# Patient Record
Sex: MALE | Race: WHITE | NOT HISPANIC OR LATINO | Employment: OTHER | URBAN - METROPOLITAN AREA
[De-identification: names, ages, dates, MRNs, and addresses within clinical notes are randomized per-mention and may not be internally consistent; named-entity substitution may affect disease eponyms.]

---

## 2018-03-08 ENCOUNTER — TELEPHONE CONVERTED (OUTPATIENT)
Dept: PULMONOLOGY | Facility: CLINIC | Age: 78
End: 2018-03-08

## 2018-03-14 ENCOUNTER — OFFICE VISIT CONVERTED (OUTPATIENT)
Dept: PULMONOLOGY | Facility: CLINIC | Age: 78
End: 2018-03-14
Attending: NURSE PRACTITIONER

## 2018-03-15 ENCOUNTER — TELEPHONE CONVERTED (OUTPATIENT)
Dept: PULMONOLOGY | Facility: CLINIC | Age: 78
End: 2018-03-15

## 2018-04-11 ENCOUNTER — TELEPHONE CONVERTED (OUTPATIENT)
Dept: PULMONOLOGY | Facility: CLINIC | Age: 78
End: 2018-04-11

## 2018-04-13 ENCOUNTER — TELEPHONE CONVERTED (OUTPATIENT)
Dept: PULMONOLOGY | Facility: CLINIC | Age: 78
End: 2018-04-13

## 2018-05-08 ENCOUNTER — TELEPHONE CONVERTED (OUTPATIENT)
Dept: PULMONOLOGY | Facility: CLINIC | Age: 78
End: 2018-05-08

## 2018-06-05 ENCOUNTER — TELEPHONE CONVERTED (OUTPATIENT)
Dept: PULMONOLOGY | Facility: CLINIC | Age: 78
End: 2018-06-05

## 2018-06-08 ENCOUNTER — OFFICE VISIT CONVERTED (OUTPATIENT)
Dept: PULMONOLOGY | Facility: CLINIC | Age: 78
End: 2018-06-08
Attending: NURSE PRACTITIONER

## 2018-06-25 ENCOUNTER — OFFICE VISIT CONVERTED (OUTPATIENT)
Dept: PULMONOLOGY | Facility: CLINIC | Age: 78
End: 2018-06-25
Attending: PHYSICIAN ASSISTANT

## 2018-09-20 ENCOUNTER — OFFICE VISIT CONVERTED (OUTPATIENT)
Dept: PULMONOLOGY | Facility: CLINIC | Age: 78
End: 2018-09-20
Attending: INTERNAL MEDICINE

## 2018-12-04 ENCOUNTER — OFFICE VISIT CONVERTED (OUTPATIENT)
Dept: PULMONOLOGY | Facility: CLINIC | Age: 78
End: 2018-12-04
Attending: PHYSICIAN ASSISTANT

## 2018-12-10 ENCOUNTER — OFFICE VISIT CONVERTED (OUTPATIENT)
Dept: PULMONOLOGY | Facility: CLINIC | Age: 78
End: 2018-12-10
Attending: INTERNAL MEDICINE

## 2019-01-07 ENCOUNTER — OFFICE VISIT CONVERTED (OUTPATIENT)
Dept: PULMONOLOGY | Facility: CLINIC | Age: 79
End: 2019-01-07
Attending: INTERNAL MEDICINE

## 2019-02-12 ENCOUNTER — OFFICE VISIT CONVERTED (OUTPATIENT)
Dept: PULMONOLOGY | Facility: CLINIC | Age: 79
End: 2019-02-12
Attending: NURSE PRACTITIONER

## 2019-02-12 ENCOUNTER — HOSPITAL ENCOUNTER (OUTPATIENT)
Dept: ONCOLOGY | Facility: HOSPITAL | Age: 79
Discharge: HOME | End: 2019-02-12
Attending: NURSE PRACTITIONER

## 2019-02-12 LAB
ALBUMIN SERPL-MCNC: 4.1 G/DL (ref 3.5–5)
ALBUMIN/GLOB SERPL: 1.7 {RATIO} (ref 1.4–2.6)
ALP SERPL-CCNC: 103 U/L (ref 56–155)
ALT SERPL-CCNC: 18 U/L (ref 10–40)
ANION GAP SERPL CALC-SCNC: 15 MMOL/L (ref 8–19)
AST SERPL-CCNC: 23 U/L (ref 15–50)
BASOPHILS # BLD AUTO: 0.01 10*3/UL (ref 0–0.2)
BASOPHILS NFR BLD AUTO: 0.17 % (ref 0–3)
BILIRUB SERPL-MCNC: 0.52 MG/DL (ref 0.2–1.3)
BUN SERPL-MCNC: 13 MG/DL (ref 5–25)
BUN/CREAT SERPL: 15 {RATIO} (ref 6–20)
CALCIUM SERPL-MCNC: 9.5 MG/DL (ref 8.7–10.4)
CHLORIDE SERPL-SCNC: 104 MMOL/L (ref 99–111)
CONV CO2: 28 MMOL/L (ref 22–32)
CONV TOTAL PROTEIN: 6.5 G/DL (ref 6.3–8.2)
CREAT UR-MCNC: 0.87 MG/DL (ref 0.7–1.2)
EOSINOPHIL # BLD AUTO: 0.16 10*3/UL (ref 0–0.7)
EOSINOPHIL # BLD AUTO: 2.25 % (ref 0–7)
ERYTHROCYTE [DISTWIDTH] IN BLOOD BY AUTOMATED COUNT: 14.6 % (ref 11.5–14.5)
GFR SERPLBLD BASED ON 1.73 SQ M-ARVRAT: >60 ML/MIN/{1.73_M2}
GLOBULIN UR ELPH-MCNC: 2.4 G/DL (ref 2–3.5)
GLUCOSE SERPL-MCNC: 84 MG/DL (ref 70–99)
HBA1C MFR BLD: 13.9 G/DL (ref 14–18)
HCT VFR BLD AUTO: 41.3 % (ref 42–52)
LDH SERPL-CCNC: 214 U/L (ref 120–240)
LYMPHOCYTES # BLD AUTO: 0.95 10*3/UL (ref 1–5)
MCH RBC QN AUTO: 28.5 PG (ref 27–31)
MCHC RBC AUTO-ENTMCNC: 33.5 G/DL (ref 33–37)
MCV RBC AUTO: 85.1 FL (ref 80–96)
MONOCYTES # BLD AUTO: 0.65 10*3/UL (ref 0.2–1.2)
MONOCYTES NFR BLD AUTO: 8.99 % (ref 3–10)
NEUTROPHILS # BLD AUTO: 5.44 10*3/UL (ref 2–8)
NEUTROPHILS NFR BLD AUTO: 75.4 % (ref 30–85)
NRBC BLD AUTO-RTO: 0 % (ref 0–0.01)
OSMOLALITY SERPL CALC.SUM OF ELEC: 295 MOSM/KG (ref 273–304)
PLATELET # BLD AUTO: 149 10*3/UL (ref 130–400)
PMV BLD AUTO: 8.6 FL (ref 7.4–10.4)
POTASSIUM SERPL-SCNC: 4.2 MMOL/L (ref 3.5–5.3)
RBC # BLD AUTO: 4.86 10*6/UL (ref 4.7–6.1)
SODIUM SERPL-SCNC: 143 MMOL/L (ref 135–147)
VARIANT LYMPHS NFR BLD MANUAL: 13.2 % (ref 20–45)
WBC # BLD AUTO: 7.21 10*3/UL (ref 4.8–10.8)

## 2019-02-18 ENCOUNTER — HOSPITAL ENCOUNTER (OUTPATIENT)
Dept: GENERAL RADIOLOGY | Facility: HOSPITAL | Age: 79
Discharge: HOME | End: 2019-02-18
Attending: PHYSICIAN ASSISTANT

## 2019-02-26 ENCOUNTER — OFFICE VISIT CONVERTED (OUTPATIENT)
Dept: PULMONOLOGY | Facility: CLINIC | Age: 79
End: 2019-02-26
Attending: INTERNAL MEDICINE

## 2019-03-15 ENCOUNTER — HOSPITAL ENCOUNTER (OUTPATIENT)
Dept: OTHER | Facility: HOSPITAL | Age: 79
Discharge: HOME | End: 2019-03-15
Attending: SPECIALIST

## 2019-03-15 LAB
ALBUMIN SERPL-MCNC: 4 G/DL (ref 3.5–5)
ALBUMIN/GLOB SERPL: 1.5 {RATIO} (ref 1.4–2.6)
ALP SERPL-CCNC: 115 U/L (ref 56–155)
ALT SERPL-CCNC: 19 U/L (ref 10–40)
ANION GAP SERPL CALC-SCNC: 13 MMOL/L (ref 8–19)
AST SERPL-CCNC: 25 U/L (ref 15–50)
BILIRUB SERPL-MCNC: 0.46 MG/DL (ref 0.2–1.3)
BUN SERPL-MCNC: 10 MG/DL (ref 5–25)
BUN/CREAT SERPL: 14 {RATIO} (ref 6–20)
CALCIUM SERPL-MCNC: 9.1 MG/DL (ref 8.7–10.4)
CHLORIDE SERPL-SCNC: 105 MMOL/L (ref 99–111)
CHOLEST SERPL-MCNC: 158 MG/DL (ref 107–200)
CHOLEST/HDLC SERPL: 1.8 {RATIO} (ref 3–6)
CONV CO2: 29 MMOL/L (ref 22–32)
CONV TOTAL PROTEIN: 6.7 G/DL (ref 6.3–8.2)
CREAT UR-MCNC: 0.72 MG/DL (ref 0.7–1.2)
GFR SERPLBLD BASED ON 1.73 SQ M-ARVRAT: >60 ML/MIN/{1.73_M2}
GLOBULIN UR ELPH-MCNC: 2.7 G/DL (ref 2–3.5)
GLUCOSE SERPL-MCNC: 97 MG/DL (ref 70–99)
HDLC SERPL-MCNC: 88 MG/DL (ref 40–60)
LDLC SERPL CALC-MCNC: 61 MG/DL (ref 70–100)
OSMOLALITY SERPL CALC.SUM OF ELEC: 295 MOSM/KG (ref 273–304)
POTASSIUM SERPL-SCNC: 4.3 MMOL/L (ref 3.5–5.3)
SODIUM SERPL-SCNC: 143 MMOL/L (ref 135–147)
TRIGL SERPL-MCNC: 43 MG/DL (ref 40–150)
VLDLC SERPL-MCNC: 9 MG/DL (ref 5–37)

## 2019-04-03 ENCOUNTER — HOSPITAL ENCOUNTER (OUTPATIENT)
Dept: ONCOLOGY | Facility: HOSPITAL | Age: 79
Discharge: HOME | End: 2019-04-03
Attending: NURSE PRACTITIONER

## 2019-04-03 ENCOUNTER — OFFICE VISIT CONVERTED (OUTPATIENT)
Dept: PULMONOLOGY | Facility: CLINIC | Age: 79
End: 2019-04-03
Attending: NURSE PRACTITIONER

## 2019-04-24 ENCOUNTER — OFFICE VISIT CONVERTED (OUTPATIENT)
Dept: PULMONOLOGY | Facility: CLINIC | Age: 79
End: 2019-04-24
Attending: NURSE PRACTITIONER

## 2019-04-24 ENCOUNTER — HOSPITAL ENCOUNTER (OUTPATIENT)
Dept: OTHER | Facility: HOSPITAL | Age: 79
Discharge: HOME | End: 2019-04-24
Attending: NURSE PRACTITIONER

## 2019-05-15 ENCOUNTER — HOSPITAL ENCOUNTER (OUTPATIENT)
Dept: GENERAL RADIOLOGY | Facility: HOSPITAL | Age: 79
Discharge: HOME | End: 2019-05-15
Attending: INTERNAL MEDICINE

## 2019-05-28 ENCOUNTER — OFFICE VISIT CONVERTED (OUTPATIENT)
Dept: PULMONOLOGY | Facility: CLINIC | Age: 79
End: 2019-05-28
Attending: PHYSICIAN ASSISTANT

## 2019-08-28 ENCOUNTER — HOSPITAL ENCOUNTER (OUTPATIENT)
Dept: GENERAL RADIOLOGY | Facility: HOSPITAL | Age: 79
Discharge: HOME | End: 2019-08-28
Attending: PHYSICIAN ASSISTANT

## 2019-09-13 ENCOUNTER — OFFICE VISIT CONVERTED (OUTPATIENT)
Dept: PULMONOLOGY | Facility: CLINIC | Age: 79
End: 2019-09-13
Attending: INTERNAL MEDICINE

## 2019-10-04 ENCOUNTER — HOSPITAL ENCOUNTER (OUTPATIENT)
Dept: URGENT CARE | Facility: CLINIC | Age: 79
Discharge: HOME | End: 2019-10-04

## 2019-10-17 ENCOUNTER — CONVERSION ENCOUNTER (OUTPATIENT)
Dept: NEUROLOGY | Facility: CLINIC | Age: 79
End: 2019-10-17

## 2019-10-17 ENCOUNTER — OFFICE VISIT CONVERTED (OUTPATIENT)
Dept: NEUROSURGERY | Facility: CLINIC | Age: 79
End: 2019-10-17
Attending: PHYSICIAN ASSISTANT

## 2019-10-29 ENCOUNTER — OFFICE VISIT CONVERTED (OUTPATIENT)
Dept: PULMONOLOGY | Facility: CLINIC | Age: 79
End: 2019-10-29
Attending: PHYSICIAN ASSISTANT

## 2019-11-07 ENCOUNTER — HOSPITAL ENCOUNTER (OUTPATIENT)
Dept: ONCOLOGY | Facility: HOSPITAL | Age: 79
Discharge: HOME | End: 2019-11-07
Attending: NURSE PRACTITIONER

## 2019-11-07 ENCOUNTER — OFFICE VISIT CONVERTED (OUTPATIENT)
Dept: PULMONOLOGY | Facility: CLINIC | Age: 79
End: 2019-11-07
Attending: NURSE PRACTITIONER

## 2020-05-07 ENCOUNTER — HOSPITAL ENCOUNTER (OUTPATIENT)
Dept: ONCOLOGY | Facility: HOSPITAL | Age: 80
Discharge: HOME | End: 2020-05-07
Attending: NURSE PRACTITIONER

## 2020-05-07 ENCOUNTER — OFFICE VISIT CONVERTED (OUTPATIENT)
Dept: PULMONOLOGY | Facility: CLINIC | Age: 80
End: 2020-05-07
Attending: NURSE PRACTITIONER

## 2020-05-07 LAB
BASOPHILS # BLD AUTO: 0.04 10*3/UL (ref 0–0.2)
BASOPHILS NFR BLD AUTO: 0.5 % (ref 0–3)
CONV ABS IMM GRAN: 0.05 10*3/UL (ref 0–0.2)
CONV IMMATURE GRAN: 0.7 % (ref 0–1.8)
DEPRECATED RDW RBC AUTO: 48.2 FL (ref 35.1–43.9)
EOSINOPHIL # BLD AUTO: 0.11 10*3/UL (ref 0–0.7)
EOSINOPHIL # BLD AUTO: 1.5 % (ref 0–7)
ERYTHROCYTE [DISTWIDTH] IN BLOOD BY AUTOMATED COUNT: 14.3 % (ref 11.6–14.4)
HCT VFR BLD AUTO: 43.9 % (ref 42–52)
HGB BLD-MCNC: 13.8 G/DL (ref 14–18)
LYMPHOCYTES # BLD AUTO: 1.39 10*3/UL (ref 1–5)
LYMPHOCYTES NFR BLD AUTO: 18.7 % (ref 20–45)
MCH RBC QN AUTO: 28.9 PG (ref 27–31)
MCHC RBC AUTO-ENTMCNC: 31.4 G/DL (ref 33–37)
MCV RBC AUTO: 91.8 FL (ref 80–96)
MONOCYTES # BLD AUTO: 0.67 10*3/UL (ref 0.2–1.2)
MONOCYTES NFR BLD AUTO: 9 % (ref 3–10)
NEUTROPHILS # BLD AUTO: 5.17 10*3/UL (ref 2–8)
NEUTROPHILS NFR BLD AUTO: 69.6 % (ref 30–85)
NRBC CBCN: 0 % (ref 0–0.7)
PLATELET # BLD AUTO: 192 10*3/UL (ref 130–400)
PMV BLD AUTO: 10.9 FL (ref 9.4–12.4)
RBC # BLD AUTO: 4.78 10*6/UL (ref 4.7–6.1)
WBC # BLD AUTO: 7.43 10*3/UL (ref 4.8–10.8)

## 2020-05-20 ENCOUNTER — OFFICE VISIT CONVERTED (OUTPATIENT)
Dept: PULMONOLOGY | Facility: CLINIC | Age: 80
End: 2020-05-20
Attending: PHYSICIAN ASSISTANT

## 2020-06-16 ENCOUNTER — HOSPITAL ENCOUNTER (OUTPATIENT)
Dept: GENERAL RADIOLOGY | Facility: HOSPITAL | Age: 80
Discharge: HOME | End: 2020-06-16
Attending: PHYSICIAN ASSISTANT

## 2020-07-20 ENCOUNTER — HOSPITAL ENCOUNTER (OUTPATIENT)
Dept: GENERAL RADIOLOGY | Facility: HOSPITAL | Age: 80
Discharge: HOME | End: 2020-07-20
Attending: NURSE PRACTITIONER

## 2020-07-22 ENCOUNTER — OFFICE VISIT CONVERTED (OUTPATIENT)
Dept: PULMONOLOGY | Facility: CLINIC | Age: 80
End: 2020-07-22
Attending: PHYSICIAN ASSISTANT

## 2020-09-17 ENCOUNTER — HOSPITAL ENCOUNTER (OUTPATIENT)
Dept: GENERAL RADIOLOGY | Facility: HOSPITAL | Age: 80
Discharge: HOME | End: 2020-09-17
Attending: PHYSICIAN ASSISTANT

## 2020-10-27 ENCOUNTER — OFFICE VISIT CONVERTED (OUTPATIENT)
Dept: ONCOLOGY | Facility: HOSPITAL | Age: 80
End: 2020-10-27
Attending: NURSE PRACTITIONER

## 2020-10-27 ENCOUNTER — HOSPITAL ENCOUNTER (OUTPATIENT)
Dept: ONCOLOGY | Facility: HOSPITAL | Age: 80
Discharge: HOME | End: 2020-10-27
Attending: NURSE PRACTITIONER

## 2020-10-27 LAB
ALBUMIN SERPL-MCNC: 4.1 G/DL (ref 3.5–5)
ALBUMIN/GLOB SERPL: 1.9 {RATIO} (ref 1.4–2.6)
ALP SERPL-CCNC: 123 U/L (ref 56–155)
ALT SERPL-CCNC: 18 U/L (ref 10–40)
ANION GAP SERPL CALC-SCNC: 13 MMOL/L (ref 8–19)
AST SERPL-CCNC: 22 U/L (ref 15–50)
BASOPHILS # BLD AUTO: 0.04 10*3/UL (ref 0–0.2)
BASOPHILS NFR BLD AUTO: 0.5 % (ref 0–3)
BILIRUB SERPL-MCNC: 0.31 MG/DL (ref 0.2–1.3)
BUN SERPL-MCNC: 11 MG/DL (ref 5–25)
BUN/CREAT SERPL: 14 {RATIO} (ref 6–20)
CALCIUM SERPL-MCNC: 9.1 MG/DL (ref 8.7–10.4)
CHLORIDE SERPL-SCNC: 101 MMOL/L (ref 99–111)
CONV ABS IMM GRAN: 0.03 10*3/UL (ref 0–0.2)
CONV CO2: 29 MMOL/L (ref 22–32)
CONV IMMATURE GRAN: 0.4 % (ref 0–1.8)
CONV TOTAL PROTEIN: 6.3 G/DL (ref 6.3–8.2)
CREAT UR-MCNC: 0.8 MG/DL (ref 0.7–1.2)
DEPRECATED RDW RBC AUTO: 48.2 FL (ref 35.1–43.9)
EOSINOPHIL # BLD AUTO: 0.13 10*3/UL (ref 0–0.7)
EOSINOPHIL # BLD AUTO: 1.6 % (ref 0–7)
ERYTHROCYTE [DISTWIDTH] IN BLOOD BY AUTOMATED COUNT: 14.4 % (ref 11.6–14.4)
GFR SERPLBLD BASED ON 1.73 SQ M-ARVRAT: >60 ML/MIN/{1.73_M2}
GLOBULIN UR ELPH-MCNC: 2.2 G/DL (ref 2–3.5)
GLUCOSE SERPL-MCNC: 123 MG/DL (ref 70–99)
HCT VFR BLD AUTO: 42.8 % (ref 42–52)
HGB BLD-MCNC: 13.5 G/DL (ref 14–18)
LYMPHOCYTES # BLD AUTO: 1.36 10*3/UL (ref 1–5)
LYMPHOCYTES NFR BLD AUTO: 17.2 % (ref 20–45)
MCH RBC QN AUTO: 28.7 PG (ref 27–31)
MCHC RBC AUTO-ENTMCNC: 31.5 G/DL (ref 33–37)
MCV RBC AUTO: 90.9 FL (ref 80–96)
MONOCYTES # BLD AUTO: 0.6 10*3/UL (ref 0.2–1.2)
MONOCYTES NFR BLD AUTO: 7.6 % (ref 3–10)
NEUTROPHILS # BLD AUTO: 5.77 10*3/UL (ref 2–8)
NEUTROPHILS NFR BLD AUTO: 72.7 % (ref 30–85)
NRBC CBCN: 0 % (ref 0–0.7)
OSMOLALITY SERPL CALC.SUM OF ELEC: 289 MOSM/KG (ref 273–304)
PLATELET # BLD AUTO: 184 10*3/UL (ref 130–400)
PMV BLD AUTO: 11 FL (ref 9.4–12.4)
POTASSIUM SERPL-SCNC: 4 MMOL/L (ref 3.5–5.3)
RBC # BLD AUTO: 4.71 10*6/UL (ref 4.7–6.1)
SODIUM SERPL-SCNC: 139 MMOL/L (ref 135–147)
WBC # BLD AUTO: 7.93 10*3/UL (ref 4.8–10.8)

## 2020-11-06 ENCOUNTER — OFFICE VISIT CONVERTED (OUTPATIENT)
Dept: NEUROLOGY | Facility: CLINIC | Age: 80
End: 2020-11-06
Attending: PSYCHIATRY & NEUROLOGY

## 2020-11-13 ENCOUNTER — OFFICE VISIT CONVERTED (OUTPATIENT)
Dept: ORTHOPEDIC SURGERY | Facility: CLINIC | Age: 80
End: 2020-11-13
Attending: ORTHOPAEDIC SURGERY

## 2021-03-02 ENCOUNTER — HOSPITAL ENCOUNTER (OUTPATIENT)
Dept: GENERAL RADIOLOGY | Facility: HOSPITAL | Age: 81
Discharge: HOME | End: 2021-03-02
Attending: PHYSICIAN ASSISTANT

## 2021-04-02 ENCOUNTER — HOSPITAL ENCOUNTER (OUTPATIENT)
Dept: OTHER | Facility: HOSPITAL | Age: 81
Discharge: HOME | End: 2021-04-02
Attending: SPECIALIST

## 2021-04-02 LAB
25(OH)D3 SERPL-MCNC: 58.7 NG/ML (ref 30–100)
ALBUMIN SERPL-MCNC: 4 G/DL (ref 3.5–5)
ALBUMIN/GLOB SERPL: 1.8 {RATIO} (ref 1.4–2.6)
ALP SERPL-CCNC: 132 U/L (ref 56–155)
ALT SERPL-CCNC: 17 U/L (ref 10–40)
ANION GAP SERPL CALC-SCNC: 15 MMOL/L (ref 8–19)
AST SERPL-CCNC: 20 U/L (ref 15–50)
BILIRUB SERPL-MCNC: 0.57 MG/DL (ref 0.2–1.3)
BUN SERPL-MCNC: 11 MG/DL (ref 5–25)
BUN/CREAT SERPL: 14 {RATIO} (ref 6–20)
CALCIUM SERPL-MCNC: 9.3 MG/DL (ref 8.7–10.4)
CHLORIDE SERPL-SCNC: 105 MMOL/L (ref 99–111)
CHOLEST SERPL-MCNC: 149 MG/DL (ref 107–200)
CHOLEST/HDLC SERPL: 1.9 {RATIO} (ref 3–6)
CONV CO2: 28 MMOL/L (ref 22–32)
CONV TOTAL PROTEIN: 6.2 G/DL (ref 6.3–8.2)
CREAT UR-MCNC: 0.76 MG/DL (ref 0.7–1.2)
GFR SERPLBLD BASED ON 1.73 SQ M-ARVRAT: >60 ML/MIN/{1.73_M2}
GLOBULIN UR ELPH-MCNC: 2.2 G/DL (ref 2–3.5)
GLUCOSE SERPL-MCNC: 96 MG/DL (ref 70–99)
HDLC SERPL-MCNC: 79 MG/DL (ref 40–60)
LDLC SERPL CALC-MCNC: 59 MG/DL (ref 70–100)
OSMOLALITY SERPL CALC.SUM OF ELEC: 297 MOSM/KG (ref 273–304)
POTASSIUM SERPL-SCNC: 4.1 MMOL/L (ref 3.5–5.3)
SODIUM SERPL-SCNC: 144 MMOL/L (ref 135–147)
TRIGL SERPL-MCNC: 54 MG/DL (ref 40–150)
VLDLC SERPL-MCNC: 11 MG/DL (ref 5–37)

## 2021-04-19 ENCOUNTER — OFFICE VISIT CONVERTED (OUTPATIENT)
Dept: ONCOLOGY | Facility: HOSPITAL | Age: 81
End: 2021-04-19
Attending: INTERNAL MEDICINE

## 2021-04-27 ENCOUNTER — OFFICE VISIT CONVERTED (OUTPATIENT)
Dept: ONCOLOGY | Facility: HOSPITAL | Age: 81
End: 2021-04-27
Attending: NURSE PRACTITIONER

## 2021-04-27 ENCOUNTER — HOSPITAL ENCOUNTER (OUTPATIENT)
Dept: ONCOLOGY | Facility: HOSPITAL | Age: 81
Discharge: HOME | End: 2021-04-27
Attending: NURSE PRACTITIONER

## 2021-05-10 NOTE — H&P
History and Physical      Patient Name: Carlos Zimmerman   Patient ID: [id number]   Sex: Male   YOB: 1940    Primary Care Provider: Payam Carroll MD   Referring Provider: Claribel ULLOA    Visit Date: November 13, 2020    Provider: Jody Nieto MD   Location: [facility]   Location Address: [address] [place] [address]  [id number]   Location Phone: [phone]          Chief Complaint  · Bilateral Carpal Tunnel Syndrome      History Of Present Illness  Carlos Zimmerman is a 80 year old /White male who presents today to [facility].      Patient presents today for an evaluation of bilateral hand pain. Patient presents today with an EMG study of bilateral hands revealing carpal tunnel syndrome. Patient states he has numbness and tingling in bilateral hands after having chemo in 2017. He states both hands bother him equally. He states 24 hours a day his hands feels like when your foot falls asleep.       Past Medical History  Cancer; COPD (chronic obstructive pulmonary disease); High blood pressure; Hypertension; Hypertension, Benign Essential; Lung cancer; Pain, Lumbar         Past Surgical History  Esophagus wrap; Port Placement; shoulder repair         Medication List  amlodipine 5 mg oral tablet; aspirin 325 mg oral tablet; B Complex 1 oral tablet; cetirizine 10 mg oral tablet; cilostazol 100 mg oral tablet; cyclobenzaprine 10 mg oral tablet; gabapentin 600 mg oral tablet; metoprolol succinate 25 mg oral tablet extended release 24 hr; Multi Vitamin 9 mg iron/15 mL oral liquid; pravastatin 40 mg oral tablet; Protonix oral; Stiolto Respimat 2.5-2.5 mcg/actuation inhalation mist; Vitamin D2 50,000 unit oral capsule         Allergy List  NO KNOWN DRUG ALLERGIES       Allergies Reconciled  Family Medical History  Cerebrovascular disease; *No Known Family History         Social History  Alcohol (Current some day); Caffeine (Current - status unknown); lives  "with spouse; ; Retired; Tobacco (Former)         Review of Systems  · Constitutional  o Denies  o : fever, chills, weight loss  · Cardiovascular  o Denies  o : chest pain, shortness of breath  · Gastrointestinal  o Denies  o : liver disease, heartburn, nausea, blood in stools  · Genitourinary  o Denies  o : painful urination, blood in urine  · Integument  o Denies  o : rash, itching  · Neurologic  o Denies  o : headache, weakness, loss of consciousness  · Musculoskeletal  o Denies  o : painful, swollen joints  · Psychiatric  o Denies  o : drug/alcohol addiction, anxiety, depression      Vitals  Date Time BP Position Site L\R Cuff Size HR RR TEMP (F) WT  HT  BMI kg/m2 BSA m2 O2 Sat FR L/min FiO2        11/13/2020 08:57 AM         159lbs 0oz 5'  7\" 24.9 1.85             Physical Examination  · Constitutional  o Appearance  o : well developed, well-nourished, no obvious deformities present  · Head and Face  o Head  o :   § Inspection  § : normocephalic  o Face  o :   § Inspection  § : no facial lesions  · Eyes  o Conjunctivae  o : conjunctivae normal  o Sclerae  o : sclerae white  · Ears, Nose, Mouth and Throat  o Ears  o :   § External Ears  § : appearance within normal limits  § Hearing  § : intact  o Nose  o :   § External Nose  § : appearance normal  · Neck  o Inspection/Palpation  o : normal appearance  o Range of Motion  o : full range of motion  · Respiratory  o Respiratory Effort  o : breathing unlabored  o Inspection of Chest  o : normal appearance  o Auscultation of Lungs  o : no audible wheezing or rales  · Cardiovascular  o Heart  o : regular rate  · Gastrointestinal  o Abdominal Examination  o : soft and non-tender  · Skin and Subcutaneous Tissue  o General Inspection  o : intact, no rashes  · Psychiatric  o General  o : Alert and oriented x3  o Judgement and Insight  o : judgment and insight intact  o Mood and Affect  o : mood normal, affect appropriate  · Extremities  o Extremities  o : " BILATERAL HANDS: Sensation grossly intact. Neurovascular intact. Skin intact. Full ROM. Patient able to wiggle fingers and make a fist. Full wrist extension, full wrist flexion, full , full thumb opposition, full PIP flexors, full DIP flexors, full PIP extensors, full finger adduction, full finger abduction. Radial pulse 2+, ulnar pulse 2+. No swelling, skin discoloration or atrophy.   · Imaging  o Imaging  o : 11/6/20 EMG: Severe neuropathy. Possible left carpal tunnel syndrome.           Assessment  · Bilateral carpal tunnel syndrome     354.0/G56.03  · Bilateral hand neuropathy     355.9/G62.9      Plan  · Medications  o Medications have been Reconciled  o Transition of Care or Provider Policy  · Instructions  o Dr. Nieto saw and examined the patient and agrees with plan.   o EMG reviewed by Dr. Nieto.  o Reviewed the patient's Past Medical, Social, and Family history as well as the ROS at today's visit, no changes.  o Call or return if worsening symptoms.  o Follow Up PRN.  o This note was transcribed by Alina Cochran.   o Discussed diagnosis and treatment options with the patient. Patient opted for braces at this time. He will call back if pain worsens or doesn't improve.            Electronically Signed by: Alina Cochran-, Other -Author on November 13, 2020 01:27:00 PM  Electronically Co-signed by: Jody Nieto MD -Reviewer on November 14, 2020 09:48:38 AM

## 2021-05-10 NOTE — H&P
History and Physical      Patient Name: Carlos Zimmerman   Patient ID: [id number]   Sex: Male   YOB: 1940    Primary Care Provider: Payam Carroll MD   Referring Provider: Sharda López DO    Visit Date: November 6, 2020    Provider: Carlos Landa MD   Location: [facility]   Location Address: [address] [place] [address]  [id number]   Location Phone: [phone]          Chief Complaint     BUE numbness and tingling       History Of Present Illness  Carlos Zimmerman is a 80 year old /White male who presents today to [facility] today referred from Sharda López DO.      80-year-old man evaluated for bilateral hand numbness and tingling.  It is slightly worse in the left hand.  He states that the left hand has no .  This started in 2013 when he had chemotherapy.  His legs are also numb and tingly.  He never told his cancer doctor that he had pain.  He was given gabapentin and they want to get a second opinion whether or not he can continue taking gabapentin.  He states that his hands are always numb and tingly.  It is mainly tingly all the time.  It involves the whole hand.       Past Medical History  Cancer; COPD (chronic obstructive pulmonary disease); High blood pressure; Hypertension; Hypertension, Benign Essential; Lung cancer; Pain, Lumbar         Past Surgical History  Esophagus wrap; Port Placement; shoulder repair         Medication List  amlodipine 5 mg oral tablet; aspirin 325 mg oral tablet; B Complex 1 oral tablet; cetirizine 10 mg oral tablet; cilostazol 100 mg oral tablet; cyclobenzaprine 10 mg oral tablet; gabapentin 600 mg oral tablet; metoprolol succinate 25 mg oral tablet extended release 24 hr; Multi Vitamin 9 mg iron/15 mL oral liquid; pravastatin 40 mg oral tablet; Protonix oral; Stiolto Respimat 2.5-2.5 mcg/actuation inhalation mist; Vitamin D2 50,000 unit oral capsule         Allergy List  NO KNOWN DRUG ALLERGIES  "      Allergies Reconciled  Family Medical History  Cerebrovascular disease; *No Known Family History         Social History  Alcohol (Current some day); Caffeine (Current - status unknown); lives with spouse; ; Retired; Tobacco (Former)         Review of Systems  · Constitutional  o Denies  o : chills, excessive sweating, fatigue, fever, sycope/passing out, weight gain, weight loss  · Eyes  o Denies  o : changes in vision, blurry vision, double vision  · HENT  o Denies  o : loss of hearing, ringing in the ears, ear aches, sore throat, nasal congestion, sinus pain, nose bleeds, seasonal allergies  · Cardiovascular  o Admits  o : easy burising or bleeding  o Denies  o : blood clots, swollen legs, anemia, transfusions  · Respiratory  o Denies  o : shortness of breath, dry cough, productive cough, pneumonia, COPD  · Gastrointestinal  o Denies  o : difficulty swallowing, reflux  · Genitourinary  o Denies  o : incontinence  · Neurologic  o Admits  o : numbness/tingling/paresthesia   o Denies  o : headache, seizure, stroke, tremor, loss of balance, falls, dizziness/vertigo, difficulty with sleep, difficulty with coordination, difficulty with dexterity, weakness  · Musculoskeletal  o Denies  o : neck stiffness/pain, swollen lymph nodes, muscle aches, joint pain, weakness, spasms, sciatica, pain radiating in arm, pain radiating in leg, low back pain  · Endocrine  o Denies  o : diabetes, thyroid disorder  · Psychiatric  o Denies  o : anxiety, depression      Vitals  Date Time BP Position Site L\R Cuff Size HR RR TEMP (F) WT  HT  BMI kg/m2 BSA m2 O2 Sat FR L/min FiO2        11/06/2020 08:34 /94 Sitting    106 - R 18 97.1 159lbs 0oz 5'  7\" 24.9 1.85             Physical Examination     There is no weakness of the right upper extremity and with muscle testing.  There is no weakness of intrinsic hand muscles.  On the left hand he has proximal weakness secondary to an old injury when he was younger.  There is " significant asymmetry and the strength of the left abductor pollicis brevis compared to the ulnar innervated muscles including the first dorsal interossei.           Assessment  · Carpal tunnel syndrome     354.0/G56.00  I would like for him to be seen by orthopedic surgery to have steroid injections to the carpal tunnel bilaterally to see if it helps his symptoms. Should the steroid injections help him significantly I would recommend for him to have carpal tunnel surgery to the left hand.    10 minutes was spent for this straightforward encounter more than half the time spent face-to-face with the patient for examination, counseling, planning and recommendations.  · Numbness and tingling       Anesthesia of skin     782.0/R20.0  Paresthesia of skin     782.0/R20.2  · Peripheral neuropathy     356.9/G62.9  The study is abnormal shows electrophysiologic evidence for severe sensorimotor axonal polyneuropathy. There is significant asymmetry in the left median motor response compared to the left median ulnar motor response which the diagnosis of carpal tunnel is suspected.    I discussed with him that gabapentin is not going to help his numbness and tingling. I would recommend for him to wean himself off gabapentin. He is to take 1 tablet 3 times a day this week twice a day next week, once a day the third week and then stop taking gabapentin.      Plan  · Orders  o ORTHOPEDIC CONSULTATION (ORTHO) - 356.9/G62.9, 354.0/G56.00 - 11/06/2020   I would like the patient to have steroid injections to the carpal tunnel bilaterally and if this is helpful to him significantly he may need carpal tunnel surgery to the left hand.  o Nerve conduction studies; 7-8 studies (68774) - 356.9/G62.9, 354.0/G56.00, 782.0/R20.0, 782.0/R20.2 - 11/06/2020  · Medications  o Medications have been Reconciled  o Transition of Care or Provider Policy  · Instructions  o Encouraged to follow-up with Primary Care Provider for preventative  care.            Electronically Signed by: Carlos Landa MD -Author on November 6, 2020 10:32:40 AM

## 2021-05-14 VITALS — BODY MASS INDEX: 24.96 KG/M2 | HEIGHT: 67 IN | WEIGHT: 159 LBS

## 2021-05-14 VITALS
DIASTOLIC BLOOD PRESSURE: 94 MMHG | BODY MASS INDEX: 24.96 KG/M2 | HEART RATE: 106 BPM | WEIGHT: 159 LBS | TEMPERATURE: 97.1 F | SYSTOLIC BLOOD PRESSURE: 130 MMHG | HEIGHT: 67 IN | RESPIRATION RATE: 18 BRPM

## 2021-05-15 VITALS
HEIGHT: 67 IN | SYSTOLIC BLOOD PRESSURE: 119 MMHG | WEIGHT: 158 LBS | DIASTOLIC BLOOD PRESSURE: 98 MMHG | BODY MASS INDEX: 24.8 KG/M2

## 2021-05-28 VITALS
WEIGHT: 157.85 LBS | DIASTOLIC BLOOD PRESSURE: 70 MMHG | BODY MASS INDEX: 24.39 KG/M2 | WEIGHT: 158.29 LBS | DIASTOLIC BLOOD PRESSURE: 60 MMHG | DIASTOLIC BLOOD PRESSURE: 66 MMHG | HEART RATE: 110 BPM | OXYGEN SATURATION: 99 % | HEART RATE: 112 BPM | SYSTOLIC BLOOD PRESSURE: 119 MMHG | DIASTOLIC BLOOD PRESSURE: 69 MMHG | TEMPERATURE: 97.7 F | TEMPERATURE: 98.1 F | BODY MASS INDEX: 23.67 KG/M2 | DIASTOLIC BLOOD PRESSURE: 77 MMHG | HEART RATE: 103 BPM | WEIGHT: 157.85 LBS | RESPIRATION RATE: 16 BRPM | OXYGEN SATURATION: 96 % | BODY MASS INDEX: 24.78 KG/M2 | HEART RATE: 100 BPM | OXYGEN SATURATION: 100 % | RESPIRATION RATE: 20 BRPM | BODY MASS INDEX: 24.84 KG/M2 | OXYGEN SATURATION: 95 % | RESPIRATION RATE: 20 BRPM | OXYGEN SATURATION: 96 % | HEART RATE: 101 BPM | TEMPERATURE: 97.1 F | TEMPERATURE: 98 F | HEART RATE: 111 BPM | TEMPERATURE: 97.8 F | HEART RATE: 96 BPM | HEIGHT: 67 IN | OXYGEN SATURATION: 98 % | HEIGHT: 67 IN | SYSTOLIC BLOOD PRESSURE: 136 MMHG | WEIGHT: 155.42 LBS | TEMPERATURE: 97.7 F | HEIGHT: 67 IN | SYSTOLIC BLOOD PRESSURE: 139 MMHG | HEIGHT: 67 IN | SYSTOLIC BLOOD PRESSURE: 133 MMHG | SYSTOLIC BLOOD PRESSURE: 115 MMHG | HEIGHT: 67 IN | HEIGHT: 67 IN | WEIGHT: 158.95 LBS | WEIGHT: 150.79 LBS | OXYGEN SATURATION: 97 % | WEIGHT: 162.26 LBS | BODY MASS INDEX: 24.78 KG/M2 | DIASTOLIC BLOOD PRESSURE: 66 MMHG | TEMPERATURE: 97.7 F | BODY MASS INDEX: 25.47 KG/M2 | RESPIRATION RATE: 20 BRPM | BODY MASS INDEX: 24.95 KG/M2 | RESPIRATION RATE: 24 BRPM | SYSTOLIC BLOOD PRESSURE: 119 MMHG | SYSTOLIC BLOOD PRESSURE: 117 MMHG | HEIGHT: 67 IN | DIASTOLIC BLOOD PRESSURE: 64 MMHG

## 2021-05-28 VITALS
BODY MASS INDEX: 23.54 KG/M2 | DIASTOLIC BLOOD PRESSURE: 54 MMHG | RESPIRATION RATE: 14 BRPM | HEART RATE: 106 BPM | HEART RATE: 99 BPM | HEART RATE: 85 BPM | WEIGHT: 156 LBS | BODY MASS INDEX: 24.48 KG/M2 | OXYGEN SATURATION: 98 % | WEIGHT: 150 LBS | BODY MASS INDEX: 25.02 KG/M2 | TEMPERATURE: 98 F | HEIGHT: 67 IN | SYSTOLIC BLOOD PRESSURE: 118 MMHG | DIASTOLIC BLOOD PRESSURE: 70 MMHG | SYSTOLIC BLOOD PRESSURE: 116 MMHG | OXYGEN SATURATION: 97 % | BODY MASS INDEX: 24.67 KG/M2 | DIASTOLIC BLOOD PRESSURE: 68 MMHG | OXYGEN SATURATION: 95 % | RESPIRATION RATE: 18 BRPM | OXYGEN SATURATION: 96 % | WEIGHT: 159.44 LBS | HEIGHT: 67 IN | TEMPERATURE: 98.4 F | TEMPERATURE: 98.1 F | SYSTOLIC BLOOD PRESSURE: 114 MMHG | RESPIRATION RATE: 18 BRPM | HEART RATE: 111 BPM | RESPIRATION RATE: 18 BRPM | HEIGHT: 67 IN | WEIGHT: 157.19 LBS | DIASTOLIC BLOOD PRESSURE: 64 MMHG | TEMPERATURE: 98.4 F | HEIGHT: 67 IN | SYSTOLIC BLOOD PRESSURE: 133 MMHG

## 2021-05-28 VITALS
HEIGHT: 67 IN | RESPIRATION RATE: 14 BRPM | OXYGEN SATURATION: 93 % | WEIGHT: 315 LBS | TEMPERATURE: 97.6 F | BODY MASS INDEX: 49.44 KG/M2 | SYSTOLIC BLOOD PRESSURE: 122 MMHG | HEART RATE: 100 BPM | DIASTOLIC BLOOD PRESSURE: 64 MMHG

## 2021-05-28 VITALS
HEART RATE: 107 BPM | OXYGEN SATURATION: 98 % | HEART RATE: 100 BPM | SYSTOLIC BLOOD PRESSURE: 132 MMHG | SYSTOLIC BLOOD PRESSURE: 143 MMHG | TEMPERATURE: 97.1 F | DIASTOLIC BLOOD PRESSURE: 70 MMHG | DIASTOLIC BLOOD PRESSURE: 65 MMHG | WEIGHT: 155.65 LBS | OXYGEN SATURATION: 99 % | WEIGHT: 153 LBS | RESPIRATION RATE: 16 BRPM | BODY MASS INDEX: 24.38 KG/M2 | BODY MASS INDEX: 23.96 KG/M2 | RESPIRATION RATE: 20 BRPM | TEMPERATURE: 96.8 F

## 2021-05-28 VITALS
WEIGHT: 157.31 LBS | TEMPERATURE: 98.7 F | TEMPERATURE: 97.6 F | SYSTOLIC BLOOD PRESSURE: 131 MMHG | RESPIRATION RATE: 15 BRPM | HEIGHT: 67 IN | SYSTOLIC BLOOD PRESSURE: 105 MMHG | WEIGHT: 152.19 LBS | OXYGEN SATURATION: 95 % | BODY MASS INDEX: 23.86 KG/M2 | BODY MASS INDEX: 24.69 KG/M2 | SYSTOLIC BLOOD PRESSURE: 127 MMHG | HEART RATE: 105 BPM | HEIGHT: 67 IN | BODY MASS INDEX: 24.8 KG/M2 | DIASTOLIC BLOOD PRESSURE: 62 MMHG | HEIGHT: 67 IN | OXYGEN SATURATION: 95 % | SYSTOLIC BLOOD PRESSURE: 128 MMHG | SYSTOLIC BLOOD PRESSURE: 133 MMHG | HEIGHT: 67 IN | HEART RATE: 100 BPM | OXYGEN SATURATION: 98 % | DIASTOLIC BLOOD PRESSURE: 66 MMHG | WEIGHT: 159.37 LBS | DIASTOLIC BLOOD PRESSURE: 67 MMHG | OXYGEN SATURATION: 95 % | HEART RATE: 53 BPM | OXYGEN SATURATION: 90 % | TEMPERATURE: 98 F | HEART RATE: 110 BPM | RESPIRATION RATE: 16 BRPM | TEMPERATURE: 98.2 F | TEMPERATURE: 98.4 F | RESPIRATION RATE: 14 BRPM | HEIGHT: 67 IN | BODY MASS INDEX: 23.89 KG/M2 | WEIGHT: 152 LBS | DIASTOLIC BLOOD PRESSURE: 86 MMHG | RESPIRATION RATE: 15 BRPM | HEART RATE: 100 BPM | BODY MASS INDEX: 25.01 KG/M2 | WEIGHT: 158 LBS | RESPIRATION RATE: 16 BRPM | DIASTOLIC BLOOD PRESSURE: 68 MMHG

## 2021-05-28 VITALS
OXYGEN SATURATION: 91 % | DIASTOLIC BLOOD PRESSURE: 60 MMHG | TEMPERATURE: 97.1 F | BODY MASS INDEX: 24.5 KG/M2 | WEIGHT: 156.09 LBS | HEIGHT: 67 IN | HEART RATE: 112 BPM | SYSTOLIC BLOOD PRESSURE: 141 MMHG

## 2021-05-28 NOTE — PROGRESS NOTES
Patient: CARLOS RODRIGUEZ     Acct: [id number]     Report: #[id number]  UNIT #: [id number]     : 1940    Encounter Date:2020  PRIMARY CARE: Payam Carroll  ***Signed***  --------------------------------------------------------------------------------------------------------------------  TELEHEALTH NOTE      History of Present Illness      Chief Complaint: 6 month F/U, COPD            Carlos Rdoriguez is presenting for evaluation via Telehealth visit by phone.     Verbal consent obtained before beginning visit.            Provider spent (15) minutes with the patient during telehealth visit.            The following staff were present during the visit: Codi Snider CMA, Sammie Soler PA-C            The patient is a 80 year old male patient of Dr. Choudhary's known to me from a     previous office visit. 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 He is a     former cigarettes smoker but quit 10 years ago. I saw the patient in 2019 and he was treated for a chronic obstructive pulmonary disease exacerbation     as an outpatient prior to that. He has been hospitalized at [facility] since then, most recently by the hospitalist service on 2020. He     had a CT scan of the chest done that showed patchy ground glass opacities and de    nse consolidation throughout his right lung. He was treated with Levaquin and     says his symptoms fully resolved. He currently denies increased dyspnea,     coughing or wheezing, hemoptysis, fever or chills. The patient states that was     the 3rd or 4th time in the past 12 months he has had a chronic obstructive     pulmonary disease exacerbation or pneumonia. He reports good compliance with his     Stiolto and reports he has been getting that at [facility]. He is not sure how      much it is helping him. The patient denies any dysphagia, coughing after eating     or aspiration. The patient has not done a swallow study before.             I reviewed the Review of Systems, medical, surgical and family history and agree     with those as entered.  I personally reviewed his recent hospital records and     previous office visit notes.                      [facility]                PACS RADIOLOGY REPORT            Patient: DEEPALI RODRIGUEZ   Acct: #[id number]   Report: #[id number]            UNIT #: [id number]    DOS: 20 0949      INSURANCE:MEDICARE PART A   LOCATION:ER     : 1940            PROVIDERS      ADMITTING:     ATTENDING:       FAMILY:  YANETH,MD   ORDERING:  CEFERINO WALKER         OTHER:    DICTATING:  ROSANNE WATSON MD            REQ #:[id number]   EXAM:CHW - CT CHEST with CONTRAST      REASON FOR EXAM:  Dyspnea      REASON FOR VISIT:  POSS FLU--ANNEX            *******Signed******         PROCEDURE:   CT CHEST W/ CONTRAST             COMPARISON:   [facility], CT, CHEST W/O CONTRAST,     2019, 10:01.             INDICATIONS:   GENERALIZED CHEST PAIN X 1 DAY. COUGH. SHORTNESS OF BREATH             TECHNIQUE:   After obtaining the patient's consent, CT images were obtained with     non-ionic       intravenous contrast material.               PROTOCOL:     Pulmonary embolism imaging protocol performed                RADIATION:     DLP: 518 mGy*cm          Automated exposure control was utilized to minimize radiation dose.       CONTRAST:   100 cc Isovue 370 I.V.             FINDINGS:         No evidence for pulmonary embolus.  No evidence for acute aortic injury.  Heavy     coronary artery       calcification.  No adenopathy in the chest.  No acute findings in the included     upper abdomen.        There are patchy areas of ground-glass opacity, interstitial thickening, and     more  dense       consolidation seen in the right upper lobe, right middle lobe, and right lower     lobe.  The dense       consolidation is mostly in the right lower lobe.  This appearance is new     compared to 8/28/2019.  A       small area of similar appearing opacity in the subpleural left upper lobe is     stable compared to       8/28/2019.  No aggressive appearing bone change.             CONCLUSION:   Patchy areas of ground-glass opacity, interstitial thickening, and     more dense       consolidation throughout the right lung.  This appearance is new since     8/28/2019, and suspicious       for infectious or inflammatory change.             A similar appearing area of opacity in the left upper lobe is unchanged.             No evidence for pulmonary embolus.              ROSANNE WATSON MD             Electronically Signed and Approved By: ROSANNE WATSON MD on 4/04/2020 at 10:31                        Until signed, this is an unconfirmed preliminary report that may contain      errors and is subject to change.                                              KYLEER:      D:04/04/20 1031                         Past Med History      HX: COPD, Pulmonary Nodule, Pneumonia      Former Smoker x70 yrs, 1 PPD Quit 2010      Vaccines - Current      Overview of Symptoms      Denies: Fever, cough, SOA, chills            Most Recent Lab Findings      Laboratory Tests      5/7/20 08:28            Allergies/Medications      Allergies:        Coded Allergies:             NO KNOWN ALLERGIES (Unverified , 5/7/20)      Medications    Last Reconciled on 5/20/20 09:15 by MIRNA EDWARD      Fluticasone/Umeclidin/Vilanter (Trelegy Ellipta 100-62.5-25) 1 Each Blst.w.dev      1 PUFF INH RTQDAY, #1 INH 5 Refills         Prov: Sammie Soler PA-C         5/20/20       NEB-Albuterol Sulf (Albuterol) 2.5 Mg/3 Ml Vial.neb      2.5 MG INH Q6H PRN for SHORTNESS OF BREATH, #120 NEB 5 Refills         Prov: Sammie Soler PA-C         5/20/20        Azithromycin Z-Aaron (Zithromax Z-Aaron) 250 Mg Tablet      250 MG PO ASDIR, #1 PKT         Prov: Sammie Soler PA-C         5/20/20       predniSONE (predniSONE) 20 Mg Tablet      40 MG PO QDAY for 7 Days, #14 TAB 0 Refills         Prov: Sammie Soler PA-C         5/20/20       Alpha Lipoic Acid (Alpha Lipoic Acid) 200 Mg Tablet      200 MG PO QID, #90 TAB         Reported         4/5/20       Cilostazol (Pletal*) 100 Mg Tab      100 MG PO BID         Reported         4/4/20       Gabapentin (Neurontin) 100 Mg Capsule      300 MG PO QID, #180 CAP 0 Refills         Reported         11/7/19       Metoprolol Succinate (Metoprolol Succinate) 50 Mg Tab.er.24h      25 MG PO QDAY, #15 TAB.SR.24H 0 Refills         Reported         10/4/19       Cyclobenzaprine Hcl (Cyclobenzaprine*) 10 Mg Tablet      10 MG PO QDAY         Reported         11/20/18       Aspirin Chew (Aspirin Baby) 81 Mg Tab.chew      81 MG PO HS, #30 TAB.CHEW 0 Refills         Reported         11/20/18       Multivitamin/Iron/Folic Acid (CENTRUM COMPLETE MULTIVIT TAB) 1 Tab Tablet      1 TAB PO QDAY, #30 TAB 0 Refills         Reported         11/20/18       Vitamin B Complex (B Complex-Vitamin B-12) 1 Each Tablet      1 TAB PO QDAY, #30 TAB         Reported         6/27/18       Cholecalciferol (Vitamin D3) (Vitamin D3) 1,000 Unit Tab      1000 UNITS PO QDAY, #30 TAB 0 Refills         Reported         8/5/16       Pantoprazole (Protonix) 20 Mg Tablet      40 MG PO QAM, #60 TAB 0 Refills         Reported         5/18/16       Pravastatin Sod (Pravachol*) 40 Mg Tablet      40 MG PO HS, TAB         Reported         5/6/16            Plan/Instructions      Ambulatory Assessment/Plan:        Pneumonia - J18.9            Notes      New Medications      * predniSONE 20 MG TABLET: 40 MG PO QDAY 7 Days #14      * Azithromycin Z-Aaron (Zithromax Z-Aaron) 250 MG TABLET: 250 MG PO ASDIR #1         Instructions: Take 500 mg (two tablets) by mouth the first day,  then 250 mg        (one tablet) daily until all taken.      * Fluticasone/Umeclidin/Vilanter (Trelegy Ellipta 100-62.5-25) 1 EACH BLST.W.DE      V: 1 PUFF INH RTQDAY #1      * Tiotropium Br/Olodaterol HCl (Stiolto Respimat Inhal [place]) 4 GM MIST.INHAL: 2       PUFFS INH RTQDAY 30 Days #1      * Fluticasone (Flovent HFA) 220 MCG INHALER: 2 PUFF INH RTBID 30 Days #1      * NEB-Albuterol Sulf (Albuterol) 2.5 MG/3 ML VIAL.NEB: 2.5 MG INH Q6H PRN       SHORTNESS OF BREATH #120         Instructions: DX: J44.9      Discontinued Medications      * Tiotropium Br/Olodaterol HCl (Stiolto Respimat Inhal [place]) 4 GM MIST.INHAL: 2       PUFFS INH RTQDAY #3      * levoFLOXacin 750 MG TABLET: 750 MG PO QDAY 5 Days #5         Instructions: first dose 4/6      New Diagnostics      * Chest W/O Cont CT, Month         Dx: Pneumonia - J18.9      Plan/Instructions      * Plan Of Care: ()            * Chronic conditions reviewed and taken into consideration for today's treatment       plan.      * Patient instructed to seek medical attention urgently for new or worsening       symptoms.      * Patient was educated/instructed on their diagnosis, treatment and medications       prior to discharge from the clinic today.            ASSESSMENT:      1. Chronic obstructive pulmonary disease with acute exacerbation 6 weeks ago     clinically improving.       2. Pneumonia from an unspecified organism 6 weeks ago clinically improving.       3. Infiltrates and ground glass opacities throughout right lung with history of     left lower lobe ground glass opacities on CT scan of the chest, will need follow     up CT scan of the chest.       4. History of right upper lobe adenocarcinoma status post lobectomy and     chemotherapy followed by oncology.       5. Tobacco abuse of cigarettes in remission for 10 years.       6. Cervical spine compression fractures followed by Dr. Tom.             PLAN:      1. I have discussed with the patient regarding  his recent hospitalization and     pneumonia with abnormal CT scan of the chest showing infiltrates and ground     glass opacities throughout his right lung. I will repeat a CT scan of the chest     in 1 month to ensure resolution of pneumonia.       2. I recommend increasing his inhaler therapy to triple therapy given his 3-4     chronic obstructive pulmonary disease exacerbations and episodes of pneumonia in     the past year. If he still has recurrent exacerbations once on triple therapy,     then he will likely need daily Daliresp. He prefers to get his meds at [facility]     and thinks he has been getting Stiolto there. He prefers to take inhalers so I     will try him on trelegy ellipta inhaler if this is available at [facility]. The     patient will be called back after we speak with [facility] about what medications     are available there. I have prescribed albuterol nebulizers to use as needed.       3. I have refilled a Z-pack and prednisone for him to have on hand to use as     needed.       4. I discussed with the patient regarding possible aspiration and he denies any     aspirations, difficulty swallowing or coughing after eating. He declined to do a     swallow study at this time.       5. Continue to follow up with oncology as scheduled.       6. He is up to date on flu and pneumonia vaccines.             Addendum:      7. My medical assistant has spoken with [facility] pharmacy and they do carry     Stiolto and have Flovent as their only option for an inhaled corticosteroid. T    hey do not carry Trelegy ellipta inhaler. Thus, I will continue the patient on     Stiolto 2 puffs once daily and add flovent 220 mcg 2 puffs twice daily for     triple therapy.       8. Follow up in 2-3 months with Dr. Choudhary, sooner if needed.      Codes:  Phone Eval 11-20 mi [id number]            Electronically signed by DAPHNEY DE LA VEGA PA-C  05/21/2020 09:08       Disclaimer: Converted document may not contain table formatting or  lab diagrams. Please see [employer] for the authenticated document.

## 2021-05-28 NOTE — PROGRESS NOTES
Patient: DEEPALI RODRIGUEZ     Acct: [id number]     Report: #[id number]  UNIT #: [id number]     : 1940    Encounter Date:2018  PRIMARY CARE: Payam Carroll  ***Signed***  --------------------------------------------------------------------------------------------------------------------  Chief Complaint      Encounter Date      Sep 20, 2018            Primary Care Provider      Payam Carroll            Referring Provider      Dayne Choudhary            Patient Complaint      Patient is complaining of      follow up            VITALS      Height 5 ft 7 in / 170.18 cm      Weight 150 lbs 0 oz / 68.95929 kg      BSA 1.80 m2      BMI 23.5 kg/m2      Temperature 98.4 F / 36.89 C - Oral      Pulse 85      Respirations 18      Blood Pressure 114/54 Sitting, Right Arm      Pulse Oximetry 98%, room air            HPI      The patient is a 78 year old male with chronic obstructive pulmonary disease,     recent multifocal pneumonia, tobacco abuse in remission, 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 He is here     for follow up.             Since his last office visit he was seen by MIRNA Arteaga and had pulmonary     function tests and repeat CT scan of the chest ordered. I reviewed the CT scan     of the chest and pulmonary function tests with him today. He also had a six     minute walk test which did not show any desaturations. He has been on Spiriva     and does not notice a significant difference with it. He says he has significant    sinus drainage and no chest pain. He has minimal cough, no shortness of breath     with heavy exertion but otherwise he is doing well. He has no weight loss or     loss of appetite, no significant chest pain or chest tightness and no nausea and    vomiting. He has no cough or phlegm.            ROS      Constitutional:  Complains of: Fatigue; Denies: Fever, Weight gain, Weight loss,    Chills, Insomnia, Other       Respiratory/Breathing:  Complains of: Cough; Denies: Shortness of air, Wheezing,    Hemoptysis, Pleuritic pain, Other      Endocrine:  Denies: Polydipsia, Polyuria, Heat/cold intolerance, Diabetes, Other      Eyes:  Denies: Blurred vision, Vision Changes, Other      Ears, nose, mouth, throat:  Denies: Mouth lesions, Thrush, Throat pain,     Hoarseness, Allergies/Hay Fever, Post Nasal Drip, Headaches, Recent Head Injury,    Nose Bleeding, Neck Stiffness, Thyroid Mass, Hearing Loss, Ear Fullness, Dry     Mouth, Nasal or Sinus Pain, Dry Lips, Nasal discharge, Nasal congestion, Other      Cardiovascular:  Denies: Palpitations, Syncope, Claudication, Chest Pain, Wake     up Gasping for air, Leg Swelling, Irregular Heart Rate, Cyanosis, Dyspnea on     Exertion, Other      Gastrointestinal:  Denies: Nausea, Constipation, Diarrhea, Abdominal pain,     Vomiting, Difficulty Swallowing, Reflux/Heartburn, Dysphagia, Jaundice,     Bloating, Melena, Bloody stools, Other      Genitourinary:  Denies: Urinary frequency, Incontinence, Hematuria, Urgency,     Nocturia, Dysuria, Testicular problems, Other      Musculoskeletal:  Denies: Joint Pain, Joint Stiffness, Joint Swelling, Myalgias,    Other      Hematologic/lymphatic:  DENIES: Lymphadenopathy, Bruising, Bleeding tendencies,     Other      Neurological:  Denies: Headache, Numbness, Weakness, Seizures, Other      Psychiatric:  Denies: Anxiety, Appropriate Effect, Depression, Other      Sleep:  No: Excessive daytime sleep, Morning Headache?, Snoring, Insomnia?, Stop    breathing at sleep?, Other      Integumentary:  Denies: Rash, Dry skin, Skin Warm to Touch, Other      Immunologic/Allergic:  Denies: Latex allergy, Seasonal allergies, Asthma,     Urticaria, Eczema, Other      Immunization status:  No: Up to date            FAMILY/SOCIAL/MEDICAL HX      Surgical History:  Yes: Head Surgery (THROAT SURG), Orthopedic Surgery (LEFT     SHOULDER SURG), Other Surgeries (removed top  left of lung); No: Abdominal     Surgery, Appendectomy, Bladder Surgery, Bowel Surgery, CABG, Cholecystectomy,     Oral Surgery, Vascular Surgery      Cancer/Type - Family Hx:  Mother      Is Father Still Living?:  No      Is Mother Still Living?:  No       Family History:  Yes      Social History:  Tobacco Use      Smoking status:  Former smoker (1 ppd 55 years quit for 10 years )      Smoking packs/day:  1      Smoking history:  25-50 pack years      Anticoagulation Therapy:  No      Antibiotic Prophylaxis:  No      Medical History:  Yes: Chemotherapy/Cancer (LUNG CA, LEFT LUNG), Chronic     Bronchitis/COPD (INHALER PRN), Hemorrhoids/Rectal Prob (GERD), High Blood     Pressure (ON MEDS ), Shortness Of Breath (PNEUMONIA,  LEFT LUNG CA, REMOVAL HALF    OF UPPER PORTION LEFT LUNG); No: Alcoholism, Allergies, Anemia, Arthritis,     Asthma, Atrial Fibrillation, Blood Disease, Broken Bones, Cataracts, Chemical     Dependency, Emphysema, Chronic Liver Disease, Colon Trouble, Colitis,     Diverticulitis, Congestive Heart Failu, Deafness or Ringing Ears, Convulsions,     Depression, Anxiety, Bipolar Disorder, PTSD, Diabetes, Epilepsy, Seizures,     Forgetfullness, Glaucoma, Gall Stones, Gout, Head Injury, Heart Attack, Heart     Murmur, GERD, Hepatitis, Hiatal Hernia, High Cholesterol, HIV (Do not ask -     volu, Jaundice, Kidney or Bladder Disease, Kidney Stones, Migrane Headaches,     Mitral Valve Prolapse, Night sweats, Phlebitis, Psychiatric Care, Reflux     Disease, Rheumatic Fever, Sexually Transmitted Dis, Sinus Trouble, Skin     Disease/Psoriais/Ecz, Stroke, Thyroid Problem, Tuberculosis or Pos TB Te,     Miscellaneous Medical/oth      Psychiatric History      none            PREVENTION      Hx Influenza Vaccination:  Yes      Date Influenza Vaccine Given:  Sep 1, 2018      Influenza Vaccine Declined:  No      2 or More Falls Past Year?:  No      Fall Past Year with Injury?:  No      Hx Pneumococcal Vaccination:   Yes      Encouraged to follow-up with:  PCP regarding preventative exams.      Chart initiated by      camron delgadillo ma            ALLERGIES/MEDICATIONS      Allergies:        Coded Allergies:             NO KNOWN ALLERGIES (Unverified , 9/20/18)      Medications    Last Reconciled on 9/20/18 10:07 by KAITLIN CHOUDHARY MD      Tiotropium Br/Olodaterol HCl (Stiolto Respimat Inhal Spray) 4 Gm Mist.inhal      2 PUFFS INH RTQDAY, #1 INH 9 Refills         Prov: Kaitlin Choudhary         9/20/18       Azelastine Hcl (Azelastine Nasal*) 137 Mcg/0.137 Ml Spray.pump      2 PUFFS NARE EACH BID, #1 BOTTLE 9 Refills         Prov: Kaitlin Choudhary         9/20/18       Cefepime HCl (Maxipime Inj*) 2 Gm Vial      2000 MG IV Q8HR for 12 Days, VIAL         Prov: Millie Valera         8/9/18       (verapamil SR) 180 MG TABCR No Conflict Check      180 MG PO QDAY for 30 Days         Prov: Millie Valera         8/9/18       Tamsulosin HCL (Flomax) 0.4 Mg Cap.er.24h      0.8 MG PO QDAY for 30 Days, #60 CAP.ER         Prov: Millie Valera         8/9/18       Albuterol/Ipratropium (Duoneb) 3 Ml Ampul.neb      3 ML INH Q2H PRN for DYSPNEA/WHEEZING for 30 Days, #360 NEB         Prov: Andres Gaspar         6/29/18       Vitamin B Complex (B Complex-Vitamin B-12*) 1 Each Tablet      1 TAB PO QDAY, #30 TAB         Reported         6/27/18       Tiotropium Bromide (Spiriva Respimat 2.5 mcg/Puff) 4 Gm Mist.inhal      2 PUFFS INH RTQDAY, #1 MDI 8 Refills         Prov: Sammie Naidu PA-C         6/25/18       Cilostazol (Pletal*) 100 Mg Tab      100 MG PO BID, #60 TAB         Reported         6/25/18       Pregabalin (Lyrica *) 100 Mg Cap      100 MG PO TID, #90 CAP         Reported         6/8/18       Cholecalciferol (Vitamin D3*) 1,000 Unit Tab      1000 UNITS PO QDAY, #30 TAB 0 Refills         Reported         8/5/16       Pantoprazole (Protonix*) 20 Mg Tablet      40 MG PO HS, #60 TAB 0 Refills         Reported         5/18/16       Pravastatin Sod  (Pravachol*) 40 Mg Tablet      40 MG PO HS, TAB         Reported         5/6/16       Aspirin EC (Aspirin EC*) 81 Mg Tabec      81 MG PO QDAY         Reported         5/29/12      Current Medications      Current Medications Reviewed 9/20/18            EXAM      CONSTITUTIONAL: Pleasant female in no acute distress,  normal conversant.       EYES : Pink conjunctive, no ptosis, PERRL.       ENMT : Nose and ears appear normal, normal dentition, mild posterior pharyngeal     wall erythema. Mallampati classification       Neck: Nontender, no masses, no thyromegaly, no nodules.      Resp : Grossly clear to auscultation bilaterally, trace right lower lobe     crackles, no wheezes or rhonchi appreciated.  Normal work of breathing noted.       CVS  : No carotid bruits, s1s2 nl, RRR, no murmur, rubs or gallop, no peripheral    edema       Chest wall: Normal rise with inspiration, nontender on palpation      GI   : Abdomen soft, with no masses, no hepatosplenomegaly, no hernias, BS+      MSK  : Normal gait and station, no digital cyanosis or clubbing       Skin : No rashes, ulcerations or lesions, normal turgor and temperature      Neuro: CN II - XII intact, no sensory deficits, DTRs intact and symmetrical, no     motor weakness      Psych: Appropriate affect, A   Vtials      Vitals:             Height 5 ft 7 in / 170.18 cm           Weight 150 lbs 0 oz / 68.53592 kg           BSA 1.80 m2           BMI 23.5 kg/m2           Temperature 98.4 F / 36.89 C - Oral           Pulse 85           Respirations 18           Blood Pressure 114/54 Sitting, Right Arm           Pulse Oximetry 98%, room air            REVIEW      Results Reviewed      PCCS Results Reviewed?:  Yes Prev Lab Results, Yes Prev Radiology Results, Yes     Previous Mecial Records      Lab Results      [id number]  [id number]                                 [facility]                             [place]  [id number]               __________________________________________________________________________             Patient Name:                   Attending Physician:      Carlos Zimmerman PA-C             Patient Visit # MR #            Admit Date  Disch Date     Location      [id number]    [id number]      08/28/2018                 CT- -             Date of Birth      1940      __________________________________________________________________________      0821 - DIAGNOSTIC REPORT             SIX-MINUTE WALK TEST             TECHNIQUE:      The patient had 6-minute walk done with ATS criteria.             RESULTS:      1.  The patient walked a distance of 1200 feet with MET level of 2.8 over 6          minutes.      2.  The patient's resting O2 saturation on room air was 98%, with resting          dyspnea score of zero and a heart rate of 102.      3.  The patient walked a total of 1200 feet.  The janny oxygen saturation was          96% on room air.      4.  Heart rate increased from baseline of 102 to 113 after 5 minutes.      5.  Dyspnea score at baseline was zero and increased to 1 at 6 minutes.             IMPRESSION:      1.  The patient had no significant desaturations with exertion during          submaximal exercise.      2.  The patient's dyspnea score increased from zero to 1.      3.  The patient developed bilateral knee pain, which did limit his distance          some during the 6-minute walk test.             To be electronically signed in [place]      [id number] JULIO CESAR MARTE D.O.             WC:rt      D:  09/10/2018 10:02      T:  09/10/2018 11:58      #[id number]             Until signed, this is an unconfirmed preliminary report that may contain      errors and is subject to change.                   09/10/18 1205  <Electronically signed by Julio Cesar Marte DO>      Radiographic Results               [facility]                   [facility]                PACS RADIOLOGY REPORT            Patient: DEEPALI RODRIGUEZ   Acct: #[id number]   Report: #[id number]            UNIT #: [id number]    DOS: 18 0900      INSURANCE:MEDICARE PART A   LOCATION:CT     : 1940            PROVIDERS      ADMITTING:     ATTENDING: Sammie Naidu PA-C      FAMILY:  Payam Carroll   ORDERING:  Sammie Naidu PA-C         OTHER:    DICTATING:  ROSANNE WATSON MD            REQ #:[id number]   EXAM:CHWO - CT CHEST without CONTRAST      REASON FOR EXAM:  COPD      REASON FOR VISIT:  COPD            *******Signed******         PROCEDURE:   CT CHEST WITHOUT CONTRAST             COMPARISON:   [facility], CT, CHEST W/O CONTRAST, 2018, 9:01.             INDICATIONS:   COPD/LT. LUNG CA.             TECHNIQUE:   CT images were created without the administration of contrast     material.               PROTOCOL:     Standard imaging protocol performed                RADIATION:     DLP: 487mGy*cm          Automated exposure control was utilized to minimize radiation dose.              FINDINGS:         Patient is status post left upper lobectomy.  Scattered areas of scarring in the    left lung.        Significant emphysema in the right lung.  Previously demonstrated mucous     plugging in the right       lower lobe and right middle lobe has significantly improved.  Right middle lobe     and lower lobe per       much better aerated.  There is residual multifocal nodularity in the right     middle lobe and right       lower lobe.  Previously demonstrated areas of nodular opacity in the right lower    lobe are also       improving.  There are few scattered nodular areas remaining.  A nodular region     in the posterior       lateral right upper lobe measures 9 mm (image 31), previously 3.5 cm.  No     adenopathy in the chest.        Coronary artery calcification.  No acute findings in the included upper abdomen.      No aggressive       appearing bone lesion.             CONCLUSION:   Significantly improved aeration and opacities in the right middle     lobe and right lower       lobe.  Opacities in the right upper lobe are also improved.             Scattered areas of nodularity right lung are favored to represent residual/post     infectious or       inflammatory change.  Recommend attention on 3 month followup chest CT.              ROSANNE WATSON MD             Electronically Signed and Approved By: ROSANNE WATSON MD on 8/28/2018 at 11:59                        Until signed, this is an unconfirmed preliminary report that may contain      errors and is subject to change.                                              ZULEMA:      D:08/28/18 1159      PFT Results      [id number]  [id number]                                 [facility]                          [facility] Information Management Services                            [place], [place]  [address]               __________________________________________________________________________             Patient Name:                   Attending Physician:      Carlos Zimmerman PA-C             Patient Visit # MR #            Admit Date  Disch Date     Location      [id number]    [id number]      08/28/2018                 CT- -             Date of Birth      1940      __________________________________________________________________________      0821 - DIAGNOSTIC REPORT             SIX-MINUTE WALK TEST             TECHNIQUE:      The patient had 6-minute walk done with ATS criteria.             RESULTS:      1.  The patient walked a distance of 1200 feet with MET level of 2.8 over 6          minutes.      2.  The patient's resting O2 saturation on room air was 98%, with resting          dyspnea score of zero and a heart rate of 102.      3.  The patient walked a total of 1200 feet.  The janny oxygen saturation  was          96% on room air.      4.  Heart rate increased from baseline of 102 to 113 after 5 minutes.      5.  Dyspnea score at baseline was zero and increased to 1 at 6 minutes.             IMPRESSION:      1.  The patient had no significant desaturations with exertion during          submaximal exercise.      2.  The patient's dyspnea score increased from zero to 1.      3.  The patient developed bilateral knee pain, which did limit his distance          some during the 6-minute walk test.             To be electronically signed in [employer]      [id number] LINDSEY MARTE D.O.             WC:rt      D:  09/10/2018 10:02      T:  09/10/2018 11:58      #[id number]             Until signed, this is an unconfirmed preliminary report that may contain      errors and is subject to change.                   09/10/18 1205  <Electronically signed by Lindsey Marte DO>            Assessment      Notes      New Medications      * AZELASTINE HCL (Azelastine Nasal*) 137 MCG/0.137 ML SPRAY.PUMP: 2 PUFFS NARE       EACH BID #1      * Tiotropium Br/Olodaterol HCl (Stiolto Respimat Inhal [place]) 4 GM MIST.INHAL: 2      PUFFS INH RTQDAY #1      New Office Procedures      * Fluzone Vaccine High-Dose, As Soon As Possible         Flu Vacc [id number](65Yr Up)/Pf (Fluzone High-Dose 2018-19 Syringe) 180        MCG/0.5 ML SYRINGE:         180 MICROGRAM INTRAMUSCULARLY Qty 1 SYRINGE      PLAN:      The patient is a 78 year old male with history of chronic obstructive pulmonary     disease with low diffusion capacity, recent multifocal pneumonia, smoking in the    past, had left upper lobe adenocarcinoma status post lobectomy and chemotherapy.          1.  Chronic obstructive pulmonary disease. I will change Spiriva to Stiolto once    daily and use albuterol as needed.       2. Postnasal drip. Start azelastine nasal spray twice daily.       3. He has a CT scan of the chest scheduled for November and I will follow up     with him after that.        4. I advised him to keep himself as active as possible.       5. We gave him flu vaccine today.       6.  Follow up in November, sooner if needed.            Patient Education      Education resources provided:  Yes      Patient Education Provided:  Acute Bronchitis, COPD                 Disclaimer: Converted document may not contain table formatting or lab diagrams. Please see [email or web address] for the authenticated document.

## 2021-05-28 NOTE — PROGRESS NOTES
Patient: DEEPALI RODRIGUEZ     Acct: [id number]     Report: #[id number]  UNIT #: [id number]     : 1940    Encounter Date:2019  PRIMARY CARE: Payam Carroll  ***Signed***  --------------------------------------------------------------------------------------------------------------------  NURSE INTAKE      Visit Type      Established Patient Visit            Chief Complaint      LUNG CANCER            Referring Provider/Copies To      PCP Not Found in Lookup:  PAYAM CARROLL            History and Present Illness      Past Oncology Illness History      This is a very pleasant 76-year-old gentleman who presents with follow-up for     lobectomy with adjuvant chemotherapy.            -2016. Evaluated by PCP for coughing .  He underwent a CXR on  which     showed a suspicious appearing 5.5mm nodule in the BONNIE.  Follow-up CT chest on      showed a 1.6 x 1.9 cm mass in the apex of the BNONIE highly suspicious for     malignancy.        -May 2016.  He underwent biopsy by FNA during which he developed a pneumothorax     requiring hospital admission and chest tube placement.  He was ultimately     discharged on the following day with resolution of the pneumothorax on CXR.      Pathology results returned consistent with poorly differentiated adenocarcinoma,    consistent with lung primary.  The patient has been referred to medical oncology    for further recommendations.  PET/CT and brain MRI did not show evidence of     metastatic disease and he was referred to  for consideration of surgical     resection.        - He was seen at  and underwent left VATS with lobectomy under the    care of Dr. Saul on   Pathology revealed multifocal invasive moderately     differentiated adenocarcinoma, acinar type, measuring 2.0 cm x 1.7 cm x 1.4 cm.     Another nodule measured 0.6 cm x 0.5 cm x 0.3 cm.  All margins were free of     tumor and no lymph nodes were involved.  Visceral pleural invasion  was noted.     Pathologic stage was pT3N0. Stage IIB disease and adjuvant chemotherapy was     recommended.  His performance status is good (ECOG PS 0-1) however given age and    other comorbidities, I recommended we proceed with carboplatin based chemo as     opposed to cisplatin.      -Aug 10, 2016.  Patient was counseled on risk and benefits of systemic     chemotherapy and agreed to proceed with carboplatin AUC 6 and paclitaxel     200mg/m2 every 21 days x 4 cycles.        -February 2017.  Patient was evaluated the [facility] for follow-up.      Per patient CT chest abdomen and pelvis showed no evidence of disease.      -April 6-2017.  Patient was tapered off gabapentin and Cymbalta due increase     fatigue.       -November 2, 2017. CT chest: No evidence of recurrent or metastatic disease     done at .       -December 11, 2017. Sestamibi Stress showed low probability of coronary ischemic    disease. Follows with Cardiology.       -May 3, 2018.  CT chest:  No evidence of thoracic progression.      -June 8, 2018.  Present for f/u lung cancer.  Reports doing well except     neuropathy pain in hands.  Currently taking Lyrica 100mg BID and it seems to     wear off by late day, early evening.  Reports minimal SOA with exertion, also     has emphysema.  BFA with ecchymotic areas noted.  Will not see Dr. Saul again     until 5/2019 (stated she will be at [facility]).  Denies fever, cough and    anorexia.      -November .  WBC 14.4.  Hemoglobin 11.8.  Platelet count 125,000            HPI - Oncology Interim      Presents for follow up for Stage IIB lung adenocarcinoma: S/P BONNIE lobectomy in     July 2016, with adjuvant chemotherapy with Carboplatin and Paclitaxel x 4 cycles    completed in April of 2017.             Today, Mr. Zimmerman reports no worsening productive cough, shortness of breath,     chest pain or weight loss. Denies any HA. Reports he will visit Dr. Choudhary at the    end of this  month with CT scan of chest scheduled. He reports had pneumonia 4     times in 2018. Received pneumonia shot last year. We discussed he will speak to     Funmi's office about the second pneumonia vaccine.             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     Rates the neuropathy a 5/10. He has difficulty buttoning his buttons on his     shirt and tying his shoes. He does report his son found him a tool to assist     with the process of buttoning his shirts which is helpful.  He would like to     increase his Lyrica from 100 mg TID to 100 mg QID if possible secondary to the     neuropathy. He will increase his Lyrica to 100 mg QID x 1 month. If no     improvement in the neuropathy, then will increase to 150 mg TID dosing.             He continues to be a non-smoker at this time.             Labs today: CBC with normal WBC and platelet count. Hemoglobin showed a mild     anemia. We will  follow this. He denies any GI bleeding or worsening fatigue.     CMP pending.            Treatments      Chemotherapy      Carboplatin, Paclitaxel x 4 cycles January 2017 - April 2017            Clinical Trial Participant      No            ECOG Performance Status      0            PAST, FAMILY   Past Medical History      Past Medical History:  COPD, High Cholesterol, Hypertension, Short of Air,     Stroke (MINI)      Hematology/Oncology (M):  Anemia, Lung Cancer            Past Surgical History      Lung Biopsy            Family History      Family History:  Stomach Cancer (MOTHER)            Social History      Marital Status:        Lives independently:  Yes      Occupation:  RETIRED            Tobacco Use      Tobacco status:  Former smoker (1 ppd 55 years quit for 10 years )      Smoking packs/day:  1      Smoking history:  25-50 pack years      Quit status:  Quit date established (8 YRS AGO)            Alcohol Use      Alcohol intake:  2+ drinks per day            Substance Use       Substance use:  Denies use            REVIEW OF SYSTEMS      General:  Admits: Weight Gain;          Denies: Appetite Change, Fatigue, Fever, Night Sweats, Weight Loss      Eye:  Denies: Blurred Vision, Corrective Lenses, Diplopia, Eye Irritation, Eye     Pain, Eye Redness, Spots in Vision, Vision Loss      ENT:  Denies: Headache, Hearing Loss, Hoarseness, Seizures, Sinus Congestion,     Sore Throat      Cardiovascular:  Denies: Chest Pain, Edema Ankles, Edema Legs, Irregular     Heartbeat, Palpitations      Respiratory:  Denies: Coughing Blood, Productive Cough, Shortness of Air,     Wheezing      Gastrointestinal:  Denies: Bloody Stools, Constipation, Diarrhea, Frequent Hear    tburn, Nausea, Problem Swallowing, Tarry Stools, Unable to Control Bowels,     Vomiting      Genitourinary (male):  Denies: Blood in Urine, Decrease Urine Stream, Frequent     Urination, Incontinence, Painful Urination      Musculoskeletal:  Denies: Back Pain, Leg Cramps, Muscle Pain, Muscle Weakness,     Painful Joints, Swollen Joints      Integumentary:  Denies: Bleeds Easily, Bruises Easily, Hair Changes, Jaundice,     Lesions, Mole Changes, Nail Changes, Pigment Changes, Rash, Skin Discoloration      Neurologic:  Admits: Numbness\Tingling (to hands );          Denies: Dizziness, Fainting, Paralysis, Seizures      Psychiatric:  Denies: Anxiety, Confused, Depression, Disoriented, Memory Loss      Endocrine:  Denies: Cold Intolerance, Diabetes, Excessive Sweating, Excessive     Thirst, Excessive Urination, Heat Intolerance, Flushing, Hyperthyroidism,     Hypothyroidism      Hematologic/Lymphatic:  Denies: Bruising, Bleeding, Enlarged Lymph Nodes,     Recurrent Infections, Transfusions            VITAL SIGNS AND SCORES      Vitals      Height 5 ft 7.00 in / 170.18 cm      Weight 157 lbs 13.590 oz / 71.6 kg      BSA 1.83 m2      BMI 24.7 kg/m2      Temperature 98.1 F / 36.72 C - Temporal      Pulse 103      Respirations 20      Blood  Pressure 117/70 Sitting, Left Arm      Pulse Oximetry 100%, RM AIR            Pain Score      Experiencing any pain?:  No      Pain Scale Used:  Numerical      Pain Intensity:  0            Fatigue Score      Experiencing any fatigue?:  No      Fatigue (0-10 scale):  0 (none)            EXAM      General Appearance:  Positive for: Alert, Oriented x3, Cooperative      Eye:  Positive for: Moist Conjunctiva, PERRLA, Reactive to Light      HEENT:  Positive for: Oropharynx clear, Dentition      Neck:  Positive for: Full ROM, Supple      Respiratory:  Positive for: CTAB, Normal Respiratory Effort      Abdomen/Gastro:  Positive for: Normal Active Bowel Sounds, Soft;          Negative for: Distention, Tenderness      Cardiovascular:  Positive for: RRR;          Negative for: Murmur, Peripheral Edema      Skin:  Positive for: Normal Temperature, Normal Texture and Turgor, Normal Tone      Psychiatric:  Positive for: AAO X 3, Appropriate Affect, Intact Judgement      Neurologic:  Positive for: Cranial Ner II-XII Intact, Deep Tendon Reflexes,     Numbness;          Negative for: Headache      Genitourinary:  Negative for: Bladder Distention      Musculoskeletal:  Positive for: Full ROM Lower Extremety, Full ROM Upper     Extremety, Full Muscle Strength, Full Muscle Tone      Lower Extremities:  Positive for: Normal Gait and Station, Pedal Pulses Intact,     Pedal Pulses Symetrical      Upper Extremities:  Negative for: Edema, Weakness      Lymphatic:  Negative for: Axillary, Cervical, Supraclavicular            PREVENTION      Date Influenza Vaccine Given:  Sep 1, 2018      Influenza Vaccine Declined:  No      2 or More Falls Past Year?:  No      Fall Past Year with Injury?:  No      Encouraged to follow-up with:  PCP regarding preventative exams.      Chart initiated by      PJ ASHLEY CMA            ALLERGY/MEDS      Allergies      Coded Allergies:             NO KNOWN ALLERGIES (Unverified , 2/12/19)             Medications      Last Reconciled on 2/12/19 09:46 by FAUSTO GODWIN      Roflumilast (Daliresp) 500 Mcg Tab      500 MCG PO QDAY for 30 Days, #30 TAB 5 Refills         Prov: Sammie Soler PA-C         12/4/18       Cetirizine Hcl (ZyrTEC) 10 Mg Tablet      10 MG PO HS, #30 TAB 5 Refills         Prov: Sammie Soler PA-C         12/4/18       Tiotropium Br/Olodaterol HCl (Stiolto Respimat Inhal Spray) 4 Gm Mist.inhal      2 PUFFS INH RTQDAY, #1 INH 0 Refills         Reported         12/4/18       Cyclobenzaprine Hcl (Cyclobenzaprine*) 10 Mg Tablet      10 MG PO QDAY         Reported         11/20/18       Aspirin Chew (Aspirin Baby) 81 Mg Tab.chew      81 MG PO HS, #30 TAB.CHEW 0 Refills         Reported         11/20/18       Multivitamin/Iron/Folic Acid (CENTRUM COMPLETE MULTIVIT TAB) 1 Tab Tablet      1 TAB PO QDAY, #30 TAB 0 Refills         Reported         11/20/18       Verapamil Er (VERAPAMIL ER) 180 Mg Tablet.er      180 MG PO QDAY         Reported         11/20/18       Azelastine Hcl (Azelastine Nasal) 137 Mcg/0.137 Ml Spray.pump      2 PUFFS NARE EACH BID, #1 BOTTLE 9 Refills         Prov: Dayne Choudhary         9/20/18       Albuterol/Ipratropium (Duoneb) 3 Ml Ampul.neb      3 ML INH Q2H PRN for DYSPNEA/WHEEZING for 30 Days, #360 NEB         Prov: Andres Gaspar         6/29/18       Vitamin B Complex (B Complex-Vitamin B-12) 1 Each Tablet      1 TAB PO QDAY, #30 TAB         Reported         6/27/18       Cilostazol (Pletal*) 100 Mg Tab      100 MG PO BID, #60 TAB         Reported         6/25/18       Pregabalin (Lyrica *) 100 Mg Cap      100 MG PO TID, #90 CAP         Reported         6/8/18       Cholecalciferol (Vitamin D3*) 1,000 Unit Tab      1000 UNITS PO QDAY, #30 TAB 0 Refills         Reported         8/5/16       Pantoprazole (Protonix*) 20 Mg Tablet      40 MG PO QAM, #60 TAB 0 Refills         Reported         5/18/16       Pravastatin Sod (Pravachol*) 40 Mg Tablet      40 MG PO HS,  TAB         Reported         5/6/16      Medications Reviewed:  No Changes made to meds            IMPRESSION/PLAN      Impression      Adenocarcinoma of left lung, stage IIB (pT3N0)      -s/p VATS lobectomy on 7/12/16.  Continue adjuvant chemotherapy as recommended     by NCCN guidelines.  Pt's performance status remains very good (ECOG PS 0-1).        -Following a detailed discussion of the risks and benefits of chemotherapy,     patient decided to proceed with treatment.  Plan is to proceed with carboplatin     AUC 6 and paclitaxel 200mg/m2 every 21 days x 4 cycles.        -He initiated chemotherapy on 8/10 and tolerated cycle 1 of treatment generally     well. He received cycle 2 on 9/2.       -Patient has been asymptomatic since after surgery and chemotherapy.      -CT chest in May 2019 with Dr. Saul.  Continue close observation.      -Mild chest pain.            -Chemotherapy associated neuropathy      -Current dose of Lyrica 100 mg TID      -Will increase Lyrica 100 mg QID      -If no improvement in 1 month, will increase Lyrica to 150 mg TID      -Continue Capasician cream.            Diagnosis      Notes      Discontinued Medications      * predniSONE* (Deltasone*) 10 MG TABLET: 10 MG PO ASDIR #45         Instructions: [id number],[id number],[id number],57kof2i      * AMOXICILLIN/CLAVULANATE K (Augmentin 875/125 Mg) 1 EACH TABLET: 875 MG PO BID       #14      New Diagnostics      * CMP Comp Metabolic Panel, Routine         Dx: Non-small cell lung cancer (NSCLC) - C34.90      * LDH, Routine         Dx: Non-small cell lung cancer (NSCLC) - C34.90      * CBC, Routine         Dx: Non-small cell lung cancer (NSCLC) - C34.90            Plan      1. Has CT scan scheduled with Dr. Choudhary at the end of the month with follow up     with him.       2. Lyrica increased to 100 mg QID. If no improvement in PN, will increase to 150    mg TID instead.       3. Return in 4 months with NP follow up.       4. Labs today: CBC,  CMP.            Patient Education      Patient Education Provided:  Yes            Topics Patient Counseled on      pneumonia vaccine      peripheral neuropathy and use of Lyrica            Alcohol Counseling      Counseling given:  None                 Disclaimer: Converted document may not contain table formatting or lab diagrams. Please see [employer] [facility] for the authenticated document.

## 2021-05-28 NOTE — PROGRESS NOTES
Patient: DEEPALI RODRIGUEZ     Acct: [id number]     Report: #[id number]  UNIT #: [id number]     : 1940    Encounter Date:2021  PRIMARY CARE: Payam Carroll  ***Signed***  --------------------------------------------------------------------------------------------------------------------  NURSE INTAKE      Visit Type      Established Patient Visit            Chief Complaint      LUNG CANCER      Intent of Therapy:  Curative            Referring Provider/Copies To      Primary Care Provider:  Payam Carroll      Copies To:   Payam Carroll            History and Present Illness      Past Oncology Illness History      This is a very pleasant 76-year-old gentleman who presents with follow-up for     lobectomy with adjuvant chemotherapy.            1) Left lung:  Pathology 2016: FNA: Poorly differentiated adenocarcinoma: 5.5     mm BONNIE nodule. 2016.       Left lung lobectomy: VATS procedure: Dr. Saul in [place].Path:multifocal     invasive moderately differentiated adenocarcinoma, acinar type, 2.0 x 1.6 x 1.4     cm. Additional nodule 0.6 x 0.5 x 0.3 cm: margins free of tumor, negative LN's.     Visceral pleural invasion noted. (16). Staging: Stage IIB:  pT3N0.       Carboplatin / Paclitaxel x 4 cycles: 8/10/16 - 10/14/16.      CT CAP: No evidence of disease. 2017.       CT CAP: No evidence of disease. 17.       CT chest: Right lower base: improving consolidation. 19.       CT chest: T6 / T10 compression fractures: non-pathologic. (19)      CT chest in ER: likely pneumonia/inflammation. No new evidence of recurring     cancer: (20)      CT chest: Stable 1.5 cm subpleural ground glass opacity in the BONNIE, new patchy     areas on non-specific ground glass opacity throughout the LLL. (3/2021).            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            HPI - Oncology Interim       This is a very pleasant 76-year-old gentleman who presents with follow-up for     lobectomy with adjuvant chemotherapy.            1) Left lung:  Pathology 5/2016: FNA: Poorly differentiated adenocarcinoma: 5.5     mm BONNIE nodule. 5/2016.             Mr. Carlos Zimmerman presents for 6 month follow up. He will be 5 years out from     his lung cancer diagnosis in May of 2021.  He reports he is doing well in the     interim. He does report he went to see neurology for the chronic PN. He reports     he was discontinued off the Neurontin. He reports he has not noticed any     difference in his neuropathy since discontinuing the Neurontin.             In addition, he reports he is following with pulmonary. He reports he had a CT     of the chest in March 2021. CT scan report does show: CT chest with Stable 1.5     cm subpleural ground glass opacity in the BONNIE, new patchy areas on non-specific     ground glass opacity throughout the LLL. (3/2021).            He reports he feels well overall. He reports he will be following with pulmonary    next month to have a new CT scan to evaluate the PN. He denies any hemoptysis or    CP.             He does report he was in the hospital in March for 3 days for UTI. He does     report he has had recent lab work at his cardiology office but this is not     available for my viewing. We will try to get his most recent lab work.              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 Neurontin was     discontinued per neurology ( Dr. Gama).             3) Anemia: Mild anemia: He reports recent lab work around 3 weeks ago. He denies    GI bleeding, change in bowel habits or worsening fatigue.            Clinical Trial Participant      No            ECOG Performance Status      0            Most Recent Imaging Findings      [facility] Diagnostic Img                 PACS RADIOLOGY REPORT            Patient: DEEPALI RODRIGUEZ   Acct: #[id number]   Report: #[id number]            UNIT #: [id number]    DOS: 21 0715      INSURANCE:MEDICARE PART A   LOCATION:[facility]     : 1940            PROVIDERS      ADMITTING:     ATTENDING: DAPHNEY DE LA VEGA PA-C      FAMILY:  Payam Carroll   ORDERING:  DAPHNEY DE LA VEGA PA-C         OTHER: Reji Mcnair   DICTATING:  Vinicio Beltrán MD            REQ #:[id number]   EXAM:WO - CT CHEST without CONTRAST      REASON FOR EXAM:        REASON FOR VISIT:  ABN FINDING LUNG FIELD            *******Signed******         PROCEDURE:   CT CHEST WITHOUT CONTRAST             COMPARISON:   [facility], , CHEST AP/PA 1 VIEW, 2021,     19:33.  [facility], CT, CHEST W/O CONTRAST, 2020, 12:05.             INDICATIONS:   FOLLOW UP PREVIOUS CT CHEST. H/O LUNG CA.NO COMPLAINTS.             TECHNIQUE:   CT images were created without the administration of contrast     material.               PROTOCOL:     Standard imaging protocol performed                RADIATION:     DLP: 283.8mGy*cm          Automated exposure control was utilized to minimize radiation dose.              FINDINGS:         Lung window images reveal moderate paraseptal and centrilobular emphysema.             1.5 cm ground-glass subpleural opacity in the lateral left upper lung seen on     series 204, image 20       is stable.             New patchy areas of ground-glass opacity are seen throughout the left lower     lobe, not evident on       the prior study.  These are indeterminate for COVID-19 pneumonia.             Subsegmental atelectasis is seen in the right lower lobe.             Mediastinal windows reveal no mediastinal, hilar, or axillary adenopathy.      Extensive coronary       artery calcifications are evident.             CONCLUSION:         CT scan of the chest without IV contrast demonstrating moderate  emphysema.             Stable 1.5 cm subpleural ground-glass opacity in the left upper lung.             New patchy areas of nonspecific ground-glass opacity throughout the left lower     lobe, as above.              CHELI SEBASTIAN MD             Electronically Signed and Approved By: CHELI SEBASTIAN MD on 3/02/2021 at 10:20                                  PAST, FAMILY   Past Medical History      Past Medical History:  COPD, High Cholesterol, Hypertension, Short of Air,     Stroke      Hematology/Oncology (M):  Anemia, Lung Cancer            Past Surgical History      Lung Biopsy            Family History      Family History:  Stomach Cancer            Social History      Lives independently:  Yes      Occupation:  RETIRED            Tobacco Use      Tobacco status:  Former smoker (70 yrs x 1 PPD Quit 2010)      Smoking packs/day:  1            Substance Use      Substance use:  Denies use            REVIEW OF SYSTEMS      General:  Denies: Appetite Change, Fatigue, Fever, Night Sweats, Weight Gain,     Weight Loss      Eye:  Denies Blurred Vision, Denies Corrective Lenses, Denies Diplopia, Denies     Vision Changes      ENT:  Denies Headache, Denies Hearing Loss, Denies Hoarseness, Denies Sore     Throat      Cardiovascular:  Denies Chest Pain, Denies Palpitations      Respiratory:  Denies: Cough, Coughing Blood, Productive Cough, Shortness of Air,    Wheezing      Gastrointestinal:  Denies Bloody Stools, Denies Constipation, Denies Diarrhea,     Denies Nausea/Vomiting, Denies Problem Swallowing, Denies Unable to Control     Bowels      Genitourinary:  Denies Blood in Urine, Denies Incontinence, Denies Painful     Urination      Musculoskeletal:  Denies Back Pain, Denies Muscle Pain, Denies Painful Joints      Integumentary:  Denies Itching, Denies Lesions, Denies Rash      Neurologic:  Denies Dizziness, Denies Numbness\Tingling, Denies Seizures      Psychiatric:  Denies Anxiety, Denies Depression      Endocrine:   Denies Cold Intolerance, Denies Heat Intolerance      Hematologic/Lymphatic:  Denies Bruising, Denies Bleeding, Denies Enlarged Lymph     Nodes            VITAL SIGNS AND SCORES      Vitals      Weight 152 lbs 15.988 oz / 69.4 kg      Temperature 97.1 F / 36.17 C - Temporal      Pulse 100      Respirations 16      Blood Pressure 143/70 Sitting, Right Arm      Pulse Oximetry 99%, RM AIR            Pain Score      Experiencing any pain?:  No      Pain Scale Used:  Numerical      Pain Intensity:  0            Fatigue Score      Experiencing any fatigue?:  No      Fatigue (0-10 scale):  0 (none)            PT/OT Functional Screening      Nerve Pain            Speech Functional Screening      No needs identified            Rehab to be Ordered      Type of Referral to be Ordered:  No needs identified            EXAM      General appearance:  in no apparent distress, cooperative, appears stated age.      HEENT: No pallor, no icterus, oral mucosa moist      Neck: Supple, trachea central-not deviated      Lymph nodes: none palpable peripherally      Cardiovascular: S1-S2 heard, no murmurs, no rubs, no gallops.      Breast exam:      Respiratory: Clear to auscultation bilaterally, no adventitious sounds      Abdomen/gastro: Soft, nontender, no palpable hepatosplenomegaly, bowel sounds     heard      Skin: No lesions, no rashes, no petechiae.      Extremities: No pedal edema, peripheral pulses felt, no clubbing      Musculoskeletal: Normal tone, no rigidity of muscles; range of motion intact      Spine: No deformities      Psychiatric: Alert and oriented x3, appropriate affect, intact judgment      Neurologic: Cranial nerves II through XII grossly intact, no gross neurological     deficits noted.            PREVENTION      Hx Influenza Vaccination:  Yes      Date Influenza Vaccine Given:  Sep 1, 2020      Influenza Vaccine Declined:  No      2 or More Falls in Past Year?:  No      Fall Past Year with Injury?:  No      Hx  Pneumococcal Vaccination:  Yes      Encouraged to follow-up with:  PCP regarding preventative exams.      Chart initiated by      ABIGAIL BENSON CMA            ALLERGY/MEDS      Allergies      Coded Allergies:             NO KNOWN ALLERGIES (Unverified , 4/27/21)            Medications      Last Reconciled on 4/27/21 11:12 by FAUSTO GODWIN      levoFLOXacin (levoFLOXacin) 500 Mg Tablet      500 MG PO QDAY for 7 Days, #7 TAB 0 Refills         Prov: Andrea Schreiber         2/27/21       Metoprolol Succinate (Metoprolol Succinate) 25 Mg Tab.er.24h      25 MG PO QDAY, #30 TAB 0 Refills         Reported         2/24/21       Tamsulosin HCL (Tamsulosin HCL) 0.4 Mg Capsule      0.4 MG PO QDAY, #30 CAP 0 Refills         Reported         2/24/21       Tiotropium Br/Olodaterol HCl (Stiolto Respimat Inhal Spray) 4 Gm Mist.inhal      2 PUFFS INH RTQDAY, #1 INH 0 Refills         Reported         2/24/21       Pantoprazole (Protonix) 40 Mg Tablet.dr      40 MG PO QDAY@07, #30 TAB 0 Refills         Reported         2/24/21       Pravastatin Sodium (Pravastatin Sodium) 20 Mg Tablet      20 MG PO QDAY, #30 TAB 0 Refills         Reported         2/24/21       MDI-Albuterol (Ventolin HFA) 18 Gm Hfa.aer.ad      2 PUFFS INH QID, #1 MDI 5 Refills         Prov: [facility]         10/12/20       amLODIPine (amLODIPine) 5 Mg Tablet      5 MG PO QDAY, #30 TAB         Reported         7/2/20       Cetirizine Hcl (zyrTEC) 10 Mg Tablet      10 MG PO QDAY, #30 TAB 0 Refills         Reported         7/2/20       Aspirin Chew (Aspirin Baby) 81 Mg Tab.chew      81 MG PO QDAY, #30 TAB.CHEW 0 Refills         Reported         7/2/20       Cilostazol (Pletal*) 100 Mg Tab      100 MG PO BID         Reported         4/4/20       Cyclobenzaprine Hcl (Cyclobenzaprine*) 10 Mg Tablet      10 MG PO QDAY         Reported         11/20/18       Multivitamin/Iron/Folic Acid (CENTRUM COMPLETE MULTIVIT TAB) 1 Tab Tablet      1 TAB PO QDAY, #30 TAB  0 Refills         Reported         11/20/18       Vitamin B Complex (B Complex-Vitamin B-12) 1 Each Tablet      1 TAB PO QDAY, #30 TAB         Reported         6/27/18       Cholecalciferol (Vitamin D3) (Vitamin D3) 1,000 Unit Tab      1000 UNITS PO QDAY, #30 TAB 0 Refills         Reported         8/5/16      Medications Reviewed:  Changes made to meds            IMPRESSION/PLAN      Impression      1) Left lung:  Pathology 5/2016: FNA: Poorly differentiated adenocarcinoma: 5.5     mm BONNIE nodule. 5/2016.             Mr. Carlos Zimmerman presents for 6 month follow up. He will be 5 years out from     his lung cancer diagnosis in May of 2021.  He reports he is doing well in the     interim. He does report he went to see neurology for the chronic PN. He reports     he was discontinued off the Neurontin. He reports he has not noticed any     difference in his neuropathy since discontinuing the Neurontin.             In addition, he reports he is following with pulmonary. He reports he had a CT     of the chest in March 2021. CT scan report does show: CT chest with Stable 1.5     cm subpleural ground glass opacity in the BONNIE, new patchy areas on non-specific     ground glass opacity throughout the LLL. (3/2021).            He reports he feels well overall. He reports he will be following with pulmonary    next month to have a new CT scan to evaluate the PN. He denies any hemoptysis or    CP.             He does report he was in the hospital in March for 3 days for UTI. He does     report he has had recent lab work at his cardiology office but this is not     available for my viewing. We will try to get his most recent lab work.             We will follow along with pulmonary and his CT scans. He will follow up with us     in 1 year or sooner if needed.              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 Neurontin was discon     tinued per neurology. Appreciate Dr. Gama recommendations for the PN. He     was recommended to see orthopedic for steroid injections and if improved then     consider carpal tunnel surgery for the left hand.             3) Anemia: Mild anemia: He reports recent lab work around 3 weeks ago. He denies    GI bleeding, change in bowel habits or worsening fatigue.            Diagnosis      Anemia         Anemia, unspecified type - D64.9         Anemia type: unspecified type            Notes      New Diagnostics      * CBC With Auto Diff, Routine         Dx: Anemia - D64.9      * CMP Comp Metabolic Panel, Routine         Dx: Anemia - D64.9      * Iron Profile, Routine         Dx: Anemia - D64.9      * Ferritin Level, Routine         Dx: Anemia - D64.9      * B12      Dx: Anemia - D64.9      * SPEP with Immuno (IEPS), Routine         Dx: Anemia - D64.9      * FREE KAPPA LAMBDA LT CHAINS QU, Routine         Dx: Anemia - D64.9      * IMMUNOGLOBULIN QUANT IGAMQ, Routine         Dx: Anemia - D64.9      * Reticulocyte Count, Routine         Dx: Anemia - D64.9            Plan      1) Continue close follow up with pulmonary for CT scans for PN.             2) Stable chronic PN. He was discontinued off Neurontin for neurology. He feels     well and denies any change in his PN since discontinuing.             3) Obtain lab work from cardiology office done about 3 weeks ago. He will have     lab work in 1 year  with CBC, CMP, iron profile, ferritn, folate, b-12, SPEP,     SFLC and QUIGS.             follow up with NP in 1 year.            Pain      Pain Zero Today            Advanced Care Plan Discussion      Declines Discussion [id number]            Patient Education            Anemia      DI for Peripheral Neuropathy      DI for Pulmonary Nodule      Patient Education Provided:  Yes            Electronically signed by FAUSTO GODWIN onc  04/27/2021 11:12       Disclaimer: Converted document may not contain table formatting  or lab diagrams. Please see [facility] for the authenticated document.

## 2021-05-28 NOTE — PROGRESS NOTES
Patient: DEEPALI RODRIGUEZ     Acct: [id number]     Report: #[id number]  UNIT #: [id number]     : 1940    Encounter Date:2019  PRIMARY CARE: Payam Carroll  ***Signed***  --------------------------------------------------------------------------------------------------------------------  NURSE INTAKE      Visit Type      Established Patient Visit            Chief Complaint      LUNG CA            Referring Provider/Copies To      PCP Not Found in Lookup:  PAYAM CARROLL            History and Present Illness      Past Oncology Illness History      This is a very pleasant 76-year-old gentleman who presents with follow-up for     lobectomy with adjuvant chemotherapy.            Left lung:  Pathology 2016: FNA: Poorly differentiated adenocarcinoma: 5.5 mm     BONNIE nodule. 2016.       Left lung lobectomy: VATS procedure: Dr. Saul in [place].Path:multifocal     invasive moderately differentiated adenocarcinoma, acinar type, 2.0 x 1.6 x 1.4     cm. Additional nodule 0.6 x 0.5 x 0.3 cm: margins free of tumor, negative LN's.     Visceral pleural invasion noted. (16). Staging: Stage IIB:  pT3N0.       Carboplatin / Paclitaxel x 4 cycles: 8/10/16 - 10/14/16.      CT CAP: No evidence of disease. 2017.       CT CAP: No evidence of disease. 17.       CT chest: Right lower base: improving consolidation. 19.            HPI - Oncology Interim       Presents for lung cancer follow up and chronic issue:             1) Left lung Cancer:  Pathology 2016: FNA: Poorly differentiated adenocarcinom    a: 5.5 mm BONNIE nodule. 2016. Status post BONNIE lobectomy in .             He comes in today to discuss getting CT scan ordered.       Previously seen by Dr. Choudhary in February with scan results. Reviewed Dr. Choudhary     last note from 2019 which mentions repeat scan in 6-8 weeks from last     scan. Has follow up in May scheduled to see Dr. Choudhary. Dr. Roberts discussed     NCCN guidelines for  stage I/II lung cancer surveillance which recommends annual     low dose CT screening. He reports he is feeling well. Denies any cough, s    hortness of breath or chest pain.             2). Chronic Peripheral Neuropathy: Last month was switched from Lyrica to     Neurontin. Previously was on Neurontin 4 times a day in the past and then     switched over to Lyrica. States Lyrica is not really helping. At his last visit,    Neurontin was ordered 300 mg TID. Requests Neurontin be written for 4 times a     day instead like it was previously. At his last visit, he was started on Alpha     lipoic acid 200 mg TID. Difficulty buttoning his buttons on shirts. Has a     handicap device which assists with buttoning his shirts he uses daily. Rates his    neuropathy a 5/10 scale. Neuropathy to hands and fingertips. Feels like numbness    and tingling and weakness. Exacerbating factors: unable to button his shirts.      It is of moderate severity.  No other modifying factors or associated signs or     symptoms.  Uses a handicap device to assist with this.  Since he will not need     to follow up with us on a frequent basis other than possibly yearly. He will     discuss with his PCP to see if they will write for Neurontin. If not, will     schedule him every 6 months for the chronic peripheral neuropathy with refills     on the Neurontin with RYDER review since Neurontin can have refills. Ryder     last reviewed in March of 2019. Mr. Zimmerman will contact us regarding whether     his PCP will fill his Neurontin script.            Treatments      Chemotherapy      Carboplatin, Paclitaxel x 4 cycles January 2017 - April 2017            Clinical Trial Participant      No            ECOG Performance Status      0            PAST, FAMILY   Past Medical History      Past Medical History:  COPD, High Cholesterol, Hypertension, Short of Air,     Stroke (MINI)      Hematology/Oncology (M):  Anemia, Lung Cancer            Past Surgical  History      Lung Biopsy            Family History      Family History:  Stomach Cancer (MOTHER)            Social History      Marital Status:        Lives independently:  Yes      Occupation:  RETIRED            Tobacco Use      Tobacco status:  Former smoker (1 ppd 55 years quit for 10 years )      Smoking packs/day:  1      Smoking history:  25-50 pack years      Quit status:  Quit date established (8 YRS AGO)            Alcohol Use      Alcohol intake:  2+ drinks per day            Substance Use      Substance use:  Denies use            REVIEW OF SYSTEMS      General:  Denies: Appetite Change, Fatigue, Fever, Night Sweats, Weight Gain,     Weight Loss      Eye:  Denies: Blurred Vision, Corrective Lenses, Diplopia, Eye Irritation, Eye     Pain, Eye Redness, Spots in Vision, Vision Loss      ENT:  Denies: Headache, Hearing Loss, Hoarseness, Seizures, Sinus Congestion, S    ore Throat      Cardiovascular:  Denies: Chest Pain, Edema Ankles, Edema Legs, Irregular     Heartbeat, Palpitations      Respiratory:  Denies: Coughing Blood, Productive Cough, Shortness of Air,     Wheezing      Gastrointestinal:  Denies: Bloody Stools, Constipation, Diarrhea, Frequent     Heartburn, Nausea, Problem Swallowing, Tarry Stools, Unable to Control Bowels,     Vomiting      Genitourinary (male):  Denies: Blood in Urine, Decrease Urine Stream, Frequent     Urination, Incontinence, Painful Urination      Musculoskeletal:  Denies: Back Pain, Leg Cramps, Muscle Pain, Muscle Weakness,     Painful Joints, Swollen Joints      Integumentary:  Denies: Bleeds Easily, Bruises Easily, Hair Changes, Jaundice,     Lesions, Mole Changes, Nail Changes, Pigment Changes, Rash, Skin Discoloration      Neurologic:  Admits: Numbness\Tingling (hands and fingertips. );          Denies: Dizziness, Fainting, Paralysis, Seizures      Psychiatric:  Denies: Anxiety, Confused, Depression, Disoriented, Memory Loss      Endocrine:  Denies: Cold  Intolerance, Diabetes, Excessive Sweating, Excessive     Thirst, Excessive Urination, Heat Intolerance, Flushing, Hyperthyroidism, H    ypothyroidism      Hematologic/Lymphatic:  Denies: Bruising, Bleeding, Enlarged Lymph Nodes,     Recurrent Infections, Transfusions            VITAL SIGNS AND SCORES      Vitals      Height 5 ft 7 in / 170.18 cm      Weight 157 lbs 13.590 oz / 71.6 kg      BSA 1.83 m2      BMI 24.7 kg/m2      Temperature 98 F / 36.67 C - Temporal      Pulse 96      Respirations 16      Blood Pressure 119/66 Sitting, Left Arm      Pulse Oximetry 98%, ROOM AIR            Pain Score      Experiencing any pain?:  No      Pain Scale Used:  Numerical      Pain Intensity:  0            Fatigue Score      Experiencing any fatigue?:  No      Fatigue (0-10 scale):  0 (none)            EXAM      General Appearance:  Positive for: Alert, Oriented x3, Cooperative      Eye:  Positive for: Moist Conjunctiva, PERRLA, Reactive to Light      HEENT:  Positive for: Oropharynx clear;          Negative for: Erythema      Neck:  Positive for: Full ROM, Supple      Respiratory:  Positive for: CTAB, Normal Respiratory Effort      Abdomen/Gastro:  Positive for: Normal Active Bowel Sounds, Soft;          Negative for: Distention, Tenderness      Cardiovascular:  Positive for: RRR;          Negative for: Murmur, Peripheral Edema      Skin:  Positive for: Normal Temperature, Normal Texture and Turgor, Normal Tone;             Negative for: Rash      Psychiatric:  Positive for: AAO X 3, Appropriate Affect, Intact Judgement      Neurologic:  Positive for: Cranial Ner II-XII Intact, Deep Tendon Reflexes;          Negative for: Dizziness, Headache      Genitourinary:  Negative for: Bladder Distention      Musculoskeletal:  Positive for: Full ROM Lower Extremety, Full ROM Upper     Extremety, Full Muscle Strength, Full Muscle Tone      Lower Extremities:  Positive for: Normal Gait and Station, Pedal Pulses Intact,     Pedal Pulses  Symetrical      Upper Extremities:  Negative for: Edema, Weakness      Lymphatic:  Negative for: Axillary, Cervical, Supraclavicular            PREVENTION      Hx Influenza Vaccination:  Yes      Date Influenza Vaccine Given:  Sep 1, 2018      Influenza Vaccine Declined:  No      2 or More Falls Past Year?:  No      Fall Past Year with Injury?:  No      Hx Pneumococcal Vaccination:  Yes      Encouraged to follow-up with:  PCP regarding preventative exams.      Chart initiated by      ÁNGEL YI            ALLERGY/MEDS      Allergies      Coded Allergies:             NO KNOWN ALLERGIES (Unverified , 4/24/19)            Medications      Last Reconciled on 4/24/19 10:12 by FAUSTO GODWIN      Gabapentin (Gabapentin) 300 Mg Capsule      300 MG PO QID, #120 CAP 0 Refills         Reported         4/3/19       Cetirizine Hcl (ZyrTEC) 10 Mg Tablet      10 MG PO HS, #30 TAB 5 Refills         Prov: Sammie Soler PA-C         12/4/18       Tiotropium Br/Olodaterol HCl (Stiolto Respimat Inhal Spray) 4 Gm Mist.inhal      2 PUFFS INH RTQDAY, #1 INH 0 Refills         Reported         12/4/18       Cyclobenzaprine Hcl (Cyclobenzaprine*) 10 Mg Tablet      10 MG PO QDAY         Reported         11/20/18       Aspirin Chew (Aspirin Baby) 81 Mg Tab.chew      81 MG PO HS, #30 TAB.CHEW 0 Refills         Reported         11/20/18       Multivitamin/Iron/Folic Acid (CENTRUM COMPLETE MULTIVIT TAB) 1 Tab Tablet      1 TAB PO QDAY, #30 TAB 0 Refills         Reported         11/20/18       Verapamil Er (VERAPAMIL ER) 180 Mg Tablet.er      180 MG PO QDAY         Reported         11/20/18       Azelastine Hcl (Azelastine Nasal) 137 Mcg/0.137 Ml Spray.pump      2 PUFFS NARE EACH BID, #1 BOTTLE 9 Refills         Prov: Dayne Choudhary         9/20/18       Vitamin B Complex (B Complex-Vitamin B-12) 1 Each Tablet      1 TAB PO QDAY, #30 TAB         Reported         6/27/18       Cilostazol (Pletal*) 100 Mg Tab      100 MG PO BID, #60 TAB          Reported         6/25/18       Cholecalciferol (Vitamin D3*) 1,000 Unit Tab      1000 UNITS PO QDAY, #30 TAB 0 Refills         Reported         8/5/16       Pantoprazole (Protonix*) 20 Mg Tablet      40 MG PO QAM, #60 TAB 0 Refills         Reported         5/18/16       Pravastatin Sod (Pravachol*) 40 Mg Tablet      40 MG PO HS, TAB         Reported         5/6/16      Medications Reviewed:  No Changes made to meds            IMPRESSION/PLAN      Impression      1) Left lung Cancer:  Pathology 5/2016: FNA: Poorly differentiated     adenocarcinoma: 5.5 mm BONNIE nodule. 5/2016. Status post BONNIE lobectomy in 2016.             He comes in today to discuss getting CT scan ordered.       Previously seen by Dr. Choudhary in February with scan results. Reviewed Dr. Choudhary     last note from February 2019 which mentions repeat scan in 6-8 weeks from last     scan. Has follow up in May scheduled to see Dr. Choudhary. Dr. Roberts discussed     NCCN guidelines for stage I/II lung cancer surveillance which recommends annual     low dose CT screening. He reports he is feeling well. Denies any cough,     shortness of breath or chest pain. If CT of chest is not ordered by Dr. Choudhary's     office, then he will call us and will set up CT scan to be done.             2). Chronic Peripheral Neuropathy: Last month was switched from Lyrica to     Neurontin. Previously was on Neurontin 4 times a day in the past and then     switched over to Lyrica. States Lyrica is not really helping. At his last visit,    Neurontin was ordered 300 mg TID. Requests Neurontin be written for 4 times a     day instead like it was previously. At his last visit, he was started on Alpha     lipoic acid 200 mg TID. Difficulty buttoning his buttons on shirts. Has a     handicap device which assists with buttoning his shirts he uses daily. Rates his    neuropathy a 5/10 scale. Neuropathy to hands and fingertips. Feels like numbness    and tingling and weakness.  Exacerbating factors: unable to button his shirts.     Uses a handicap device to assist with this.  Since he will not need to follow up    with us on a frequent basis other than possibly yearly. He will discuss with his    PCP to see if they will write for Neurontin. If not, will schedule him every 6     months for the chronic peripheral neuropathy with refills on the Neurontin with     RYDER review since Neurontin can have refills. Ryder last reviewed in March of 2019. Mr. Zimmerman will contact us regarding whether his PCP will fill his     Neurontin script.            Diagnosis      Notes      Changed Medications      * Gabapentin 300 MG CAPSULE: 300 MG PO QID #120            Plan      1) no labs today. Follow up with Dr. Choudhary in May for CT to be completed and     reviewed. Discussed NCCN guidelines for surveillance. We will see him yearly.             2) Neurontin 300 mg QID, # 120. no refills. He will call his PCP to see if they     will take over Neurontin prescribing. If not, then we will see him every 6     months for the chronic peripheral neuropathy. Continue Alpha lipoic acid 200 mg     TID.             ATTENDING ADDENDUM: I personally saw and examined the patient; I performed key     or critical portions of the E and M service required for supervisory billing;     overall, the patient is doing well with no overt evidence of recurrent disease.     I think it is reasonable to transition to surveillance with pulmonary as long as    he has annual screening CT scans performed.  He indicates understanding and is     amenable to this plan.  We will see him back with any new or worsening symptoms     subsequently.            Patient Education      Patient Education Provided:  Yes            Topics Patient Counseled on      NCCN guidelines for stage I/II lung cancer.       chronic peripheral neuropathy.                 Disclaimer: Converted document may not contain table formatting or lab diagrams. Please see  [facility] for the authenticated document.

## 2021-05-28 NOTE — PROGRESS NOTES
Patient: DEEPALI RODRIGUEZ     Acct: [id number]     Report: #[id number]  UNIT #: [id number]     : 1940    Encounter Date:2018  PRIMARY CARE: Payam Carroll  ***Signed***  --------------------------------------------------------------------------------------------------------------------  ADDENDUM: [id number]          Addendum: Miguel Jackson on 3/14/18 @ 16:49             Total time spent with patient was 25 minutes of which 15 minutes was spent     discussing the patient's diagnosis. During this time we had a face-to-face     counseling and coordination of care with the patient. We also discussed the     medical diagnosis and current treatment plan.  Patient was seen in clinic,     physical exam was performed, lab and imaging tests were noted, and above     medical documentation was also done by me.              Miguel Jackson MD FACP      Board Certified Hematologist      Board Certified Medical Oncologist            Chief Complaint      Mar 14, 2018      LUNG CANCER            Current Plan      -Adenocarcinoma of left lung, stage IIB (pT3N0)      -Patient's old medical records were reviewed and summarized in chronological     order in the HPI.       -Patient's chest x-ray was reviewed independently by me by direct visualization     of the images.        -s/p VATS lobectomy on 16.  Continue adjuvant chemotherapy as recommended     by NCCN guidelines.  Pt's performance status remains very good (ECOG PS 0-1).        -Following a detailed discussion of the risks and benefits of chemotherapy,     patient decided to proceed with treatment.  Plan is to proceed with carboplatin     AUC 6 and paclitaxel 200mg/m2 every 21 days x 4 cycles.        -He initiated chemotherapy on 8/10 and tolerated cycle 1 of treatment generally     well. He received cycle 2 on .       -Counseled patient on common side effects of current treatment. All questions     were answered in the clinic room.         -Patient  completed treatment adjuvant treatment.      -Patient has been asymptomatic since after surgery and chemotherapy.      -CT chest in August 2017 at the [facility].  Continue close     observation.            -Interval Clinic Visit Changes      -Patient was given amitriptyline for neuropathy.      -Refills given for his medications.      -Return to clinic in 3 months      -Follows with Dr. Saul at  in May with repeat scans.             -Peripheral neuropathy      -Patient is currently on Gabapentin 600 mg 3 times a day.      -Added Capasician cream.       -Patient was given calcium and vitamin D.       -Patient still has neuropathy. Uses a device to button his shirts.             -Cancer related fatigue      -Fatigue and nausea has improved.  Continu zofran for nausea.       -Maintain activity as tolerated to prevent worsening fatigue.       -Encouraged oral hydration today.            Medical Evaluation of Cancer Associated Symptoms today      -Cancer associated pain-neuropathy      -Patient's pain has remained stable.      -Continue current pain regimen.      -Cancer associated muscle weakness      -Patient's weakness has remained stable.      -Continue close observation.      -Cancer associated fatigue      -Patient's fatigue has remained stable.      -Recommended exercise.      -Anxiety due to cancer      -Patient's anxiety is well controlled today.       -Continue current management.       -Today's Assessment      -Patient's imaging exams, blood tests, physicians' notes, and any new findings     since our last visit were reviewed today to reassess patient's medical     treatment plan. .       -Old medical records were reviewed and summarized in chronological order in the     HPI today to maintain an updated medical record.       -Patient's radiology imaging tests from our last visit were reviewed     independently by me by direct visualization of the images.        -Patients current lab tests and  medications were carefully reviewed to evaluate     patient's current treatment plan today.       -Patient was advised to call us right away if there are any new symptoms for an     urgent visit for further evaluation. Patient voiced understanding and agreed to     do so.      Notes      Discontinued Medications      * Capsaicin 0.075% 56.6 GM CREAM..G.: 1 APL TOPICAL Q4H PRN PAIN/CRAMPS #1      * Amitriptyline HCl 25 MG TABLET: 25 MG PO HS #30      * AQUAPHOR (Aquaphor*) 50 GM OINT...G.: 1 APL TOPICAL BID #1            Date      march 14, 2018      Presents for follow up for lung cancer.             Denies any worsening shortness of breath or productive cough. Reports     intentional 9 pound weight loss since last visit.             Has quit smoking.             Still has neuropathy to hands. Still using Neurontin for neuropathy. Uses an     assitive device to button shirts.      ECOG      ECOG Performance Status:  0            History and Present Illness      -April 2016. Evaluated by PCP for coughing .  He underwent a CXR on 4/21 which     showed a suspicious appearing 5.5mm nodule in the BONNIE.  Follow-up CT chest on 4/ 26 showed a 1.6 x 1.9 cm mass in the apex of the BONNIE highly suspicious for     malignancy.        -May 2016.  He underwent biopsy by FNA during which he developed a pneumothorax     requiring hospital admission and chest tube placement.  He was ultimately     discharged on the following day with resolution of the pneumothorax on CXR.      Pathology results returned consistent with poorly differentiated adenocarcinoma    , consistent with lung primary.  The patient has been referred to medical     oncology for further recommendations.  PET/CT and brain MRI did not show     evidence of metastatic disease and he was referred to  for consideration of     surgical resection.        -July  He was seen at  and underwent left VATS with lobectomy under     the care of Dr. Saul on   Pathology  revealed multifocal invasive moderately     differentiated adenocarcinoma, acinar type, measuring 2.0 cm x 1.7 cm x 1.4 cm.      Another nodule measured 0.6 cm x 0.5 cm x 0.3 cm.  All margins were free of     tumor and no lymph nodes were involved.  Visceral pleural invasion was noted.     Pathologic stage was pT3N0. Stage IIB disease and adjuvant chemotherapy was     recommended.  His performance status is good (ECOG PS 0-1) however given age     and other comorbidities, I recommended we proceed with carboplatin based chemo     as opposed to cisplatin.      -Aug 10, 2016.  Patient was counseled on risk and benefits of systemic     chemotherapy and agreed to proceed with carboplatin AUC 6 and paclitaxel 200mg/    m2 every 21 days x 4 cycles.        -February 2017.  Patient was evaluated the [facility] for follow-up.      Per patient CT chest abdomen and pelvis showed no evidence of disease.      -April 6-2017.  Patient was tapered off gabapentin and Cymbalta due increase     fatigue.       -November 2, 2017. CT chest: No evidence of recurrent or metastatic disease     done at .       -December 11, 2017. Sestamibi Stress showed low probability of coronary     ischemic disease. Follows with Cardiology.             This is a very pleasant 76-year-old gentleman who presents with follow-up for     lobectomy with adjuvant chemotherapy.            Vitals      Height 5 ft 7.00 in / 170.18 cm      Weight 158 lbs 4.644 oz / 71.8 kg      BSA 1.85 m2      BMI 24.8 kg/m2      Temperature 97.7 F / 36.5 C - Temporal      Pulse 112      Blood Pressure 133/77 Sitting, Left Arm      Pulse Oximetry 96%, ROOM AIR            Allergies      Coded Allergies:             NO KNOWN ALLERGIES (Unverified , 3/14/18)            Medications      Last Reconciled on 3/14/18 15:46 by FAUSTO GODWIN      Tiotropium Bromide (Spiriva Respimat 2.5 mcg/Puff) 4 Gm Mist.inhal      2 PUFFS INH RTQDAY, #1 MDI 9 Refills         Prov:  RenettaMiguel         3/8/18       Amitriptyline HCl (Amitriptyline HCl) 50 Mg Tablet      50 MG PO HS, #30 TAB         Prov: Miguel Jackson         12/12/17       Calcium Carb/Vit D3 (Eq Calcium 600 + Vit D*) 1 Each Tablet      600 MG PO BID, #60 TAB 1 Refill         Prov: RenettaMiguel         9/12/17       Gabapentin (Gabapentin) 600 Mg Tablet      600 MG PO TID, #90 TAB 0 Refills         Prov: Miguel Jackson         6/12/17       Juan-Fluticasone (Fluticasone 50 mcg) 16 Gm Spray.susp      2 PUFFS NARE EACH QDAY, #1 BOTTLE 6 Refills         Prov: Rubén Choudharyin         8/16/16       Cholecalciferol (Vitamin D3*) 1,000 Unit Tab      1000 UNITS PO QDAY, #30 TAB 0 Refills         Reported         8/5/16       Pantoprazole (Protonix*) 20 Mg Tablet      40 MG PO HS, #60 TAB 0 Refills         Reported         5/18/16       Folic Acid/Multivits-Min/Lut (Multi-Vitamin) 1 Tab Tablet      1 TAB PO QDAY, #30 TAB 0 Refills         Reported         5/6/16       Pravastatin Sod (Pravachol*) 40 Mg Tablet      40 MG PO HS, TAB         Reported         5/6/16       Aspirin EC (Aspirin EC*) 81 Mg Tabec      81 MG PO QDAY         Reported         5/29/12       verapamil HCl ER (verapamil ER*) 240 Mg Tabcr      240 MG PO QDAY, TAB         Reported         5/27/12            General Information      Primary Provider:  Payam Carroll            Pain and Fatigue Scales      Pain Assessment:           Experiencing any pain?:  Yes         Pain Scale Used:  Numerical         Pain Intensity:  4         Pain Location:  Hand         Description:  Aching, Tingling         Duration:  Continuous      Fatigue:           Experiencing any fatigue?:  No         Fatigue (0-10 scale):  0 (none)            Chemo Status      On Oral Chemotherapy?:  No            Other      Clinical Trial Participant?:  No            Past Medical History      No Diabetes Type 1, No Diabetes Type 2, No Thyroid Disease, No COPD, No     Emphysema, Yes Hypertension, No Stroke, No  High Cholesterol, No Heart Attack,     No Bleeding Condition, No Low or High RBC Count, No Low or High WBC Count, No     Low or High Platelet Coun, No Hepatitis, No Kidney Disease, No Depression, No     Alzheimer's Disease, No Mental Disease, No Seizures, No Arthritis, No     Osteoporosis, No Osteopenia, No Short of Air, No Sleep apnea, No Liver Disease,     No STD, No Enlarged Prostate, No Other      Hematology/oncology:  REPORTS HX OF: Lung cancer, DENIES HX OF: Previous     Treatment for CA, Anemia, Bladder Cancer, Blood cancer, Brain cancer, Breast     cancer, Coagulopathy, Colon Cancer, Colorectal cancer, Endocrine cancer, Eye     cancer, GI cancer,  cancer, Kidney cancer, Leukemia, Leukocytosis, Leukopenia    , Liver cancer, Lymphoma, Musculoskeletal cancer, Myeloma, Neurologic cancer,     Oral cancer, Pancreatic Cancer, Prostate cancer, Skin cancer, Stomach cancer,     Testicular cancer, Thrombocytopenia, Thyroid cancer, Other cancer history,     Other hematologic history      Genetic/metabolic:  DENIES HX OF: Cystic fibrosis, Down syndrome, Other genetic     history, Other metabolic history            Past Surgical History      REPORTS HX OF: Lung biopsy (PARTICAL REMOVAL OF LUNG), Joint replacement, Other     Past Surgical Hx (THROAT SURGERY), DENIES HX OF: Cataract extraction, Thyroid     surgery, CABG surgery, Coronary stent, Valve replacement, Appendectomy,     Cholecystectomy, Splenectomy, Bladder surgery, Nephrectomy, Frature repair,     Skin cancer removal, Melanoma excision, Spinal surgery, Breast biopsy,     Mastectomy, bilateral, Mastectomy, right, Mastectomy, left, Hysterectomy, Peg     Tube Placement, VAD Placement, Biopsy            Family History      REPORTS HX OF: Other Cancer History (MOTHER), DENIES HX OF: Anemia, Blood     disorders, Blood Cancer, Breast cancer, Cervical cancer, Coagulopathy,     Colorectal cancer, Endocrine Cancer, Eye Cancer, GI Cancer,  Cancer, Kidney     Cancer,  Leukemia, Leukocytosis, Leukopenia, Liver Cancer, Lung cancer, Lymphoma    , Melanoma, Musculoskeletal Cancer, Myeloma, Neurologic Cancer, Oral Cancer,     Ovarian cancer, Prostate cancer, Skin Cancer, Stomach Cancer, Testicular Cancer    , Thrombocytopenia, Thyroid cancer, Uterine cancer, Other Hematology History            Social History      Yes      RETIRED            Tobacco Use      Tobacco status:  Former smoker      Smoking packs/day:  1      Smoking history:  25-50 pack years      Quit status:  Quit date established (8 YRS AGO)            Alcohol Use      Alcohol intake:  2+ drinks per day      Counseling given:  Provider counseling            Substance Use      Substance use:  Denies use            ROS      General:  Complains of: Weight loss, Denies: Appetite change, Excessive sweating    , Fatigue, Fever, Night sweats, Weight gain, Other      Eyes:  Denies: Blurred vision, Corrective lenses, Diplopia, Eye irritation, Eye     pain, Eye redness, Spots in vision, Vision loss, Other      Ears, nose, mouth, throat:  Denies: Headache, Seizures, Visual Changes, Hearing     loss, Sinus Congestion, Hoarseness, Sore throat, Other      Cardiovascular:  Denies: Chest pain, Irregular heartbeat, Palpitations, Swollen     ankles/legs, Other      Respiratory:  Denies: Chest pain, Shortness of Air, Productive cough, Coughing     blood, Other      Gastrointestinal:  Denies: Nausea, Vomiting, Problem swallowing, Frequent     heartburn, Constipation, Diarrhea, Tarry stools, Bloody stools, Unable to     control bowels, Other      Kidney/Bladder:  Denies: Painful Urination, Blood in Urine, Incontinence,     Frequent Urination, Decreased Urine Stream, Other      Musculoskeletal:  Denies: New Back pain, Leg Cramps, Painful Joints, Swollen     Joints, Muscle Pain, Muscle weakness, Other      Skin:  DENIES: Bruises Easily, Hair changes, Lesions/changes in moles, Nail     changes, Pigment changes, Rash, Other      Neurological:   Complains of: Numbness\Tingling (hands ), Denies: Dizziness,     Fainting, Paralysis, Seizures, Other      Psychiatric:  Complains of: AAO X 3, Denies: Anxiety, Panic attacks, Depression    , Memory loss, Other      Endocrine:  DiabetesThyroid DisorderOsteoporosisEndocrine Other      Hematologic/lymphatic:  Complains of: Bruising, Denies: Bleeding, Enlarged     Lymph Nodes, Recurrent infections, Other            General Appearance:  Alert, Oriented X3, Cooperative, No acute distress      Eyes:  Anicteric Sclerae, Moist Conjunctiva, PERRLA      HEENT:  Orophraynx clear, No Erythema, No Exudates      Neck:  Supple, Full ROM, No Masses or JVD, No Carotid Bruits      Respiratory:  CTAB, No Diminished Breath, No Rales, No Crackels      Breast\Chest:  Symmetrical, No Nipple Discharge, No Masses, No Lumps      Abdomen\Gastro:  Soft, No No NABS, No No Masses, No No Hernias, No No     Hepatosplenomegaly      Cardio:  RRR, No Murmur, No, Peripheral Edema, Normal PMI      Skin:  Normal Temperature, Normal Tone, Normal Texture and Turgor, No Induration      Psychiatric:  Appropriate Affect, Intact Judgement, AAO x3      Neuro:  Cranial Nerve II-XII Inta, No Focal Sensory Deficit      Muscularskeletal:  Normal Gait and Station, Full ROM of extremeties, Full     muscle strength\tone      Extremities:  No Digital Cyanosis, No Digital Ischemia, Pedal Pulses Intact,     Pedal Pulses Symetrical, Normal Gait and station, No Weakness      Lymphatic:  No Cervical, No Supraclavicular, No Infraclavicular, No Axillary,     No Inguinal            Lab Results      Laboratory Tests      3/6/18 07:50            Hx Influenza Vaccination:  Yes      Date Influenza Vaccine Given:  Sep 1, 2017      Influenza Vaccine Declined:  No      2 or More Falls Past Year?:  No      Fall Past Year with Injury?:  No      Hx Pneumococcal Vaccination:  Yes      Encouraged to follow-up with:  PCP regarding preventative exams.      Chart initiated by      MARTIN  ZAKI RAMIREZ                 Disclaimer: Converted document may not contain table formatting or lab diagrams. Please see [employer] for the authenticated document.

## 2021-05-28 NOTE — PROGRESS NOTES
Patient: DEEPALI RODRIGUEZ     Acct: [id number]     Report: #[id number]  UNIT #: [id number]     : 1940    Encounter Date:2019  PRIMARY CARE: Payam Carroll  ***Signed***  --------------------------------------------------------------------------------------------------------------------  Chief Complaint      Encounter Date      2019            Primary Care Provider      Payam Carroll            Referring Provider      Dayne Choudhary            Patient Complaint      Patient is complaining of      3 month follow up/ct results            VITALS      Height 5 ft 7 in / 170.18 cm      Weight 157 lbs 3 oz / 71.446474 kg      BSA 1.83 m2      BMI 24.6 kg/m2      Temperature 98.4 F / 36.89 C - Oral      Pulse 106      Respirations 18      Blood Pressure 116/70 Sitting, Right Arm      Pulse Oximetry 97%, room air            HPI      The patient is an 79 year old male with chronic obstructive pulmonary disease     with recurrent exacerbations, history of multifocal pneumonia, pulmonary     nodules, 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 He is here for follow up.             Since his last office visit he was admitted to the hospital 3 times over the     summer and fall. The last hospital admission was right before . He     had a CT scan of the chest done at that time showing multiple lung nodules     concerning for infectious inflammatory in origin. He was seen by MIRNA Arteaga after his last hospitalization in early December and at this time he was     given Daliresp to start but the cost was too high and he did not notice any     difference. He currently uses Stiolto once daily and he rarely uses his rescue     inhaler.  He has not needed any antibiotics or steroids since his last     hospitalization. He thinks he has a CT scan ordered but does not know the exact     date. He has no nausea and vomiting, no chest pain or chest tightness,  no sign    ificant weight loss or loss of appetite.            ROS      Constitutional:  Complains of: Fatigue; Denies: Fever, Weight gain, Weight loss,    Chills, Insomnia, Other      Respiratory/Breathing:  Complains of: Shortness of air, Wheezing, Cough; Denies:    Hemoptysis, Pleuritic pain, Other      Endocrine:  Denies: Polydipsia, Polyuria, Heat/cold intolerance, Diabetes, Other      Eyes:  Denies: Blurred vision, Vision Changes, Other      Ears, nose, mouth, throat:  Denies: Mouth lesions, Thrush, Throat pain,     Hoarseness, Allergies/Hay Fever, Post Nasal Drip, Headaches, Recent Head Injury,    Nose Bleeding, Neck Stiffness, Thyroid Mass, Hearing Loss, Ear Fullness, Dry     Mouth, Nasal or Sinus Pain, Dry Lips, Nasal discharge, Nasal congestion, Other      Cardiovascular:  Denies: Palpitations, Syncope, Claudication, Chest Pain, Wake     up Gasping for air, Leg Swelling, Irregular Heart Rate, Cyanosis, Dyspnea on     Exertion, Other      Gastrointestinal:  Denies: Nausea, Constipation, Diarrhea, Abdominal pain,     Vomiting, Difficulty Swallowing, Reflux/Heartburn, Dysphagia, Jaundice,     Bloating, Melena, Bloody stools, Other      Genitourinary:  Denies: Urinary frequency, Incontinence, Hematuria, Urgency,     Nocturia, Dysuria, Testicular problems, Other      Musculoskeletal:  Denies: Joint Pain, Joint Stiffness, Joint Swelling, Myalgias,    Other      Hematologic/lymphatic:  DENIES: Lymphadenopathy, Bruising, Bleeding tendencies,     Other      Neurological:  Denies: Headache, Numbness, Weakness, Seizures, Other      Psychiatric:  Denies: Anxiety, Appropriate Effect, Depression, Other      Sleep:  No: Excessive daytime sleep, Morning Headache?, Snoring, Insomnia?, Stop    breathing at sleep?, Other      Integumentary:  Denies: Rash, Dry skin, Skin Warm to Touch, Other      Immunologic/Allergic:  Denies: Latex allergy, Seasonal allergies, Asthma,     Urticaria, Eczema, Other      Immunization status:   No: Up to date            FAMILY/SOCIAL/MEDICAL HX      Surgical History:  Yes: Head Surgery (THROAT SURG), Orthopedic Surgery (LEFT     SHOULDER SURG), Other Surgeries (removed top left of lung); No: Abdominal     Surgery, Appendectomy, Bladder Surgery, Bowel Surgery, CABG, Cholecystectomy,     Oral Surgery, Vascular Surgery      Cancer/Type - Family Hx:  Mother      Is Father Still Living?:  No      Is Mother Still Living?:  No       Family History:  Yes      Social History:  Tobacco Use      Smoking status:  Former smoker (1 ppd 55 years quit for 10 years )      Smoking packs/day:  1      Smoking history:  25-50 pack years      Anticoagulation Therapy:  No      Antibiotic Prophylaxis:  No      Medical History:  Yes: Chemotherapy/Cancer (LUNG CA, LEFT LUNG), Chronic     Bronchitis/COPD (INHALER PRN), Hemorrhoids/Rectal Prob (GERD), High Blood     Pressure (ON MEDS ), Shortness Of Breath (PNEUMONIA,  LEFT LUNG CA, REMOVAL HALF    OF UPPER PORTION LEFT LUNG); No: Alcoholism, Allergies, Anemia, Arthritis,     Asthma, Atrial Fibrillation, Blood Disease, Broken Bones, Cataracts, Chemical     Dependency, Emphysema, Chronic Liver Disease, Colon Trouble, Colitis,     Diverticulitis, Congestive Heart Failu, Deafness or Ringing Ears, Convulsions,     Depression, Anxiety, Bipolar Disorder, PTSD, Diabetes, Epilepsy, Seizures,     Forgetfullness, Glaucoma, Gall Stones, Gout, Head Injury, Heart Attack, Heart     Murmur, GERD, Hepatitis, Hiatal Hernia, High Cholesterol, HIV (Do not ask -     volu, Jaundice, Kidney or Bladder Disease, Kidney Stones, Migrane Headaches,     Mitral Valve Prolapse, Night sweats, Phlebitis, Psychiatric Care, Reflux     Disease, Rheumatic Fever, Sexually Transmitted Dis, Sinus Trouble, Skin     Disease/Psoriais/Ecz, Stroke, Thyroid Problem, Tuberculosis or Pos TB Te,     Miscellaneous Medical/oth      Psychiatric History      none            PREVENTION      Hx Influenza Vaccination:  Yes      Date  Influenza Vaccine Given:  Sep 1, 2018      Influenza Vaccine Declined:  No      2 or More Falls Past Year?:  No      Fall Past Year with Injury?:  No      Hx Pneumococcal Vaccination:  Yes      Encouraged to follow-up with:  PCP regarding preventative exams.      Chart initiated by      camron delgadillo ma            ALLERGIES/MEDICATIONS      Allergies:        Coded Allergies:             NO KNOWN ALLERGIES (Unverified , 1/7/19)      Medications    Last Reconciled on 1/7/19 08:59 by DAYNE CHOUDHARY MD      Amoxicillin/Clavulanate K (Augmentin 875/125 Mg) 1 Each Tablet      875 MG PO BID, #14 TAB 2 Refills         Prov: Dayne Choudhary         1/7/19       predniSONE* (Deltasone*) 10 Mg Tablet      10 MG PO ASDIR, #45 TAB 2 Refills         Prov: Dayne Choudhary         1/7/19       Roflumilast (Daliresp) 500 Mcg Tab      500 MCG PO QDAY for 30 Days, #30 TAB 5 Refills         Prov: Sammie Soler PA-C         12/4/18       Cetirizine Hcl (ZyrTEC) 10 Mg Tablet      10 MG PO HS, #30 TAB 5 Refills         Prov: Sammie Soler PA-C         12/4/18       Tiotropium Br/Olodaterol HCl (Stiolto Respimat Inhal Spray) 4 Gm Mist.inhal      2 PUFFS INH RTQDAY, #1 INH 0 Refills         Reported         12/4/18       Cyclobenzaprine Hcl (Cyclobenzaprine*) 10 Mg Tablet      10 MG PO QDAY         Reported         11/20/18       Aspirin Chew (Aspirin Baby) 81 Mg Tab.chew      81 MG PO HS, #30 TAB.CHEW 0 Refills         Reported         11/20/18       Multivitamin/Iron/Folic Acid (CENTRUM COMPLETE MULTIVIT TAB) 1 Tab Tablet      1 TAB PO QDAY, #30 TAB 0 Refills         Reported         11/20/18       Verapamil Er (VERAPAMIL ER) 180 Mg Tablet.er      180 MG PO QDAY         Reported         11/20/18       Azelastine Hcl (Azelastine Nasal) 137 Mcg/0.137 Ml Spray.pump      2 PUFFS NARE EACH BID, #1 BOTTLE 9 Refills         Prov: Dayne Choudhary         9/20/18       Albuterol/Ipratropium (Duoneb) 3 Ml Ampul.neb      3 ML INH Q2H PRN for  DYSPNEA/WHEEZING for 30 Days, #360 NEB         Prov: Andres Gaspar         6/29/18       Vitamin B Complex (B Complex-Vitamin B-12) 1 Each Tablet      1 TAB PO QDAY, #30 TAB         Reported         6/27/18       Cilostazol (Pletal*) 100 Mg Tab      100 MG PO BID, #60 TAB         Reported         6/25/18       Pregabalin (Lyrica *) 100 Mg Cap      100 MG PO TID, #90 CAP         Reported         6/8/18       Cholecalciferol (Vitamin D3*) 1,000 Unit Tab      1000 UNITS PO QDAY, #30 TAB 0 Refills         Reported         8/5/16       Pantoprazole (Protonix*) 20 Mg Tablet      40 MG PO QAM, #60 TAB 0 Refills         Reported         5/18/16       Pravastatin Sod (Pravachol*) 40 Mg Tablet      40 MG PO HS, TAB         Reported         5/6/16      Current Medications      Current Medications Reviewed 1/7/19            EXAM      CONSTITUTIONAL: Pleasant female in no acute distress,  normal conversant.       EYES : Pink conjunctive, no ptosis, PERRL.       ENMT : Nose and ears appear normal, normal dentition, mild posterior pharyngeal     wall erythema. Mallampati classification       Neck: Nontender, no masses, no thyromegaly, no nodules.      Resp : Bilateral diminished breath sounds, scattered rhonchi on the left base,     no wheezing or crackles, resonate to percussion bilaterally.       CVS  : No carotid bruits, s1s2 nl, RRR, no murmur, rubs or gallop, no peripheral    edema       Chest wall: Normal rise with inspiration, nontender on palpation      GI   : Abdomen soft, with no masses, no hepatosplenomegaly, no hernias, BS+      MSK  : Normal gait and station, no digital cyanosis or clubbing       Skin : No rashes, ulcerations or lesions, normal turgor and temperature      Neuro: CN II - XII intact, no sensory deficits, DTRs intact and symmetrical, no     motor weakness      Psych: Appropriate affect, A   Vtials      Vitals:             Height 5 ft 7 in / 170.18 cm           Weight 157 lbs 3 oz / 71.483371 kg            BSA 1.83 m2           BMI 24.6 kg/m2           Temperature 98.4 F / 36.89 C - Oral           Pulse 106           Respirations 18           Blood Pressure 116/70 Sitting, Right Arm           Pulse Oximetry 97%, room air            REVIEW      Results Reviewed      PCCS Results Reviewed?:  Yes Prev Lab Results, Yes Prev Radiology Results, Yes     Previous Mecial Records      Radiographic Results               [facility]                PACS RADIOLOGY REPORT            Patient: DEEPALI RODRIGUEZ   Acct: #[id number]   Report: #[id number]            UNIT #: [id number]    DOS: 18 1717      INSURANCE:MEDICARE PART A   LOCATION:[facility]  0216   : 1940            PROVIDERS      ADMITTING:  Chase Klein   ATTENDING: Chase Klein      FAMILY:  Payam Carroll   ORDERING:  Dayne Choudhary         OTHER:    DICTATING:  Hussain Martinez MD            REQ #:[id number]   EXAM:[facility] - CT CHEST without CONTRAST      REASON FOR EXAM:  Lung nodules      REASON FOR VISIT:  SEPSIS SECONDARY TO PNEUMONIA            *******Signed******         PROCEDURE:   CT CHEST WITHOUT CONTRAST             COMPARISON:   [facility], CT, CHEST W/ CONTRAST,     2016, 14:17.  [facility], CT, CHEST W/ CONTRAST, 2018, 15:16.  [facility], CT, CHEST W/       CONTRAST, 2018, 1:00.  [facility], CT, CHEST W/O CONTRAST,     2018, 9:01.        [facility], CR, CHEST AP/PA 1 VIEW, 2018, 11:37.  [facility], CT,       CHEST W/O CONTRAST, 2018, 10:39.             INDICATIONS:   LUNG NODULES, PNEUMONIA, SEPSIS, HISTORY OF LUNG CANCER             TECHNIQUE:   CT images were created without the administration of contrast m    aterial.               PROTOCOL:     Standard imaging protocol performed                RADIATION:     DLP: 534mGy*cm          Automated  exposure control was utilized to minimize radiation dose.              FINDINGS:         There are postsurgical changes from left upper lobectomy.  The residual central     bronchial tree is       clear.  There is paraseptal emphysema mostly within the right upper lobe.  There    are numerous       ill-defined nodular opacities scattered throughout the right lung that appear     overall slightly       increased from prior exam.  For example, there is a new 4 mm nodular opacity in     the right upper       lobe on image 20. There is also a new 7 mm nodular opacity within the inferior     right upper lobe on       image 40. There is a new small consolidation within the right lung base.  There     is no pleural       effusion.             The heart size appears normal, with partially calcified atherosclerotic disease     within the coronary       arteries.  The great vessels are normal in caliber.  No abnormally enlarged     lymph nodes are       identified.             Partial evaluation of the upper abdomen is unremarkable.             No aggressive osseous lesions identified.             CONCLUSION:         1. Scattered ill-defined nodular opacities appear overall slightly increased     from prior CT.        Findings may be secondary to an ongoing infectious or inflammatory process.      Given history of lung       cancer, recommend continued surveillance.      2. New small consolidation within right lung base, likely focal pneumonia.              GABRIELA DOYLE MD             Electronically Signed and Approved By: GABRIELA DOYLE MD on 11/20/2018 at     19:23                        Until signed, this is an unconfirmed preliminary report that may contain      errors and is subject to change.                                              RODD1:      D:11/20/18 1923      PFT Results      [id number]  [id number]                                 [facility]  [employer]                            [place]  [id number]               __________________________________________________________________________             Patient Name:                   Attending Physician:      Carlos Zimmerman PA-C [facility]             Patient Visit # MR #            Admit Date  Disch Date     Location      [id number]    [id number]      08/28/2018                 CT- -             Date of Birth      1940      __________________________________________________________________________      0821 - DIAGNOSTIC REPORT             PULMONARY FUNCTION TEST             SPIROMETRY:      FEV1/FVC ratio is 58%.      FEV1 is 1.53 L, 61% of predicted.      Forced vital capacity is 2.65 L, 74% of predicted.      There is no response to bronchodilator therapy seen.             LUNG VOLUMES:      Total lung capacity is 88% of predicted.      Residual volume is 120% of predicted.             DIFFUSION:      DLCO is 50% of predicted.             IMPRESSION:      1. Spirometry shows the presence of a mild obstructive defect with no          significant response to bronchodilator therapy seen.      2. Lung volumes show the presence of air trapping.      3. Diffusion capacity is moderately decreased.             To be electronically signed in [employer]      [id number] LINDSEY MARTE D.O.             WC:rt      D:  09/10/2018 10:04      T:  09/10/2018 12:02      #[id number]             Until signed, this is an unconfirmed preliminary report that may contain      errors and is subject to change.                   09/11/18 1056  <Electronically signed by Lindsey Marte DO>            Assessment      Pneumonia         Pneumonia due to infectious organism, unspecified laterality, unspecified        part of lung - J18.9         Pneumonia type: due to unspecified organism         Laterality: unspecified laterality         Lung location: unspecified part of  lung            Non-small cell lung cancer (NSCLC) - C34.90            Lung nodule - R91.1            Notes      New Medications      * predniSONE* (Deltasone*) 10 MG TABLET: 10 MG PO ASDIR #45         Instructions: 11yhx6x,13dpr5d,69euj7u,75fox7f,74svg6e      * AMOXICILLIN/CLAVULANATE K (Augmentin 875/125 Mg) 1 EACH TABLET: 875 MG PO BID       #14      New Diagnostics      * Chest W/O Cont CT, 6 WEEKS         Dx: Pneumonia - J18.9      PLAN:       The patient is a 79 year old male with chronic obstructive pulmonary disease     with recurrent exacerbation, multifocal pneumonia, pulmonary nodules, tobacco     abuse of cigarettes in remission, history of right upper lobe adenocarcinoma     status post lobectomy and chemotherapy.             1.  Chronic obstructive pulmonary disease.  Continue with Stiolto. Samples of     Stiolto were provided today. Use albuterol as needed. He has a nebulizer but he     is not using it. Chronic obstructive pulmonary disease action plan was discussed    in detail. I have given him a course of Prednisone and Augmentin to use when he     has a chronic obstructive pulmonary disease exacerbation. I have given him 3     refills.  CT scan of the chest showed multiple lung nodules and infiltrates     which were enlarging and was concerning for infectious or inflammatory etiology.    I will order CT scan of the chest in 6 weeks and follow up with him after the CT    scan.       2. I advised him to keep the head of his bed up and not eat close to bedtime.     Part of his problem is likely related to reflux and aspiration.       3. The patient is up to date on his vaccinations.       4. He says the Daliresp was very expensive and did not change any of his     symptoms so I will stop that now.       5. Continue Zyrtec and azelastine nasal spray.       6. Follow up in 6-8 weeks, earlier if needed.            Patient Education      Education resources provided:  Yes      Patient Education Provided:   Acute Bronchitis                 Disclaimer: Converted document may not contain table formatting or lab diagrams. Please see [email or web address] for the authenticated document.

## 2021-05-28 NOTE — PROGRESS NOTES
Patient: DEEPALI RODRIGUEZ     Acct: [id number]     Report: #[id number]  UNIT #: [id number]     : 1940    Encounter Date:2020  PRIMARY CARE: Payam Carroll  ***Signed***  --------------------------------------------------------------------------------------------------------------------  Chief Complaint      Encounter Date      2020            Primary Care Provider      Payam Carroll            Referring Provider      Dayne Choudhary            Patient Complaint      Patient is complaining of      Patient here today for [facility] F/U, COPD            VITALS      Height 67 in / 170.18 cm      Weight 152 lbs  / 68.364220 kg      BSA 1.80 m2      BMI 23.8 kg/m2      Temperature 98.7 F / 37.06 C - Temporal      Pulse 100      Respirations 15      Blood Pressure 127/62 Sitting, Left Arm      Pulse Oximetry 98%, room air            HPI      The patient is a 80 year old white male patient of Dr. Choudhary's last seen by me     for a Telehealth 2 months ago. He was recently hospitalized at [facility] and seen by Dr. Choudhary and Dr. Elias on 2020. He was treated for     a pseudomonas pneumonia and had extensive airway disease in the right lung on CT    scan of the chest. He has completed a 2 week course of Levaquin and states he is    feeling back to his baseline. He denies exertional dyspnea, states he     occasionally has a dry cough, denies wheezing, hemoptysis, fever or chills. He     had a follow up chest x-ray done on 2020 and it showed improving pneumonia    in right lung. The patient has been staying active and states he did 2 hours of     weed eating yesterday without any breathing difficulties. He was planning to go     [place] [employer] [place] after his visit with me today. He also has a history of lung     cancer and had right upper lobectomy and adjuvant chemotherapy. He quit smoking     10 years ago. He is asking about switching to trelegy ellipta inhaler as he has     some  friends who take it and it seems to help them a lot.             I reviewed the Review of Systems, medical, surgical and family history and agree    with those as entered.      Copies To:   Dayne Choudhary      Constitutional:  Denies: Fatigue, Fever, Weight gain, Weight loss, Chills,     Insomnia, Other      Respiratory/Breathing:  Complains of: Shortness of air, Cough; Denies: Wheezing,    Hemoptysis, Pleuritic pain, Other      Endocrine:  Denies: Polydipsia, Polyuria, Heat/cold intolerance, Diabetes, Other      Eyes:  Denies: Blurred vision, Vision Changes, Other      Ears, nose, mouth, throat:  Denies: Congestion, Dysphagia, Hearing Changes, Nose    Bleeding, Nasal Discharge, Throat pain, Tinnitus, Other      Cardiovascular:  Denies: Chest Pain, Exertional dyspnea, Peripheral Edema,     Palpitations, Syncope, Wake up Gasping for air, Orthopnea, Tachycardia, Other      Gastrointestinal:  Denies: Abdominal pain/cramping, Bloody stools, Constipation,    Diarrhea, Melena, Nausea, Vomiting, Other      Genitourinary:  Denies: Dysuria, Urinary frequency, Incontinence, Hematuria,     Urgency, Other      Musculoskeletal:  Denies: Joint Pain, Joint Stiffness, Joint Swelling, Myalgias,    Other      Hematologic/lymphatic:  DENIES: Lymphadenopathy, Bruising, Bleeding tendencies,     Other      Neurologic:  Denies: Headache, Numbness, Weakness, Seizures, Other      Psychiatric:  Denies: Anxiety, Appropriate Effect, Depression, Other      Sleep:  No: Excessive daytime sleep, Morning Headache?, Snoring, Insomnia?, Stop    breathing at sleep?, Other      Integumentary:  Denies: Rash, Dry skin, Skin Warm to Touch, Other            FAMILY/SOCIAL/MEDICAL HX      Surgical History:  Yes: Head Surgery (THROAT SURG), Orthopedic Surgery (LEFT     SHOULDER SURG), Other Surgeries (removed top left of lung); No: AAA Repair,     Abdominal Surgery, Adenoids, Angioplasty, Appendectomy, Back Surgery, Bladder     Surgery, Bowel  Surgery, Breast Surgery, CABG, Carotid Stenosis, Cholecystectomy,    Ear Surgery, Eye Surgery, Hernia Surgery, Kidney Surgery, Nose Surgery, Oral     Surgery, Prostatectomy, Rectal Surgery, Spinal Surgery, Testicular Surgery,     Throat Surgery, Tonsils, Valve Replacement, Vascular Surgery      Cancer/Type - Family Hx:  Mother      Is Father Still Living?:  No      Is Mother Still Living?:  No       Family History:  Yes      Social History:  Tobacco Use      Smoking status:  Former smoker      Smoking packs/day:  1      Smoking history:  25-50 pack years      Anticoagulation Therapy:  No      Antibiotic Prophylaxis:  No      Medical History:  Yes: Chemotherapy/Cancer (LUNG CA, LEFT LUNG), Chronic     Bronchitis/COPD (INHALER PRN), High Blood Pressure (ON MEDS ), Shortness Of     Breath (PNEUMONIA,  LEFT LUNG CA, REMOVAL HALF OF UPPER PORTION LEFT LUNG); No:     Alcoholism, Allergies, Anemia, Arthritis, Asthma, Atrial Fibrillation, Blood     Disease, Broken Bones, Cataracts, Chemical Dependency, Emphysema, Chronic Liver     Disease, Colon Trouble, Colitis, Diverticulitis, Congestive Heart Failu,     Deafness or Ringing Ears, Convulsions, Depression, Anxiety, Bipolar Disorder,     PTSD, Diabetes, Epilepsy, Seizures, Forgetfullness, Glaucoma, Gall Stones, Gout,    Head Injury, Heart Attack, Heart Murmur, GERD, Hemorrhoids/Rectal Prob,     Hepatitis, Hiatal Hernia, High Cholesterol, HIV (Do not ask - volu, Jaundice,     Kidney or Bladder Disease, Kidney Stones, Migrane Headaches, Mitral Valve     Prolapse, Night sweats, Phlebitis, Psychiatric Care, Reflux Disease, Rheumatic     Fever, Sexually Transmitted Dis, Sinus Trouble, Skin Disease/Psoriais/Ecz,     Stroke, Thyroid Problem, Tuberculosis or Pos TB Te, Miscellaneous Medical/oth      Psychiatric History      none            PREVENTION      Hx Influenza Vaccination:  Yes      Date Influenza Vaccine Given:  Sep 1, 2019      Influenza Vaccine Declined:  No      2 or  More Falls in Past Year?:  No      Fall Past Year with Injury?:  No      Hx Pneumococcal Vaccination:  Yes      Encouraged to follow-up with:  PCP regarding preventative exams.      Chart initiated by      Codi Snider CMA            ALLERGIES/MEDICATIONS      Allergies:        Coded Allergies:             NO KNOWN ALLERGIES (Unverified , 5/7/20)      Medications    Last Reconciled on 7/22/20 08:36 by MIRNA EDWARD      amLODIPine (amLODIPine) 5 Mg Tablet      5 MG PO QDAY, #30 TAB         Reported         7/2/20       Cetirizine Hcl (zyrTEC) 10 Mg Tablet      10 MG PO QDAY, #30 TAB 0 Refills         Reported         7/2/20       Aspirin Chew (Aspirin Baby) 81 Mg Tab.chew      81 MG PO QDAY, #30 TAB.CHEW 0 Refills         Reported         7/2/20       Gabapentin (Gabapentin) 300 Mg Capsule      300 MG PO QID, #60 CAP 0 Refills         Reported         7/2/20       Tiotropium Br/Olodaterol HCl (Stiolto Respimat Inhal Spray) 4 Gm Mist.inhal      2 PUFFS INH RTQDAY for 30 Days, #1 INH 5 Refills         Prov: Sammie Soler PA-C         5/20/20       Cilostazol (Pletal*) 100 Mg Tab      100 MG PO BID         Reported         4/4/20       Metoprolol Succinate (Metoprolol Succinate) 50 Mg Tab.er.24h      25 MG PO QDAY, #15 TAB.SR.24H 0 Refills         Reported         10/4/19       Cyclobenzaprine Hcl (Cyclobenzaprine*) 10 Mg Tablet      10 MG PO QDAY         Reported         11/20/18       Multivitamin/Iron/Folic Acid (CENTRUM COMPLETE MULTIVIT TAB) 1 Tab Tablet      1 TAB PO QDAY, #30 TAB 0 Refills         Reported         11/20/18       Vitamin B Complex (B Complex-Vitamin B-12) 1 Each Tablet      1 TAB PO QDAY, #30 TAB         Reported         6/27/18       Cholecalciferol (Vitamin D3) (Vitamin D3) 1,000 Unit Tab      1000 UNITS PO QDAY, #30 TAB 0 Refills         Reported         8/5/16       Pantoprazole (Protonix) 20 Mg Tablet      40 MG PO QAM, #60 TAB 0 Refills         Reported         5/18/16        Pravastatin Sod (Pravachol*) 40 Mg Tablet      20 MG PO HS, TAB         Reported         5/6/16      Current Medications      Current Medications Reviewed 7/22/20            EXAM      CONSTITUTIONAL: Pleasant male in no acute distress,  normal conversant.       EYES : Pink conjunctive, no ptosis, PERRL.       ENMT : Nose and ears appear normal, normal dentition, mild posterior pharyngeal     wall erythema, no sinus tenderness. Mallampati classification       Neck: Nontender, no masses, no thyromegaly, no nodules.      Resp : Mildly decreased breath sounds throughout, no wheezes, rhonchi or     crackles, normal work of breathing noted.        CVS  : No carotid bruits, s1s2 nl, RRR, no murmur, rubs or gallop, no peripheral    edema       Chest wall: Normal rise with inspiration, nontender on palpation.      GI   : Abdomen soft, with no masses, no hepatosplenomegaly, no hernias, BS+      MSK  : Normal gait and station, no digital cyanosis or clubbing       Skin : No rashes, ulcerations or lesions, normal turgor and temperature      Neuro: CN II - XII intact, no sensory deficits, DTRs intact and symmetrical, no     motor weakness      Psych: Appropriate affect, A   Vitals      Vitals:             Height 67 in / 170.18 cm           Weight 152 lbs  / 68.690615 kg           BSA 1.80 m2           BMI 23.8 kg/m2           Temperature 98.7 F / 37.06 C - Temporal           Pulse 100           Respirations 15           Blood Pressure 127/62 Sitting, Left Arm           Pulse Oximetry 98%, room air            REVIEW      Results Reviewed      PCCS Results Reviewed?:  Yes Prev Lab Results, Yes Prev Radiology Results, Yes     Previous [facility] Records      Lab Results      I personally reviewed the patient's previous hospital records.      Radiographic Results               [facility]                PACS RADIOLOGY REPORT            Patient: DEEPALI RODRIGUEZ   Acct:  #[id number]   Report: #[id number]            UNIT #: [id number]    DOS: 20 1045      INSURANCE:MEDICARE PART A   LOCATION:[facility]  [id number]   : 1940            PROVIDERS      ADMITTING:  Rosalio Arias   ATTENDING: Rosalio Arias      FAMILY:  Payam Carroll   ORDERING:  Rosalio Arias         OTHER:    DICTATING:  Daisy Rossi MD            REQ #:[id number]   EXAM:CHWO - CT CHEST without CONTRAST      REASON FOR EXAM:  PNA, comparision study       REASON FOR VISIT:  CAP,COPD            *******Signed******         PROCEDURE:   CT CHEST WITHOUT CONTRAST             COMPARISON:   [facility], PET, SKULL BASE TO MID THIGH INITIAL,     2016, 16:03.        [facility], CT, CHEST W/ CONTRAST, 2020, 9:59.  [facility], CR,       CHEST AP/PA 1 VIEW, 2020, 8:31.  [facility], CT,     CHEST W/O CONTRAST,       2020, 7:12.             INDICATIONS:   PNA, comparison study             TECHNIQUE:   CT images were created without the administration of contrast     material.               PROTOCOL:     Standard imaging protocol performed                RADIATION:     DLP: 322.3mGy*cm          Automated exposure control was utilized to minimize radiation dose.              FINDINGS:         There are new areas of patchy ground-glass and consolidative opacities     throughout the right lung,       most confluent in the right lower lobe.  Postoperative changes are redemonstr    ated in the left lung.        Moderate paraseptal/centrilobular emphysematous changes are again noted.  No     pleural effusion is       seen.  There is trace pericardial fluid.  Heart size is not enlarged.  Coronary     artery       calcifications are noted.  No adenopathy is seen chest on this noncontrast CT.      There are punctate       nonobstructing left renal calculi.  T8 and T12 superior endplate fractures with     anterior height       loss appears stable.              CONCLUSION:         1. New ground-glass and consolidative opacities throughout the right lung,     concerning for       multifocal pneumonia.      2. Moderate emphysematous changes.        3. Coronary artery calcifications. Please see above for additional stable     incidental findings.                BLAINE REYES MD             Electronically Signed and Approved By: BLAINE REYES MD on 7/02/2020 at 12:41                           Until signed, this is an unconfirmed preliminary report that may contain      errors and is subject to change.                                              BARLA:      D:07/02/20 1241            Assessment      Pseudomonas pneumonia - J15.1            Notes      New Medications      * Fluticasone/Umeclidin/Vilanter (Trelegy Ellipta 100-62.5-25) 1 EACH BLST.W.D      EV: 1 PUFF INH RTQDAY #1         Dx: Pseudomonas pneumonia - J15.1      * Fluticasone/Umeclidin/Vilanter (Trelegy Ellipta 100-62.5-25) 1 EACH BLST.W.DEV               Sample - Qty 2      Discontinued Medications      * levoFLOXacin 750 MG TABLET: 750 MG PO QDAY 14 Days #14      New Diagnostics      * Chest W/O Cont CT, 2 Months         Dx: Pseudomonas pneumonia - J15.1      ASSESSMENT:       1. Recent pseudomonas pneumonia clinically improving.       2. Chronic obstructive pulmonary disease with acute exacerbation clinically     resolving.       3. 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       4. Tobacco abuse of cigarettes in remission for 10 years.       5. Cervical spine compression fractures followed by Dr. Tom.            PLAN:      1. At this time I have discussed with the patient regarding his recent     hospitalization and plans going forward. He has completed a 2 week course of     Levaquin for pseudomonas pneumonia and feels fully back to his baseline. He is     back to golfing and going heavy yard work without breathing issues.       2. I will get a repeat CT scan of the  chest in 2-3 months to ensure full     resolution of right sided pneumonia. I discussed that the patient chest x-ray     done 2 days ago showed some improvement.       3. The patient would like to try trelegy ellipta inhaler. He has friends who use    this and it seems to help them. I will discontinue Stiolto and start trelegy e    llipta inhaler 1 puff once daily and use albuterol as needed. I have given hims     samples of trelegy ellipta inhaler and showed him how to use it. I advised the     patient that if he has any worsening of his breathing on trelegy ellipta inhaler    or if it does not help, he should call us back and we can switch him back to     Stiolto.         4. He is up to date on flu and pneumonia vaccines.       5. Follow up in 2-3 months with Dr. Choudhary to review CT scan of the chest, sooner    if needed.            Patient Education      Patient Education Provided:  COPD, How to use an Inhaler      Time Spent:  > 50% /Coord Care            Electronically signed by DAPHNEY DE LA VEGA PA-C  07/23/2020 09:08       Disclaimer: Converted document may not contain table formatting or lab diagrams. Please see [email or web address] for the authenticated document.

## 2021-05-28 NOTE — PROGRESS NOTES
Patient: DEEPALI RODRIGUEZ     Acct: [id number]     Report: #[id number]  UNIT #: [id number]     : 1940    Encounter Date:2019  PRIMARY CARE: Payam Carroll  ***Signed***  --------------------------------------------------------------------------------------------------------------------  Chief Complaint      Encounter Date      May 28, 2019            Primary Care Provider      Payam Carroll            Referring Provider      Dayne Choudhary            Patient Complaint      Patient is complaining of      Patient here today for 3 month follow up and CT scan            VITALS      Height 5 ft 7 in / 170.18 cm      Weight 159 lbs 6 oz / 72.838246 kg      BSA 1.84 m2      BMI 25.0 kg/m2      Temperature 98.2 F / 36.78 C - Oral      Pulse 105      Respirations 15      Blood Pressure 133/67 Sitting, Right Arm      Pulse Oximetry 95%, room air            HPI      The patient is a very pleasant 79 year old white male with a history of chronic     obstructive pulmonary disease and 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 He had had periodic CT     scans of the chest about every 3 months and most recent CT was done on 05/15/19     which I have reviewed with him today. It showed more prominent area of ground     glass opacity in the left lower lobe. The patient states he has been doing well.    He denies any increased dyspnea, coughing or wheezing. He denies hemoptysis,     fever or chills. He has not needed antibiotics or steroids in the past 3 months     or longer. He uses his Stiolto daily and denies needing his rescue inhaler at     all.             I reviewed her Review of Systems, medical, surgical and family history and agree    with those as entered.      Copies To:   Dayne Choudhary ;            KALEE      Constitutional:  Denies: Fatigue, Fever, Weight gain, Weight loss, Chills,     Insomnia, Other      Respiratory/Breathing:  Complains of: Shortness  of air; Denies: Wheezing, Cough,    Hemoptysis, Pleuritic pain, Other      Endocrine:  Denies: Polydipsia, Polyuria, Heat/cold intolerance, Diabetes, Other      Eyes:  Denies: Blurred vision, Vision Changes, Other      Ears, nose, mouth, throat:  Denies: Congestion, Dysphagia, Hearing Changes, Nose    Bleeding, Nasal Discharge, Throat pain, Tinnitus, Other      Cardiovascular:  Denies: Chest Pain, Exertional dyspnea, Peripheral Edema,     Palpitations, Syncope, Wake up Gasping for air, Orthopnea, Tachycardia, Other      Gastrointestinal:  Denies: Abdominal pain/cramping, Bloody stools, Constipation,    Diarrhea, Melena, Nausea, Vomiting, Other      Genitourinary:  Denies: Dysuria, Urinary frequency, Incontinence, Hematuria,     Urgency, Other      Musculoskeletal:  Denies: Joint Pain, Joint Stiffness, Joint Swelling, Myalgias,    Other      Hematologic/lymphatic:  DENIES: Lymphadenopathy, Bruising, Bleeding tendencies,     Other      Neurologic:  Denies: Headache, Numbness, Weakness, Seizures, Other      Psychiatric:  Denies: Anxiety, Appropriate Effect, Depression, Other      Sleep:  No: Excessive daytime sleep, Morning Headache?, Snoring, Insomnia?, Stop    breathing at sleep?, Other      Integumentary:  Denies: Rash, Dry skin, Skin Warm to Touch, Other            FAMILY/SOCIAL/MEDICAL HX      Surgical History:  Yes: Head Surgery (THROAT SURG), Orthopedic Surgery (LEFT     SHOULDER SURG), Other Surgeries (removed top left of lung); No: AAA Repair,     Abdominal Surgery, Adenoids, Angioplasty, Appendectomy, Back Surgery, Bladder     Surgery, Bowel Surgery, Breast Surgery, CABG, Carotid Stenosis, Cholecystectomy,    Ear Surgery, Eye Surgery, Hernia Surgery, Kidney Surgery, Nose Surgery, Oral     Surgery, Prostatectomy, Rectal Surgery, Spinal Surgery, Testicular Surgery,     Throat Surgery, Tonsils, Valve Replacement, Vascular Surgery      Cancer/Type - Family Hx:  Mother      Is Father Still Living?:  No      Is  Mother Still Living?:  No       Family History:  Yes      Social History:  Tobacco Use      Smoking status:  Former smoker (1 ppd 55 years quit for 10 years )      Smoking packs/day:  1      Smoking history:  25-50 pack years      Anticoagulation Therapy:  No      Antibiotic Prophylaxis:  No      Medical History:  Yes: Chemotherapy/Cancer (LUNG CA, LEFT LUNG), Chronic     Bronchitis/COPD (INHALER PRN), Hemorrhoids/Rectal Prob (GERD), High Blood     Pressure (ON MEDS ), Shortness Of Breath (PNEUMONIA,  LEFT LUNG CA, REMOVAL HALF    OF UPPER PORTION LEFT LUNG); No: Alcoholism, Allergies, Anemia, Arthritis,     Asthma, Atrial Fibrillation, Blood Disease, Broken Bones, Cataracts, Chemical     Dependency, Emphysema, Chronic Liver Disease, Colon Trouble, Colitis,     Diverticulitis, Congestive Heart Failu, Deafness or Ringing Ears, Convulsions,     Depression, Anxiety, Bipolar Disorder, PTSD, Diabetes, Epilepsy, Seizures,     Forgetfullness, Glaucoma, Gall Stones, Gout, Head Injury, Heart Attack, Heart     Murmur, GERD, Hepatitis, Hiatal Hernia, High Cholesterol, HIV (Do not ask -     volu, Jaundice, Kidney or Bladder Disease, Kidney Stones, Migrane Headaches,     Mitral Valve Prolapse, Night sweats, Phlebitis, Psychiatric Care, Reflux     Disease, Rheumatic Fever, Sexually Transmitted Dis, Sinus Trouble, Skin     Disease/Psoriais/Ecz, Stroke, Thyroid Problem, Tuberculosis or Pos TB Te,     Miscellaneous Medical/oth      Psychiatric History      none            PREVENTION      Hx Influenza Vaccination:  Yes      Date Influenza Vaccine Given:  Sep 1, 2018      Influenza Vaccine Declined:  No      2 or More Falls Past Year?:  No      Fall Past Year with Injury?:  No      Hx Pneumococcal Vaccination:  Yes      Encouraged to follow-up with:  PCP regarding preventative exams.      Chart initiated by      Codi Snider CMA            ALLERGIES/MEDICATIONS      Allergies:        Coded Allergies:             NO KNOWN ALLERGIES  (Unverified , 5/28/19)      Medications    Last Reconciled on 5/28/19 08:24 by MIRNA EDWARD      Gabapentin (Gabapentin) 300 Mg Capsule      300 MG PO QID, #120 CAP 0 Refills         Reported         4/3/19       Cetirizine Hcl (ZyrTEC) 10 Mg Tablet      10 MG PO HS, #30 TAB 5 Refills         Prov: Sammie Soler PA-C         12/4/18       Tiotropium Br/Olodaterol HCl (Stiolto Respimat Inhal Spray) 4 Gm Mist.inhal      2 PUFFS INH RTQDAY, #1 INH 0 Refills         Reported         12/4/18       Cyclobenzaprine Hcl (Cyclobenzaprine*) 10 Mg Tablet      10 MG PO QDAY         Reported         11/20/18       Aspirin Chew (Aspirin Baby) 81 Mg Tab.chew      81 MG PO HS, #30 TAB.CHEW 0 Refills         Reported         11/20/18       Multivitamin/Iron/Folic Acid (CENTRUM COMPLETE MULTIVIT TAB) 1 Tab Tablet      1 TAB PO QDAY, #30 TAB 0 Refills         Reported         11/20/18       Verapamil Er (VERAPAMIL ER) 180 Mg Tablet.er      180 MG PO QDAY         Reported         11/20/18       Azelastine Hcl (Azelastine Nasal) 137 Mcg/0.137 Ml Spray.pump      2 PUFFS NARE EACH BID, #1 BOTTLE 9 Refills         Prov: Dayne Choudhary         9/20/18       Vitamin B Complex (B Complex-Vitamin B-12) 1 Each Tablet      1 TAB PO QDAY, #30 TAB         Reported         6/27/18       Cilostazol (Pletal*) 100 Mg Tab      100 MG PO BID, #60 TAB         Reported         6/25/18       Cholecalciferol (Vitamin D3*) 1,000 Unit Tab      1000 UNITS PO QDAY, #30 TAB 0 Refills         Reported         8/5/16       Pantoprazole (Protonix*) 20 Mg Tablet      40 MG PO QAM, #60 TAB 0 Refills         Reported         5/18/16       Pravastatin Sod (Pravachol*) 40 Mg Tablet      40 MG PO HS, TAB         Reported         5/6/16      Current Medications      Current Medications Reviewed 5/28/19            EXAM      CONSTITUTIONAL: Pleasant  normal conversant.       EYES : Pink conjunctive, no ptosis, PERRL.       ENMT : Nose and ears appear normal, normal  dentition, mild posterior pharyngeal     wall erythema, no sinus tenderness. Mallampati classification       Neck: Nontender, no masses, no thyromegaly, no nodules.      Resp : Grossly clear to auscultation, mildly decreased breath sounds but no whe    ezes, rhonchi or crackles appreciated, normal work of breathing noted noted.        CVS  : No carotid bruits, s1s2 nl, RRR, no murmur, rubs or gallop, no peripheral    edema       Chest wall: Normal rise with inspiration, nontender on palpation.      GI   : Abdomen soft, with no masses, no hepatosplenomegaly, no hernias, BS+      MSK  : Normal gait and station, no digital cyanosis or clubbing       Skin : No rashes, ulcerations or lesions, normal turgor and temperature      Neuro: CN II - XII intact, no sensory deficits, DTRs intact and symmetrical, no     motor weakness      Psych: Appropriate affect, A   Vitals      Vitals:             Height 5 ft 7 in / 170.18 cm           Weight 159 lbs 6 oz / 72.859470 kg           BSA 1.84 m2           BMI 25.0 kg/m2           Temperature 98.2 F / 36.78 C - Oral           Pulse 105           Respirations 15           Blood Pressure 133/67 Sitting, Right Arm           Pulse Oximetry 95%, room air            REVIEW      Results Reviewed      PCCS Results Reviewed?:  Yes Prev Lab Results, Yes Prev Radiology Results, Yes     Previous Mecial Records      Radiographic Results               [facility]                PACS RADIOLOGY REPORT            Patient: DEEPALI RODRIGUEZ   Acct: #[id number]   Report: #[id number]            UNIT #: [id number]    DOS: 05/15/19 1015      INSURANCE:MEDICARE PART A   LOCATION:[facility]     : 1940            PROVIDERS      ADMITTING:     ATTENDING: Dayne Choudhary      FAMILY:  Payam Carroll   ORDERING:  Dayne Choudhary         OTHER:  JOAN TAYLOR   DICTATING:  Chauncey Montejo MD            REQ #:[id number]   EXAM:CHWO - CT CHEST without CONTRAST       REASON FOR EXAM:        REASON FOR VISIT:  PNEUMONIA            *******Signed******         PROCEDURE:   CT CHEST WITHOUT CONTRAST             COMPARISON:   [facility], PET, SKULL BASE TO MID THIGH INITIAL,     5/19/2016, 16:03.        [facility], CT, CHEST W/ CONTRAST, 6/20/2018, 15:16.  [facility], CT,       CHEST W/ CONTRAST, 6/27/2018, 1:00.  [facility], CT, CHEST    W/O CONTRAST,       2/18/2019, 9:17.             INDICATIONS:   HX OF LUNG CANCER, PREV ABNORMAL CT CHEST, NO COMPLAINTS, PREV     SMOKER, QUIT 10 YRS AGO             TECHNIQUE:   CT images were created without the administration of contrast     material.               PROTOCOL:     Standard imaging protocol performed                RADIATION:     DLP: 294.8mGy*cm          Automated exposure control was utilized to minimize radiation dose.              FINDINGS:         No pleural or pericardial effusion is identified.  Coronary artery     atherosclerotic disease is       present.             Sub cm mediastinal lymph nodes are nonpathologic by size criteria.  No hilar or     axillary adenopathy       is seen.             Mild to moderate emphysematous changes are noted.  A left upper lobectomy has     been performed.  The       ground-glass opacities previously noted in the right upper lobe have resolved.      In the left lower       lobe on image 21 is a 1.8 cm x 1.5 cm airspace opacity which appears mildly more    prominent in the       interval.  A small focus of scarring or atelectasis in the right middle lobe on     image 58 is stable.        A 0.3 cm nodule in the left lower lobe on image 58 is stable.             There is a 1.4 cm cystic focus in the pancreatic tail which appears stable since    6/20/2018.  No       focal osseous lesion is seen.             CONCLUSION:         1. Previous left upper lobectomy      2. Mild to moderate emphysematous changes      3. 1.8 cm x  1.5 cm airspace opacity in the left lower lobe laterally, slightly     more prominent in       the interval.      4. Chronic cystic focus in the pancreatic tail measuring 1.4 cm, unchanged in     the interval              Chauncey Montejo M.D.             Electronically Signed and Approved By: Chauncey Montejo M.D. on 5/15/2019 at 12:45                           Until signed, this is an unconfirmed preliminary report that may contain      errors and is subject to change.                                              WURRO:      D:05/15/19 1246            Assessment      Ground glass opacity present on imaging of lung - R91.8            Notes      New Medications      * Tiotropium Br/Olodaterol HCl (Stiolto Respimat Inhal Spray) 4 GM MIST.INHAL          Sample - Qty 2      New Diagnostics      * Chest W/O Cont CT, 3 Months         Dx: Ground glass opacity present on imaging of lung - R91.8      ASSESSMENT:       1.  Chronic obstructive pulmonary disease without acute exacerbation.      2. History of multifocal pneumonia.       3. History of pulmonary nodules.       4. Ground glass opacities in left lower lobe on recent CT scan of the chest.      5. History of right upper lobe adenocarcinoma status post lobectomy and chemot    herapy.       6. Tobacco abuse of cigarettes in remission for 10 years             PLAN:       1. I have discussed the patient's most recent CT scan of the chest results with     him and I will repeat a CT scan in 3 months to ensure there is no increase in     size to his left lower lobe ground glass opacity.       2. Continue Stiolto 2 puffs once daily and albuterol as needed although he has     not needed his albuterol in the past 3 months.       3. He is up to date on his pneumonia vaccines and will need flu vaccine in fall     of 2019.      4. Follow up in 3-4 months with Dr. Choudhary, sooner if needed.            Patient Education      Patient Education Provided:  COPD, How to use an Inhaler       Time Spent:  > 50% /[facility]                 Disclaimer: Converted document may not contain table formatting or lab diagrams. Please see [employer] [facility] for the authenticated document.

## 2021-05-28 NOTE — PROGRESS NOTES
Patient: DEEPALI RODRIGUEZ     Acct: [id number]     Report: #[id number]  UNIT #: [id number]     : 1940    Encounter Date:2018  PRIMARY CARE: Payam Carroll  ***Signed***  --------------------------------------------------------------------------------------------------------------------  Chief Complaint      Encounter Date      Dec 4, 2018            Primary Care Provider      Payam Carroll            Referring Provider      Dayne Choudhary            Patient Complaint      Patient is complaining of      yolande discharg            VITALS      Height 5 ft 7 in / 170.18 cm      Weight 157 lbs 5 oz / 71.152575 kg      BSA 1.83 m2      BMI 24.6 kg/m2      Temperature 97.6 F / 36.44 C - Oral      Pulse 53      Respirations 16      Blood Pressure 105/68 Sitting, Right Arm      Pulse Oximetry 90%, roomair            HPI      The patient is a very pleasant 78 year old white male patient of Dr. Choudhary's     with a history of chronic obstructive pulmonary disease with recurrent     exacerbation, history of pseudomonal pneumonia recently hospitalized at [facility] about 2 weeks ago after presenting with worsening dyspnea,     coughing and wheezing. He was treated with antibiotics, steroids and breathing     treatments and discharged with a 2 week course of oral Levaquin given his     history of pseudomonal pneumonia. He is much improved and says his breathing is     now back to his baseline and he feels well. He currently denies increased     coughing, wheezing or dyspnea. He did have evidence of pneumonia on chest CT     scan as well as some increased nodular opacities from prior chest CT scan likely    secondary to infection. He uses Stiolto two puffs once daily. He has had several    chronic obstructive pulmonary disease exacerbations in the month of .  He     denies hemoptysis, fevers or chills. He uses DuoNeb as needed.             I have reviewed his Review of Systems medical, surgical and  family history and     agree with those as entered.      Copies To:   Dayne Choudhary      Constitutional:  Denies: Fatigue, Fever, Weight gain, Weight loss, Chills,     Insomnia, Other      Respiratory/Breathing:  Denies: Shortness of air, Wheezing, Cough, Hemoptysis,     Pleuritic pain, Other      Endocrine:  Denies: Polydipsia, Polyuria, Heat/cold intolerance, Diabetes, Other      Eyes:  Denies: Blurred vision, Vision Changes, Other      Ears, nose, mouth, throat:  Denies: Congestion, Dysphagia, Hearing Changes, Nose    Bleeding, Nasal Discharge, Throat pain, Tinnitus, Other      Cardiovascular:  Denies: Chest Pain, Exertional dyspnea, Peripheral Edema,     Palpitations, Syncope, Wake up Gasping for air, Orthopnea, Tachycardia, Other      Gastrointestinal:  Denies: Abdominal pain/cramping, Bloody stools, Constipation,    Diarrhea, Melena, Nausea, Vomiting, Other      Genitourinary:  Denies: Dysuria, Urinary frequency, Incontinence, Hematuria,     Urgency, Other      Musculoskeletal:  Denies: Joint Pain, Joint Stiffness, Joint Swelling, Myalgias,    Other      Hematologic/lymphatic:  DENIES: Lymphadenopathy, Bruising, Bleeding tendencies,     Other      Neurologic:  Denies: Headache, Numbness, Weakness, Seizures, Other      Psychiatric:  Denies: Anxiety, Appropriate Effect, Depression, Other      Sleep:  No: Excessive daytime sleep, Morning Headache?, Snoring, Insomnia?, Stop    breathing at sleep?, Other      Integumentary:  Denies: Rash, Dry skin, Skin Warm to Touch, Other            FAMILY/SOCIAL/MEDICAL HX      Surgical History:  Yes: Head Surgery (THROAT SURG), Orthopedic Surgery (LEFT     SHOULDER SURG), Other Surgeries (removed top left of lung); No: Abdominal     Surgery, Appendectomy, Bladder Surgery, Bowel Surgery, CABG, Cholecystectomy,     Oral Surgery, Vascular Surgery      Cancer/Type - Family Hx:  Mother      Is Father Still Living?:  No      Is Mother Still Living?:  No       Family  History:  Yes      Social History:  Tobacco Use      Smoking status:  Former smoker (1 ppd 55 years quit for 10 years )      Smoking packs/day:  1      Smoking history:  25-50 pack years      Anticoagulation Therapy:  No      Antibiotic Prophylaxis:  No      Medical History:  Yes: Chemotherapy/Cancer (LUNG CA, LEFT LUNG), Chronic     Bronchitis/COPD (INHALER PRN), Hemorrhoids/Rectal Prob (GERD), High Blood     Pressure (ON MEDS ), Shortness Of Breath (PNEUMONIA,  LEFT LUNG CA, REMOVAL HALF    OF UPPER PORTION LEFT LUNG); No: Alcoholism, Allergies, Anemia, Arthritis,     Asthma, Atrial Fibrillation, Blood Disease, Broken Bones, Cataracts, Chemical     Dependency, Emphysema, Chronic Liver Disease, Colon Trouble, Colitis,     Diverticulitis, Congestive Heart Failu, Deafness or Ringing Ears, Convulsions,     Depression, Anxiety, Bipolar Disorder, PTSD, Diabetes, Epilepsy, Seizures,     Forgetfullness, Glaucoma, Gall Stones, Gout, Head Injury, Heart Attack, Heart     Murmur, GERD, Hepatitis, Hiatal Hernia, High Cholesterol, HIV (Do not ask -     volu, Jaundice, Kidney or Bladder Disease, Kidney Stones, Migrane Headaches,     Mitral Valve Prolapse, Night sweats, Phlebitis, Psychiatric Care, Reflux     Disease, Rheumatic Fever, Sexually Transmitted Dis, Sinus Trouble, Skin     Disease/Psoriais/Ecz, Stroke, Thyroid Problem, Tuberculosis or Pos TB Te,     Miscellaneous Medical/oth            PREVENTION      Hx Influenza Vaccination:  Yes (October 2018)      Date Influenza Vaccine Given:  Sep 1, 2018      Influenza Vaccine Declined:  No      2 or More Falls Past Year?:  No      Fall Past Year with Injury?:  No      Hx Pneumococcal Vaccination:  Yes (up to date)      Encouraged to follow-up with:  PCP regarding preventative exams.      Chart initiated by      Maribel Beltrán ma            ALLERGIES/MEDICATIONS      Allergies:        Coded Allergies:             NO KNOWN ALLERGIES (Unverified , 12/4/18)      Medications    Last  Reconciled on 12/4/18 09:11 by MIRNA EDWARD      Roflumilast (Daliresp) 500 Mcg Tab      500 MCG PO QDAY for 30 Days, #30 TAB 5 Refills         Prov: Sammie Soler PA-C         12/4/18       Cetirizine Hcl (ZyrTEC) 10 Mg Tablet      10 MG PO HS, #30 TAB 5 Refills         Prov: Sammie Soler PA-C         12/4/18       Tiotropium Br/Olodaterol HCl (Stiolto Respimat Inhal Spray) 4 Gm Mist.inhal      2 PUFFS INH RTQDAY, #1 INH 0 Refills         Reported         12/4/18       Cyclobenzaprine Hcl (Cyclobenzaprine*) 10 Mg Tablet      10 MG PO QDAY         Reported         11/20/18       Aspirin Chew (Aspirin Baby) 81 Mg Tab.chew      81 MG PO HS, #30 TAB.CHEW 0 Refills         Reported         11/20/18       Multivitamin/Iron/Folic Acid (CENTRUM COMPLETE MULTIVIT TAB) 1 Tab Tablet      1 TAB PO QDAY, #30 TAB 0 Refills         Reported         11/20/18       Verapamil Er (VERAPAMIL ER) 180 Mg Tablet.er      180 MG PO QDAY         Reported         11/20/18       Azelastine Hcl (Azelastine Nasal) 137 Mcg/0.137 Ml Spray.pump      2 PUFFS NARE EACH BID, #1 BOTTLE 9 Refills         Prov: Dayne Choudhary         9/20/18       Albuterol/Ipratropium (Duoneb) 3 Ml Ampul.neb      3 ML INH Q2H PRN for DYSPNEA/WHEEZING for 30 Days, #360 NEB         Prov: Andres Gaspar         6/29/18       Vitamin B Complex (B Complex-Vitamin B-12) 1 Each Tablet      1 TAB PO QDAY, #30 TAB         Reported         6/27/18       Cilostazol (Pletal*) 100 Mg Tab      100 MG PO BID, #60 TAB         Reported         6/25/18       Pregabalin (Lyrica *) 100 Mg Cap      100 MG PO TID, #90 CAP         Reported         6/8/18       Cholecalciferol (Vitamin D3*) 1,000 Unit Tab      1000 UNITS PO QDAY, #30 TAB 0 Refills         Reported         8/5/16       Pantoprazole (Protonix*) 20 Mg Tablet      40 MG PO QAM, #60 TAB 0 Refills         Reported         5/18/16       Pravastatin Sod (Pravachol*) 40 Mg Tablet      40 MG PO HS, TAB         Reported          5/6/16      Current Medications      Current Medications Reviewed 12/4/18            EXAM      CONSTITUTIONAL: Pleasant  normal conversant.       EYES : Pink conjunctive, no ptosis, PERRL.       ENMT : Nose and ears appear normal, normal dentition, mild posterior pharyngeal     wall erythema. Mallampati classification       Neck: Nontender, no masses, no thyromegaly, no nodules.      Resp : Grossly clear to auscultation bilaterally without wheezes, rhonchi or     crackles appreciated.  Normal work of breathing noted.       CVS  : No carotid bruits, s1s2 nl, RRR, no murmur, rubs or gallop, no peripheral    edema       Chest wall: Normal rise with inspiration, nontender on palpation      GI   : Abdomen soft, with no masses, no hepatosplenomegaly, no hernias, BS+      MSK  : Normal gait and station, no digital cyanosis or clubbing       Skin : No rashes, ulcerations or lesions, normal turgor and temperature      Neuro: CN II - XII intact, no sensory deficits, DTRs intact and symmetrical, no     motor weakness      Psych: Appropriate affect, A   Vitals      Vitals:             Height 5 ft 7 in / 170.18 cm           Weight 157 lbs 5 oz / 71.356094 kg           BSA 1.83 m2           BMI 24.6 kg/m2           Temperature 97.6 F / 36.44 C - Oral           Pulse 53           Respirations 16           Blood Pressure 105/68 Sitting, Right Arm           Pulse Oximetry 90%, roomair            REVIEW      Results Reviewed      PCCS Results Reviewed?:  Yes Prev Lab Results, Yes Prev Radiology Results, Yes     [facility]            Assessment      Pneumonia - J18.9            COPD (chronic obstructive pulmonary disease) - J44.9            Pulmonary nodule - R91.1            Notes      New Medications      * Tiotropium Br/Olodaterol HCl (Stiolto Respimat Inhal Ary) 4 GM MIST.INHAL: 2      PUFFS INH RTQDAY #1      * CETIRIZINE HCL (ZyrTEC) 10 MG TABLET: 10 MG PO HS #30      * ROFLUMILAST (Daliresp) 500 MCG TAB: 500  MCG PO QDAY 30 Days #30         Instructions: Take 250mg po qd x 1 month then increase to 500mg po qd to        continue.      Discontinued Medications      * TIOTROPIUM BROMIDE (Spiriva Respimat 2.5 mcg/Puff) 4 GM MIST.INHAL: 2 PUFFS       INH RTQDAY #1      * Levofloxacin (Levaquin*) 750 MG TABLET: 750 MG PO QDAY #14      New Diagnostics      * Chest W/O Cont CT, 3 Months         Dx: Pneumonia - J18.9      ASSESSMENT:       1.  Chronic obstructive pulmonary disease with recurrent exacerbations and     recent exacerbation resolved.       2. Recent acute on chronic hypoxic respiratory failure resolved.        3. Recent sepsis secondary to pneumonia resolved.       4. Multifocal pneumonia secondary to unidentified organism resolving.       5. Pulmonary nodules likely infectious in etiology.       6. Tobacco abuse of cigarettes in remission..       7. 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             PLAN:       1. Continue the patient on Stiolto two puffs once daily and albuterol or DuoNeb     as needed.       2. Continue azelastine nasal spray for postnasal drip. I will also start him on     Zyrtec for his uncontrolled seasonal allergies.       3. Given his history of recurrent chronic obstructive pulmonary disease     exacerbations we will start the patient on Daliresp 250 mcg once daily for 1     month and then increase to 500 mcg once daily. I have counseled him on possible     side effects such as diarrhea or gastrointestinal upset. He is to let us know if    he experiences any of those.       4. We will get a follow up chest CT scan in 3 months to follow up for resolution    of his recent pneumonia and infectious appearing nodules.       5. He is up to date on his flu and pneumonia shots.       6. Follow up with Dr. Choudhary in 3 months, sooner if needed.            Patient Education      Patient Education Provided:  COPD, How to use an Inhaler      Time Spent:  >  50% /Coord Care            Patient Education:        Chronic Obstructive Pulmonary Disease                 Disclaimer: Converted document may not contain table formatting or lab diagrams. Please see [employer] [facility] for the authenticated document.

## 2021-05-28 NOTE — PROGRESS NOTES
Patient: DEEPALI RODRIGUEZ     Acct: [id number]     Report: #[id number]  UNIT #: [id number]     : 1940    Encounter Date:10/29/2019  PRIMARY CARE: Payam Carroll  ***Signed***  --------------------------------------------------------------------------------------------------------------------  Chief Complaint      Encounter Date      Oct 29, 2019            Primary Care Provider      Payam Carroll            Referring Provider      Dayne Choudhary            Patient Complaint      Patient is complaining of      Patient here today for [facility] [place] ER follow up, COPD            VITALS      Height 67 in / 170.18 cm      Weight 158 lbs  / 71.292025 kg      BSA 1.83 m2      BMI 24.7 kg/m2      Temperature 98 F / 36.67 C - Oral      Pulse 100      Respirations 14      Blood Pressure 128/66 Sitting, Left Arm      Pulse Oximetry 95%, room air            HPI      The patient is a very pleasant 79 year white male patient of Dr. Choudhary's last     seen by him about 6 weeks ago. He has a history of chronic obstructive pulmonary    disease, ground glass opacities in left lower lobe, history of right upper lobe     adenocarcinoma status post lobectomy and was treated with chemotherapy. He is a     former smoker who quit smoking 10 years ago. He was seen in the ER 2-3 weeks ago    for a fever but did not complain of any increased dyspnea, coughing or wheezing     at that time. He had a prescription for azithromycin and prednisone on hand that    Dr. Choudhary gave him to use as needed so the ER physician instructed him to take     that. He took that, felt a bit better, fevers resolved and he now feels that his    breathing is at his baseline. He denies any increased dyspnea, coughing or     wheezing, hemoptysis, fever or chills or  purulent sputum production. He has     been seen by Dr. Rg Tom since his last office visit for compression     fractures in C6 and C10, he was not having any pain so Dr. Tom recommended     him to avoid  heavy lifting and to follow up again if he experiences pain.             I reviewed his Review of Systems, medical, surgical and family history and agree    with those as entered.      Copies To:   Dayne Choudhary      Constitutional:  Denies: Fatigue, Fever, Weight gain, Weight loss, Chills,     Insomnia, Other      Respiratory/Breathing:  Denies: Shortness of air, Wheezing, Cough, Hemoptysis,     Pleuritic pain, Other      Endocrine:  Denies: Polydipsia, Polyuria, Heat/cold intolerance, Diabetes, Other      Eyes:  Denies: Blurred vision, Vision Changes, Other      Ears, nose, mouth, throat:  Denies: Congestion, Dysphagia, Hearing Changes, Nose    Bleeding, Nasal Discharge, Throat pain, Tinnitus, Other      Cardiovascular:  Denies: Chest Pain, Exertional dyspnea, Peripheral Edema,     Palpitations, Syncope, Wake up Gasping for air, Orthopnea, Tachycardia, Other      Gastrointestinal:  Denies: Abdominal pain/cramping, Bloody stools, Constipation,    Diarrhea, Melena, Nausea, Vomiting, Other      Genitourinary:  Denies: Dysuria, Urinary frequency, Incontinence, Hematuria,     Urgency, Other      Musculoskeletal:  Denies: Joint Pain, Joint Stiffness, Joint Swelling, Myalgias,    Other      Hematologic/lymphatic:  DENIES: Lymphadenopathy, Bruising, Bleeding tendencies,     Other      Neurologic:  Denies: Headache, Numbness, Weakness, Seizures, Other      Psychiatric:  Denies: Anxiety, Appropriate Effect, Depression, Other      Sleep:  No: Excessive daytime sleep, Morning Headache?, Snoring, Insomnia?, Stop    breathing at sleep?, Other      Integumentary:  Denies: Rash, Dry skin, Skin Warm to Touch, Other            FAMILY/SOCIAL/MEDICAL HX      Surgical History:  Yes: Head Surgery (THROAT SURG), Orthopedic Surgery (LEFT     SHOULDER SURG), Other Surgeries (removed top left of lung); No: AAA Repair,     Abdominal Surgery, Adenoids, Angioplasty, Appendectomy, Back Surgery, Bladder     Surgery, Bowel  Surgery, Breast Surgery, CABG, Carotid Stenosis, Cholecystectomy,    Ear Surgery, Eye Surgery, Hernia Surgery, Kidney Surgery, Nose Surgery, Oral     Surgery, Prostatectomy, Rectal Surgery, Spinal Surgery, Testicular Surgery,     Throat Surgery, Tonsils, Valve Replacement, Vascular Surgery      Cancer/Type - Family Hx:  Mother      Is Father Still Living?:  No      Is Mother Still Living?:  No       Family History:  Yes      Social History:  Tobacco Use      Smoking status:  Former smoker      Smoking packs/day:  1      Smoking history:  25-50 pack years      Anticoagulation Therapy:  No      Antibiotic Prophylaxis:  No      Medical History:  Yes: Chemotherapy/Cancer (LUNG CA, LEFT LUNG), Chronic     Bronchitis/COPD (INHALER PRN), Hemorrhoids/Rectal Prob (GERD), High Blood     Pressure (ON MEDS ), Shortness Of Breath (PNEUMONIA,  LEFT LUNG CA, REMOVAL HALF    OF UPPER PORTION LEFT LUNG); No: Alcoholism, Allergies, Anemia, Arthritis,     Asthma, Atrial Fibrillation, Blood Disease, Broken Bones, Cataracts, Chemical     Dependency, Emphysema, Chronic Liver Disease, Colon Trouble, Colitis,     Diverticulitis, Congestive Heart Failu, Deafness or Ringing Ears, Convulsions,     Depression, Anxiety, Bipolar Disorder, PTSD, Diabetes, Epilepsy, Seizures,     Forgetfullness, Glaucoma, Gall Stones, Gout, Head Injury, Heart Attack, Heart     Murmur, GERD, Hepatitis, Hiatal Hernia, High Cholesterol, HIV (Do not ask -     volu, Jaundice, Kidney or Bladder Disease, Kidney Stones, Migrane Headaches,     Mitral Valve Prolapse, Night sweats, Phlebitis, Psychiatric Care, Reflux     Disease, Rheumatic Fever, Sexually Transmitted Dis, Sinus Trouble, Skin     Disease/Psoriais/Ecz, Stroke, Thyroid Problem, Tuberculosis or Pos TB Te,     Miscellaneous Medical/oth      Psychiatric History      none            PREVENTION      Hx Influenza Vaccination:  Yes      Date Influenza Vaccine Given:  Sep 1, 2019      Influenza Vaccine Declined:  No       2 or More Falls Past Year?:  No      Fall Past Year with Injury?:  No      Hx Pneumococcal Vaccination:  Yes      Encouraged to follow-up with:  PCP regarding preventative exams.      Chart initiated by      Codi Snider CMA            ALLERGIES/MEDICATIONS      Allergies:        Coded Allergies:             NO KNOWN ALLERGIES (Unverified , 10/29/19)      Medications    Last Reconciled on 10/29/19 08:45 by MIRNA EDWARD      Metoprolol Succinate (Metoprolol Succinate) 50 Mg Tab.er.24h      25 MG PO QDAY, #15 TAB.SR.24H 0 Refills         Reported         10/4/19       Pregabalin (Lyrica*) 50 Mg Cap      100 MG PO TID, #180 CAP         Reported         10/4/19       Tiotropium Br/Olodaterol HCl (Stiolto Respimat Inhal Spray) 4 Gm Mist.inhal               Prov: Sammie Soler PA-C         8/16/19       Cyclobenzaprine Hcl (Cyclobenzaprine*) 10 Mg Tablet      10 MG PO QDAY         Reported         11/20/18       Aspirin Chew (Aspirin Baby) 81 Mg Tab.chew      81 MG PO HS, #30 TAB.CHEW 0 Refills         Reported         11/20/18       Multivitamin/Iron/Folic Acid (CENTRUM COMPLETE MULTIVIT TAB) 1 Tab Tablet      1 TAB PO QDAY, #30 TAB 0 Refills         Reported         11/20/18       Verapamil Er (VERAPAMIL ER) 180 Mg Tablet.er      180 MG PO QDAY         Reported         11/20/18       Vitamin B Complex (B Complex-Vitamin B-12) 1 Each Tablet      1 TAB PO QDAY, #30 TAB         Reported         6/27/18       Cilostazol (Pletal*) 100 Mg Tab      100 MG PO BID, #60 TAB         Reported         6/25/18       Cholecalciferol (Vitamin D3*) 1,000 Unit Tab      1000 UNITS PO QDAY, #30 TAB 0 Refills         Reported         8/5/16       Pantoprazole (Protonix*) 20 Mg Tablet      40 MG PO QAM, #60 TAB 0 Refills         Reported         5/18/16       Pravastatin Sod (Pravachol*) 40 Mg Tablet      40 MG PO HS, TAB         Reported         5/6/16      Current Medications      Current Medications Reviewed 10/29/19             EXAM      CONSTITUTIONAL: Pleasant  normal conversant.       EYES : Pink conjunctive, no ptosis, PERRL.       ENMT : Nose and ears appear normal, normal dentition, mild posterior pharyngeal     wall erythema, no sinus tenderness. Mallampati classification       Neck: Nontender, no masses, no thyromegaly, no nodules.      Resp : Grossly clear to auscultation, no wheezes, rhonchi or crackles     appreciated, normal work of breathing noted.        CVS  : No carotid bruits, s1s2 nl, RRR, no murmur, rubs or gallop, no peripheral    edema       Chest wall: Normal rise with inspiration, nontender on palpation.      GI   : Abdomen soft, with no masses, no hepatosplenomegaly, no hernias, BS+      MSK  : Normal gait and station, no digital cyanosis or clubbing       Skin : No rashes, ulcerations or lesions, normal turgor and temperature      Neuro: CN II - XII intact, no sensory deficits, DTRs intact and symmetrical, no     motor weakness      Psych: Appropriate affect, A   Vitals      Vitals:             Height 67 in / 170.18 cm           Weight 158 lbs  / 71.615447 kg           BSA 1.83 m2           BMI 24.7 kg/m2           Temperature 98 F / 36.67 C - Oral           Pulse 100           Respirations 14           Blood Pressure 128/66 Sitting, Left Arm           Pulse Oximetry 95%, room air            REVIEW      Results Reviewed      PCCS Results Reviewed?:  Yes Prev Lab Results, Yes Prev Radiology Results, Yes     Previous [facility] Records            Assessment      ASSESSMENT:       1. Chronic obstructive pulmonary disease without acute exacerbation.       2. Ground glass opacities in left lower lobe on CT scan of the chest.       3. 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       4. Tobacco abuse of cigarettes in remission for 10 years.       5. Cervical spine compression fractures followed by Dr. Tom.            PLAN:      1. At this time the patient's breathing is now back to his baseline  and I will     not do anything further in terms of additional antibiotics or steroids. He still    has prescriptions for a Z-pack and prednisone on hand to use if needed for a     chronic obstructive pulmonary disease exacerbation.       2. Continue Stiolto 2 puffs once daily and albuterol as needed.       3. He is up to date on flu and pneumonia vaccines.       4. Continue to follow up with Dr. Tom as needed for his cervical spine     compression fractures.       5. He has an appointment in 2-3 months with Dr. Choudhary that he would like to     keep. He will call sooner if needed.            Patient Education      Patient Education Provided:  COPD, How to use an Inhaler      Time Spent:  > 50% /Coord Care            Electronically signed by DAPHNEY DE LA VEGA PA-C  11/04/2019 14:48       Disclaimer: Converted document may not contain table formatting or lab diagrams. Please see [employer] [facility] for the authenticated document.

## 2021-05-28 NOTE — PROGRESS NOTES
Patient: DEEPALI RODRIGUEZ     Acct: [id number]     Report: #[id number]  UNIT #: [id number]     : 1940    Encounter Date:2021  PRIMARY CARE: Payam Carroll  ***Signed***  --------------------------------------------------------------------------------------------------------------------  Chief Complaint      Encounter Date      2021            Primary Care Provider      Payam Carroll            Patient Complaint      Patient is complaining of      Patient is here for 6 month f/u, COPD            VITALS      Height 5 ft 7 in / 170.18 cm      Weight 337 lbs 4.861 oz / 153 kg      BSA 2.53 m2      BMI 52.8 kg/m2      Temperature 97.6 F / 36.44 C - Tympanic      Pulse 100      Respirations 14      Blood Pressure 122/64 Sitting, Left Arm      Pulse Oximetry 93%, room air            HPI      The patient is an 81 year old male with a history of pseudomonas pneumonia,     COPD, history of right upper lobe adenocarcinoma, status post lobectomy,     cervical spine compression fracture and tobacco abuse with cigarettes in     remission. He is currently on Trelegy ellipta once daily.  He uses Ventolin once    every other week or so and has not needed any antibiotics or steroids since his     last office visit note.  He was recently admitted to the hospital for a UTI one     and a half month ago and was given antibiotics for five days. He has not had any    problems since.  He has no fever, chills, nausea, vomiting, chest pain or chest     tightness.            ROS      Constitutional:  Denies: Fatigue, Fever, Weight gain, Weight loss, Chills,     Insomnia, Other      Respiratory/Breathing:  Denies: Shortness of air, Wheezing, Cough, Hemoptysis,     Pleuritic pain, Other      Endocrine:  Denies: Polydipsia, Polyuria, Heat/cold intolerance, Diabetes, Other      Eyes:  Denies: Blurred vision, Vision Changes, Other      Ears, nose, mouth, throat:  Denies: Congestion, Dysphagia, Hearing Changes, Nose     Bleeding, Nasal Discharge, Throat pain, Tinnitus, Other      Cardiovascular:  Denies: Chest Pain, Exertional dyspnea, Peripheral Edema,     Palpitations, Syncope, Wake up Gasping for air, Orthopnea, Tachycardia, Other      Gastrointestinal:  Denies: Abdominal pain/cramping, Bloody stools, Constipation,    Diarrhea, Melena, Nausea, Vomiting, Other      Genitourinary:  Denies: Dysuria, Urinary frequency, Incontinence, Hematuria,     Urgency, Other      Musculoskeletal:  Denies: Joint Pain, Joint Stiffness, Joint Swelling, Myalgias,    Other      Hematologic/lymphatic:  DENIES: Lymphadenopathy, Bruising, Bleeding tendencies,     Other      Neurologic:  Denies: Headache, Numbness, Weakness, Seizures, Other      Psychiatric:  Denies: Anxiety, Appropriate Effect, Depression, Other      Sleep:  No: Excessive daytime sleep, Morning Headache?, Snoring, Insomnia?, Stop    breathing at sleep?, Other      Integumentary:  Denies: Rash, Dry skin, Skin Warm to Touch, Other            FAMILY/SOCIAL/MEDICAL HX      Surgical History:  Yes: Head Surgery (THROAT SURG), Orthopedic Surgery (LEFT     ROTATOR CUFF ), Other Surgeries (removed top left of lung); No: AAA Repair,     Abdominal Surgery, Adenoids, Angioplasty, Appendectomy, Back Surgery, Bladder     Surgery, Bowel Surgery, Breast Surgery, CABG, Carotid Stenosis, Cholecystectomy,    Ear Surgery, Eye Surgery, Hernia Surgery, Kidney Surgery, Nose Surgery, Oral     Surgery, Prostatectomy, Rectal Surgery, Spinal Surgery, Testicular Surgery,     Throat Surgery, Tonsils, Valve Replacement, Vascular Surgery      Cancer/Type - Family Hx:  Mother      Is Father Still Living?:  No      Is Mother Still Living?:  No      Social History:  No Tobacco Use, No Alcohol Use, No Recreational Drug use      Smoking status:  Former smoker (70 yrs x 1 PPD Quit 2010)      Smoking packs/day:  1      Anticoagulation Therapy:  No      Antibiotic Prophylaxis:  No      Medical History:  Yes: Arthritis,  Chemotherapy/Cancer (HX LUNG CA, LEFT LUNG),     Chronic Bronchitis/COPD (INHALER PRN), High Blood Pressure (ON MEDS ), Shortness    Of Breath (PNEUMONIA,  LEFT LUNG CA, REMOVAL HALF OF UPPER PORTION LEFT LUNG);     No: Anemia, Asthma, Blood Disease, Broken Bones, Cataracts, Chemical Dependency,    Emphysema, Chronic Liver Disease, Colon Trouble, Colitis, Diverticulitis,     Congestive Heart Failu, Deafness or Ringing Ears, Depression, Anxiety, Bipolar     Disorder, PTSD, Diabetes, Epilepsy, Seizures, Glaucoma, Gall Stones, Gout, Head     Injury, Heart Attack, Heart Murmur, GERD, Hemorrhoids/Rectal Prob, Hepatitis,     Hiatal Hernia, HIV (Do not ask - volu, Kidney or Bladder Disease, Kidney Stones,    Migrane Headaches, Mitral Valve Prolapse, Psychiatric Care, Reflux Disease,     Rheumatic Fever, Sexually Transmitted Dis, Sinus Trouble, Skin     Disease/Psoriais/Ecz, Stroke, Thyroid Problem, Tuberculosis or Pos TB Te,     Miscellaneous Medical/oth      Psychiatric History      none            PREVENTION      Hx Influenza Vaccination:  Yes      Date Influenza Vaccine Given:  Sep 1, 2020      Influenza Vaccine Declined:  No      2 or More Falls in Past Year?:  No      Fall Past Year with Injury?:  No      Hx Pneumococcal Vaccination:  Yes      Encouraged to follow-up with:  PCP regarding preventative exams.      Chart initiated by      Codi Pierre MA            ALLERGIES/MEDICATIONS      Allergies:        Coded Allergies:             NO KNOWN ALLERGIES (Unverified , 4/19/21)      Medications    Last Reconciled on 10/12/20 08:59 by RENITA MOSER,       levoFLOXacin (levoFLOXacin) 500 Mg Tablet      500 MG PO QDAY for 7 Days, #7 TAB 0 Refills         Prov: Andrea Schreiber         2/27/21       Metoprolol Succinate (Metoprolol Succinate) 25 Mg Tab.er.24h      25 MG PO QDAY, #30 TAB 0 Refills         Reported         2/24/21       Tamsulosin HCL (Tamsulosin HCL) 0.4 Mg Capsule      0.4 MG PO QDAY, #30 CAP 0 Refills          Reported         2/24/21       Tiotropium Br/Olodaterol HCl (Stiolto Respimat Inhal Spray) 4 Gm Mist.inhal      2 PUFFS INH RTQDAY, #1 INH 0 Refills         Reported         2/24/21       Pantoprazole (Protonix) 40 Mg Tablet.dr      40 MG PO QDAY@07, #30 TAB 0 Refills         Reported         2/24/21       Pravastatin Sodium (Pravastatin Sodium) 20 Mg Tablet      20 MG PO QDAY, #30 TAB 0 Refills         Reported         2/24/21       MDI-Albuterol (Ventolin HFA) 18 Gm Hfa.aer.ad      2 PUFFS INH QID, #1 MDI 5 Refills         Prov: RENITA MOSER [facility]         10/12/20       amLODIPine (amLODIPine) 5 Mg Tablet      5 MG PO QDAY, #30 TAB         Reported         7/2/20       Cetirizine Hcl (zyrTEC) 10 Mg Tablet      10 MG PO QDAY, #30 TAB 0 Refills         Reported         7/2/20       Aspirin Chew (Aspirin Baby) 81 Mg Tab.chew      81 MG PO QDAY, #30 TAB.CHEW 0 Refills         Reported         7/2/20       Gabapentin (Gabapentin) 300 Mg Capsule      300 MG PO QID, #60 CAP 0 Refills         Reported         7/2/20       Cilostazol (Pletal*) 100 Mg Tab      100 MG PO BID         Reported         4/4/20       Cyclobenzaprine Hcl (Cyclobenzaprine*) 10 Mg Tablet      10 MG PO QDAY         Reported         11/20/18       Multivitamin/Iron/Folic Acid (CENTRUM COMPLETE MULTIVIT TAB) 1 Tab Tablet      1 TAB PO QDAY, #30 TAB 0 Refills         Reported         11/20/18       Vitamin B Complex (B Complex-Vitamin B-12) 1 Each Tablet      1 TAB PO QDAY, #30 TAB         Reported         6/27/18       Cholecalciferol (Vitamin D3) (Vitamin D3) 1,000 Unit Tab      1000 UNITS PO QDAY, #30 TAB 0 Refills         Reported         8/5/16      Current Medications      Current Medications Reviewed 4/19/21            EXAM      CONSTITUTIONAL: Pleasant male in no acute distress,  normal conversant.       EYES : Pink conjunctive, no ptosis, PERRL.       ENMT : Nose and ears appear normal, normal dentition, mild posterior pharyngeal      wall erythema. Mallampati classification       Neck: Nontender, no masses, no thyromegaly, no nodules.      Resp : Bilateral diminished breath sounds, scattered rhonchi on the left base,     no wheezing or crackles, resonate to percussion bilaterally.       CVS  : No carotid bruits, s1s2 nl, RRR, no murmur, rubs or gallop, no peripheral    edema       Chest wall: Normal rise with inspiration, nontender on palpation      GI   : Abdomen soft, with no masses, no hepatosplenomegaly, no hernias, BS+      MSK  : Normal gait and station, no digital cyanosis or clubbing       Skin : No rashes, ulcerations or lesions, normal turgor and temperature      Neuro: CN II - XII intact, no sensory deficits, DTRs intact and symmetrical, no     motor weakness      Psych: Appropriate affect, A   Vitals      Vitals:             Height 5 ft 7 in / 170.18 cm           Weight 337 lbs 4.861 oz / 153 kg           BSA 2.53 m2           BMI 52.8 kg/m2           Temperature 97.6 F / 36.44 C - Tympanic           Pulse 100           Respirations 14           Blood Pressure 122/64 Sitting, Left Arm           Pulse Oximetry 93%, room air            REVIEW      Results Reviewed      [facility] Results Reviewed?:  Yes Prev Lab Results, Yes Prev Radiology Results, Yes     [facility]      [facility]adi[facility] Results               [facility]                PACS RADIOLOGY REPORT            Patient: DEEPALI RODRIGUEZ   Acct: #[id number]   Report: #[id number]            UNIT #: [id number]    DOS: 21 0715      INSURANCE:MEDICARE PART A   LOCATION:[facility]     : 1940            PROVIDERS      ADMITTING:     ATTENDING: DAPHNEY DE LA VEGA PA-C      FAMILY:  Payam Carroll   ORDERING:  DAPHNEY DE LA VEGA PA-C         OTHER: Reji Mcnair   DICTATING:  Vinicio Beltrán MD            REQ #:[id number]   EXAM:CHWO - CT CHEST without CONTRAST      REASON FOR EXAM:        REASON FOR VISIT:  ABN  FINDING LUNG FIELD            *******Signed******         PROCEDURE:   CT CHEST WITHOUT CONTRAST             COMPARISON:   [facility], CR, CHEST AP/PA 1 VIEW, 2/24/2021,     19:33.  [facility], CT, CHEST W/O CONTRAST, 9/17/2020, 12:05.             INDICATIONS:   FOLLOW UP PREVIOUS CT CHEST. H/O LUNG CA.NO COMPLAINTS.             TECHNIQUE:   CT images were created without the administration of contrast     material.               PROTOCOL:     Standard imaging protocol performed                RADIATION:     DLP: 283.8mGy*cm          Automated exposure control was utilized to minimize radiation dose.              FINDINGS:         Lung window images reveal moderate paraseptal and centrilobular emphysema.             1.5 cm ground-glass subpleural opacity in the lateral left upper lung seen on     series 204, image 20       is stable.             New patchy areas of ground-glass opacity are seen throughout the left lower     lobe, not evident on       the prior study.  These are indeterminate for COVID-19 pneumonia.             Subsegmental atelectasis is seen in the right lower lobe.             Mediastinal windows reveal no mediastinal, hilar, or axillary adenopathy.      Extensive coronary       artery calcifications are evident.             CONCLUSION:         CT scan of the chest without IV contrast demonstrating moderate emphysema.             Stable 1.5 cm subpleural ground-glass opacity in the left upper lung.             New patchy areas of nonspecific ground-glass opacity throughout the left lower     lobe, as above.              CHELI SEBASTIAN MD             Electronically Signed and Approved By: CHELI SEBASTIAN MD on 3/02/2021 at 10:20                                  Until signed, this is an unconfirmed preliminary report that may contain      errors and is subject to change.                                              COUST:      D:03/02/21 1020      PFT Results       [id number]  [id number]                                 [facility]                          [employer]                            [place]  [id number]               __________________________________________________________________________             Patient Name:                   Attending Physician:      Carlos Zimmerman PA-C             Patient Visit # MR #            Admit Date  Disch Date     Location      [id number]    [id number]      08/28/2018                 CT- -             Date of Birth      1940      __________________________________________________________________________      0821 - DIAGNOSTIC REPORT             SIX-MINUTE WALK TEST             TECHNIQUE:      The patient had 6-minute walk done with ATS criteria.             RESULTS:      1.  The patient walked a distance of 1200 feet with MET level of 2.8 over 6          minutes.      2.  The patient's resting O2 saturation on room air was 98%, with resting          dyspnea score of zero and a heart rate of 102.      3.  The patient walked a total of 1200 feet.  The janny oxygen saturation was          96% on room air.      4.  Heart rate increased from baseline of 102 to 113 after 5 minutes.      5.  Dyspnea score at baseline was zero and increased to 1 at 6 minutes.             IMPRESSION:      1.  The patient had no significant desaturations with exertion during          submaximal exercise.      2.  The patient's dyspnea score increased from zero to 1.      3.  The patient developed bilateral knee pain, which did limit his distance          some during the 6-minute walk test.             To be electronically signed in [employer]      [id number] LINDSEY VALLES D.O.             WC:      D:  09/10/2018 10:02      T:  09/10/2018 11:58      #[id number]             Until signed, this is an unconfirmed preliminary report that may contain       errors and is subject to change.                   09/10/18 1205  <Electronically signed by Julio Cesar Marte DO>            Assessment      PLAN:  The patient is an 81 year old male with a history of COPD with a history     of pseudomonas pneumonia in the past, history of right upper lobe     adenocarcinoma, status post lobectomy, tobacco abuse with cigarettes in [facility]    ion for 10 years.      1. COPD.  Continue with Trelegy ellipta once daily, use albuterol as needed,     keep himself as active as possible.  I reviewed the CT scan of the chest with     him today. CT scan shows a stable 1.5 cm subpleural ground glass opacity in the     left upper lobe. I will order a repeat CT scan of the chest in six months.  I     will have him follow up in six months, earlier if needed.        2.  Keep himself as active as possible.        3.  He already has an appointment scheduled for 06/2021.            Electronically signed by Dayne Choudhary  05/01/2021 19:09       Disclaimer: Converted document may not contain table formatting or lab diagrams. Please see [employer] for the authenticated document.

## 2021-05-28 NOTE — PROGRESS NOTES
Patient: DEEPALI RODRIGUEZ     Acct: [id number]     Report: #[id number]  UNIT #: [id number]     : 1940    Encounter Date:2018  PRIMARY CARE: Payam Carroll  ***Signed***  --------------------------------------------------------------------------------------------------------------------  Chief Complaint      Encounter Date      2018            Primary Care Provider      Payam Carroll            Referring Provider      Dayne Choudhary            Patient Complaint      Patient is complaining of      Pt here for [facility] follow up,  ACUTE HYPOXEMIC RESP FAILURE ;NOT ON HOME OXYGEN;    IMPROVING      ACUTE METABOLIC ENCEPHALOPATHY ;RESOLVED      GENERAL WEAKNESS AND DEBILITY DUE TO PNA      B/L CAP R>L      H/O LUNG CA;S/P LOBECTOMY      SEPSIS ;LEUKOCYTOSIS/LACTIC ACIDEMIA      CHRONIC ALCOHOLISM      Lactic acidosis      Hypoxia            VITALS      Height 5 ft 7 in / 170.18 cm      Weight 152 lbs 3 oz / 69.285449 kg      BSA 1.81 m2      BMI 23.8 kg/m2      Temperature 98.4 F / 36.89 C - Oral      Pulse 110      Respirations 16      Blood Pressure 131/86 Sitting, Left Arm      Pulse Oximetry 95%, room air            HPI      The patient is a very pleasant 78 year old white male who is a patient of Dr. Choudhary's. He has a history of left upper lobe lung cancer status post left upper     lobectomy and adjuvant chemotherapy. He also has a history of chronic     obstructive pulmonary disease and was recently hospitalized at [facility] with weakness and fatigue and found to have multifocal pneumonia. He     was treated with antibiotics, steroids and breathing treatments by the     hospitalist service and was discharged home on supplemental oxygen after     failing a six minute walk test. However the patient tells me has has done quite     well since going home. He is no longer having any shortness of breath,     productive cough or wheezing. He will occasionally have a dry cough but is  not     coughing anything up. He denies fevers or chills or hemoptysis.  He is using     Spiriva Respimat 2.5 and that is helping him quite a bit. That is the only     maintenance inhaler he has been on long standing and feels he has done quite     well with it. He was discharged with 2 liters per minute nasal cannula oxygen     but tells me that after 2 days he no longer felt short of breath so he began     checking his oxygen saturation without the oxygen and noted that his saturation     were always between 94 and 98% and therefore stopped wearing it.             I have reviewed his Review of Systems medical, surgical and family history and     agree with those as entered.            ROS      Constitutional:  Complains of: Fatigue, Denies: Fever, Weight gain, Weight loss    , Chills, Insomnia, Other      Respiratory/Breathing:  Denies: Shortness of air, Wheezing, Cough, Hemoptysis,     Pleuritic pain, Other      Endocrine:  Denies: Polydipsia, Polyuria, Heat/cold intolerance, Diabetes, Other      Eyes:  Denies: Blurred vision, Vision Changes, Other      Ears, nose, mouth, throat:  Denies: Congestion, Dysphagia, Hearing Changes,     Nose Bleeding, Nasal Discharge, Throat pain, Tinnitus, Other      Cardiovascular:  Denies: Chest Pain, Exertional dyspnea, Peripheral Edema,     Palpitations, Syncope, Wake up Gasping for air, Orthopnea, Tachycardia, Other      Gastrointestinal:  Denies: Abdominal pain/cramping, Bloody stools, Constipation    , Diarrhea, Melena, Nausea, Vomiting, Other      Genitourinary:  Denies: Dysuria, Urinary frequency, Incontinence, Hematuria,     Urgency, Other      Musculoskeletal:  Denies: Joint Pain, Joint Stiffness, Joint Swelling, Myalgias    , Other      Hematologic/lymphatic:  DENIES: Lymphadenopathy, Bruising, Bleeding tendencies,     Other      Neurologic:  Denies: Headache, Numbness, Weakness, Seizures, Other      Psychiatric:  Denies: Anxiety, Appropriate Effect, Depression, Other       Sleep:  No: Excessive daytime sleep, Morning Headache?, Snoring, Insomnia?,     Stop breathing at sleep?, Other      Integumentary:  Denies: Rash, Dry skin, Skin Warm to Touch, Other            FAMILY/SOCIAL/MEDICAL HX      Surgical History:  Yes: Head Surgery (THROAT SURG), Orthopedic Surgery (LEFT     SHOULDER SURG), Other Surgeries (removed top left of lung), No: Abdominal     Surgery, Appendectomy, Bladder Surgery, Bowel Surgery, CABG, Cholecystectomy,     Oral Surgery, Vascular Surgery      Cancer/Type - Family Hx:  Mother      Is Father Still Living?:  No      Is Mother Still Living?:  No      Social History:  Tobacco Use      Smoking status:  Former smoker      Smoking packs/day:  1      Smoking history:  25-50 pack years      Anticoagulation Therapy:  No      Antibiotic Prophylaxis:  No      Medical History:  Yes: Chemotherapy/Cancer (LUNG CA, LEFT LUNG), Chronic     Bronchitis/COPD (INHALER PRN), Hemorrhoids/Rectal Prob (GERD), High Blood     Pressure (ON MEDS ), Shortness Of Breath (PNEUMONIA,  LEFT LUNG CA), No:     Arthritis, Asthma, Blood Disease, Congestive Heart Failu, Deafness or Ringing     Ears, Diabetes, Seizures, Heart Attack, Night sweats, Sinus Trouble,     Miscellaneous Medical/oth      Psychiatric History      None            PREVENTION      Hx Influenza Vaccination:  Yes      Date Influenza Vaccine Given:  Sep 1, 2017      Influenza Vaccine Declined:  No      2 or More Falls Past Year?:  No      Fall Past Year with Injury?:  No      Hx Pneumococcal Vaccination:  Yes      Encouraged to follow-up with:  PCP regarding preventative exams.      Chart initiated by      Carine Goldsmith MA            ALLERGIES/MEDICATIONS      Allergies:        Coded Allergies:             NO KNOWN ALLERGIES (Unverified , 6/25/18)      Medications    Last Reconciled on 6/25/18 14:33 by DAPHNEY MATA PA-C      Tiotropium Bromide (Spiriva Respimat 2.5 mcg/Puff) 4 Gm Mist.inhal      2 PUFFS INH RTQDAY, #1 MDI 8  Refills         Prov: Judie Naidu PA-C         6/25/18       Cyclobenzaprine Hcl (Cyclobenzaprine*) 10 Mg Tablet      10 MG PO BID Y for MUSCLE SPASMS, TAB 0 Refills         Reported         6/25/18       Cilostazol (Pletal*) 100 Mg Tab      100 MG PO BID, #60 TAB         Reported         6/25/18       Pregabalin (Lyrica *) 100 Mg Cap      100 MG PO TID, #90 CAP         Reported         6/8/18       Amitriptyline HCl (Amitriptyline HCl) 50 Mg Tablet      50 MG PO HS, #30 TAB         Prov: Miguel Jackson         12/12/17       Cholecalciferol (Vitamin D3*) 1,000 Unit Tab      1000 UNITS PO QDAY, #30 TAB 0 Refills         Reported         8/5/16       Pantoprazole (Protonix*) 20 Mg Tablet      40 MG PO HS, #60 TAB 0 Refills         Reported         5/18/16       Pravastatin Sod (Pravachol*) 40 Mg Tablet      40 MG PO HS, TAB         Reported         5/6/16       Aspirin EC (Aspirin EC*) 81 Mg Tabec      81 MG PO QDAY         Reported         5/29/12       verapamil HCl ER (verapamil ER*) 240 Mg Tabcr      240 MG PO QDAY, TAB         Reported         5/27/12      Current Medications      Current Medications Reviewed 6/25/18            EXAM      CONSTITUTIONAL: Pleasant female in no acute distress,  normal conversant.       EYES : Pink conjunctive, no ptosis, PERRL.       ENMT : Nose and ears appear normal, normal dentition, mild posterior pharyngeal     wall erythema. Mallampati classification       Neck: Nontender, no masses, no thyromegaly, no nodules.      Resp : Grossly clear to auscultation bilaterally, trace right lower lobe     crackles, no wheezes or rhonchi appreciated.  Normal work of breathing noted.       CVS  : No carotid bruits, s1s2 nl, RRR, no murmur, rubs or gallop, no     peripheral edema       Chest wall: Normal rise with inspiration, nontender on palpation      GI   : Abdomen soft, with no masses, no hepatosplenomegaly, no hernias, BS+      MSK  : Normal gait and station, no digital cyanosis or  clubbing       Skin : No rashes, ulcerations or lesions, normal turgor and temperature      Neuro: CN II - XII intact, no sensory deficits, DTRs intact and symmetrical, no     motor weakness      Psych: Appropriate affect, A   Vitals      Vitals:             Height 5 ft 7 in / 170.18 cm           Weight 152 lbs 3 oz / 69.923425 kg           BSA 1.81 m2           BMI 23.8 kg/m2           Temperature 98.4 F / 36.89 C - Oral           Pulse 110           Respirations 16           Blood Pressure 131/86 Sitting, Left Arm           Pulse Oximetry 95%, room air            REVIEW      Results Reviewed      PCCS Results Reviewed?:  Yes Prev Lab Results, Yes Prev Radiology Results, Yes     Previous [facility] Records (I personally reviewed the patient's recent history and     physical,  progress notes and discharge summary. )            Assessment      Non-small cell lung cancer (NSCLC) - C34.90            COPD (chronic obstructive pulmonary disease) - J44.9            Acute hypoxemic respiratory failure - J96.01            Multifocal pneumonia - J18.9            Notes      New Medications      * CILOSTAZOL (Pletal*) 100 MG TAB: 100 MG PO BID #60      * CYCLOBENZAPRINE HCL (Cyclobenzaprine*) 10 MG TABLET: 10 MG PO BID PRN MUSCLE     SPASMS      Renewed Medications      * TIOTROPIUM BROMIDE (Spiriva Respimat 2.5 mcg/Puff) 4 GM MIST.INHAL: 2 PUFFS     INH RTQDAY #1      Discontinued Medications      * Azithromycin 250 MG TABLET: 250 MG PO QDAY 3 Days #3      * Cefpodoxime Proxetil (Vantin) 100 MG TAB: 200 MG PO BID 7 Days #28      * predniSONE* 20 MG TABLET: 40 MG PO QDAY 3 Days #6      New Diagnostics      * 6 Min Walk With Pulse Ox, 2 Months       Dx: Non-small cell lung cancer (NSCLC) - C34.90      * PFT-Comp, PrePost,DLCO,BodyBox, 2 Months       Dx: Non-small cell lung cancer (NSCLC) - C34.90      * Chest W/O Cont CT, 2 Months       Dx: Non-small cell lung cancer (NSCLC) - C34.90      ASSESSMENT:      1. Chronic obstructive  pulmonary disease without acute exacerbation.      2.  Recent multifocal pneumonia resolving.        3. Tobacco abuse of cigarettes in remission.       4. Left upper lobe adenocarcinoma of the lung status post lobectomy and     chemotherapy.              PLAN:      1. I have instructed the patient to continue checking his oxygen saturation to     ensure that they stay greater than 90%. If they are staying greater than 90%     without supplemental oxygen then he does not have to wear it.       2. Continue Spiriva Respimat 2.5 two puffs once daily.       3. I will recheck a CT scan of the chest in 2-3 months to follow up for     resolution of his recent pneumonia.      4. I will recheck pulmonary function tests and six minute walk test in 2-3     months for reevaluation of his lung function given his recent hospitalization.     His last FEV1 was 60% in May 2016.       5. Follow up in 2-3 months to go over his results and make any adjustments to     his breathing medications, sooner if needed.            Patient Education      Patient Education Provided:  COPD, How to use an Inhaler      Time Spent:  > 50% /[facility]            Patient Education:        Chronic Obstructive Pulmonary Disease                 Disclaimer: Converted document may not contain table formatting or lab diagrams. Please see [employer] for the authenticated document.

## 2021-05-28 NOTE — PROGRESS NOTES
Patient: DEEPALI RODRIGUEZ     Acct: [id number]     Report: #[id number]  UNIT #: [id number]     : 1940    Encounter Date:2018  PRIMARY CARE: Payam Carroll  ***Signed***  --------------------------------------------------------------------------------------------------------------------  Chief Complaint      2018      LUNG CANCER      Intent of Therapy?:  Curative            Current Plan      -Adenocarcinoma of left lung, stage IIB (pT3N0)      -Patient's old medical records were reviewed and summarized in chronological     order in the HPI.       -Patient's chest x-ray was reviewed independently by me by direct visualization     of the images.        -s/p VATS lobectomy on 16.  Continue adjuvant chemotherapy as recommended     by NCCN guidelines.  Pt's performance status remains very good (ECOG PS 0-1).        -Following a detailed discussion of the risks and benefits of chemotherapy,     patient decided to proceed with treatment.  Plan is to proceed with carboplatin     AUC 6 and paclitaxel 200mg/m2 every 21 days x 4 cycles.        -He initiated chemotherapy on 8/10 and tolerated cycle 1 of treatment generally     well. He received cycle 2 on .       -Counseled patient on common side effects of current treatment. All questions     were answered in the clinic room.         -Patient completed treatment adjuvant treatment.      -Patient has been asymptomatic since after surgery and chemotherapy.      -CT chest in May 2019 with Dr. Saul.  Continue close observation.            -Interval Clinic Visit Changes      -Increase Lyrica to 100mg TID      -Alpha Lipoic Acid escribed to pharmacy      -Follow-up in clinic in 6 months or prior if needed      -Scans reviewed with patient from [facility].             -Peripheral neuropathy-ongoing      -Patient is currently on Gabapentin 600 mg 3 times a day.      -Added Capasician cream.       -Patient was given calcium and vitamin D.       -Patient still  has neuropathy. Uses a device to button his shirts.             -Cancer related fatigue      -Fatigue and nausea has improved.  Continu zofran for nausea.       -Maintain activity as tolerated to prevent worsening fatigue.       -Encouraged oral hydration today.            Medical Evaluation of Cancer Associated Symptoms today      -Cancer associated pain-neuropathy      -Patient's pain has remained stable.      -Continue current pain regimen.      -Cancer associated muscle weakness      -Patient's weakness has remained stable.      -Continue close observation.      -Cancer associated fatigue      -Patient's fatigue has remained stable.      -Recommended exercise.      -Anxiety due to cancer      -Patient's anxiety is well controlled today.       -Continue current management.       -Today's Assessment      -Patient's imaging exams, blood tests, physicians' notes, and any new findings     since our last visit were reviewed today to reassess patient's medical     treatment plan. .       -Old medical records were reviewed and summarized in chronological order in the     HPI today to maintain an updated medical record.       -Patient's radiology imaging tests from our last visit were reviewed     independently by me by direct visualization of the images.        -Patients current lab tests and medications were carefully reviewed to evaluate     patient's current treatment plan today.       -Patient was advised to call us right away if there are any new symptoms for an     urgent visit for further evaluation. Patient voiced understanding and agreed to     do so.      Non-small cell lung cancer (NSCLC)       Non-small cell cancer of left lung - C34.92       Laterality: left            Neuropathy - G62.9            Notes      New Medications      * ALPHA LIPOIC ACID (Alpha Lipoic Acid) 200 MG TABLET: 200 MG PO BID NEUROPATHY     #60      * Pregabalin (Lyrica *) 100 MG CAP: 100 MG PO TID #90      Discontinued Medications       * Gabapentin 600 MG TABLET: 600 MG PO TID #90      Intensive Monitor for Toxicity:  Labs Reviewed, Meds\Narcotics Reviewed      Labs Reviewed During Visit?:  Yes      Patient Education Provided:  Yes      ECOG Score:  0            ECOG      ECOG Performance Status:  0            History and Present Illness      This is a very pleasant 76-year-old gentleman who presents with follow-up for     lobectomy with adjuvant chemotherapy.            -April 2016. Evaluated by PCP for coughing .  He underwent a CXR on 4/21 which     showed a suspicious appearing 5.5mm nodule in the BONNIE.  Follow-up CT chest on 4/ 26 showed a 1.6 x 1.9 cm mass in the apex of the BONNIE highly suspicious for     malignancy.        -May 2016.  He underwent biopsy by FNA during which he developed a pneumothorax     requiring hospital admission and chest tube placement.  He was ultimately     discharged on the following day with resolution of the pneumothorax on CXR.      Pathology results returned consistent with poorly differentiated adenocarcinoma    , consistent with lung primary.  The patient has been referred to medical     oncology for further recommendations.  PET/CT and brain MRI did not show     evidence of metastatic disease and he was referred to  for consideration of     surgical resection.        -July  He was seen at  and underwent left VATS with lobectomy under     the care of Dr. Saul on   Pathology revealed multifocal invasive moderately     differentiated adenocarcinoma, acinar type, measuring 2.0 cm x 1.7 cm x 1.4 cm.      Another nodule measured 0.6 cm x 0.5 cm x 0.3 cm.  All margins were free of     tumor and no lymph nodes were involved.  Visceral pleural invasion was noted.     Pathologic stage was pT3N0. Stage IIB disease and adjuvant chemotherapy was     recommended.  His performance status is good (ECOG PS 0-1) however given age     and other comorbidities, I recommended we proceed with carboplatin based chemo      as opposed to cisplatin.      -Aug 10, 2016.  Patient was counseled on risk and benefits of systemic     chemotherapy and agreed to proceed with carboplatin AUC 6 and paclitaxel 200mg/    m2 every 21 days x 4 cycles.        -February 2017.  Patient was evaluated the [facility] for follow-up.      Per patient CT chest abdomen and pelvis showed no evidence of disease.      -April 6-2017.  Patient was tapered off gabapentin and Cymbalta due increase     fatigue.       -November 2, 2017. CT chest: No evidence of recurrent or metastatic disease     done at .       -December 11, 2017. Sestamibi Stress showed low probability of coronary     ischemic disease. Follows with Cardiology.       -May 3, 2018.  CT chest:  No evidence of thoracic progression.      -June 8, 2018.  Presences for f/u lung cancer.  Reports doing well except     neuropathy pain in hands.  Currently taking Lyrica 100mg BID and it seems to     wear off by late day, early evening.  Reports minimal SOA with exertion, also     has emphysema.  BFA with ecchymotic areas noted.  Will not see Dr. Saul again     until 5/2019 (stated she will be at [facility]).  Denies fever, cough     and anorexia.            Vitals      Height 5 ft 7.00 in / 170.18 cm      Weight 150 lbs 12.714 oz / 68.4 kg      BSA 1.80 m2      BMI 23.6 kg/m2      Temperature 97.8 F / 36.56 C - Temporal      Pulse 100      Blood Pressure 115/60 Sitting, Right Arm      Pulse Oximetry 96%, ROOM AIR            Allergies      Coded Allergies:             NO KNOWN ALLERGIES (Unverified , 6/8/18)            Medications      Last Reconciled on 6/8/18 09:31 by FAUSTO GODWIN      Pregabalin (Lyrica *) 100 Mg Cap      100 MG PO TID, #90 CAP         Reported         6/8/18       Alpha Lipoic Acid (Alpha Lipoic Acid) 200 Mg Tablet      200 MG PO BID for NEUROPATHY, #60 TAB 3 Refills         Prov: FAUSTO GODWIN onc         6/8/18       Tiotropium Bromide (Spiriva Respimat  2.5 mcg/Puff) 4 Gm Mist.inhal      2 PUFFS INH RTQDAY, #1 MDI 9 Refills         Prov: Miguel Jackson         3/8/18       Amitriptyline HCl (Amitriptyline HCl) 50 Mg Tablet      50 MG PO HS, #30 TAB         Prov: Miguel Jackson         12/12/17       Calcium Carb/Vit D3 (Eq Calcium 600 + Vit D*) 1 Each Tablet      600 MG PO BID, #60 TAB 1 Refill         Prov: Miguel Jackson         9/12/17       Juan-Fluticasone (Fluticasone 50 mcg) 16 Gm Spray.susp      2 PUFFS NARE EACH QDAY, #1 BOTTLE 6 Refills         Prov: Dayne Choudhary         8/16/16       Cholecalciferol (Vitamin D3*) 1,000 Unit Tab      1000 UNITS PO QDAY, #30 TAB 0 Refills         Reported         8/5/16       Pantoprazole (Protonix*) 20 Mg Tablet      40 MG PO HS, #60 TAB 0 Refills         Reported         5/18/16       Folic Acid/Multivits-Min/Lut (Multi-Vitamin) 1 Tab Tablet      1 TAB PO QDAY, #30 TAB 0 Refills         Reported         5/6/16       Pravastatin Sod (Pravachol*) 40 Mg Tablet      40 MG PO HS, TAB         Reported         5/6/16       Aspirin EC (Aspirin EC*) 81 Mg Tabec      81 MG PO QDAY         Reported         5/29/12       verapamil HCl ER (verapamil ER*) 240 Mg Tabcr      240 MG PO QDAY, TAB         Reported         5/27/12            General Information      Primary Provider:  Payam Carroll            Pain and Fatigue Scales      Pain Assessment:           Experiencing any pain?:  Yes         Pain Intensity:  2         Pain Location:  Hand, Left side, Right side         Description:  Burning, Cramping, Throbbing         Duration:  Continuous      Fatigue:           Experiencing any fatigue?:  No            Chemo Status      On Oral Chemotherapy?:  No            Other      Clinical Trial Participant?:  No            PAST, FAMILY   Past Medical History      Past Medical History:  No Diabetes Type 1, No Diabetes Type 2, No Thyroid     Disease, No COPD, No Emphysema, Yes Hypertension, No Stroke, No High Cholesterol    , No Heart Attack, No  Bleeding Condition, No Low or High RBC Count, No Low or     High WBC Count, No Low or High Platelet Coun, No Hepatitis, No Kidney Disease,     No Depression, No Alzheimer's Disease, No Mental Disease, No Seizures, No     Arthritis, No Osteoporosis, No Osteopenia, No Short of Air, No Sleep apnea, No     Liver Disease, No STD, No Enlarged Prostate, No Other      Hematology/oncology:  REPORTS HX OF: Lung cancer, DENIES HX OF: Previous     Treatment for CA, Anemia, Bladder Cancer, Blood cancer, Brain cancer, Breast     cancer, Coagulopathy, Colon Cancer, Colorectal cancer, Endocrine cancer, Eye     cancer, GI cancer,  cancer, Kidney cancer, Leukemia, Leukocytosis, Leukopenia    , Liver cancer, Lymphoma, Musculoskeletal cancer, Myeloma, Neurologic cancer,     Oral cancer, Pancreatic Cancer, Prostate cancer, Skin cancer, Stomach cancer,     Testicular cancer, Thrombocytopenia, Thyroid cancer, Other cancer history,     Other hematologic history      Genetic/metabolic:  DENIES HX OF: Cystic fibrosis, Down syndrome, Other genetic     history, Other metabolic history            Past Surgical History      REPORTS HX OF: Lung biopsy (PARTICAL REMOVAL OF LUNG), Joint replacement, Other     Past Surgical Hx (THROAT SURGERY), DENIES HX OF: Cataract extraction, Thyroid     surgery, CABG surgery, Coronary stent, Valve replacement, Appendectomy,     Cholecystectomy, Splenectomy, Bladder surgery, Nephrectomy, Frature repair,     Skin cancer removal, Melanoma excision, Spinal surgery, Breast biopsy,     Mastectomy, bilateral, Mastectomy, right, Mastectomy, left, Hysterectomy, Peg     Tube Placement, VAD Placement, Biopsy            Family History      REPORTS HX OF: Other Cancer History (MOTHER), DENIES HX OF: Anemia, Blood     disorders, Blood Cancer, Breast cancer, Cervical cancer, Coagulopathy,     Colorectal cancer, Endocrine Cancer, Eye Cancer, GI Cancer,  Cancer, Kidney     Cancer, Leukemia, Leukocytosis, Leukopenia, Liver  Cancer, Lung cancer, Lymphoma    , Melanoma, Musculoskeletal Cancer, Myeloma, Neurologic Cancer, Oral Cancer,     Ovarian cancer, Prostate cancer, Skin Cancer, Stomach Cancer, Testicular Cancer    , Thrombocytopenia, Thyroid cancer, Uterine cancer, Other Hematology History            Social History      Lives independently:  Yes      Occupation:  RETIRED            Tobacco Use      Tobacco status:  Former smoker      Smoking packs/day:  1      Smoking history:  25-50 pack years      Quit status:  Quit date established (8 YRS AGO)            Alcohol Use      Alcohol intake:  2+ drinks per day      Counseling given:  Provider counseling            Substance Use      Substance use:  Denies use            REVIEW OF SYSTEMS      General:  Denies: Appetite change, Excessive sweating, Fatigue, Fever, Night     sweats, Weight gain, Weight loss, Other      Eyes:  Complains of: Corrective lenses, Denies: Blurred vision, Diplopia, Eye     irritation, Eye pain, Eye redness, Spots in vision, Vision loss, Other      Ears, nose, mouth, throat:  Denies: Headache, Seizures, Visual Changes, Hearing     loss, Sinus Congestion, Hoarseness, Sore throat, Other      Cardiovascular:  Denies: Chest pain, Irregular heartbeat, Palpitations, Swollen     ankles/legs, Other      Respiratory:  Denies: Chest pain, Shortness of Air, Productive cough, Coughing     blood, Other      Gastrointestinal:  Denies: Nausea, Vomiting, Problem swallowing, Frequent     heartburn, Constipation, Diarrhea, Tarry stools, Bloody stools, Unable to     control bowels, Other      Kidney/Bladder:  Denies: Painful Urination, Blood in Urine, Incontinence,     Frequent Urination, Decreased Urine Stream, Other      Musculoskeletal:  Denies: New Back pain, Leg Cramps, Painful Joints, Swollen     Joints, Muscle Pain, Muscle weakness, Other      Skin:  DENIES: Bruises Easily, Hair changes, Lesions/changes in moles, Nail     changes, Pigment changes, Rash, Other       Neurological:  Denies: Dizziness, Fainting, Numbness\Tingling, Paralysis,     Seizures, Other      Psychiatric:  Complains of: AAO X 3, Denies: Anxiety, Panic attacks, Depression    , Memory loss, Other      Endocrine:  DiabetesThyroid DisorderOsteoporosisEndocrine Other      Hematologic/lymphatic:  Denies: Bruising, Bleeding, Enlarged Lymph Nodes,     Recurrent infections, Other            General Appearance:  Alert, Oriented X3, Cooperative, No acute distress, No     Obese      Eyes:  Anicteric Sclerae, Moist Conjunctiva, PERRLA      HEENT:  Orophraynx clear, No Erythema, No Exudates, No Pallor, No Scleral     Icterus, No Thrush      Neck:  Supple, Full ROM, No Masses or JVD, No Carotid Bruits      Respiratory:  CTAB, Diminished Breath (Hx LULobectomy), No Rales, No Crackels,     No Stridor, No Rhonchi      Breast\Chest:  Symmetrical, No Nipple Discharge, No Masses, No Lumps      Abdomen\Gastro:  Soft, No No NABS, No No Masses, No No Hernias, No No     Hepatosplenomegaly      Cardio:  RRR, No Murmur, No, Peripheral Edema, Normal PMI      Skin:  Normal Temperature, Normal Tone, Normal Texture and Turgor, No Induration    , No No Rash, lesions, ulcers      Psychiatric:  Appropriate Affect, Intact Judgement, AAO x3      Neuro:  Cranial Nerve II-XII Inta, No Focal Sensory Deficit      Genitourinary:  No Garay Catheter      Muscularskeletal:  Normal Gait and Station, Full ROM of extremeties, Full     muscle strength\tone      Extremities:  No Digital Cyanosis, No Digital Ischemia, Pedal Pulses Intact,     Pedal Pulses Symetrical, Normal Gait and station, No Weakness      Lymphatic:  No Cervical, No Supraclavicular, No Infraclavicular, No Axillary,     No Inguinal            PREVENTION      Hx Influenza Vaccination:  Yes      Date Influenza Vaccine Given:  Sep 1, 2017      Influenza Vaccine Declined:  No      2 or More Falls Past Year?:  No      Fall Past Year with Injury?:  No      Hx Pneumococcal Vaccination:  Yes       Encouraged to follow-up with:  PCP regarding preventative exams.      Chart initiated by      MARTIN HAINES CMA            Electronically signed by FAUSTO GODWIN  06/08/2018 09:31  Electronically signed by SILVIA MODI  01/28/2020 12:29       Disclaimer: Converted document may not contain table formatting or lab diagrams. Please see [employer] for the authenticated document.

## 2021-05-28 NOTE — PROGRESS NOTES
Patient: DEEPALI RODRIGUEZ     Acct: [id number]     Report: #[id number]  UNIT #: [id number]     : 1940    Encounter Date:2019  PRIMARY CARE: Payam Carroll  ***Signed***  --------------------------------------------------------------------------------------------------------------------  NURSE INTAKE      Visit Type      Established Patient Visit            Chief Complaint      LUNG CANCER            Referring Provider/Copies To      PCP Not Found in Lookup:  PAYAM CARROLL            History and Present Illness      Past Oncology Illness History      This is a very pleasant 76-year-old gentleman who presents with follow-up for     lobectomy with adjuvant chemotherapy.            Left lung:  Pathology 2016: FNA: Poorly differentiated adenocarcinoma: 5.5 mm     BONNIE nodule. 2016.       Left lung lobectomy: VATS procedure: Dr. Saul in [place].Path:multifocal     invasive moderately differentiated adenocarcinoma, acinar type, 2.0 x 1.6 x 1.4     cm. Additional nodule 0.6 x 0.5 x 0.3 cm: margins free of tumor, negative LN's.     Visceral pleural invasion noted. (16). Staging: Stage IIB:  pT3N0.       Carboplatin / Paclitaxel x 4 cycles: 8/10/16 - 10/14/16.      CT CAP: No evidence of disease. 2017.       CT CAP: No evidence of disease. 17.       CT chest: Right lower base: improving consolidation. 19.            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 status     post VATS procedure with left upper lobectomy in 2016 with Dr. Saul in    [facility] ED. s/p 4 cycles Carboplatin /   Carboplatin completed in 2016            Presents today for follow up. Doing well in the interim. He denies any worsening    cough or shortness of breath. He does, however have chronic peripheral     neuropathy. Reports he does follow up with Dr. Saul at [facility]  anymore since she has    moved to [facility]. Patient reports he has follow up with pulmonology, Dr. Choudhary in May for CT scan and reports Dr. Choudhary will refer him to Dr. Nagi Spears for follow up here on Tuesday in lung clinic day.. He does reports he had    pneumonia 4 times in 2018. Most recent CT in February 2019 showed improving     consolidation in the right lower base consolidation.             2) Chronic peripheral  neuropathy: Reports ongoing peripheral neuropathy to     hands and feet. He is unable to button his shirt buttons. Reports he has been     using Lyrica for the neuropathy which he feels is not helping well lately and w    ould like to switch back to Neurontin. He previously used Neurontin in the past.    Today, we discussed switching back to Neurontin. We also discussed taking B     complex vitimins as well as starting Alpha lipoic acid 300 mg TID for     neuropathy. As well, we discussed referral to PT for the chronic neuropathy. AT     this time, we will change Lyrica to Neurontin, start Alpha lipoic acid and wait     to do referral to PT for neuropathy. He will continue to use his device to     button his shirts.            Treatments      Chemotherapy      Carboplatin, Paclitaxel x 4 cycles January 2017 - April 2017            Clinical Trial Participant      No            ECOG Performance Status      0            PAST, FAMILY   Past Medical History      Past Medical History:  COPD, High Cholesterol, Hypertension, Short of Air,     Stroke (MINI)      Other PMH      frequent pneumonia      Hematology/Oncology (M):  Anemia, Lung Cancer            Past Surgical History      Lung Biopsy            Family History      Family History:  Stomach Cancer (MOTHER)            Social History      Marital Status:        Lives independently:  Yes      Occupation:  RETIRED            Tobacco Use      Tobacco status:  Former smoker (1 ppd 55 years quit for 10 years )      Smoking packs/day:   1      Smoking history:  25-50 pack years      Quit status:  Quit date established (8 YRS AGO)            Alcohol Use      Alcohol intake:  2+ drinks per day            Substance Use      Substance use:  Denies use            REVIEW OF SYSTEMS      General:  Admits: Fatigue;          Denies: Appetite Change, Fever, Night Sweats, Weight Gain, Weight Loss      Eye:  Denies: Blurred Vision, Corrective Lenses, Diplopia, Eye Irritation, Eye     Pain, Eye Redness, Spots in Vision, Vision Loss      ENT:  Denies: Headache, Hearing Loss, Hoarseness, Seizures, Sinus Congestion,     Sore Throat      Cardiovascular:  Denies: Chest Pain, Edema Ankles, Edema Legs, Irregular     Heartbeat, Palpitations      Respiratory:  Denies: Coughing Blood, Productive Cough, Shortness of Air,     Wheezing      Gastrointestinal:  Denies: Bloody Stools, Constipation, Diarrhea, Frequent     Heartburn, Nausea, Problem Swallowing, Tarry Stools, Unable to Control Bowels,     Vomiting      Genitourinary (male):  Denies: Blood in Urine, Decrease Urine Stream, Frequent     Urination, Incontinence, Painful Urination      Musculoskeletal:  Denies: Back Pain, Leg Cramps, Muscle Pain, Muscle Weakness,     Painful Joints, Swollen Joints      Integumentary:  Denies: Bleeds Easily, Bruises Easily, Hair Changes, Jaundice,     Lesions, Mole Changes, Nail Changes, Pigment Changes, Rash, Skin Discoloration      Neurologic:  Denies: Dizziness, Fainting, Numbness\Tingling, Paralysis, Seizures      Psychiatric:  Denies: Anxiety, Confused, Depression, Disoriented, Memory Loss      Endocrine:  Denies: Cold Intolerance, Diabetes, Excessive Sweating, Excessive     Thirst, Excessive Urination, Heat Intolerance, Flushing, Hyperthyroidism,     Hypothyroidism      Hematologic/Lymphatic:  Denies: Bruising, Bleeding, Enlarged Lymph Nodes,     Recurrent Infections, Transfusions            VITAL SIGNS AND SCORES      Vitals      Height 5 ft 7.00 in / 170.18 cm      Weight 162  lbs 4.137 oz / 73.6 kg      BSA 1.85 m2      BMI 25.4 kg/m2      Temperature 97.7 F / 36.5 C - Temporal      Pulse 101      Respirations 20      Blood Pressure 136/64 Sitting, Left Arm      Pulse Oximetry 95%            Pain Score      Experiencing any pain?:  No      Pain Scale Used:  Numerical      Pain Intensity:  0            Fatigue Score      Experiencing any fatigue?:  Yes      Fatigue (0-10 scale):  2            EXAM      General Appearance:  Positive for: Alert, Oriented x3, Cooperative      Eye:  Positive for: Moist Conjunctiva, PERRLA, Reactive to Light      HEENT:  Positive for: Oropharynx clear;          Negative for: Erythema      Neck:  Positive for: Full ROM, Supple      Respiratory:  Positive for: CTAB, Normal Respiratory Effort      Abdomen/Gastro:  Positive for: Normal Active Bowel Sounds, Soft;          Negative for: Distention, Tenderness      Cardiovascular:  Positive for: RRR;          Negative for: Murmur, Peripheral Edema      Skin:  Positive for: Normal Temperature, Normal Texture and Turgor, Normal Tone;             Negative for: Rash      Psychiatric:  Positive for: AAO X 3, Appropriate Affect, Intact Judgement      Neurologic:  Positive for: Cranial Ner II-XII Intact, Deep Tendon Reflexes,     Focal Sensory Deficit (hands and feet);          Negative for: Dizziness, Headache      Genitourinary:  Negative for: Bladder Distention      Musculoskeletal:  Positive for: Full ROM Lower Extremety, Full Muscle Strength,     Full Muscle Tone      Lower Extremities:  Positive for: Normal Gait and Station, Pedal Pulses Intact,     Pedal Pulses Symetrical      Upper Extremities:  Negative for: Edema, Weakness      Lymphatic:  Negative for: Axillary, Cervical, Supraclavicular            PREVENTION      Date Influenza Vaccine Given:  Sep 1, 2018      Influenza Vaccine Declined:  No      2 or More Falls Past Year?:  No      Fall Past Year with Injury?:  No      Hx Pneumococcal Vaccination:  Yes       Encouraged to follow-up with:  PCP regarding preventative exams.      Chart initiated by      PJ ASHLEY CMA            ALLERGY/MEDS      Allergies      Coded Allergies:             NO KNOWN ALLERGIES (Unverified , 2/26/19)            Medications      Last Reconciled on 4/3/19 12:41 by FAUSTO GODWIN      Gabapentin (Gabapentin) 300 Mg Capsule      300 MG PO TID, #90 CAP 0 Refills         Reported         4/3/19       Cetirizine Hcl (ZyrTEC) 10 Mg Tablet      10 MG PO HS, #30 TAB 5 Refills         Prov: Sammie Soler PA-C         12/4/18       Tiotropium Br/Olodaterol HCl (Stiolto Respimat Inhal Spray) 4 Gm Mist.inhal      2 PUFFS INH RTQDAY, #1 INH 0 Refills         Reported         12/4/18       Cyclobenzaprine Hcl (Cyclobenzaprine*) 10 Mg Tablet      10 MG PO QDAY         Reported         11/20/18       Aspirin Chew (Aspirin Baby) 81 Mg Tab.chew      81 MG PO HS, #30 TAB.CHEW 0 Refills         Reported         11/20/18       Multivitamin/Iron/Folic Acid (CENTRUM COMPLETE MULTIVIT TAB) 1 Tab Tablet      1 TAB PO QDAY, #30 TAB 0 Refills         Reported         11/20/18       Verapamil Er (VERAPAMIL ER) 180 Mg Tablet.er      180 MG PO QDAY         Reported         11/20/18       Azelastine Hcl (Azelastine Nasal) 137 Mcg/0.137 Ml Spray.pump      2 PUFFS NARE EACH BID, #1 BOTTLE 9 Refills         Prov: Dayne Choudhary         9/20/18       Vitamin B Complex (B Complex-Vitamin B-12) 1 Each Tablet      1 TAB PO QDAY, #30 TAB         Reported         6/27/18       Cilostazol (Pletal*) 100 Mg Tab      100 MG PO BID, #60 TAB         Reported         6/25/18       Cholecalciferol (Vitamin D3*) 1,000 Unit Tab      1000 UNITS PO QDAY, #30 TAB 0 Refills         Reported         8/5/16       Pantoprazole (Protonix*) 20 Mg Tablet      40 MG PO QAM, #60 TAB 0 Refills         Reported         5/18/16       Pravastatin Sod (Pravachol*) 40 Mg Tablet      40 MG PO HS, TAB         Reported         5/6/16      Medications  Reviewed:  Changes made to meds            IMPRESSION/PLAN      Impression      1) 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 status     post VATS procedure with left upper lobectomy in July 12, 2016 with Dr. Saul in    [facility]. s/p 4 cycles Carboplatin /   Carboplatin completed in October 2016            Presents today for follow up. Doing well in the interim. He denies any worsening    cough or shortness of breath. He does, however have chronic peripheral     neuropathy. Reports he does follow up with Dr. Saul at  anymore since she has    moved to [facility]. Patient reports he has follow up with pulmonology, Dr. Choudhary in May for CT scan and reports Dr. Choudhary will refer him to Dr. Nagi Spears for follow up here on Tuesday in lung clinic day.. He does reports he had    pneumonia 4 times in 2018. Most recent CT in February 2019 showed improving     consolidation in the right lower base consolidation.             2) Chronic peripheral  neuropathy: Reports ongoing peripheral neuropathy to     hands and feet. He is unable to button his shirt buttons. Reports he has been     using Lyrica for the neuropathy which he feels is not helping well lately and     would like to switch back to Neurontin. He previously used Neurontin in the     past. Today, we discussed switching back to Neurontin. We also discussed taking     B complex vitimins as well as starting Alpha lipoic acid 300 mg TID for     neuropathy. As well, we discussed referral to PT for the chronic neuropathy. AT     this time, we will change Lyrica to Neurontin, start Alpha lipoic acid and wait     to do referral to PT for neuropathy. He will continue to use his device to bu    tton his shirts.            Diagnosis      Notes      New Medications      * Gabapentin 300 MG CAPSULE: 300 MG PO TID #90      Discontinued Medications      * Albuterol/Ipratropium (Duoneb) 3 ML AMPUL.NEB: 3 ML INH Q2H PRN        DYSPNEA/WHEEZING 30 Days #360         Instructions: DIAGNOSIS CODE REQUIRED PRIOR TO PRESCRIBING.      * ROFLUMILAST (Daliresp) 500 MCG TAB: 500 MCG PO QDAY 30 Days #30         Instructions: Take 250mg po qd x 1 month then increase to 500mg po qd to        continue.            Plan      1) Follow up with Dr. Choudhary in May for CT scan and referral to Dr. Nagi Spears    as needed.       2) Change Lyrica to Neurontin 300 mg TID. I instructed Mr. Zimmerman we will start    the Neurontin 300 mg TID and increase if needed for improvement in the     neuropathy. He will also start Alpha lipoic acid 300 mg TID as well. He will     call in 3-4 weeks if no improvement in the neuropathy with the current dose of     Neurontin. He will follow up with NP in 4 months for follow up. He had a RYDER     checked the last week of March.            Patient Education      Patient Education Provided:  Yes                 Disclaimer: Converted document may not contain table formatting or lab diagrams. Please see [employer] for the authenticated document.

## 2021-05-28 NOTE — PROGRESS NOTES
Patient: DEEPALI RODRIGUEZ     Acct: [id number]     Report: #[id number]  UNIT #: [id number]     : 1940    Encounter Date:10/27/2020  PRIMARY CARE: Payam Carroll  ***Signed***  --------------------------------------------------------------------------------------------------------------------  NURSE INTAKE      Visit Type      Established Patient Visit            Chief Complaint      LUNG CANCER            History and Present Illness      Past Oncology Illness History      This is a very pleasant 76-year-old gentleman who presents with follow-up for     lobectomy with adjuvant chemotherapy.            1) Left lung:  Pathology 2016: FNA: Poorly differentiated adenocarcinoma: 5.5     mm BONNIE nodule. 2016.       Left lung lobectomy: VATS procedure: Dr. Saul in [place].Path:multifocal     invasive moderately differentiated adenocarcinoma, acinar type, 2.0 x 1.6 x 1.4     cm. Additional nodule 0.6 x 0.5 x 0.3 cm: margins free of tumor, negative LN's.     Visceral pleural invasion noted. (16). Staging: Stage IIB:  pT3N0.       Carboplatin / Paclitaxel x 4 cycles: 8/10/16 - 10/14/16.      CT CAP: No evidence of disease. 2017.       CT CAP: No evidence of disease. 17.       CT chest: Right lower base: improving consolidation. 19.       CT chest: T6 / T10 compression fractures: non-pathologic. (19)      CT chest in ER: likely pneumonia/inflammation. 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            HPI - Oncology Interim      1) Left lung:  Pathology 2016: FNA: Poorly differentiated adenocarcinoma: 5.5     mm BONNIE nodule. 2016.       Left lung lobectomy: VATS procedure: Dr. Saul in [place].Path:multifocal i    nvasive moderately differentiated adenocarcinoma, acinar type, 2.0 x 1.6 x 1.4     cm. Additional nodule 0.6 x 0.5 x  0.3 cm: margins free of tumor, negative LN's.     Visceral pleural invasion noted. (16). Staging: Stage IIB:  pT3N0.             Mr. Rodriguez presents for follow up for left lung cancer. He is status post VATS     procedure in [place] in May / 2016. He reports he is doing well in the     interim. He does reports he was was recently hospitalized in July with     pneumonia. He follows with pulmonary for this. He recently completed a CT scan     in September which showed resoloving pneumonia in the right lung.             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             Mr. Rodriguez sees us every 6 months for chronic PN associated with previous     chemotherapy. He reports his PN in his hands seems to be worsening and is having    difficulty buttoning his shirts. He has PN to his feet as well and but does     report he received a stem cell infusion in December for this. He does report he     did obtain some improvement to his feet with the infusion. In addition, he is     taking Vitamin B-6 for PN as well. He did take alpha lipoic acid but reports     this was not helpful. He inquires about a dose increase in his Neurontin today.     His current dose is 300 mg QID.             3) Leukocytosis: WBC 15.53. He denies any fevers or chills.            Clinical Trial Participant      No            ECOG Performance Status      0            Most Recent Imaging Findings      [facility]                PACS RADIOLOGY REPORT            Patient: DEEPALI RODRIGUEZ   Acct: #[id number]   Report: #[id number]            UNIT #: [id number]    DOS: 20 1200      INSURANCE:MEDICARE PART A   LOCATION:[facility]     : 1940            PROVIDERS      ADMITTING:     ATTENDING: DAPHNEY DE LA VEGA PA-C      FAMILY:  NONE,MD   ORDERING:  DAPHNEY DE LA VEGA PA-C          OTHER:    DICTATING:  Daisy Rossi MD            REQ #:[id number]   [facility]      REASON FOR EXAM:        REASON FOR VISIT:  PNEUMONIA            *******Signed******         PROCEDURE:   CT CHEST WITHOUT CONTRAST             COMPARISON:   [facility], CT, CHEST W/O CONTRAST,     8/28/2019, 10:01.        [facility], CT, CHEST W/O CONTRAST, 5/15/2019, 9:55.      [facility], CT, CHEST W/O CONTRAST, 2/18/2019, 9:17.  [facility], CT, CHEST       W/ CONTRAST, 4/04/2020, 9:59.  [facility], CT, CHEST W/O     CONTRAST, 6/16/2020,       7:12.  [facility], CT, CHEST W/O CONTRAST, 7/02/2020, 12:21.             INDICATIONS:   Pneumonia due to Pseudomonas, SOA, COUGHING, PREV SMOKER, AND HX     OF LUNG CANCER             TECHNIQUE:   CT images were created without the administration of contrast     material.               PROTOCOL:     Standard imaging protocol performed                RADIATION:     DLP: 286.5mGy*cm          Automated exposure control was utilized to minimize radiation dose.              FINDINGS:         Previously seen multifocal pneumonia in the right lung has resolved.  There is     minor residual       atelectasis or scarring in medial right middle lobe.  1.5 cm ground-glass     opacity in the subpleural       left upper lung on image 18 is stable.  Postoperative changes are redemonstrated    in the left lung.        There are moderate paraseptal and centrilobular emphysematous changes.  There is    no pleural       effusion.  Trace pericardial fluid is seen.  Heart size is not enlarged.  There     are coronary artery       calcifications.  Nonobstructing left renal calculus measures 3 mm.  Remaining     partially imaged       solid organs of the upper abdomen appear unremarkable for noncontrast CT.  There    are stable T8 and       T12 compression  fractures.             CONCLUSION:         1. Resolution of previously seen multifocal pneumonia in the right lung.  Minor     residual       atelectasis or scarring in the medial right middle lobe.      2. Stable 1.5 cm ground-glass opacity in the subpleural left upper lung.      3. Moderate emphysematous changes.      4. Please see above for additional stable findings.              BLAINE REYES MD             Electronically Signed and Approved By: BLAINE REYES MD on 9/17/2020 at 12:47                                  PAST, FAMILY   Past Medical History      Past Medical History:  COPD, High Cholesterol, Hypertension, Short of Air,     Stroke      Hematology/Oncology (M):  Anemia, Lung Cancer            Past Surgical History      Lung Biopsy            Family History      Family History:  Stomach Cancer            Social History      Lives independently:  Yes      Occupation:  RETIRED            Tobacco Use      Tobacco status:  Former smoker (x70 yrs, 1 PPD Quit 2010)      Smoking packs/day:  1            Substance Use      Substance use:  Denies use            REVIEW OF SYSTEMS      General:  Denies: Appetite Change, Fatigue, Fever, Night Sweats, Weight Gain,     Weight Loss      Eye:  Denies Blurred Vision, Denies Corrective Lenses, Denies Diplopia, Denies     Vision Changes      ENT:  Denies Headache, Denies Hearing Loss, Denies Hoarseness, Denies Sore     Throat      Cardiovascular:  Denies Chest Pain, Denies Palpitations      Respiratory:  Denies: Cough, Coughing Blood, Productive Cough, Shortness of Air,    Wheezing      Gastrointestinal:  Denies Bloody Stools, Denies Constipation, Denies Diarrhea,     Denies Nausea/Vomiting, Denies Problem Swallowing, Denies Unable to Control     Bowels      Genitourinary:  Denies Blood in Urine, Denies Incontinence, Denies Painful     Urination      Musculoskeletal:  Denies Back Pain, Denies Muscle Pain, Denies Painful Joints      Integumentary:  Denies Itching,  Denies Lesions, Denies Rash      Neurologic:  Denies Dizziness, Denies Numbness\Tingling, Denies Seizures      Psychiatric:  Denies Anxiety, Denies Depression      Endocrine:  Denies Cold Intolerance, Denies Heat Intolerance      Hematologic/Lymphatic:  Denies Bruising, Denies Bleeding, Denies Enlarged Lymph     Nodes            VITAL SIGNS AND SCORES      Vitals      Weight 155 lbs 10.316 oz / 70.6 kg      Temperature 96.8 F / 36 C - Temporal      Pulse 107      Respirations 20      Blood Pressure 132/65 Sitting, Left Arm      Pulse Oximetry 98%, ROOM AIR            Pain Score      Experiencing any pain?:  No      Pain Scale Used:  Numerical      Pain Intensity:  0            Fatigue Score      Experiencing any fatigue?:  No      Fatigue (0-10 scale):  0 (none)            EXAM      General appearance:  in no apparent distress, cooperative, appears stated age.      HEENT: No pallor, no icterus, oral mucosa moist      Neck: Supple, trachea central-not deviated      Lymph nodes: none palpable peripherally      Cardiovascular: S1-S2 heard, no murmurs, no rubs, no gallops.      Breast exam:      Respiratory: Clear to auscultation bilaterally, no adventitious sounds      Abdomen/gastro: Soft, nontender, no palpable hepatosplenomegaly, bowel sounds     heard      Skin: No lesions, no rashes, no petechiae.      Extremities: No pedal edema, peripheral pulses felt, no clubbing      Musculoskeletal: Normal tone, no rigidity of muscles; range of motion intact      Spine: No deformities      Psychiatric: Alert and oriented x3, appropriate affect, intact judgment      Neurologic: Cranial nerves II through XII grossly intact, no gross neurological     deficits noted.            PREVENTION      Hx Influenza Vaccination:  Yes      Date Influenza Vaccine Given:  Sep 1, 2020      Influenza Vaccine Declined:  No      2 or More Falls in Past Year?:  No      Fall Past Year with Injury?:  No      Hx Pneumococcal Vaccination:  Yes       Encouraged to follow-up with:  PCP regarding preventative exams.      Chart initiated [facility]      [facility]            ALLERGY/MEDS      Allergies      Coded Allergies:             NO KNOWN ALLERGIES (Unverified , 10/27/20)            Medications      Last Reconciled on 10/12/20 08:59 by RENITA MOSER,       Fluticasone/Umeclidin/Vilanter (Trelegy Ellipta 100-62.5-25) 1 Each Blst.w.dev               [facility]         10/12/20       Fluticasone/Umeclidin/Vilanter (Trelegy Ellipta 100-62.5-25) 1 Each Blst.w.dev      1 PUFF INH RTQDAY, #1 INH 11 Refills         Prov: [facility]         10/12/20       MDI-Albuterol (Ventolin HFA) 18 Gm Hfa.aer.ad      2 PUFFS INH QID, #1 MDI 5 Refills         Prov: [facility]         10/12/20       amLODIPine (amLODIPine) 5 Mg Tablet      5 MG PO QDAY, #30 TAB         Reported         7/2/20       Cetirizine Hcl (zyrTEC) 10 Mg Tablet      10 MG PO QDAY, #30 TAB 0 Refills         Reported         7/2/20       Aspirin Chew (Aspirin Baby) 81 Mg Tab.chew      81 MG PO QDAY, #30 TAB.CHEW 0 Refills         Reported         7/2/20       Gabapentin (Gabapentin) 300 Mg Capsule      300 MG PO QID, #60 CAP 0 Refills         Reported         7/2/20       Cilostazol (Pletal*) 100 Mg Tab      100 MG PO BID         Reported         4/4/20       Metoprolol Succinate (Metoprolol Succinate) 50 Mg Tab.er.24h      25 MG PO QDAY, #15 TAB.SR.24H 0 Refills         Reported         10/4/19       Cyclobenzaprine Hcl (Cyclobenzaprine*) 10 Mg Tablet      10 MG PO QDAY         Reported         11/20/18       Multivitamin/Iron/Folic Acid (CENTRUM COMPLETE MULTIVIT TAB) 1 Tab Tablet      1 TAB PO QDAY, #30 TAB 0 Refills         Reported         11/20/18       Vitamin B Complex (B Complex-Vitamin B-12) 1 Each Tablet      1 TAB PO QDAY, #30 TAB         Reported         6/27/18       Cholecalciferol (Vitamin D3) (Vitamin D3) 1,000 Unit Tab      1000 UNITS PO QDAY, #30  TAB 0 Refills         Reported         8/5/16       Pantoprazole (Protonix) 20 Mg Tablet      40 MG PO QAM, #60 TAB 0 Refills         Reported         5/18/16       Pravastatin Sod (Pravachol*) 40 Mg Tablet      20 MG PO HS, TAB         Reported         5/6/16      Medications Reviewed:  No Changes made to meds            IMPRESSION/PLAN      Impression      1) Left lung:  Pathology 5/2016: FNA: Poorly differentiated adenocarcinoma: 5.5     mm BONNIE nodule. 5/2016.       Left lung lobectomy: VATS procedure: Dr. Saul in [place].Path:multifocal     invasive moderately differentiated adenocarcinoma, acinar type, 2.0 x 1.6 x 1.4     cm. Additional nodule 0.6 x 0.5 x 0.3 cm: margins free of tumor, negative LN's.     Visceral pleural invasion noted. (7/12/16). Staging: Stage IIB:  pT3N0.             Mr. Zimmerman presents for follow up for left lung cancer. He is status post VATS     procedure in [place] in May / 2016. He reports he is doing well in the     interim. He does reports he was was recently hospitalized in July with cleopatra brown. He follows with pulmonary for this. He recently completed a CT scan in     September which showed resoloving pneumonia in the right lung.             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             Mr. Zimmerman sees us every 6 months for chronic PN associated with previous     chemotherapy. He reports his PN in his hands seems to be worsening and is having    difficulty buttoning his shirts. He has PN to his feet as well and but does     report he received a stem cell infusion in December for this. He does report he     did obtain some improvement to his feet with the infusion. In addition, he is     taking Vitamin B-6 for PN as well. He did take alpha lipoic acid but reports     this was not helpful. He inquires about a dose increase in his Neurontin today.     His current dose is 300  mg QID.             3) Leukocytosis: WBC 15.53. He denies any fevers or chills.            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             2) Referral for neurology for chronic and worsening PN to the hands for their     recommendations for worsening neuropathy. Patient is taking Neurontin 300 mg 1     tab QID and is currently taking Vitamin B 6. He has tried alpha lipoic acid in     the past without any improvement. Patient requesting increase in Neurontin.             Need RYDER today to review. RYDER reveiwed. Request [id number]            3) Recheck CBC, CMP today to evaluate leukocytosis.             RTC in 6 months with NP.            Patient Education            Gabapentin      Peripheral Neuropathy      Patient Education Provided:  Yes            Electronically signed by FAUSTO GODWIN onc  10/27/2020 15:54       Disclaimer: Converted document may not contain table formatting or lab diagrams. Please see [employer] for the authenticated document.

## 2021-05-28 NOTE — PROGRESS NOTES
Patient: DEEPALI RODRIGUEZ     Acct: [id number]     Report: #[id number]  UNIT #: [id number]     : 1940    Encounter Date:2019  PRIMARY CARE: Payam Carroll  ***Signed***  --------------------------------------------------------------------------------------------------------------------  Chief Complaint      Encounter Date      Sep 13, 2019            Primary Care Provider      Payam Carroll            Referring Provider      Dayne Choudhary            Patient Complaint      Patient is complaining of      Patient here today for 4 month follow up, discuss CT results            VITALS      Height 67 in / 170.18 cm      Weight 156 lbs  / 70.216403 kg      BSA 1.82 m2      BMI 24.4 kg/m2      Temperature 98 F / 36.67 C - Oral      Pulse 111      Respirations 14      Blood Pressure 133/68 Sitting, Left Arm      Pulse Oximetry 95%, room air            HPI      The patient is a 79 year old male with chronic obstructive pulmonary disease,     multifocal pneumonia, pulmonary nodules, ground glass opacities in left lower     lobe, 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     He is here for follow up today.             Since his last office visit he has been on Stiolto and very rarely uses     albuterol.  He was given a prednisone taper to use and has used it for several     days and he has not needed any more. He has not needed any antibiotics or needed    to be hospitalized. Overall he is doing well.  He recently had CT scan of the     chest which showed stable findings but there were some anterior superior      endplate compressions of approximately 40-50% loss of height at the T6 and T10     vertebral bodies. He does not have any back pain, no decreased sensation in his     flank area or extremities. He has no changes in his weight or appetite, no     coughing up blood, no nausea or vomiting.             ROS      Constitutional:  Denies: Fatigue, Fever, Weight gain, Weight loss, Chills,     Insomnia, Other      Respiratory/Breathing:  Denies: Shortness of air, Wheezing, Cough, Hemoptysis,     Pleuritic pain, Other      Endocrine:  Denies: Polydipsia, Polyuria, Heat/cold intolerance, Diabetes, Other      Eyes:  Denies: Blurred vision, Vision Changes, Other      Ears, nose, mouth, throat:  Denies: Congestion, Dysphagia, Hearing Changes, Nose    Bleeding, Nasal Discharge, Throat pain, Tinnitus, Other      Cardiovascular:  Denies: Chest Pain, Exertional dyspnea, Peripheral Edema,     Palpitations, Syncope, Wake up Gasping for air, Orthopnea, Tachycardia, Other      Gastrointestinal:  Denies: Abdominal pain/cramping, Bloody stools, Constipation,    Diarrhea, Melena, Nausea, Vomiting, Other      Genitourinary:  Denies: Dysuria, Urinary frequency, Incontinence, Hematuria,     Urgency, Other      Musculoskeletal:  Denies: Joint Pain, Joint Stiffness, Joint Swelling, Myalgias,    Other      Hematologic/lymphatic:  DENIES: Lymphadenopathy, Bruising, Bleeding tendencies,     Other      Neurologic:  Denies: Headache, Numbness, Weakness, Seizures, Other      Psychiatric:  Denies: Anxiety, Appropriate Effect, Depression, Other      Sleep:  No: Excessive daytime sleep, Morning Headache?, Snoring, Insomnia?, Stop    breathing at sleep?, Other      Integumentary:  Denies: Rash, Dry skin, Skin Warm to Touch, Other            FAMILY/SOCIAL/MEDICAL HX      Surgical History:  Yes: Head Surgery (THROAT SURG), Orthopedic Surgery (LEFT     SHOULDER SURG), Other Surgeries (removed top left of lung); No: AAA Repair,     Abdominal Surgery, Adenoids, Angioplasty, Appendectomy, Back Surgery, Bladder     Surgery, Bowel Surgery, Breast Surgery, CABG, Carotid Stenosis, Cholecystectomy,    Ear Surgery, Eye Surgery, Hernia Surgery, Kidney Surgery, Nose Surgery, Oral     Surgery, Prostatectomy, Rectal Surgery, Spinal Surgery, Testicular Surgery,      Throat Surgery, Tonsils, Valve Replacement, Vascular Surgery      Cancer/Type - Family Hx:  Mother      Is Father Still Living?:  No      Is Mother Still Living?:  No       Family History:  Yes      Social History:  Tobacco Use      Smoking status:  Former smoker (1 ppd 55 years quit for 10 years )      Smoking packs/day:  1      Smoking history:  25-50 pack years      Anticoagulation Therapy:  No      Antibiotic Prophylaxis:  No      Medical History:  Yes: Chemotherapy/Cancer (LUNG CA, LEFT LUNG), Chronic     Bronchitis/COPD (INHALER PRN), Hemorrhoids/Rectal Prob (GERD), High Blood     Pressure (ON MEDS ), Shortness Of Breath (PNEUMONIA,  LEFT LUNG CA, REMOVAL HALF    OF UPPER PORTION LEFT LUNG); No: Alcoholism, Allergies, Anemia, Arthritis,     Asthma, Atrial Fibrillation, Blood Disease, Broken Bones, Cataracts, Chemical     Dependency, Emphysema, Chronic Liver Disease, Colon Trouble, Colitis,     Diverticulitis, Congestive Heart Failu, Deafness or Ringing Ears, Convulsions,     Depression, Anxiety, Bipolar Disorder, PTSD, Diabetes, Epilepsy, Seizures,     Forgetfullness, Glaucoma, Gall Stones, Gout, Head Injury, Heart Attack, Heart Mu    rmur, GERD, Hepatitis, Hiatal Hernia, High Cholesterol, HIV (Do not ask - volu,     Jaundice, Kidney or Bladder Disease, Kidney Stones, Migrane Headaches, Mitral     Valve Prolapse, Night sweats, Phlebitis, Psychiatric Care, Reflux Disease,     Rheumatic Fever, Sexually Transmitted Dis, Sinus Trouble, Skin     Disease/Psoriais/Ecz, Stroke, Thyroid Problem, Tuberculosis or Pos TB Te,     Miscellaneous Medical/oth      Psychiatric History      none            PREVENTION      Hx Influenza Vaccination:  Yes      Date Influenza Vaccine Given:  Sep 1, 2018      Influenza Vaccine Declined:  No      2 or More Falls Past Year?:  No      Fall Past Year with Injury?:  No      Hx Pneumococcal Vaccination:  Yes      Encouraged to follow-up with:  PCP regarding preventative exams.      Chart  initiated by      Codi Snider  [facility]            ALLERGIES/MEDICATIONS      Allergies:        Coded Allergies:             NO KNOWN ALLERGIES (Unverified , 9/13/19)      Medications    Last Reconciled on 9/13/19 08:14 by DAYNE CHOUDHARY MD      Tiotropium Br/Olodaterol HCl (Stiolto Respimat Inhal Spray) 4 Gm Mist.inhal               Prov: Sammie Soler PA-C         8/16/19       Tiotropium Br/Olodaterol HCl (Stiolto Respimat Inhal Spray) 4 Gm Mist.inhal               Prov: Sammie Soler PA-C         5/28/19       Gabapentin (Gabapentin) 300 Mg Capsule      300 MG PO QID, #120 CAP 0 Refills         Reported         4/3/19       Cetirizine Hcl (ZyrTEC) 10 Mg Tablet      10 MG PO HS, #30 TAB 5 Refills         Prov: Sammie Soler PA-C         12/4/18       Tiotropium Br/Olodaterol HCl (Stiolto Respimat Inhal Spray) 4 Gm Mist.inhal      2 PUFFS INH RTQDAY, #1 INH 0 Refills         Reported         12/4/18       Cyclobenzaprine Hcl (Cyclobenzaprine*) 10 Mg Tablet      10 MG PO QDAY         Reported         11/20/18       Aspirin Chew (Aspirin Baby) 81 Mg Tab.chew      81 MG PO HS, #30 TAB.CHEW 0 Refills         Reported         11/20/18       Multivitamin/Iron/Folic Acid (CENTRUM COMPLETE MULTIVIT TAB) 1 Tab Tablet      1 TAB PO QDAY, #30 TAB 0 Refills         Reported         11/20/18       Verapamil Er (VERAPAMIL ER) 180 Mg Tablet.er      180 MG PO QDAY         Reported         11/20/18       Azelastine Hcl (Azelastine Nasal) 137 Mcg/0.137 Ml Spray.pump      2 PUFFS NARE EACH BID, #1 BOTTLE 9 Refills         Prov: Dayne Choudhary         9/20/18       Vitamin B Complex (B Complex-Vitamin B-12) 1 Each Tablet      1 TAB PO QDAY, #30 TAB         Reported         6/27/18       Cilostazol (Pletal*) 100 Mg Tab      100 MG PO BID, #60 TAB         Reported         6/25/18       Cholecalciferol (Vitamin D3*) 1,000 Unit Tab      1000 UNITS PO QDAY, #30 TAB 0 Refills         Reported         8/5/16       Pantoprazole  (Protonix*) 20 Mg Tablet      40 MG PO QAM, #60 TAB 0 Refills         Reported         5/18/16       Pravastatin Sod (Pravachol*) 40 Mg Tablet      40 MG PO HS, TAB         Reported         5/6/16      Current Medications      Current Medications Reviewed 9/13/19            EXAM      CONSTITUTIONAL: Pleasant male in no acute distress,  normal conversant.       EYES : Pink conjunctive, no ptosis, PERRL.       ENMT : Nose and ears appear normal, normal dentition, mild posterior pharyngeal     wall erythema. Mallampati classification       Neck: Nontender, no masses, no thyromegaly, no nodules.      Resp : Bilateral diminished breath sounds, scattered rhonchi on the left base,     no wheezing or crackles, resonate to percussion bilaterally.       CVS  : No carotid bruits, s1s2 nl, RRR, no murmur, rubs or gallop, no peripheral    edema       Chest wall: Normal rise with inspiration, nontender on palpation      GI   : Abdomen soft, with no masses, no hepatosplenomegaly, no hernias, BS+      MSK  : Normal gait and station, no digital cyanosis or clubbing       Skin : No rashes, ulcerations or lesions, normal turgor and temperature      Neuro: CN II - XII intact, no sensory deficits, DTRs intact and symmetrical, no     motor weakness      Psych: Appropriate affect, A   Vitals      Vitals:             Height 67 in / 170.18 cm           Weight 156 lbs  / 70.835320 kg           BSA 1.82 m2           BMI 24.4 kg/m2           Temperature 98 F / 36.67 C - Oral           Pulse 111           Respirations 14           Blood Pressure 133/68 Sitting, Left Arm           Pulse Oximetry 95%, room air            REVIEW      Results Reviewed      PCCS Results Reviewed?:  Yes Prev Lab Results, Yes Prev Radiology Results, Yes     [facility]      [facility] Results               [facility]                PACS RADIOLOGY REPORT            Patient: DEEPALI RODRIGUEZ    Acct: #[id number]   Report: #[id number]            UNIT #: [id number]    DOS: 19 1030      INSURANCE:MEDICARE PART A   LOCATION:[facility]     : 1940            PROVIDERS      ADMITTING:     ATTENDING: Sammie Soler PA-C      FAMILY:  NONE,MD   ORDERING:  Sammie Soler PA-C         OTHER:  JOAN TAYLOR   DICTATING:  Ed Pa             REQ #:[id number]   EXAM:[facility] - CT CHEST without CONTRAST      REASON FOR EXAM:        REASON FOR VISIT:  ABN FINDINGS OF LUNG FIELD            *******Signed******         PROCEDURE:   CT CHEST WITHOUT CONTRAST             COMPARISON:   [facility], CT, CHEST W/O CONTRAST, 2018,     18:56.  [facility], CT, CHEST W/ CONTRAST, 2016, 14:17.  [facility], CT,       CHEST W/O CONTRAST, 2019, 9:17.  [facility], CT,     CHEST W/O CONTRAST,       5/15/2019, 9:55.             INDICATIONS:   FOLLOW LUNG CA DX 2016. NO COMPLAINTS. QUIT SMOKING 10 YEARS AGO.     Diagnosed with       adenocarcinoma stage IIB in 2016 status post fat lobectomy.  Status post     chemotherapy.             TECHNIQUE:   CT images were created without the administration of contrast     material.               PROTOCOL:     Standard imaging protocol performed                RADIATION:     DLP: 295mGy*cm          Automated exposure control was utilized to minimize radiation dose.              FINDINGS:         There are stable changes from known left upper lobectomy.  There is moderate     underlying emphysema.        Within the periphery of the right upper chest there is stable ground-glass     opacity abutting the       pleura on axial images 18 through 20, likely post infectious or post     inflammatory . There is stable       scarring in the anterior right lower lobe adjacent to the fissure.  There is     stable mild       bronchiectasis in the right middle and right lower lobes with  increasing volume     loss in the right       middle lobe from plate atelectasis.  No new pulmonary nodules or consolidations.     Thyroid gland is       unremarkable.  No thoracic or axillary adenopathy.  There is a moderate degree     of vascular       calcification in the thoracic aorta and coronary arteries.  There is a very     small pericardial       effusion which is unchanged from 5/15/2019.  No pleural effusions or     pneumothorax.        Tracheobronchial tree is widely patent.  Limited images of the upper abdomen     demonstrate normal       adrenal glands.  The gallbladder is present.  There are now acute or subacute     compressions of T6       and T10 with anterior wedging and approximately 50% loss of height, new since     5/15/2019.             CONCLUSION:   1. Anterior superior endplate compressions of approximately 40-50%    loss of height at       the T6 and T10 vertebral body levels.  No paraspinal hemorrhage.  These do not     appear to be       pathologic on CT.  There is plate atelectasis and volume loss in the right     middle lobe.        2. Treatment related changes in the left chest and underlying emphysema.  No CT     signs of residual,       recurrent, or metastatic disease in the chest.              JAIRON VALLE DO             Electronically Signed and Approved By: JAIRON VALLE DO on 8/28/2019 at 11:12                           Until signed, this is an unconfirmed preliminary report that may contain      errors and is subject to change.                                              VALERIE:      D:08/28/19 1052      PFT Results      [id number]  [id number]                                 [facility]                          [employer]                            [place], [place]  [address]               __________________________________________________________________________             Patient Name:                    Attending Physician:      Carlos Zimmerman PA-C [facility]             Patient Visit # MR #            Admit Date  Disch Date     Location      [id number]    [id number]      08/28/2018                 CT- -             Date of Birth      1940      __________________________________________________________________________      0821 - DIAGNOSTIC REPORT             PULMONARY FUNCTION TEST             SPIROMETRY:      FEV1/FVC ratio is 58%.      FEV1 is 1.53 L, 61% of predicted.      Forced vital capacity is 2.65 L, 74% of predicted.      There is no response to bronchodilator therapy seen.             LUNG VOLUMES:      Total lung capacity is 88% of predicted.      Residual volume is 120% of predicted.             DIFFUSION:      DLCO is 50% of predicted.             IMPRESSION:      1. Spirometry shows the presence of a mild obstructive defect with no          significant response to bronchodilator therapy seen.      2. Lung volumes show the presence of air trapping.      3. Diffusion capacity is moderately decreased.             To be electronically signed in [employer]      [id number] LINDSEY MARTE D.O.             WC:      D:  09/10/2018 10:04      T:  09/10/2018 12:02      #[id number]             Until signed, this is an unconfirmed preliminary report that may contain      errors and is subject to change.                   09/11/18 1056  <Electronically signed by Lindsey Marte DO>            Assessment      Compression fracture of body of thoracic vertebra - S22.000A            Notes      New Medications      * predniSONE* (Deltasone*) 10 MG TABLET: 10 MG PO ASDIR #45         Instructions: 52eho4f,90dwa0y,89meb3n,51bff4v,48aze5t      * Tiotropium Br/Olodaterol HCl (Stiolto Respimat Inhal [place]) 4 GM MIST.INHAL: 2      PUFFS INH RTQDAY #1      * Azithromycin 250 MG TABLET: 250 MG PO ASDIR #6         Instructions: Take 500 mg (two tablets) on the first day, then 250 mg (one        tablet)  once a day until all gone.      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             1. CT scan of the chest is stable, continue with Stiolto Respimat once daily and    use albuterol as needed.       2.  He is up to date on his vaccinations. He will get his flu vaccine through     his primary care provider.       3. I will give him a prednisone taper to use for now.       4. Chronic obstructive pulmonary disease exacerbation action plan was discussed     in detail. I advised him to use prednisone for several days when he needs it. He    has needed it twice since his last office visit. I have given him a Z-pack to     use if needed for exacerbation.       5. I will refer him to neurosurgery service given his C6 and C10 compression     fractures.       6. Follow up in 3-4 months, earlier if needed.            Patient Education      Education resources provided:  Yes      Patient Education Provided:  Acute Bronchitis, COPD                 Disclaimer: Converted document may not contain table formatting or lab diagrams. Please see [employer] for the authenticated document.

## 2021-05-28 NOTE — PROGRESS NOTES
Patient: DEEPALI RODRIGUEZ     Acct: [id number]     Report: #[id number]  UNIT #: [id number]     : 1940    Encounter Date:2019  PRIMARY CARE: Payam Carroll  ***Signed***  --------------------------------------------------------------------------------------------------------------------  Chief Complaint      Encounter Date      2019            Primary Care Provider      Payam Carroll            Referring Provider      Dayne Choudhary            Patient Complaint      Patient is complaining of      Pt here for 6w f/u and results, copd            VITALS      Height 5 ft 7 in / 170.18 cm      Weight 159 lbs 7 oz / 72.414815 kg      BSA 1.84 m2      BMI 25.0 kg/m2      Temperature 98.1 F / 36.72 C - Oral      Pulse 99      Respirations 18      Blood Pressure 118/64 Sitting, Right Arm      Pulse Oximetry 96%, room air            HPI      The patient is an 79 year old male with a history of COPD with recurrent     exacerbation, multifocal pneumonia and pulmonary nodules, tobacco abuse with     cigarettes in remission and history of right upper lobe adenocarcinoma, status     post lobectomy and chemotherapy.  He is here for follow up.  Since his last     office visit, he has been on Stiolto Respimat once daily and albuterol as     needed.  He is doing well overall. He has no significant worsening shortness of     breath, cough, fever, chills, nausea or vomiting. No need of antibiotics or     steroids.  He has no weight loss or loss of appetite.  He is suppose to see Dr. Saul at [facility] [place] in [place].  He was told that Dr. Saul is leaving [facility] [place] and he    is going to be needing another doctor.  He has no chest pain.  He is willing to     follow up with Dr. Spears here if it could be arranged.            ROS      Constitutional:  Complains of: Fatigue; Denies: Fever, Weight gain, Weight loss,    Chills, Insomnia, Other      Respiratory/Breathing:  Denies: Shortness of air, Wheezing, Cough, Hemoptysis,      Pleuritic pain, Other      Endocrine:  Denies: Polydipsia, Polyuria, Heat/cold intolerance, Diabetes, Other      Eyes:  Denies: Blurred vision, Vision Changes, Other      Ears, nose, mouth, throat:  Denies: Congestion, Dysphagia, Hearing Changes, Nose    Bleeding, Nasal Discharge, Throat pain, Tinnitus, Other      Cardiovascular:  Denies: Chest Pain, Exertional dyspnea, Peripheral Edema,     Palpitations, Syncope, Wake up Gasping for air, Orthopnea, Tachycardia, Other      Gastrointestinal:  Denies: Abdominal pain/cramping, Bloody stools, Constipation,    Diarrhea, Melena, Nausea, Vomiting, Other      Genitourinary:  Denies: Dysuria, Urinary frequency, Incontinence, Hematuria,     Urgency, Other      Musculoskeletal:  Denies: Joint Pain, Joint Stiffness, Joint Swelling, Myalgias,    Other      Hematologic/lymphatic:  DENIES: Lymphadenopathy, Bruising, Bleeding tendencies,     Other      Neurologic:  Denies: Headache, Numbness, Weakness, Seizures, Other      Psychiatric:  Denies: Anxiety, Appropriate Effect, Depression, Other      Sleep:  No: Excessive daytime sleep, Morning Headache?, Snoring, Insomnia?, Stop    breathing at sleep?, Other      Integumentary:  Denies: Rash, Dry skin, Skin Warm to Touch, Other            FAMILY/SOCIAL/MEDICAL HX      Surgical History:  Yes: Head Surgery (THROAT SURG), Orthopedic Surgery (LEFT     SHOULDER SURG), Other Surgeries (removed top left of lung); No: AAA Repair,     Abdominal Surgery, Adenoids, Angioplasty, Appendectomy, Back Surgery, Bladder     Surgery, Bowel Surgery, Breast Surgery, CABG, Carotid Stenosis, Cholecystectomy,    Ear Surgery, Eye Surgery, Hernia Surgery, Kidney Surgery, Nose Surgery, Oral     Surgery, Prostatectomy, Rectal Surgery, Spinal Surgery, Testicular Surgery,     Throat Surgery, Tonsils, Valve Replacement, Vascular Surgery      Cancer/Type - Family Hx:  Mother      Is Father Still Living?:  No      Is Mother Still Living?:  No       Family History:   Yes      Social History:  Tobacco Use      Smoking status:  Former smoker (1 ppd 55 years quit for 10 years )      Smoking packs/day:  1      Smoking history:  25-50 pack years      Anticoagulation Therapy:  No      Antibiotic Prophylaxis:  No      Medical History:  Yes: Chemotherapy/Cancer (LUNG CA, LEFT LUNG), Chronic     Bronchitis/COPD (INHALER PRN), Hemorrhoids/Rectal Prob (GERD), High Blood     Pressure (ON MEDS ), Shortness Of Breath (PNEUMONIA,  LEFT LUNG CA, REMOVAL HALF    OF UPPER PORTION LEFT LUNG); No: Alcoholism, Allergies, Anemia, Arthritis,     Asthma, Atrial Fibrillation, Blood Disease, Broken Bones, Cataracts, Chemical     Dependency, Emphysema, Chronic Liver Disease, Colon Trouble, Colitis,     Diverticulitis, Congestive Heart Failu, Deafness or Ringing Ears, Convulsions,     Depression, Anxiety, Bipolar Disorder, PTSD, Diabetes, Epilepsy, Seizures,     Forgetfullness, Glaucoma, Gall Stones, Gout, Head Injury, Heart Attack, Heart     Murmur, GERD, Hepatitis, Hiatal Hernia, High Cholesterol, HIV (Do not ask -     volu, Jaundice, Kidney or Bladder Disease, Kidney Stones, Migrane Headaches,     Mitral Valve Prolapse, Night sweats, Phlebitis, Psychiatric Care, Reflux     Disease, Rheumatic Fever, Sexually Transmitted Dis, Sinus Trouble, Skin     Disease/Psoriais/Ecz, Stroke, Thyroid Problem, Tuberculosis or Pos TB Te,     Miscellaneous Medical/oth      Psychiatric History      None            PREVENTION      Hx Influenza Vaccination:  Yes      Date Influenza Vaccine Given:  Sep 1, 2018      Influenza Vaccine Declined:  No      2 or More Falls Past Year?:  No      Fall Past Year with Injury?:  No      Hx Pneumococcal Vaccination:  Yes      Encouraged to follow-up with:  PCP regarding preventative exams.      Chart initiated by      Felecia Hurt Ma            ALLERGIES/MEDICATIONS      Allergies:        Coded Allergies:             NO KNOWN ALLERGIES (Unverified , 2/26/19)      Medications    Last  Reconciled on 2/26/19 12:15 by DAYNE CHOUDHARY MD      Roflumilast (Daliresp) 500 Mcg Tab      500 MCG PO QDAY for 30 Days, #30 TAB 5 Refills         Prov: Sammie Soler PA-C         12/4/18       Cetirizine Hcl (ZyrTEC) 10 Mg Tablet      10 MG PO HS, #30 TAB 5 Refills         Prov: Sammie Soler PA-C         12/4/18       Tiotropium Br/Olodaterol HCl (Stiolto Respimat Inhal Spray) 4 Gm Mist.inhal      2 PUFFS INH RTQDAY, #1 INH 0 Refills         Reported         12/4/18       Cyclobenzaprine Hcl (Cyclobenzaprine*) 10 Mg Tablet      10 MG PO QDAY         Reported         11/20/18       Aspirin Chew (Aspirin Baby) 81 Mg Tab.chew      81 MG PO HS, #30 TAB.CHEW 0 Refills         Reported         11/20/18       Multivitamin/Iron/Folic Acid (CENTRUM COMPLETE MULTIVIT TAB) 1 Tab Tablet      1 TAB PO QDAY, #30 TAB 0 Refills         Reported         11/20/18       Verapamil Er (VERAPAMIL ER) 180 Mg Tablet.er      180 MG PO QDAY         Reported         11/20/18       Azelastine Hcl (Azelastine Nasal) 137 Mcg/0.137 Ml Spray.pump      2 PUFFS NARE EACH BID, #1 BOTTLE 9 Refills         Prov: Dayne Choudhary         9/20/18       Albuterol/Ipratropium (Duoneb) 3 Ml Ampul.neb      3 ML INH Q2H PRN for DYSPNEA/WHEEZING for 30 Days, #360 NEB         Prov: Andres Gaspar         6/29/18       Vitamin B Complex (B Complex-Vitamin B-12) 1 Each Tablet      1 TAB PO QDAY, #30 TAB         Reported         6/27/18       Cilostazol (Pletal*) 100 Mg Tab      100 MG PO BID, #60 TAB         Reported         6/25/18       Pregabalin (Lyrica *) 100 Mg Cap      100 MG PO TID, #90 CAP         Reported         6/8/18       Cholecalciferol (Vitamin D3*) 1,000 Unit Tab      1000 UNITS PO QDAY, #30 TAB 0 Refills         Reported         8/5/16       Pantoprazole (Protonix*) 20 Mg Tablet      40 MG PO QAM, #60 TAB 0 Refills         Reported         5/18/16       Pravastatin Sod (Pravachol*) 40 Mg Tablet      40 MG PO HS, TAB         Reported          16      Current Medications      Current Medications Reviewed 19            EXAM      CONSTITUTIONAL: Pleasant female in no acute distress,  normal conversant.       EYES : Pink conjunctive, no ptosis, PERRL.       ENMT : Nose and ears appear normal, normal dentition, mild posterior pharyngeal     wall erythema. Mallampati classification       Neck: Nontender, no masses, no thyromegaly, no nodules.      Resp : Bilateral diminished breath sounds, scattered rhonchi on the left base,     no wheezing or crackles, resonate to percussion bilaterally.       CVS  : No carotid bruits, s1s2 nl, RRR, no murmur, rubs or gallop, no peripheral    edema       Chest wall: Normal rise with inspiration, nontender on palpation      GI   : Abdomen soft, with no masses, no hepatosplenomegaly, no hernias, BS+      MSK  : Normal gait and station, no digital cyanosis or clubbing       Skin : No rashes, ulcerations or lesions, normal turgor and temperature      Neuro: CN II - XII intact, no sensory deficits, DTRs intact and symmetrical, no     motor weakness      Psych: Appropriate affect, A   Vitals      Vitals:             Height 5 ft 7 in / 170.18 cm           Weight 159 lbs 7 oz / 72.232123 kg           BSA 1.84 m2           BMI 25.0 kg/m2           Temperature 98.1 F / 36.72 C - Oral           Pulse 99           Respirations 18           Blood Pressure 118/64 Sitting, Right Arm           Pulse Oximetry 96%, room air            REVIEW      Results Reviewed      PCCS Results Reviewed?:  Yes Prev Lab Results, Yes Prev Radiology Results, Yes     [facility]      [facility]adi[facility] Results               [facility]                PACS RADIOLOGY REPORT            Patient: DEEPALI RODRIGUEZ   Acct: #[id number]   Report: #[id number]            UNIT #: [id number]    DOS: 19 1000      INSURANCE:MEDICARE PART A   LOCATION:[facility]     : 1940            PROVIDERS       ADMITTING:     ATTENDING: Sammie Soler PA-C      FAMILY:  Payam Carroll   ORDERING:  Sammie Soler PA-C         OTHER:    DICTATING:  Chauncey Montejo MD            REQ #:[id number]   EXAM:WO - CT CHEST without CONTRAST      REASON FOR EXAM:        REASON FOR VISIT:  COPD            *******Signed******         PROCEDURE:   CT CHEST WITHOUT CONTRAST             COMPARISON:   [facility], CT, CHEST W/O CONTRAST, 11/20/2018,     18:56.             INDICATIONS:   FOLLOW UP LUNG CA AND PREVIOUS CT CHEST. NO COMPLAINTS. H/O PAST     SMOKER.             TECHNIQUE:   CT images were created without the administration of contrast     material.               PROTOCOL:     Standard imaging protocol performed                RADIATION:     DLP: 302.9mGy*cm          Automated exposure control was utilized to minimize radiation dose.              FINDINGS:         Coronary artery atherosclerotic changes are noted.  No adenopathy or effusion is    evident.             A left upper lobectomy has been performed.  Consolidation previously noted in     the right lung base       has improved.  There are ground-glass opacities noted elsewhere in the lung     fields bilaterally,       predominantly the right upper lobe.  Some of these appear new or worse in the     interval.  Moderate       emphysematous changes are noted.             0.3 cm nonobstructing left renal stone is noted.  No focal osseous lesion is     seen.             CONCLUSION:         1. Previous left upper lobectomy      2. Improving consolidation in the right lung base.      3. Multiple ground-glass opacities predominantly involving the right upper lobe.     Pneumonitis or       post XRT change is favored as the most likely etiology.  Continued followup is     recommended              Chauncey Montejo M.D.             Electronically Signed and Approved By: Chauncey Montejo M.D. on 2/18/2019 at 12:08                           Until signed, this is an  unconfirmed preliminary report that may contain      errors and is subject to change.                                              WURRO:      D:02/18/19 1208      PFT Results      [id number]  [id number]                                 [facility]                          [employer]                            [place]  [id number]               __________________________________________________________________________             Patient Name:                   Attending Physician:      Carlos Zimmerman PA-C             Patient Visit # MR #            Admit Date  Disch Date     Location      [id number]    [id number]      08/28/2018                 CT- -             Date of Birth      1940      __________________________________________________________________________      0821 - DIAGNOSTIC REPORT             SIX-MINUTE WALK TEST             TECHNIQUE:      The patient had 6-minute walk done with ATS criteria.             RESULTS:      1.  The patient walked a distance of 1200 feet with MET level of 2.8 over 6          minutes.      2.  The patient's resting O2 saturation on room air was 98%, with resting          dyspnea score of zero and a heart rate of 102.      3.  The patient walked a total of 1200 feet.  The janny oxygen saturation was          96% on room air.      4.  Heart rate increased from baseline of 102 to 113 after 5 minutes.      5.  Dyspnea score at baseline was zero and increased to 1 at 6 minutes.             IMPRESSION:      1.  The patient had no significant desaturations with exertion during          submaximal exercise.      2.  The patient's dyspnea score increased from zero to 1.      3.  The patient developed bilateral knee pain, which did limit his distance          some during the 6-minute walk test.             To be electronically signed in [employer]      [id number] LINDSEY VALLES D.O.              WC:rt      D:  09/10/2018 10:02      T:  09/10/2018 11:58      #[id number]             Until signed, this is an unconfirmed preliminary report that may contain      errors and is subject to change.                   09/10/18 1205  <Electronically signed by Julio Cesar Marte DO>            Assessment      Pneumonia         Aspiration pneumonia, unspecified aspiration pneumonia type, unspecified        laterality, unspecified part of lung - J69.0         Pneumonia type: aspiration pneumonia         Aspiration pneumonia type: unspecified         Laterality: unspecified laterality         Lung location: unspecified part of lung            Non-small cell lung cancer (NSCLC)         Non-small cell lung cancer, unspecified laterality - C34.90         Laterality: unspecified laterality            Notes      New Diagnostics      * Chest W/O Cont CT, 3 Months         Dx: Pneumonia - J18.9      New Office Procedures      * Pneumo Polysaccharide Vaccine, As Soon As Possible         Pneumococcal Vaccine Polyvalen (Pneumovax-23) 25 MCG/0.5 ML VIAL: 25        MICROGRAM INTRAMUSCULARLY Qty 25 MCG      PLAN:       The patient is a 79 year old male with chronic obstructive pulmonary disease     with recurrent exacerbation, multifocal pneumonia, pulmonary nodules, tobacco     abuse of cigarettes in remission, history of right upper lobe adenocarcinoma     status post lobectomy and chemotherapy.             1.  Chronic obstructive pulmonary disease.  Continue with Stiolto Respimat once     daily, albuterol as needed.  CT scan shows ground glass opacities are overall     improved, likely infectious and inflammatory.  I will repeat CT scan of the     chest in three months. He was suppose to see dr. Saul at [facility].  If he was     going to see Dr. Spears, I will switch him to have him see Dr. Spears here as     well.        2.  COPD. Continue with Stiolto.  Continue albuterol as needed. Pneumovax provi    ded today. She already  had Prevnar vaccine in the past.  He does not smoke     anymore.  Lifestyle modification including keeping head of bed up and not eating    close to bedtime were all reviewed.  He understands.      3. I will follow up with him in 3-4 months, earlier if needed.            Patient Education      Education resources provided:  Yes      Patient Education Provided:  Acute Bronchitis                 Disclaimer: Converted document may not contain table formatting or lab diagrams. Please see [employer] for the authenticated document.

## 2021-05-28 NOTE — PROGRESS NOTES
Patient: DEEPALI RODRIGUEZ     Acct: [id number]     Report: #[id number]  UNIT #: [id number]     : 1940    Encounter Date:2019  PRIMARY CARE: Payam Carroll  ***Signed***  --------------------------------------------------------------------------------------------------------------------  NURSE INTAKE      Visit Type      Established Patient Visit            Chief Complaint      LUNG CA            Referring Provider/Copies To      Referring Provider:  Payam Carroll      Primary Care Provider:  Payam Carroll            History and Present Illness      Past Oncology Illness History      This is a very pleasant 76-year-old gentleman who presents with follow-up for     lobectomy with adjuvant chemotherapy.            1) Left lung:  Pathology 2016: FNA: Poorly differentiated adenocarcinoma: 5.5     mm BONNIE nodule. 2016.       Left lung lobectomy: VATS procedure: Dr. Saul in [place].Path:multifocal     invasive moderately differentiated adenocarcinoma, acinar type, 2.0 x 1.6 x 1.4     cm. Additional nodule 0.6 x 0.5 x 0.3 cm: margins free of tumor, negative LN's.     Visceral pleural invasion noted. (16). Staging: Stage IIB:  pT3N0.       Carboplatin / Paclitaxel x 4 cycles: 8/10/16 - 10/14/16.      CT CAP: No evidence of disease. 2017.       CT CAP: No evidence of disease. 17.       CT chest: Right lower base: improving consolidation. 19.       CT chest: T6 / T10 compression fractures: non-pathologic. (19)            2) Chronic Peripheral Neuropathy: Secondary to chemotherapeutic agents: (     Paclitaxel, Carboplatin x 4 cycles, completed in 10/2016)            Treatment:             Neurontin 300 mg QID      Alpha lipoic acid: 600 mg TID            HPI - Oncology Interim      1) Left lung cancer: Path: 2016; FNA: poorly differentiated lung     adenocarcinoma: 5.5 mm BONNIE nodule.  negative LN's, Visceral pleural invasion     noted. (16) Staging: Stage IIB pT3N0.              Treatment:             1) VATS procedure: BONNIE lobectomy:  Dr. Saul at .  7/12/2016      2) Carboplatin / Pacitaxel x 4 cycles. 8/10/16 - 10/14/16      3) CT CAP: no evidence of disease. 2/2017      4) CT CAP: no evidence of disease. 12/11/17      5) CT: chest: right lower base: improving consolidation. (2/18/19)      6) CT chest: T6, T 10 compression fractures: vertebral bodies: Not pathologic:     (8/28/19)      7) Dr. Tom:             He presents today for every 6 month follow up for Left lung poorly     differentiated lung adenocarcinoma. He reports he is feeling well in the     interim. He denies any productive cough, hemoptysis, or SOA. 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 He does report he followed with Dr. Rg Tom for this. We will obtain a copy of his note to follow with plan of care.    He no longer follows with his lung surgeon, Dr. Saul who left .             2) Chronic Peripheral Neuropathy:  Secondary to chemotherapeutic agents: Mr. Zimmerman presents for follow up for lung cancer. He is status post VATS procedure    / adjuvant chemotherapy / 10/2016. He received 4 cycles of Carboplatin and     Paclitaxel. He rates his PN a 8/10 on pain scale. He reports this feels like     numbness / tingling / weakness to the hands and feet. He reports this is     worsening in the feet. He uses a device for buttoning his shirts daily on most     days. In the past, he had switched from Neurontin to Lyrica, now back to     Neurontin. In addition, he states he takes Alphalipoic acid TID and does not     notice any improvement in the neuropathy. He reports neuropathy does not go away    completely with the Neurontin, but Neurontin does make it more tolerable. I have    asked him in the past if his PCP will take over the prescribing of the Neurontin    which he states his PCP does not want to do the  prescribing.             We discussed at the last visit, he only needs to be seen on a yearly basis for     scans which pulmonary is doing.            Treatments      Chemotherapy      Carboplatin, Paclitaxel x 4 cycles 2017 - 2017            Clinical Trial Participant      No            ECOG Performance Status      0            Most Recent Imaging Findings      [facility]                PACS RADIOLOGY REPORT            Patient: DEEPALI RODRIGUEZ   Acct: #[id number]   Report: #[id number]            UNIT #: [id number]    DOS: 19 1030      INSURANCE:MEDICARE PART A   LOCATION:[facility]     : 1940            PROVIDERS      ADMITTING:     ATTENDING: Sammie Soler PA-C      FAMILY:  NONE,MD   ORDERING:  Sammie Soler PA-C         OTHER:  JOAN TAYLOR   DICTATING:  Ed Pa DO            REQ #:[id number]   EXAM:[facility] - CT CHEST without CONTRAST      REASON FOR EXAM:        REASON FOR VISIT:  ABN FINDINGS OF LUNG FIELD            *******Signed******         PROCEDURE:   CT CHEST WITHOUT CONTRAST             COMPARISON:   [facility], CT, CHEST W/O CONTRAST, 2018,     18:56.  [facility], CT, CHEST W/ CONTRAST, 2016, 14:17.  [facility], CT,       CHEST W/O CONTRAST, 2019, 9:17.  [facility], CT,     CHEST W/O CONTRAST,       5/15/2019, 9:55.             INDICATIONS:   FOLLOW LUNG CA DX 2016. NO COMPLAINTS. QUIT SMOKING 10 YEARS AGO.     Diagnosed with       adenocarcinoma stage IIB in 2016 status post fat lobectomy.  Status post     chemotherapy.             TECHNIQUE:   CT images were created without the administration of contrast     material.               PROTOCOL:     Standard imaging protocol performed                RADIATION:     DLP: 295mGy*cm          Automated exposure control was utilized to minimize radiation  dose.              FINDINGS:         There are stable changes from known left upper lobectomy.  There is moderate     underlying emphysema.        Within the periphery of the right upper chest there is stable ground-glass     opacity abutting the       pleura on axial images 18 through 20, likely post infectious or post     inflammatory . There is stable       scarring in the anterior right lower lobe adjacent to the fissure.  There is     stable mild       bronchiectasis in the right middle and right lower lobes with increasing volume     loss in the right       middle lobe from plate atelectasis.  No new pulmonary nodules or consolidations.     Thyroid gland is       unremarkable.  No thoracic or axillary adenopathy.  There is a moderate degree     of vascular       calcification in the thoracic aorta and coronary arteries.  There is a very     small pericardial       effusion which is unchanged from 5/15/2019.  No pleural effusions or     pneumothorax.        Tracheobronchial tree is widely patent.  Limited images of the upper abdomen     demonstrate normal       adrenal glands.  The gallbladder is present.  There are now acute or subacute     compressions of T6       and T10 with anterior wedging and approximately 50% loss of height, new since     5/15/2019.             CONCLUSION:   1. Anterior superior endplate compressions of approximately 40-50%    loss of height at       the T6 and T10 vertebral body levels.  No paraspinal hemorrhage.  These do not     appear to be       pathologic on CT.  There is plate atelectasis and volume loss in the right     middle lobe.        2. Treatment related changes in the left chest and underlying emphysema.  No CT     signs of residual,       recurrent, or metastatic disease in the chest.              JAIRON VALLE DO             Electronically Signed and Approved By: JAIRON VALLE DO on 8/28/2019 at 11:12                           PAST, FAMILY   Past Medical History       Past Medical History:  COPD, High Cholesterol, Hypertension, Short of Air,     Stroke (MINI)      Hematology/Oncology (M):  Anemia, Lung Cancer            Past Surgical History      Lung Biopsy            Family History      Family History:  Stomach Cancer (MOTHER)            Social History      Marital Status:        Lives independently:  Yes      Occupation:  RETIRED            Tobacco Use      Tobacco status:  Former smoker      Smoking packs/day:  1      Smoking history:  25-50 pack years      Quit status:  Quit date established (8 YRS AGO)            Alcohol Use      Alcohol intake:  2+ drinks per day            Substance Use      Substance use:  Denies use            REVIEW OF SYSTEMS      General:  Denies: Appetite Change, Fatigue, Fever, Night Sweats, Weight Gain,     Weight Loss      Eye:  Denies Blurred Vision, Denies Corrective Lenses, Denies Diplopia, Denies     Vision Changes      ENT:  Denies Headache, Denies Hearing Loss, Denies Hoarseness, Denies Sore     Throat      Cardiovascular:  Denies Chest Pain, Denies Palpitations      Respiratory:  Denies: Coughing Blood, Productive Cough, Shortness of Air,     Wheezing      Gastrointestinal:  Denies Bloody Stools, Denies Constipation, Denies Diarrhea,     Denies Nausea/Vomiting, Denies Problem Swallowing, Denies Unable to Control     Bowels      Genitourinary:  Denies Blood in Urine, Denies Incontinence, Denies Painful     Urination      Musculoskeletal:  Denies Back Pain, Denies Muscle Pain, Denies Painful Joints      Integumentary:  Denies Itching, Denies Lesions, Denies Rash      Neurologic:  Denies Dizziness; Admits Numbness\Tingling; Denies Seizures      Other      weakness to hands with buttoning.      Psychiatric:  Denies Anxiety, Denies Depression      Endocrine:  Denies Cold Intolerance, Denies Heat Intolerance      Hematologic/Lymphatic:  Denies Bruising, Denies Bleeding, Denies Enlarged Lymph     Nodes            VITAL SIGNS AND SCORES       Vitals      Height 5 ft 7.00 in / 170.18 cm      Weight 158 lbs 15.227 oz / 72.1 kg      BSA 1.83 m2      BMI 24.9 kg/m2      Temperature 97.7 F / 36.5 C - Temporal      Pulse 111      Respirations 20      Blood Pressure 139/66 Sitting, Left Arm      Pulse Oximetry 99%, room air            Pain Score      Experiencing any pain?:  No      Pain Scale Used:  Numerical      Pain Intensity:  0            Fatigue Score      Experiencing any fatigue?:  No      Fatigue (0-10 scale):  0 (none)            EXAM      General Appearance:  Positive for: Alert, Oriented x3, Cooperative      Eye:  Positive for: Moist Conjunctiva, PERRLA, Reactive to Light      HEENT:  Positive for: Oropharynx clear;          Negative for: Erythema      Neck:  Positive for: Full ROM, Supple      Respiratory:  Positive for: CTAB, Normal Respiratory Effort      Abdomen/Gastro:  Positive for: Normal Active Bowel Sounds, Soft;          Negative for: Distention, Tenderness      Cardiovascular:  Positive for: RRR;          Negative for: Distant Heart Sounds, Murmur, Peripheral Edema      Skin:  Positive for: Normal Temperature, Normal Texture and Turgor, Normal Tone;             Negative for: Rash      Psychiatric:  Positive for: AAO X 3, Appropriate Affect, Intact Judgement      Neurologic:  Positive for: Cranial Ner II-XII Intact, Deep Tendon Reflexes,     Focal Sensory Deficit (hands and feet with neuropathy);          Negative for: Dizziness, Headache      Genitourinary:  Negative for: Bladder Distention      Musculoskeletal:  Positive for: Full ROM Lower Extremety, Full ROM Upper     Extremety, Full Muscle Strength, Full Muscle Tone      Lower Extremities:  Positive for: Normal Gait and Station, Pedal Pulses Intact,     Pedal Pulses Symetrical      Lymphatic:  Negative for: Axillary, Cervical, Supraclavicular            PREVENTION      Hx Influenza Vaccination:  Yes      Date Influenza Vaccine Given:  Sep 1, 2019      Influenza Vaccine Declined:   No      2 or More Falls Past Year?:  No      Fall Past Year with Injury?:  No      Hx Pneumococcal Vaccination:  Yes      Encouraged to follow-up with:  PCP regarding preventative exams.      Chart initiated by      GROVER BRIGHT MA            ALLERGY/MEDS      Allergies      Coded Allergies:             NO KNOWN ALLERGIES (Unverified , 11/7/19)            Medications      Last Reconciled on 11/7/19 10:01 by FAUSTO GODWIN      Gabapentin (Neurontin) 100 Mg Capsule      300 MG PO QID, #180 CAP 0 Refills         Reported         11/7/19       Metoprolol Succinate (Metoprolol Succinate) 50 Mg Tab.er.24h      25 MG PO QDAY, #15 TAB.SR.24H 0 Refills         Reported         10/4/19       Tiotropium Br/Olodaterol HCl (Stiolto Respimat Inhal Spray) 4 Gm Mist.inhal               Prov: Sammie Soler PA-C         8/16/19       Cyclobenzaprine Hcl (Cyclobenzaprine*) 10 Mg Tablet      10 MG PO QDAY         Reported         11/20/18       Aspirin Chew (Aspirin Baby) 81 Mg Tab.chew      81 MG PO HS, #30 TAB.CHEW 0 Refills         Reported         11/20/18       Multivitamin/Iron/Folic Acid (CENTRUM COMPLETE MULTIVIT TAB) 1 Tab Tablet      1 TAB PO QDAY, #30 TAB 0 Refills         Reported         11/20/18       Vitamin B Complex (B Complex-Vitamin B-12) 1 Each Tablet      1 TAB PO QDAY, #30 TAB         Reported         6/27/18       Cholecalciferol (Vitamin D3*) 1,000 Unit Tab      1000 UNITS PO QDAY, #30 TAB 0 Refills         Reported         8/5/16       Pantoprazole (Protonix*) 20 Mg Tablet      40 MG PO QAM, #60 TAB 0 Refills         Reported         5/18/16       Pravastatin Sod (Pravachol*) 40 Mg Tablet      40 MG PO HS, TAB         Reported         5/6/16      Medications Reviewed:  Changes made to meds            IMPRESSION/PLAN      Impression      1) Left lung cancer: Path: 5/2016; FNA: poorly differentiated lung     adenocarcinoma: 5.5 mm BONNIE nodule.  negative LN's, Visceral pleural invasion     noted.  (7/12/16) Staging: Stage IIB pT3N0.             Treatment:             1) VATS procedure: BONNIE lobectomy:  Dr. Saul at .  7/12/2016      2) Carboplatin / Pacitaxel x 4 cycles. 8/10/16 - 10/14/16      3) CT CAP: no evidence of disease. 2/2017      4) CT CAP: no evidence of disease. 12/11/17      5) CT: chest: right lower base: improving consolidation. (2/18/19)      6) CT chest: T6, T 10 compression fractures: vertebral bodies: Not pathologic:     (8/28/19)      7) Dr. Tom:             He presents today for every 6 month follow up for Left lung poorly     differentiated lung adenocarcinoma. He reports he is feeling well in the     interim. He denies any productive cough, hemoptysis, or SOA. 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 He does report he followed with Dr. Rg Tom for this. We will obtain a copy of his note to follow with plan of care.    He no longer follows with his lung surgeon, Dr. Saul who left . His most     recent labs are WNL except for leukocytosis present. Differential is normal.             2) Chronic Peripheral Neuropathy: Mr. Zimmerman presents for follow up for lung     cancer. He is status post VATS procedure / adjuvant chemotherapy / 10/2016. He     received 4 cycles of Carboplatin and Paclitaxel. He rates his PN a 8/10 on pain     scale. He reports this feels like numbness / tingling / weakness to the hands     and feet. He reports this is worsening in the feet. He uses a device for     buttoning his shirts daily on most days. In the past, he had switched from     Neurontin to Lyrica, now back to Neurontin. In addition, he states he takes     Alphalipoic acid TID and does not notice any improvement in the neuropathy. He     reports neuropathy does not go away completely with the Neurontin, but Neurontin    does make it more tolerable. I have asked him in the past if his PCP  will take     over the prescribing of the Neurontin which he states his PCP does not want to     do the prescribing.             We discussed at the last visit, he only needs to be seen on a yearly basis for     scans which pulmonary is ordering and following.       I explained to him we can consider a pain management referral for the chronic      PN if needed and let them take over the prescribing.             3) Leukocytosis:             WBC up to 21.64 in 10/6/19. WBC count has been intermittently elevated in the     past. Mr. Zimmerman denies any fevers, chills or cough.            Diagnosis      Notes      New Medications      * Gabapentin (Neurontin) 100 MG CAPSULE: 300 MG PO QID #180            Plan      1) Continue close follow up with pulmonary for yearly CT scan for history of     left lung cancer.             2) Continue Neurontin 300 mg QID, # 120. 5 refills. Need RYDER please to     review. Discuss sending to pain management for chronic PN treatment with chronic    medication. RYDER # [id number]            3) Will continue to monitor leukocytosis.             Return in 6 months with NP.            Patient Education            Vertebral Compression Fracture      Patient Education Provided:  Yes            Topics Patient Counseled on      discussed possible referral to pain management.            Alcohol Counseling      Counseling given:  None            Substance Counseling      Counseling given:  None            Electronically signed by FAUSTO GODWIN onc  11/07/2019 10:02       Disclaimer: Converted document may not contain table formatting or lab diagrams. Please see [employer] for the authenticated document.

## 2021-05-28 NOTE — PROGRESS NOTES
Patient: DEEPALI RODRIGUEZ     Acct: [id number]     Report: #[id number]  UNIT #: [id number]     : 1940    Encounter Date:2020  PRIMARY CARE: Payam Carroll  ***Signed***  --------------------------------------------------------------------------------------------------------------------  NURSE INTAKE      Visit Type      Established Patient Visit            Chief Complaint      LUNG CA HERE FOR F/U            Referring Provider/Copies To      Referring Provider:  Payam Carroll      Primary Care Provider:  Payam Carroll            History and Present Illness      Past Oncology Illness History      This is a very pleasant 76-year-old gentleman who presents with follow-up for     lobectomy with adjuvant chemotherapy.            1) Left lung:  Pathology 2016: FNA: Poorly differentiated adenocarcinoma: 5.5     mm BONNIE nodule. 2016.       Left lung lobectomy: VATS procedure: Dr. Saul in [place].Path:multifocal     invasive moderately differentiated adenocarcinoma, acinar type, 2.0 x 1.6 x 1.4     cm. Additional nodule 0.6 x 0.5 x 0.3 cm: margins free of tumor, negative LN's.     Visceral pleural invasion noted. (16). Staging: Stage IIB:  pT3N0.       Carboplatin / Paclitaxel x 4 cycles: 8/10/16 - 10/14/16.      CT CAP: No evidence of disease. 2017.       CT CAP: No evidence of disease. 17.       CT chest: Right lower base: improving consolidation. 19.       CT chest: T6 / T10 compression fractures: non-pathologic. (19)      CT chest in ER: likely pneumonia/inflammation. No new evidence of recurring     cancer: (20)            2) Chronic Peripheral Neuropathy: Secondary to chemotherapeutic agents: ( Pacl    itaxel, Carboplatin x 4 cycles, completed in 10/2016)            Treatment:             Neurontin 300 mg QID      Alpha lipoic acid: 600 mg TID            HPI - Oncology Interim      1) Left lung cancer: Pathology 2016: FNA: Poorly differentiated     adenocarcinoma: 5.5  mm BONNIE nodule. 5/2016.       Left lung lobectomy: VATS procedure: Dr. Saul in [place].Path:multifocal     invasive moderately differentiated adenocarcinoma, acinar type, 2.0 x 1.6 x 1.4     cm. Additional nodule 0.6 x 0.5 x 0.3 cm: margins free of tumor, negative LN's.     Visceral pleural invasion noted. (7/12/16). Staging: Stage IIB:  pT3N0.             Mr. Zimmerman presents for follow up for left lung cancer which was a poorly     differentiated adenocarcinoma diagnosed in May of 2016. He received left     lobectomy with VATS procedure with Dr. Saul in [place], [place]. This was staged     as a Stage IIB, [id number]. Mr. Zimmerman does not follow up with Dr. Saul in     [place] as MD has since left [facility]. He completed adjuvant     chemotherapy with Carboplatin and Paclitaxel x 4 cycles in October 2016.             Mr. Zimmerman reports he was recently admitted to [facility] for pneumonia and was     hospitalized for 2 days in early April. He reports he tested negative for COVID-    19. Mr. Zimmerman reports he developed pneumonia 4 times last year. He reports he     has had all his pneumonia vaccines. He follows with Dr. Choudhary pulmonary for     COPD.             He recently had a CT chest during his ER admission and was negative for any PE.     No new lung nodules or cancer noted.             He reports he is recovering well from pneumonia. Denies any productive cough or     SOA or CP.             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             Mr. Zimmerman reports he continues to have chronic PN from previous chemotherapy.     He reports he recently had a stem cell infusion back in December for the PN. He     reports he may have received some therapeutic from this and has noticed     increased feeling in his feet. He does report the PN is in his feet and hands.     He rates this a 8/10 on scale. He reports it  "feels like his feet and hands are     \"always asleep\". He continues to use Neurontin 300 mg QID and this lessens some     of the PN symptoms. In addition, he is taking B complex vitamin and Alpha lipoic    acid for the PN as well. He has noticed any difference with the PN symptoms     since with the addition of the B  and ALA meds.             3) Leukocytosis: He was noted to have elevated leukocytosis in ER during     pneumonia hospital stay. We will recheck this to make sure he has improvement.     He denies any fevers, chills or productive cough. His COVID 19 testing was     negative.            Treatments      Chemotherapy      Carboplatin, Paclitaxel x 4 cycles 2017 - 2017            Clinical Trial Participant      No            ECOG Performance Status      0            Most Recent Imaging Findings      [facility]                PACS RADIOLOGY REPORT            Patient: DEEPALI RODRIGUEZ   Acct: #[id number]   Report: #[id number]            UNIT #: [id number]    DOS: 20 0949      INSURANCE:MEDICARE PART A   LOCATION:ER     : 1940            PROVIDERS      ADMITTING:     ATTENDING:       FAMILY:  MD YANETH   ORDERING:  CEFERINO WALKER         OTHER:    DICTATING:  ROSANNE WATSON MD            REQ #:[id number]   EXAM:CH[facility] - CT CHEST with CONTRAST      REASON FOR EXAM:  Dyspnea      REASON FOR VISIT:  POSS FLU--ANNEX            *******Signed******         PROCEDURE:   CT CHEST W/ CONTRAST             COMPARISON:   [facility], CT, CHEST W/O CONTRAST, , 10:01.             INDICATIONS:   GENERALIZED CHEST PAIN X 1 DAY. COUGH. SHORTNESS OF BREATH             TECHNIQUE:   After obtaining the patient's consent, CT images were obtained with    non-ionic       intravenous contrast material.               PROTOCOL:     Pulmonary embolism imaging protocol performed                RADIATION:     DLP: 518 " mGy*cm          Automated exposure control was utilized to minimize radiation dose.       CONTRAST:   100 cc Isovue 370 I.V.             FINDINGS:         No evidence for pulmonary embolus.  No evidence for acute aortic injury.  Heavy     coronary artery       calcification.  No adenopathy in the chest.  No acute findings in the included     upper abdomen.        There are patchy areas of ground-glass opacity, interstitial thickening, and     more dense       consolidation seen in the right upper lobe, right middle lobe, and right lower     lobe.  The dense       consolidation is mostly in the right lower lobe.  This appearance is new     compared to 8/28/2019.  A       small area of similar appearing opacity in the subpleural left upper lobe is     stable compared to       8/28/2019.  No aggressive appearing bone change.             CONCLUSION:   Patchy areas of ground-glass opacity, interstitial thickening, and    more dense       consolidation throughout the right lung.  This appearance is new since     8/28/2019, and suspicious       for infectious or inflammatory change.             A similar appearing area of opacity in the left upper lobe is unchanged.             No evidence for pulmonary embolus.              ROSANNE WATSON MD             Electronically Signed and Approved By: ROSANNE WATSON MD on 4/04/2020 at 10:31                        PAST, FAMILY   Past Medical History      Past Medical History:  COPD, High Cholesterol, Hypertension, Short of Air,     Stroke (MINI)      Hematology/Oncology (M):  Anemia, Lung Cancer            Past Surgical History      Lung Biopsy            Family History      Family History:  Stomach Cancer (MOTHER)            Social History      Marital Status:        Lives independently:  Yes      Occupation:  RETIRED            Tobacco Use      Tobacco status:  Former smoker      Smoking packs/day:  1      Smoking history:  25-50 pack years      Quit status:  Quit date  established (8 YRS AGO)            Alcohol Use      Alcohol intake:  2+ drinks per day            Substance Use      Substance use:  Denies use            REVIEW OF SYSTEMS      General:  Denies: Appetite Change, Fatigue, Fever, Night Sweats, Weight Gain,     Weight Loss      Eye:  Denies Blurred Vision, Denies Corrective Lenses, Denies Diplopia, Denies     Vision Changes      ENT:  Denies Headache, Denies Hearing Loss, Denies Hoarseness, Denies Sore     Throat      Cardiovascular:  Denies Chest Pain, Denies Palpitations      Respiratory:  Denies: Cough, Coughing Blood, Productive Cough, Shortness of Air,    Wheezing      Gastrointestinal:  Denies Bloody Stools, Denies Constipation, Denies Diarrhea,     Denies Nausea/Vomiting, Denies Problem Swallowing, Denies Unable to Control Yue    ls      Musculoskeletal:  Denies Back Pain, Denies Muscle Pain, Denies Painful Joints      Integumentary:  Denies Itching, Denies Lesions, Denies Rash      Neurologic:  Denies Dizziness, Denies Numbness\Tingling, Denies Seizures      Psychiatric:  Denies Anxiety, Denies Depression      Endocrine:  Denies Cold Intolerance, Denies Heat Intolerance      Hematologic/Lymphatic:  Denies Bruising, Denies Bleeding, Denies Enlarged Lymph     Nodes            VITAL SIGNS AND SCORES      Vitals      Height 5 ft 7 in / 170.18 cm      Weight 155 lbs 6.789 oz / 70.5 kg      BSA 1.82 m2      BMI 24.3 kg/m2      Temperature 97.1 F / 36.17 C - Temporal      Pulse 110      Respirations 24      Blood Pressure 119/69 Sitting, Left Arm      Pulse Oximetry 97%, ROOM AIR            Pain Score      Experiencing any pain?:  No      Pain Scale Used:  Numerical      Pain Intensity:  0            Fatigue Score      Experiencing any fatigue?:  No      Fatigue (0-10 scale):  0 (none)            EXAM      General appearance:  in no apparent distress, cooperative, appears stated age.      HEENT: No pallor, no icterus, oral mucosa moist      Neck: Supple, trachea  central-not deviated      Lymph nodes: none palpable peripherally      Cardiovascular: S1-S2 heard, no murmurs, no rubs, no gallops.      Breast exam:      Respiratory: Clear to auscultation bilaterally, no adventitious sounds      Abdomen/gastro: Soft, nontender, no palpable hepatosplenomegaly, bowel sounds     heard      Skin: No lesions, no rashes, no petechiae.      Extremities: No pedal edema, peripheral pulses felt, no clubbing      Musculoskeletal: Normal tone, no rigidity of muscles; range of motion intact      Spine: No deformities      Psychiatric: Alert and oriented x3, appropriate affect, intact judgment      Neurologic: Cranial nerves II through XII grossly intact, no gross neurological     deficits noted.            PREVENTION      Hx Influenza Vaccination:  Yes      Date Influenza Vaccine Given:  Sep 1, 2019      Influenza Vaccine Declined:  No      2 or More Falls Past Year?:  No      Fall Past Year with Injury?:  No      Hx Pneumococcal Vaccination:  Yes      Encouraged to follow-up with:  PCP regarding preventative exams.      Chart initiated by      ÁNGEL YI            ALLERGY/MEDS      Allergies      Coded Allergies:             NO KNOWN ALLERGIES (Unverified , 5/7/20)            Medications      Last Reconciled on 5/7/20 11:18 by FAUSTO GODWIN      levoFLOXacin (levoFLOXacin) 750 Mg Tablet      750 MG PO QDAY for 5 Days, #5 TAB 0 Refills         Prov: SYBIL WILKERSON         4/5/20       Alpha Lipoic Acid (Alpha Lipoic Acid) 200 Mg Tablet      200 MG PO QID, #90 TAB         Reported         4/5/20       Cilostazol (Pletal*) 100 Mg Tab      100 MG PO BID         Reported         4/4/20       Tiotropium Br/Olodaterol HCl (Stiolto Respimat Inhal Spray) 4 Gm Mist.inhal      2 PUFFS INH RTQDAY, #3 INH 3 Refills         Prov: Dayne Choudhary         12/30/19       Gabapentin (Neurontin) 100 Mg Capsule      300 MG PO QID, #180 CAP 0 Refills         Reported         11/7/19       Metoprolol  Succinate (Metoprolol Succinate) 50 Mg Tab.er.24h      25 MG PO QDAY, #15 TAB.SR.24H 0 Refills         Reported         10/4/19       Cyclobenzaprine Hcl (Cyclobenzaprine*) 10 Mg Tablet      10 MG PO QDAY         Reported         11/20/18       Aspirin Chew (Aspirin Baby) 81 Mg Tab.chew      81 MG PO HS, #30 TAB.CHEW 0 Refills         Reported         11/20/18       Multivitamin/Iron/Folic Acid (CENTRUM COMPLETE MULTIVIT TAB) 1 Tab Tablet      1 TAB PO QDAY, #30 TAB 0 Refills         Reported         11/20/18       Vitamin B Complex (B Complex-Vitamin B-12) 1 Each Tablet      1 TAB PO QDAY, #30 TAB         Reported         6/27/18       Cholecalciferol (Vitamin D3) (Vitamin D3) 1,000 Unit Tab      1000 UNITS PO QDAY, #30 TAB 0 Refills         Reported         8/5/16       Pantoprazole (Protonix) 20 Mg Tablet      40 MG PO QAM, #60 TAB 0 Refills         Reported         5/18/16       Pravastatin Sod (Pravachol*) 40 Mg Tablet      40 MG PO HS, TAB         Reported         5/6/16      Medications Reviewed:  No Changes made to meds            IMPRESSION/PLAN      Impression      1) Left lung cancer: Pathology 5/2016: FNA: Poorly differentiated     adenocarcinoma: 5.5 mm BONNIE nodule. 5/2016.       Left lung lobectomy: VATS procedure: Dr. Saul in [place].Path:multifocal     invasive moderately differentiated adenocarcinoma, acinar type, 2.0 x 1.6 x 1.4     cm. Additional nodule 0.6 x 0.5 x 0.3 cm: margins free of tumor, negative LN's.     Visceral pleural invasion noted. (7/12/16). Staging: Stage IIB:  pT3N0.             Mr. Zimmerman presents for follow up for left lung cancer which was a poorly     differentiated adenocarcinoma diagnosed in May of 2016. He received left     lobectomy with VATS procedure with Dr. Saul in [place], [place]. This was staged     as a Stage IIB, [id number]. Mr. Zimmerman does not follow up with Dr. Saul in     [place] as MD has since left [facility]. He completed adjuvant     chemotherapy  "with Carboplatin and Paclitaxel x 4 cycles in October 2016.             Mr. Zimmerman reports he was recently admitted to [facility] for pneumonia and was     hospitalized for 2 days in early April. He reports he tested negative for COVID-    19. Mr. Zimmerman reports he developed pneumonia 4 times last year. He reports he     has had all his pneumonia vaccines. He follows with Dr. Choudhary, pulmonary for     COPD.             He recently had a CT chest during his ER admission and was negative for any PE.     No new lung nodules or cancer noted.             He reports he is recovering well from pneumonia. Denies any productive cough or     SOA or CP.             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             Mr. Zimmerman reports he continues to have chronic PN from previous chemotherapy.     He reports he recently had a stem cell infusion back in December for the PN. He     reports he may have received some therapeutic from this and has noticed     increased feeling in his feet. He does report the PN is in his feet and hands.     He rates this a 8/10 on scale. He reports it feels like his feet and hands are     \"always asleep\". He continues to use Neurontin 300 mg QID and this lessens some     of the PN symptoms. In addition, he is taking B complex vitamin and Alpha lipoic    acid for the PN as well. He has noticed any difference with the PN symptoms     since with the addition of the B  and ALA meds.             We discussed sending him to PT/OT to see if there are any additional therapies     he could do for the PN. He will think about this and call in the interim if this    is something he wants to pursue.             3) Leukocytosis: He was noted to have elevated leukocytosis in ER during     pneumonia hospital stay. We will recheck this to make sure he has improvement.     He denies any fevers, chills or productive cough. His " COVID 19 testing was     negative. Repeat CBC with elevated WBC has resolved on todays. visit. WBC of     7.43.            Diagnosis      Anemia         Anemia, unspecified type - D64.9         Anemia type: unspecified type            Leukocytosis         Leukocytosis, unspecified type - D72.829         Leukocytosis type: unspecified            Notes      New Diagnostics      * CBC With Auto Diff, Routine         Dx: Anemia - D64.9            Plan      1) Continue close follow up with pulmonary for COPD exacerbation. Stay up to     date on pneumonia vaccines.             2) He will continue Neurontin 300 mg QID, # 120 with 5 refills. RYDER was     reviewed on 4/20/20 and was WNL.             Continue B-complex and Alpha lipoic acid.             3) We will recheck his CBC today to see if WBC count has improved. Leukocytosis     has resolved.             Return in 6 months with NP follow up.            Pain      Pain Follow-up Plan            Advanced Care Plan Discussion      Declines Discussion [id number]            Patient Education            DI for Leukocytosis      Peripheral Neuropathy      Patient Education Provided:  Yes            Electronically signed by FAUSTO GODWIN onc  05/07/2020 11:18       Disclaimer: Converted document may not contain table formatting or lab diagrams. Please see [facility] for the authenticated document.

## 2021-06-01 ENCOUNTER — HOSPITAL ENCOUNTER (OUTPATIENT)
Dept: GENERAL RADIOLOGY | Facility: HOSPITAL | Age: 81
Discharge: HOME | End: 2021-06-01
Attending: INTERNAL MEDICINE

## 2021-06-08 ENCOUNTER — TRANSCRIBE ORDERS (OUTPATIENT)
Dept: ADMINISTRATIVE | Facility: HOSPITAL | Age: 81
End: 2021-06-08

## 2021-06-08 DIAGNOSIS — R91.1 LUNG NODULE: Primary | ICD-10-CM

## 2021-06-09 ENCOUNTER — HOSPITAL ENCOUNTER (OUTPATIENT)
Dept: PET IMAGING | Facility: HOSPITAL | Age: 81
Discharge: HOME | End: 2021-06-09

## 2021-06-09 DIAGNOSIS — R91.1 LUNG NODULE: ICD-10-CM

## 2021-06-09 PROCEDURE — 78815 PET IMAGE W/CT SKULL-THIGH: CPT

## 2021-06-09 PROCEDURE — 0 FLUDEOXYGLUCOSE F18 SOLUTION: Performed by: INTERNAL MEDICINE

## 2021-06-09 PROCEDURE — 78815 PET IMAGE W/CT SKULL-THIGH: CPT | Performed by: RADIOLOGY

## 2021-06-09 PROCEDURE — A9552 F18 FDG: HCPCS | Performed by: INTERNAL MEDICINE

## 2021-06-09 RX ADMIN — FLUDEOXYGLUCOSE F18 1 DOSE: 300 INJECTION INTRAVENOUS at 12:43

## 2021-06-16 ENCOUNTER — OFFICE VISIT (OUTPATIENT)
Dept: PULMONOLOGY | Facility: CLINIC | Age: 81
End: 2021-06-16

## 2021-06-16 VITALS
TEMPERATURE: 98.3 F | OXYGEN SATURATION: 100 % | DIASTOLIC BLOOD PRESSURE: 74 MMHG | HEART RATE: 92 BPM | SYSTOLIC BLOOD PRESSURE: 135 MMHG | HEIGHT: 67 IN | RESPIRATION RATE: 16 BRPM | BODY MASS INDEX: 23.67 KG/M2 | WEIGHT: 150.8 LBS

## 2021-06-16 DIAGNOSIS — J43.2 CENTRILOBULAR EMPHYSEMA (HCC): ICD-10-CM

## 2021-06-16 DIAGNOSIS — R91.1 SOLITARY LUNG NODULE: Primary | ICD-10-CM

## 2021-06-16 PROBLEM — C80.1 CANCER: Status: ACTIVE | Noted: 2021-06-16

## 2021-06-16 PROBLEM — J44.9 COPD (CHRONIC OBSTRUCTIVE PULMONARY DISEASE): Status: ACTIVE | Noted: 2021-06-16

## 2021-06-16 PROBLEM — M54.50 LOW BACK PAIN: Status: ACTIVE | Noted: 2021-06-16

## 2021-06-16 PROBLEM — J98.4 LUNG DISEASE: Status: ACTIVE | Noted: 2021-06-16

## 2021-06-16 PROBLEM — C34.90 LUNG CANCER: Status: ACTIVE | Noted: 2021-06-16

## 2021-06-16 PROBLEM — I10 HYPERTENSION: Status: ACTIVE | Noted: 2021-06-16

## 2021-06-16 PROCEDURE — 99214 OFFICE O/P EST MOD 30 MIN: CPT | Performed by: INTERNAL MEDICINE

## 2021-06-16 RX ORDER — PRAVASTATIN SODIUM 20 MG
20 TABLET ORAL DAILY
COMMUNITY
Start: 2021-03-29 | End: 2022-07-10

## 2021-06-16 RX ORDER — ASPIRIN 81 MG/1
81 TABLET, COATED ORAL DAILY
COMMUNITY
Start: 2021-04-19

## 2021-06-16 RX ORDER — CILOSTAZOL 100 MG/1
100 TABLET ORAL 2 TIMES DAILY
COMMUNITY
Start: 2021-05-24

## 2021-06-16 RX ORDER — GABAPENTIN 600 MG/1
TABLET ORAL
COMMUNITY
End: 2021-06-16

## 2021-06-16 RX ORDER — METOPROLOL SUCCINATE 25 MG/1
25 TABLET, EXTENDED RELEASE ORAL DAILY
Status: ON HOLD | COMMUNITY
End: 2022-02-09 | Stop reason: SDUPTHER

## 2021-06-16 RX ORDER — AMLODIPINE BESYLATE 5 MG/1
5 TABLET ORAL DAILY
COMMUNITY

## 2021-06-16 RX ORDER — PANTOPRAZOLE SODIUM 40 MG/1
40 TABLET, DELAYED RELEASE ORAL DAILY
COMMUNITY
Start: 2021-04-19

## 2021-06-16 RX ORDER — CYCLOBENZAPRINE HCL 10 MG
10 TABLET ORAL 3 TIMES DAILY PRN
COMMUNITY

## 2021-06-16 RX ORDER — TAMSULOSIN HYDROCHLORIDE 0.4 MG/1
1 CAPSULE ORAL DAILY
COMMUNITY
Start: 2021-04-19 | End: 2022-09-26 | Stop reason: SDUPTHER

## 2021-06-16 RX ORDER — CETIRIZINE HYDROCHLORIDE 10 MG/1
10 TABLET ORAL DAILY
COMMUNITY

## 2021-06-16 NOTE — PROGRESS NOTES
"Chief Complaint  COPD and Follow-up (PET Scan)    Subjective          Carlos Zimmerman presents to [facility]  History of Present Illness     Mr. 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  He is here for follow-up.  He recently had repeat CT scan of the chest which showed stable lung nodule but did pneumatocele attached to it had grown from 1 to 1.5 cm in size.  He continues to be on trilogy Ellipta once daily and albuterol which she has not needed to use for the last 6 months.  He already is up-to-date on Covid vaccine.  He has not had any changes in weight or appetite.  No chest pain no chest tightness.  No nausea or vomiting.  No fever or chills.  I reviewed the CT scan results with him and his wife.  I had also ordered a PET scan given the increase in size of pneumatocele.  The PET scan did not show any lighting up on the pneumatocele of the lung nodule.    Objective   Vital Signs:   /74 (BP Location: Left arm, Patient Position: Sitting, Cuff Size: Adult)   Pulse 92   Temp 98.3 °F (36.8 °C) (Oral)   Resp 16   Ht 170.2 cm (67\")   Wt 68.4 kg (150 lb 12.8 oz)   SpO2 100% Comment: Room Air  BMI 23.62 kg/m²     Physical Exam  Vitals and nursing note reviewed.   Constitutional:       General: He is not in acute distress.     Appearance: Normal appearance. He is normal weight.   HENT:      Head: Normocephalic and atraumatic.      Right Ear: Hearing normal.      Left Ear: Hearing normal.      Nose: No nasal tenderness or congestion.      Mouth/Throat:      Mouth: Mucous membranes are moist. No oral lesions.      Pharynx: Oropharynx is clear.   Eyes:      Extraocular Movements: Extraocular movements intact.      Pupils: Pupils are equal, round, and reactive to light.   Neck:      Thyroid: No " thyroid mass or thyromegaly.   Cardiovascular:      Rate and Rhythm: Normal rate and regular rhythm.      Pulses: Normal pulses.      Heart sounds: No murmur heard.     Pulmonary:      Effort: Pulmonary effort is normal. No respiratory distress.      Breath sounds: No wheezing, rhonchi or rales.      Comments: Bilateral diminished breath sounds, no wheezing crackles or rhonchi, resonant to percussion bilaterally  Chest:      Chest wall: No tenderness.   Abdominal:      General: Bowel sounds are normal. There is no distension.      Palpations: Abdomen is soft. There is no mass.      Tenderness: There is no abdominal tenderness. There is no guarding.   Musculoskeletal:         General: Normal range of motion.      Cervical back: Normal range of motion and neck supple.      Right lower leg: No edema.      Left lower leg: No edema.   Lymphadenopathy:      Cervical: No cervical adenopathy.      Upper Body:      Right upper body: No supraclavicular or axillary adenopathy.      Left upper body: No supraclavicular or axillary adenopathy.   Skin:     General: Skin is warm and dry.      Capillary Refill: Capillary refill takes less than 2 seconds.      Findings: No lesion or rash.   Neurological:      General: No focal deficit present.      Mental Status: He is alert and oriented to person, place, and time.      Cranial Nerves: Cranial nerves are intact.   Psychiatric:         Mood and Affect: Mood and affect normal. Mood is not anxious or depressed.         Behavior: Behavior normal.        Result Review :   The following data was reviewed by: Dayne Choudhary MD on 06/16/2021:  Common labs    Common Labsle 2/25/21 2/25/21 2/26/21 2/26/21 4/2/21 4/2/21    0424 0424 0447 0447 0908 0908   Glucose  96  116 (A) 96    BUN  15  7 11    Creatinine  0.84  0.60 (A) 0.76    Sodium  136  136 144    Potassium  3.7  3.4 (A) 4.1    Chloride  104  103 105    Calcium  9.1  8.4 (A) 9.3    Albumin  2.9 (A)   4.0    Total Bilirubin  0.42   0.57     Alkaline Phosphatase  111   132    AST (SGOT)  15   20    ALT (SGPT)  12   17    WBC 13.02 (A)  8.44      Hemoglobin 11.0 (A)  11.1 (A)      Hematocrit 33.6 (A)  34.0 (A)      Platelets 87 (A)  101 (A)      Total Cholesterol      149   Triglycerides      54   HDL Cholesterol      79 (A)   LDL Cholesterol       59 (A)   (A) Abnormal value       Comments are available for some flowsheets but are not being displayed.           CMP    CMP 2/25/21 2/26/21 4/2/21   Glucose 96 116 (A) 96   BUN 15 7 11   Creatinine 0.84 0.60 (A) 0.76   Sodium 136 136 144   Potassium 3.7 3.4 (A) 4.1   Chloride 104 103 105   Calcium 9.1 8.4 (A) 9.3   Albumin 2.9 (A)  4.0   Total Bilirubin 0.42  0.57   Alkaline Phosphatase 111  132   AST (SGOT) 15  20   ALT (SGPT) 12  17   (A) Abnormal value       Comments are available for some flowsheets but are not being displayed.           Data reviewed: Radiologic studies Including CT scan of the chest and PET scan was reviewed.  It was discussed with him and his wife.          Assessment and Plan    Diagnoses and all orders for this visit:    1. Solitary lung nodule (Primary)  -     CT Chest Without Contrast; Future    2. Centrilobular emphysema (CMS/HCC)    Lung nodule with attached pneumatocele, pneumatocele was increasing in size.  PET scan did not show any lighting up on the pneumatocele.  Repeat CT scan of the chest in 3 months time.    COPD: Continue with Trelegy Ellipta once daily.  Continue with albuterol as needed.  Overall COPD seems to be stable for now.    Follow Up   Return in about 3 months (around 9/16/2021).  Patient was given instructions and counseling regarding his condition or for health maintenance advice. Please see specific information pulled into the AVS if appropriate.

## 2021-07-30 DIAGNOSIS — D64.9 ANEMIA, UNSPECIFIED TYPE: Primary | ICD-10-CM

## 2021-09-16 ENCOUNTER — OFFICE VISIT (OUTPATIENT)
Dept: PULMONOLOGY | Facility: CLINIC | Age: 81
End: 2021-09-16

## 2021-09-16 VITALS
HEIGHT: 67 IN | BODY MASS INDEX: 23.54 KG/M2 | HEART RATE: 107 BPM | SYSTOLIC BLOOD PRESSURE: 128 MMHG | RESPIRATION RATE: 18 BRPM | OXYGEN SATURATION: 99 % | WEIGHT: 150 LBS | TEMPERATURE: 98.4 F | DIASTOLIC BLOOD PRESSURE: 60 MMHG

## 2021-09-16 DIAGNOSIS — J44.9 CHRONIC OBSTRUCTIVE PULMONARY DISEASE, UNSPECIFIED COPD TYPE (HCC): Primary | ICD-10-CM

## 2021-09-16 DIAGNOSIS — C34.32 MALIGNANT NEOPLASM OF LOWER LOBE OF LEFT LUNG (HCC): ICD-10-CM

## 2021-09-16 DIAGNOSIS — R06.09 DYSPNEA ON EXERTION: ICD-10-CM

## 2021-09-16 DIAGNOSIS — R91.1 SOLITARY LUNG NODULE: ICD-10-CM

## 2021-09-16 DIAGNOSIS — F17.211 NICOTINE DEPENDENCE, CIGARETTES, IN REMISSION: ICD-10-CM

## 2021-09-16 DIAGNOSIS — J30.2 SEASONAL ALLERGIES: ICD-10-CM

## 2021-09-16 PROCEDURE — 99214 OFFICE O/P EST MOD 30 MIN: CPT | Performed by: NURSE PRACTITIONER

## 2021-09-16 RX ORDER — ALBUTEROL SULFATE 90 UG/1
2 AEROSOL, METERED RESPIRATORY (INHALATION) EVERY 4 HOURS PRN
Qty: 18 G | Refills: 3 | Status: SHIPPED | OUTPATIENT
Start: 2021-09-16 | End: 2021-12-15

## 2021-09-16 RX ORDER — ANTIOX #8/OM3/DHA/EPA/LUT/ZEAX 250-2.5 MG
1 CAPSULE ORAL 2 TIMES DAILY
COMMUNITY
Start: 2021-07-26

## 2021-09-16 NOTE — PROGRESS NOTES
Primary Care Provider  Payam Carroll MD     Referring Provider  No ref. provider found     Chief Complaint  COPD (3mo f/u ) and Shortness of Breath    Subjective          Carlos Zimmerman presents to [facility] PULMONARY & CRITICAL CARE MEDICINE  History of Present Illness  Carlos Zimmerman is a 81 y.o. male patient of Dr. 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  He is here for a follow-up visit today.    Patient states that he is doing well since last visit.  Not have a have any antibiotics or steroids for his lungs.  He continues to use Trelegy as prescribed.  He does have an albuterol inhaler to use on hand but believes it is .  He states that he has not had to use it recently.  His shortness of breath is mild in severity, worse with exertion and improved with rest.  He is due to have a repeat CT scan of the chest next week to assess a pulmonary nodule.  Overall, patient states he is doing well and has no other complaints at this time.  He is up-to-date with his Covid and pneumonia vaccine.  He will get his Fluzone vaccine from a local pharmacy as we do not carry it at this time.      His history of smoking is      Tobacco Use: Medium Risk   • Smoking Tobacco Use: Former Smoker   • Smokeless Tobacco Use: Former User   .    Review of Systems   Constitutional: Negative for chills, fatigue, fever, unexpected weight gain and unexpected weight loss.   HENT: Negative for congestion (Nasal), hearing loss, mouth sores, nosebleeds, postnasal drip, sore throat and trouble swallowing.    Eyes: Negative for blurred vision and visual disturbance.   Respiratory: Positive for shortness of breath. Negative for apnea, cough and wheezing.         Negative for Hemoptysis     Cardiovascular: Negative for chest pain, palpitations and leg swelling.    Gastrointestinal: Negative for abdominal pain, constipation, diarrhea, nausea, vomiting and GERD.        Negative for Jaundice  Negative for Bloating  Negative for Melena   Musculoskeletal: Negative for joint swelling and myalgias.        Negative for Joint pain  Negative for Joint stiffness   Skin: Negative for color change.        Negative for cyanosis   Neurological: Negative for syncope, weakness, numbness and headache.      Sleep: Negative for Excessive daytime sleepiness  Negative for morning headaches  Negative for Snoring    Family History   Problem Relation Age of Onset   • Stomach cancer Other    • Cancer Mother         Social History     Socioeconomic History   • Marital status:      Spouse name: Not on file   • Number of children: Not on file   • Years of education: Not on file   • Highest education level: Not on file   Tobacco Use   • Smoking status: Former Smoker     Packs/day: 1.00     Years: 55.00     Pack years: 55.00     Types: Cigarettes     Quit date:      Years since quittin.7   • Smokeless tobacco: Former User   Vaping Use   • Vaping Use: Never used   Substance and Sexual Activity   • Alcohol use: Yes     Comment: 3-4 GIN TONICS/NIGHT   • Drug use: Never   • Sexual activity: Defer        Past Medical History:   Diagnosis Date   • Anemia    • Arthritis    • COPD (chronic obstructive pulmonary disease) (CMS/[facility])     INHALER  PRN   • Hyperlipidemia    • Hypertension     ON MEDS   • Lung cancer (CMS/[facility])    • Pneumonia     LEFT LUNG CA   • SOB (shortness of breath)    • Stroke (CMS/[facility])    • TIA (transient ischemic attack)     NO RESIDUAL         Immunization History   Administered Date(s) Administered   • COVID-19 (PFIZER) 2021, 2021   • Fluad Quad 65+ 10/07/2020   • Influenza, Unspecified 2020         No Known Allergies       Current Outpatient Medications:   •  amLODIPine (NORVASC) 5 MG tablet, amlodipine 5 mg oral tablet take 1 tablet (5 mg) by oral route  once daily   Active, Disp: , Rfl:   •  Aspirin Low Dose 81 MG EC tablet, , Disp: , Rfl:   •  cetirizine (zyrTEC) 10 MG tablet, cetirizine 10 mg oral tablet take 1 tablet (10 mg) by oral route once daily   Active, Disp: , Rfl:   •  Cholecalciferol 25 MCG (1000 UT) capsule, , Disp: , Rfl:   •  cilostazol (PLETAL) 100 MG tablet, , Disp: , Rfl:   •  cyclobenzaprine (FLEXERIL) 10 MG tablet, cyclobenzaprine 10 mg oral tablet take 1 tablet (10 mg) by oral route 2 times per day   Active, Disp: , Rfl:   •  metoprolol succinate XL (TOPROL-XL) 25 MG 24 hr tablet, metoprolol succinate 25 mg oral tablet extended release 24 hr take 1 tablet (25 mg) by oral route once daily   Active, Disp: , Rfl:   •  multivitamins-minerals (PRESERVISION AREDS 2) capsule capsule, , Disp: , Rfl:   •  pantoprazole (PROTONIX) 40 MG EC tablet, , Disp: , Rfl:   •  pravastatin (PRAVACHOL) 20 MG tablet, , Disp: , Rfl:   •  tamsulosin (FLOMAX) 0.4 MG capsule 24 hr capsule, , Disp: , Rfl:   •  Trelegy Ellipta 100-62.5-25 MCG/INH inhaler, Inhale 1 puff Daily for 90 days., Disp: 84 each, Rfl: 3  •  albuterol sulfate  (90 Base) MCG/ACT inhaler, Inhale 2 puffs Every 4 (Four) Hours As Needed for Wheezing for up to 90 days., Disp: 18 g, Rfl: 3     Objective   Physical Exam  Constitutional:       General: He is not in acute distress.     Appearance: Normal appearance. He is normal weight.   HENT:      Right Ear: Hearing normal.      Left Ear: Hearing normal.      Nose: No nasal tenderness or congestion.      Mouth/Throat:      Mouth: Mucous membranes are moist. No oral lesions.   Eyes:      Extraocular Movements: Extraocular movements intact.      Pupils: Pupils are equal, round, and reactive to light.   Neck:      Thyroid: No thyroid mass or thyromegaly.   Cardiovascular:      Rate and Rhythm: Normal rate and regular rhythm.      Pulses: Normal pulses.      Heart sounds: Normal heart sounds. No murmur heard.     Pulmonary:      Effort: Pulmonary effort is  "normal.      Breath sounds: Decreased breath sounds present. No wheezing, rhonchi or rales.   Abdominal:      General: Bowel sounds are normal. There is no distension.      Palpations: Abdomen is soft.      Tenderness: There is no abdominal tenderness.   Musculoskeletal:      Cervical back: Neck supple.      Right lower leg: No edema.      Left lower leg: No edema.   Lymphadenopathy:      Cervical: No cervical adenopathy.      Upper Body:      Right upper body: No axillary adenopathy.   Skin:     General: Skin is warm and dry.      Findings: No lesion or rash.   Neurological:      General: No focal deficit present.      Mental Status: He is alert and oriented to person, place, and time.      Cranial Nerves: Cranial nerves are intact.   Psychiatric:         Mood and Affect: Affect normal. Mood is not anxious or depressed.         Vital Signs:   /60 (BP Location: Right arm, Patient Position: Sitting, Cuff Size: Adult)   Pulse 107   Temp 98.4 °F (36.9 °C) (Tympanic)   Resp 18   Ht 170.2 cm (67\")   Wt 68 kg (150 lb)   SpO2 99% Comment: room air  BMI 23.49 kg/m²        Result Review :     CMP    CMP 2/25/21 2/26/21 4/2/21   Glucose 96 116 (A) 96   BUN 15 7 11   Creatinine 0.84 0.60 (A) 0.76   Sodium 136 136 144   Potassium 3.7 3.4 (A) 4.1   Chloride 104 103 105   Calcium 9.1 8.4 (A) 9.3   Albumin 2.9 (A)  4.0   Total Bilirubin 0.42  0.57   Alkaline Phosphatase 111  132   AST (SGOT) 15  20   ALT (SGPT) 12  17   (A) Abnormal value       Comments are available for some flowsheets but are not being displayed.           CBC w/diff    CBC w/Diff 2/24/21 2/25/21 2/26/21   WBC 17.63 (A) 13.02 (A) 8.44   RBC 4.37 (A) 3.77 (A) 3.89 (A)   Hemoglobin 12.7 (A) 11.0 (A) 11.1 (A)   Hematocrit 37.8 (A) 33.6 (A) 34.0 (A)   MCV 86.5 89.1 87.4   MCH 29.1 29.2 28.5   MCHC 33.6 32.7 (A) 32.6 (A)   RDW 13.8 14.2 14.2   Platelets 113 (A) 87 (A) 101 (A)   Neutrophil Rel % 83.9 80.0 74.0   Lymphocyte Rel % 7.0 (A) 11.8 (A) 14.3 (A) "   Monocyte Rel % 8.2 7.4 10.1 (A)   Eosinophil Rel % 0.0 0.1 0.9   Basophil Rel % 0.1 0.2 0.2   (A) Abnormal value            Covid Tests    [facility] Labs[facility] 2/25/21   COVID19 NOT DETECTED      Comments are available for some flowsheets but are not being displayed.           Data reviewed: Radiologic studies Chest CT 6/1/2021, PET scan 6/9/2021 and Dr. Choudhary's last office note   Procedures        Assessment and Plan    Diagnoses and all orders for this visit:    1. Chronic obstructive pulmonary disease, unspecified COPD type (CMS/HCC) (Primary)  -     Trelegy Ellipta 100-62.5-25 MCG/INH inhaler; Inhale 1 puff Daily for 90 days.  Dispense: 84 each; Refill: 3  -     albuterol sulfate  (90 Base) MCG/ACT inhaler; Inhale 2 puffs Every 4 (Four) Hours As Needed for Wheezing for up to 90 days.  Dispense: 18 g; Refill: 3    2. Solitary lung nodule    3. Malignant neoplasm of lower lobe of left lung (CMS/HCC)    4. Dyspnea on exertion    5. Nicotine dependence, cigarettes, in remission    6. Seasonal allergies    7.  Continue Trelegy as prescribed.  Rinse mouth after each use.  Written prescription given to patient today.  8.  Continue albuterol as needed.  Written prescription given to patient today.  9.  Patient is due to have a CT scan of the chest next week.  Encourage patient to keep this appointment.  10.  Continue Zyrtec for seasonal allergies  11.  Follow-up in approximately 2-month sooner if needed.        Follow Up   Return in about 2 months (around 12/1/2021) for Recheck with Dr. Choudhary, early morning.  Patient was given instructions and counseling regarding his condition or for health maintenance advice. Please see specific information pulled into the AVS if appropriate.

## 2021-09-21 ENCOUNTER — HOSPITAL ENCOUNTER (OUTPATIENT)
Dept: CT IMAGING | Facility: HOSPITAL | Age: 81
Discharge: HOME | End: 2021-09-21
Admitting: INTERNAL MEDICINE

## 2021-09-21 PROCEDURE — 71250 CT THORAX DX C-: CPT

## 2021-11-18 ENCOUNTER — OFFICE VISIT (OUTPATIENT)
Dept: PULMONOLOGY | Facility: CLINIC | Age: 81
End: 2021-11-18

## 2021-11-18 VITALS
HEIGHT: 67 IN | TEMPERATURE: 98 F | DIASTOLIC BLOOD PRESSURE: 64 MMHG | WEIGHT: 155 LBS | OXYGEN SATURATION: 99 % | BODY MASS INDEX: 24.33 KG/M2 | SYSTOLIC BLOOD PRESSURE: 136 MMHG | RESPIRATION RATE: 17 BRPM | HEART RATE: 107 BPM

## 2021-11-18 DIAGNOSIS — C34.32 MALIGNANT NEOPLASM OF LOWER LOBE OF LEFT LUNG (HCC): ICD-10-CM

## 2021-11-18 DIAGNOSIS — R06.09 DYSPNEA ON EXERTION: ICD-10-CM

## 2021-11-18 DIAGNOSIS — R91.8 LUNG NODULES: ICD-10-CM

## 2021-11-18 DIAGNOSIS — J44.9 CHRONIC OBSTRUCTIVE PULMONARY DISEASE, UNSPECIFIED COPD TYPE (HCC): Primary | ICD-10-CM

## 2021-11-18 DIAGNOSIS — J30.2 SEASONAL ALLERGIES: ICD-10-CM

## 2021-11-18 DIAGNOSIS — J43.2 CENTRILOBULAR EMPHYSEMA (HCC): ICD-10-CM

## 2021-11-18 PROCEDURE — 99214 OFFICE O/P EST MOD 30 MIN: CPT | Performed by: NURSE PRACTITIONER

## 2021-11-18 NOTE — PROGRESS NOTES
Primary Care Provider  Payam Carroll MD     Referring Provider  No ref. provider found     Chief Complaint  COPD (2mo f/u)    Subjective          Carlos Zimmerman presents to [facility] PULMONARY & CRITICAL CARE MEDICINE  History of Present Illness  Carlos Zimmerman is a 81 y.o. male patient of Dr. 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  He is here for a follow-up visit today.    Patient states that he is doing well since his last visit.  He denies him using antibiotics or steroids for his lungs.  He continues to use Trelegy as prescribed.  He states that he has not had to use his albuterol inhaler.  He describes her shortness of breath as mild in severity, worse with exertion and improved with rest.  States that he does have oxygen at home to use as needed, but has not felt the need to use that here recently.  Patient does have a history of lung cancer and has been getting CT scans approximately every 3 months.  Overall, patient has no concerns at this time.  He is up-to-date with his flu, pneumonia, and Covid vaccines.  He is able to perform his ADLs without difficulty.     His history of smoking is      Tobacco Use: Medium Risk   • Smoking Tobacco Use: Former Smoker   • Smokeless Tobacco Use: Former User   .    Review of Systems   Constitutional: Negative for chills, fatigue, fever, unexpected weight gain and unexpected weight loss.   HENT: Negative for congestion (Nasal), hearing loss, mouth sores, nosebleeds, postnasal drip, sore throat and trouble swallowing.    Eyes: Negative for blurred vision and visual disturbance.   Respiratory: Positive for shortness of breath. Negative for apnea, cough and wheezing.         Negative for Hemoptysis     Cardiovascular: Negative for chest pain, palpitations and leg swelling.   Gastrointestinal: Negative for  abdominal pain, constipation, diarrhea, nausea, vomiting and GERD.        Negative for Jaundice  Negative for Bloating  Negative for Melena   Musculoskeletal: Negative for joint swelling and myalgias.        Negative for Joint pain  Negative for Joint stiffness   Skin: Negative for color change.        Negative for cyanosis   Neurological: Negative for syncope, weakness, numbness and headache.      Sleep: Negative for Excessive daytime sleepiness  Negative for morning headaches  Negative for Snoring    Family History   Problem Relation Age of Onset   • Stomach cancer Other    • Cancer Mother         Social History     Socioeconomic History   • Marital status:    Tobacco Use   • Smoking status: Former Smoker     Packs/day: 1.00     Years: 55.00     Pack years: 55.00     Types: Cigarettes     Quit date:      Years since quittin.8   • Smokeless tobacco: Former User   Vaping Use   • Vaping Use: Never used   Substance and Sexual Activity   • Alcohol use: Yes     Comment: 3-4 GIN TONICS/NIGHT   • Drug use: Never   • Sexual activity: Defer        Past Medical History:   Diagnosis Date   • Anemia    • Arthritis    • COPD (chronic obstructive pulmonary disease) ([facility])     INHALER  PRN   • Hyperlipidemia    • Hypertension     ON MEDS   • Lung cancer (HCC)    • Pneumonia     LEFT LUNG CA   • SOB (shortness of breath)    • Stroke (HCC)    • TIA (transient ischemic attack)     NO RESIDUAL         Immunization History   Administered Date(s) Administered   • COVID-19 (PFIZER) 2021, 2021   • Fluad Quad 65+ 10/07/2020   • Influenza, Unspecified 2020         No Known Allergies       Current Outpatient Medications:   •  albuterol sulfate  (90 Base) MCG/ACT inhaler, Inhale 2 puffs Every 4 (Four) Hours As Needed for Wheezing for up to 90 days., Disp: 18 g, Rfl: 3  •  amLODIPine (NORVASC) 5 MG tablet, amlodipine 5 mg oral tablet take 1 tablet (5 mg) by oral route once daily   Active, Disp: ,  Rfl:   •  Aspirin Low Dose 81 MG EC tablet, , Disp: , Rfl:   •  cetirizine (zyrTEC) 10 MG tablet, cetirizine 10 mg oral tablet take 1 tablet (10 mg) by oral route once daily   Active, Disp: , Rfl:   •  Cholecalciferol 25 MCG (1000 UT) capsule, , Disp: , Rfl:   •  cilostazol (PLETAL) 100 MG tablet, , Disp: , Rfl:   •  cyclobenzaprine (FLEXERIL) 10 MG tablet, cyclobenzaprine 10 mg oral tablet take 1 tablet (10 mg) by oral route 2 times per day   Active, Disp: , Rfl:   •  Fluticasone-Umeclidin-Vilant (Trelegy Ellipta) 100-62.5-25 MCG/INH inhaler, Inhale 1 puff Daily., Disp: 1 each, Rfl: 0  •  metoprolol succinate XL (TOPROL-XL) 25 MG 24 hr tablet, metoprolol succinate 25 mg oral tablet extended release 24 hr take 1 tablet (25 mg) by oral route once daily   Active, Disp: , Rfl:   •  multivitamins-minerals (PRESERVISION AREDS 2) capsule capsule, , Disp: , Rfl:   •  pantoprazole (PROTONIX) 40 MG EC tablet, , Disp: , Rfl:   •  pravastatin (PRAVACHOL) 20 MG tablet, , Disp: , Rfl:   •  tamsulosin (FLOMAX) 0.4 MG capsule 24 hr capsule, , Disp: , Rfl:   •  Trelegy Ellipta 100-62.5-25 MCG/INH inhaler, Inhale 1 puff Daily for 90 days., Disp: 84 each, Rfl: 3     Objective   Physical Exam  Constitutional:       General: He is not in acute distress.     Appearance: Normal appearance. He is normal weight.   HENT:      Right Ear: Hearing normal.      Left Ear: Hearing normal.      Nose: No nasal tenderness or congestion.      Mouth/Throat:      Mouth: Mucous membranes are moist. No oral lesions.   Eyes:      Extraocular Movements: Extraocular movements intact.      Pupils: Pupils are equal, round, and reactive to light.   Neck:      Thyroid: No thyroid mass or thyromegaly.   Cardiovascular:      Rate and Rhythm: Normal rate and regular rhythm.      Pulses: Normal pulses.      Heart sounds: Normal heart sounds. No murmur heard.      Pulmonary:      Effort: Pulmonary effort is normal.      Breath sounds: Normal breath sounds. No  "wheezing, rhonchi or rales.   Chest:   Breasts:      Right: No axillary adenopathy.       Abdominal:      General: Bowel sounds are normal. There is no distension.      Palpations: Abdomen is soft.      Tenderness: There is no abdominal tenderness.   Musculoskeletal:      Cervical back: Neck supple.      Right lower leg: No edema.      Left lower leg: No edema.   Lymphadenopathy:      Cervical: No cervical adenopathy.      Upper Body:      Right upper body: No axillary adenopathy.   Skin:     General: Skin is warm and dry.      Findings: No lesion or rash.   Neurological:      General: No focal deficit present.      Mental Status: He is alert and oriented to person, place, and time.      Cranial Nerves: Cranial nerves are intact.   Psychiatric:         Mood and Affect: Affect normal. Mood is not anxious or depressed.         Vital Signs:   /64 (BP Location: Left arm, Patient Position: Sitting, Cuff Size: Adult)   Pulse 107   Temp 98 °F (36.7 °C) (Tympanic)   Resp 17   Ht 170.2 cm (67\")   Wt 70.3 kg (155 lb)   SpO2 99% Comment: room air  BMI 24.28 kg/m²        Result Review :     CMP    CMP 2/25/21 2/26/21 4/2/21   Glucose 96 116 (A) 96   BUN 15 7 11   Creatinine 0.84 0.60 (A) 0.76   Sodium 136 136 144   Potassium 3.7 3.4 (A) 4.1   Chloride 104 103 105   Calcium 9.1 8.4 (A) 9.3   Albumin 2.9 (A)  4.0   Total Bilirubin 0.42  0.57   Alkaline Phosphatase 111  132   AST (SGOT) 15  20   ALT (SGPT) 12  17   (A) Abnormal value       Comments are available for some flowsheets but are not being displayed.           CBC w/diff    CBC w/Diff 2/24/21 2/25/21 2/26/21   WBC 17.63 (A) 13.02 (A) 8.44   RBC 4.37 (A) 3.77 (A) 3.89 (A)   Hemoglobin 12.7 (A) 11.0 (A) 11.1 (A)   Hematocrit 37.8 (A) 33.6 (A) 34.0 (A)   MCV 86.5 89.1 87.4   MCH 29.1 29.2 28.5   MCHC 33.6 32.7 (A) 32.6 (A)   RDW 13.8 14.2 14.2   Platelets 113 (A) 87 (A) 101 (A)   Neutrophil Rel % 83.9 80.0 74.0   Lymphocyte Rel % 7.0 (A) 11.8 (A) 14.3 (A) "   Monocyte Rel % 8.2 7.4 10.1 (A)   Eosinophil Rel % 0.0 0.1 0.9   Basophil Rel % 0.1 0.2 0.2   (A) Abnormal value            Data reviewed: Radiologic studies Chest CT 9/21/2021 and My last office note   Procedures        Assessment and Plan    Diagnoses and all orders for this visit:    1. Chronic obstructive pulmonary disease, unspecified COPD type (HCC) (Primary)    2. Centrilobular emphysema (HCC)    3. Malignant neoplasm of lower lobe of left lung (HCC)    4. Lung nodules  -     CT Chest Without Contrast; Future    5. Seasonal allergies    6. Dyspnea on exertion    7.  Continue Trelegy as prescribed.  Rinse mouth after each use.  Patient states that he does not need refills at this time.  8.  Continue albuterol as needed.  9.  Repeat CT scan ordered to be performed in February 2022.  10.  Continue follow-up with oncology as scheduled.  11.  Follow-up with Dr. Choudhary in approximate 4 months, sooner if needed.        Follow Up   Return in about 4 months (around 3/18/2022) for Recheck with Dr. Choduhary.  Patient was given instructions and counseling regarding his condition or for health maintenance advice. Please see specific information pulled into the AVS if appropriate.

## 2022-01-26 ENCOUNTER — TRANSCRIBE ORDERS (OUTPATIENT)
Dept: GENERAL RADIOLOGY | Facility: HOSPITAL | Age: 82
End: 2022-01-26

## 2022-01-26 ENCOUNTER — HOSPITAL ENCOUNTER (OUTPATIENT)
Dept: GENERAL RADIOLOGY | Facility: HOSPITAL | Age: 82
Discharge: HOME | End: 2022-01-26
Admitting: INTERNAL MEDICINE

## 2022-01-26 DIAGNOSIS — M25.511 RIGHT SHOULDER PAIN, UNSPECIFIED CHRONICITY: Primary | ICD-10-CM

## 2022-01-26 DIAGNOSIS — M25.511 RIGHT SHOULDER PAIN, UNSPECIFIED CHRONICITY: ICD-10-CM

## 2022-01-26 PROCEDURE — 73030 X-RAY EXAM OF SHOULDER: CPT

## 2022-02-07 ENCOUNTER — APPOINTMENT (OUTPATIENT)
Dept: CT IMAGING | Facility: HOSPITAL | Age: 82
End: 2022-02-07

## 2022-02-07 ENCOUNTER — APPOINTMENT (OUTPATIENT)
Dept: GENERAL RADIOLOGY | Facility: HOSPITAL | Age: 82
End: 2022-02-07

## 2022-02-07 ENCOUNTER — HOSPITAL ENCOUNTER (INPATIENT)
Facility: HOSPITAL | Age: 82
LOS: 2 days | Discharge: HOME | End: 2022-02-09
Attending: EMERGENCY MEDICINE | Admitting: INTERNAL MEDICINE

## 2022-02-07 DIAGNOSIS — J18.9 PNEUMONIA OF RIGHT LUNG DUE TO INFECTIOUS ORGANISM, UNSPECIFIED PART OF LUNG: Primary | ICD-10-CM

## 2022-02-07 DIAGNOSIS — R26.2 DIFFICULTY WALKING: ICD-10-CM

## 2022-02-07 LAB
ALBUMIN SERPL-MCNC: 4.1 G/DL (ref 3.5–5.2)
ALBUMIN/GLOB SERPL: 1.8 G/DL
ALP SERPL-CCNC: 132 U/L (ref 39–117)
ALT SERPL W P-5'-P-CCNC: 22 U/L (ref 1–41)
ANION GAP SERPL CALCULATED.3IONS-SCNC: 13.1 MMOL/L (ref 5–15)
APTT PPP: 22.7 SECONDS (ref 22.2–34.2)
ARTERIAL PATENCY WRIST A: POSITIVE
AST SERPL-CCNC: 28 U/L (ref 1–40)
BASE EXCESS BLDA CALC-SCNC: 1.7 MMOL/L (ref -2–2)
BASOPHILS # BLD AUTO: 0.07 10*3/MM3 (ref 0–0.2)
BASOPHILS NFR BLD AUTO: 0.2 % (ref 0–1.5)
BDY SITE: ABNORMAL
BILIRUB SERPL-MCNC: 0.9 MG/DL (ref 0–1.2)
BUN SERPL-MCNC: 16 MG/DL (ref 8–23)
BUN/CREAT SERPL: 19.8 (ref 7–25)
CA-I BLDA-SCNC: 1.07 MMOL/L (ref 1.13–1.32)
CALCIUM SPEC-SCNC: 9.4 MG/DL (ref 8.6–10.5)
CHLORIDE SERPL-SCNC: 99 MMOL/L (ref 98–107)
CO2 SERPL-SCNC: 24.9 MMOL/L (ref 22–29)
COHGB MFR BLD: 1.2 % (ref 0–1.5)
CREAT SERPL-MCNC: 0.81 MG/DL (ref 0.76–1.27)
CRP SERPL-MCNC: 3.62 MG/DL (ref 0–0.5)
D-LACTATE SERPL-SCNC: 1.7 MMOL/L (ref 0.5–2)
DEPRECATED RDW RBC AUTO: 44.1 FL (ref 37–54)
EOSINOPHIL # BLD AUTO: 0.01 10*3/MM3 (ref 0–0.4)
EOSINOPHIL NFR BLD AUTO: 0 % (ref 0.3–6.2)
ERYTHROCYTE [DISTWIDTH] IN BLOOD BY AUTOMATED COUNT: 14.4 % (ref 12.3–15.4)
FHHB: 5.2 % (ref 0–5)
GAS FLOW AIRWAY: 0 LPM
GFR SERPL CREATININE-BSD FRML MDRD: 91 ML/MIN/1.73
GLOBULIN UR ELPH-MCNC: 2.3 GM/DL
GLUCOSE SERPL-MCNC: 86 MG/DL (ref 65–99)
HCO3 BLDA-SCNC: 25 MMOL/L (ref 22–26)
HCT VFR BLD AUTO: 41.4 % (ref 37.5–51)
HGB BLD-MCNC: 13.7 G/DL (ref 13–17.7)
HGB BLDA-MCNC: 14.2 G/DL (ref 13.8–16.4)
HOLD SPECIMEN: NORMAL
HOLD SPECIMEN: NORMAL
IMM GRANULOCYTES # BLD AUTO: 0.16 10*3/MM3 (ref 0–0.05)
IMM GRANULOCYTES NFR BLD AUTO: 0.6 % (ref 0–0.5)
INHALED O2 CONCENTRATION: 21 %
INR PPP: 1.01 (ref 2–3)
LYMPHOCYTES # BLD AUTO: 2.04 10*3/MM3 (ref 0.7–3.1)
LYMPHOCYTES NFR BLD AUTO: 7.1 % (ref 19.6–45.3)
MAGNESIUM SERPL-MCNC: 1.7 MG/DL (ref 1.6–2.4)
MCH RBC QN AUTO: 27.8 PG (ref 26.6–33)
MCHC RBC AUTO-ENTMCNC: 33.1 G/DL (ref 31.5–35.7)
MCV RBC AUTO: 84 FL (ref 79–97)
METHGB BLD QL: 0.1 % (ref 0–1.5)
MODALITY: ABNORMAL
MONOCYTES # BLD AUTO: 1.65 10*3/MM3 (ref 0.1–0.9)
MONOCYTES NFR BLD AUTO: 5.8 % (ref 5–12)
NEUTROPHILS NFR BLD AUTO: 24.71 10*3/MM3 (ref 1.7–7)
NEUTROPHILS NFR BLD AUTO: 86.3 % (ref 42.7–76)
NRBC BLD AUTO-RTO: 0 /100 WBC (ref 0–0.2)
OXYHGB MFR BLDV: 93.5 % (ref 94–99)
PCO2 BLDA: 35.2 MM HG (ref 35–45)
PH BLDA: 7.47 PH UNITS (ref 7.35–7.45)
PHOSPHATE SERPL-MCNC: 2 MG/DL (ref 2.5–4.5)
PLATELET # BLD AUTO: 211 10*3/MM3 (ref 140–450)
PMV BLD AUTO: 10.2 FL (ref 6–12)
PO2 BLD: 319 MM[HG] (ref 0–500)
PO2 BLDA: 66.9 MM HG (ref 80–100)
POTASSIUM SERPL-SCNC: 4.2 MMOL/L (ref 3.5–5.2)
PROT SERPL-MCNC: 6.4 G/DL (ref 6–8.5)
PROTHROMBIN TIME: 10.6 SECONDS (ref 9.4–12)
RBC # BLD AUTO: 4.93 10*6/MM3 (ref 4.14–5.8)
SAO2 % BLDCOA: 94.7 % (ref 95–99)
SARS-COV-2 RNA PNL SPEC NAA+PROBE: NOT DETECTED
SODIUM SERPL-SCNC: 137 MMOL/L (ref 136–145)
WBC NRBC COR # BLD: 28.64 10*3/MM3 (ref 3.4–10.8)
WHOLE BLOOD HOLD SPECIMEN: NORMAL
WHOLE BLOOD HOLD SPECIMEN: NORMAL

## 2022-02-07 PROCEDURE — 85025 COMPLETE CBC W/AUTO DIFF WBC: CPT | Performed by: EMERGENCY MEDICINE

## 2022-02-07 PROCEDURE — U0005 INFEC AGEN DETEC AMPLI PROBE: HCPCS | Performed by: EMERGENCY MEDICINE

## 2022-02-07 PROCEDURE — 36600 WITHDRAWAL OF ARTERIAL BLOOD: CPT | Performed by: EMERGENCY MEDICINE

## 2022-02-07 PROCEDURE — 86140 C-REACTIVE PROTEIN: CPT | Performed by: EMERGENCY MEDICINE

## 2022-02-07 PROCEDURE — 85610 PROTHROMBIN TIME: CPT | Performed by: EMERGENCY MEDICINE

## 2022-02-07 PROCEDURE — 0 IOPAMIDOL PER 1 ML: Performed by: INTERNAL MEDICINE

## 2022-02-07 PROCEDURE — 25010000002 ENOXAPARIN PER 10 MG: Performed by: INTERNAL MEDICINE

## 2022-02-07 PROCEDURE — 85730 THROMBOPLASTIN TIME PARTIAL: CPT | Performed by: EMERGENCY MEDICINE

## 2022-02-07 PROCEDURE — 25010000002 AZITHROMYCIN PER 500 MG: Performed by: EMERGENCY MEDICINE

## 2022-02-07 PROCEDURE — 99285 EMERGENCY DEPT VISIT HI MDM: CPT

## 2022-02-07 PROCEDURE — 93010 ELECTROCARDIOGRAM REPORT: CPT | Performed by: INTERNAL MEDICINE

## 2022-02-07 PROCEDURE — 82805 BLOOD GASES W/O2 SATURATION: CPT | Performed by: EMERGENCY MEDICINE

## 2022-02-07 PROCEDURE — 71275 CT ANGIOGRAPHY CHEST: CPT

## 2022-02-07 PROCEDURE — 87899 AGENT NOS ASSAY W/OPTIC: CPT | Performed by: INTERNAL MEDICINE

## 2022-02-07 PROCEDURE — 93005 ELECTROCARDIOGRAM TRACING: CPT | Performed by: EMERGENCY MEDICINE

## 2022-02-07 PROCEDURE — 84100 ASSAY OF PHOSPHORUS: CPT | Performed by: EMERGENCY MEDICINE

## 2022-02-07 PROCEDURE — 71045 X-RAY EXAM CHEST 1 VIEW: CPT

## 2022-02-07 PROCEDURE — 82375 ASSAY CARBOXYHB QUANT: CPT | Performed by: EMERGENCY MEDICINE

## 2022-02-07 PROCEDURE — 94799 UNLISTED PULMONARY SVC/PX: CPT

## 2022-02-07 PROCEDURE — 83735 ASSAY OF MAGNESIUM: CPT | Performed by: EMERGENCY MEDICINE

## 2022-02-07 PROCEDURE — 25010000002 METHYLPREDNISOLONE PER 125 MG: Performed by: EMERGENCY MEDICINE

## 2022-02-07 PROCEDURE — 99223 1ST HOSP IP/OBS HIGH 75: CPT | Performed by: INTERNAL MEDICINE

## 2022-02-07 PROCEDURE — 83605 ASSAY OF LACTIC ACID: CPT | Performed by: EMERGENCY MEDICINE

## 2022-02-07 PROCEDURE — 80053 COMPREHEN METABOLIC PANEL: CPT | Performed by: EMERGENCY MEDICINE

## 2022-02-07 PROCEDURE — 87040 BLOOD CULTURE FOR BACTERIA: CPT | Performed by: EMERGENCY MEDICINE

## 2022-02-07 PROCEDURE — 0 CEFTRIAXONE PER 250 MG: Performed by: EMERGENCY MEDICINE

## 2022-02-07 PROCEDURE — 82330 ASSAY OF CALCIUM: CPT | Performed by: EMERGENCY MEDICINE

## 2022-02-07 PROCEDURE — 83050 HGB METHEMOGLOBIN QUAN: CPT | Performed by: EMERGENCY MEDICINE

## 2022-02-07 PROCEDURE — U0004 COV-19 TEST NON-CDC HGH THRU: HCPCS | Performed by: EMERGENCY MEDICINE

## 2022-02-07 PROCEDURE — 94640 AIRWAY INHALATION TREATMENT: CPT

## 2022-02-07 RX ORDER — ONDANSETRON 2 MG/ML
4 INJECTION INTRAMUSCULAR; INTRAVENOUS EVERY 6 HOURS PRN
Status: DISCONTINUED | OUTPATIENT
Start: 2022-02-07 | End: 2022-02-09 | Stop reason: HOSPADM

## 2022-02-07 RX ORDER — CHOLECALCIFEROL (VITAMIN D3) 125 MCG
5 CAPSULE ORAL NIGHTLY PRN
Status: DISCONTINUED | OUTPATIENT
Start: 2022-02-07 | End: 2022-02-09 | Stop reason: HOSPADM

## 2022-02-07 RX ORDER — IPRATROPIUM BROMIDE AND ALBUTEROL SULFATE 2.5; .5 MG/3ML; MG/3ML
3 SOLUTION RESPIRATORY (INHALATION) ONCE
Status: COMPLETED | OUTPATIENT
Start: 2022-02-07 | End: 2022-02-07

## 2022-02-07 RX ORDER — ACETAMINOPHEN 325 MG/1
650 TABLET ORAL EVERY 4 HOURS PRN
Status: DISCONTINUED | OUTPATIENT
Start: 2022-02-07 | End: 2022-02-09 | Stop reason: HOSPADM

## 2022-02-07 RX ORDER — BUDESONIDE 0.5 MG/2ML
0.5 INHALANT ORAL
Status: DISCONTINUED | OUTPATIENT
Start: 2022-02-07 | End: 2022-02-09 | Stop reason: HOSPADM

## 2022-02-07 RX ORDER — IPRATROPIUM BROMIDE AND ALBUTEROL SULFATE 2.5; .5 MG/3ML; MG/3ML
3 SOLUTION RESPIRATORY (INHALATION)
Status: DISCONTINUED | OUTPATIENT
Start: 2022-02-07 | End: 2022-02-09 | Stop reason: HOSPADM

## 2022-02-07 RX ORDER — AMOXICILLIN 250 MG
2 CAPSULE ORAL 2 TIMES DAILY
Status: DISCONTINUED | OUTPATIENT
Start: 2022-02-07 | End: 2022-02-09 | Stop reason: HOSPADM

## 2022-02-07 RX ORDER — BISACODYL 5 MG/1
5 TABLET, DELAYED RELEASE ORAL DAILY PRN
Status: DISCONTINUED | OUTPATIENT
Start: 2022-02-07 | End: 2022-02-09 | Stop reason: HOSPADM

## 2022-02-07 RX ORDER — ARFORMOTEROL TARTRATE 15 UG/2ML
15 SOLUTION RESPIRATORY (INHALATION)
Status: DISCONTINUED | OUTPATIENT
Start: 2022-02-07 | End: 2022-02-09 | Stop reason: HOSPADM

## 2022-02-07 RX ORDER — FAMOTIDINE 20 MG/1
40 TABLET, FILM COATED ORAL DAILY
Status: DISCONTINUED | OUTPATIENT
Start: 2022-02-07 | End: 2022-02-09 | Stop reason: HOSPADM

## 2022-02-07 RX ORDER — AMLODIPINE BESYLATE 5 MG/1
5 TABLET ORAL DAILY
Status: DISCONTINUED | OUTPATIENT
Start: 2022-02-07 | End: 2022-02-09 | Stop reason: HOSPADM

## 2022-02-07 RX ORDER — METHYLPREDNISOLONE SODIUM SUCCINATE 125 MG/2ML
125 INJECTION, POWDER, LYOPHILIZED, FOR SOLUTION INTRAMUSCULAR; INTRAVENOUS ONCE
Status: COMPLETED | OUTPATIENT
Start: 2022-02-07 | End: 2022-02-07

## 2022-02-07 RX ORDER — CILOSTAZOL 100 MG/1
100 TABLET ORAL 2 TIMES DAILY
Status: DISCONTINUED | OUTPATIENT
Start: 2022-02-07 | End: 2022-02-09 | Stop reason: HOSPADM

## 2022-02-07 RX ORDER — SODIUM CHLORIDE 0.9 % (FLUSH) 0.9 %
10 SYRINGE (ML) INJECTION AS NEEDED
Status: DISCONTINUED | OUTPATIENT
Start: 2022-02-07 | End: 2022-02-09 | Stop reason: HOSPADM

## 2022-02-07 RX ORDER — ASPIRIN 81 MG/1
81 TABLET ORAL DAILY
Status: DISCONTINUED | OUTPATIENT
Start: 2022-02-07 | End: 2022-02-09 | Stop reason: HOSPADM

## 2022-02-07 RX ORDER — ALBUTEROL SULFATE 90 UG/1
2 AEROSOL, METERED RESPIRATORY (INHALATION) EVERY 4 HOURS PRN
COMMUNITY
End: 2022-06-30 | Stop reason: SDUPTHER

## 2022-02-07 RX ORDER — CEFTRIAXONE SODIUM 1 G/50ML
1 INJECTION, SOLUTION INTRAVENOUS EVERY 24 HOURS
Status: DISCONTINUED | OUTPATIENT
Start: 2022-02-08 | End: 2022-02-09 | Stop reason: HOSPADM

## 2022-02-07 RX ORDER — SODIUM CHLORIDE 0.9 % (FLUSH) 0.9 %
10 SYRINGE (ML) INJECTION AS NEEDED
Status: DISCONTINUED | OUTPATIENT
Start: 2022-02-07 | End: 2022-02-07

## 2022-02-07 RX ORDER — CYCLOBENZAPRINE HCL 10 MG
10 TABLET ORAL 3 TIMES DAILY PRN
Status: DISCONTINUED | OUTPATIENT
Start: 2022-02-07 | End: 2022-02-09 | Stop reason: HOSPADM

## 2022-02-07 RX ORDER — TAMSULOSIN HYDROCHLORIDE 0.4 MG/1
0.4 CAPSULE ORAL DAILY
Status: DISCONTINUED | OUTPATIENT
Start: 2022-02-07 | End: 2022-02-09 | Stop reason: HOSPADM

## 2022-02-07 RX ORDER — POLYETHYLENE GLYCOL 3350 17 G/17G
17 POWDER, FOR SOLUTION ORAL DAILY PRN
Status: DISCONTINUED | OUTPATIENT
Start: 2022-02-07 | End: 2022-02-09 | Stop reason: HOSPADM

## 2022-02-07 RX ORDER — NAPROXEN 500 MG/1
500 TABLET ORAL 2 TIMES DAILY WITH MEALS
COMMUNITY
End: 2022-07-10

## 2022-02-07 RX ORDER — CEFTRIAXONE SODIUM 2 G/50ML
2 INJECTION, SOLUTION INTRAVENOUS ONCE
Status: COMPLETED | OUTPATIENT
Start: 2022-02-07 | End: 2022-02-07

## 2022-02-07 RX ORDER — PANTOPRAZOLE SODIUM 40 MG/1
40 TABLET, DELAYED RELEASE ORAL DAILY
Status: DISCONTINUED | OUTPATIENT
Start: 2022-02-07 | End: 2022-02-09 | Stop reason: HOSPADM

## 2022-02-07 RX ORDER — SODIUM CHLORIDE 0.9 % (FLUSH) 0.9 %
10 SYRINGE (ML) INJECTION EVERY 12 HOURS SCHEDULED
Status: DISCONTINUED | OUTPATIENT
Start: 2022-02-07 | End: 2022-02-09 | Stop reason: HOSPADM

## 2022-02-07 RX ORDER — PRAVASTATIN SODIUM 20 MG
20 TABLET ORAL DAILY
Status: DISCONTINUED | OUTPATIENT
Start: 2022-02-07 | End: 2022-02-09 | Stop reason: HOSPADM

## 2022-02-07 RX ORDER — CETIRIZINE HYDROCHLORIDE 10 MG/1
10 TABLET ORAL DAILY
Status: DISCONTINUED | OUTPATIENT
Start: 2022-02-07 | End: 2022-02-09 | Stop reason: HOSPADM

## 2022-02-07 RX ORDER — BISACODYL 10 MG
10 SUPPOSITORY, RECTAL RECTAL DAILY PRN
Status: DISCONTINUED | OUTPATIENT
Start: 2022-02-07 | End: 2022-02-09 | Stop reason: HOSPADM

## 2022-02-07 RX ORDER — METOPROLOL SUCCINATE 25 MG/1
25 TABLET, EXTENDED RELEASE ORAL DAILY
Status: DISCONTINUED | OUTPATIENT
Start: 2022-02-07 | End: 2022-02-09

## 2022-02-07 RX ADMIN — ENOXAPARIN SODIUM 40 MG: 100 INJECTION SUBCUTANEOUS at 21:07

## 2022-02-07 RX ADMIN — SODIUM CHLORIDE 2046 ML: 9 INJECTION, SOLUTION INTRAVENOUS at 14:53

## 2022-02-07 RX ADMIN — ARFORMOTEROL TARTRATE 15 MCG: 15 SOLUTION RESPIRATORY (INHALATION) at 20:11

## 2022-02-07 RX ADMIN — METOPROLOL SUCCINATE 25 MG: 25 TABLET, FILM COATED, EXTENDED RELEASE ORAL at 20:29

## 2022-02-07 RX ADMIN — IPRATROPIUM BROMIDE AND ALBUTEROL SULFATE 3 ML: 2.5; .5 SOLUTION RESPIRATORY (INHALATION) at 20:11

## 2022-02-07 RX ADMIN — CETIRIZINE HYDROCHLORIDE 10 MG: 10 TABLET, FILM COATED ORAL at 20:28

## 2022-02-07 RX ADMIN — ASPIRIN 81 MG: 81 TABLET, COATED ORAL at 20:29

## 2022-02-07 RX ADMIN — IOPAMIDOL 100 ML: 755 INJECTION, SOLUTION INTRAVENOUS at 17:10

## 2022-02-07 RX ADMIN — METHYLPREDNISOLONE SODIUM SUCCINATE 125 MG: 125 INJECTION, POWDER, FOR SOLUTION INTRAMUSCULAR; INTRAVENOUS at 15:04

## 2022-02-07 RX ADMIN — FAMOTIDINE 40 MG: 20 TABLET, FILM COATED ORAL at 21:07

## 2022-02-07 RX ADMIN — CEFTRIAXONE SODIUM 2 G: 2 INJECTION, SOLUTION INTRAVENOUS at 15:03

## 2022-02-07 RX ADMIN — PRAVASTATIN SODIUM 20 MG: 20 TABLET ORAL at 20:30

## 2022-02-07 RX ADMIN — BUDESONIDE 0.5 MG: 0.5 SUSPENSION RESPIRATORY (INHALATION) at 20:11

## 2022-02-07 RX ADMIN — AMLODIPINE BESYLATE 5 MG: 5 TABLET ORAL at 21:07

## 2022-02-07 RX ADMIN — SODIUM CHLORIDE, PRESERVATIVE FREE 10 ML: 5 INJECTION INTRAVENOUS at 20:31

## 2022-02-07 RX ADMIN — CILOSTAZOL 100 MG: 100 TABLET ORAL at 20:30

## 2022-02-07 RX ADMIN — PANTOPRAZOLE SODIUM 40 MG: 40 TABLET, DELAYED RELEASE ORAL at 21:08

## 2022-02-07 RX ADMIN — SODIUM CHLORIDE, PRESERVATIVE FREE 10 ML: 5 INJECTION INTRAVENOUS at 20:32

## 2022-02-07 RX ADMIN — TAMSULOSIN HYDROCHLORIDE 0.4 MG: 0.4 CAPSULE ORAL at 20:29

## 2022-02-07 RX ADMIN — IPRATROPIUM BROMIDE AND ALBUTEROL SULFATE 3 ML: 2.5; .5 SOLUTION RESPIRATORY (INHALATION) at 14:37

## 2022-02-07 NOTE — H&P
[facility]  Date: 2/7/2022   Patient Name: Carlos Zimmerman  Primary Care Physician:  Payam Carroll MD  Date of admission: 2/7/2022    Subjective   Subjective     Chief Complaint: Shortness of breath    HPI:    Carlos Zimmerman is a 82 y.o. male with COPD, history of lung cancer in 2016 status post left upper lobe lobectomy and chemo presents with shortness of breath.  His wife is at the bedside and helps give the history.  Approximately 5 days ago the patient developed left foot and knee pain with swelling to the top of his foot with redness, tender to ambulation but not to palpation, this has since resolved they emergently thought it was gout.  This morning the patient could not get out of bed due to severe weakness.  Has had a wet cough for 3 days but no fever or chills, cough is not currently productive.  Has been vaccinated x3 against COVID-19.  No one else has been ill.  In the emergency department patient is septic with elevated white count febrile and tachycardic, given sepsis IV fluids and broad-spectrum antibiotics.  Patient was hypoxic requiring 2 L nasal cannula to maintain oxygen saturation into the 90s.  Imaging consistent with right-sided pneumonia.  Patient notes that his normal heart rate is around 105 and this has been evaluated many times that he does not expect to get lower than that.    Personal History     Past Medical History:  Left upper lobe lung cancer status post chemo and left upper lobe lobectomy  COPD  Hypertension   hyperlipidemia  TIA  BPH    Past Surgical History:  Left upper lobe lobectomy  Left rotator cuff repair    Family History:   Mother had cancer    Social History:   Former tobacco use quit over 10 years ago  Daily alcohol use, no history of alcohol withdrawal when maintaining sobriety  No illegal drug use   lives at home with his wife    Home Medications:  Cholecalciferol, Fluticasone-Umeclidin-Vilant, albuterol sulfate HFA,  amLODIPine, aspirin, cetirizine, cilostazol, cyclobenzaprine, metoprolol succinate XL, multivitamins-minerals, naproxen, pantoprazole, pravastatin, and tamsulosin    Allergies:  No Known Allergies    Review of Systems   14 point review of systems obtained and negative except as noted in HPI    Objective   Objective     Vitals:   Temp:  [100.1 °F (37.8 °C)] 100.1 °F (37.8 °C)  Heart Rate:  [111-125] 111  Resp:  [18-22] 22  BP: (84)/(56) 84/56    Physical Exam   General: NAD, WDWN elderly male lying on the gurney comfortably  Eyes: Clear conjunctiva, PERRL EOMI  ENMT: mmm, normal oropharynx, dentures in place  Resp: Coarse breath sounds throughout with scattered wheezes no rhonchi or rales good effort no dyspnea  CV: Tachycardic regular rhythm no mrg, no LE edema  GI: abdomen is soft, NT, ND, +bs  Neuro: Moves all extremities spontaneously, sensation intact  Psych: AOx3, normal mood and affect      Result Review    Result Review:  I have personally reviewed the results from the time of this admission to 2/7/2022 15:40 EST and agree with these findings:  [x]  Laboratory  White count 20,000 with left shift  [x]  Microbiology blood cultures and Covid swab pending  [x]  Radiology chest x-ray: New large area of airspace consolidation in the right midlung  [x]  EKG/Telemetry sinus rhythm tachycardic into the 1 teens  []  Cardiology/Vascular   []  Pathology  []  Old records  []  Other:      Assessment/Plan   Assessment / Plan     Assessment:   • Sepsis  • Right-sided community-acquired pneumonia causing sepsis  • Acute hypoxic respiratory failure due to above  • COVID-19 rule out  • COPD  • Hypertension  • Hyperlipidemia    Plan:     • CT chest with contrast and lower extremity duplex to rule out DVT given the history of left leg swelling this week  • Sees Dr. Choudhary as an outpatient.  If there are abnormal findings on CT or patient does not clinically improve will consult pulmonology  • Initiating azithromycin and  Rocephin  • Sputum culture, strep Legionella urinary antigen  • Brovana Pulmicort duo nebs, bronchopulmonary and bronchodilator protocols  • Home antihypertensives, aspirin, Pletal, metoprolol statin and Flomax resumed  • No CIWA protocol for now, if patient develops withdrawal symptoms will need to initiate    DVT ppx: Lovenox  Diet: Regular  Discussed with bedside RN and ER physician    Admission Status:  I believe this patient meets inpatient status.    Electronically signed by Millie Valera MD, 02/07/22, 3:40 PM EST.

## 2022-02-07 NOTE — ED PROVIDER NOTES
Time: 1:57 PM EST  Arrived by: private car  Chief Complaint: Cough fever shortness of breath and weakness  History provided by: Patient  History is limited by: Nothing    History of Present Illness:  Patient is a 82 y.o. year old male who presents to the emergency department with cough fever shortness of breath and weakness for the last 2 days.  The patient has a prior history of lung cancer which was diagnosed in 2016 and for which he had a lobectomy and chemotherapy.  The patient has been cancer free since then.  The patient states that the last 2 days he has developed a cough fever chills and weakness.  He also has had some shortness of breath.  The patient has been vaccinated for Covid.  He has not contracted Covid.            HPI    Similar Symptoms Previously: yes  Recently seen: no      Patient Care Team  Primary Care Provider: Payam Carroll MD    Past Medical History:     No Known Allergies  Past Medical History:   Diagnosis Date   • Anemia    • Arthritis    • COPD (chronic obstructive pulmonary disease) (HCC)     INHALER  PRN   • Hyperlipidemia    • Hypertension     ON MEDS   • Lung cancer (HCC)    • Pneumonia     LEFT LUNG CA   • SOB (shortness of breath)    • Stroke (HCC)    • TIA (transient ischemic attack)     NO RESIDUAL 2010     Past Surgical History:   Procedure Laterality Date   • LUNG BIOPSY     • LUNG SURGERY      REMOVAL HALF OF UPPER PORTION LEFT LUNG   • ORTHOPEDIC SURGERY Left     ROTATOR CUFF   • THROAT SURGERY       Family History   Problem Relation Age of Onset   • Stomach cancer Other    • Cancer Mother        Home Medications:  Prior to Admission medications    Medication Sig Start Date End Date Taking? Authorizing Provider   amLODIPine (NORVASC) 5 MG tablet amlodipine 5 mg oral tablet take 1 tablet (5 mg) by oral route once daily   Active    Provider, MD Maxine   Aspirin Low Dose 81 MG EC tablet  4/19/21   Provider, MD Maxine   cetirizine (zyrTEC) 10 MG tablet cetirizine  10 mg oral tablet take 1 tablet (10 mg) by oral route once daily   Active    Maxine Espinoza MD   Cholecalciferol 25 MCG (1000 UT) capsule     Maxine Espinoza MD   cilostazol (PLETAL) 100 MG tablet  21   Maxine Espinoza MD   cyclobenzaprine (FLEXERIL) 10 MG tablet cyclobenzaprine 10 mg oral tablet take 1 tablet (10 mg) by oral route 2 times per day   Active    Maxine Espinoza MD   Fluticasone-Umeclidin-Vilant (Trelegy Ellipta) 100-62.5-25 MCG/INH inhaler Inhale 1 puff Daily. 21   Meliza Iglesias APRN   metoprolol succinate XL (TOPROL-XL) 25 MG 24 hr tablet metoprolol succinate 25 mg oral tablet extended release 24 hr take 1 tablet (25 mg) by oral route once daily   Active    Maxine Espinoza MD   multivitamins-minerals (PRESERVISION AREDS 2) capsule capsule  21   Maxine Espinoza MD   pantoprazole (PROTONIX) 40 MG EC tablet  21   Maxine Espinoza MD   pravastatin (PRAVACHOL) 20 MG tablet  3/29/21   Maxine Espinoza MD   tamsulosin (FLOMAX) 0.4 MG capsule 24 hr capsule  21   Maxine Espinoza MD        Social History:   Social History     Tobacco Use   • Smoking status: Former Smoker     Packs/day: 1.00     Years: 55.00     Pack years: 55.00     Types: Cigarettes     Quit date:      Years since quittin.1   • Smokeless tobacco: Former User   Vaping Use   • Vaping Use: Never used   Substance Use Topics   • Alcohol use: Yes     Comment: 3-4 GIN TONICS/NIGHT   • Drug use: Never     Recent travel: no     Review of Systems:  Review of Systems   Constitutional: Positive for chills, fatigue and fever.   HENT: Positive for congestion. Negative for mouth sores, rhinorrhea, sore throat and voice change.    Eyes: Negative for redness, itching and visual disturbance.   Respiratory: Positive for cough, chest tightness and shortness of breath.    Cardiovascular: Positive for chest pain. Negative for leg swelling.   Gastrointestinal: Negative for  "abdominal pain, constipation, nausea and vomiting.   Endocrine: Negative for cold intolerance, polydipsia and polyuria.   Genitourinary: Negative for difficulty urinating, flank pain, hematuria and urgency.   Musculoskeletal: Negative for arthralgias, back pain, joint swelling, myalgias, neck pain and neck stiffness.   Skin: Negative for color change, rash and wound.   Allergic/Immunologic: Negative.  Negative for environmental allergies.   Neurological: Positive for weakness and light-headedness. Negative for dizziness, syncope and numbness.   Hematological: Negative.    Psychiatric/Behavioral: Negative.         Physical Exam:  /67 (BP Location: Right arm, Patient Position: Sitting)   Pulse 107   Temp 97.9 °F (36.6 °C) (Oral)   Resp 18   Ht 170.2 cm (67\")   Wt 68.8 kg (151 lb 10.8 oz)   SpO2 93%   BMI 23.76 kg/m²     Physical Exam  Vitals and nursing note reviewed.   Constitutional:       General: He is in acute distress.      Appearance: He is ill-appearing.   HENT:      Head: Normocephalic and atraumatic.      Mouth/Throat:      Mouth: Mucous membranes are moist.      Pharynx: Oropharynx is clear.   Eyes:      Extraocular Movements: Extraocular movements intact.      Pupils: Pupils are equal, round, and reactive to light.   Cardiovascular:      Rate and Rhythm: Regular rhythm. Tachycardia present.   Pulmonary:      Effort: Respiratory distress present.      Breath sounds: Examination of the right-middle field reveals wheezing and rhonchi. Examination of the left-middle field reveals wheezing. Wheezing and rhonchi present.   Chest:      Chest wall: No mass, tenderness or crepitus. There is no dullness to percussion.   Abdominal:      General: Bowel sounds are normal.      Palpations: Abdomen is soft. There is no mass.      Tenderness: There is no abdominal tenderness.   Musculoskeletal:         General: Normal range of motion.      Cervical back: Normal range of motion and neck supple.   Skin:     " General: Skin is warm and dry.      Capillary Refill: Capillary refill takes less than 2 seconds.   Neurological:      General: No focal deficit present.      Mental Status: He is alert and oriented to person, place, and time.   Psychiatric:         Mood and Affect: Mood normal.         Behavior: Behavior normal.                Medications in the Emergency Department:  Medications   sodium chloride 0.9 % flush 10 mL (10 mL Intravenous Given 2/8/22 0847)   sodium chloride 0.9 % flush 10 mL (10 mL Intravenous Given 2/7/22 2032)   acetaminophen (TYLENOL) tablet 650 mg ( Oral Canceled Entry 2/7/22 2330)   famotidine (PEPCID) tablet 40 mg (40 mg Oral Given 2/8/22 0844)   sennosides-docusate (PERICOLACE) 8.6-50 MG per tablet 2 tablet (2 tablets Oral Given 2/8/22 0844)     And   polyethylene glycol (MIRALAX) packet 17 g (has no administration in time range)     And   bisacodyl (DULCOLAX) EC tablet 5 mg (has no administration in time range)     And   bisacodyl (DULCOLAX) suppository 10 mg (has no administration in time range)   ondansetron (ZOFRAN) injection 4 mg (has no administration in time range)   melatonin tablet 5 mg ( Oral Canceled Entry 2/7/22 2330)   arformoterol (BROVANA) nebulizer solution 15 mcg (15 mcg Nebulization Given 2/8/22 0735)   budesonide (PULMICORT) nebulizer solution 0.5 mg (0.5 mg Nebulization Given 2/8/22 0735)   ipratropium-albuterol (DUO-NEB) nebulizer solution 3 mL (3 mL Nebulization Given 2/8/22 1431)   AZITHROMYCIN 500 MG/250 ML 0.9% NS IVPB (vial-mate) (has no administration in time range)   cefTRIAXone (ROCEPHIN) IVPB 1 g (1 g Intravenous New Bag 2/8/22 1408)   cilostazol (PLETAL) tablet 100 mg (100 mg Oral Given 2/8/22 0846)   aspirin EC tablet 81 mg (81 mg Oral Given 2/8/22 0845)   cetirizine (zyrTEC) tablet 10 mg (10 mg Oral Given 2/8/22 0845)   cyclobenzaprine (FLEXERIL) tablet 10 mg (has no administration in time range)   metoprolol succinate XL (TOPROL-XL) 24 hr tablet 25 mg (25 mg  Oral Given 2/8/22 0845)   pantoprazole (PROTONIX) EC tablet 40 mg (40 mg Oral Given 2/8/22 0926)   pravastatin (PRAVACHOL) tablet 20 mg (20 mg Oral Given 2/8/22 0926)   amLODIPine (NORVASC) tablet 5 mg (5 mg Oral Given 2/8/22 0845)   tamsulosin (FLOMAX) 24 hr capsule 0.4 mg (0.4 mg Oral Given 2/8/22 0845)   enoxaparin (LOVENOX) syringe 40 mg (40 mg Subcutaneous Given 2/7/22 2107)   sodium chloride 0.9 % bolus 2,046 mL (0 mL/kg × 68.2 kg Intravenous Stopped 2/7/22 1553)   cefTRIAXone (ROCEPHIN) IVPB 2 g (0 g Intravenous Stopped 2/7/22 1533)   AZITHROMYCIN 500 MG/250 ML 0.9% NS IVPB (vial-mate) (0 mg Intravenous Stopped 2/7/22 1604)   ipratropium-albuterol (DUO-NEB) nebulizer solution 3 mL (3 mL Nebulization Given 2/7/22 1437)   methylPREDNISolone sodium succinate (SOLU-Medrol) injection 125 mg (125 mg Intravenous Given 2/7/22 1504)   iopamidol (ISOVUE-370) 76 % injection 100 mL (100 mL Intravenous Given 2/7/22 1710)        Labs  Lab Results (last 24 hours)     Procedure Component Value Units Date/Time    Blood Culture - Blood, Arm, Left [[id number]] Collected: 02/07/22 1459    Specimen: Blood from Arm, Left Updated: 02/07/22 1509    Calcium, Ionized [[id number]]  (Abnormal) Collected: 02/07/22 1501    Specimen: Blood Updated: 02/07/22 1513     Ionized Calcium, Arterial 1.07 mmol/L     COVID-19,APTIMA PANTHER(TERESA),[facility], NP/OP SWAB IN UTM/VTM/SALINE TRANSPORT MEDIA,24 HR TAT - Swab, Nasopharynx [[id number]]  (Normal) Collected: 02/07/22 1518    Specimen: Swab from Nasopharynx Updated: 02/07/22 2204     COVID19 Not Detected    Narrative:      Fact sheet for providers: https://www.fda.gov/media/873903/download     Fact sheet for patients: https://www.fda.gov/media/258331/download    Test performed by RT PCR.    S. Pneumo Ag Urine or CSF - Urine, Urine, Clean Catch [[id number]]  (Normal) Collected: 02/07/22 2153    Specimen: Urine, Clean Catch Updated: 02/08/22 0205     Strep Pneumo Ag Negative    Legionella Antigen,  Urine - Urine, Urine, Clean Catch [[id number]]  (Normal) Collected: 02/07/22 2153    Specimen: Urine, Clean Catch Updated: 02/08/22 0205     LEGIONELLA ANTIGEN, URINE Negative    Basic Metabolic Panel [[id number]]  (Abnormal) Collected: 02/08/22 0532    Specimen: Blood Updated: 02/08/22 0627     Glucose 120 mg/dL      BUN 17 mg/dL      Creatinine 0.63 mg/dL      Sodium 139 mmol/L      Potassium 3.9 mmol/L      Chloride 105 mmol/L      CO2 26.1 mmol/L      Calcium 9.2 mg/dL      eGFR Non African Amer 122 mL/min/1.73      BUN/Creatinine Ratio 27.0     Anion Gap 7.9 mmol/L     Narrative:      GFR Normal >60  Chronic Kidney Disease <60  Kidney Failure <15      CBC (No Diff) [[id number]]  (Abnormal) Collected: 02/08/22 0532    Specimen: Blood Updated: 02/08/22 0607     WBC 17.88 10*3/mm3      RBC 4.18 10*6/mm3      Hemoglobin 11.9 g/dL      Hematocrit 37.2 %      MCV 89.0 fL      MCH 28.5 pg      MCHC 32.0 g/dL      RDW 14.6 %      RDW-SD 46.5 fl      MPV 10.4 fL      Platelets 185 10*3/mm3            Imaging:  CT Chest Pulmonary Embolism    Result Date: 2/7/2022  PROCEDURE: CT CHEST PULMONARY EMBOLISM W CONTRAST  COMPARISON:  [facility], CT, CT CHEST WO CONTRAST DIAGNOSTIC, 9/21/2021, 7:52. INDICATIONS: rule out PE  PROTOCOL:   PE imaging protocol performed    RADIATION:   DLP: 333mGy*cm   Automated exposure control was utilized to minimize radiation dose. CONTRAST: 100cc Isovue 370 I.V. LABS:   eGFR: >60ml/min/1.73m2  TECHNIQUE: Axial images of the chest with intravenous contrast.  FINDINGS: No pulmonary emboli are identified.  There is fairly extensive airspace consolidation in the right upper lobe and superior segment of the right lower lobe.  Paraseptal emphysema is present.  No evidence of pleural or pericardial effusion.  The thoracic aorta has a normal caliber.  Coronary artery calcification is present.  No evidence of pneumothorax.  3 mm nonobstructing left renal calculus.  Images of the upper  abdomen are otherwise unremarkable.  Degenerative changes are present in the thoracic spine.  There are multiple chronic appearing compression deformities in the mid and lower thoracic spine.  IMPRESSION:  1. No pulmonary emboli are identified.  2. Fairly extensive airspace consolidation in the right upper lobe and superior segment of the right lower lobe likely secondary to bacterial pneumonia.   3. 3 mm nonobstructing left renal calculus.   LEAH COLBERT MD       Electronically Signed and Approved By: LEAH COLBERT MD on 2/07/2022 at 17:27               Procedures:  Procedures    Progress:     sepsis was recognized at 1450 and the patient will have IV fluids and antibiotics ordered for treatment of sepsis.                            Medical Decision Making:  MDM  Number of Diagnoses or Management Options     Amount and/or Complexity of Data Reviewed  Clinical lab tests: reviewed  Tests in the radiology section of CPT®: reviewed  Tests in the medicine section of CPT®: reviewed  Decide to obtain previous medical records or to obtain history from someone other than the patient: yes  Obtain history from someone other than the patient: yes  Review and summarize past medical records: yes  Discuss the patient with other providers: yes  Independent visualization of images, tracings, or specimens: yes    Patient Progress  Patient progress: improved       Final diagnoses:   Pneumonia of right lung due to infectious organism, unspecified part of lung        Disposition:  ED Disposition     ED Disposition Condition Comment    Decision to Admit  Level of Care: Telemetry [5]   Diagnosis: Pneumonia of right lung due to infectious organism, unspecified part of lung [[id number]]   Isolate for COVID?: Yes [1]   Certification: I Certify That Inpatient Hospital Services Are Medically Necessary For Greater Than 2 Midnights            This medical record created using voice recognition software and may contain unintended  errors.           Rashid See, DO  02/08/22 1441

## 2022-02-08 ENCOUNTER — APPOINTMENT (OUTPATIENT)
Dept: CARDIOLOGY | Facility: HOSPITAL | Age: 82
End: 2022-02-08

## 2022-02-08 LAB
ANION GAP SERPL CALCULATED.3IONS-SCNC: 7.9 MMOL/L (ref 5–15)
BH CV LOWER VASCULAR LEFT COMMON FEMORAL AUGMENT: NORMAL
BH CV LOWER VASCULAR LEFT COMMON FEMORAL COMPETENT: NORMAL
BH CV LOWER VASCULAR LEFT COMMON FEMORAL COMPRESS: NORMAL
BH CV LOWER VASCULAR LEFT COMMON FEMORAL PHASIC: NORMAL
BH CV LOWER VASCULAR LEFT COMMON FEMORAL SPONT: NORMAL
BH CV LOWER VASCULAR LEFT DISTAL FEMORAL COMPRESS: NORMAL
BH CV LOWER VASCULAR LEFT GREATER SAPH AK COMPRESS: NORMAL
BH CV LOWER VASCULAR LEFT MID FEMORAL AUGMENT: NORMAL
BH CV LOWER VASCULAR LEFT MID FEMORAL COMPETENT: NORMAL
BH CV LOWER VASCULAR LEFT MID FEMORAL COMPRESS: NORMAL
BH CV LOWER VASCULAR LEFT MID FEMORAL PHASIC: NORMAL
BH CV LOWER VASCULAR LEFT MID FEMORAL SPONT: NORMAL
BH CV LOWER VASCULAR LEFT PERONEAL COMPRESS: NORMAL
BH CV LOWER VASCULAR LEFT POPLITEAL AUGMENT: NORMAL
BH CV LOWER VASCULAR LEFT POPLITEAL COMPETENT: NORMAL
BH CV LOWER VASCULAR LEFT POPLITEAL COMPRESS: NORMAL
BH CV LOWER VASCULAR LEFT POPLITEAL PHASIC: NORMAL
BH CV LOWER VASCULAR LEFT POPLITEAL SPONT: NORMAL
BH CV LOWER VASCULAR LEFT POSTERIOR TIBIAL COMPRESS: NORMAL
BH CV LOWER VASCULAR LEFT PROXIMAL FEMORAL COMPRESS: NORMAL
BH CV LOWER VASCULAR RIGHT COMMON FEMORAL AUGMENT: NORMAL
BH CV LOWER VASCULAR RIGHT COMMON FEMORAL COMPETENT: NORMAL
BH CV LOWER VASCULAR RIGHT COMMON FEMORAL COMPRESS: NORMAL
BH CV LOWER VASCULAR RIGHT COMMON FEMORAL PHASIC: NORMAL
BH CV LOWER VASCULAR RIGHT COMMON FEMORAL SPONT: NORMAL
BH CV LOWER VASCULAR RIGHT DISTAL FEMORAL COMPRESS: NORMAL
BH CV LOWER VASCULAR RIGHT GREATER SAPH AK COMPRESS: NORMAL
BH CV LOWER VASCULAR RIGHT MID FEMORAL AUGMENT: NORMAL
BH CV LOWER VASCULAR RIGHT MID FEMORAL COMPETENT: NORMAL
BH CV LOWER VASCULAR RIGHT MID FEMORAL COMPRESS: NORMAL
BH CV LOWER VASCULAR RIGHT MID FEMORAL PHASIC: NORMAL
BH CV LOWER VASCULAR RIGHT MID FEMORAL SPONT: NORMAL
BH CV LOWER VASCULAR RIGHT PERONEAL COMPRESS: NORMAL
BH CV LOWER VASCULAR RIGHT POPLITEAL AUGMENT: NORMAL
BH CV LOWER VASCULAR RIGHT POPLITEAL COMPETENT: NORMAL
BH CV LOWER VASCULAR RIGHT POPLITEAL COMPRESS: NORMAL
BH CV LOWER VASCULAR RIGHT POPLITEAL PHASIC: NORMAL
BH CV LOWER VASCULAR RIGHT POPLITEAL SPONT: NORMAL
BH CV LOWER VASCULAR RIGHT POSTERIOR TIBIAL COMPRESS: NORMAL
BH CV LOWER VASCULAR RIGHT PROXIMAL FEMORAL COMPRESS: NORMAL
BUN SERPL-MCNC: 17 MG/DL (ref 8–23)
BUN/CREAT SERPL: 27 (ref 7–25)
CALCIUM SPEC-SCNC: 9.2 MG/DL (ref 8.6–10.5)
CHLORIDE SERPL-SCNC: 105 MMOL/L (ref 98–107)
CO2 SERPL-SCNC: 26.1 MMOL/L (ref 22–29)
CREAT SERPL-MCNC: 0.63 MG/DL (ref 0.76–1.27)
DEPRECATED RDW RBC AUTO: 46.5 FL (ref 37–54)
ERYTHROCYTE [DISTWIDTH] IN BLOOD BY AUTOMATED COUNT: 14.6 % (ref 12.3–15.4)
GFR SERPL CREATININE-BSD FRML MDRD: 122 ML/MIN/1.73
GLUCOSE SERPL-MCNC: 120 MG/DL (ref 65–99)
HCT VFR BLD AUTO: 37.2 % (ref 37.5–51)
HGB BLD-MCNC: 11.9 G/DL (ref 13–17.7)
L PNEUMO1 AG UR QL IA: NEGATIVE
MAXIMAL PREDICTED HEART RATE: 137 BPM
MCH RBC QN AUTO: 28.5 PG (ref 26.6–33)
MCHC RBC AUTO-ENTMCNC: 32 G/DL (ref 31.5–35.7)
MCV RBC AUTO: 89 FL (ref 79–97)
PLATELET # BLD AUTO: 185 10*3/MM3 (ref 140–450)
PMV BLD AUTO: 10.4 FL (ref 6–12)
POTASSIUM SERPL-SCNC: 3.9 MMOL/L (ref 3.5–5.2)
QT INTERVAL: 287 MS
RBC # BLD AUTO: 4.18 10*6/MM3 (ref 4.14–5.8)
S PNEUM AG SPEC QL LA: NEGATIVE
SODIUM SERPL-SCNC: 139 MMOL/L (ref 136–145)
STRESS TARGET HR: 116 BPM
WBC NRBC COR # BLD: 17.88 10*3/MM3 (ref 3.4–10.8)

## 2022-02-08 PROCEDURE — 94760 N-INVAS EAR/PLS OXIMETRY 1: CPT

## 2022-02-08 PROCEDURE — 94799 UNLISTED PULMONARY SVC/PX: CPT

## 2022-02-08 PROCEDURE — 25010000002 AZITHROMYCIN PER 500 MG: Performed by: INTERNAL MEDICINE

## 2022-02-08 PROCEDURE — 93970 EXTREMITY STUDY: CPT

## 2022-02-08 PROCEDURE — 99232 SBSQ HOSP IP/OBS MODERATE 35: CPT | Performed by: INTERNAL MEDICINE

## 2022-02-08 PROCEDURE — 80048 BASIC METABOLIC PNL TOTAL CA: CPT | Performed by: INTERNAL MEDICINE

## 2022-02-08 PROCEDURE — 93970 EXTREMITY STUDY: CPT | Performed by: SURGERY

## 2022-02-08 PROCEDURE — 25010000002 ENOXAPARIN PER 10 MG: Performed by: INTERNAL MEDICINE

## 2022-02-08 PROCEDURE — 85027 COMPLETE CBC AUTOMATED: CPT | Performed by: INTERNAL MEDICINE

## 2022-02-08 PROCEDURE — 36415 COLL VENOUS BLD VENIPUNCTURE: CPT | Performed by: INTERNAL MEDICINE

## 2022-02-08 PROCEDURE — 25010000002 CEFTRIAXONE PER 250 MG: Performed by: INTERNAL MEDICINE

## 2022-02-08 PROCEDURE — 94761 N-INVAS EAR/PLS OXIMETRY MLT: CPT

## 2022-02-08 RX ADMIN — ARFORMOTEROL TARTRATE 15 MCG: 15 SOLUTION RESPIRATORY (INHALATION) at 07:35

## 2022-02-08 RX ADMIN — IPRATROPIUM BROMIDE AND ALBUTEROL SULFATE 3 ML: 2.5; .5 SOLUTION RESPIRATORY (INHALATION) at 00:03

## 2022-02-08 RX ADMIN — BUDESONIDE 0.5 MG: 0.5 SUSPENSION RESPIRATORY (INHALATION) at 07:35

## 2022-02-08 RX ADMIN — AMLODIPINE BESYLATE 5 MG: 5 TABLET ORAL at 08:45

## 2022-02-08 RX ADMIN — METOPROLOL SUCCINATE 25 MG: 25 TABLET, FILM COATED, EXTENDED RELEASE ORAL at 08:45

## 2022-02-08 RX ADMIN — SENNOSIDES AND DOCUSATE SODIUM 2 TABLET: 50; 8.6 TABLET ORAL at 08:44

## 2022-02-08 RX ADMIN — IPRATROPIUM BROMIDE AND ALBUTEROL SULFATE 3 ML: 2.5; .5 SOLUTION RESPIRATORY (INHALATION) at 10:41

## 2022-02-08 RX ADMIN — ENOXAPARIN SODIUM 40 MG: 100 INJECTION SUBCUTANEOUS at 20:29

## 2022-02-08 RX ADMIN — SODIUM CHLORIDE, PRESERVATIVE FREE 10 ML: 5 INJECTION INTRAVENOUS at 20:30

## 2022-02-08 RX ADMIN — CILOSTAZOL 100 MG: 100 TABLET ORAL at 08:46

## 2022-02-08 RX ADMIN — IPRATROPIUM BROMIDE AND ALBUTEROL SULFATE 3 ML: 2.5; .5 SOLUTION RESPIRATORY (INHALATION) at 07:35

## 2022-02-08 RX ADMIN — IPRATROPIUM BROMIDE AND ALBUTEROL SULFATE 3 ML: 2.5; .5 SOLUTION RESPIRATORY (INHALATION) at 14:31

## 2022-02-08 RX ADMIN — TAMSULOSIN HYDROCHLORIDE 0.4 MG: 0.4 CAPSULE ORAL at 08:45

## 2022-02-08 RX ADMIN — FAMOTIDINE 40 MG: 20 TABLET, FILM COATED ORAL at 08:44

## 2022-02-08 RX ADMIN — CILOSTAZOL 100 MG: 100 TABLET ORAL at 20:30

## 2022-02-08 RX ADMIN — CEFTRIAXONE SODIUM 1 G: 1 INJECTION, SOLUTION INTRAVENOUS at 14:08

## 2022-02-08 RX ADMIN — PRAVASTATIN SODIUM 20 MG: 20 TABLET ORAL at 09:26

## 2022-02-08 RX ADMIN — IPRATROPIUM BROMIDE AND ALBUTEROL SULFATE 3 ML: 2.5; .5 SOLUTION RESPIRATORY (INHALATION) at 23:11

## 2022-02-08 RX ADMIN — AZITHROMYCIN 500 MG: 500 INJECTION, POWDER, LYOPHILIZED, FOR SOLUTION INTRAVENOUS at 15:01

## 2022-02-08 RX ADMIN — CETIRIZINE HYDROCHLORIDE 10 MG: 10 TABLET, FILM COATED ORAL at 08:45

## 2022-02-08 RX ADMIN — SODIUM CHLORIDE, PRESERVATIVE FREE 10 ML: 5 INJECTION INTRAVENOUS at 08:47

## 2022-02-08 RX ADMIN — PANTOPRAZOLE SODIUM 40 MG: 40 TABLET, DELAYED RELEASE ORAL at 09:26

## 2022-02-08 RX ADMIN — ASPIRIN 81 MG: 81 TABLET, COATED ORAL at 08:45

## 2022-02-08 RX ADMIN — CYCLOBENZAPRINE 10 MG: 10 TABLET, FILM COATED ORAL at 20:54

## 2022-02-08 NOTE — PLAN OF CARE
Goal Outcome Evaluation:  Plan of Care Reviewed With: patient           Outcome Summary: Patient presents with no strength, balance, functional mobility deficits.  He is able to ambulate ad chinedu. in his room and does not need inpatient PT services at this point in time.

## 2022-02-08 NOTE — PLAN OF CARE
Goal Outcome Evaluation:  Plan of Care Reviewed With: patient        Progress: no change  Outcome Summary: Pt admitted tonight due to hypoxia with Sepsis and RML suspected PNA. Pt needing 2L on NC to keep O@ sat above 90%. COVID negative. ABO and breathing treatments started.

## 2022-02-08 NOTE — PLAN OF CARE
Goal Outcome Evaluation:   Patient ambulated in the lux with this RN and a male family member. We walked from his room to the opposite end nurses station. Patient complained of weakness and left foot drag and at times he had his weight on his family member. He has had no other complaints at this time. IV antibiotics currently infusing and he is tolerating well.

## 2022-02-08 NOTE — THERAPY EVALUATION
Acute Care - Physical Therapy Initial Evaluation   Angela     Patient Name: Carlos Zimmerman  : 1940  MRN: [id number]  Today's Date: 2022      Visit Dx:     ICD-10-CM ICD-9-CM   1. Pneumonia of right lung due to infectious organism, unspecified part of lung  J18.9 483.8   2. Difficulty walking  R26.2 719.7     Patient Active Problem List   Diagnosis   • Cancer (HCC)   • COPD (chronic obstructive pulmonary disease) (HCC)   • Hypertension   • Low back pain   • Lung cancer (HCC)   • Lung disease   • Solitary lung nodule   • Pneumonia of right lung due to infectious organism     Past Medical History:   Diagnosis Date   • Anemia    • Arthritis    • COPD (chronic obstructive pulmonary disease) (HCC)     INHALER  PRN   • Hyperlipidemia    • Hypertension     ON MEDS   • Lung cancer (HCC)    • Pneumonia     LEFT LUNG CA   • SOB (shortness of breath)    • Stroke (HCC)    • TIA (transient ischemic attack)     NO RESIDUAL      Past Surgical History:   Procedure Laterality Date   • LUNG BIOPSY     • LUNG SURGERY      REMOVAL HALF OF UPPER PORTION LEFT LUNG   • ORTHOPEDIC SURGERY Left     ROTATOR CUFF   • THROAT SURGERY       PT Assessment (last 12 hours)     PT Evaluation and Treatment     Row Name 22 1400          Physical Therapy Time and Intention    Subjective Information no complaints  -DP     Document Type evaluation  -DP     Mode of Treatment individual therapy; physical therapy  -DP     Patient Effort good  -DP     Row Name 22 1400          General Information    Patient Profile Reviewed yes  -DP     Patient Observations alert; cooperative; agree to therapy  -DP     Prior Level of Function independent:; gait; transfer; bed mobility; ADL's  -DP     Equipment Currently Used at Home none  -DP     Row Name 22 1400          Living Environment    Current Living Arrangements home/apartment/condo  -DP     Home Accessibility stairs to enter home  -DP     Lives With spouse  -DP     Row Name  02/08/22 1400          Home Main Entrance    Number of Stairs, Main Entrance none  -DP     Row Name 02/08/22 1400          Range of Motion (ROM)    Range of Motion bilateral lower extremities; ROM is WFL  -DP     Row Name 02/08/22 1400          Strength (Manual Muscle Testing)    Strength (Manual Muscle Testing) bilateral lower extremities; strength is WFL  -DP     Row Name 02/08/22 1400          Bed Mobility    Bed Mobility supine-sit-supine  -DP     Supine-Sit-Supine [place] (Bed Mobility) supervision  -DP     Row Name 02/08/22 1400          Transfers    Transfers sit-stand transfer  -DP     Sit-Stand [place] (Transfers) supervision  -DP     Row Name 02/08/22 1400          Gait/Stairs (Locomotion)    Comment (Gait/Stairs) Patient is able to ambulate ad chinedu. in his room without an assistive device  -DP     Row Name             Wound 02/07/22 2200 Left distal leg Skin Tear    Wound - Properties Group Placement Date: 02/07/22  - Placement Time: 2200  -AH Present on Hospital Admission: Y  -AH Side: Left  -AH Orientation: distal  -AH Location: leg  -AH Primary Wound Type: Skin tear  -AH     Retired Wound - Properties Group Date first assessed: 02/07/22  - Time first assessed: 2200  -AH Present on Hospital Admission: Y  -AH Side: Left  -AH Location: leg  -AH Primary Wound Type: Skin tear  -AH     Row Name             Wound 02/07/22 2200 Left lower leg Skin Tear    Wound - Properties Group Placement Date: 02/07/22  - Placement Time: 2200  -AH Side: Left  -AH Orientation: lower  -AH, proximal left lower leg  Location: leg  -AH Primary Wound Type: Skin tear  -AH     Retired Wound - Properties Group Date first assessed: 02/07/22  - Time first assessed: 2200  -AH Side: Left  -AH Location: leg  -AH Primary Wound Type: Skin tear  -AH     Row Name 02/08/22 1400          Plan of Care Review    Plan of Care Reviewed With patient  -DP     Outcome Summary Patient presents with no strength, balance, functional  mobility deficits.  He is able to ambulate ad chinedu. in his room and does not need inpatient PT services at this point in time.  -DP     Row Name 02/08/22 1400          PT Evaluation Complexity    History, PT Evaluation Complexity no personal factors and/or comorbidities  -DP     Examination of Body Systems (PT Eval Complexity) total of 4 or more elements  -DP     Clinical Presentation (PT Evaluation Complexity) stable  -DP     Clinical Decision Making (PT Evaluation Complexity) low complexity  -DP     Overall Complexity (PT Evaluation Complexity) low complexity  -DP           User Key  (r) = Recorded By, (t) = Taken By, (c) = Cosigned By    Initials Name Provider Type    DP Taylor Edwards, PT Physical Therapist    Misty Lund RN Registered Nurse                  PT Recommendation and Plan  Anticipated Discharge Disposition (PT): home with assist, home with home health  Therapy Frequency (PT): evaluation only  Plan of Care Reviewed With: patient  Outcome Summary: Patient presents with no strength, balance, functional mobility deficits.  He is able to ambulate ad chinedu. in his room and does not need inpatient PT services at this point in time.   Outcome Measures     Row Name 02/08/22 1400             How much help from another person do you currently need...    Turning from your back to your side while in flat bed without using bedrails? 4  -DP      Moving from lying on back to sitting on the side of a flat bed without bedrails? 4  -DP      Moving to and from a bed to a chair (including a wheelchair)? 4  -DP      Standing up from a chair using your arms (e.g., wheelchair, bedside chair)? 4  -DP      Climbing 3-5 steps with a railing? 4  -DP      To walk in hospital room? 4  -DP      AM-PAC 6 Clicks Score (PT) 24  -DP              Functional Assessment    Outcome Measure Options AM-PAC 6 Clicks Basic Mobility (PT)  -DP            User Key  (r) = Recorded By, (t) = Taken By, (c) = Cosigned By    Initials Name  Provider Type    Taylor Pires, PT Physical Therapist                 Time Calculation:    PT Charges     Row Name 02/08/22 1413             Time Calculation    PT Received On 02/08/22  -DP              Untimed Charges    PT Eval/Re-eval Minutes 30  -DP              Total Minutes    Untimed Charges Total Minutes 30  -DP       Total Minutes 30  -DP            User Key  (r) = Recorded By, (t) = Taken By, (c) = Cosigned By    Initials Name Provider Type    Taylor Pires, PT Physical Therapist                  PT G-Codes  Outcome Measure Options: AM-PAC 6 Clicks Basic Mobility (PT)  AM-PAC 6 Clicks Score (PT): 24    Taylor Edwards, PT  2/8/2022

## 2022-02-08 NOTE — PROGRESS NOTES
[facility]   Hospitalist Progress Note  Date: 2022  Patient Name: Carlos Zimmerman  : 1940  MRN: [id number]  Date of admission: 2022    Subjective   Subjective     Chief Complaint:   Shortness of breath    Summary:   Carlos Zimmerman is a 82 y.o. male with COPD, history of lung cancer in 2016 status post left upper lobe lobectomy and chemo presents with shortness of breath.  His wife is at the bedside and helps give the history.  Approximately 5 days ago the patient developed left foot and knee pain with swelling to the top of his foot with redness, tender to ambulation but not to palpation, this has since resolved they emergently thought it was gout.  This morning the patient could not get out of bed due to severe weakness.  Has had a wet cough for 3 days but no fever or chills, cough is not currently productive.  Has been vaccinated x3 against COVID-19.  No one else has been ill.  In the emergency department patient is septic with elevated white count febrile and tachycardic, given sepsis IV fluids and broad-spectrum antibiotics.  Patient was hypoxic requiring 2 L nasal cannula to maintain oxygen saturation into the 90s.  Imaging consistent with right-sided pneumonia.  Patient notes that his normal heart rate is around 105 and this has been evaluated many times that he does not expect to get lower than that.    Interval Followup:   Patient states he is feeling better today, still with weakness and some difficulty walking today    Review of Systems  Denies nausea vomiting or diarrhea  No chest pain palpitations or shortness of breath  No fever no chills    Objective   Objective     Vitals:   Temp:  [97.3 °F (36.3 °C)-98.1 °F (36.7 °C)] 98 °F (36.7 °C)  Heart Rate:  [] 108  Resp:  [18-22] 22  BP: ()/(49-88) 129/88  Flow (L/min):  [2] 2    Physical Exam   General appearance: NAD, conversant   Eyes: anicteric sclerae, moist conjunctivae; no lid-lag; PERRLA  HENT: Atraumatic; oropharynx  clear with moist mucous membranes and no mucosal ulcerations; normal hard and soft palate  Neck: Trachea midline; FROM, supple, no thyromegaly or lymphadenopathy  Lungs: Right lower lobe rhonchi, with normal respiratory effort and no intercostal retractions  CV: Regular Rate and Rhythm, no Murmurs, Rubs, or Gallops   Abdomen: Soft, non-tender; no masses or hepatosplenomegaly  Extremities: No peripheral edema or extremity lymphadenopathy  Skin: Normal temperature, turgor and texture; no rash, ulcers or subcutaneous nodules  Psych: Appropriate affect, alert and oriented to person, place and time  Neuro: CN II - XII intact no motor deficits, no sensory deficits, globally weak    Result Review    Result Review:  I have personally reviewed the results from the time of this admission to 2/8/2022 18:40 EST and agree with these findings:  [x]  Laboratory  CBC    CBC 2/26/21 2/7/22 2/8/22   WBC 8.44 28.64 (A) 17.88 (A)   RBC 3.89 (A) 4.93 4.18   Hemoglobin 11.1 (A) 13.7 11.9 (A)   Hematocrit 34.0 (A) 41.4 37.2 (A)   MCV 87.4 84.0 89.0   MCH 28.5 27.8 28.5   MCHC 32.6 (A) 33.1 32.0   RDW 14.2 14.4 14.6   Platelets 101 (A) 211 185   (A) Abnormal value            CMP    CMP 4/2/21 2/7/22 2/8/22   Glucose 96 86 120 (A)   BUN 11 16 17   Creatinine 0.76 0.81 0.63 (A)   eGFR Non African Am  91 122   Sodium 144 137 139   Potassium 4.1 4.2 3.9   Chloride 105 99 105   Calcium 9.3 9.4 9.2   Albumin 4.0 4.10    Total Bilirubin 0.57 0.9    Alkaline Phosphatase 132 132 (A)    AST (SGOT) 20 28    ALT (SGPT) 17 22    (A) Abnormal value            [x]  Microbiology   Strep pneumo negative  Legionella negative  COVID-19 negative  Blood cultures no growth at 24 hours    []  Radiology  []  EKG/Telemetry   []  Cardiology/Vascular   []  Pathology  []  Old records  []  Other:    Assessment/Plan   Assessment / Plan     Assessment:  Sepsis  Right-sided community-acquired pneumonia  Acute hypoxemic respiratory failure  COVID-19 ruled  out  COPD  Hypertension  Hyperlipidemia    Plan:  • CT scan of the chest with contrast negative for pulmonary embolism  • Continue azithromycin, Rocephin  • Sputum culture, blood cultures ordered and pending  • Urine antigens as above  • Continue bronchodilators with Brovana, Pulmicort, duo nebs  • Resume home antihypertensives  • Continue home aspirin, Pletal, statin and Flomax  • No current indication for CIWA protocol     Reviewed patients labs and imaging, and discussed with patient and nurse at bedside.    DVT prophylaxis:  Medical DVT prophylaxis orders are present.    CODE STATUS:   Code Status (Patient has no pulse and is not breathing): CPR (Attempt to Resuscitate)  Medical Interventions (Patient has pulse or is breathing): Full Support        Electronically signed by Chase Klein MD, 02/08/22, 6:40 PM EST.

## 2022-02-09 ENCOUNTER — APPOINTMENT (OUTPATIENT)
Dept: GENERAL RADIOLOGY | Facility: HOSPITAL | Age: 82
End: 2022-02-09

## 2022-02-09 VITALS
SYSTOLIC BLOOD PRESSURE: 119 MMHG | HEIGHT: 67 IN | WEIGHT: 151.68 LBS | BODY MASS INDEX: 23.81 KG/M2 | HEART RATE: 112 BPM | OXYGEN SATURATION: 94 % | RESPIRATION RATE: 18 BRPM | TEMPERATURE: 98.2 F | DIASTOLIC BLOOD PRESSURE: 68 MMHG

## 2022-02-09 PROCEDURE — 94799 UNLISTED PULMONARY SVC/PX: CPT

## 2022-02-09 PROCEDURE — 25010000002 AZITHROMYCIN PER 500 MG: Performed by: INTERNAL MEDICINE

## 2022-02-09 PROCEDURE — 25010000002 CEFTRIAXONE PER 250 MG: Performed by: INTERNAL MEDICINE

## 2022-02-09 PROCEDURE — 73620 X-RAY EXAM OF FOOT: CPT

## 2022-02-09 PROCEDURE — 99239 HOSP IP/OBS DSCHRG MGMT >30: CPT | Performed by: INTERNAL MEDICINE

## 2022-02-09 RX ORDER — DOXYCYCLINE HYCLATE 100 MG/1
100 CAPSULE ORAL 2 TIMES DAILY
Qty: 14 CAPSULE | Refills: 0 | Status: SHIPPED | OUTPATIENT
Start: 2022-02-09 | End: 2022-02-16

## 2022-02-09 RX ORDER — METOPROLOL SUCCINATE 50 MG/1
50 TABLET, EXTENDED RELEASE ORAL DAILY
Status: DISCONTINUED | OUTPATIENT
Start: 2022-02-09 | End: 2022-02-09 | Stop reason: HOSPADM

## 2022-02-09 RX ORDER — METOPROLOL SUCCINATE 25 MG/1
50 TABLET, EXTENDED RELEASE ORAL DAILY
Qty: 60 TABLET | Refills: 0 | Status: SHIPPED | OUTPATIENT
Start: 2022-02-09 | End: 2022-07-10

## 2022-02-09 RX ADMIN — PANTOPRAZOLE SODIUM 40 MG: 40 TABLET, DELAYED RELEASE ORAL at 09:35

## 2022-02-09 RX ADMIN — IPRATROPIUM BROMIDE AND ALBUTEROL SULFATE 3 ML: 2.5; .5 SOLUTION RESPIRATORY (INHALATION) at 07:37

## 2022-02-09 RX ADMIN — METOPROLOL SUCCINATE 50 MG: 50 TABLET, EXTENDED RELEASE ORAL at 09:35

## 2022-02-09 RX ADMIN — CEFTRIAXONE SODIUM 1 G: 1 INJECTION, SOLUTION INTRAVENOUS at 14:27

## 2022-02-09 RX ADMIN — BUDESONIDE 0.5 MG: 0.5 SUSPENSION RESPIRATORY (INHALATION) at 07:38

## 2022-02-09 RX ADMIN — AMLODIPINE BESYLATE 5 MG: 5 TABLET ORAL at 09:34

## 2022-02-09 RX ADMIN — TAMSULOSIN HYDROCHLORIDE 0.4 MG: 0.4 CAPSULE ORAL at 09:34

## 2022-02-09 RX ADMIN — IPRATROPIUM BROMIDE AND ALBUTEROL SULFATE 3 ML: 2.5; .5 SOLUTION RESPIRATORY (INHALATION) at 02:54

## 2022-02-09 RX ADMIN — FAMOTIDINE 40 MG: 20 TABLET, FILM COATED ORAL at 09:34

## 2022-02-09 RX ADMIN — CILOSTAZOL 100 MG: 100 TABLET ORAL at 09:34

## 2022-02-09 RX ADMIN — IPRATROPIUM BROMIDE AND ALBUTEROL SULFATE 3 ML: 2.5; .5 SOLUTION RESPIRATORY (INHALATION) at 15:00

## 2022-02-09 RX ADMIN — ASPIRIN 81 MG: 81 TABLET, COATED ORAL at 09:35

## 2022-02-09 RX ADMIN — IPRATROPIUM BROMIDE AND ALBUTEROL SULFATE 3 ML: 2.5; .5 SOLUTION RESPIRATORY (INHALATION) at 11:26

## 2022-02-09 RX ADMIN — ARFORMOTEROL TARTRATE 15 MCG: 15 SOLUTION RESPIRATORY (INHALATION) at 07:37

## 2022-02-09 RX ADMIN — AZITHROMYCIN 500 MG: 500 INJECTION, POWDER, LYOPHILIZED, FOR SOLUTION INTRAVENOUS at 17:29

## 2022-02-09 RX ADMIN — SODIUM CHLORIDE, PRESERVATIVE FREE 10 ML: 5 INJECTION INTRAVENOUS at 09:45

## 2022-02-09 RX ADMIN — PRAVASTATIN SODIUM 20 MG: 20 TABLET ORAL at 09:36

## 2022-02-09 RX ADMIN — CETIRIZINE HYDROCHLORIDE 10 MG: 10 TABLET, FILM COATED ORAL at 09:34

## 2022-02-09 NOTE — PLAN OF CARE
Goal Outcome Evaluation:  Plan of Care Reviewed With: patient, spouse discussed with wife and pt that his toprol strength has changed.

## 2022-02-09 NOTE — PLAN OF CARE
Goal Outcome Evaluation:  Plan of Care Reviewed With: patient        Progress: improving  Outcome Summary: VSS and pt stable through the night. Skin is fragile and had a new skin tear tonight. IV bleeding around catheter insertion site. Monitoring. Up ad chinedu around room with rolling walker. Able to make needs known.

## 2022-02-09 NOTE — DISCHARGE SUMMARY
[facility]         HOSPITALIST  DISCHARGE SUMMARY    Patient Name: Carlos Zimmerman  : 1940  MRN: [id number]    Date of Admission: 2022  Date of Discharge:  2022  Primary Care Physician: Payam Carroll MD    Consults     No orders found from 2022 to 2022.          Active and Resolved Hospital Problems:  Sepsis  Right-sided community-acquired pneumonia  Acute hypoxemic respiratory failure  COVID-19 ruled out  COPD  Hypertension  Hyperlipidemia    Hospital Course     Hospital Course:  Carlos Zimmerman is a 82 y.o. male with COPD, history of lung cancer in 2016 status post left upper lobe lobectomy and chemo presents with shortness of breath.  His wife is at the bedside and helps give the history.  Approximately 5 days ago the patient developed left foot and knee pain with swelling to the top of his foot with redness, tender to ambulation but not to palpation, this has since resolved they emergently thought it was gout.  This morning the patient could not get out of bed due to severe weakness.  Has had a wet cough for 3 days but no fever or chills, cough is not currently productive.  Has been vaccinated x3 against COVID-19.  No one else has been ill.  In the emergency department patient is septic with elevated white count febrile and tachycardic, given sepsis IV fluids and broad-spectrum antibiotics.  Patient was hypoxic requiring 2 L nasal cannula to maintain oxygen saturation into the 90s.  Imaging consistent with right-sided pneumonia.  Patient notes that his normal heart rate is around 105 and this has been evaluated many times that he does not expect to get lower than that. Patient was CT scan of the chest which was negative for pulmonary embolism, patient underwent lower extremity Doppler which was negative for any DVTs. Patient underwent x-ray of the foot as he was having foot pain and erythema, there was no bony abnormalities noted. Patient is discharged on oral  antibiotics, he was ambulating better, he was anxious to go home. He is discharged home in stable condition. Patient follow-up with his PCP in 3 to 7 days of discharge.    Day of Discharge     Vital Signs:  Temp:  [97.5 °F (36.4 °C)-98.3 °F (36.8 °C)] 98.2 °F (36.8 °C)  Heart Rate:  [105-126] 112  Resp:  [16-22] 18  BP: (102-136)/(56-73) 119/68  Flow (L/min):  [2] 2    Physical Exam:   General appearance: NAD, conversant   Eyes: anicteric sclerae, moist conjunctivae; no lid-lag; PERRLA  HENT: Atraumatic; oropharynx clear with moist mucous membranes and no mucosal ulcerations; normal hard and soft palate  Neck: Trachea midline; FROM, supple, no thyromegaly or lymphadenopathy  Lungs: Right lower lobe rhonchi, with normal respiratory effort and no intercostal retractions  CV: Regular Rate and Rhythm, no Murmurs, Rubs, or Gallops   Abdomen: Soft, non-tender; no masses or hepatosplenomegaly  Extremities: No peripheral edema or extremity lymphadenopathy, left lower foot erythema  Skin: Normal temperature, turgor and texture; no rash, ulcers or subcutaneous nodules  Psych: Appropriate affect, alert and oriented to person, place and time  Neuro: CN II - XII intact no motor deficits, no sensory deficits, globally weak    Discharge Details        Discharge Medications      New Medications      Instructions Start Date   doxycycline 100 MG capsule  Commonly known as: VIBRAMYCIN   100 mg, Oral, 2 Times Daily         Changes to Medications      Instructions Start Date   metoprolol succinate XL 25 MG 24 hr tablet  Commonly known as: TOPROL-XL  What changed: how much to take   50 mg, Oral, Daily         Continue These Medications      Instructions Start Date   albuterol sulfate  (90 Base) MCG/ACT inhaler  Commonly known as: PROVENTIL HFA;VENTOLIN HFA;PROAIR HFA   2 puffs, Inhalation, Every 4 Hours PRN      amLODIPine 5 MG tablet  Commonly known as: NORVASC   5 mg, Oral, Daily      Aspirin Low Dose 81 MG EC tablet  Generic  drug: aspirin   81 mg, Oral, Daily      cetirizine 10 MG tablet  Commonly known as: zyrTEC   10 mg, Oral, Daily      Cholecalciferol 25 MCG (1000 UT) capsule   1,000 Units, Oral, Daily      cilostazol 100 MG tablet  Commonly known as: PLETAL   100 mg, Oral, 2 Times Daily      cyclobenzaprine 10 MG tablet  Commonly known as: FLEXERIL   10 mg, Oral, 3 Times Daily PRN      multivitamins-minerals capsule capsule   1 capsule, Oral, 2 Times Daily      naproxen 500 MG tablet  Commonly known as: NAPROSYN   500 mg, Oral, 2 Times Daily With Meals      pantoprazole 40 MG EC tablet  Commonly known as: PROTONIX   40 mg, Oral, Daily      pravastatin 20 MG tablet  Commonly known as: PRAVACHOL   20 mg, Oral, Daily      tamsulosin 0.4 MG capsule 24 hr capsule  Commonly known as: FLOMAX   1 capsule, Oral, Daily      Trelegy Ellipta 100-62.5-25 MCG/INH inhaler  Generic drug: Fluticasone-Umeclidin-Vilant   1 puff, Inhalation, Daily - RT             No Known Allergies    Discharge Disposition:  Home or Self Care    Diet:  Hospital:  Diet Order   Procedures   • Diet Regular       Discharge Activity:       CODE STATUS:  Code Status and Medical Interventions:   Ordered at: 02/07/22 1456     Code Status (Patient has no pulse and is not breathing):    CPR (Attempt to Resuscitate)     Medical Interventions (Patient has pulse or is breathing):    Full Support       Future Appointments   Date Time Provider Department Center   2/14/2022 11:15 AM [facility] CT 2 [facility]   3/24/2022  8:15 AM Dayne Choudhary MD [facility] PCC ETW [facility]   4/18/2022  9:00 AM NURSE/MA [place]C [place] [facility] ONC [address] [facility]   4/22/2022 10:00 AM Claribel Wolff APRN [facility] ONC [address] [facility]       Additional Instructions for the Follow-ups that You Need to Schedule     Discharge Follow-up with PCP   As directed       Currently Documented PCP:    Payam Carroll MD    PCP Phone Number:    [phone]     Follow Up Details: 3 to 7 days               Pertinent  and/or Most Recent Results      IMAGING:  XR Shoulder 2+ View Right    Result Date: 1/26/2022  PROCEDURE: XR SHOULDER 2+ VW RIGHT  COMPARISON: [facility], CR, SHOULDER >OR= 2V RT, 8/21/2017, 14:59.  INDICATIONS: RIGHT SHOULDER JOINT PAIN X 1 MONTH AFTER CATCHING WIFE FROM A FALL  FINDINGS:  No fracture.  No dislocation.  Significant glenohumeral and AC joint arthrosis.       Significant degenerative change.  No acute findings.      ROSANNE WATSON MD       Electronically Signed and Approved By: ROSANNE WATSON MD on 1/26/2022 at 10:14             XR Foot 2 View Left    Result Date: 2/9/2022  PROCEDURE: XR FOOT 2 VW LEFT  COMPARISON: None  INDICATIONS: bruising and redness, swelling and pain in left foot  FINDINGS:  BONES: Normal.  No significant arthropathy or acute abnormality.  SOFT TISSUES: Negative.  No visible soft tissue swelling.  EFFUSION: None visible.  OTHER: Negative.        No acute disease.       ALBERT FORTE MD       Electronically Signed and Approved By: ALBERT FORTE MD on 2/09/2022 at 15:11             XR Chest 1 View    Result Date: 2/7/2022  PROCEDURE: XR CHEST 1 VW  COMPARISON: [facility], CT, CT CHEST WO CONTRAST DIAGNOSTIC, 9/21/2021, 7:52.  [facility], , CHEST AP/PA 1 VIEW, 2/24/2021, 19:33.  INDICATIONS: Severe Sepsis triage protocol, soa, copd  FINDINGS:   The lungs are well-expanded. The heart and pulmonary vasculature are within normal limits. No pleural effusions are identified. There is a new 12 cm x 8 cm area of airspace consolidation in the right midlung field.  Scoliosis is present.  IMPRESSION:  New large area of airspace consolidation in the right midlung field suspicious for pneumonia.  Recommend a follow-up examination after treatment to confirm resolution.   LEAH COLBERT MD       Electronically Signed and Approved By: LEAH COLBERT MD on 2/07/2022 at 13:33             CT Chest Pulmonary Embolism    Result Date: 2/7/2022  PROCEDURE: CT CHEST  PULMONARY EMBOLISM W CONTRAST  COMPARISON:  [facility], CT, CT CHEST WO CONTRAST DIAGNOSTIC, 9/21/2021, 7:52. INDICATIONS: rule out PE  PROTOCOL:   PE imaging protocol performed    RADIATION:   DLP: 333mGy*cm   Automated exposure control was utilized to minimize radiation dose. CONTRAST: 100cc Isovue 370 I.V. LABS:   eGFR: >60ml/min/1.73m2  TECHNIQUE: Axial images of the chest with intravenous contrast.  FINDINGS: No pulmonary emboli are identified.  There is fairly extensive airspace consolidation in the right upper lobe and superior segment of the right lower lobe.  Paraseptal emphysema is present.  No evidence of pleural or pericardial effusion.  The thoracic aorta has a normal caliber.  Coronary artery calcification is present.  No evidence of pneumothorax.  3 mm nonobstructing left renal calculus.  Images of the upper abdomen are otherwise unremarkable.  Degenerative changes are present in the thoracic spine.  There are multiple chronic appearing compression deformities in the mid and lower thoracic spine.  IMPRESSION:  1. No pulmonary emboli are identified.  2. Fairly extensive airspace consolidation in the right upper lobe and superior segment of the right lower lobe likely secondary to bacterial pneumonia.   3. 3 mm nonobstructing left renal calculus.   LEAH COLBERT MD       Electronically Signed and Approved By: LEAH COLBERT MD on 2/07/2022 at 17:27             Duplex Venous Lower Extremity - Bilateral CV-READ    Result Date: 2/8/2022  · Normal bilateral lower extremity venous duplex scan.        LAB RESULTS:      Lab 02/08/22  0532 02/07/22  1304 02/07/22  1303   WBC 17.88* 28.64*  --    HEMOGLOBIN 11.9* 13.7  --    HEMATOCRIT 37.2* 41.4  --    PLATELETS 185 211  --    NEUTROS ABS  --  24.71*  --    IMMATURE GRANS (ABS)  --  0.16*  --    LYMPHS ABS  --  2.04  --    MONOS ABS  --  1.65*  --    EOS ABS  --  0.01  --    MCV 89.0 84.0  --    CRP  --  3.62*  --    LACTATE  --  1.7   --    PROTIME  --   --  10.6   APTT  --   --  22.7         Lab 02/08/22  0532 02/07/22  1501 02/07/22  1304   SODIUM 139  --  137   POTASSIUM 3.9  --  4.2   CHLORIDE 105  --  99   CO2 26.1  --  24.9   ANION GAP 7.9  --  13.1   BUN 17  --  16   CREATININE 0.63*  --  0.81   GLUCOSE 120*  --  86   CALCIUM 9.2  --  9.4   IONIZED CALCIUM  --  1.07*  --    MAGNESIUM  --   --  1.7   PHOSPHORUS  --   --  2.0*         Lab 02/07/22  1304   TOTAL PROTEIN 6.4   ALBUMIN 4.10   GLOBULIN 2.3   ALT (SGPT) 22   AST (SGOT) 28   BILIRUBIN 0.9   ALK PHOS 132*         Lab 02/07/22  1303   PROTIME 10.6   INR 1.01*                 Lab 02/07/22  1316   PH, ARTERIAL 7.469*   PCO2, ARTERIAL 35.2   PO2 ART 66.9*   O2 SATURATION ART 94.7*   FIO2 21   HCO3 ART 25.0   BASE EXCESS ART 1.7   CARBOXYHEMOGLOBIN 1.2     Brief Urine Lab Results  (Last result in the past 365 days)      Color   Clarity   Blood   Leuk Est   Nitrite   Protein   CREAT   Urine HCG        02/24/21 1923   CLOUDY   *  Comment: Modified result; previously reported as NEGATIVE at 19:50 on 02/24/21.  [C]   *  Comment: Modified result; previously reported as LARGE at 19:50 on 02/24/21.  URINE MICROSCOPICS:    Only performed if any of the following are present:    Appearance is Sl Cloudy to Turbid; Protein is    30 to >/= 300 mg/dL; Occult Blood, Nitrite, or Leukocyte    Esterase is positive. Color is Red or Orange.    [C]   *  Comment: Modified result; previously reported as POSITIVE at 19:50 on 02/24/21.  [C]                    [C] - Corrected Result           Microbiology Results (last 10 days)     Procedure Component Value - Date/Time    S. Pneumo Ag Urine or CSF - Urine, Urine, Clean Catch [[id number]]  (Normal) Collected: 02/07/22 2153    Lab Status: Final result Specimen: Urine, Clean Catch Updated: 02/08/22 0205     Strep Pneumo Ag Negative    Legionella Antigen, Urine - Urine, Urine, Clean Catch [[id number]]  (Normal) Collected: 02/07/22 1951    Lab Status: Final result  Specimen: Urine, Clean Catch Updated: 02/08/22 0205     LEGIONELLA ANTIGEN, URINE Negative    COVID-19,APTIMA PANTHER(TERESA),[facility], NP/OP SWAB IN UTM/VTM/[facility]ALINE TRANSPORT MEDIA,24 HR TAT - Swab, Nasopharynx [[id number]]  (Normal) Collected: 02/07/22 1518    Lab Status: Final result Specimen: Swab from Nasopharynx Updated: 02/07/22 2204     COVID19 Not Detected    Narrative:      Fact sheet for providers: https://www.fda.gov/media/167795/download     Fact sheet for patients: https://www.fda.gov/media/535568/download    Test performed by RT PCR.    Blood Culture - Blood, Arm, Left [[id number]]  (Normal) Collected: 02/07/22 1459    Lab Status: Preliminary result Specimen: Blood from Arm, Left Updated: 02/09/22 1515     Blood Culture No growth at 2 days    Blood Culture - Blood, Arm, Left [[id number]]  (Normal) Collected: 02/07/22 1303    Lab Status: Preliminary result Specimen: Blood from Arm, Left Updated: 02/09/22 1330     Blood Culture No growth at 2 days        Results for orders placed during the hospital encounter of 02/07/22    Duplex Venous Lower Extremity - Bilateral CV-READ    Interpretation Summary  · Normal bilateral lower extremity venous duplex scan.    Results for orders placed during the hospital encounter of 02/07/22    Duplex Venous Lower Extremity - Bilateral CV-READ    Interpretation Summary  · Normal bilateral lower extremity venous duplex scan.        Time spent on Discharge including face to face service: Greater than 30 minutes      Electronically signed by Chase Klein MD, 02/09/22, 4:49 PM EST.

## 2022-02-10 ENCOUNTER — READMISSION MANAGEMENT (OUTPATIENT)
Dept: CALL CENTER | Facility: HOSPITAL | Age: 82
End: 2022-02-10

## 2022-02-10 NOTE — OUTREACH NOTE
Prep Survey      Responses   [facility] facility patient discharged from? [facility]   Is LACE score < 7 ? No   Emergency Room discharge w/ pulse ox? No   Eligibility Readm Mgmt   Discharge diagnosis SepsisRight-sided community-acquired pneumonia Acute hypoxemic respiratory failure   Does the patient have one of the following disease processes/diagnoses(primary or secondary)? Other   Does the patient have Home health ordered? No   Is there a DME ordered? No   Prep survey completed? Yes          Amanda Laguna RN

## 2022-02-12 LAB
BACTERIA SPEC AEROBE CULT: NORMAL
BACTERIA SPEC AEROBE CULT: NORMAL

## 2022-02-14 ENCOUNTER — READMISSION MANAGEMENT (OUTPATIENT)
Dept: CALL CENTER | Facility: HOSPITAL | Age: 82
End: 2022-02-14

## 2022-02-14 ENCOUNTER — HOSPITAL ENCOUNTER (OUTPATIENT)
Dept: CT IMAGING | Facility: HOSPITAL | Age: 82
Discharge: HOME | End: 2022-02-14
Admitting: NURSE PRACTITIONER

## 2022-02-14 DIAGNOSIS — R91.8 LUNG NODULES: ICD-10-CM

## 2022-02-14 PROCEDURE — 71250 CT THORAX DX C-: CPT

## 2022-02-14 NOTE — OUTREACH NOTE
Medical Week 1 Survey      Responses   [facility] patient discharged from? [facility]   Does the patient have one of the following disease processes/diagnoses(primary or secondary)? Other   Week 1 attempt successful? No   Unsuccessful attempts Attempt 1  [Both numbers called]   Wrap up additional comments UTR x1          Jackie Tony, RN

## 2022-02-15 ENCOUNTER — TRANSCRIBE ORDERS (OUTPATIENT)
Dept: ADMINISTRATIVE | Facility: HOSPITAL | Age: 82
End: 2022-02-15

## 2022-02-15 DIAGNOSIS — G45.9 TIA (TRANSIENT ISCHEMIC ATTACK): ICD-10-CM

## 2022-02-15 DIAGNOSIS — R53.1 LEFT-SIDED WEAKNESS: Primary | ICD-10-CM

## 2022-02-16 ENCOUNTER — READMISSION MANAGEMENT (OUTPATIENT)
Dept: CALL CENTER | Facility: HOSPITAL | Age: 82
End: 2022-02-16

## 2022-02-16 NOTE — OUTREACH NOTE
Medical Week 1 Survey      Responses   [facility] patient discharged from? [facility]   Does the patient have one of the following disease processes/diagnoses(primary or secondary)? Other   Week 1 attempt successful? No   Unsuccessful attempts Attempt 2          Shannon Moore RN

## 2022-02-21 ENCOUNTER — READMISSION MANAGEMENT (OUTPATIENT)
Dept: CALL CENTER | Facility: HOSPITAL | Age: 82
End: 2022-02-21

## 2022-02-21 NOTE — OUTREACH NOTE
Medical Week 1 Survey      Responses   [facility] patient discharged from? [facility]   Does the patient have one of the following disease processes/diagnoses(primary or secondary)? Other   Week 1 attempt successful? No  [Attempted emergency contact also]   Unsuccessful attempts Attempt 3          Brittany Hutchinson RN

## 2022-03-01 ENCOUNTER — READMISSION MANAGEMENT (OUTPATIENT)
Dept: CALL CENTER | Facility: HOSPITAL | Age: 82
End: 2022-03-01

## 2022-03-01 NOTE — OUTREACH NOTE
Medical Week 2 Survey      Responses   [facility] patient discharged from? [facility]   Does the patient have one of the following disease processes/diagnoses(primary or secondary)? Other   Week 2 attempt successful? No   Unsuccessful attempts Attempt 1          Venice Vásquez RN

## 2022-03-02 ENCOUNTER — HOSPITAL ENCOUNTER (OUTPATIENT)
Dept: MRI IMAGING | Facility: HOSPITAL | Age: 82
Discharge: HOME | End: 2022-03-02
Admitting: INTERNAL MEDICINE

## 2022-03-02 DIAGNOSIS — R53.1 LEFT-SIDED WEAKNESS: ICD-10-CM

## 2022-03-02 DIAGNOSIS — G45.9 TIA (TRANSIENT ISCHEMIC ATTACK): ICD-10-CM

## 2022-03-02 PROCEDURE — 70551 MRI BRAIN STEM W/O DYE: CPT

## 2022-03-03 ENCOUNTER — READMISSION MANAGEMENT (OUTPATIENT)
Dept: CALL CENTER | Facility: HOSPITAL | Age: 82
End: 2022-03-03

## 2022-03-03 NOTE — OUTREACH NOTE
Medical Week 2 Survey      Responses   [facility] patient discharged from? [facility]   Does the patient have one of the following disease processes/diagnoses(primary or secondary)? Other   Week 2 attempt successful? No   Unsuccessful attempts Attempt 2   Rescheduled Revoked   Revoke Decline to participate   Wrap up additional comments UTR x 5 attempts          Jackie Tony, RN

## 2022-03-04 ENCOUNTER — TRANSCRIBE ORDERS (OUTPATIENT)
Dept: ADMINISTRATIVE | Facility: HOSPITAL | Age: 82
End: 2022-03-04

## 2022-03-04 DIAGNOSIS — I66.9: Primary | ICD-10-CM

## 2022-03-15 ENCOUNTER — HOSPITAL ENCOUNTER (OUTPATIENT)
Dept: CARDIOLOGY | Facility: HOSPITAL | Age: 82
Discharge: HOME | End: 2022-03-15
Admitting: INTERNAL MEDICINE

## 2022-03-15 DIAGNOSIS — I66.9: ICD-10-CM

## 2022-03-15 LAB
BH CV XLRA MEAS LEFT CAROTID BULB EDV: 16 CM/SEC
BH CV XLRA MEAS LEFT CAROTID BULB PSV: 56 CM/SEC
BH CV XLRA MEAS LEFT DIST CCA EDV: 17 CM/SEC
BH CV XLRA MEAS LEFT DIST CCA PSV: 72 CM/SEC
BH CV XLRA MEAS LEFT DIST ICA EDV: 17 CM/SEC
BH CV XLRA MEAS LEFT DIST ICA PSV: 50 CM/SEC
BH CV XLRA MEAS LEFT MID ICA EDV: 14 CM/SEC
BH CV XLRA MEAS LEFT MID ICA PSV: 65 CM/SEC
BH CV XLRA MEAS LEFT PROX CCA EDV: 13 CM/SEC
BH CV XLRA MEAS LEFT PROX CCA PSV: 62 CM/SEC
BH CV XLRA MEAS LEFT PROX ECA EDV: 16 CM/SEC
BH CV XLRA MEAS LEFT PROX ECA PSV: 123 CM/SEC
BH CV XLRA MEAS LEFT PROX ICA EDV: 24 CM/SEC
BH CV XLRA MEAS LEFT PROX ICA PSV: 105 CM/SEC
BH CV XLRA MEAS LEFT VERTEBRAL A EDV: 20 CM/SEC
BH CV XLRA MEAS LEFT VERTEBRAL A PSV: 64 CM/SEC
BH CV XLRA MEAS RIGHT CAROTID BULB EDV: 9 CM/SEC
BH CV XLRA MEAS RIGHT CAROTID BULB PSV: 41 CM/SEC
BH CV XLRA MEAS RIGHT DIST CCA EDV: 14 CM/SEC
BH CV XLRA MEAS RIGHT DIST CCA PSV: 48 CM/SEC
BH CV XLRA MEAS RIGHT DIST ICA EDV: 15 CM/SEC
BH CV XLRA MEAS RIGHT DIST ICA PSV: 44 CM/SEC
BH CV XLRA MEAS RIGHT MID ICA EDV: 17 CM/SEC
BH CV XLRA MEAS RIGHT MID ICA PSV: 52 CM/SEC
BH CV XLRA MEAS RIGHT PROX CCA EDV: 18 CM/SEC
BH CV XLRA MEAS RIGHT PROX CCA PSV: 77 CM/SEC
BH CV XLRA MEAS RIGHT PROX ECA EDV: 22 CM/SEC
BH CV XLRA MEAS RIGHT PROX ECA PSV: 179 CM/SEC
BH CV XLRA MEAS RIGHT PROX ICA EDV: 9 CM/SEC
BH CV XLRA MEAS RIGHT PROX ICA PSV: 58 CM/SEC
BH CV XLRA MEAS RIGHT VERTEBRAL A EDV: 6 CM/SEC
BH CV XLRA MEAS RIGHT VERTEBRAL A PSV: 23 CM/SEC
LEFT ARM BP: NORMAL MMHG
MAXIMAL PREDICTED HEART RATE: 137 BPM
RIGHT ARM BP: NORMAL MMHG
STRESS TARGET HR: 116 BPM

## 2022-03-15 PROCEDURE — 93880 EXTRACRANIAL BILAT STUDY: CPT

## 2022-03-15 PROCEDURE — 93880 EXTRACRANIAL BILAT STUDY: CPT | Performed by: SURGERY

## 2022-03-29 ENCOUNTER — HOSPITAL ENCOUNTER (OUTPATIENT)
Dept: GENERAL RADIOLOGY | Facility: HOSPITAL | Age: 82
Discharge: HOME | End: 2022-03-29
Admitting: INTERNAL MEDICINE

## 2022-03-29 ENCOUNTER — OFFICE VISIT (OUTPATIENT)
Dept: PULMONOLOGY | Facility: CLINIC | Age: 82
End: 2022-03-29

## 2022-03-29 VITALS
BODY MASS INDEX: 24.64 KG/M2 | HEIGHT: 67 IN | DIASTOLIC BLOOD PRESSURE: 68 MMHG | TEMPERATURE: 97.5 F | RESPIRATION RATE: 18 BRPM | SYSTOLIC BLOOD PRESSURE: 119 MMHG | HEART RATE: 80 BPM | WEIGHT: 157 LBS | OXYGEN SATURATION: 97 %

## 2022-03-29 DIAGNOSIS — J44.9 CHRONIC OBSTRUCTIVE PULMONARY DISEASE, UNSPECIFIED COPD TYPE: ICD-10-CM

## 2022-03-29 DIAGNOSIS — C34.32 MALIGNANT NEOPLASM OF LOWER LOBE OF LEFT LUNG: ICD-10-CM

## 2022-03-29 DIAGNOSIS — R06.09 DYSPNEA ON EXERTION: ICD-10-CM

## 2022-03-29 DIAGNOSIS — F17.211 NICOTINE DEPENDENCE, CIGARETTES, IN REMISSION: ICD-10-CM

## 2022-03-29 DIAGNOSIS — R91.8 LUNG NODULES: ICD-10-CM

## 2022-03-29 DIAGNOSIS — J44.9 CHRONIC OBSTRUCTIVE PULMONARY DISEASE, UNSPECIFIED COPD TYPE: Primary | ICD-10-CM

## 2022-03-29 PROCEDURE — 99214 OFFICE O/P EST MOD 30 MIN: CPT | Performed by: INTERNAL MEDICINE

## 2022-03-29 PROCEDURE — 71046 X-RAY EXAM CHEST 2 VIEWS: CPT

## 2022-03-29 NOTE — PROGRESS NOTES
Pulmonary Consultation    No ref. provider found,    Thank you for asking me to see Carlos Zimmerman for   Chief Complaint   Patient presents with   • COPD   • Lung Cancer   • Lung Disease    • Solitary lung nodule    • Pneumonia     right lung due to infectious organism    .      History of Present Illness  Carlos Zimmerman is a 82 y.o. male with a PMH significant for COPD lung cancer and pneumonia comes for follow-up patient seems to be doing well he denies any significant expectoration he does have mild cough along with wheezing and dyspnea on exertion which is well controlled on his bronchodilators he has already quit smoking as and is being followed by oncology       Tobacco use history: Former smoker      Review of Systems: History obtained from chart review and the patient.  Review of Systems   Respiratory: Positive for shortness of breath and wheezing.    All other systems reviewed and are negative.    As described in the HPI. Otherwise, remainder of ROS (14 systems) were negative.    Patient Active Problem List   Diagnosis   • Cancer (HCC)   • COPD (chronic obstructive pulmonary disease) (HCC)   • Hypertension   • Low back pain   • Lung cancer (HCC)   • Lung disease   • Solitary lung nodule   • Pneumonia of right lung due to infectious organism         Current Outpatient Medications:   •  albuterol sulfate  (90 Base) MCG/ACT inhaler, Inhale 2 puffs Every 4 (Four) Hours As Needed for Wheezing or Shortness of Air., Disp: , Rfl:   •  amLODIPine (NORVASC) 5 MG tablet, Take 5 mg by mouth Daily., Disp: , Rfl:   •  Aspirin Low Dose 81 MG EC tablet, Take 81 mg by mouth Daily., Disp: , Rfl:   •  cetirizine (zyrTEC) 10 MG tablet, Take 10 mg by mouth Daily., Disp: , Rfl:   •  Cholecalciferol 25 MCG (1000 UT) capsule, Take 1,000 Units by mouth Daily., Disp: , Rfl:   •  cilostazol (PLETAL) 100 MG tablet, Take 100 mg by mouth 2 (Two) Times a Day., Disp: , Rfl:   •  cyclobenzaprine (FLEXERIL) 10 MG tablet, Take  10 mg by mouth 3 (Three) Times a Day As Needed for Muscle Spasms., Disp: , Rfl:   •  metoprolol succinate XL (TOPROL-XL) 25 MG 24 hr tablet, Take 2 tablets by mouth Daily., Disp: 60 tablet, Rfl: 0  •  multivitamins-minerals (PRESERVISION AREDS 2) capsule capsule, Take 1 capsule by mouth 2 (Two) Times a Day., Disp: , Rfl:   •  naproxen (NAPROSYN) 500 MG tablet, Take 500 mg by mouth 2 (Two) Times a Day With Meals., Disp: , Rfl:   •  pantoprazole (PROTONIX) 40 MG EC tablet, Take 40 mg by mouth Daily., Disp: , Rfl:   •  pravastatin (PRAVACHOL) 20 MG tablet, Take 20 mg by mouth Daily., Disp: , Rfl:   •  tamsulosin (FLOMAX) 0.4 MG capsule 24 hr capsule, Take 1 capsule by mouth Daily., Disp: , Rfl:     No Known Allergies    Past Medical History:   Diagnosis Date   • Anemia    • Arthritis    • COPD (chronic obstructive pulmonary disease) (HCC)     INHALER  PRN   • Hyperlipidemia    • Hypertension     ON MEDS   • Lung cancer (HCC)    • Pneumonia     LEFT LUNG CA   • SOB (shortness of breath)    • Stroke (HCC)    • TIA (transient ischemic attack)     NO RESIDUAL      Past Surgical History:   Procedure Laterality Date   • LUNG BIOPSY     • LUNG SURGERY      REMOVAL HALF OF UPPER PORTION LEFT LUNG   • ORTHOPEDIC SURGERY Left     ROTATOR CUFF   • THROAT SURGERY       Social History     Socioeconomic History   • Marital status:    Tobacco Use   • Smoking status: Former Smoker     Packs/day: 1.00     Years: 55.00     Pack years: 55.00     Types: Cigarettes     Quit date:      Years since quittin.2   • Smokeless tobacco: Former User   Vaping Use   • Vaping Use: Never used   Substance and Sexual Activity   • Alcohol use: Yes     Comment: 3-4 GIN TONICS/NIGHT   • Drug use: Never   • Sexual activity: Defer     Family History   Problem Relation Age of Onset   • Stomach cancer Other    • Cancer Mother           Objective     Blood pressure 119/68, pulse 80, temperature 97.5 °F (36.4 °C), temperature source Temporal,  "resp. rate 18, height 170.2 cm (67\"), weight 71.2 kg (157 lb), SpO2 97 %.  Physical Exam  Vitals and nursing note reviewed.   Constitutional:       Appearance: Normal appearance.   HENT:      Head: Normocephalic and atraumatic.      Nose: Nose normal.      Mouth/Throat:      Mouth: Mucous membranes are moist.   Eyes:      Extraocular Movements: Extraocular movements intact.      Pupils: Pupils are equal, round, and reactive to light.   Cardiovascular:      Rate and Rhythm: Normal rate and regular rhythm.      Pulses: Normal pulses.      Heart sounds: Normal heart sounds.   Pulmonary:      Effort: Pulmonary effort is normal.      Breath sounds: Rhonchi present.   Abdominal:      General: Abdomen is flat. Bowel sounds are normal.      Palpations: Abdomen is soft.   Musculoskeletal:         General: Normal range of motion.      Cervical back: Normal range of motion and neck supple.   Skin:     General: Skin is warm.      Capillary Refill: Capillary refill takes less than 2 seconds.   Neurological:      General: No focal deficit present.      Mental Status: He is alert and oriented to person, place, and time.   Psychiatric:         Mood and Affect: Mood normal.       Immunization History   Administered Date(s) Administered   • COVID-19 (PFIZER) PURPLE CAP 02/28/2021, 03/11/2021, 10/18/2021   • Fluad Quad 65+ 10/07/2020, 09/24/2021   • Influenza, Unspecified 09/01/2020                Assessment/Plan     Diagnoses and all orders for this visit:    1. Chronic obstructive pulmonary disease, unspecified COPD type (HCC) (Primary)    2. Malignant neoplasm of lower lobe of left lung (HCC)    3. Lung nodules    4. Dyspnea on exertion    5. Nicotine dependence, cigarettes, in remission         Discussion/ Recommendations:   I will repeat a chest x-ray for follow-up  Patient has already quit smoking  Patient is advised to continue his present medication including bronchodilators  To continue his nebulizer as needed  Discussed " vaccination and recommended    Patient's Body mass index is 24.59 kg/m². indicating that he is within normal range (BMI 18.5-24.9). No BMI management plan needed..           Return in about 3 months (around 6/29/2022).      Thank you for allowing me to participate in the care of Carlos Zimmerman. Please do not hesitate to contact me with any questions.         This document has been electronically signed by Henrry Marie MD on March 29, 2022 10:15 EDT

## 2022-04-04 ENCOUNTER — TRANSCRIBE ORDERS (OUTPATIENT)
Dept: LAB | Facility: HOSPITAL | Age: 82
End: 2022-04-04

## 2022-04-04 ENCOUNTER — LAB (OUTPATIENT)
Dept: LAB | Facility: HOSPITAL | Age: 82
End: 2022-04-04

## 2022-04-04 DIAGNOSIS — E78.5 HYPERLIPIDEMIA, UNSPECIFIED HYPERLIPIDEMIA TYPE: ICD-10-CM

## 2022-04-04 DIAGNOSIS — D64.9 ANEMIA, UNSPECIFIED TYPE: ICD-10-CM

## 2022-04-04 DIAGNOSIS — E78.5 HYPERLIPIDEMIA, UNSPECIFIED HYPERLIPIDEMIA TYPE: Primary | ICD-10-CM

## 2022-04-04 DIAGNOSIS — I10 ESSENTIAL HYPERTENSION, MALIGNANT: ICD-10-CM

## 2022-04-04 LAB
ALBUMIN SERPL-MCNC: 4.4 G/DL (ref 3.5–5.2)
ALBUMIN/GLOB SERPL: 1.9 G/DL
ALP SERPL-CCNC: 157 U/L (ref 39–117)
ALT SERPL W P-5'-P-CCNC: 15 U/L (ref 1–41)
ANION GAP SERPL CALCULATED.3IONS-SCNC: 10.3 MMOL/L (ref 5–15)
AST SERPL-CCNC: 26 U/L (ref 1–40)
BASOPHILS # BLD AUTO: 0.04 10*3/MM3 (ref 0–0.2)
BASOPHILS NFR BLD AUTO: 0.6 % (ref 0–1.5)
BILIRUB SERPL-MCNC: 0.6 MG/DL (ref 0–1.2)
BUN SERPL-MCNC: 9 MG/DL (ref 8–23)
BUN/CREAT SERPL: 11.3 (ref 7–25)
CALCIUM SPEC-SCNC: 9.5 MG/DL (ref 8.6–10.5)
CHLORIDE SERPL-SCNC: 103 MMOL/L (ref 98–107)
CHOLEST SERPL-MCNC: 151 MG/DL (ref 0–200)
CO2 SERPL-SCNC: 27.7 MMOL/L (ref 22–29)
CREAT SERPL-MCNC: 0.8 MG/DL (ref 0.76–1.27)
DEPRECATED RDW RBC AUTO: 48 FL (ref 37–54)
EGFRCR SERPLBLD CKD-EPI 2021: 87.8 ML/MIN/1.73
EOSINOPHIL # BLD AUTO: 0.14 10*3/MM3 (ref 0–0.4)
EOSINOPHIL NFR BLD AUTO: 1.9 % (ref 0.3–6.2)
ERYTHROCYTE [DISTWIDTH] IN BLOOD BY AUTOMATED COUNT: 15.7 % (ref 12.3–15.4)
FERRITIN SERPL-MCNC: 52.23 NG/ML (ref 30–400)
FOLATE SERPL-MCNC: >20 NG/ML (ref 4.78–24.2)
GLOBULIN UR ELPH-MCNC: 2.3 GM/DL
GLUCOSE SERPL-MCNC: 98 MG/DL (ref 65–99)
HCT VFR BLD AUTO: 42.6 % (ref 37.5–51)
HDLC SERPL-MCNC: 79 MG/DL (ref 40–60)
HGB BLD-MCNC: 13.5 G/DL (ref 13–17.7)
IGA1 MFR SER: 153 MG/DL (ref 70–400)
IGG1 SER-MCNC: 642 MG/DL (ref 700–1600)
IGM SERPL-MCNC: 34 MG/DL (ref 40–230)
IMM GRANULOCYTES # BLD AUTO: 0.03 10*3/MM3 (ref 0–0.05)
IMM GRANULOCYTES NFR BLD AUTO: 0.4 % (ref 0–0.5)
IRON 24H UR-MRATE: 53 MCG/DL (ref 59–158)
IRON SATN MFR SERPL: 13 % (ref 20–50)
LDLC SERPL CALC-MCNC: 61 MG/DL (ref 0–100)
LDLC/HDLC SERPL: 0.78 {RATIO}
LYMPHOCYTES # BLD AUTO: 2.53 10*3/MM3 (ref 0.7–3.1)
LYMPHOCYTES NFR BLD AUTO: 34.8 % (ref 19.6–45.3)
MCH RBC QN AUTO: 27.4 PG (ref 26.6–33)
MCHC RBC AUTO-ENTMCNC: 31.7 G/DL (ref 31.5–35.7)
MCV RBC AUTO: 86.6 FL (ref 79–97)
MONOCYTES # BLD AUTO: 0.65 10*3/MM3 (ref 0.1–0.9)
MONOCYTES NFR BLD AUTO: 8.9 % (ref 5–12)
NEUTROPHILS NFR BLD AUTO: 3.88 10*3/MM3 (ref 1.7–7)
NEUTROPHILS NFR BLD AUTO: 53.4 % (ref 42.7–76)
NRBC BLD AUTO-RTO: 0 /100 WBC (ref 0–0.2)
PLATELET # BLD AUTO: 217 10*3/MM3 (ref 140–450)
PMV BLD AUTO: 10.9 FL (ref 6–12)
POTASSIUM SERPL-SCNC: 4.1 MMOL/L (ref 3.5–5.2)
PROT SERPL-MCNC: 6.7 G/DL (ref 6–8.5)
RBC # BLD AUTO: 4.92 10*6/MM3 (ref 4.14–5.8)
RETICS # AUTO: 0.09 10*6/MM3 (ref 0.02–0.13)
RETICS/RBC NFR AUTO: 1.83 % (ref 0.7–1.9)
SODIUM SERPL-SCNC: 141 MMOL/L (ref 136–145)
TIBC SERPL-MCNC: 410 MCG/DL (ref 298–536)
TRANSFERRIN SERPL-MCNC: 275 MG/DL (ref 200–360)
TRIGL SERPL-MCNC: 52 MG/DL (ref 0–150)
TSH SERPL DL<=0.05 MIU/L-ACNC: 1.94 UIU/ML (ref 0.27–4.2)
VIT B12 BLD-MCNC: 908 PG/ML (ref 211–946)
VLDLC SERPL-MCNC: 11 MG/DL (ref 5–40)
WBC NRBC COR # BLD: 7.27 10*3/MM3 (ref 3.4–10.8)

## 2022-04-04 PROCEDURE — 84466 ASSAY OF TRANSFERRIN: CPT

## 2022-04-04 PROCEDURE — 82746 ASSAY OF FOLIC ACID SERUM: CPT

## 2022-04-04 PROCEDURE — 82728 ASSAY OF FERRITIN: CPT

## 2022-04-04 PROCEDURE — 84165 PROTEIN E-PHORESIS SERUM: CPT

## 2022-04-04 PROCEDURE — 84443 ASSAY THYROID STIM HORMONE: CPT

## 2022-04-04 PROCEDURE — 85045 AUTOMATED RETICULOCYTE COUNT: CPT

## 2022-04-04 PROCEDURE — 80053 COMPREHEN METABOLIC PANEL: CPT

## 2022-04-04 PROCEDURE — 85025 COMPLETE CBC W/AUTO DIFF WBC: CPT

## 2022-04-04 PROCEDURE — 83540 ASSAY OF IRON: CPT

## 2022-04-04 PROCEDURE — 83521 IG LIGHT CHAINS FREE EACH: CPT

## 2022-04-04 PROCEDURE — 82784 ASSAY IGA/IGD/IGG/IGM EACH: CPT

## 2022-04-04 PROCEDURE — 80061 LIPID PANEL: CPT

## 2022-04-04 PROCEDURE — 82607 VITAMIN B-12: CPT

## 2022-04-04 PROCEDURE — 36415 COLL VENOUS BLD VENIPUNCTURE: CPT

## 2022-04-05 LAB
ALBUMIN SERPL ELPH-MCNC: 3.5 G/DL (ref 2.9–4.4)
ALBUMIN/GLOB SERPL: 1.4 {RATIO} (ref 0.7–1.7)
ALPHA1 GLOB SERPL ELPH-MCNC: 0.3 G/DL (ref 0–0.4)
ALPHA2 GLOB SERPL ELPH-MCNC: 0.7 G/DL (ref 0.4–1)
B-GLOBULIN SERPL ELPH-MCNC: 0.9 G/DL (ref 0.7–1.3)
GAMMA GLOB SERPL ELPH-MCNC: 0.6 G/DL (ref 0.4–1.8)
GLOBULIN SER CALC-MCNC: 2.5 G/DL (ref 2.2–3.9)
KAPPA LC FREE SER-MCNC: 15.4 MG/L (ref 3.3–19.4)
KAPPA LC FREE/LAMBDA FREE SER: 0.94 {RATIO} (ref 0.26–1.65)
LABORATORY COMMENT REPORT: NORMAL
LAMBDA LC FREE SERPL-MCNC: 16.4 MG/L (ref 5.7–26.3)
M PROTEIN SERPL ELPH-MCNC: NORMAL G/DL
PROT PATTERN SERPL ELPH-IMP: NORMAL
PROT SERPL-MCNC: 6 G/DL (ref 6–8.5)

## 2022-04-07 NOTE — PROGRESS NOTES
Patient: DEEPALI RODRIGUEZ     Acct: [id number]     Report: #[id number]  UNIT #: [id number]     : 1940    Encounter Date:12/10/2018  PRIMARY CARE: Payam Carroll  ***Signed***  --------------------------------------------------------------------------------------------------------------------  NURSE INTAKE      Visit Type      Established Patient Visit            Chief Complaint      LUNG CANCER            History and Present Illness      Past Oncology Illness History      This is a very pleasant 76-year-old gentleman who presents with follow-up for     lobectomy with adjuvant chemotherapy.            -2016. Evaluated by PCP for coughing .  He underwent a CXR on  which     showed a suspicious appearing 5.5mm nodule in the BONNIE.  Follow-up CT chest on      showed a 1.6 x 1.9 cm mass in the apex of the BONNIE highly suspicious for     malignancy.        -May 2016.  He underwent biopsy by FNA during which he developed a pneumothorax     requiring hospital admission and chest tube placement.  He was ultimately     discharged on the following day with resolution of the pneumothorax on CXR.      Pathology results returned consistent with poorly differentiated adenocarcinoma,    consistent with lung primary.  The patient has been referred to medical oncology    for further recommendations.  PET/CT and brain MRI did not show evidence of     metastatic disease and he was referred to  for consideration of surgical     resection.        - He was seen at  and underwent left VATS with lobectomy under the    care of Dr. Saul on   Pathology revealed multifocal invasive moderately     differentiated adenocarcinoma, acinar type, measuring 2.0 cm x 1.7 cm x 1.4 cm.     Another nodule measured 0.6 cm x 0.5 cm x 0.3 cm.  All margins were free of     tumor and no lymph nodes were involved.  Visceral pleural invasion was noted.     Pathologic stage was pT3N0. Stage IIB disease and adjuvant  chemotherapy was     recommended.  His performance status is good (ECOG PS 0-1) however given age and    other comorbidities, I recommended we proceed with carboplatin based chemo as     opposed to cisplatin.      -Aug 10, 2016.  Patient was counseled on risk and benefits of systemic     chemotherapy and agreed to proceed with carboplatin AUC 6 and paclitaxel     200mg/m2 every 21 days x 4 cycles.        -February 2017.  Patient was evaluated the [facility] for follow-up.      Per patient CT chest abdomen and pelvis showed no evidence of disease.      -April 6-2017.  Patient was tapered off gabapentin and Cymbalta due increase     fatigue.       -November 2, 2017. CT chest: No evidence of recurrent or metastatic disease     done at .       -December 11, 2017. Sestamibi Stress showed low probability of coronary ischemic    disease. Follows with Cardiology.       -May 3, 2018.  CT chest:  No evidence of thoracic progression.      -June 8, 2018.  Present for f/u lung cancer.  Reports doing well except     neuropathy pain in hands.  Currently taking Lyrica 100mg BID and it seems to     wear off by late day, early evening.  Reports minimal SOA with exertion, also     has emphysema.  BFA with ecchymotic areas noted.  Will not see Dr. Saul again     until 5/2019 (stated she will be at [facility]).  Denies fever, cough and    anorexia.      -November .  WBC 14.4.  Hemoglobin 11.8.  Platelet count 125,000            HPI - Oncology Interim      Patient presents today because of neuropathy and history of lung cancer.            Clinical Trial Participant      No            ECOG Performance Status      0            PAST, FAMILY   Past Medical History      Past Medical History:  COPD, High Cholesterol, Hypertension, Short of Air, Strok    e (MINI)      Hematology/Oncology (M):  Lung Cancer            Past Surgical History      Lung Biopsy      ZINGER ON THROAT, ROTATOR CUFF REPAIR LEFT SHOULDER, TOOK  OUT PART OF LEFT LUNG,    PORT PLACEMENT            Family History      Family History:  Stomach Cancer (MOTHER)            Social History      Lives independently:  Yes      Occupation:  RETIRED            Tobacco Use      Tobacco status:  Former smoker (1 ppd 55 years quit for 10 years )      Smoking packs/day:  1      Smoking history:  25-50 pack years      Quit status:  Quit date established (8 YRS AGO)            Alcohol Use      Alcohol intake:  2+ drinks per day            Substance Use      Substance use:  Denies use            REVIEW OF SYSTEMS      General:  Denies: Appetite Change, Fatigue, Fever, Night Sweats, Weight Gain,     Weight Loss      Eye:  Denies: Blurred Vision, Corrective Lenses, Diplopia, Eye Irritation, Eye     Pain, Eye Redness, Spots in Vision, Vision Loss      ENT:  Denies: Headache, Hearing Loss, Hoarseness, Seizures, Sinus Congestion,     Sore Throat      Cardiovascular:  Denies: Chest Pain, Edema Ankles, Edema Legs, Irregular     Heartbeat, Palpitations      Respiratory:  Denies: Coughing Blood, Productive Cough, Shortness of Air,     Wheezing      Gastrointestinal:  Denies: Bloody Stools, Constipation, Diarrhea, Frequent     Heartburn, Nausea, Problem Swallowing, Tarry Stools, Unable to Control Bowels,     Vomiting      Genitourinary (male):  Denies: Blood in Urine, Decrease Urine Stream, Frequent     Urination, Incontinence, Painful Urination      Musculoskeletal:  Denies: Back Pain, Leg Cramps, Muscle Pain, Muscle Weakness,     Painful Joints, Swollen Joints      Integumentary:  Denies: Bleeds Easily, Bruises Easily, Hair Changes, Jaundice,     Lesions, Mole Changes, Nail Changes, Pigment Changes, Rash, Skin Discoloration      Neurologic:  Admits: Numbness\Tingling;          Denies: Dizziness, Fainting, Paralysis, Seizures      Psychiatric:  Denies: Anxiety, Confused, Depression, Disoriented, Memory Loss      Endocrine:  Denies: Cold Intolerance, Diabetes, Excessive Sweating,  Excessive     Thirst, Excessive Urination, Heat Intolerance, Flushing, Hyperthyroidism,     Hypothyroidism      Hematologic/Lymphatic:  Denies: Bruising, Bleeding, Enlarged Lymph Nodes,     Recurrent Infections, Transfusions            VITAL SIGNS AND SCORES      Vitals      Height 5 ft 7 in / 170.18 cm      Weight 156 lbs 1.371 oz / 70.8 kg      BSA 1.82 m2      BMI 24.4 kg/m2      Temperature 97.1 F / 36.17 C - Temporal      Pulse 112      Blood Pressure 141/60 Sitting, Left Arm      Pulse Oximetry 91%, ROOM AIR            Pain Score      Experiencing any pain?:  No      Pain Scale Used:  Numerical      Pain Intensity:  0            Fatigue Score      Experiencing any fatigue?:  No      Fatigue (0-10 scale):  0 (none)            EXAM      General Appearance:  Positive for: Alert, Oriented x3, Cooperative      Eye:  Positive for: Anicteric Sclerae, Moist Conjunctiva, PERRLA      HEENT:  Positive for: Oropharynx clear;          Negative for: Dentition, Erythema      Neck:  Positive for: Full ROM;          Negative for: Carotid Bruits      Respiratory:  Positive for: CTAB, Normal Respiratory Effort      Abdomen/Gastro:  Positive for: Normal Active Bowel Sounds;          Negative for: Hepatosplenomegaly      Cardiovascular:  Positive for: Normal PMI;          Negative for: Murmur      Skin:  Positive for: Normal Temperature, Normal Texture and Turgor      Psychiatric:  Positive for: AAO X 3, Appropriate Affect      Neurologic:  Positive for: Cranial Ner II-XII Intact, Deep Tendon Reflexes      Musculoskeletal:  Positive for: Full ROM Lower Extremety, Full ROM Upper     Extremety, Full Muscle Strength      Lower Extremities:  Positive for: Normal Gait and Station;          Negative for: Edema      Lymphatic:  Negative for: Axillary, Cervical, Epitrochlear, Femoral, Inf    raclavicular, Inguinal, Occipital, Popliteal, Posterior auricular, Preauricular,    Supraclavicular            PREVENTION      Hx Influenza  Vaccination:  Yes      Date Influenza Vaccine Given:  Sep 1, 2018      Influenza Vaccine Declined:  No      2 or More Falls Past Year?:  No      Fall Past Year with Injury?:  No      Hx Pneumococcal Vaccination:  Yes      Encouraged to follow-up with:  PCP regarding preventative exams.      Chart initiated by      MARTIN HAINES [facility]            ALLERGY/MEDS      Allergies      Coded Allergies:             NO KNOWN ALLERGIES (Unverified , 12/10/18)            Medications      Last Reconciled on 12/13/18 21:06 by KRYSTIAN MCNULTY MD      Roflumilast (Daliresp) 500 Mcg Tab      500 MCG PO QDAY for 30 Days, #30 TAB 5 Refills         Prov: Sammie Soler PA-C         12/4/18       Cetirizine Hcl (ZyrTEC) 10 Mg Tablet      10 MG PO HS, #30 TAB 5 Refills         Prov: Sammie Soler PA-C         12/4/18       Tiotropium Br/Olodaterol HCl (Stiolto Respimat Inhal Spray) 4 Gm Mist.inhal      2 PUFFS INH RTQDAY, #1 INH 0 Refills         Reported         12/4/18       Cyclobenzaprine Hcl (Cyclobenzaprine*) 10 Mg Tablet      10 MG PO QDAY         Reported         11/20/18       Aspirin Chew (Aspirin Baby) 81 Mg Tab.chew      81 MG PO HS, #30 TAB.CHEW 0 Refills         Reported         11/20/18       Multivitamin/Iron/Folic Acid (CENTRUM COMPLETE MULTIVIT TAB) 1 Tab Tablet      1 TAB PO QDAY, #30 TAB 0 Refills         Reported         11/20/18       Verapamil Er (VERAPAMIL ER) 180 Mg Tablet.er      180 MG PO QDAY         Reported         11/20/18       Azelastine Hcl (Azelastine Nasal) 137 Mcg/0.137 Ml Spray.pump      2 PUFFS NARE EACH BID, #1 BOTTLE 9 Refills         Prov: Dayne Choudhary         9/20/18       Albuterol/Ipratropium (Duoneb) 3 Ml Ampul.neb      3 ML INH Q2H PRN for DYSPNEA/WHEEZING for 30 Days, #360 NEB         Prov: Andres Gaspar         6/29/18       Vitamin B Complex (B Complex-Vitamin B-12) 1 Each Tablet      1 TAB PO QDAY, #30 TAB         Reported         6/27/18       Cilostazol (Pletal*) 100 Mg Tab      100  MG PO BID, #60 TAB         Reported         6/25/18       Pregabalin (Lyrica *) 100 Mg Cap      100 MG PO TID, #90 CAP         Reported         6/8/18       Cholecalciferol (Vitamin D3*) 1,000 Unit Tab      1000 UNITS PO QDAY, #30 TAB 0 Refills         Reported         8/5/16       Pantoprazole (Protonix*) 20 Mg Tablet      40 MG PO QAM, #60 TAB 0 Refills         Reported         5/18/16       Pravastatin Sod (Pravachol*) 40 Mg Tablet      40 MG PO HS, TAB         Reported         5/6/16      Medications Reviewed:  No Changes made to meds            IMPRESSION/PLAN      Impression      -Adenocarcinoma of left lung, stage IIB (pT3N0)      -s/p VATS lobectomy on 7/12/16.  Continue adjuvant chemotherapy as recommended     by NCCN guidelines.  Pt's performance status remains very good (ECOG PS 0-1).        -Following a detailed discussion of the risks and benefits of chemotherapy,     patient decided to proceed with treatment.  Plan is to proceed with carboplatin     AUC 6 and paclitaxel 200mg/m2 every 21 days x 4 cycles.        -He initiated chemotherapy on 8/10 and tolerated cycle 1 of treatment generally     well. He received cycle 2 on 9/2.       -Patient has been asymptomatic since after surgery and chemotherapy.      -CT chest in May 2019 with Dr. Saul.  Continue close observation.      -Mild chest pain.            -Chemotherapy associated neuropathy      -Continue Lyrica and lipoic acid      -Continue Capasician cream.             -Cancer related fatigue      -Fatigue and nausea has improved.  Continu zofran for nausea.       -Maintain activity as tolerated to prevent worsening fatigue.       -Encouraged oral hydration today.            -GERD      -Protonix 40 mg      -Continue treatment            -Dyslipidemia      -Continue Pravachol 40 mg      -Stable. Continue close observation.            -Hypertension      -Continue verapamil      -Stable            Diagnosis      Notes      -Today's visit is an encounter  for patient's cancer evaluation and treatment.       -Patient's radiology exams, blood tests, physicians' notes, and medications were    reviewed today to assess patient's medical treatment plan. ECOG 1.       -Patient was advised to call us right away if there are any new symptoms after     today's visit for an urgent evaluation since this may be due to cancer     worsening. Patient voiced understanding and agreed to do so.      -Radiology imaging tests from August 2018 to today were reviewed independently     by me by direct visualization of the images.        -Old medical records were reviewed and summarized in chronological order in the     HPI today to maintain an updated medical record.            Plan      Counseled patient to call us for an urgent visit if needed.  Check blood tests     and/or imaging tests as shown above.  Return to clinic in 2 months.  Refill     Lyrica.            Patient Education      Patient Education Provided:  Yes                 Disclaimer: Converted document may not contain table formatting or lab diagrams. Please see [facility] for the authenticated document.   V-Y Plasty Text: The defect edges were debeveled with a #15 scalpel blade.  Given the location of the defect, shape of the defect and the proximity to free margins an V-Y advancement flap was deemed most appropriate.  Using a sterile surgical marker, an appropriate advancement flap was drawn incorporating the defect and placing the expected incisions within the relaxed skin tension lines where possible.    The area thus outlined was incised deep to adipose tissue with a #15 scalpel blade.  The skin margins were undermined to an appropriate distance in all directions utilizing iris scissors.

## 2022-04-25 ENCOUNTER — APPOINTMENT (OUTPATIENT)
Dept: ONCOLOGY | Facility: HOSPITAL | Age: 82
End: 2022-04-25

## 2022-04-29 ENCOUNTER — OFFICE VISIT (OUTPATIENT)
Dept: ONCOLOGY | Facility: HOSPITAL | Age: 82
End: 2022-04-29

## 2022-04-29 ENCOUNTER — TELEPHONE (OUTPATIENT)
Dept: ONCOLOGY | Facility: HOSPITAL | Age: 82
End: 2022-04-29

## 2022-04-29 VITALS
BODY MASS INDEX: 23.89 KG/M2 | OXYGEN SATURATION: 97 % | HEART RATE: 85 BPM | TEMPERATURE: 97.6 F | RESPIRATION RATE: 18 BRPM | SYSTOLIC BLOOD PRESSURE: 136 MMHG | DIASTOLIC BLOOD PRESSURE: 65 MMHG | WEIGHT: 152.56 LBS

## 2022-04-29 DIAGNOSIS — C34.32 MALIGNANT NEOPLASM OF LOWER LOBE OF LEFT LUNG: ICD-10-CM

## 2022-04-29 DIAGNOSIS — D50.9 IRON DEFICIENCY ANEMIA, UNSPECIFIED IRON DEFICIENCY ANEMIA TYPE: Primary | ICD-10-CM

## 2022-04-29 DIAGNOSIS — D64.9 ANEMIA, UNSPECIFIED TYPE: ICD-10-CM

## 2022-04-29 PROCEDURE — G0463 HOSPITAL OUTPT CLINIC VISIT: HCPCS | Performed by: NURSE PRACTITIONER

## 2022-04-29 PROCEDURE — 99213 OFFICE O/P EST LOW 20 MIN: CPT | Performed by: NURSE PRACTITIONER

## 2022-04-29 RX ORDER — FERROUS SULFATE 325(65) MG
325 TABLET ORAL
Qty: 90 TABLET | Refills: 1 | Status: SHIPPED | OUTPATIENT
Start: 2022-04-29 | End: 2022-08-30 | Stop reason: SDUPTHER

## 2022-04-29 RX ORDER — DOXYCYCLINE HYCLATE 50 MG/1
324 CAPSULE, GELATIN COATED ORAL
Qty: 30 TABLET | Refills: 2 | Status: SHIPPED | OUTPATIENT
Start: 2022-04-29 | End: 2022-07-28

## 2022-04-29 NOTE — PROGRESS NOTES
Chief Complaint  Lung Cancer    Payam Carroll MD Houk, Brandon Lyle, MD      Subjective          Carlos Zimmerman presents to [facility] for follow up yearly surveillance for NSCLC lung cancer.     History of Present Illness   Mr. Carlos Zimmerman presents for yearly follow up for NSCLC BONNIE. He completed left lung lobectomy and is status post chemotherapy in 10/14/2016. He reports he is doing well in the interim. He does report he was hospitalized with pneumonia and suffered a stroke and does have mild residual with the left lower extremity. He reports he has pneumonia several times in the last 2 years. He is up to date on pneumonia vaccines, COVID vaccines. He denies any plegm production or hemoptysis or shortness of breath currently. He had a chest CT in February 2022 which showed extensive airspace consolidation in the RUL and RLL likely secondary to bacterial pneumonia. Lungs are clear currently on today's exam.     Lab work does show he has low iron and low iron sat, but does have a normal hemoglobin. Folate and B-12 were normal. SPEP did not show any monoclonality. It has been several year since he has had colonoscopy and does not remember dates. He denies any change in bowel habits and he has actually gained weight he states.     He is following with pulmonary for the BONNIE PN nodule and receives regular low dose CT scans.       Cancer Staging  No matching staging information was found for the patient.     Treatment intent: curative    Oncology/Hematology History    No history exists.   This is a very pleasant 76-year-old gentleman who presents with follow-up for     lobectomy with adjuvant chemotherapy.            1) Left lung:  Pathology 5/2016: FNA: Poorly differentiated adenocarcinoma: 5.5     mm BONNIE nodule. 5/2016.       Left lung lobectomy: VATS procedure: Dr. Saul in [place].Path:multifocal     invasive moderately differentiated adenocarcinoma, acinar type,  2.0 x 1.6 x 1.4     cm. Additional nodule 0.6 x 0.5 x 0.3 cm: margins free of tumor, negative LN's.     Visceral pleural invasion noted. (7/12/16). Staging: Stage IIB:  pT3N0.       Carboplatin / Paclitaxel x 4 cycles: 8/10/16 - 10/14/16.      CT CAP: No evidence of disease. 2/2017.       CT CAP: No evidence of disease. 12/11/17.       CT chest: Right lower base: improving consolidation. 2/18/19.       CT chest: T6 / T10 compression fractures: non-pathologic. (8/28/19)      CT chest in ER: likely pneumonia/inflammation. No new evidence of recurring     cancer: (4/4/20)      CT chest: Stable 1.5 cm subpleural ground glass opacity in the BONNIE, new patchy     areas on non-specific ground glass opacity throughout the LLL. (3/2021).            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                Review of Systems   Constitutional: Positive for fatigue.   HENT: Negative.    Eyes: Negative.    Respiratory: Negative.    Cardiovascular: Negative.    Gastrointestinal: Negative.    Endocrine: Negative.    Genitourinary: Negative.    Musculoskeletal: Negative.    Skin: Negative.    Allergic/Immunologic: Negative.    Neurological: Negative.    Hematological: Negative.    Psychiatric/Behavioral: Negative.        Current Outpatient Medications on File Prior to Visit   Medication Sig Dispense Refill   • albuterol sulfate  (90 Base) MCG/ACT inhaler Inhale 2 puffs Every 4 (Four) Hours As Needed for Wheezing or Shortness of Air.     • amLODIPine (NORVASC) 5 MG tablet Take 5 mg by mouth Daily.     • Aspirin Low Dose 81 MG EC tablet Take 81 mg by mouth Daily.     • cetirizine (zyrTEC) 10 MG tablet Take 10 mg by mouth Daily.     • Cholecalciferol 25 MCG (1000 UT) capsule Take 1,000 Units by mouth Daily.     • cilostazol (PLETAL) 100 MG tablet Take 100 mg by mouth 2 (Two) Times a Day.     • cyclobenzaprine (FLEXERIL) 10 MG tablet Take  10 mg by mouth 3 (Three) Times a Day As Needed for Muscle Spasms.     • metoprolol succinate XL (TOPROL-XL) 25 MG 24 hr tablet Take 2 tablets by mouth Daily. 60 tablet 0   • multivitamins-minerals (PRESERVISION AREDS 2) capsule capsule Take 1 capsule by mouth 2 (Two) Times a Day.     • naproxen (NAPROSYN) 500 MG tablet Take 500 mg by mouth 2 (Two) Times a Day With Meals.     • pantoprazole (PROTONIX) 40 MG EC tablet Take 40 mg by mouth Daily.     • pravastatin (PRAVACHOL) 20 MG tablet Take 20 mg by mouth Daily.     • tamsulosin (FLOMAX) 0.4 MG capsule 24 hr capsule Take 1 capsule by mouth Daily.       No current facility-administered medications on file prior to visit.       No Known Allergies  Past Medical History:   Diagnosis Date   • Anemia    • Arthritis    • COPD (chronic obstructive pulmonary disease) (HCC)     INHALER  PRN   • Hyperlipidemia    • Hypertension     ON MEDS   • Lung cancer (HCC)    • Pneumonia     LEFT LUNG CA   • SOB (shortness of breath)    • Stroke (HCC)    • TIA (transient ischemic attack)     NO RESIDUAL 2010     Past Surgical History:   Procedure Laterality Date   • LUNG BIOPSY     • LUNG SURGERY      REMOVAL HALF OF UPPER PORTION LEFT LUNG   • ORTHOPEDIC SURGERY Left     ROTATOR CUFF   • THROAT SURGERY       Social History     Socioeconomic History   • Marital status:    Tobacco Use   • Smoking status: Former Smoker     Packs/day: 1.00     Years: 55.00     Pack years: 55.00     Types: Cigarettes     Quit date:      Years since quittin.3   • Smokeless tobacco: Former User   Vaping Use   • Vaping Use: Never used   Substance and Sexual Activity   • Alcohol use: Yes     Comment: 3-4 GIN TONICS/NIGHT   • Drug use: Never   • Sexual activity: Defer     Family History   Problem Relation Age of Onset   • Stomach cancer Other    • Cancer Mother      Immunization History   Administered Date(s) Administered   • COVID-19 (PFIZER) PURPLE CAP 2021, 2021, 10/18/2021   • Fluad  Quad 65+ 10/07/2020, 09/24/2021   • Influenza, Unspecified 09/01/2020       Objective   Physical Exam  Vitals and nursing note reviewed.   Constitutional:       Appearance: Normal appearance. He is normal weight.   HENT:      Head: Normocephalic.      Nose: Nose normal.      Mouth/Throat:      Mouth: Mucous membranes are moist.   Eyes:      Pupils: Pupils are equal, round, and reactive to light.   Cardiovascular:      Rate and Rhythm: Normal rate and regular rhythm.      Pulses: Normal pulses.      Heart sounds: Normal heart sounds.   Pulmonary:      Effort: Pulmonary effort is normal.      Breath sounds: Normal breath sounds.   Abdominal:      Palpations: Abdomen is soft.   Musculoskeletal:         General: Normal range of motion.      Cervical back: Normal range of motion and neck supple.   Skin:     General: Skin is dry.      Capillary Refill: Capillary refill takes less than 2 seconds.   Neurological:      General: No focal deficit present.      Mental Status: He is alert and oriented to person, place, and time.   Psychiatric:         Mood and Affect: Mood normal.         Behavior: Behavior normal.         Thought Content: Thought content normal.         Judgment: Judgment normal.         Vitals:    04/29/22 1003   BP: 136/65   Pulse: 85   Resp: 18   Temp: 97.6 °F (36.4 °C)   SpO2: 97%   Weight: 69.2 kg (152 lb 8.9 oz)   PainSc: 0-No pain     ECOG score: 1         ECOG: (0) Fully Active - Able to Carry On All Pre-disease Performance Without Restriction  Fall Risk Assessment was completed, and patient is at low risk for falls.  PHQ-9 Total Score: 0       The patient is  experiencing fatigue. Fatigue score: 2    PT/OT Functional Screening: PT fx screen: No needs identified  Speech Functional Screening: Speech fx screen: No needs identified  Rehab to be ordered: Rehab to be ordered: No needs identified        Result Review :   The following data was reviewed by: ARTEMIO Giordano on 04/29/2022:  Lab  Results   Component Value Date    HGB 13.5 04/04/2022    HCT 42.6 04/04/2022    MCV 86.6 04/04/2022     04/04/2022    WBC 7.27 04/04/2022    NEUTROABS 3.88 04/04/2022    LYMPHSABS 2.53 04/04/2022    MONOSABS 0.65 04/04/2022    EOSABS 0.14 04/04/2022    BASOSABS 0.04 04/04/2022     Lab Results   Component Value Date    GLUCOSE 98 04/04/2022    BUN 9 04/04/2022    CREATININE 0.80 04/04/2022     04/04/2022    K 4.1 04/04/2022     04/04/2022    CO2 27.7 04/04/2022    CALCIUM 9.5 04/04/2022    PROTEINTOT 6.7 04/04/2022    ALBUMIN 4.40 04/04/2022    ALBUMIN 3.5 04/04/2022    BILITOT 0.6 04/04/2022    ALKPHOS 157 (H) 04/04/2022    AST 26 04/04/2022    ALT 15 04/04/2022          Assessment and Plan    Diagnoses and all orders for this visit:    1. Iron deficiency anemia, unspecified iron deficiency anemia type (Primary)  -     ferrous gluconate (FERGON) 324 MG tablet; Take 1 tablet by mouth Daily With Breakfast for 90 days.  Dispense: 30 tablet; Refill: 2    2. Malignant neoplasm of lower lobe of left lung (HCC)  -     CBC & Differential; Future    3. Anemia, unspecified type  -     Occult Blood X 1, Stool - Stool, Per Rectum; Future  -     CBC & Differential; Future  -     Iron Profile; Future  -     Ferritin; Future    Will initiate ferrous gluconate 1 tab PO QD and send to his pharmacy for refills. Will check stool for occult blood. Recheck CBC, iron panel, ferritin in 4 months. He follows with cardiology and pulmonary.             Patient Follow Up: 4 months.   Patient was given instructions and counseling regarding his condition or for health maintenance advice. Please see specific information pulled into the AVS if appropriate.     Claribel Wolff, APRN    4/29/2022

## 2022-04-29 NOTE — TELEPHONE ENCOUNTER
Caller: Carlos Zimmerman    Relationship: Self    Best call back number: [phone]    What medication are you requesting: FERROUS SULFATE 325 MG IRON    Have you had these symptoms before:    [x] Yes  [] No    Have you been treated for these symptoms before:   [x] Yes  [] No      If a prescription is needed, what is your preferred pharmacy and phone number: [facility]  [place] [facility] [place], [place] - [address] [phone] [facility] [phone]      Additional notes: PATIENT PHARMACY DOES NOT HAVE MEDICATION FOR ORIGINAL ORDER AND SUGGESTED THIS MEDICATION AS AN ALTERNATIVE

## 2022-05-02 LAB — HEMOCCULT STL QL IA: NEGATIVE

## 2022-05-02 PROCEDURE — 82274 ASSAY TEST FOR BLOOD FECAL: CPT

## 2022-05-03 ENCOUNTER — TELEPHONE (OUTPATIENT)
Dept: ONCOLOGY | Facility: HOSPITAL | Age: 82
End: 2022-05-03

## 2022-05-03 NOTE — TELEPHONE ENCOUNTER
Patient informed that there was not any blood in his stool, he is to continue taking the iron and he does not need to see a GI doctor at this time. Patient verbalized understanding.

## 2022-05-03 NOTE — TELEPHONE ENCOUNTER
----- Message from ARTEMIO Becker sent at 5/3/2022  8:43 AM EDT -----  Occult blood negative stool. Continue oral ferrous sulfate. He does not need to see GI at this time.

## 2022-06-30 ENCOUNTER — OFFICE VISIT (OUTPATIENT)
Dept: PULMONOLOGY | Facility: CLINIC | Age: 82
End: 2022-06-30

## 2022-06-30 VITALS
HEIGHT: 67 IN | SYSTOLIC BLOOD PRESSURE: 118 MMHG | BODY MASS INDEX: 23.54 KG/M2 | TEMPERATURE: 98.7 F | RESPIRATION RATE: 16 BRPM | DIASTOLIC BLOOD PRESSURE: 58 MMHG | HEART RATE: 102 BPM | OXYGEN SATURATION: 97 % | WEIGHT: 150 LBS

## 2022-06-30 DIAGNOSIS — C34.32 MALIGNANT NEOPLASM OF LOWER LOBE OF LEFT LUNG: ICD-10-CM

## 2022-06-30 DIAGNOSIS — R06.09 DYSPNEA ON EXERTION: ICD-10-CM

## 2022-06-30 DIAGNOSIS — R91.1 SOLITARY LUNG NODULE: ICD-10-CM

## 2022-06-30 DIAGNOSIS — J44.9 CHRONIC OBSTRUCTIVE PULMONARY DISEASE, UNSPECIFIED COPD TYPE: Primary | ICD-10-CM

## 2022-06-30 DIAGNOSIS — F17.211 NICOTINE DEPENDENCE, CIGARETTES, IN REMISSION: ICD-10-CM

## 2022-06-30 PROCEDURE — 99214 OFFICE O/P EST MOD 30 MIN: CPT | Performed by: NURSE PRACTITIONER

## 2022-06-30 RX ORDER — ALBUTEROL SULFATE 90 UG/1
2 AEROSOL, METERED RESPIRATORY (INHALATION) EVERY 4 HOURS PRN
Qty: 54 G | Refills: 3 | Status: SHIPPED | OUTPATIENT
Start: 2022-06-30

## 2022-06-30 NOTE — PATIENT INSTRUCTIONS
COPD and Physical Activity  Chronic obstructive pulmonary disease (COPD) is a long-term (chronic) condition that affects the lungs. COPD is a general term that can be used to describe many different lung problems that cause lung swelling (inflammation) and limit airflow, including chronic bronchitis and emphysema.  The main symptom of COPD is shortness of breath, which makes it harder to do even simple tasks. This can also make it harder to exercise and be active. Talk with your health care provider about treatments to help you breathe better and actions you can take to prevent breathing problems during physical activity.  What are the benefits of exercising with COPD?  Exercising regularly is an important part of a healthy lifestyle. You can still exercise and do physical activities even though you have COPD. Exercise and physical activity improve your shortness of breath by increasing blood flow (circulation). This causes your heart to pump more oxygen through your body. Moderate exercise can improve your:  Oxygen use.  Energy level.  Shortness of breath.  Strength in your breathing muscles.  Heart health.  Sleep.  Self-esteem and feelings of self-worth.  Depression, stress, and anxiety levels.  Exercise can benefit everyone with COPD. The severity of your disease may affect how hard you can exercise, especially at first, but everyone can benefit. Talk with your health care provider about how much exercise is safe for you, and which activities and exercises are safe for you.  What actions can I take to prevent breathing problems during physical activity?  Sign up for a pulmonary rehabilitation program. This type of program may include:  Education about lung diseases.  Exercise classes that teach you how to exercise and be more active while improving your breathing. This usually involves:  Exercise using your lower extremities, such as a stationary bicycle.  About 30 minutes of exercise, 2 to 5 times per week, for  6 to 12 weeks  Strength training, such as push ups or leg lifts.  Nutrition education.  Group classes in which you can talk with others who also have COPD and learn ways to manage stress.  If you use an oxygen tank, you should use it while you exercise. Work with your health care provider to adjust your oxygen for your physical activity. Your resting flow rate is different from your flow rate during physical activity.  While you are exercising:  Take slow breaths.  Pace yourself and do not try to go too fast.  Purse your lips while breathing out. Pursing your lips is similar to a kissing or whistling position.  If doing exercise that uses a quick burst of effort, such as weight lifting:  Breathe in before starting the exercise.  Breathe out during the hardest part of the exercise (such as raising the weights).  Where to find support  You can find support for exercising with COPD from:  Your health care provider.  A pulmonary rehabilitation program.  Your local health department or community health programs.  Support groups, online or in-person. Your health care provider may be able to recommend support groups.  Where to find more information  You can find more information about exercising with COPD from:  American Lung Association: lung.org.  COPD Foundation: copdfoundation.org.  Contact a health care provider if:  Your symptoms get worse.  You have chest pain.  You have nausea.  You have a fever.  You have trouble talking or catching your breath.  You want to start a new exercise program or a new activity.  Summary  COPD is a general term that can be used to describe many different lung problems that cause lung swelling (inflammation) and limit airflow. This includes chronic bronchitis and emphysema.  Exercise and physical activity improve your shortness of breath by increasing blood flow (circulation). This causes your heart to provide more oxygen to your body.  Contact your health care provider before starting  any exercise program or new activity. Ask your health care provider what exercises and activities are safe for you.  This information is not intended to replace advice given to you by your health care provider. Make sure you discuss any questions you have with your health care provider.  Document Revised: 04/08/2020 Document Reviewed: 01/10/2019  Elsevier Patient Education © 2021 [employer]

## 2022-06-30 NOTE — PROGRESS NOTES
Primary Care Provider  Payam Carroll MD     Referring Provider  No ref. provider found     Chief Complaint  COPD (3 month f/u ) and Shortness of Breath (No more than usual. )    Subjective          Carlos Zimmerman presents to [facility]  History of Present Illness  Carlos Zimmerman is a 82 y.o. male patient of Dr. 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     Patient states he is doing well since his last visit.  He denies using any antibiotics or steroids for his lungs.  He continues to use Trelegy as prescribed.  Patient is using his albuterol inhaler once a week.  Patient's shortness of breath is mild in severity, worse with exertion and improved with rest.  Patient does have oxygen to use at home if needed, but this is rare.  He continues to follow-up with oncology for management of iron deficiency anemia and surveillance of his left lower lobe lung cancer.  Patient's most recent CT scan from February 2022, showed persistent but improving consolidation and significant emphysema.  No suspicious nodules were seen.  Patient has been getting repeat scans every 3 months and I will order this today.  He did recently fall a couple days ago and is on Keflex for a large skin tear on his right forearm.  Otherwise, patient is doing well and has no concerns at this time.  He is up-to-date with his flu and COVID vaccines.  He is able to perform his ADLs without difficulty.     His history of smoking is   Tobacco Use: Medium Risk   • Smoking Tobacco Use: Former Smoker   • Smokeless Tobacco Use: Former User   .    Review of Systems   Constitutional: Negative for chills, fatigue, fever, unexpected weight gain and unexpected weight loss.   HENT: Negative for congestion (Nasal), hearing loss, mouth sores, nosebleeds, postnasal drip, sore throat and trouble swallowing.     Eyes: Negative for blurred vision and visual disturbance.   Respiratory: Positive for shortness of breath. Negative for apnea, cough and wheezing.         Negative for Hemoptysis     Cardiovascular: Negative for chest pain, palpitations and leg swelling.   Gastrointestinal: Negative for abdominal pain, constipation, diarrhea, nausea, vomiting and GERD.        Negative for Jaundice  Negative for Bloating  Negative for Melena   Musculoskeletal: Negative for joint swelling and myalgias.        Negative for Joint pain  Negative for Joint stiffness   Skin: Negative for color change.        Negative for cyanosis   Neurological: Negative for syncope, weakness, numbness and headache.      Sleep: Negative for Excessive daytime sleepiness  Negative for morning headaches  Negative for Snoring    Family History   Problem Relation Age of Onset   • Stomach cancer Other    • Cancer Mother         Social History     Socioeconomic History   • Marital status:    Tobacco Use   • Smoking status: Former Smoker     Packs/day: 1.00     Years: 55.00     Pack years: 55.00     Types: Cigarettes     Quit date:      Years since quittin.5   • Smokeless tobacco: Former User   Vaping Use   • Vaping Use: Never used   Substance and Sexual Activity   • Alcohol use: Yes     Comment: 3-4 GIN TONICS/NIGHT   • Drug use: Never   • Sexual activity: Defer        Past Medical History:   Diagnosis Date   • Anemia    • Arthritis    • COPD (chronic obstructive pulmonary disease) (HCC)     INHALER  PRN   • Hyperlipidemia    • Hypertension     ON MEDS   • Lung cancer (HCC)    • Pneumonia     LEFT LUNG CA   • SOB (shortness of breath)    • Stroke (HCC)    • TIA (transient ischemic attack)     NO RESIDUAL         Immunization History   Administered Date(s) Administered   • COVID-19 (PFIZER) PURPLE CAP 2021, 2021, 10/18/2021   • Fluad Quad 65+ 10/07/2020, 2021   • Influenza, Unspecified 2020   • TD Preservative Free  06/27/2022         No Known Allergies       Current Outpatient Medications:   •  albuterol sulfate  (90 Base) MCG/ACT inhaler, Inhale 2 puffs Every 4 (Four) Hours As Needed for Wheezing or Shortness of Air., Disp: 54 g, Rfl: 3  •  amLODIPine (NORVASC) 5 MG tablet, Take 5 mg by mouth Daily., Disp: , Rfl:   •  Aspirin Low Dose 81 MG EC tablet, Take 81 mg by mouth Daily., Disp: , Rfl:   •  cephalexin (KEFLEX) 500 MG capsule, Take 1 capsule by mouth 3 (Three) Times a Day for 7 days., Disp: 21 capsule, Rfl: 0  •  cetirizine (zyrTEC) 10 MG tablet, Take 10 mg by mouth Daily., Disp: , Rfl:   •  Cholecalciferol 25 MCG (1000 UT) capsule, Take 1,000 Units by mouth Daily., Disp: , Rfl:   •  cilostazol (PLETAL) 100 MG tablet, Take 100 mg by mouth 2 (Two) Times a Day., Disp: , Rfl:   •  cyclobenzaprine (FLEXERIL) 10 MG tablet, Take 10 mg by mouth 3 (Three) Times a Day As Needed for Muscle Spasms., Disp: , Rfl:   •  ferrous gluconate (FERGON) 324 MG tablet, Take 1 tablet by mouth Daily With Breakfast for 90 days., Disp: 30 tablet, Rfl: 2  •  ferrous sulfate 325 (65 FE) MG tablet, Take 1 tablet by mouth Daily With Breakfast., Disp: 90 tablet, Rfl: 1  •  metoprolol succinate XL (TOPROL-XL) 25 MG 24 hr tablet, Take 2 tablets by mouth Daily., Disp: 60 tablet, Rfl: 0  •  multivitamins-minerals (PRESERVISION AREDS 2) capsule capsule, Take 1 capsule by mouth 2 (Two) Times a Day., Disp: , Rfl:   •  naproxen (NAPROSYN) 500 MG tablet, Take 500 mg by mouth 2 (Two) Times a Day With Meals., Disp: , Rfl:   •  pantoprazole (PROTONIX) 40 MG EC tablet, Take 40 mg by mouth Daily., Disp: , Rfl:   •  pravastatin (PRAVACHOL) 20 MG tablet, Take 20 mg by mouth Daily., Disp: , Rfl:   •  tamsulosin (FLOMAX) 0.4 MG capsule 24 hr capsule, Take 1 capsule by mouth Daily., Disp: , Rfl:      Objective   Physical Exam  Constitutional:       General: He is not in acute distress.     Appearance: Normal appearance. He is normal weight.   HENT:      Right  "Ear: Hearing normal.      Left Ear: Hearing normal.      Nose: No nasal tenderness or congestion.      Mouth/Throat:      Mouth: Mucous membranes are moist. No oral lesions.   Eyes:      Extraocular Movements: Extraocular movements intact.      Pupils: Pupils are equal, round, and reactive to light.   Neck:      Thyroid: No thyroid mass or thyromegaly.   Cardiovascular:      Rate and Rhythm: Normal rate and regular rhythm.      Pulses: Normal pulses.      Heart sounds: Normal heart sounds. No murmur heard.  Pulmonary:      Effort: Pulmonary effort is normal.      Breath sounds: Normal breath sounds. No wheezing, rhonchi or rales.   Chest:   Breasts:      Right: No axillary adenopathy.       Abdominal:      General: Bowel sounds are normal. There is no distension.      Palpations: Abdomen is soft.      Tenderness: There is no abdominal tenderness.   Musculoskeletal:      Cervical back: Neck supple.      Right lower leg: No edema.      Left lower leg: No edema.   Lymphadenopathy:      Cervical: No cervical adenopathy.      Upper Body:      Right upper body: No axillary adenopathy.   Skin:     General: Skin is warm and dry.      Findings: No lesion or rash.      Comments: Patient's left arm is wrapped in a bandage from urgent care.   Neurological:      General: No focal deficit present.      Mental Status: He is alert and oriented to person, place, and time.      Cranial Nerves: Cranial nerves are intact.   Psychiatric:         Mood and Affect: Affect normal. Mood is not anxious or depressed.         Vital Signs:   /58 (BP Location: Left arm, Patient Position: Sitting, Cuff Size: Adult)   Pulse 102   Temp 98.7 °F (37.1 °C) (Infrared)   Resp 16   Ht 170.2 cm (67\")   Wt 68 kg (150 lb)   SpO2 97% Comment: Room air  BMI 23.49 kg/m²        Result Review :     CMP    CMP 2/7/22 2/8/22 4/4/22 4/4/22      0828 0828   Glucose 86 120 (A) 98    BUN 16 17 9    Creatinine 0.81 0.63 (A) 0.80    eGFR Non African Am 91 " 122     Sodium 137 139 141    Potassium 4.2 3.9 4.1    Chloride 99 105 103    Calcium 9.4 9.2 9.5    Total Protein    6.0   Albumin 4.10  4.40 3.5   Globulin    2.5   Total Bilirubin 0.9  0.6    Alkaline Phosphatase 132 (A)  157 (A)    AST (SGOT) 28  26    ALT (SGPT) 22  15    (A) Abnormal value            CBC w/diff    CBC w/Diff 2/7/22 2/8/22 4/4/22   WBC 28.64 (A) 17.88 (A) 7.27   RBC 4.93 4.18 4.92   Hemoglobin 13.7 11.9 (A) 13.5   Hematocrit 41.4 37.2 (A) 42.6   MCV 84.0 89.0 86.6   MCH 27.8 28.5 27.4   MCHC 33.1 32.0 31.7   RDW 14.4 14.6 15.7 (A)   Platelets 211 185 217   Neutrophil Rel % 86.3 (A)  53.4   Immature Granulocyte Rel % 0.6 (A)  0.4   Lymphocyte Rel % 7.1 (A)  34.8   Monocyte Rel % 5.8  8.9   Eosinophil Rel % 0.0 (A)  1.9   Basophil Rel % 0.2  0.6   (A) Abnormal value            Data reviewed: Radiologic studies Chest CT 2/14/2022, chest x-ray 3/29/2022, Dr. Marie's last office note and My last office note   Procedures        Assessment and Plan    Diagnoses and all orders for this visit:    1. Chronic obstructive pulmonary disease, unspecified COPD type (HCC) (Primary)  -     albuterol sulfate  (90 Base) MCG/ACT inhaler; Inhale 2 puffs Every 4 (Four) Hours As Needed for Wheezing or Shortness of Air.  Dispense: 54 g; Refill: 3    2. Malignant neoplasm of lower lobe of left lung (HCC)  -     Cancel: CT Chest Without Contrast; Future  -     CT Chest Without Contrast; Future    3. Solitary lung nodule  -     Cancel: CT Chest Without Contrast; Future  -     CT Chest Without Contrast; Future    4. Dyspnea on exertion    5. Nicotine dependence, cigarettes, in remission    6.  Continue albuterol as needed.  7.  Repeat CT scan of the chest ordered to evaluate history of lung cancer.  8.  Follow-up with Dr. Choudhary in 4 months, sooner if needed.        Follow Up   Return in about 4 months (around 10/30/2022) for Recheck with Dr. Choudhary.  Patient was given instructions and counseling regarding his  condition or for health maintenance advice. Please see specific information pulled into the AVS if appropriate.

## 2022-07-10 ENCOUNTER — APPOINTMENT (OUTPATIENT)
Dept: GENERAL RADIOLOGY | Facility: HOSPITAL | Age: 82
End: 2022-07-10

## 2022-07-10 ENCOUNTER — APPOINTMENT (OUTPATIENT)
Dept: ULTRASOUND IMAGING | Facility: HOSPITAL | Age: 82
End: 2022-07-10

## 2022-07-10 ENCOUNTER — HOSPITAL ENCOUNTER (INPATIENT)
Facility: HOSPITAL | Age: 82
LOS: 2 days | Discharge: HOME | End: 2022-07-12
Attending: EMERGENCY MEDICINE | Admitting: INTERNAL MEDICINE

## 2022-07-10 DIAGNOSIS — R26.2 DIFFICULTY WALKING: ICD-10-CM

## 2022-07-10 DIAGNOSIS — A41.9 SEPSIS, DUE TO UNSPECIFIED ORGANISM, UNSPECIFIED WHETHER ACUTE ORGAN DYSFUNCTION PRESENT: ICD-10-CM

## 2022-07-10 DIAGNOSIS — N39.0 ACUTE UTI: Primary | ICD-10-CM

## 2022-07-10 PROBLEM — R31.9 HEMATURIA: Status: ACTIVE | Noted: 2022-07-10

## 2022-07-10 LAB
ALBUMIN SERPL-MCNC: 4.1 G/DL (ref 3.5–5.2)
ALBUMIN/GLOB SERPL: 1.8 G/DL
ALP SERPL-CCNC: 145 U/L (ref 39–117)
ALT SERPL W P-5'-P-CCNC: 18 U/L (ref 1–41)
ANION GAP SERPL CALCULATED.3IONS-SCNC: 10.5 MMOL/L (ref 5–15)
AST SERPL-CCNC: 21 U/L (ref 1–40)
BACTERIA UR QL AUTO: ABNORMAL /HPF
BASOPHILS # BLD AUTO: 0.04 10*3/MM3 (ref 0–0.2)
BASOPHILS NFR BLD AUTO: 0.2 % (ref 0–1.5)
BILIRUB SERPL-MCNC: 0.8 MG/DL (ref 0–1.2)
BILIRUB UR QL STRIP: NEGATIVE
BUN SERPL-MCNC: 10 MG/DL (ref 8–23)
BUN/CREAT SERPL: 12 (ref 7–25)
CALCIUM SPEC-SCNC: 9 MG/DL (ref 8.6–10.5)
CHLORIDE SERPL-SCNC: 101 MMOL/L (ref 98–107)
CLARITY UR: ABNORMAL
CO2 SERPL-SCNC: 24.5 MMOL/L (ref 22–29)
COLOR UR: ABNORMAL
CREAT SERPL-MCNC: 0.83 MG/DL (ref 0.76–1.27)
D-LACTATE SERPL-SCNC: 1.2 MMOL/L (ref 0.5–2)
DEPRECATED RDW RBC AUTO: 47.8 FL (ref 37–54)
EGFRCR SERPLBLD CKD-EPI 2021: 86.8 ML/MIN/1.73
EOSINOPHIL # BLD AUTO: 0.05 10*3/MM3 (ref 0–0.4)
EOSINOPHIL NFR BLD AUTO: 0.3 % (ref 0.3–6.2)
ERYTHROCYTE [DISTWIDTH] IN BLOOD BY AUTOMATED COUNT: 14.8 % (ref 12.3–15.4)
GLOBULIN UR ELPH-MCNC: 2.3 GM/DL
GLUCOSE SERPL-MCNC: 94 MG/DL (ref 65–99)
GLUCOSE UR STRIP-MCNC: NEGATIVE MG/DL
HCT VFR BLD AUTO: 39 % (ref 37.5–51)
HGB BLD-MCNC: 12.9 G/DL (ref 13–17.7)
HGB UR QL STRIP.AUTO: ABNORMAL
HOLD SPECIMEN: NORMAL
HOLD SPECIMEN: NORMAL
HYALINE CASTS UR QL AUTO: ABNORMAL /LPF
IMM GRANULOCYTES # BLD AUTO: 0.08 10*3/MM3 (ref 0–0.05)
IMM GRANULOCYTES NFR BLD AUTO: 0.4 % (ref 0–0.5)
KETONES UR QL STRIP: ABNORMAL
LEUKOCYTE ESTERASE UR QL STRIP.AUTO: ABNORMAL
LYMPHOCYTES # BLD AUTO: 1.28 10*3/MM3 (ref 0.7–3.1)
LYMPHOCYTES NFR BLD AUTO: 6.8 % (ref 19.6–45.3)
MCH RBC QN AUTO: 29.1 PG (ref 26.6–33)
MCHC RBC AUTO-ENTMCNC: 33.1 G/DL (ref 31.5–35.7)
MCV RBC AUTO: 88 FL (ref 79–97)
MONOCYTES # BLD AUTO: 1.42 10*3/MM3 (ref 0.1–0.9)
MONOCYTES NFR BLD AUTO: 7.5 % (ref 5–12)
NEUTROPHILS NFR BLD AUTO: 16.01 10*3/MM3 (ref 1.7–7)
NEUTROPHILS NFR BLD AUTO: 84.8 % (ref 42.7–76)
NITRITE UR QL STRIP: NEGATIVE
NRBC BLD AUTO-RTO: 0 /100 WBC (ref 0–0.2)
NT-PROBNP SERPL-MCNC: 164.5 PG/ML (ref 0–1800)
PH UR STRIP.AUTO: 5.5 [PH] (ref 5–8)
PLATELET # BLD AUTO: 199 10*3/MM3 (ref 140–450)
PMV BLD AUTO: 9.7 FL (ref 6–12)
POTASSIUM SERPL-SCNC: 3.6 MMOL/L (ref 3.5–5.2)
PROT SERPL-MCNC: 6.4 G/DL (ref 6–8.5)
PROT UR QL STRIP: ABNORMAL
QT INTERVAL: 325 MS
RBC # BLD AUTO: 4.43 10*6/MM3 (ref 4.14–5.8)
RBC # UR STRIP: ABNORMAL /HPF
REF LAB TEST METHOD: ABNORMAL
SODIUM SERPL-SCNC: 136 MMOL/L (ref 136–145)
SP GR UR STRIP: 1.02 (ref 1–1.03)
SQUAMOUS #/AREA URNS HPF: ABNORMAL /HPF
TROPONIN T SERPL-MCNC: <0.01 NG/ML (ref 0–0.03)
UROBILINOGEN UR QL STRIP: ABNORMAL
WBC # UR STRIP: ABNORMAL /HPF
WBC NRBC COR # BLD: 18.88 10*3/MM3 (ref 3.4–10.8)
WHOLE BLOOD HOLD COAG: NORMAL
WHOLE BLOOD HOLD SPECIMEN: NORMAL

## 2022-07-10 PROCEDURE — 0 CEFTRIAXONE PER 250 MG: Performed by: EMERGENCY MEDICINE

## 2022-07-10 PROCEDURE — 87040 BLOOD CULTURE FOR BACTERIA: CPT | Performed by: EMERGENCY MEDICINE

## 2022-07-10 PROCEDURE — 87186 SC STD MICRODIL/AGAR DIL: CPT | Performed by: EMERGENCY MEDICINE

## 2022-07-10 PROCEDURE — 85025 COMPLETE CBC W/AUTO DIFF WBC: CPT | Performed by: EMERGENCY MEDICINE

## 2022-07-10 PROCEDURE — 25010000002 AZITHROMYCIN PER 500 MG: Performed by: EMERGENCY MEDICINE

## 2022-07-10 PROCEDURE — 83605 ASSAY OF LACTIC ACID: CPT | Performed by: EMERGENCY MEDICINE

## 2022-07-10 PROCEDURE — U0005 INFEC AGEN DETEC AMPLI PROBE: HCPCS | Performed by: EMERGENCY MEDICINE

## 2022-07-10 PROCEDURE — 93005 ELECTROCARDIOGRAM TRACING: CPT | Performed by: EMERGENCY MEDICINE

## 2022-07-10 PROCEDURE — 71045 X-RAY EXAM CHEST 1 VIEW: CPT

## 2022-07-10 PROCEDURE — 36415 COLL VENOUS BLD VENIPUNCTURE: CPT

## 2022-07-10 PROCEDURE — 87086 URINE CULTURE/COLONY COUNT: CPT | Performed by: EMERGENCY MEDICINE

## 2022-07-10 PROCEDURE — 99284 EMERGENCY DEPT VISIT MOD MDM: CPT

## 2022-07-10 PROCEDURE — 80053 COMPREHEN METABOLIC PANEL: CPT | Performed by: EMERGENCY MEDICINE

## 2022-07-10 PROCEDURE — U0004 COV-19 TEST NON-CDC HGH THRU: HCPCS | Performed by: EMERGENCY MEDICINE

## 2022-07-10 PROCEDURE — 83880 ASSAY OF NATRIURETIC PEPTIDE: CPT | Performed by: EMERGENCY MEDICINE

## 2022-07-10 PROCEDURE — 99222 1ST HOSP IP/OBS MODERATE 55: CPT | Performed by: INTERNAL MEDICINE

## 2022-07-10 PROCEDURE — 84484 ASSAY OF TROPONIN QUANT: CPT | Performed by: EMERGENCY MEDICINE

## 2022-07-10 PROCEDURE — 81001 URINALYSIS AUTO W/SCOPE: CPT | Performed by: EMERGENCY MEDICINE

## 2022-07-10 RX ORDER — METOPROLOL SUCCINATE 25 MG/1
25 TABLET, EXTENDED RELEASE ORAL DAILY
COMMUNITY

## 2022-07-10 RX ORDER — CEFTRIAXONE SODIUM 1 G/50ML
1 INJECTION, SOLUTION INTRAVENOUS EVERY 24 HOURS
Status: DISCONTINUED | OUTPATIENT
Start: 2022-07-11 | End: 2022-07-12 | Stop reason: HOSPADM

## 2022-07-10 RX ORDER — TAMSULOSIN HYDROCHLORIDE 0.4 MG/1
0.4 CAPSULE ORAL DAILY
Status: DISCONTINUED | OUTPATIENT
Start: 2022-07-10 | End: 2022-07-12 | Stop reason: HOSPADM

## 2022-07-10 RX ORDER — CILOSTAZOL 100 MG/1
100 TABLET ORAL 2 TIMES DAILY
Status: DISCONTINUED | OUTPATIENT
Start: 2022-07-10 | End: 2022-07-12 | Stop reason: HOSPADM

## 2022-07-10 RX ORDER — ACETAMINOPHEN 325 MG/1
975 TABLET ORAL ONCE
Status: COMPLETED | OUTPATIENT
Start: 2022-07-10 | End: 2022-07-10

## 2022-07-10 RX ORDER — ACETAMINOPHEN 325 MG/1
650 TABLET ORAL EVERY 6 HOURS PRN
Status: DISCONTINUED | OUTPATIENT
Start: 2022-07-10 | End: 2022-07-12 | Stop reason: HOSPADM

## 2022-07-10 RX ORDER — AMLODIPINE BESYLATE 5 MG/1
5 TABLET ORAL DAILY
Status: DISCONTINUED | OUTPATIENT
Start: 2022-07-10 | End: 2022-07-12 | Stop reason: HOSPADM

## 2022-07-10 RX ORDER — SODIUM CHLORIDE 0.9 % (FLUSH) 0.9 %
10 SYRINGE (ML) INJECTION AS NEEDED
Status: DISCONTINUED | OUTPATIENT
Start: 2022-07-10 | End: 2022-07-12 | Stop reason: HOSPADM

## 2022-07-10 RX ORDER — MELATONIN
1000 DAILY
Status: DISCONTINUED | OUTPATIENT
Start: 2022-07-10 | End: 2022-07-12 | Stop reason: HOSPADM

## 2022-07-10 RX ORDER — PANTOPRAZOLE SODIUM 40 MG/1
40 TABLET, DELAYED RELEASE ORAL DAILY
Status: DISCONTINUED | OUTPATIENT
Start: 2022-07-10 | End: 2022-07-12 | Stop reason: HOSPADM

## 2022-07-10 RX ORDER — PRAVASTATIN SODIUM 40 MG
40 TABLET ORAL NIGHTLY
COMMUNITY
Start: 2022-05-31

## 2022-07-10 RX ORDER — FERROUS SULFATE 325(65) MG
325 TABLET ORAL
Refills: 2 | Status: DISCONTINUED | OUTPATIENT
Start: 2022-07-11 | End: 2022-07-12 | Stop reason: HOSPADM

## 2022-07-10 RX ORDER — CYCLOBENZAPRINE HCL 10 MG
10 TABLET ORAL 3 TIMES DAILY PRN
Status: DISCONTINUED | OUTPATIENT
Start: 2022-07-10 | End: 2022-07-12 | Stop reason: HOSPADM

## 2022-07-10 RX ORDER — PRAVASTATIN SODIUM 40 MG
40 TABLET ORAL NIGHTLY
Status: DISCONTINUED | OUTPATIENT
Start: 2022-07-10 | End: 2022-07-12 | Stop reason: HOSPADM

## 2022-07-10 RX ORDER — CETIRIZINE HYDROCHLORIDE 10 MG/1
10 TABLET ORAL DAILY
Status: DISCONTINUED | OUTPATIENT
Start: 2022-07-10 | End: 2022-07-12 | Stop reason: HOSPADM

## 2022-07-10 RX ORDER — ASPIRIN 81 MG/1
81 TABLET ORAL DAILY
Status: DISCONTINUED | OUTPATIENT
Start: 2022-07-10 | End: 2022-07-12 | Stop reason: HOSPADM

## 2022-07-10 RX ORDER — CEFTRIAXONE SODIUM 2 G/50ML
2 INJECTION, SOLUTION INTRAVENOUS ONCE
Status: COMPLETED | OUTPATIENT
Start: 2022-07-10 | End: 2022-07-10

## 2022-07-10 RX ORDER — ALBUTEROL SULFATE 90 UG/1
2 AEROSOL, METERED RESPIRATORY (INHALATION) EVERY 4 HOURS PRN
Status: DISCONTINUED | OUTPATIENT
Start: 2022-07-10 | End: 2022-07-12 | Stop reason: HOSPADM

## 2022-07-10 RX ORDER — MULTIPLE VITAMINS W/ MINERALS TAB 9MG-400MCG
1 TAB ORAL DAILY
Status: DISCONTINUED | OUTPATIENT
Start: 2022-07-10 | End: 2022-07-12 | Stop reason: HOSPADM

## 2022-07-10 RX ORDER — METOPROLOL SUCCINATE 50 MG/1
50 TABLET, EXTENDED RELEASE ORAL DAILY
Status: DISCONTINUED | OUTPATIENT
Start: 2022-07-10 | End: 2022-07-12 | Stop reason: HOSPADM

## 2022-07-10 RX ORDER — ONDANSETRON 2 MG/ML
4 INJECTION INTRAMUSCULAR; INTRAVENOUS EVERY 6 HOURS PRN
Status: DISCONTINUED | OUTPATIENT
Start: 2022-07-10 | End: 2022-07-12 | Stop reason: HOSPADM

## 2022-07-10 RX ADMIN — ACETAMINOPHEN 975 MG: 325 TABLET ORAL at 18:31

## 2022-07-10 RX ADMIN — SODIUM CHLORIDE 2133 ML: 9 INJECTION, SOLUTION INTRAVENOUS at 19:25

## 2022-07-10 RX ADMIN — CEFTRIAXONE SODIUM 2 G: 2 INJECTION, SOLUTION INTRAVENOUS at 19:17

## 2022-07-10 NOTE — ED PROVIDER NOTES
Time: 7:05 PM EDT  Arrived by: private car  Chief Complaint: SOB  History provided by: patient  History is limited by: N/A     History of Present Illness:      Patient is a 82 y.o. year old male who presents to the emergency department via car with SOB. Pt admits to dysuria, difficulty urinating, frequency, fever, generalized weakness, and hematuria. Pt denies use of self-catheterization. Pt has a hx of COPD, stroke, HT, lung cancer, pneumonia, hyperlipidemia, SOB, and anemia. Pt is a former smoker, current alcohol user, and denies drug use.       History provided by:  Patient   used: No        Similar Symptoms Previously: N/A  Recently seen: N/A      Patient Care Team  Primary Care Provider: Payam Carroll MD    Past Medical History:     No Known Allergies  Past Medical History:   Diagnosis Date   • Anemia    • Arthritis    • COPD (chronic obstructive pulmonary disease) (HCC)     INHALER  PRN   • Hyperlipidemia    • Hypertension     ON MEDS   • Lung cancer (HCC)    • Pneumonia     LEFT LUNG CA   • SOB (shortness of breath)    • Stroke (HCC)    • TIA (transient ischemic attack)     NO RESIDUAL 2010     Past Surgical History:   Procedure Laterality Date   • LUNG BIOPSY     • LUNG SURGERY      REMOVAL HALF OF UPPER PORTION LEFT LUNG   • ORTHOPEDIC SURGERY Left     ROTATOR CUFF   • THROAT SURGERY       Family History   Problem Relation Age of Onset   • Stomach cancer Other    • Cancer Mother        Home Medications:  Prior to Admission medications    Medication Sig Start Date End Date Taking? Authorizing Provider   albuterol sulfate  (90 Base) MCG/ACT inhaler Inhale 2 puffs Every 4 (Four) Hours As Needed for Wheezing or Shortness of Air. 6/30/22   Meliza Cuellar APRN   amLODIPine (NORVASC) 5 MG tablet Take 5 mg by mouth Daily.    ProviderMaxine MD   Aspirin Low Dose 81 MG EC tablet Take 81 mg by mouth Daily. 4/19/21   Maxine Espinoza MD   cetirizine (zyrTEC) 10 MG tablet  Take 10 mg by mouth Daily.    Maxine Espinoza MD   Cholecalciferol 25 MCG (1000 UT) capsule Take 1,000 Units by mouth Daily.    Maxine Espinoza MD   cilostazol (PLETAL) 100 MG tablet Take 100 mg by mouth 2 (Two) Times a Day. 21   Maxine Espinoza MD   cyclobenzaprine (FLEXERIL) 10 MG tablet Take 10 mg by mouth 3 (Three) Times a Day As Needed for Muscle Spasms.    Maxine Espinoza MD   ferrous gluconate (FERGON) 324 MG tablet Take 1 tablet by mouth Daily With Breakfast for 90 days. 22  Claribel Wolff APRN   ferrous sulfate 325 (65 FE) MG tablet Take 1 tablet by mouth Daily With Breakfast. 22   Claribel Wolff APRN   metoprolol succinate XL (TOPROL-XL) 25 MG 24 hr tablet Take 2 tablets by mouth Daily. 22   Chase Klein MD   multivitamins-minerals (PRESERVISION AREDS 2) capsule capsule Take 1 capsule by mouth 2 (Two) Times a Day. 21   Maxine Espinoza MD   naproxen (NAPROSYN) 500 MG tablet Take 500 mg by mouth 2 (Two) Times a Day With Meals.    Maxine Espinoza MD   pantoprazole (PROTONIX) 40 MG EC tablet Take 40 mg by mouth Daily. 21   Maxine Espinoza MD   pravastatin (PRAVACHOL) 20 MG tablet Take 20 mg by mouth Daily. 3/29/21   Maxine Espinoza MD   tamsulosin (FLOMAX) 0.4 MG capsule 24 hr capsule Take 1 capsule by mouth Daily. 21   Maxine Espinoza MD        Social History:   Social History     Tobacco Use   • Smoking status: Former Smoker     Packs/day: 1.00     Years: 55.00     Pack years: 55.00     Types: Cigarettes     Quit date:      Years since quittin.5   • Smokeless tobacco: Former User   Vaping Use   • Vaping Use: Never used   Substance Use Topics   • Alcohol use: Yes     Comment: 3-4 GIN TONICS/NIGHT   • Drug use: Never     Recent travel: not applicable     Review of Systems:  Review of Systems   Constitutional: Positive for fever. Negative for chills.   HENT: Negative for congestion, ear  "pain and sore throat.    Eyes: Negative for pain.   Respiratory: Positive for shortness of breath (intermittent ). Negative for cough and chest tightness.    Cardiovascular: Negative for chest pain.   Gastrointestinal: Negative for abdominal pain, diarrhea, nausea and vomiting.   Genitourinary: Positive for difficulty urinating, dysuria, frequency and hematuria. Negative for flank pain.   Musculoskeletal: Negative for joint swelling.   Skin: Negative for pallor.   Neurological: Positive for weakness (generalized). Negative for seizures and headaches.   All other systems reviewed and are negative.       Physical Exam:  /57 (BP Location: Left arm, Patient Position: Lying)   Pulse 107   Temp 98.2 °F (36.8 °C) (Oral)   Resp 18   Ht 170.2 cm (67\")   Wt 74.1 kg (163 lb 5.8 oz)   SpO2 98%   BMI 25.59 kg/m²     Physical Exam  Vitals and nursing note reviewed.   Constitutional:       General: He is in acute distress (mild).      Appearance: Normal appearance. He is not toxic-appearing.   HENT:      Head: Normocephalic and atraumatic.      Jaw: There is normal jaw occlusion.   Eyes:      General: Lids are normal.      Extraocular Movements: Extraocular movements intact.      Conjunctiva/sclera: Conjunctivae normal.      Pupils: Pupils are equal, round, and reactive to light.   Cardiovascular:      Rate and Rhythm: Regular rhythm. Tachycardia present.      Pulses: Normal pulses.      Heart sounds: Normal heart sounds.   Pulmonary:      Effort: Pulmonary effort is normal. No respiratory distress.      Breath sounds: Normal breath sounds. No wheezing or rhonchi.   Abdominal:      General: Abdomen is flat.      Palpations: Abdomen is soft.      Tenderness: There is no abdominal tenderness. There is no guarding or rebound.   Musculoskeletal:         General: Normal range of motion.      Cervical back: Normal range of motion and neck supple.      Right lower leg: No edema.      Left lower leg: No edema.   Skin:     " General: Skin is warm and dry.   Neurological:      Mental Status: He is alert and oriented to person, place, and time. Mental status is at baseline.   Psychiatric:         Mood and Affect: Mood normal.                Medications in the Emergency Department:  Medications   acetaminophen (TYLENOL) tablet 975 mg (975 mg Oral Given 7/10/22 1831)   sodium chloride 0.9 % bolus 2,133 mL (2,133 mL Intravenous New Bag 7/10/22 1925)   cefTRIAXone (ROCEPHIN) IVPB 2 g (0 g Intravenous Stopped 7/10/22 2026)   AZITHROMYCIN 500 MG/250 ML 0.9% NS IVPB (vial-mate) (0 mg Intravenous Stopped 7/10/22 2155)   potassium chloride (MICRO-K) CR capsule 20 mEq (20 mEq Oral Given 7/11/22 1825)        Labs  Lab Results (last 24 hours)     ** No results found for the last 24 hours. **           Imaging:    XR Chest 1 View    Result Date: 7/10/2022  Narrative: PROCEDURE: XR CHEST 1 VW  COMPARISON: 3/29/2022.  INDICATIONS: Shortness of breath.  FINDINGS:  A single AP upright portable chest radiograph was performed.  Mild cardiac enlargement is seen.  No definite acute infiltrate is appreciated.  No pleural effusion or pneumothorax is identified.  The thoracic aorta is atherosclerotic.  Emphysematous contour of the lungs is noted.  Mild to moderate levoscoliosis of the thoracic spine is suggested. The thoracic aorta is atherosclerotic.  External artifacts obscure detail.  Degenerative changes involve the bilateral shoulders and the imaged spine.  Generalized osteopenia is suspected.  Chronic calcified granulomatous disease may involve the chest.  No significant interval change is appreciated since the prior study.      Impression:   No definite acute infiltrate is appreciated.  Mild cardiomegaly is suspected.  Severe emphysematous changes involve the lungs.      COMMENT:  Part of this note is an electronic transcription of spoken language to printed text. The electronic translation/transcription may permit erroneous, or at times, nonsensical (or  even sensical) words or phrases to be inadvertently transcribed or omitted; this  has reviewed the note for such errors (as well as additional errors); however, some may still exist.  CANDIDA PARK JR, MD       Electronically Signed and Approved By: CANDIDA PARK JR, MD on 7/10/2022 at 19:07              US Renal Bilateral    Result Date: 7/11/2022  Narrative: PROCEDURE: US RENAL BILATERAL  COMPARISON: None  INDICATIONS: Neoplasm Exclusion.  FINDINGS:  The right kidney measures 11.8 x 4.3 x 5.3 cm. The left kidney measures 12.8 x 5.6 x 5.3 cm.  There is normal renal cortical thickness and renal echogenicity.  There is no evidence of solid or cystic renal lesion by ultrasound.  There is no hydronephrosis.  The urinary bladder is collapsed and not evaluated.      Impression:   1. No evidence of renal mass by ultrasound.  If high clinical concern, follow-up with CT or MRI renal mass protocol would be the test of choice.       MARY HOWARD MD       Electronically Signed and Approved By: MARY HOWARD MD on 7/11/2022 at 7:58               No Radiology Exams Resulted Within Past 24 Hours    Procedures:  Procedures    Progress                            Medical Decision Making:  MDM  Number of Diagnoses or Management Options     Amount and/or Complexity of Data Reviewed  Clinical lab tests: reviewed  Tests in the radiology section of CPT®: reviewed  Tests in the medicine section of CPT®: reviewed    Patient Progress  Patient progress: (At 1900 signs of sepsis were noted. Pt was given a normal saline bolus and IV antibiotics.)       Final diagnoses:   Acute UTI   Sepsis, due to unspecified organism, unspecified whether acute organ dysfunction present (HCC)        Disposition:  ED Disposition     ED Disposition   Decision to Admit    Condition   --    Comment   Level of Care: Med/Surg [1]   Diagnosis: Hematuria [428634]   Admitting Physician: MILLIE RUIZ [[id number]]   Attending Physician: JOSEPH  MILLIE BAKER [[id number]]   Certification: I Certify That Inpatient Hospital Services Are Medically Necessary For Greater Than 2 Midnights               This medical record created using voice recognition software.           Francine Ferrell  07/10/22 1921           Ibrahima Hernandez Jr.  07/10/22 2205       Rashid See DO  07/13/22 1547

## 2022-07-11 ENCOUNTER — APPOINTMENT (OUTPATIENT)
Dept: ULTRASOUND IMAGING | Facility: HOSPITAL | Age: 82
End: 2022-07-11

## 2022-07-11 LAB
ANION GAP SERPL CALCULATED.3IONS-SCNC: 9.9 MMOL/L (ref 5–15)
BASOPHILS # BLD AUTO: 0.03 10*3/MM3 (ref 0–0.2)
BASOPHILS NFR BLD AUTO: 0.1 % (ref 0–1.5)
BUN SERPL-MCNC: 8 MG/DL (ref 8–23)
BUN/CREAT SERPL: 13.8 (ref 7–25)
CALCIUM SPEC-SCNC: 8.4 MG/DL (ref 8.6–10.5)
CHLORIDE SERPL-SCNC: 106 MMOL/L (ref 98–107)
CO2 SERPL-SCNC: 23.1 MMOL/L (ref 22–29)
CREAT SERPL-MCNC: 0.58 MG/DL (ref 0.76–1.27)
DEPRECATED RDW RBC AUTO: 47.5 FL (ref 37–54)
EGFRCR SERPLBLD CKD-EPI 2021: 96.8 ML/MIN/1.73
EOSINOPHIL # BLD AUTO: 0.02 10*3/MM3 (ref 0–0.4)
EOSINOPHIL NFR BLD AUTO: 0.1 % (ref 0.3–6.2)
ERYTHROCYTE [DISTWIDTH] IN BLOOD BY AUTOMATED COUNT: 14.6 % (ref 12.3–15.4)
GLUCOSE SERPL-MCNC: 108 MG/DL (ref 65–99)
HCT VFR BLD AUTO: 37 % (ref 37.5–51)
HGB BLD-MCNC: 12.1 G/DL (ref 13–17.7)
IMM GRANULOCYTES # BLD AUTO: 0.28 10*3/MM3 (ref 0–0.05)
IMM GRANULOCYTES NFR BLD AUTO: 1.3 % (ref 0–0.5)
LYMPHOCYTES # BLD AUTO: 1.01 10*3/MM3 (ref 0.7–3.1)
LYMPHOCYTES NFR BLD AUTO: 4.7 % (ref 19.6–45.3)
MCH RBC QN AUTO: 29.1 PG (ref 26.6–33)
MCHC RBC AUTO-ENTMCNC: 32.7 G/DL (ref 31.5–35.7)
MCV RBC AUTO: 88.9 FL (ref 79–97)
MONOCYTES # BLD AUTO: 1.55 10*3/MM3 (ref 0.1–0.9)
MONOCYTES NFR BLD AUTO: 7.1 % (ref 5–12)
NEUTROPHILS NFR BLD AUTO: 18.82 10*3/MM3 (ref 1.7–7)
NEUTROPHILS NFR BLD AUTO: 86.7 % (ref 42.7–76)
NRBC BLD AUTO-RTO: 0 /100 WBC (ref 0–0.2)
PLATELET # BLD AUTO: 182 10*3/MM3 (ref 140–450)
PMV BLD AUTO: 10.1 FL (ref 6–12)
POTASSIUM SERPL-SCNC: 3.3 MMOL/L (ref 3.5–5.2)
RBC # BLD AUTO: 4.16 10*6/MM3 (ref 4.14–5.8)
SARS-COV-2 RNA PNL SPEC NAA+PROBE: NOT DETECTED
SODIUM SERPL-SCNC: 139 MMOL/L (ref 136–145)
WBC NRBC COR # BLD: 21.71 10*3/MM3 (ref 3.4–10.8)

## 2022-07-11 PROCEDURE — 25010000002 CEFTRIAXONE PER 250 MG: Performed by: INTERNAL MEDICINE

## 2022-07-11 PROCEDURE — 80048 BASIC METABOLIC PNL TOTAL CA: CPT | Performed by: INTERNAL MEDICINE

## 2022-07-11 PROCEDURE — 94799 UNLISTED PULMONARY SVC/PX: CPT

## 2022-07-11 PROCEDURE — 99232 SBSQ HOSP IP/OBS MODERATE 35: CPT | Performed by: FAMILY MEDICINE

## 2022-07-11 PROCEDURE — 85025 COMPLETE CBC W/AUTO DIFF WBC: CPT | Performed by: INTERNAL MEDICINE

## 2022-07-11 PROCEDURE — 76775 US EXAM ABDO BACK WALL LIM: CPT

## 2022-07-11 RX ORDER — POTASSIUM CHLORIDE 750 MG/1
20 CAPSULE, EXTENDED RELEASE ORAL 2 TIMES DAILY WITH MEALS
Status: COMPLETED | OUTPATIENT
Start: 2022-07-11 | End: 2022-07-11

## 2022-07-11 RX ADMIN — FERROUS SULFATE TAB 325 MG (65 MG ELEMENTAL FE) 325 MG: 325 (65 FE) TAB at 08:57

## 2022-07-11 RX ADMIN — CILOSTAZOL 100 MG: 100 TABLET ORAL at 08:57

## 2022-07-11 RX ADMIN — PANTOPRAZOLE SODIUM 40 MG: 40 TABLET, DELAYED RELEASE ORAL at 08:57

## 2022-07-11 RX ADMIN — POTASSIUM CHLORIDE 20 MEQ: 750 CAPSULE, EXTENDED RELEASE ORAL at 09:10

## 2022-07-11 RX ADMIN — PRAVASTATIN SODIUM 40 MG: 40 TABLET ORAL at 20:14

## 2022-07-11 RX ADMIN — MULTIPLE VITAMINS W/ MINERALS TAB 1 TABLET: TAB at 08:57

## 2022-07-11 RX ADMIN — TAMSULOSIN HYDROCHLORIDE 0.4 MG: 0.4 CAPSULE ORAL at 08:57

## 2022-07-11 RX ADMIN — Medication 1000 UNITS: at 08:57

## 2022-07-11 RX ADMIN — METOPROLOL SUCCINATE 50 MG: 50 TABLET, EXTENDED RELEASE ORAL at 08:57

## 2022-07-11 RX ADMIN — POTASSIUM CHLORIDE 20 MEQ: 750 CAPSULE, EXTENDED RELEASE ORAL at 18:25

## 2022-07-11 RX ADMIN — CETIRIZINE HYDROCHLORIDE 10 MG: 10 TABLET, FILM COATED ORAL at 08:57

## 2022-07-11 RX ADMIN — ASPIRIN 81 MG: 81 TABLET, COATED ORAL at 08:57

## 2022-07-11 RX ADMIN — CILOSTAZOL 100 MG: 100 TABLET ORAL at 20:14

## 2022-07-11 RX ADMIN — CEFTRIAXONE SODIUM 1 G: 1 INJECTION, SOLUTION INTRAVENOUS at 20:13

## 2022-07-11 RX ADMIN — AMLODIPINE BESYLATE 5 MG: 5 TABLET ORAL at 08:57

## 2022-07-11 NOTE — H&P
[facility]   HOSPITALIST HISTORY AND PHYSICAL  Date: 7/10/2022   Patient Name: Carlos Zimmerman  : 1940  MRN: [id number]    Primary Care Physician:  Payam Carroll MD  Family Point of Contact:  Date of admission: 7/10/2022      Subjective     Chief Complaint: fever and urinary incontinence    HPI:  Carlos Zimmerman is a COVID vaccinated and sometime O2 dependent 82M lung cancer survivor who has had two strokes. He presents from home with < 24 hrs of fever, urinary incontinence, and his seminal episodes of painless vis hematuria.    Denies pain, bleeding from any other orifice, constitutional/GI/pulmonary sxs.    Upon arrival he had pyuria & fever and met sepsis criteria, necessitating IVF and empiric abs therapies. No hypoxia or PNA and no PE suspicion.    Personal History     Past Medical History:  Past Medical History:   Diagnosis Date   • Anemia    • Arthritis    • COPD (chronic obstructive pulmonary disease) (HCC)     INHALER  PRN   • Hyperlipidemia    • Hypertension     ON MEDS   • Lung cancer (HCC)    • Pneumonia     LEFT LUNG CA   • SOB (shortness of breath)    • Stroke (HCC)    • TIA (transient ischemic attack)     NO RESIDUAL      NSCLCa s/p BONNIE pneumonectomy and CXT, 2016  Advanced COPD with prn O2 need via NC; ceased tobacco   CVA x2 without residual; cilostozol and ASA  Arterial HTN   Fe def anemia; oral iron  BPH     Past Surgical History:  Past Surgical History:   Procedure Laterality Date   • LUNG BIOPSY     • LUNG SURGERY      REMOVAL HALF OF UPPER PORTION LEFT LUNG   • ORTHOPEDIC SURGERY Left     ROTATOR CUFF   • THROAT SURGERY         Family History:   Family History   Problem Relation Age of Onset   • Stomach cancer Other    • Cancer Mother       Non-contributory    Social History:    ([place]) with one child  Retired from [employer] and former [employer] member without combat  Ceased tobacco  after an extensive history  2-3 nightly gin & tonics without withdrawal  sxs    Home Medications:  Cholecalciferol, albuterol sulfate HFA, amLODIPine, aspirin, cetirizine, cilostazol, cyclobenzaprine, ferrous gluconate, ferrous sulfate, metoprolol succinate XL, multivitamins-minerals, pantoprazole, pravastatin, and tamsulosin    Allergies:  No Known Allergies    Review of Systems  Please see HPI. All else asked and negative.    Objective     Vitals:   Temp:  [98.2 °F (36.8 °C)-102.6 °F (39.2 °C)] 98.2 °F (36.8 °C)  Heart Rate:  [104-113] 104  Resp:  [22] 22  BP: (121-122)/(60-87) 122/60    Physical Exam    Constitutional: A&O x3, non-septic appearing, NAD   Eyes: PERRLA, EOMI, sclerae anicteric, no conjunctival injection   HENT: NC/AT, mucous membranes moist   Neck: Supple, no JVD, thyromegaly, or lymphadenopathy; trachea midline   Respiratory: Clear to auscultation bilaterally without wheezes, rhonchi, or rales   Cardiovascular: RRR, no murmurs, rubs, or gallops   Gastrointestinal: Soft, NT/ND with NABS x4; no ascites   Musculoskeletal: No c/c/e; palpable pedal pulses bilaterally   Psychiatric: Appropriate affect, cooperative   Neurologic: Awake and alert; CN II-XII grossly intact; no tremor; moves all four extremities without sensory deficits  Skin: No rashes, breakdown, or bleeding from any orifice.  Rectal: deferred    Result Review    Result Review:  I have personally reviewed the results from the time of this admission to 7/10/2022 20:35 EDT and agree with these findings:  [x]  Laboratory  [x]  Microbiology  [x]  Radiology  []  EKG/Telemetry   []  Cardiology/Vascular   []  Pathology  []  Old records  []  Other:      Assessment & Plan   Assessment / Plan     Assessment:  Urosepsis +/- urothelial neoplasm  NSCLCa survivor  Advanced COPD, quiescent  CVA hx  BPH without overt obstruction concern; prostatitis (?)  Fe def anemia    Plan:   Admit to medicine for empiric ceftriaxone and renal/bladder US.  Rx reconciliation; no CIWA need and will delay IV iron given his infection  Await  cultures  Cardiac diet  VTE prophy via SCD's  7/11 labs     Admission Status:  I believe this patient meets admission status.    Electronically signed by Luis Guerrero DO, 07/10/22, 8:35 PM EDT.

## 2022-07-11 NOTE — PROGRESS NOTES
[facility]   Hospitalist Progress Note  Date: 2022  Patient Name: Carlos Zimmerman  : 1940  MRN: [id number]  Date of admission: 7/10/2022      Subjective   Subjective     Chief complaint: Hematuria, urinary incontinence, female    Summary:  82-year-old male with history of COPD, dyslipidemia, essential hypertension, history of lung cancer, stroke, TIA, osteoarthritis affecting multiple joints, BPH with LUTS, hospitalized on 7/10/2022 with symptoms of fever and urinary incontinence, septic, source urine, sepsis managed, hematuria improved.  Prelim urine culture gram-negative bacilli    Interval follow-up: Patient seen and examined this morning, no acute distress, no acute major overnight events, notes he is feeling better, has been afebrile, denied hematuria this morning, creatinine stable at 0.58.  Hemoglobin at 12.1.  Denied back pain, chills or sweats.  Urine culture positive for gram-negative bacilli.  Renal ultrasound reviewed, no hydro or pyelonephritis.      Review of systems:  All systems reviewed and negative except for generalized fatigue, dysuria    Objective   Objective     Vitals:   Temp:  [97.8 °F (36.6 °C)-102.6 °F (39.2 °C)] 98.1 °F (36.7 °C)  Heart Rate:  [] 93  Resp:  [18-22] 18  BP: (100-134)/(58-87) 124/62  Physical Exam    Constitutional: Awake, alert, no acute distress   Eyes: Pupils equal, sclerae anicteric, no conjunctival injection   HENT: NCAT, mucous membranes moist   Neck: Supple, no thyromegaly, no lymphadenopathy, trachea midline   Respiratory: Clear to auscultation bilaterally, nonlabored respirations    Cardiovascular: RRR, no murmurs, rubs, or gallops, palpable pedal pulses bilaterally   Gastrointestinal: Positive bowel sounds, soft, nontender, nondistended   Musculoskeletal: No bilateral ankle edema, no clubbing or cyanosis to extremities   Psychiatric: Appropriate affect, cooperative   Neurologic: Oriented x 3, strength symmetric in all extremities, Cranial  Nerves grossly intact to confrontation, speech clear   Skin: No rashes    : External male catheter in place, no palpable bladder tenderness    Result Review    Result Review:  I have personally reviewed the results from the time of this admission to 7/11/2022 16:26 EDT and agree with these findings:  [x]  Laboratory   CBC    CBC 4/4/22 7/10/22 7/11/22   WBC 7.27 18.88 (A) 21.71 (A)   RBC 4.92 4.43 4.16   Hemoglobin 13.5 12.9 (A) 12.1 (A)   Hematocrit 42.6 39.0 37.0 (A)   MCV 86.6 88.0 88.9   MCH 27.4 29.1 29.1   MCHC 31.7 33.1 32.7   RDW 15.7 (A) 14.8 14.6   Platelets 217 199 182   (A) Abnormal value            BMP    BMP 4/4/22 7/10/22 7/11/22   BUN 9 10 8   Creatinine 0.80 0.83 0.58 (A)   Sodium 141 136 139   Potassium 4.1 3.6 3.3 (A)   Chloride 103 101 106   CO2 27.7 24.5 23.1   Calcium 9.5 9.0 8.4 (A)   (A) Abnormal value            LIVER FUNCTION TESTS:      Lab 07/10/22  1800   TOTAL PROTEIN 6.4   ALBUMIN 4.10   GLOBULIN 2.3   ALT (SGPT) 18   AST (SGOT) 21   BILIRUBIN 0.8   ALK PHOS 145*       [x]  Microbiology No results found for: ACANTHNAEG, AFBCX, BPERTUSSISCX, BLOODCX  No results found for: BCIDPCR, CXREFLEX, CSFCX, CULTURETIS  No results found for: CULTURES, HSVCX, URCX  No results found for: EYECULTURE, GCCX, HSVCULTURE, LABHSV  No results found for: LEGIONELLA, MRSACX, MUMPSCX, MYCOPLASCX  No results found for: NOCARDIACX, STOOLCX  Urine Culture   Date Value Ref Range Status   07/10/2022 >100,000 CFU/mL Gram Negative Bacilli (A)  Preliminary     No results found for: VIRALCULTU, WOUNDCX    [x]  Radiology XR Chest 1 View    Result Date: 7/10/2022  PROCEDURE: XR CHEST 1 VW  COMPARISON: 3/29/2022.  INDICATIONS: Shortness of breath.  FINDINGS:  A single AP upright portable chest radiograph was performed.  Mild cardiac enlargement is seen.  No definite acute infiltrate is appreciated.  No pleural effusion or pneumothorax is identified.  The thoracic aorta is atherosclerotic.  Emphysematous contour of the  lungs is noted.  Mild to moderate levoscoliosis of the thoracic spine is suggested. The thoracic aorta is atherosclerotic.  External artifacts obscure detail.  Degenerative changes involve the bilateral shoulders and the imaged spine.  Generalized osteopenia is suspected.  Chronic calcified granulomatous disease may involve the chest.  No significant interval change is appreciated since the prior study.        No definite acute infiltrate is appreciated.  Mild cardiomegaly is suspected.  Severe emphysematous changes involve the lungs.      COMMENT:  Part of this note is an electronic transcription of spoken language to printed text. The electronic translation/transcription may permit erroneous, or at times, nonsensical (or even sensical) words or phrases to be inadvertently transcribed or omitted; this  has reviewed the note for such errors (as well as additional errors); however, some may still exist.  CANDIDA PARK JR, MD       Electronically Signed and Approved By: CANDIDA PARK JR, MD on 7/10/2022 at 19:07              US Renal Bilateral    Result Date: 7/11/2022  PROCEDURE: US RENAL BILATERAL  COMPARISON: None  INDICATIONS: Neoplasm Exclusion.  FINDINGS:  The right kidney measures 11.8 x 4.3 x 5.3 cm. The left kidney measures 12.8 x 5.6 x 5.3 cm.  There is normal renal cortical thickness and renal echogenicity.  There is no evidence of solid or cystic renal lesion by ultrasound.  There is no hydronephrosis.  The urinary bladder is collapsed and not evaluated.        1. No evidence of renal mass by ultrasound.  If high clinical concern, follow-up with CT or MRI renal mass protocol would be the test of choice.       MARY HOWARD MD       Electronically Signed and Approved By: MARY HOWARD MD on 7/11/2022 at 7:58               []  EKG/Telemetry   []  Cardiology/Vascular   []  Pathology  [x]  Old records  []  Other:    Assessment & Plan   Assessment / Plan      Assessment/Plan:  Assessment:  Sepsis secondary to urinary tract infection secondary to gram-negative bacteria  History of lung cancer  COPD  History of CVA  BPH with LUTS  Concern for acute prostatitis  Hypokalemia  Iron deficiency anemia  Essential hypertension  Osteoarthritis affecting multiple joints    Plan:  Labs and imaging reviewed  Continue ceftriaxone  Follow urine culture sensitivities  Reconciled home medications, resumed accordingly  No signs of respiratory decompensation, COVID-19 negative, unlikely he has pneumonia  Home medications reconciled, resumed accordingly  Replacement potassium via oral route  Continue tamsulosin 0.4 mg daily  DC COVID-19 isolation  A.m. labs  Full code  DVT prophylaxis with SCDs  Clinical course to dictate further management  Discussed with nurse at the bedside    DVT prophylaxis:  Mechanical DVT prophylaxis orders are present.    CODE STATUS:   Level Of Support Discussed With: Patient  Code Status (Patient has no pulse and is not breathing): CPR (Attempt to Resuscitate)  Medical Interventions (Patient has pulse or is breathing): Full Support        Electronically signed by Jaime Zarco MD, 07/11/22, 4:26 PM EDT.

## 2022-07-11 NOTE — PLAN OF CARE
Goal Outcome Evaluation:  Plan of Care Reviewed With: patient         Pt denies pain upon urination. Urine is dark jil in color. Covid resulted negative.

## 2022-07-11 NOTE — CASE MANAGEMENT/SOCIAL WORK
Discharge Planning Assessment   Angela     Patient Name: Carlos Zimmerman  MRN: [id number]  Today's Date: 7/11/2022    Admit Date: 7/10/2022     Discharge Needs Assessment     Row Name 07/11/22 1404       Living Environment    People in Home spouse    Current Living Arrangements home    Primary Care Provided by self    Provides Primary Care For no one    Family Caregiver if Needed spouse    Quality of Family Relationships helpful;involved;supportive    Able to Return to Prior Arrangements yes       Resource/Environmental Concerns    Resource/Environmental Concerns none    Transportation Concerns none       Transition Planning    Patient/Family Anticipates Transition to home with family    Patient/Family Anticipated Services at Transition none    Transportation Anticipated family or friend will provide       Discharge Needs Assessment    Readmission Within the Last 30 Days no previous admission in last 30 days    Equipment Currently Used at Home none    Concerns to be Addressed discharge planning    Anticipated Changes Related to Illness none    Equipment Needed After Discharge none               Discharge Plan     Row Name 07/11/22 1411       Plan    Plan Patient is alert and oriented, spouse at bedside. Verified demographics and preferred pharmacy. Goal is to return home once medically ready for discharge, denies having any needs for home health or medical equipment.              Continued Care and Services - Admitted Since 7/10/2022    Coordination has not been started for this encounter.          Demographic Summary     Row Name 07/11/22 1404       General Information    Admission Type inpatient    Arrived From emergency department    Reason for Consult discharge planning    Preferred Language English       Contact Information    Permission Granted to Share Info With family/designee               Functional Status     Row Name 07/11/22 1404       Functional Status    Usual Activity Tolerance good    Current  Activity Tolerance moderate       Assessment of Health Literacy    How often do you have someone help you read hospital materials? Sometimes    How often do you have problems learning about your medical condition because of difficulty understanding written information? Sometimes    How often do you have a problem understanding what is told to you about your medical condition? Sometimes    How confident are you filling out medical forms by yourself? Somewhat    Health Literacy Moderate       Functional Status, IADL    Medications independent    Meal Preparation independent    Housekeeping independent    Laundry independent    Shopping assistive person       Mental Status    General Appearance WDL WDL       Mental Status Summary    Recent Changes in Mental Status/Cognitive Functioning no changes               Psychosocial    No documentation.                Abuse/Neglect    No documentation.                Legal    No documentation.                Substance Abuse    No documentation.                Patient Forms    No documentation.                   Priya Jo RN

## 2022-07-11 NOTE — PLAN OF CARE
Goal Outcome Evaluation:           Progress: improving  Outcome Evaluation: Patient attempting to rest throughout the shift. Patient family at bedside. Patient has no complaints at this time.

## 2022-07-12 VITALS
TEMPERATURE: 98.2 F | DIASTOLIC BLOOD PRESSURE: 57 MMHG | RESPIRATION RATE: 18 BRPM | HEIGHT: 67 IN | HEART RATE: 107 BPM | SYSTOLIC BLOOD PRESSURE: 106 MMHG | OXYGEN SATURATION: 98 % | BODY MASS INDEX: 25.64 KG/M2 | WEIGHT: 163.36 LBS

## 2022-07-12 PROBLEM — N39.0 COMPLICATED UTI (URINARY TRACT INFECTION): Status: ACTIVE | Noted: 2022-07-12

## 2022-07-12 LAB
ALBUMIN SERPL-MCNC: 3.2 G/DL (ref 3.5–5.2)
ALP SERPL-CCNC: 128 U/L (ref 39–117)
ALT SERPL W P-5'-P-CCNC: 14 U/L (ref 1–41)
ANION GAP SERPL CALCULATED.3IONS-SCNC: 6.6 MMOL/L (ref 5–15)
AST SERPL-CCNC: 20 U/L (ref 1–40)
BACTERIA SPEC AEROBE CULT: ABNORMAL
BASOPHILS # BLD AUTO: 0.04 10*3/MM3 (ref 0–0.2)
BASOPHILS NFR BLD AUTO: 0.2 % (ref 0–1.5)
BILIRUB CONJ SERPL-MCNC: 0.2 MG/DL (ref 0–0.3)
BILIRUB INDIRECT SERPL-MCNC: 0.4 MG/DL
BILIRUB SERPL-MCNC: 0.6 MG/DL (ref 0–1.2)
BUN SERPL-MCNC: 7 MG/DL (ref 8–23)
BUN/CREAT SERPL: 11.9 (ref 7–25)
CALCIUM SPEC-SCNC: 8.9 MG/DL (ref 8.6–10.5)
CHLORIDE SERPL-SCNC: 103 MMOL/L (ref 98–107)
CO2 SERPL-SCNC: 25.4 MMOL/L (ref 22–29)
CREAT SERPL-MCNC: 0.59 MG/DL (ref 0.76–1.27)
DEPRECATED RDW RBC AUTO: 48.1 FL (ref 37–54)
EGFRCR SERPLBLD CKD-EPI 2021: 96.3 ML/MIN/1.73
EOSINOPHIL # BLD AUTO: 0.19 10*3/MM3 (ref 0–0.4)
EOSINOPHIL NFR BLD AUTO: 1.2 % (ref 0.3–6.2)
ERYTHROCYTE [DISTWIDTH] IN BLOOD BY AUTOMATED COUNT: 14.6 % (ref 12.3–15.4)
GLUCOSE SERPL-MCNC: 105 MG/DL (ref 65–99)
HCT VFR BLD AUTO: 38.3 % (ref 37.5–51)
HGB BLD-MCNC: 12.5 G/DL (ref 13–17.7)
IMM GRANULOCYTES # BLD AUTO: 0.1 10*3/MM3 (ref 0–0.05)
IMM GRANULOCYTES NFR BLD AUTO: 0.6 % (ref 0–0.5)
LYMPHOCYTES # BLD AUTO: 1.73 10*3/MM3 (ref 0.7–3.1)
LYMPHOCYTES NFR BLD AUTO: 10.7 % (ref 19.6–45.3)
MAGNESIUM SERPL-MCNC: 1.7 MG/DL (ref 1.6–2.4)
MCH RBC QN AUTO: 29.2 PG (ref 26.6–33)
MCHC RBC AUTO-ENTMCNC: 32.6 G/DL (ref 31.5–35.7)
MCV RBC AUTO: 89.5 FL (ref 79–97)
MONOCYTES # BLD AUTO: 1.01 10*3/MM3 (ref 0.1–0.9)
MONOCYTES NFR BLD AUTO: 6.3 % (ref 5–12)
NEUTROPHILS NFR BLD AUTO: 13.04 10*3/MM3 (ref 1.7–7)
NEUTROPHILS NFR BLD AUTO: 81 % (ref 42.7–76)
NRBC BLD AUTO-RTO: 0 /100 WBC (ref 0–0.2)
PHOSPHATE SERPL-MCNC: 2.1 MG/DL (ref 2.5–4.5)
PLATELET # BLD AUTO: 162 10*3/MM3 (ref 140–450)
PMV BLD AUTO: 9.8 FL (ref 6–12)
POTASSIUM SERPL-SCNC: 4.1 MMOL/L (ref 3.5–5.2)
PROT SERPL-MCNC: 5.7 G/DL (ref 6–8.5)
RBC # BLD AUTO: 4.28 10*6/MM3 (ref 4.14–5.8)
SODIUM SERPL-SCNC: 135 MMOL/L (ref 136–145)
WBC NRBC COR # BLD: 16.11 10*3/MM3 (ref 3.4–10.8)

## 2022-07-12 PROCEDURE — 97161 PT EVAL LOW COMPLEX 20 MIN: CPT

## 2022-07-12 PROCEDURE — 85025 COMPLETE CBC W/AUTO DIFF WBC: CPT | Performed by: FAMILY MEDICINE

## 2022-07-12 PROCEDURE — 83735 ASSAY OF MAGNESIUM: CPT | Performed by: FAMILY MEDICINE

## 2022-07-12 PROCEDURE — 80076 HEPATIC FUNCTION PANEL: CPT | Performed by: FAMILY MEDICINE

## 2022-07-12 PROCEDURE — 84100 ASSAY OF PHOSPHORUS: CPT | Performed by: FAMILY MEDICINE

## 2022-07-12 PROCEDURE — 99239 HOSP IP/OBS DSCHRG MGMT >30: CPT | Performed by: FAMILY MEDICINE

## 2022-07-12 PROCEDURE — 80048 BASIC METABOLIC PNL TOTAL CA: CPT | Performed by: FAMILY MEDICINE

## 2022-07-12 RX ORDER — LEVOFLOXACIN 500 MG/1
500 TABLET, FILM COATED ORAL DAILY
Qty: 12 TABLET | Refills: 0 | Status: SHIPPED | OUTPATIENT
Start: 2022-07-12 | End: 2022-07-24

## 2022-07-12 RX ADMIN — MULTIPLE VITAMINS W/ MINERALS TAB 1 TABLET: TAB at 09:30

## 2022-07-12 RX ADMIN — CILOSTAZOL 100 MG: 100 TABLET ORAL at 09:30

## 2022-07-12 RX ADMIN — METOPROLOL SUCCINATE 50 MG: 50 TABLET, EXTENDED RELEASE ORAL at 09:30

## 2022-07-12 RX ADMIN — Medication 1000 UNITS: at 09:30

## 2022-07-12 RX ADMIN — ASPIRIN 81 MG: 81 TABLET, COATED ORAL at 09:30

## 2022-07-12 RX ADMIN — CETIRIZINE HYDROCHLORIDE 10 MG: 10 TABLET, FILM COATED ORAL at 09:30

## 2022-07-12 RX ADMIN — FERROUS SULFATE TAB 325 MG (65 MG ELEMENTAL FE) 325 MG: 325 (65 FE) TAB at 09:30

## 2022-07-12 RX ADMIN — AMLODIPINE BESYLATE 5 MG: 5 TABLET ORAL at 09:30

## 2022-07-12 RX ADMIN — PANTOPRAZOLE SODIUM 40 MG: 40 TABLET, DELAYED RELEASE ORAL at 09:30

## 2022-07-12 RX ADMIN — TAMSULOSIN HYDROCHLORIDE 0.4 MG: 0.4 CAPSULE ORAL at 09:30

## 2022-07-12 NOTE — PLAN OF CARE
Goal Outcome Evaluation:                 Problem: Adult Inpatient Plan of Care  Goal: Plan of Care Review  Outcome: Met  Goal: Patient-Specific Goal (Individualized)  Outcome: Met  Goal: Absence of Hospital-Acquired Illness or Injury  Outcome: Met  Intervention: Identify and Manage Fall Risk  Recent Flowsheet Documentation  Taken 7/12/2022 1649 by Tiffanie Patino RN  Safety Promotion/Fall Prevention: safety round/check completed  Taken 7/12/2022 1440 by Tiffanie Patino RN  Safety Promotion/Fall Prevention: safety round/check completed  Taken 7/12/2022 1100 by Tiffanie Patino RN  Safety Promotion/Fall Prevention: safety round/check completed  Taken 7/12/2022 0930 by Tiffanie Patino RN  Safety Promotion/Fall Prevention: safety round/check completed  Goal: Optimal Comfort and Wellbeing  Outcome: Met  Intervention: Provide Person-Centered Care  Recent Flowsheet Documentation  Taken 7/12/2022 0930 by Tiffanie Patino RN  Trust Relationship/Rapport:   care explained   choices provided   emotional support provided   empathic listening provided   questions answered   questions encouraged   reassurance provided   thoughts/feelings acknowledged  Goal: Readiness for Transition of Care  Outcome: Met     Problem: COPD (Chronic Obstructive Pulmonary Disease) Comorbidity  Goal: Maintenance of COPD Symptom Control  Outcome: Met     Problem: UTI (Urinary Tract Infection)  Goal: Improved Infection Symptoms  Outcome: Met     Problem: Skin Injury Risk Increased  Goal: Skin Health and Integrity  Outcome: Met     Problem: Fall Injury Risk  Goal: Absence of Fall and Fall-Related Injury  Outcome: Met  Intervention: Promote Injury-Free Environment  Recent Flowsheet Documentation  Taken 7/12/2022 1649 by Tiffanie Patino RN  Safety Promotion/Fall Prevention: safety round/check completed  Taken 7/12/2022 1440 by Tiffanie Patino RN  Safety Promotion/Fall Prevention: safety round/check completed  Taken 7/12/2022 1100 by Tiffanie Patino  RN  Safety Promotion/Fall Prevention: safety round/check completed  Taken 7/12/2022 0930 by Tiffanie Patino RN  Safety Promotion/Fall Prevention: safety round/check completed

## 2022-07-12 NOTE — DISCHARGE SUMMARY
[facility]         HOSPITALIST  DISCHARGE SUMMARY    Patient Name: Carlos Zimmerman  : 1940  MRN: [id number]    Date of Admission: 7/10/2022  Date of Discharge:  2022    Primary Care Physician: Payam Carorll MD    Consults     Date and Time Order Name Status Description    7/10/2022  8:00 PM Hospitalist (on-call MD unless specified)            Active and Resolved Hospital Problems:  Sepsis secondary to urinary tract infection secondary to Serratia  History of lung cancer  COPD  History of CVA  BPH with LUTS  Concern for acute prostatitis  Hypokalemia  Iron deficiency anemia  Essential hypertension  Osteoarthritis affecting multiple joints  Active Hospital Problems    Diagnosis POA   • Complicated UTI (urinary tract infection) [N39.0] Yes   • Hematuria [R31.9] Yes      Resolved Hospital Problems   No resolved problems to display.       Hospital Course     Hospital Course:  82-year-old male with history of COPD, dyslipidemia, essential hypertension, history of lung cancer, stroke, TIA, osteoarthritis affecting multiple joints, BPH with LUTS, hospitalized on 7/10/2022 with symptoms of fever and urinary incontinence, septic, source urine, sepsis managed, hematuria improved.  Prelim urine culture gram-negative bacilli, determined to be Serratia, sensitive to fluoroquinolones and ceftriaxone, kept on ceftriaxone, improved urine output, no hematuria.  Does not have urology, continued on Flomax, discharged in hemodynamically stable condition on 2022 to follow-up with urology in 2 weeks.  To complete 12 more days of Levaquin 500 mg daily.  To follow-up with PCP within 1 week.    Day of Discharge     Vital Signs:  Temp:  [97.6 °F (36.4 °C)-99.3 °F (37.4 °C)] 98.2 °F (36.8 °C)  Heart Rate:  [] 107  Resp:  [18] 18  BP: ()/(54-72) 106/57    Review of systems:  All systems reviewed and negative except for generalized fatigue        Physical Exam                          Constitutional: Awake, alert, no acute distress              Eyes: Pupils equal, sclerae anicteric, no conjunctival injection              HENT: NCAT, mucous membranes moist              Neck: Supple, no thyromegaly, no lymphadenopathy, trachea midline              Respiratory: Clear to auscultation bilaterally, nonlabored respirations               Cardiovascular: RRR, no murmurs, rubs, or gallops, palpable pedal pulses bilaterally              Gastrointestinal: Positive bowel sounds, soft, nontender, nondistended              Musculoskeletal: No bilateral ankle edema, no clubbing or cyanosis to extremities              Psychiatric: Appropriate affect, cooperative              Neurologic: Oriented x 3, strength symmetric in all extremities, Cranial Nerves grossly intact to confrontation, speech clear              Skin: No rashes               : External male catheter in place, no palpable bladder tenderness             Discharge Details        Discharge Medications      New Medications      Instructions Start Date   levoFLOXacin 500 MG tablet  Commonly known as: Levaquin   500 mg, Oral, Daily         Continue These Medications      Instructions Start Date   albuterol sulfate  (90 Base) MCG/ACT inhaler  Commonly known as: PROVENTIL HFA;VENTOLIN HFA;PROAIR HFA   2 puffs, Inhalation, Every 4 Hours PRN      amLODIPine 5 MG tablet  Commonly known as: NORVASC   5 mg, Oral, Daily      Aspirin Low Dose 81 MG EC tablet  Generic drug: aspirin   81 mg, Oral, Daily      cetirizine 10 MG tablet  Commonly known as: zyrTEC   10 mg, Oral, Daily      Cholecalciferol 25 MCG (1000 UT) capsule   1,000 Units, Oral, Daily      cilostazol 100 MG tablet  Commonly known as: PLETAL   100 mg, Oral, 2 Times Daily      cyclobenzaprine 10 MG tablet  Commonly known as: FLEXERIL   10 mg, Oral, 3 Times Daily PRN      ferrous gluconate 324 MG tablet  Commonly known as: FERGON   324 mg, Oral, Daily With Breakfast      ferrous sulfate 325  (65 FE) MG tablet   325 mg, Oral, Daily With Breakfast      metoprolol succinate XL 50 MG 24 hr tablet  Commonly known as: TOPROL-XL   50 mg, Oral, Daily      multivitamins-minerals capsule capsule   1 capsule, Oral, 2 Times Daily      pantoprazole 40 MG EC tablet  Commonly known as: PROTONIX   40 mg, Oral, Daily      pravastatin 40 MG tablet  Commonly known as: PRAVACHOL   40 mg, Oral, Nightly      tamsulosin 0.4 MG capsule 24 hr capsule  Commonly known as: FLOMAX   1 capsule, Oral, Daily             No Known Allergies    Discharge Disposition:  Home or Self Care    Diet:  Hospital:  Diet Order   Procedures   • Diet Regular; Cardiac       Discharge Activity: as tolerates      CODE STATUS:  Code Status and Medical Interventions:   Ordered at: 07/10/22 2044     Level Of Support Discussed With:    Patient     Code Status (Patient has no pulse and is not breathing):    CPR (Attempt to Resuscitate)     Medical Interventions (Patient has pulse or is breathing):    Full Support         Future Appointments   Date Time Provider Department Center   7/25/2022  7:45 AM [facility] [place] CT 1  [place] ETWCT [facility]   8/30/2022 10:00 AM Claribel Wolff APRN [facility] ONC E521 [facility]   11/3/2022  8:15 AM Dayne Choudhary MD [facility] PCC ETW [place]       Additional Instructions for the Follow-ups that You Need to Schedule     Discharge Follow-up with PCP   As directed       Currently Documented PCP:    Payam Carroll MD    PCP Phone Number:    [phone]     Follow Up Details: 3 to 7 days         Discharge Follow-up with Specified Provider: Urology Dr Mai in 2 weeks   As directed      To: Urology Dr Mai in 2 weeks               Pertinent  and/or Most Recent Results     PROCEDURES:   XR Chest 1 View    Result Date: 7/10/2022  PROCEDURE: XR CHEST 1 VW  COMPARISON: 3/29/2022.  INDICATIONS: Shortness of breath.  FINDINGS:  A single AP upright portable chest radiograph was performed.  Mild cardiac enlargement is seen.  No definite acute  infiltrate is appreciated.  No pleural effusion or pneumothorax is identified.  The thoracic aorta is atherosclerotic.  Emphysematous contour of the lungs is noted.  Mild to moderate levoscoliosis of the thoracic spine is suggested. The thoracic aorta is atherosclerotic.  External artifacts obscure detail.  Degenerative changes involve the bilateral shoulders and the imaged spine.  Generalized osteopenia is suspected.  Chronic calcified granulomatous disease may involve the chest.  No significant interval change is appreciated since the prior study.        No definite acute infiltrate is appreciated.  Mild cardiomegaly is suspected.  Severe emphysematous changes involve the lungs.      COMMENT:  Part of this note is an electronic transcription of spoken language to printed text. The electronic translation/transcription may permit erroneous, or at times, nonsensical (or even sensical) words or phrases to be inadvertently transcribed or omitted; this  has reviewed the note for such errors (as well as additional errors); however, some may still exist.  CANDIDA PARK JR, MD       Electronically Signed and Approved By: CANDIDA PARK JR, MD on 7/10/2022 at 19:07              US Renal Bilateral    Result Date: 7/11/2022  PROCEDURE: US RENAL BILATERAL  COMPARISON: None  INDICATIONS: Neoplasm Exclusion.  FINDINGS:  The right kidney measures 11.8 x 4.3 x 5.3 cm. The left kidney measures 12.8 x 5.6 x 5.3 cm.  There is normal renal cortical thickness and renal echogenicity.  There is no evidence of solid or cystic renal lesion by ultrasound.  There is no hydronephrosis.  The urinary bladder is collapsed and not evaluated.        1. No evidence of renal mass by ultrasound.  If high clinical concern, follow-up with CT or MRI renal mass protocol would be the test of choice.       MARY HOWARD MD       Electronically Signed and Approved By: MARY HOWARD MD on 7/11/2022 at 7:58               LAB RESULTS:       Lab 07/12/22  0554 07/11/22  0538 07/10/22  1800   WBC 16.11* 21.71* 18.88*   HEMOGLOBIN 12.5* 12.1* 12.9*   HEMATOCRIT 38.3 37.0* 39.0   PLATELETS 162 182 199   NEUTROS ABS 13.04* 18.82* 16.01*   IMMATURE GRANS (ABS) 0.10* 0.28* 0.08*   LYMPHS ABS 1.73 1.01 1.28   MONOS ABS 1.01* 1.55* 1.42*   EOS ABS 0.19 0.02 0.05   MCV 89.5 88.9 88.0   LACTATE  --   --  1.2         Lab 07/12/22  0554 07/11/22  0538 07/10/22  1800   SODIUM 135* 139 136   POTASSIUM 4.1 3.3* 3.6   CHLORIDE 103 106 101   CO2 25.4 23.1 24.5   ANION GAP 6.6 9.9 10.5   BUN 7* 8 10   CREATININE 0.59* 0.58* 0.83   EGFR 96.3 96.8 86.8   GLUCOSE 105* 108* 94   CALCIUM 8.9 8.4* 9.0   MAGNESIUM 1.7  --   --    PHOSPHORUS 2.1*  --   --          Lab 07/12/22  0554 07/10/22  1800   TOTAL PROTEIN 5.7* 6.4   ALBUMIN 3.20* 4.10   GLOBULIN  --  2.3   ALT (SGPT) 14 18   AST (SGOT) 20 21   BILIRUBIN 0.6 0.8   INDIRECT BILIRUBIN 0.4  --    BILIRUBIN DIRECT 0.2  --    ALK PHOS 128* 145*         Lab 07/10/22  1800   PROBNP 164.5   TROPONIN T <0.010                 Brief Urine Lab Results  (Last result in the past 365 days)      Color   Clarity   Blood   Leuk Est   Nitrite   Protein   CREAT   Urine HCG        07/10/22 1859 Dark Yellow   Cloudy   Large (3+)   Moderate (2+)   Negative   100 mg/dL (2+)               Microbiology Results (last 10 days)     Procedure Component Value - Date/Time    COVID-19,APTIMA PANTHER(TERESA), ROBI/ [place], NP/OP SWAB IN UTM/VTM/SALINE TRANSPORT MEDIA,24 HR TAT - Swab, Nasopharynx [[id number]]  (Normal) Collected: 07/10/22 2010    Lab Status: Final result Specimen: Swab from Nasopharynx Updated: 07/11/22 0404     COVID19 Not Detected    Narrative:      Fact sheet for providers: https://www.fda.gov/media/135118/download     Fact sheet for patients: https://www.fda.gov/media/688584/download    Test performed by RT PCR.    Urine Culture - Urine, Urine, Random Void [[id number]]  (Abnormal)  (Susceptibility) Collected: 07/10/22 2265    Lab Status:  Final result Specimen: Urine, Random Void Updated: 07/12/22 0854     Urine Culture >100,000 CFU/mL Serratia marcescens    Narrative:      Colonization of the urinary tract without infection is common. Treatment is discouraged unless the patient is symptomatic, pregnant, or undergoing an invasive urologic procedure.    Susceptibility      Serratia marcescens      VAN      Cefazolin Resistant     Cefepime Susceptible      Ceftazidime Susceptible      Ceftriaxone Susceptible      Gentamicin Susceptible      Levofloxacin Susceptible      Nitrofurantoin Resistant     Piperacillin + Tazobactam Susceptible      Trimethoprim + Sulfamethoxazole Susceptible                           Blood Culture - Blood, Arm, Left [[id number]]  (Normal) Collected: 07/10/22 1824    Lab Status: Preliminary result Specimen: Blood from Arm, Left Updated: 07/11/22 1834     Blood Culture No growth at 24 hours    Blood Culture - Blood, Arm, Right [[id number]]  (Normal) Collected: 07/10/22 1800    Lab Status: Preliminary result Specimen: Blood from Arm, Right Updated: 07/11/22 1818     Blood Culture No growth at 24 hours          XR Chest 1 View    Result Date: 7/10/2022  Impression:   No definite acute infiltrate is appreciated.  Mild cardiomegaly is suspected.  Severe emphysematous changes involve the lungs.      COMMENT:  Part of this note is an electronic transcription of spoken language to printed text. The electronic translation/transcription may permit erroneous, or at times, nonsensical (or even sensical) words or phrases to be inadvertently transcribed or omitted; this  has reviewed the note for such errors (as well as additional errors); however, some may still exist.  CANDIDA PARK JR, MD       Electronically Signed and Approved By: CANDIDA PARK JR, MD on 7/10/2022 at 19:07              US Renal Bilateral    Result Date: 7/11/2022  Impression:   1. No evidence of renal mass by ultrasound.  If high clinical concern, follow-up  with CT or MRI renal mass protocol would be the test of choice.       MARY HOWARD MD       Electronically Signed and Approved By: MARY HOWARD MD on 7/11/2022 at 7:58               Results for orders placed during the hospital encounter of 03/15/22    Duplex carotid ultrasound CAR    Interpretation Summary  · All other right side carotid system vessels are normal.  · All other left side carotid system vessels are normal.  · Mild plaque in the carotid bulb areas bilaterally, otherwise normal bilateral carotid-vertebral duplex scan      Results for orders placed during the hospital encounter of 03/15/22    Duplex carotid ultrasound CAR    Interpretation Summary  · All other right side carotid system vessels are normal.  · All other left side carotid system vessels are normal.  · Mild plaque in the carotid bulb areas bilaterally, otherwise normal bilateral carotid-vertebral duplex scan          Labs Pending at Discharge:  Pending Labs     Order Current Status    Blood Culture - Blood, Arm, Left Preliminary result    Blood Culture - Blood, Arm, Right Preliminary result            Time spent on Discharge including face to face service:  35 minutes    Electronically signed by Jaime Zarco MD, 07/12/22, 4:24 PM EDT.

## 2022-07-12 NOTE — PLAN OF CARE
Goal Outcome Evaluation:  Plan of Care Reviewed With: (P) patient           Outcome Evaluation: (P) Patient was able to ambulate household distances, but was unsteady at times during gait when trying to walk quickly. Patient does demonstrate deficits in strength/ balance that effect his gait. Recommend followup with home health services.

## 2022-07-12 NOTE — PLAN OF CARE
Goal Outcome Evaluation:  Plan of Care Reviewed With: patient            Pt states he feels stronger this shift. Continues to have urgency of urination.

## 2022-07-12 NOTE — THERAPY EVALUATION
Acute Care - Physical Therapy Initial Evaluation  [facility]     Patient Name: Carlos Zimmerman  : 1940  MRN: [id number]  Today's Date: 2022 Admit date: 7/10/2022     Referring Physician: Jaime Zarco MD     Surgery Date:* No surgery found *           Visit Dx:     ICD-10-CM ICD-9-CM   1. Acute UTI  N39.0 599.0   2. Sepsis, due to unspecified organism, unspecified whether acute organ dysfunction present (HCC)  A41.9 038.9     995.91   3. Difficulty walking  R26.2 719.7     Patient Active Problem List   Diagnosis   • Cancer (HCC)   • COPD (chronic obstructive pulmonary disease) (HCC)   • Hypertension   • Low back pain   • Lung cancer (HCC)   • Lung disease   • Solitary lung nodule   • Pneumonia of right lung due to infectious organism   • Hematuria     Past Medical History:   Diagnosis Date   • Anemia    • Arthritis    • COPD (chronic obstructive pulmonary disease) (HCC)     INHALER  PRN   • Hyperlipidemia    • Hypertension     ON MEDS   • Lung cancer (HCC)    • Pneumonia     LEFT LUNG CA   • SOB (shortness of breath)    • Stroke (HCC)    • TIA (transient ischemic attack)     NO RESIDUAL      Past Surgical History:   Procedure Laterality Date   • LUNG BIOPSY     • LUNG SURGERY      REMOVAL HALF OF UPPER PORTION LEFT LUNG   • ORTHOPEDIC SURGERY Left     ROTATOR CUFF   • THROAT SURGERY       PT Assessment (last 12 hours)     PT Evaluation and Treatment     Row Name 22 1130          Physical Therapy Time and Intention    Subjective Information no complaints (P)   -JS     Document Type evaluation (P)   -JS     Mode of Treatment individual therapy;physical therapy (P)   -JS     Patient Effort good (P)   -JS     Symptoms Noted During/After Treatment none (P)   -JS     Row Name 22 1130          General Information    Patient Profile Reviewed yes (P)   -JS     Patient Observations alert;cooperative;agree to therapy (P)   -JS     Prior Level of Function independent:;all household  mobility;community mobility (P)   -JS     Equipment Currently Used at Home none (P)   -JS     Existing Precautions/Restrictions fall (P)   -JS     Barriers to Rehab none identified (P)   -     Row Name 07/12/22 1130          Living Environment    Current Living Arrangements home (P)   -JS     Home Accessibility stairs to enter home (P)   -JS     People in Home spouse (P)   -JS     Primary Care Provided by self (P)   -     Row Name 07/12/22 1130          Home Main Entrance    Number of Stairs, Main Entrance one (P)   -     Row Name 07/12/22 1130          Home Use of Assistive/Adaptive Equipment    Equipment Currently Used at Home none (P)   -     Row Name 07/12/22 1130          Range of Motion (ROM)    Range of Motion bilateral lower extremities;ROM is WFL (P)   -[facility] Name 07/12/22 1130          Strength (Manual Muscle Testing)    Strength (Manual Muscle Testing) bilateral lower extremities (P)   4/5  -     Row Name 07/12/22 1130          Bed Mobility    Bed Mobility supine-sit;sit-supine (P)   -     Supine-Sit [place] (Bed Mobility) standby assist (P)   -     Sit-Supine [place] (Bed Mobility) standby assist (P)   -     Row Name 07/12/22 1130          Transfers    Transfers sit-stand transfer;stand-sit transfer (P)   -     Sit-Stand [place] (Transfers) standby assist (P)   -     Stand-Sit [place] (Transfers) standby assist (P)   -     Row Name 07/12/22 1130          Sit-Stand Transfer    Assistive Device (Sit-Stand Transfers) walker, front-wheeled (P)   -     Row Name 07/12/22 1130          Stand-Sit Transfer    Assistive Device (Stand-Sit Transfers) walker, front-wheeled (P)   -     Row Name 07/12/22 1130          Gait/Stairs (Locomotion)    Gait/Stairs Locomotion gait/ambulation assistive device (P)   -     [place] Level (Gait) contact guard (P)   -     Assistive Device (Gait) walker, front-wheeled (P)   -     Distance in Feet (Gait) 200 (P)   -      Pattern (Gait) step-through (P)   -JS     Deviations/Abnormal Patterns (Gait) stride length decreased;other (see comments) (P)   unsteady  -JS     Bilateral Gait Deviations forward flexed posture (P)   -JS     Row Name 07/12/22 1130          Safety Issues, Functional Mobility    Impairments Affecting Function (Mobility) balance;strength (P)   -JS     Row Name 07/12/22 1130          Balance    Balance Assessment standing dynamic balance (P)   -JS     Dynamic Standing Balance contact guard (P)   -JS     Position/Device Used, Standing Balance walker, front-wheeled (P)   -JS     Row Name             Wound 07/10/22 2300 Left lower arm Traumatic    Wound - Properties Group Placement Date: 07/10/22  -SS Placement Time: 2300  -SS Present on Hospital Admission: Y  -SS Side: Left  -SS Orientation: lower  -SS Location: arm  -SS Primary Wound Type: Traumatic  -SS Additional Comments: from fall at home  -     Retired Wound - Properties Group Placement Date: 07/10/22  -SS Placement Time: 2300  -SS Present on Hospital Admission: Y  -SS Side: Left  -SS Orientation: lower  -SS Location: arm  -SS Primary Wound Type: Traumatic  -SS Additional Comments: from fall at home  -     Retired Wound - Properties Group Date first assessed: 07/10/22  - Time first assessed: 2300  -SS Present on Hospital Admission: Y  -SS Side: Left  -SS Location: arm  -SS Primary Wound Type: Traumatic  -SS Additional Comments: from fall at home  -     Row Name 07/12/22 1130          Plan of Care Review    Plan of Care Reviewed With patient (P)   -JS     Outcome Evaluation Patient was able to ambulate household distances, but was unsteady at times during gait when trying to walk quickly. Patient does demonstrate deficits in strength/ balance that effect his gait. Recommend followup with home health services. (P)   -JS     Row Name 07/12/22 1130          Positioning and Restraints    Pre-Treatment Position in bed (P)   -JS     Post Treatment Position bed  (P)   -JS     In Bed supine;call light within reach (P)   -JS     Row Name 07/12/22 1130          Therapy Assessment/Plan (PT)    Patient/Family Therapy Goals Statement (PT) Return home (P)   -JS     Rehab Potential (PT) good, to achieve stated therapy goals (P)   -JS     Criteria for Skilled Interventions Met (PT) skilled treatment is necessary (P)   -JS     Therapy Frequency (PT) daily (P)   -JS     Predicted Duration of Therapy Intervention (PT) 10 days (P)   -JS     Problem List (PT) problems related to;balance;mobility;strength (P)   -JS     Activity Limitations Related to Problem List (PT) unable to ambulate safely (P)   -JS     Row Name 07/12/22 1130          Therapy Plan Review/Discharge Plan (PT)    Therapy Plan Review (PT) evaluation/treatment results reviewed;patient (P)   -JS     Row Name 07/12/22 1130          Physical Therapy Goals    Transfer Goal Selection (PT) transfer, PT goal 1 (P)   -JS     Gait Training Goal Selection (PT) gait training, PT goal 1 (P)   -JS     Row Name 07/12/22 1130          Transfer Goal 1 (PT)    Activity/Assistive Device (Transfer Goal 1, PT) transfers, all;walker, rolling (P)   -JS     [place] Level/Cues Needed (Transfer Goal 1, PT) modified independence (P)   -JS     Time Frame (Transfer Goal 1, PT) long term goal (LTG);10 days (P)   -JS     Row Name 07/12/22 1130          Gait Training Goal 1 (PT)    Activity/Assistive Device (Gait Training Goal 1, PT) gait (walking locomotion);assistive device use;walker, rolling (P)   -JS     [place] Level (Gait Training Goal 1, PT) modified independence (P)   -JS     Distance (Gait Training Goal 1, PT) 300 (P)   -JS     Time Frame (Gait Training Goal 1, PT) long term goal (LTG);10 days (P)   -JS           User Key  (r) = Recorded By, (t) = Taken By, (c) = Cosigned By    Initials Name Provider Type    Thi Leung, RN Registered Nurse    Curt Bass, PT Student PT Student                Physical Therapy  Education                 Title: PT OT SLP Therapies (Done)     Topic: Physical Therapy (Done)     Point: Mobility training (Done)     Learning Progress Summary           Patient Acceptance, E, VU by NEGAR at 7/12/2022 1142                               User Key     Initials Effective Dates Name Provider Type Discipline    NEGAR 06/06/22 -  Curt Walsh, PT Student PT Student PT              PT Recommendation and Plan  Anticipated Discharge Disposition (PT): (P) home with home health  Planned Therapy Interventions (PT): (P) balance training, gait training, patient/family education, postural re-education, stair training, strengthening, transfer training  Therapy Frequency (PT): (P) daily  Plan of Care Reviewed With: (P) patient  Outcome Evaluation: (P) Patient was able to ambulate household distances, but was unsteady at times during gait when trying to walk quickly. Patient does demonstrate deficits in strength/ balance that effect his gait. Recommend followup with home health services.   Outcome Measures     Row Name 07/12/22 1100             How much help from another person do you currently need...    Turning from your back to your side while in flat bed without using bedrails? 4 (P)   -JS      Moving from lying on back to sitting on the side of a flat bed without bedrails? 4 (P)   -JS      Moving to and from a bed to a chair (including a wheelchair)? 4 (P)   -JS      Standing up from a chair using your arms (e.g., wheelchair, bedside chair)? 3 (P)   -JS      Climbing 3-5 steps with a railing? 3 (P)   -JS      To walk in hospital room? 3 (P)   -JS      AM-PAC 6 Clicks Score (PT) 21 (P)   -NEGAR            User Key  (r) = Recorded By, (t) = Taken By, (c) = Cosigned By    Initials Name Provider Type    Curt Bass, PT Student PT Student                 Time Calculation:    PT Charges     Row Name 07/12/22 1133             Time Calculation    PT Received On 07/12/22 (P)   -NEGAR      PT Goal Re-Cert Due Date  07/21/22 (P)   -JS              Untimed Charges    PT Eval/Re-eval Minutes 40 (P)   -JS              Total Minutes    Untimed Charges Total Minutes 40 (P)   -JS       Total Minutes 40 (P)   -JS            User Key  (r) = Recorded By, (t) = Taken By, (c) = Cosigned By    Initials Name Provider Type    Curt Bass, PT Student PT Student              Therapy Charges for Today     Code Description Service Date Service Provider Modifiers Qty    [id number] HC PT EVAL LOW COMPLEXITY 3 7/12/2022 Curt Walsh, PT Student GP 1          PT G-Codes  AM-PAC 6 Clicks Score (PT): (P) 21    Curt Wlash PT Student  7/12/2022

## 2022-07-13 ENCOUNTER — READMISSION MANAGEMENT (OUTPATIENT)
Dept: CALL CENTER | Facility: HOSPITAL | Age: 82
End: 2022-07-13

## 2022-07-13 NOTE — OUTREACH NOTE
Prep Survey    Flowsheet Row Responses   [facility]ondenominational facility patient discharged from? [facility]   Is LACE score < 7 ? No   Emergency Room discharge w/ pulse ox? No   Eligibility Readm Mgmt   Discharge diagnosis sepsis secondary to UTI due to serratia   Does the patient have one of the following disease processes/diagnoses(primary or secondary)? Sepsis   Does the patient have Home health ordered? No   Is there a DME ordered? No   Prep survey completed? Yes          KAREN BAKER - Registered Nurse

## 2022-07-15 LAB
BACTERIA SPEC AEROBE CULT: NORMAL
BACTERIA SPEC AEROBE CULT: NORMAL

## 2022-07-21 ENCOUNTER — READMISSION MANAGEMENT (OUTPATIENT)
Dept: CALL CENTER | Facility: HOSPITAL | Age: 82
End: 2022-07-21

## 2022-07-21 NOTE — OUTREACH NOTE
Sepsis Week 2 Survey    Flowsheet Row Responses   [facility] facility patient discharged from? [facility]   Does the patient have one of the following disease processes/diagnoses(primary or secondary)? Sepsis   Week 2 attempt successful? No   Unsuccessful attempts Attempt 1   Discharge diagnosis sepsis secondary to UTI due to serratia          BRANDYN H - Registered Nurse

## 2022-07-25 ENCOUNTER — HOSPITAL ENCOUNTER (OUTPATIENT)
Dept: CT IMAGING | Facility: HOSPITAL | Age: 82
Discharge: HOME | End: 2022-07-25
Admitting: NURSE PRACTITIONER

## 2022-07-25 DIAGNOSIS — C34.32 MALIGNANT NEOPLASM OF LOWER LOBE OF LEFT LUNG: ICD-10-CM

## 2022-07-25 DIAGNOSIS — R91.1 SOLITARY LUNG NODULE: ICD-10-CM

## 2022-07-25 PROCEDURE — 71250 CT THORAX DX C-: CPT

## 2022-07-26 ENCOUNTER — READMISSION MANAGEMENT (OUTPATIENT)
Dept: CALL CENTER | Facility: HOSPITAL | Age: 82
End: 2022-07-26

## 2022-07-26 NOTE — OUTREACH NOTE
Sepsis Week 2 Survey    Flowsheet Row Responses   Religion facility patient discharged from? [facility]   Does the patient have one of the following disease processes/diagnoses(primary or secondary)? Sepsis   Week 2 attempt successful? No   Unsuccessful attempts Attempt 2  [Both numbers attempted-no answer]          FATUMA H - Registered Nurse

## 2022-07-28 ENCOUNTER — TELEPHONE (OUTPATIENT)
Dept: UROLOGY | Facility: CLINIC | Age: 82
End: 2022-07-28

## 2022-07-28 NOTE — TELEPHONE ENCOUNTER
Caller: DEEPALI RODRIGUEZ    Relationship: SELF     Best call back number: [phone]    PT SAID HE JUST MISSED AN INCOMING CALL. NO SAMANTHA IN CHART.

## 2022-08-03 ENCOUNTER — READMISSION MANAGEMENT (OUTPATIENT)
Dept: CALL CENTER | Facility: HOSPITAL | Age: 82
End: 2022-08-03

## 2022-08-03 NOTE — OUTREACH NOTE
Sepsis Week 3 Survey    Flowsheet Row Responses   [facility] facility patient discharged from? [facility]   Does the patient have one of the following disease processes/diagnoses(primary or secondary)? Sepsis   Week 3 attempt successful? No   Unsuccessful attempts Attempt 1   Revoke Decline to participate          JOSÉ ALEJANDRO - Registered Nurse

## 2022-08-25 ENCOUNTER — OFFICE VISIT (OUTPATIENT)
Dept: VASCULAR SURGERY | Facility: HOSPITAL | Age: 82
End: 2022-08-25

## 2022-08-25 VITALS
DIASTOLIC BLOOD PRESSURE: 63 MMHG | RESPIRATION RATE: 16 BRPM | HEART RATE: 84 BPM | OXYGEN SATURATION: 94 % | TEMPERATURE: 97.8 F | SYSTOLIC BLOOD PRESSURE: 118 MMHG

## 2022-08-25 DIAGNOSIS — I70.213 ATHEROSCLEROSIS OF NATIVE ARTERY OF BOTH LOWER EXTREMITIES WITH INTERMITTENT CLAUDICATION: Primary | ICD-10-CM

## 2022-08-25 PROCEDURE — G0463 HOSPITAL OUTPT CLINIC VISIT: HCPCS | Performed by: NURSE PRACTITIONER

## 2022-08-25 PROCEDURE — 99213 OFFICE O/P EST LOW 20 MIN: CPT | Performed by: NURSE PRACTITIONER

## 2022-08-25 NOTE — PROGRESS NOTES
"     [facility] Vascular Surgery New Patient Office Note     Date of Encounter: 08/25/2022     MRN Number: [id number]  Name: Carlos Zimmerman  Phone Number: [phone]     Referred By: Payam Carroll MD  PCP: Payam Carroll MD    Chief Complaint:    Chief Complaint   Patient presents with   • Leg Pain     Patient is here as a new referral from PCP with a chief complaint of leg pain with ambulation. Patient states that \" when I walk to the mailbox, my legs are about to give out\". Patient has not had any prior testing to vascular appointment.        Subjective      History of Present Illness:    Carlos Zimmerman is a 82 y.o. male presents for bilateral leg pain with ambulation.  He states that after walking short distance legs get very weak and wants to give out but no calf pain.  He also denies any ischemic rest pain.  He has scattered varicosities and wears zip up compression stockings daily.  He is a former smoker who quit 2010, he had lung cancer in 2016.  He does take 81 mg aspirin, cilostazol and pravastatin.  No other complaints at this time.    Review of Systems:  ROS  Review of Systems   Constitutional: Negative.   HENT: Negative.    Cardiovascular: Negative.    Respiratory: Negative.    Skin: Negative.    Musculoskeletal:  Leg pain/weakness.    Gastrointestinal: Negative.    Neurological: Negative.    Psychiatric/Behavioral: Negative.      I have reviewed the following portions of the patient's history: allergies, current medications, past family history, past medical history, past social history, past surgical history and problem list and confirm it's accurate.    Allergies:  No Known Allergies    Medications:      Current Outpatient Medications:   •  albuterol sulfate  (90 Base) MCG/ACT inhaler, Inhale 2 puffs Every 4 (Four) Hours As Needed for Wheezing or Shortness of Air., Disp: 54 g, Rfl: 3  •  amLODIPine (NORVASC) 5 MG tablet, Take 5 mg by mouth Daily., Disp: , Rfl:   •  " Aspirin Low Dose 81 MG EC tablet, Take 81 mg by mouth Daily., Disp: , Rfl:   •  cetirizine (zyrTEC) 10 MG tablet, Take 10 mg by mouth Daily., Disp: , Rfl:   •  Cholecalciferol 25 MCG (1000 UT) capsule, Take 1,000 Units by mouth Daily., Disp: , Rfl:   •  cilostazol (PLETAL) 100 MG tablet, Take 100 mg by mouth 2 (Two) Times a Day., Disp: , Rfl:   •  cyclobenzaprine (FLEXERIL) 10 MG tablet, Take 10 mg by mouth 3 (Three) Times a Day As Needed for Muscle Spasms., Disp: , Rfl:   •  ferrous sulfate 325 (65 FE) MG tablet, Take 1 tablet by mouth Daily With Breakfast., Disp: 90 tablet, Rfl: 1  •  metoprolol succinate XL (TOPROL-XL) 50 MG 24 hr tablet, Take 50 mg by mouth Daily., Disp: , Rfl:   •  multivitamins-minerals (PRESERVISION AREDS 2) capsule capsule, Take 1 capsule by mouth 2 (Two) Times a Day., Disp: , Rfl:   •  pantoprazole (PROTONIX) 40 MG EC tablet, Take 40 mg by mouth Daily., Disp: , Rfl:   •  pravastatin (PRAVACHOL) 40 MG tablet, Take 40 mg by mouth Every Night., Disp: , Rfl:   •  tamsulosin (FLOMAX) 0.4 MG capsule 24 hr capsule, Take 1 capsule by mouth Daily., Disp: , Rfl:     History:   Past Medical History:   Diagnosis Date   • Anemia    • Arthritis    • COPD (chronic obstructive pulmonary disease) (HCC)     INHALER  PRN   • Hyperlipidemia    • Hypertension     ON MEDS   • Lung cancer (HCC)    • Pneumonia     LEFT LUNG CA   • SOB (shortness of breath)    • Stroke (HCC)    • TIA (transient ischemic attack)     NO RESIDUAL        Past Surgical History:   Procedure Laterality Date   • LUNG BIOPSY     • LUNG SURGERY      REMOVAL HALF OF UPPER PORTION LEFT LUNG   • ORTHOPEDIC SURGERY Left     ROTATOR CUFF   • THROAT SURGERY         Social History     Socioeconomic History   • Marital status:    Tobacco Use   • Smoking status: Former Smoker     Packs/day: 1.00     Years: 55.00     Pack years: 55.00     Types: Cigarettes     Quit date:      Years since quittin.6   • Smokeless tobacco: Former User    Vaping Use   • Vaping Use: Never used   Substance and Sexual Activity   • Alcohol use: Yes     Comment: 3-4 GIN TONICS/NIGHT   • Drug use: Never   • Sexual activity: Defer        Family History   Problem Relation Age of Onset   • Stomach cancer Other    • Cancer Mother        Objective     Physical Exam:  Vitals:    08/25/22 0957   BP: 118/63   BP Location: Left arm   Patient Position: Sitting   Cuff Size: Large Adult   Pulse: 84   Resp: 16   Temp: 97.8 °F (36.6 °C)   TempSrc: Temporal   SpO2: 94%   PainSc: 0-No pain      There is no height or weight on file to calculate BMI.    Physical Exam  Physical Exam  Constitutional:       Appearance: Normal appearance.   HENT:      Head: Normocephalic.   Cardiovascular:      Rate and Rhythm: Normal rate.      Pulses: Normal pulses.      Comments: Left lower extremity: Nonpalpable dorsalis pedis, +2 Doppler detected posterior tibial.  Right lower extremity: +2 palpable dorsalis pedis.  Pulmonary:      Effort: Pulmonary effort is normal.   Musculoskeletal:         General: Normal range of motion.      Cervical back: Normal range of motion.   Skin:     General: Skin is warm and dry.      Capillary Refill: Capillary refill takes less than 2 seconds.      Comments: Bilateral lower extremities: Warm to touch, no open areas with visible scattered varicosities.  Neurological:      General: No focal deficit present.      Mental Status: Alert and oriented to person, place, and time.   Psychiatric:         Mood and Affect: Mood normal.         Behavior: Behavior normal.    Imaging/Labs:    No radiology results for the last 30 days.       Assessment / Plan      Assessment / Plan:  Diagnoses and all orders for this visit:    1. Atherosclerosis of native artery of both lower extremities with intermittent claudication (HCC) (Primary)  -     Doppler Arterial Multi Level Lower Extremity - Bilateral CAR; Future    Mr. Murphy has bilateral lower extremity pain and weakness after walking a  short distance.  He has nonpalpable pulses in the left lower extremity however, he does have Doppler detected posterior tibial.  He has been using cilostazol as prescribed by his primary care physician. I recommend we obtain a arterial Doppler and follow-up in the office.  I have answered all his questions and he is in agreement with the plan at this time.        Follow Up:   Return for Recheck arterial doppler.   Or sooner for any further concerns or worsening sign and symptoms. If unable to reach us in the office please dial 911 or go to the nearest emergency department.      ARTEMIO Gerber  [facility] Vascular Surgery

## 2022-08-30 ENCOUNTER — OFFICE VISIT (OUTPATIENT)
Dept: ONCOLOGY | Facility: HOSPITAL | Age: 82
End: 2022-08-30

## 2022-08-30 ENCOUNTER — LAB (OUTPATIENT)
Dept: ONCOLOGY | Facility: HOSPITAL | Age: 82
End: 2022-08-30

## 2022-08-30 VITALS
TEMPERATURE: 97.8 F | HEART RATE: 97 BPM | OXYGEN SATURATION: 94 % | BODY MASS INDEX: 23.1 KG/M2 | WEIGHT: 147.49 LBS | SYSTOLIC BLOOD PRESSURE: 118 MMHG | DIASTOLIC BLOOD PRESSURE: 72 MMHG | RESPIRATION RATE: 18 BRPM

## 2022-08-30 DIAGNOSIS — D50.9 IRON DEFICIENCY ANEMIA, UNSPECIFIED IRON DEFICIENCY ANEMIA TYPE: ICD-10-CM

## 2022-08-30 DIAGNOSIS — M54.40 LOW BACK PAIN WITH SCIATICA, SCIATICA LATERALITY UNSPECIFIED, UNSPECIFIED BACK PAIN LATERALITY, UNSPECIFIED CHRONICITY: ICD-10-CM

## 2022-08-30 DIAGNOSIS — R63.4 WEIGHT LOSS: ICD-10-CM

## 2022-08-30 DIAGNOSIS — D50.9 IRON DEFICIENCY ANEMIA, UNSPECIFIED IRON DEFICIENCY ANEMIA TYPE: Primary | ICD-10-CM

## 2022-08-30 LAB
BASOPHILS # BLD AUTO: 0.02 10*3/MM3 (ref 0–0.2)
BASOPHILS NFR BLD AUTO: 0.3 % (ref 0–1.5)
DEPRECATED RDW RBC AUTO: 46.7 FL (ref 37–54)
EOSINOPHIL # BLD AUTO: 0.14 10*3/MM3 (ref 0–0.4)
EOSINOPHIL NFR BLD AUTO: 1.9 % (ref 0.3–6.2)
ERYTHROCYTE [DISTWIDTH] IN BLOOD BY AUTOMATED COUNT: 14 % (ref 12.3–15.4)
FERRITIN SERPL-MCNC: 59.55 NG/ML (ref 30–400)
HCT VFR BLD AUTO: 42.1 % (ref 37.5–51)
HGB BLD-MCNC: 13.5 G/DL (ref 13–17.7)
IMM GRANULOCYTES # BLD AUTO: 0 10*3/MM3 (ref 0–0.05)
IMM GRANULOCYTES NFR BLD AUTO: 0 % (ref 0–0.5)
IRON 24H UR-MRATE: 124 MCG/DL (ref 59–158)
IRON SATN MFR SERPL: 34 % (ref 20–50)
LYMPHOCYTES # BLD AUTO: 1.74 10*3/MM3 (ref 0.7–3.1)
LYMPHOCYTES NFR BLD AUTO: 23.3 % (ref 19.6–45.3)
MCH RBC QN AUTO: 28.8 PG (ref 26.6–33)
MCHC RBC AUTO-ENTMCNC: 32.1 G/DL (ref 31.5–35.7)
MCV RBC AUTO: 90 FL (ref 79–97)
MONOCYTES # BLD AUTO: 0.63 10*3/MM3 (ref 0.1–0.9)
MONOCYTES NFR BLD AUTO: 8.4 % (ref 5–12)
NEUTROPHILS NFR BLD AUTO: 4.95 10*3/MM3 (ref 1.7–7)
NEUTROPHILS NFR BLD AUTO: 66.1 % (ref 42.7–76)
PLATELET # BLD AUTO: 201 10*3/MM3 (ref 140–450)
PMV BLD AUTO: 9.7 FL (ref 6–12)
RBC # BLD AUTO: 4.68 10*6/MM3 (ref 4.14–5.8)
TIBC SERPL-MCNC: 365 MCG/DL (ref 298–536)
TRANSFERRIN SERPL-MCNC: 245 MG/DL (ref 200–360)
WBC NRBC COR # BLD: 7.48 10*3/MM3 (ref 3.4–10.8)

## 2022-08-30 PROCEDURE — 84466 ASSAY OF TRANSFERRIN: CPT | Performed by: NURSE PRACTITIONER

## 2022-08-30 PROCEDURE — 36415 COLL VENOUS BLD VENIPUNCTURE: CPT | Performed by: NURSE PRACTITIONER

## 2022-08-30 PROCEDURE — G0463 HOSPITAL OUTPT CLINIC VISIT: HCPCS | Performed by: NURSE PRACTITIONER

## 2022-08-30 PROCEDURE — 99214 OFFICE O/P EST MOD 30 MIN: CPT | Performed by: NURSE PRACTITIONER

## 2022-08-30 PROCEDURE — 82728 ASSAY OF FERRITIN: CPT | Performed by: NURSE PRACTITIONER

## 2022-08-30 PROCEDURE — 83540 ASSAY OF IRON: CPT | Performed by: NURSE PRACTITIONER

## 2022-08-30 PROCEDURE — 85025 COMPLETE CBC W/AUTO DIFF WBC: CPT | Performed by: NURSE PRACTITIONER

## 2022-08-30 RX ORDER — CARBOXYMETHYLCELLULOSE SODIUM 5 MG/ML
1 SOLUTION/ DROPS OPHTHALMIC DAILY PRN
COMMUNITY
Start: 2022-08-24

## 2022-08-30 RX ORDER — FERROUS SULFATE 325(65) MG
325 TABLET ORAL
Qty: 90 TABLET | Refills: 1 | Status: SHIPPED | OUTPATIENT
Start: 2022-08-30 | End: 2023-02-28 | Stop reason: SDUPTHER

## 2022-08-30 RX ORDER — DOXYCYCLINE HYCLATE 50 MG/1
324 CAPSULE, GELATIN COATED ORAL
Qty: 90 TABLET | Refills: 1 | Status: ON HOLD | OUTPATIENT
Start: 2022-08-30 | End: 2023-01-13

## 2022-08-30 NOTE — TELEPHONE ENCOUNTER
pt called and states that [facility] does not carry Ferrous Gluconate but they do carry Ferrous Sulfate. pt request a rx be sent for Ferrous Sulfate to [facility]. Please review and sign script.

## 2022-08-30 NOTE — PROGRESS NOTES
Chief Complaint  Lung Cancer    Payam Carroll MD Houk, Brandon Lyle, MD      Subjective          Carlos Zimmerman presents to [facility] for iron defieiency anemia    History of Present Illness     Mr. Carlos Zimmerman presents for follow up for iron deficiency anemia. History of prior left lung NSCLC with adenocarcinoma with left VATS procedure and prior chemotherapy per Dr. Saul. He follows with pulmonary for yearly low dose CT screening. His last low dose CT on 7/25/22 with stable appearance of lungs and recommendation to continue yearly low dose CT screening.     He does report he was hospitalized in July for a UTI and has now recovered from this.     We are following now for iron deficiency anemia. He was started on oral iron 1 tab QD back in April 2022. He is tolerating med well except for mild diarrhea he states, but is otherwise tolerable. 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 Previously did Neurontin but then came off of med after neurology visit. Stool for OB was negative. No recent labs so will need lab draw today. He reports he has good energy for his age.     Cancer Staging  No matching staging information was found for the patient.     Treatment intent: curative    Oncology/Hematology History    No history exists.   This is a very pleasant 76-year-old gentleman who presents with follow-up for     lobectomy with adjuvant chemotherapy.            1) Left lung:  Pathology 5/2016: FNA: Poorly differentiated adenocarcinoma: 5.5     mm BONNIE nodule. 5/2016.       Left lung lobectomy: VATS procedure: Dr. Saul in [place].Path:multifocal     invasive moderately differentiated adenocarcinoma, acinar type, 2.0 x 1.6 x 1.4     cm. Additional nodule 0.6 x 0.5 x 0.3 cm: margins free of tumor, negative LN's.     Visceral pleural invasion noted. (7/12/16). Staging: Stage IIB:  pT3N0.       Carboplatin  / Paclitaxel x 4 cycles: 8/10/16 - 10/14/16.      CT CAP: No evidence of disease. 2/2017.       CT CAP: No evidence of disease. 12/11/17.       CT chest: Right lower base: improving consolidation. 2/18/19.       CT chest: T6 / T10 compression fractures: non-pathologic. (8/28/19)      CT chest in ER: likely pneumonia/inflammation. No new evidence of recurring     cancer: (4/4/20)      CT chest: Stable 1.5 cm subpleural ground glass opacity in the BONNIE, new patchy     areas on non-specific ground glass opacity throughout the LLL. (3/2021).            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                    Review of Systems   Constitutional: Positive for appetite change (decreased appetite) and unexpected weight loss. Negative for diaphoresis, fever and unexpected weight gain.   HENT: Negative for hearing loss, sore throat and voice change.    Eyes: Negative for blurred vision, double vision, pain, redness and visual disturbance.   Respiratory: Negative for cough, shortness of breath and wheezing.    Cardiovascular: Negative for chest pain, palpitations and leg swelling.   Endocrine: Negative for cold intolerance, heat intolerance, polydipsia and polyuria.   Genitourinary: Negative for decreased urine volume, difficulty urinating, frequency and urinary incontinence.   Musculoskeletal: Negative for arthralgias, back pain, joint swelling and myalgias.   Skin: Negative for color change, rash, skin lesions and wound.   Neurological: Negative for dizziness, seizures, numbness and headache.   Hematological: Negative for adenopathy. Does not bruise/bleed easily.   Psychiatric/Behavioral: Negative for depressed mood. The patient is not nervous/anxious.    All other systems reviewed and are negative.      Current Outpatient Medications on File Prior to Visit   Medication Sig Dispense Refill   • albuterol sulfate  (90 Base)  MCG/ACT inhaler Inhale 2 puffs Every 4 (Four) Hours As Needed for Wheezing or Shortness of Air. 54 g 3   • amLODIPine (NORVASC) 5 MG tablet Take 5 mg by mouth Daily.     • Aspirin Low Dose 81 MG EC tablet Take 81 mg by mouth Daily.     • cetirizine (zyrTEC) 10 MG tablet Take 10 mg by mouth Daily.     • Cholecalciferol 25 MCG (1000 UT) capsule Take 1,000 Units by mouth Daily.     • cilostazol (PLETAL) 100 MG tablet Take 100 mg by mouth 2 (Two) Times a Day.     • cyclobenzaprine (FLEXERIL) 10 MG tablet Take 10 mg by mouth 3 (Three) Times a Day As Needed for Muscle Spasms.     • ferrous sulfate 325 (65 FE) MG tablet Take 1 tablet by mouth Daily With Breakfast. 90 tablet 1   • metoprolol succinate XL (TOPROL-XL) 50 MG 24 hr tablet Take 50 mg by mouth Daily.     • multivitamins-minerals (PRESERVISION AREDS 2) capsule capsule Take 1 capsule by mouth 2 (Two) Times a Day.     • pantoprazole (PROTONIX) 40 MG EC tablet Take 40 mg by mouth Daily.     • pravastatin (PRAVACHOL) 40 MG tablet Take 40 mg by mouth Every Night.     • Refresh Tears 0.5 % solution      • tamsulosin (FLOMAX) 0.4 MG capsule 24 hr capsule Take 1 capsule by mouth Daily.       No current facility-administered medications on file prior to visit.       No Known Allergies  Past Medical History:   Diagnosis Date   • Anemia    • Arthritis    • COPD (chronic obstructive pulmonary disease) (HCC)     INHALER  PRN   • Hyperlipidemia    • Hypertension     ON MEDS   • Lung cancer (HCC)    • Pneumonia     LEFT LUNG CA   • SOB (shortness of breath)    • Stroke (HCC)    • TIA (transient ischemic attack)     NO RESIDUAL 2010     Past Surgical History:   Procedure Laterality Date   • LUNG BIOPSY     • LUNG SURGERY      REMOVAL HALF OF UPPER PORTION LEFT LUNG   • ORTHOPEDIC SURGERY Left     ROTATOR CUFF   • THROAT SURGERY       Social History     Socioeconomic History   • Marital status:    Tobacco Use   • Smoking status: Former Smoker     Packs/day: 1.00     Years:  55.00     Pack years: 55.00     Types: Cigarettes     Quit date:      Years since quittin.6   • Smokeless tobacco: Former User   Vaping Use   • Vaping Use: Never used   Substance and Sexual Activity   • Alcohol use: Yes     Comment: 3-4 GIN TONICS/NIGHT   • Drug use: Never   • Sexual activity: Defer     Family History   Problem Relation Age of Onset   • Stomach cancer Other    • Cancer Mother      Immunization History   Administered Date(s) Administered   • COVID-19 (PFIZER) PURPLE CAP 2021, 2021, 10/18/2021   • Fluad Quad 65+ 10/07/2020, 2021   • Influenza, Unspecified 2020   • TD Preservative Free 2022       Objective   Physical Exam  Vitals and nursing note reviewed.   Constitutional:       Appearance: Normal appearance. He is normal weight.   HENT:      Head: Normocephalic.      Nose: Nose normal.      Mouth/Throat:      Mouth: Mucous membranes are moist.   Eyes:      Pupils: Pupils are equal, round, and reactive to light.   Cardiovascular:      Rate and Rhythm: Normal rate and regular rhythm.      Pulses: Normal pulses.      Heart sounds: Normal heart sounds. No murmur heard.  Pulmonary:      Effort: No respiratory distress.      Breath sounds: Normal breath sounds.   Abdominal:      General: Bowel sounds are normal.      Palpations: Abdomen is soft.   Musculoskeletal:         General: Normal range of motion.      Cervical back: Normal range of motion and neck supple.   Skin:     General: Skin is warm and dry.      Capillary Refill: Capillary refill takes less than 2 seconds.   Neurological:      General: No focal deficit present.      Mental Status: He is alert and oriented to person, place, and time.   Psychiatric:         Mood and Affect: Mood normal.         Behavior: Behavior normal.         Thought Content: Thought content normal.         Judgment: Judgment normal.         Vitals:    22 0955   BP: 118/72   Pulse: 97   Resp: 18   Temp: 97.8 °F (36.6 °C)   SpO2:  94%   Weight: 66.9 kg (147 lb 7.8 oz)   PainSc: 0-No pain     ECOG score: 1         ECOG: (0) Fully Active - Able to Carry On All Pre-disease Performance Without Restriction  Fall Risk Assessment was completed, and patient is at low risk for falls.  PHQ-9 Total Score: 0       The patient is  experiencing fatigue. Fatigue score: 1    PT/OT Functional Screening: PT fx screen: No needs identified  Speech Functional Screening: Speech fx screen: No needs identified  Rehab to be ordered: Rehab to be ordered: No needs identified        Result Review :   The following data was reviewed by: ARTEMIO Giordano on 08/30/2022:  Lab Results   Component Value Date    HGB 13.5 08/30/2022    HCT 42.1 08/30/2022    MCV 90.0 08/30/2022     08/30/2022    WBC 7.48 08/30/2022    NEUTROABS 4.95 08/30/2022    LYMPHSABS 1.74 08/30/2022    MONOSABS 0.63 08/30/2022    EOSABS 0.14 08/30/2022    BASOSABS 0.02 08/30/2022     Lab Results   Component Value Date    GLUCOSE 105 (H) 07/12/2022    BUN 7 (L) 07/12/2022    CREATININE 0.59 (L) 07/12/2022     (L) 07/12/2022    K 4.1 07/12/2022     07/12/2022    CO2 25.4 07/12/2022    CALCIUM 8.9 07/12/2022    PROTEINTOT 5.7 (L) 07/12/2022    ALBUMIN 3.20 (L) 07/12/2022    BILITOT 0.6 07/12/2022    ALKPHOS 128 (H) 07/12/2022    AST 20 07/12/2022    ALT 14 07/12/2022          Assessment and Plan    Diagnoses and all orders for this visit:    1. Iron deficiency anemia, unspecified iron deficiency anemia type (Primary)  -     CBC & Differential  -     Iron Profile  -     Ferritin  -     ferrous gluconate (FERGON) 324 MG tablet; Take 1 tablet by mouth Daily With Breakfast.  Dispense: 90 tablet; Refill: 1  -     CBC & Differential; Future  -     Iron Profile; Future  -     Ferritin; Future    2. Weight loss        Reviewed low dose CT scan from July. If he continues to have weight loss, recommend follow up with GI for colonoscopy. He reports he feels well overall. Tolerating oral  iron well 1 tab QD. Will recheck labs today. Will send in refills after labs reviewed. Stool for OB was negative at his last visit.     CBC and iron labs are in the normal limits. He will continue oral iron. Refills sent to his pharmacy.     Recheck lab work in 6 months and follow up with MD.         Patient Follow Up: 6 months with MD.       Patient was given instructions and counseling regarding his condition or for health maintenance advice. Please see specific information pulled into the AVS if appropriate.     Claribel Wolff, APRN    8/30/2022

## 2022-09-24 PROBLEM — N40.0 BENIGN PROSTATIC HYPERPLASIA: Status: ACTIVE | Noted: 2022-09-24

## 2022-09-24 PROBLEM — N39.0 RECURRENT URINARY TRACT INFECTION: Status: ACTIVE | Noted: 2022-09-24

## 2022-09-24 NOTE — PROGRESS NOTES
Chief Complaint: Urologic complaint    Subjective         History of Present Illness  Carlos Zimmerman is a 82 y.o. male       Recurrent UTI  BPH      Patient had 2 UTIs this last year, none before.  Voids without straining .  Nocturia x0.  Really not bothered.  Currently on Flomax 0.4 mg daily.      7/22 renal ultrasound-normal  7/22 0.59, GFR 96    PVR    9/22  061    Urine cultures    7/22 Serratia-resistant to Ancef and nitrofurantoin  2/21 Serratia     No gross hematuria      No history of kidney  stone.    No urologic family history,   no history of urologic surgery.    No cardiopulmonary history.  Non-smoker.  No anticoagulation.  History of lung cancer years ago treated with surgery        No recent PSAs      Results for orders placed or performed in visit on 09/26/22   Bladder Scan   Result Value Ref Range    Volume 61ML    POC Urinalysis Dipstick, Automated    Specimen: Urine   Result Value Ref Range    Color Yellow Yellow, Straw, Dark Yellow, Jessica    Clarity, UA Clear Clear    Specific Gravity  1.015 1.005 - 1.030    pH, Urine 6.5 5.0 - 8.0    Leukocytes Negative Negative    Nitrite, UA Negative Negative    Protein, POC Negative Negative mg/dL    Glucose, UA Negative Negative mg/dL    Ketones, UA Negative Negative    Urobilinogen, UA Normal Normal, 0.2 E.U./dL    Bilirubin Negative Negative    Blood, UA Negative Negative    Lot Number [id number]     Expiration Date 8/2,023            Objective     Past Medical History:   Diagnosis Date   • Anemia    • Arthritis    • COPD (chronic obstructive pulmonary disease) (HCC)     INHALER  PRN   • Hyperlipidemia    • Hypertension     ON MEDS   • Lung cancer (HCC)    • Pneumonia     LEFT LUNG CA   • SOB (shortness of breath)    • Stroke (HCC)    • TIA (transient ischemic attack)     NO RESIDUAL 2010       Past Surgical History:   Procedure Laterality Date   • LUNG BIOPSY     • LUNG SURGERY      REMOVAL HALF OF UPPER PORTION LEFT LUNG   • ORTHOPEDIC SURGERY Left      ROTATOR CUFF   • THROAT SURGERY           Current Outpatient Medications:   •  albuterol sulfate  (90 Base) MCG/ACT inhaler, Inhale 2 puffs Every 4 (Four) Hours As Needed for Wheezing or Shortness of Air., Disp: 54 g, Rfl: 3  •  amLODIPine (NORVASC) 5 MG tablet, Take 5 mg by mouth Daily., Disp: , Rfl:   •  Aspirin Low Dose 81 MG EC tablet, Take 81 mg by mouth Daily., Disp: , Rfl:   •  cetirizine (zyrTEC) 10 MG tablet, Take 10 mg by mouth Daily., Disp: , Rfl:   •  Cholecalciferol 25 MCG (1000 UT) capsule, Take 1,000 Units by mouth Daily., Disp: , Rfl:   •  cilostazol (PLETAL) 100 MG tablet, Take 100 mg by mouth 2 (Two) Times a Day., Disp: , Rfl:   •  cyclobenzaprine (FLEXERIL) 10 MG tablet, Take 10 mg by mouth 3 (Three) Times a Day As Needed for Muscle Spasms., Disp: , Rfl:   •  ferrous gluconate (FERGON) 324 MG tablet, Take 1 tablet by mouth Daily With Breakfast., Disp: 90 tablet, Rfl: 1  •  ferrous sulfate 325 (65 FE) MG tablet, Take 1 tablet by mouth Daily With Breakfast., Disp: 90 tablet, Rfl: 1  •  metoprolol succinate XL (TOPROL-XL) 50 MG 24 hr tablet, Take 50 mg by mouth Daily., Disp: , Rfl:   •  multivitamins-minerals (PRESERVISION AREDS 2) capsule capsule, Take 1 capsule by mouth 2 (Two) Times a Day., Disp: , Rfl:   •  pantoprazole (PROTONIX) 40 MG EC tablet, Take 40 mg by mouth Daily., Disp: , Rfl:   •  pravastatin (PRAVACHOL) 40 MG tablet, Take 40 mg by mouth Every Night., Disp: , Rfl:   •  Refresh Tears 0.5 % solution, , Disp: , Rfl:   •  tamsulosin (FLOMAX) 0.4 MG capsule 24 hr capsule, Take 1 capsule by mouth Daily., Disp: , Rfl:     No Known Allergies     Family History   Problem Relation Age of Onset   • Stomach cancer Other    • Cancer Mother        Social History     Socioeconomic History   • Marital status:    Tobacco Use   • Smoking status: Former Smoker     Packs/day: 1.00     Years: 55.00     Pack years: 55.00     Types: Cigarettes     Quit date:      Years since quittin.7    • Smokeless tobacco: Former User   Vaping Use   • Vaping Use: Never used   Substance and Sexual Activity   • Alcohol use: Yes     Comment: 3-4 GIN TONICS/NIGHT   • Drug use: Never   • Sexual activity: Defer       Vital Signs:   There were no vitals taken for this visit.     Physical exam    Alert and orient x3  Well appearing, well developed, in no acute distress   Unlabored respirations  Nontender/nondistended      Grossly oriented to person, place and time, judgment is intact, normal mood and affect    Results for orders placed or performed in visit on 08/30/22   Iron Profile    Specimen: Arm, Left; Blood   Result Value Ref Range    Iron 124 59 - 158 mcg/dL    Iron Saturation 34 20 - 50 %    Transferrin 245 200 - 360 mg/dL    TIBC 365 298 - 536 mcg/dL   Ferritin    Specimen: Arm, Left; Blood   Result Value Ref Range    Ferritin 59.55 30.00 - 400.00 ng/mL   CBC Auto Differential    Specimen: Arm, Left; Blood   Result Value Ref Range    WBC 7.48 3.40 - 10.80 10*3/mm3    RBC 4.68 4.14 - 5.80 10*6/mm3    Hemoglobin 13.5 13.0 - 17.7 g/dL    Hematocrit 42.1 37.5 - 51.0 %    MCV 90.0 79.0 - 97.0 fL    MCH 28.8 26.6 - 33.0 pg    MCHC 32.1 31.5 - 35.7 g/dL    RDW 14.0 12.3 - 15.4 %    RDW-SD 46.7 37.0 - 54.0 fl    MPV 9.7 6.0 - 12.0 fL    Platelets 201 140 - 450 10*3/mm3    Neutrophil % 66.1 42.7 - 76.0 %    Lymphocyte % 23.3 19.6 - 45.3 %    Monocyte % 8.4 5.0 - 12.0 %    Eosinophil % 1.9 0.3 - 6.2 %    Basophil % 0.3 0.0 - 1.5 %    Immature Grans % 0.0 0.0 - 0.5 %    Neutrophils, Absolute 4.95 1.70 - 7.00 10*3/mm3    Lymphocytes, Absolute 1.74 0.70 - 3.10 10*3/mm3    Monocytes, Absolute 0.63 0.10 - 0.90 10*3/mm3    Eosinophils, Absolute 0.14 0.00 - 0.40 10*3/mm3    Basophils, Absolute 0.02 0.00 - 0.20 10*3/mm3    Immature Grans, Absolute 0.00 0.00 - 0.05 10*3/mm3             Assessment and Plan    Diagnoses and all orders for this visit:    1. Recurrent urinary tract infection (Primary)    2. Benign prostatic  hyperplasia, unspecified whether lower urinary tract symptoms present      Records reviewed today and summarized in the chart      Increase Flomax to 0.4 mg twice daily.  Risk benefits discussed.      We will go ahead and get him set up for cystoscopy    PSA before cystoscopy    Preparation for possible TURP.  Risks and benefits were discussed including bleeding, infection and damage to the urinary system.  We also discussed the risk of anesthesia up to and including death.  Patient voiced understanding.

## 2022-09-26 ENCOUNTER — OFFICE VISIT (OUTPATIENT)
Dept: UROLOGY | Facility: CLINIC | Age: 82
End: 2022-09-26

## 2022-09-26 ENCOUNTER — TELEPHONE (OUTPATIENT)
Dept: UROLOGY | Facility: CLINIC | Age: 82
End: 2022-09-26

## 2022-09-26 ENCOUNTER — HOSPITAL ENCOUNTER (OUTPATIENT)
Dept: CARDIOLOGY | Facility: HOSPITAL | Age: 82
Discharge: HOME | End: 2022-09-26
Admitting: NURSE PRACTITIONER

## 2022-09-26 VITALS — RESPIRATION RATE: 18 BRPM | BODY MASS INDEX: 23.35 KG/M2 | HEIGHT: 67 IN | WEIGHT: 148.8 LBS

## 2022-09-26 DIAGNOSIS — N39.0 RECURRENT URINARY TRACT INFECTION: Primary | ICD-10-CM

## 2022-09-26 DIAGNOSIS — N40.0 BENIGN PROSTATIC HYPERPLASIA, UNSPECIFIED WHETHER LOWER URINARY TRACT SYMPTOMS PRESENT: ICD-10-CM

## 2022-09-26 DIAGNOSIS — I70.213 ATHEROSCLEROSIS OF NATIVE ARTERY OF BOTH LOWER EXTREMITIES WITH INTERMITTENT CLAUDICATION: ICD-10-CM

## 2022-09-26 LAB
BILIRUB BLD-MCNC: NEGATIVE MG/DL
CLARITY, POC: CLEAR
COLOR UR: YELLOW
EXPIRATION DATE: NORMAL
GLUCOSE UR STRIP-MCNC: NEGATIVE MG/DL
KETONES UR QL: NEGATIVE
LEUKOCYTE EST, POC: NEGATIVE
Lab: NORMAL
NITRITE UR-MCNC: NEGATIVE MG/ML
PH UR: 6.5 [PH] (ref 5–8)
PROT UR STRIP-MCNC: NEGATIVE MG/DL
RBC # UR STRIP: NEGATIVE /UL
SP GR UR: 1.01 (ref 1–1.03)
SPECIMEN VOL 24H UR: NORMAL L
UROBILINOGEN UR QL: NORMAL

## 2022-09-26 PROCEDURE — 81003 URINALYSIS AUTO W/O SCOPE: CPT | Performed by: UROLOGY

## 2022-09-26 PROCEDURE — 51798 US URINE CAPACITY MEASURE: CPT | Performed by: UROLOGY

## 2022-09-26 PROCEDURE — 93923 UPR/LXTR ART STDY 3+ LVLS: CPT | Performed by: SURGERY

## 2022-09-26 PROCEDURE — 99204 OFFICE O/P NEW MOD 45 MIN: CPT | Performed by: UROLOGY

## 2022-09-26 PROCEDURE — 93923 UPR/LXTR ART STDY 3+ LVLS: CPT

## 2022-09-26 RX ORDER — TAMSULOSIN HYDROCHLORIDE 0.4 MG/1
1 CAPSULE ORAL 2 TIMES DAILY
Qty: 180 CAPSULE | Refills: 4 | Status: SHIPPED | OUTPATIENT
Start: 2022-09-26

## 2022-09-30 ENCOUNTER — OFFICE VISIT (OUTPATIENT)
Dept: VASCULAR SURGERY | Facility: HOSPITAL | Age: 82
End: 2022-09-30

## 2022-09-30 VITALS
HEART RATE: 90 BPM | RESPIRATION RATE: 18 BRPM | OXYGEN SATURATION: 98 % | SYSTOLIC BLOOD PRESSURE: 106 MMHG | TEMPERATURE: 96.7 F | DIASTOLIC BLOOD PRESSURE: 60 MMHG

## 2022-09-30 DIAGNOSIS — I70.213 ATHEROSCLEROSIS OF NATIVE ARTERY OF BOTH LOWER EXTREMITIES WITH INTERMITTENT CLAUDICATION: Primary | ICD-10-CM

## 2022-09-30 PROCEDURE — 99213 OFFICE O/P EST LOW 20 MIN: CPT | Performed by: NURSE PRACTITIONER

## 2022-09-30 PROCEDURE — G0463 HOSPITAL OUTPT CLINIC VISIT: HCPCS | Performed by: NURSE PRACTITIONER

## 2022-09-30 NOTE — PROGRESS NOTES
"     [facility] Vascular Surgery Office Follow Up Note     Date of Encounter: 09/30/2022     MRN Number: [id number]  Name: Carlos Zimmerman  Phone Number: [phone]     Referred By: Cuauhtemoc Thomas MD  PCP: Payam Carroll MD    Chief Complaint:    Chief Complaint   Patient presents with   • Follow-up     Patient is an  year old male that [resents to the clinic as a one month follow up to discuss lower extremity doppler results. Patient states he has been having left leg swelling and has attempted to wear compression stockings but that \" they cut into me and I stopped wearing them\". Patient states he is continuing with cilostazol. Patient does not smoke. Patient continues to report claudication pain. Patient does take a low dose aspirin.        Subjective      History of Present Illness:    Carlos Zimmerman is a 82 y.o. male presents for follow-up after having a arterial Doppler performed on 9/26/2022.  He explains he still having extreme claudication symptoms in the left calf, walking to his mailbox has become a challenge.  He has been taking cilostazol for couple years and has maintained but feels that he pain has significantly progressed in the past couple months.  He also takes aspirin and pravastatin.  He is a former smoker who quit in 2010, smoked approximately 1 pack a day for 55 years.    Review of Systems:  ROS  Review of Systems   Constitutional: Negative.   HENT: Negative.    Cardiovascular: Negative.    Respiratory: Negative.    Skin: Negative.    Musculoskeletal: Negative.    Gastrointestinal: Negative.    Neurological: Negative.    Psychiatric/Behavioral: Negative.      I have reviewed the following portions of the patient's history: allergies, current medications, past family history, past medical history, past social history, past surgical history and problem list and confirm it's accurate.    Allergies:  No Known Allergies    Medications:      Current Outpatient Medications:   •  " albuterol sulfate  (90 Base) MCG/ACT inhaler, Inhale 2 puffs Every 4 (Four) Hours As Needed for Wheezing or Shortness of Air., Disp: 54 g, Rfl: 3  •  amLODIPine (NORVASC) 5 MG tablet, Take 5 mg by mouth Daily., Disp: , Rfl:   •  Aspirin Low Dose 81 MG EC tablet, Take 81 mg by mouth Daily., Disp: , Rfl:   •  cetirizine (zyrTEC) 10 MG tablet, Take 10 mg by mouth Daily., Disp: , Rfl:   •  Cholecalciferol 25 MCG (1000 UT) capsule, Take 1,000 Units by mouth Daily., Disp: , Rfl:   •  cilostazol (PLETAL) 100 MG tablet, Take 100 mg by mouth 2 (Two) Times a Day., Disp: , Rfl:   •  cyclobenzaprine (FLEXERIL) 10 MG tablet, Take 10 mg by mouth 3 (Three) Times a Day As Needed for Muscle Spasms., Disp: , Rfl:   •  ferrous gluconate (FERGON) 324 MG tablet, Take 1 tablet by mouth Daily With Breakfast., Disp: 90 tablet, Rfl: 1  •  ferrous sulfate 325 (65 FE) MG tablet, Take 1 tablet by mouth Daily With Breakfast., Disp: 90 tablet, Rfl: 1  •  metoprolol succinate XL (TOPROL-XL) 50 MG 24 hr tablet, Take 50 mg by mouth Daily., Disp: , Rfl:   •  multivitamins-minerals (PRESERVISION AREDS 2) capsule capsule, Take 1 capsule by mouth 2 (Two) Times a Day., Disp: , Rfl:   •  pantoprazole (PROTONIX) 40 MG EC tablet, Take 40 mg by mouth Daily., Disp: , Rfl:   •  pravastatin (PRAVACHOL) 40 MG tablet, Take 40 mg by mouth Every Night., Disp: , Rfl:   •  Refresh Tears 0.5 % solution, , Disp: , Rfl:   •  tamsulosin (FLOMAX) 0.4 MG capsule 24 hr capsule, Take 1 capsule by mouth 2 (Two) Times a Day., Disp: 180 capsule, Rfl: 4    History:   Past Medical History:   Diagnosis Date   • Anemia    • Arthritis    • COPD (chronic obstructive pulmonary disease) (HCC)     INHALER  PRN   • Hyperlipidemia    • Hypertension     ON MEDS   • Lung cancer (HCC)    • Pneumonia     LEFT LUNG CA   • SOB (shortness of breath)    • Stroke (HCC)    • TIA (transient ischemic attack)     NO RESIDUAL 2010       Past Surgical History:   Procedure Laterality Date   •  LUNG BIOPSY     • LUNG SURGERY      REMOVAL HALF OF UPPER PORTION LEFT LUNG   • ORTHOPEDIC SURGERY Left     ROTATOR CUFF   • THROAT SURGERY         Social History     Socioeconomic History   • Marital status:    Tobacco Use   • Smoking status: Former Smoker     Packs/day: 1.00     Years: 55.00     Pack years: 55.00     Types: Cigarettes     Quit date:      Years since quittin.7   • Smokeless tobacco: Former User   Vaping Use   • Vaping Use: Never used   Substance and Sexual Activity   • Alcohol use: Yes     Comment: 3-4 GIN TONICS/NIGHT   • Drug use: Never   • Sexual activity: Defer        Family History   Problem Relation Age of Onset   • Stomach cancer Other    • Cancer Mother        Objective     Physical Exam:  Vitals:    22 0836   BP: 106/60   BP Location: Left arm   Patient Position: Sitting   Cuff Size: Large Adult   Pulse: 90   Resp: 18   Temp: 96.7 °F (35.9 °C)   TempSrc: Temporal   SpO2: 98%   PainSc: 0-No pain      There is no height or weight on file to calculate BMI.    Physical Exam  Physical Exam  Constitutional:       Appearance: Normal appearance.   HENT:      Head: Normocephalic.   Cardiovascular:      Rate and Rhythm: Normal rate.      Pulses: Normal pulses.      Comments: Left lower extremity: Nonpalpable dorsalis pedis pulse, pitting edema.  Pulmonary:      Effort: Pulmonary effort is normal.   Musculoskeletal:         General: Normal range of motion.      Cervical back: Normal range of motion.   Skin:     General: Skin is warm and dry.      Capillary Refill: Capillary refill takes less than 2 seconds.      Comments:   Neurological:      General: No focal deficit present.      Mental Status: Alert and oriented to person, place, and time.   Psychiatric:         Mood and Affect: Mood normal.         Behavior: Behavior normal.    Imaging/Labs:  I have reviewed the preliminary results of the arterial Doppler performed on 2022.  The Doppler reveals ABIs of 0.88 on the right  and 0.78 on the left with diminished waveforms in the left peroneal.        Assessment / Plan      Assessment / Plan:  Diagnoses and all orders for this visit:    1. Atherosclerosis of native artery of both lower extremities with intermittent claudication (HCC) (Primary)  -     CT Angio Abdominal Aorta Bilateral Iliofem Runoff; Future    Mr. Zimmerman has severe lifestyle limiting intermittent claudication symptoms in the left lower extremity.  The arterial Doppler reveals ABIs of 0.88 on the right and 0.78 on the left with diminished biphasic waveforms on the left.  I recommend we obtain a CTA of the abdomen and pelvis with runoff to further evaluate given his failed conservative therapy of taking cilostazol.  I have answered all of his questions and he is in agreement with the plan at this time.    Thank you for allowing me to participate in your patient's care.          Follow Up:   Return for cta ab/pel with runoff and f/u with Dr. Thomas.   Or sooner for any further concerns or worsening sign and symptoms. If unable to reach us in the office please dial 911 or go to the nearest emergency department.      Tasha ULLOA  [facility] Vascular Surgery

## 2022-10-02 LAB
BH CV LOWER ARTERIAL LEFT ABI RATIO: 0.78
BH CV LOWER ARTERIAL LEFT DORSALIS PEDIS SYS MAX: 77
BH CV LOWER ARTERIAL LEFT GREAT TOE SYS MAX: 51
BH CV LOWER ARTERIAL LEFT LOW THIGH SYS MAX: 97
BH CV LOWER ARTERIAL LEFT POPLITEAL SYS MAX: 81
BH CV LOWER ARTERIAL LEFT POST TIBIAL SYS MAX: 83
BH CV LOWER ARTERIAL LEFT TBI RATIO: 0.48
BH CV LOWER ARTERIAL RIGHT ABI RATIO: 0.88
BH CV LOWER ARTERIAL RIGHT DORSALIS PEDIS SYS MAX: 91
BH CV LOWER ARTERIAL RIGHT GREAT TOE SYS MAX: 65
BH CV LOWER ARTERIAL RIGHT LOW THIGH SYS MAX: 106
BH CV LOWER ARTERIAL RIGHT POPLITEAL SYS MAX: 92
BH CV LOWER ARTERIAL RIGHT POST TIBIAL SYS MAX: 94
BH CV LOWER ARTERIAL RIGHT TBI RATIO: 0.61
MAXIMAL PREDICTED HEART RATE: 137 BPM
STRESS TARGET HR: 116 BPM
UPPER ARTERIAL LEFT ARM BRACHIAL SYS MAX: 107 MMHG
UPPER ARTERIAL RIGHT ARM BRACHIAL SYS MAX: 103 MMHG

## 2022-10-18 ENCOUNTER — TELEPHONE (OUTPATIENT)
Dept: ONCOLOGY | Facility: HOSPITAL | Age: 82
End: 2022-10-18

## 2022-10-18 NOTE — TELEPHONE ENCOUNTER
Caller: Carlos Zimmerman    Relationship: Self    Best call back number: [phone]  Requested Prescriptions:   FERROUS SULFATE          Pharmacy where request should be sent:    [facility] -  [place], [place] - [address] - [phone]  - [phone] FX        Does the patient have less than a 3 day supply:  [] Yes  [x] No    Fran Baltazar Rep   10/18/22 09:39 EDT

## 2022-10-19 ENCOUNTER — HOSPITAL ENCOUNTER (OUTPATIENT)
Dept: CT IMAGING | Facility: HOSPITAL | Age: 82
Discharge: HOME | End: 2022-10-19
Admitting: NURSE PRACTITIONER

## 2022-10-19 DIAGNOSIS — I70.213 ATHEROSCLEROSIS OF NATIVE ARTERY OF BOTH LOWER EXTREMITIES WITH INTERMITTENT CLAUDICATION: ICD-10-CM

## 2022-10-19 LAB
CREAT BLDA-MCNC: 0.7 MG/DL
EGFRCR SERPLBLD CKD-EPI 2021: 91.4 ML/MIN/1.73

## 2022-10-19 PROCEDURE — 75635 CT ANGIO ABDOMINAL ARTERIES: CPT

## 2022-10-19 PROCEDURE — 0 IOPAMIDOL PER 1 ML: Performed by: NURSE PRACTITIONER

## 2022-10-19 PROCEDURE — 82565 ASSAY OF CREATININE: CPT

## 2022-10-19 RX ADMIN — IOPAMIDOL 100 ML: 755 INJECTION, SOLUTION INTRAVENOUS at 09:08

## 2022-10-31 ENCOUNTER — OFFICE VISIT (OUTPATIENT)
Dept: VASCULAR SURGERY | Facility: HOSPITAL | Age: 82
End: 2022-10-31

## 2022-10-31 VITALS
TEMPERATURE: 97.5 F | RESPIRATION RATE: 18 BRPM | DIASTOLIC BLOOD PRESSURE: 68 MMHG | SYSTOLIC BLOOD PRESSURE: 132 MMHG | HEART RATE: 104 BPM | OXYGEN SATURATION: 97 %

## 2022-10-31 DIAGNOSIS — I70.219 ATHEROSCLEROSIS OF LOWER EXTREMITY WITH CLAUDICATION: Primary | ICD-10-CM

## 2022-10-31 PROCEDURE — 99214 OFFICE O/P EST MOD 30 MIN: CPT | Performed by: SURGERY

## 2022-10-31 PROCEDURE — G0463 HOSPITAL OUTPT CLINIC VISIT: HCPCS | Performed by: SURGERY

## 2022-10-31 NOTE — PROGRESS NOTES
[facility]   Follow up Office    Patient Name: Carlos Zimmerman  : 1940  MRN: [id number]  Primary Care Physician:  Payam Carroll MD      Subjective   Subjective     HPI:    Carlos Zimmerman is a 82 y.o. male here for follow-up for lifestyle meeting to meet and claudication, left leg worse than right.  Failed medical management.      Objective     Vitals:   Temp:  [97.5 °F (36.4 °C)] 97.5 °F (36.4 °C)  Heart Rate:  [104] 104  Resp:  [18] 18  BP: (132)/(68) 132/68    Physical Exam      General: Alert, no acute distress.  Extremities: Symmetric.    Diagnostic studies: A CTA dated 10/19/2022 demonstrates significant bilateral lower extremity occlusive disease.  Left greater than right.    Assessment & Plan   Assessment / Plan     Diagnoses and all orders for this visit:    1. Atherosclerosis of lower extremity with claudication (HCC) (Primary)  -     Case Request; Standing  -     ceFAZolin (ANCEF) 2 g in sodium chloride 0.9 % 100 mL IVPB  -     Case Request    Other orders  -     Follow Anesthesia Guidelines / Protocol; Future  -     Obtain Informed Consent; Future  -     Provide NPO Instructions to Patient; Future  -     Follow Anesthesia Guidelines / Protocol; Standing  -     CBC & Differential; Standing  -     Basic Metabolic Panel; Standing       Assessment/Plan:   Mr. Zimmerman has lifestyle limiting intermittent claudication which has failed medical management.  Plan angiography with possible endovascular intervention.  I have discussed with the patient in detail the mechanics of the procedure, the indications, benefits, risks, alternatives as well as potential complications to include but not limited to infection, bleeding, inability to cross the occlusion, vascular injury requiring surgical repair.  He appears to understand and desires to proceed.        Electronically signed by Cuauhtemoc Thomas MD, 10/31/22, 10:30 AM EDT.

## 2022-11-03 ENCOUNTER — OFFICE VISIT (OUTPATIENT)
Dept: PULMONOLOGY | Facility: CLINIC | Age: 82
End: 2022-11-03

## 2022-11-03 VITALS
BODY MASS INDEX: 23.93 KG/M2 | OXYGEN SATURATION: 98 % | SYSTOLIC BLOOD PRESSURE: 129 MMHG | TEMPERATURE: 97.7 F | WEIGHT: 152.5 LBS | HEIGHT: 67 IN | DIASTOLIC BLOOD PRESSURE: 73 MMHG | HEART RATE: 98 BPM | RESPIRATION RATE: 16 BRPM

## 2022-11-03 DIAGNOSIS — J44.9 CHRONIC OBSTRUCTIVE PULMONARY DISEASE, UNSPECIFIED COPD TYPE: Primary | ICD-10-CM

## 2022-11-03 DIAGNOSIS — R91.1 SOLITARY LUNG NODULE: ICD-10-CM

## 2022-11-03 DIAGNOSIS — C34.32 MALIGNANT NEOPLASM OF LOWER LOBE OF LEFT LUNG: ICD-10-CM

## 2022-11-03 DIAGNOSIS — J98.4 LUNG DISEASE: ICD-10-CM

## 2022-11-03 DIAGNOSIS — J18.9 PNEUMONIA OF RIGHT LUNG DUE TO INFECTIOUS ORGANISM, UNSPECIFIED PART OF LUNG: ICD-10-CM

## 2022-11-03 PROCEDURE — 99214 OFFICE O/P EST MOD 30 MIN: CPT | Performed by: INTERNAL MEDICINE

## 2022-11-03 RX ORDER — METOLAZONE 5 MG/1
5 TABLET ORAL DAILY
Qty: 14 TABLET | Refills: 0 | Status: ON HOLD | OUTPATIENT
Start: 2022-11-03 | End: 2023-01-13

## 2022-11-03 RX ORDER — FLUTICASONE FUROATE, UMECLIDINIUM BROMIDE AND VILANTEROL TRIFENATATE 100; 62.5; 25 UG/1; UG/1; UG/1
1 POWDER RESPIRATORY (INHALATION)
Qty: 4 EACH | Refills: 0 | COMMUNITY
Start: 2022-11-03 | End: 2022-11-04

## 2022-11-03 RX ORDER — SULFAMETHOXAZOLE AND TRIMETHOPRIM 800; 160 MG/1; MG/1
TABLET ORAL
Status: ON HOLD | COMMUNITY
Start: 2022-10-13 | End: 2023-01-13

## 2022-11-03 NOTE — PROGRESS NOTES
"Chief Complaint  COPD, Lung Cancer, Pneumonia, and Follow-up    Subjective          Carlos Zimmerman presents to [facility]INE  History of Present Illness     Mr. 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  He is here for follow-up.  He had repeat CT scan of the chest on 7/25/2022 which was reviewed with him today.  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  He continues to be on trilogy Ellipta once daily and albuterol which he has not needed to use for the last 6 months.  He is using Trelegy Ellipta irregularly and intermittently.  He has some leg pain with walking more than 50 yards.  He also is having leg swelling for the last 6 months.  He has been seeing Dr. Mcnair in cardiology, has not been on any diuretics.  He had echocardiogram through cardiology service and was told it was normal.  He already is up-to-date on Covid vaccine.  He has not had any changes in weight or appetite.  No chest pain no chest tightness.  No nausea or vomiting.  No fever or chills.  He already had flu shot through his pharmacy.  He is planned to have pneumonia vaccine through his pharmacy as well.      Objective   Vital Signs:   /73 (BP Location: Left arm, Patient Position: Sitting, Cuff Size: Adult)   Pulse 98   Temp 97.7 °F (36.5 °C) (Tympanic)   Resp 16   Ht 170.2 cm (67\")   Wt 69.2 kg (152 lb 8 oz)   SpO2 98% Comment: room air  BMI 23.88 kg/m²     Physical Exam  Vitals and nursing note reviewed.   Constitutional:       General: He is not in acute distress.     Appearance: Normal appearance. He is normal weight.   HENT:      Head: Normocephalic and atraumatic.      Right Ear: Hearing normal.      Left Ear: Hearing normal.      Nose: No nasal tenderness or congestion. "      Mouth/Throat:      Mouth: Mucous membranes are moist. No oral lesions.      Pharynx: Oropharynx is clear.   Eyes:      Extraocular Movements: Extraocular movements intact.      Pupils: Pupils are equal, round, and reactive to light.   Neck:      Thyroid: No thyroid mass or thyromegaly.   Cardiovascular:      Rate and Rhythm: Normal rate and regular rhythm.      Pulses: Normal pulses.      Heart sounds: No murmur heard.  Pulmonary:      Effort: Pulmonary effort is normal. No respiratory distress.      Breath sounds: No wheezing, rhonchi or rales.      Comments: Bilateral diminished breath sounds, no wheezing crackles or rhonchi, resonant to percussion bilaterally  Chest:      Chest wall: No tenderness.   Abdominal:      General: Bowel sounds are normal. There is no distension.      Palpations: Abdomen is soft. There is no mass.      Tenderness: There is no abdominal tenderness. There is no guarding.   Musculoskeletal:         General: Normal range of motion.      Cervical back: Normal range of motion and neck supple.      Right lower leg: No edema.      Left lower leg: No edema.   Lymphadenopathy:      Cervical: No cervical adenopathy.      Upper Body:      Right upper body: No supraclavicular or axillary adenopathy.      Left upper body: No supraclavicular or axillary adenopathy.   Skin:     General: Skin is warm and dry.      Capillary Refill: Capillary refill takes less than 2 seconds.      Findings: No lesion or rash.   Neurological:      General: No focal deficit present.      Mental Status: He is alert and oriented to person, place, and time.   Psychiatric:         Mood and Affect: Mood and affect normal. Mood is not anxious or depressed.         Behavior: Behavior normal.        Result Review :   The following data was reviewed by: Dayne Choudhary MD on 06/16/2021:  Common labs    Common Labs 7/12/22 7/12/22 7/12/22 8/30/22 10/19/22    0554 [id number]     Glucose  105 (A)      BUN  7 (A)      Creatinine   0.59 (A)   0.70   Sodium  135 (A)      Potassium  4.1      Chloride  103      Calcium  8.9      Albumin   3.20 (A)     Total Bilirubin   0.6     Alkaline Phosphatase   128 (A)     AST (SGOT)   20     ALT (SGPT)   14     WBC 16.11 (A)   7.48    Hemoglobin 12.5 (A)   13.5    Hematocrit 38.3   42.1    Platelets 162   201    (A) Abnormal value       Comments are available for some flowsheets but are not being displayed.           CMP    CMP 7/11/22 7/12/22 7/12/22 10/19/22     0554 0554    Glucose 108 (A) 105 (A)     BUN 8 7 (A)     Creatinine 0.58 (A) 0.59 (A)  0.70   Sodium 139 135 (A)     Potassium 3.3 (A) 4.1     Chloride 106 103     Calcium 8.4 (A) 8.9     Albumin   3.20 (A)    Total Bilirubin   0.6    Alkaline Phosphatase   128 (A)    AST (SGOT)   20    ALT (SGPT)   14    (A) Abnormal value       Comments are available for some flowsheets but are not being displayed.           Data reviewed: Radiologic studies CT scan of the chest from 7/25/2022 was reviewed.  Showed interval resolution of previous infiltrates in right upper and right lower lobe consistent with resolving pneumonia.  Stable appearance of left lung bleb or pneumatocele with surrounding parenchymal haziness.          Assessment and Plan    Diagnoses and all orders for this visit:    1. Chronic obstructive pulmonary disease, unspecified COPD type (HCC) (Primary)  -     metOLazone (ZAROXOLYN) 5 MG tablet; Take 1 tablet by mouth Daily.  Dispense: 14 tablet; Refill: 0    2. Lung disease  -     metOLazone (ZAROXOLYN) 5 MG tablet; Take 1 tablet by mouth Daily.  Dispense: 14 tablet; Refill: 0  -     CT Chest Without Contrast Diagnostic; Future    3. Solitary lung nodule  -     metOLazone (ZAROXOLYN) 5 MG tablet; Take 1 tablet by mouth Daily.  Dispense: 14 tablet; Refill: 0  -     CT Chest Without Contrast Diagnostic; Future    4. Pneumonia of right lung due to infectious organism, unspecified part of lung  -     metOLazone (ZAROXOLYN) 5 MG tablet; Take 1  tablet by mouth Daily.  Dispense: 14 tablet; Refill: 0    5. Malignant neoplasm of lower lobe of left lung (HCC)    Lung nodule with attached pneumatocele, pneumatocele with hazy groundglass opacity surrounding it.  So far it seems stable.  Repeat CT scan of the chest in 4 months.     COPD: Continue with Trelegy Ellipta once daily.  Continue with albuterol as needed.  Patient has been using trilogy intermittently.  I advised him to use it regularly.  We have given him samples of Trelegy today.  Overall COPD seems to be stable for now.    He is up-to-date on COVID-19 vaccine.  He had flu shot already.  He is planned to have pneumonia vaccine through his pharmacy.    Leg swelling: Per his report, had echocardiogram through Dr. Mcnair.  I have given him metolazone 5 mg once daily for 14 days.  May need chronic low-dose diuretics.  Need to monitor renal function closely.    Follow Up   Return in about 4 months (around 3/3/2023).  Patient was given instructions and counseling regarding his condition or for health maintenance advice. Please see specific information pulled into the AVS if appropriate.

## 2022-11-09 PROCEDURE — S0260 H&P FOR SURGERY: HCPCS | Performed by: SURGERY

## 2022-11-09 NOTE — H&P
[facility]   HISTORY AND PHYSICAL    Patient Name: Carlos Zimmerman  : 1940  MRN: [id number]  Primary Care Physician:  Payam Carroll MD  Date of admission: (Not on file)    Subjective   Subjective     Chief Complaint: Bilateral leg pain with ambulation    HPI:    Carlos Zimmerman is a 82 y.o. male with bilateral leg pain with ambulation, left greater than right.  He has severe claudication which limits him to approximately 200 feet or less.    Review of Systems    Non contributory except for the History of Present Illness    Personal History     Past Medical History:   Diagnosis Date   • Anemia    • Arthritis    • COPD (chronic obstructive pulmonary disease) (HCC)     INHALER  PRN   • Hyperlipidemia    • Hypertension     ON MEDS   • Lung cancer (HCC)    • Pneumonia     LEFT LUNG CA   • SOB (shortness of breath)    • Stroke (HCC)    • TIA (transient ischemic attack)     NO RESIDUAL        Past Surgical History:   Procedure Laterality Date   • LUNG BIOPSY     • LUNG SURGERY      REMOVAL HALF OF UPPER PORTION LEFT LUNG   • ORTHOPEDIC SURGERY Left     ROTATOR CUFF   • THROAT SURGERY         Family History: family history includes Cancer in his mother; Stomach cancer in an other family member. Otherwise pertinent FHx was reviewed and not pertinent to current issue.    Social History:  reports that he quit smoking about 12 years ago. His smoking use included cigarettes. He has a 55.00 pack-year smoking history. He has quit using smokeless tobacco. He reports current alcohol use. He reports that he does not use drugs.    Home Medications:  No current facility-administered medications on file prior to encounter.     Current Outpatient Medications on File Prior to Encounter   Medication Sig   • albuterol sulfate  (90 Base) MCG/ACT inhaler Inhale 2 puffs Every 4 (Four) Hours As Needed for Wheezing or Shortness of Air.   • amLODIPine (NORVASC) 5 MG tablet Take 5 mg by mouth Daily.   • Aspirin Low  Dose 81 MG EC tablet Take 81 mg by mouth Daily.   • cetirizine (zyrTEC) 10 MG tablet Take 10 mg by mouth Daily.   • Cholecalciferol 25 MCG (1000 UT) capsule Take 1,000 Units by mouth Daily.   • cilostazol (PLETAL) 100 MG tablet Take 100 mg by mouth 2 (Two) Times a Day.   • cyclobenzaprine (FLEXERIL) 10 MG tablet Take 10 mg by mouth 3 (Three) Times a Day As Needed for Muscle Spasms.   • ferrous gluconate (FERGON) 324 MG tablet Take 1 tablet by mouth Daily With Breakfast.   • ferrous sulfate 325 (65 FE) MG tablet Take 1 tablet by mouth Daily With Breakfast.   • metoprolol succinate XL (TOPROL-XL) 50 MG 24 hr tablet Take 50 mg by mouth Daily.   • multivitamins-minerals (PRESERVISION AREDS 2) capsule capsule Take 1 capsule by mouth 2 (Two) Times a Day.   • pantoprazole (PROTONIX) 40 MG EC tablet Take 40 mg by mouth Daily.   • pravastatin (PRAVACHOL) 40 MG tablet Take 40 mg by mouth Every Night.   • Refresh Tears 0.5 % solution    • tamsulosin (FLOMAX) 0.4 MG capsule 24 hr capsule Take 1 capsule by mouth 2 (Two) Times a Day.          Allergies:  No Known Allergies    Objective   Objective     Vitals:        Physical Exam   General: Alert, no acute distress.  Neck: Supple  Heart: Regular rate  Lungs: Clear  Abdomen: Benign  Extremities: Symmetric  Pulses: Diminished bilateral pedal pulses.    Diagnostic studies:   ABIs from 9/26/2022 are 0.88 on the right, 0.78 on the left.    Assessment & Plan   Assessment / Plan     Active Hospital Problems:  Active Hospital Problems    Diagnosis    • **Atherosclerosis of lower extremity with claudication (HCC)        Diagnoses and all orders for this visit:    1. Atherosclerosis of lower extremity with claudication (HCC)  -     Cardiac Catheterization/Vascular Study; Standing  -     Cardiac Catheterization/Vascular Study        Assessment/plan:   Mr. Zimmerman has lifestyle limiting intermittent claudication which has failed medical management.  Plan angiography with possible  endovascular intervention.  I have discussed with the patient in detail the mechanics of the procedure, the indications, benefits, risks, alternatives as well as potential complications to include but not limited to infection, bleeding, inability to cross the occlusion, vascular injury requiring surgical repair.  He appears to understand and desires to proceed.      Electronically signed by Cuauhtemoc Thomas MD, 11/09/22, 11:06 AM EST.

## 2022-11-10 ENCOUNTER — HOSPITAL ENCOUNTER (OUTPATIENT)
Facility: HOSPITAL | Age: 82
Setting detail: HOSPITAL OUTPATIENT SURGERY
Discharge: HOME | End: 2022-11-10
Attending: SURGERY | Admitting: SURGERY

## 2022-11-10 VITALS
BODY MASS INDEX: 23.54 KG/M2 | DIASTOLIC BLOOD PRESSURE: 69 MMHG | HEIGHT: 67 IN | OXYGEN SATURATION: 95 % | SYSTOLIC BLOOD PRESSURE: 110 MMHG | TEMPERATURE: 98.5 F | HEART RATE: 80 BPM | RESPIRATION RATE: 16 BRPM | WEIGHT: 150 LBS

## 2022-11-10 DIAGNOSIS — I70.219 ATHEROSCLEROSIS OF LOWER EXTREMITY WITH CLAUDICATION: ICD-10-CM

## 2022-11-10 LAB
ANION GAP SERPL CALCULATED.3IONS-SCNC: 9.6 MMOL/L (ref 5–15)
BASOPHILS # BLD AUTO: 0.02 10*3/MM3 (ref 0–0.2)
BASOPHILS NFR BLD AUTO: 0.3 % (ref 0–1.5)
BUN SERPL-MCNC: 11 MG/DL (ref 8–23)
BUN/CREAT SERPL: 15.7 (ref 7–25)
CALCIUM SPEC-SCNC: 9.7 MG/DL (ref 8.6–10.5)
CHLORIDE SERPL-SCNC: 98 MMOL/L (ref 98–107)
CO2 SERPL-SCNC: 31.4 MMOL/L (ref 22–29)
CREAT SERPL-MCNC: 0.7 MG/DL (ref 0.76–1.27)
DEPRECATED RDW RBC AUTO: 41.9 FL (ref 37–54)
EGFRCR SERPLBLD CKD-EPI 2021: 91.4 ML/MIN/1.73
EOSINOPHIL # BLD AUTO: 0.04 10*3/MM3 (ref 0–0.4)
EOSINOPHIL NFR BLD AUTO: 0.5 % (ref 0.3–6.2)
ERYTHROCYTE [DISTWIDTH] IN BLOOD BY AUTOMATED COUNT: 13.4 % (ref 12.3–15.4)
GLUCOSE SERPL-MCNC: 119 MG/DL (ref 65–99)
HCT VFR BLD AUTO: 43.2 % (ref 37.5–51)
HGB BLD-MCNC: 14.4 G/DL (ref 13–17.7)
IMM GRANULOCYTES # BLD AUTO: 0.03 10*3/MM3 (ref 0–0.05)
IMM GRANULOCYTES NFR BLD AUTO: 0.4 % (ref 0–0.5)
LYMPHOCYTES # BLD AUTO: 1.4 10*3/MM3 (ref 0.7–3.1)
LYMPHOCYTES NFR BLD AUTO: 18 % (ref 19.6–45.3)
MCH RBC QN AUTO: 28.6 PG (ref 26.6–33)
MCHC RBC AUTO-ENTMCNC: 33.3 G/DL (ref 31.5–35.7)
MCV RBC AUTO: 85.7 FL (ref 79–97)
MONOCYTES # BLD AUTO: 0.77 10*3/MM3 (ref 0.1–0.9)
MONOCYTES NFR BLD AUTO: 9.9 % (ref 5–12)
NEUTROPHILS NFR BLD AUTO: 5.5 10*3/MM3 (ref 1.7–7)
NEUTROPHILS NFR BLD AUTO: 70.9 % (ref 42.7–76)
NRBC BLD AUTO-RTO: 0 /100 WBC (ref 0–0.2)
PLATELET # BLD AUTO: 202 10*3/MM3 (ref 140–450)
PMV BLD AUTO: 9.7 FL (ref 6–12)
POTASSIUM SERPL-SCNC: 3.5 MMOL/L (ref 3.5–5.2)
RBC # BLD AUTO: 5.04 10*6/MM3 (ref 4.14–5.8)
SODIUM SERPL-SCNC: 139 MMOL/L (ref 136–145)
WBC NRBC COR # BLD: 7.76 10*3/MM3 (ref 3.4–10.8)

## 2022-11-10 PROCEDURE — C1894 INTRO/SHEATH, NON-LASER: HCPCS | Performed by: SURGERY

## 2022-11-10 PROCEDURE — 99153 MOD SED SAME PHYS/QHP EA: CPT | Performed by: SURGERY

## 2022-11-10 PROCEDURE — 99152 MOD SED SAME PHYS/QHP 5/>YRS: CPT | Performed by: SURGERY

## 2022-11-10 PROCEDURE — 25010000002 MIDAZOLAM PER 1 MG: Performed by: SURGERY

## 2022-11-10 PROCEDURE — C1887 CATHETER, GUIDING: HCPCS | Performed by: SURGERY

## 2022-11-10 PROCEDURE — 25010000002 PROTAMINE SULFATE PER 10 MG: Performed by: SURGERY

## 2022-11-10 PROCEDURE — 25010000002 CEFAZOLIN IN DEXTROSE 2-4 GM/100ML-% SOLUTION: Performed by: SURGERY

## 2022-11-10 PROCEDURE — C1725 CATH, TRANSLUMIN NON-LASER: HCPCS | Performed by: SURGERY

## 2022-11-10 PROCEDURE — 25010000002 HEPARIN (PORCINE) PER 1000 UNITS: Performed by: SURGERY

## 2022-11-10 PROCEDURE — 37220 PR REVASCULARIZATION ILIAC ARTERY ANGIOP 1ST VSL: CPT | Performed by: SURGERY

## 2022-11-10 PROCEDURE — 75625 CONTRAST EXAM ABDOMINL AORTA: CPT | Performed by: SURGERY

## 2022-11-10 PROCEDURE — 80048 BASIC METABOLIC PNL TOTAL CA: CPT | Performed by: SURGERY

## 2022-11-10 PROCEDURE — C1769 GUIDE WIRE: HCPCS | Performed by: SURGERY

## 2022-11-10 PROCEDURE — 75710 ARTERY X-RAYS ARM/LEG: CPT | Performed by: SURGERY

## 2022-11-10 PROCEDURE — 0 IODIXANOL PER 1 ML: Performed by: SURGERY

## 2022-11-10 PROCEDURE — 25010000002 FENTANYL CITRATE (PF) 100 MCG/2ML SOLUTION: Performed by: SURGERY

## 2022-11-10 PROCEDURE — 85025 COMPLETE CBC W/AUTO DIFF WBC: CPT | Performed by: SURGERY

## 2022-11-10 RX ORDER — FENTANYL CITRATE 50 UG/ML
INJECTION, SOLUTION INTRAMUSCULAR; INTRAVENOUS
Status: DISCONTINUED | OUTPATIENT
Start: 2022-11-10 | End: 2022-11-10 | Stop reason: HOSPADM

## 2022-11-10 RX ORDER — PROTAMINE SULFATE 10 MG/ML
INJECTION, SOLUTION INTRAVENOUS
Status: DISCONTINUED | OUTPATIENT
Start: 2022-11-10 | End: 2022-11-10 | Stop reason: HOSPADM

## 2022-11-10 RX ORDER — ENOXAPARIN SODIUM 100 MG/ML
40 INJECTION SUBCUTANEOUS DAILY
Status: DISCONTINUED | OUTPATIENT
Start: 2022-11-11 | End: 2022-11-10 | Stop reason: HOSPADM

## 2022-11-10 RX ORDER — HEPARIN SODIUM 1000 [USP'U]/ML
INJECTION, SOLUTION INTRAVENOUS; SUBCUTANEOUS
Status: DISCONTINUED | OUTPATIENT
Start: 2022-11-10 | End: 2022-11-10 | Stop reason: HOSPADM

## 2022-11-10 RX ORDER — CEFAZOLIN SODIUM 2 G/100ML
2 INJECTION, SOLUTION INTRAVENOUS ONCE
Status: COMPLETED | OUTPATIENT
Start: 2022-11-10 | End: 2022-11-10

## 2022-11-10 RX ORDER — MIDAZOLAM HYDROCHLORIDE 1 MG/ML
INJECTION INTRAMUSCULAR; INTRAVENOUS
Status: DISCONTINUED | OUTPATIENT
Start: 2022-11-10 | End: 2022-11-10 | Stop reason: HOSPADM

## 2022-11-10 RX ORDER — IODIXANOL 320 MG/ML
INJECTION, SOLUTION INTRAVASCULAR
Status: DISCONTINUED | OUTPATIENT
Start: 2022-11-10 | End: 2022-11-10 | Stop reason: HOSPADM

## 2022-11-10 RX ORDER — LIDOCAINE HYDROCHLORIDE 20 MG/ML
INJECTION, SOLUTION INFILTRATION; PERINEURAL
Status: DISCONTINUED | OUTPATIENT
Start: 2022-11-10 | End: 2022-11-10 | Stop reason: HOSPADM

## 2022-11-10 RX ORDER — SODIUM CHLORIDE 9 MG/ML
100 INJECTION, SOLUTION INTRAVENOUS CONTINUOUS
Status: ACTIVE | OUTPATIENT
Start: 2022-11-10 | End: 2022-11-10

## 2022-11-10 RX ADMIN — CEFAZOLIN SODIUM 2 G: 2 INJECTION, SOLUTION INTRAVENOUS at 08:15

## 2022-11-10 NOTE — PROGRESS NOTES
Angiogram performed.  Percutaneous transluminal angioplasty of the left external and left common iliac arteries.  Moderate diffuse stenosis of the left common femoral artery with significant posterior plaque, focal eccentric plaque in the mid distal superficial femoral artery.  3 vessels runoff with continuous flow throughout.  The anterior tibialis demonstrates moderate diffuse disease but it is continuous.  For details find full report under chart review, cardiology tab.

## 2022-11-30 ENCOUNTER — OFFICE VISIT (OUTPATIENT)
Dept: VASCULAR SURGERY | Facility: HOSPITAL | Age: 82
End: 2022-11-30

## 2022-11-30 VITALS
OXYGEN SATURATION: 98 % | SYSTOLIC BLOOD PRESSURE: 124 MMHG | HEART RATE: 93 BPM | TEMPERATURE: 97.9 F | DIASTOLIC BLOOD PRESSURE: 62 MMHG | RESPIRATION RATE: 18 BRPM

## 2022-11-30 DIAGNOSIS — I70.219 ATHEROSCLEROSIS OF LOWER EXTREMITY WITH CLAUDICATION: Primary | ICD-10-CM

## 2022-11-30 PROCEDURE — 99214 OFFICE O/P EST MOD 30 MIN: CPT | Performed by: SURGERY

## 2022-11-30 PROCEDURE — G0463 HOSPITAL OUTPT CLINIC VISIT: HCPCS | Performed by: SURGERY

## 2022-11-30 NOTE — PROGRESS NOTES
[facility]   Follow up Office    Patient Name: Carlos Zimmerman  : 1940  MRN: [id number]  Primary Care Physician:  Payam Carroll MD      Subjective   Subjective     HPI:    Carlos Zimmerman is a 82 y.o. male here for follow-up after angiography 11/10/2022.  Walking much better where the left leg does not bother him nearly as much and is much better than the right now.  No bleeding or hematoma complications for the right groin access.  No other complaints at this time.      Objective     Vitals:   Temp:  [97.9 °F (36.6 °C)] 97.9 °F (36.6 °C)  Heart Rate:  [93] 93  Resp:  [18] 18  BP: (124)/(62) 124/62    Physical Exam      General: Alert, no acute distress.  Extremities: Symmetric.    Assessment & Plan   Assessment / Plan     Diagnoses and all orders for this visit:    1. Atherosclerosis of lower extremity with claudication (HCC) (Primary)  -     Case Request; Standing  -     ceFAZolin (ANCEF) 2 g in sodium chloride 0.9 % 100 mL IVPB  -     Case Request    Other orders  -     Obtain Informed Consent; Future  -     Provide NPO Instructions to Patient; Future  -     CBC & Differential; Standing  -     Basic Metabolic Panel; Standing       Assessment/Plan:   Mr. Zimmerman is doing significantly better after endovascular intervention of the left lower extremity.  Inflow disease was addressed at the time.  At this time the plan is to look at the right side.  I have discussed with the patient in detail the mechanics of the procedure, the indications, benefits, risks, alternatives as well as potential complications to include but not limited to infection, bleeding, inability to cross the occlusion, vascular injury requiring surgical repair.  He appears to understand and desires to proceed.        Electronically signed by Cuauhtemoc Thomas MD, 22, 10:26 AM EST.

## 2022-12-12 ENCOUNTER — PREP FOR SURGERY (OUTPATIENT)
Dept: OTHER | Facility: HOSPITAL | Age: 82
End: 2022-12-12

## 2022-12-12 DIAGNOSIS — I70.219 ATHEROSCLEROSIS OF LOWER EXTREMITY WITH CLAUDICATION: Primary | ICD-10-CM

## 2022-12-12 RX ORDER — CEFAZOLIN SODIUM 2 G/100ML
2 INJECTION, SOLUTION INTRAVENOUS ONCE
Status: CANCELLED | OUTPATIENT
Start: 2022-12-12 | End: 2022-12-12

## 2022-12-13 ENCOUNTER — TRANSCRIBE ORDERS (OUTPATIENT)
Dept: LAB | Facility: HOSPITAL | Age: 82
End: 2022-12-13

## 2022-12-13 ENCOUNTER — LAB (OUTPATIENT)
Dept: LAB | Facility: HOSPITAL | Age: 82
End: 2022-12-13

## 2022-12-13 DIAGNOSIS — E78.5 HYPERLIPIDEMIA, UNSPECIFIED HYPERLIPIDEMIA TYPE: Primary | ICD-10-CM

## 2022-12-13 DIAGNOSIS — I10 HYPERTENSION, UNSPECIFIED TYPE: ICD-10-CM

## 2022-12-13 DIAGNOSIS — E78.5 HYPERLIPIDEMIA, UNSPECIFIED HYPERLIPIDEMIA TYPE: ICD-10-CM

## 2022-12-13 LAB
ALBUMIN SERPL-MCNC: 4.1 G/DL (ref 3.5–5.2)
ALBUMIN/GLOB SERPL: 2.1 G/DL
ALP SERPL-CCNC: 119 U/L (ref 39–117)
ALT SERPL W P-5'-P-CCNC: 21 U/L (ref 1–41)
ANION GAP SERPL CALCULATED.3IONS-SCNC: 7 MMOL/L (ref 5–15)
AST SERPL-CCNC: 23 U/L (ref 1–40)
BASOPHILS # BLD AUTO: 0.04 10*3/MM3 (ref 0–0.2)
BASOPHILS NFR BLD AUTO: 0.5 % (ref 0–1.5)
BILIRUB SERPL-MCNC: 0.7 MG/DL (ref 0–1.2)
BUN SERPL-MCNC: 12 MG/DL (ref 8–23)
BUN/CREAT SERPL: 16.7 (ref 7–25)
CALCIUM SPEC-SCNC: 9.4 MG/DL (ref 8.6–10.5)
CHLORIDE SERPL-SCNC: 102 MMOL/L (ref 98–107)
CHOLEST SERPL-MCNC: 166 MG/DL (ref 0–200)
CO2 SERPL-SCNC: 30 MMOL/L (ref 22–29)
CREAT SERPL-MCNC: 0.72 MG/DL (ref 0.76–1.27)
DEPRECATED RDW RBC AUTO: 39.9 FL (ref 37–54)
EGFRCR SERPLBLD CKD-EPI 2021: 90.7 ML/MIN/1.73
EOSINOPHIL # BLD AUTO: 0.11 10*3/MM3 (ref 0–0.4)
EOSINOPHIL NFR BLD AUTO: 1.4 % (ref 0.3–6.2)
ERYTHROCYTE [DISTWIDTH] IN BLOOD BY AUTOMATED COUNT: 12.7 % (ref 12.3–15.4)
GLOBULIN UR ELPH-MCNC: 2 GM/DL
GLUCOSE SERPL-MCNC: 99 MG/DL (ref 65–99)
HCT VFR BLD AUTO: 40.9 % (ref 37.5–51)
HDLC SERPL-MCNC: 82 MG/DL (ref 40–60)
HGB BLD-MCNC: 13.6 G/DL (ref 13–17.7)
IMM GRANULOCYTES # BLD AUTO: 0.03 10*3/MM3 (ref 0–0.05)
IMM GRANULOCYTES NFR BLD AUTO: 0.4 % (ref 0–0.5)
LDLC SERPL CALC-MCNC: 74 MG/DL (ref 0–100)
LDLC/HDLC SERPL: 0.9 {RATIO}
LYMPHOCYTES # BLD AUTO: 2.21 10*3/MM3 (ref 0.7–3.1)
LYMPHOCYTES NFR BLD AUTO: 27.9 % (ref 19.6–45.3)
MCH RBC QN AUTO: 28.7 PG (ref 26.6–33)
MCHC RBC AUTO-ENTMCNC: 33.3 G/DL (ref 31.5–35.7)
MCV RBC AUTO: 86.3 FL (ref 79–97)
MONOCYTES # BLD AUTO: 0.65 10*3/MM3 (ref 0.1–0.9)
MONOCYTES NFR BLD AUTO: 8.2 % (ref 5–12)
NEUTROPHILS NFR BLD AUTO: 4.88 10*3/MM3 (ref 1.7–7)
NEUTROPHILS NFR BLD AUTO: 61.6 % (ref 42.7–76)
NRBC BLD AUTO-RTO: 0 /100 WBC (ref 0–0.2)
PLATELET # BLD AUTO: 183 10*3/MM3 (ref 140–450)
PMV BLD AUTO: 10.8 FL (ref 6–12)
POTASSIUM SERPL-SCNC: 4.6 MMOL/L (ref 3.5–5.2)
PROT SERPL-MCNC: 6.1 G/DL (ref 6–8.5)
RBC # BLD AUTO: 4.74 10*6/MM3 (ref 4.14–5.8)
SODIUM SERPL-SCNC: 139 MMOL/L (ref 136–145)
TRIGL SERPL-MCNC: 50 MG/DL (ref 0–150)
TSH SERPL DL<=0.05 MIU/L-ACNC: 1.76 UIU/ML (ref 0.27–4.2)
VLDLC SERPL-MCNC: 10 MG/DL (ref 5–40)
WBC NRBC COR # BLD: 7.92 10*3/MM3 (ref 3.4–10.8)

## 2022-12-13 PROCEDURE — 84443 ASSAY THYROID STIM HORMONE: CPT

## 2022-12-13 PROCEDURE — 36415 COLL VENOUS BLD VENIPUNCTURE: CPT

## 2022-12-13 PROCEDURE — 80053 COMPREHEN METABOLIC PANEL: CPT

## 2022-12-13 PROCEDURE — 80061 LIPID PANEL: CPT

## 2022-12-13 PROCEDURE — 85025 COMPLETE CBC W/AUTO DIFF WBC: CPT

## 2023-01-10 PROCEDURE — S0260 H&P FOR SURGERY: HCPCS | Performed by: SURGERY

## 2023-01-10 NOTE — H&P
[facility]   HISTORY AND PHYSICAL    Patient Name: Carlos Zimmerman  : 1940  MRN: [id number]  Primary Care Physician:  Payam Carroll MD  Date of admission: (Not on file)    Subjective   Subjective     Chief Complaint: Right leg pain with ambulation.    HPI:    Carlos Zimmerman is a 83 y.o. male with right leg pain with ambulation at 100 yards.    Review of Systems    Non contributory except for the History of Present Illness    Personal History     Past Medical History:   Diagnosis Date   • Anemia    • Arthritis    • COPD (chronic obstructive pulmonary disease) (HCC)     INHALER  PRN   • Hyperlipidemia    • Hypertension     ON MEDS   • Lung cancer (HCC)    • Pneumonia     LEFT LUNG CA   • SOB (shortness of breath)    • Stroke (HCC)    • TIA (transient ischemic attack)     NO RESIDUAL        Past Surgical History:   Procedure Laterality Date   • CARDIAC CATHETERIZATION Left 11/10/2022    Procedure: Aortogram with left leg angiogram, possible angioplasty or stenting;  Surgeon: Cuauhtemoc Thomas MD;  Location: [facility] CATH INVASIVE LOCATION;  Service: Vascular;  Laterality: Left;   • LUNG BIOPSY     • LUNG SURGERY      REMOVAL HALF OF UPPER PORTION LEFT LUNG   • ORTHOPEDIC SURGERY Left     ROTATOR CUFF   • THROAT SURGERY         Family History: family history includes Cancer in his mother; Stomach cancer in an other family member. Otherwise pertinent FHx was reviewed and not pertinent to current issue.    Social History:  reports that he quit smoking about 13 years ago. His smoking use included cigarettes. He has a 55.00 pack-year smoking history. He has quit using smokeless tobacco. He reports current alcohol use. He reports that he does not use drugs.    Home Medications:  No current facility-administered medications on file prior to encounter.     Current Outpatient Medications on File Prior to Encounter   Medication Sig   • albuterol sulfate  (90 Base) MCG/ACT inhaler Inhale 2 puffs Every  4 (Four) Hours As Needed for Wheezing or Shortness of Air.   • amLODIPine (NORVASC) 5 MG tablet Take 5 mg by mouth Daily.   • Aspirin Low Dose 81 MG EC tablet Take 81 mg by mouth Daily.   • cetirizine (zyrTEC) 10 MG tablet Take 10 mg by mouth Daily.   • Cholecalciferol 25 MCG (1000 UT) capsule Take 1,000 Units by mouth Daily.   • cilostazol (PLETAL) 100 MG tablet Take 100 mg by mouth 2 (Two) Times a Day.   • cyclobenzaprine (FLEXERIL) 10 MG tablet Take 10 mg by mouth 3 (Three) Times a Day As Needed for Muscle Spasms.   • ferrous gluconate (FERGON) 324 MG tablet Take 1 tablet by mouth Daily With Breakfast.   • ferrous sulfate 325 (65 FE) MG tablet Take 1 tablet by mouth Daily With Breakfast.   • metOLazone (ZAROXOLYN) 5 MG tablet Take 1 tablet by mouth Daily.   • metoprolol succinate XL (TOPROL-XL) 50 MG 24 hr tablet Take 50 mg by mouth Daily.   • multivitamins-minerals (PRESERVISION AREDS 2) capsule capsule Take 1 capsule by mouth 2 (Two) Times a Day.   • pantoprazole (PROTONIX) 40 MG EC tablet Take 40 mg by mouth Daily.   • pravastatin (PRAVACHOL) 40 MG tablet Take 40 mg by mouth Every Night.   • Refresh Tears 0.5 % solution    • sulfamethoxazole-trimethoprim (BACTRIM DS,SEPTRA DS) 800-160 MG per tablet    • tamsulosin (FLOMAX) 0.4 MG capsule 24 hr capsule Take 1 capsule by mouth 2 (Two) Times a Day.          Allergies:  No Known Allergies    Objective   Objective     Vitals:   BP: ()/()   Arterial Line BP: ()/()     Physical Exam   General: Alert, no acute distress.  Neck: Supple  Heart: Regular rate  Lungs: Clear  Abdomen: Benign  Extremities: Symmetric  Pulses: Nonpalpable right pedal pulses.    Diagnostic studies:   A CTA dated 10/19/2022 demonstrates diffuse and multifocal right superficial femoral artery disease.    Assessment & Plan   Assessment / Plan     Active Hospital Problems:  Active Hospital Problems    Diagnosis    • **Atherosclerosis of lower extremity with claudication (HCC)        Diagnoses and  all orders for this visit:    1. Atherosclerosis of lower extremity with claudication (HCC)  -     Cardiac Catheterization/Vascular Study; Standing  -     Cardiac Catheterization/Vascular Study        Assessment/plan:   Mr. Zimmerman is doing significantly better after endovascular intervention of the left lower extremity.  Inflow disease was addressed at the time.  At this time the plan is to look at the right side.  I have discussed with the patient in detail the mechanics of the procedure, the indications, benefits, risks, alternatives as well as potential complications to include but not limited to infection, bleeding, inability to cross the occlusion, vascular injury requiring surgical repair.  He appears to understand and desires to proceed.      Electronically signed by Cuauhtemoc Thomas MD, 01/10/23, 5:27 PM EST.

## 2023-01-13 ENCOUNTER — HOSPITAL ENCOUNTER (OUTPATIENT)
Facility: HOSPITAL | Age: 83
Setting detail: HOSPITAL OUTPATIENT SURGERY
Discharge: HOME | End: 2023-01-13
Attending: SURGERY | Admitting: SURGERY
Payer: MEDICARE

## 2023-01-13 VITALS
HEART RATE: 84 BPM | OXYGEN SATURATION: 95 % | RESPIRATION RATE: 16 BRPM | BODY MASS INDEX: 24.33 KG/M2 | WEIGHT: 155 LBS | SYSTOLIC BLOOD PRESSURE: 139 MMHG | TEMPERATURE: 97.8 F | HEIGHT: 67 IN | DIASTOLIC BLOOD PRESSURE: 78 MMHG

## 2023-01-13 DIAGNOSIS — I70.219 ATHEROSCLEROSIS OF LOWER EXTREMITY WITH CLAUDICATION: ICD-10-CM

## 2023-01-13 LAB
ANION GAP SERPL CALCULATED.3IONS-SCNC: 8.1 MMOL/L (ref 5–15)
BASOPHILS # BLD AUTO: 0.03 10*3/MM3 (ref 0–0.2)
BASOPHILS NFR BLD AUTO: 0.4 % (ref 0–1.5)
BUN SERPL-MCNC: 13 MG/DL (ref 8–23)
BUN/CREAT SERPL: 18.6 (ref 7–25)
CALCIUM SPEC-SCNC: 9.1 MG/DL (ref 8.6–10.5)
CHLORIDE SERPL-SCNC: 103 MMOL/L (ref 98–107)
CO2 SERPL-SCNC: 28.9 MMOL/L (ref 22–29)
CREAT SERPL-MCNC: 0.7 MG/DL (ref 0.76–1.27)
DEPRECATED RDW RBC AUTO: 47.6 FL (ref 37–54)
EGFRCR SERPLBLD CKD-EPI 2021: 90.9 ML/MIN/1.73
EOSINOPHIL # BLD AUTO: 0.11 10*3/MM3 (ref 0–0.4)
EOSINOPHIL NFR BLD AUTO: 1.6 % (ref 0.3–6.2)
ERYTHROCYTE [DISTWIDTH] IN BLOOD BY AUTOMATED COUNT: 14.8 % (ref 12.3–15.4)
GLUCOSE SERPL-MCNC: 104 MG/DL (ref 65–99)
HCT VFR BLD AUTO: 42.3 % (ref 37.5–51)
HGB BLD-MCNC: 14 G/DL (ref 13–17.7)
IMM GRANULOCYTES # BLD AUTO: 0.03 10*3/MM3 (ref 0–0.05)
IMM GRANULOCYTES NFR BLD AUTO: 0.4 % (ref 0–0.5)
LYMPHOCYTES # BLD AUTO: 1.38 10*3/MM3 (ref 0.7–3.1)
LYMPHOCYTES NFR BLD AUTO: 19.5 % (ref 19.6–45.3)
MCH RBC QN AUTO: 29.2 PG (ref 26.6–33)
MCHC RBC AUTO-ENTMCNC: 33.1 G/DL (ref 31.5–35.7)
MCV RBC AUTO: 88.1 FL (ref 79–97)
MONOCYTES # BLD AUTO: 0.66 10*3/MM3 (ref 0.1–0.9)
MONOCYTES NFR BLD AUTO: 9.3 % (ref 5–12)
NEUTROPHILS NFR BLD AUTO: 4.85 10*3/MM3 (ref 1.7–7)
NEUTROPHILS NFR BLD AUTO: 68.8 % (ref 42.7–76)
NRBC BLD AUTO-RTO: 0 /100 WBC (ref 0–0.2)
PLATELET # BLD AUTO: 179 10*3/MM3 (ref 140–450)
PMV BLD AUTO: 9.7 FL (ref 6–12)
POTASSIUM SERPL-SCNC: 4.2 MMOL/L (ref 3.5–5.2)
RBC # BLD AUTO: 4.8 10*6/MM3 (ref 4.14–5.8)
SODIUM SERPL-SCNC: 140 MMOL/L (ref 136–145)
WBC NRBC COR # BLD: 7.06 10*3/MM3 (ref 3.4–10.8)

## 2023-01-13 PROCEDURE — 25010000002 CEFAZOLIN IN DEXTROSE 2-4 GM/100ML-% SOLUTION: Performed by: SURGERY

## 2023-01-13 PROCEDURE — 25010000002 MIDAZOLAM PER 1 MG: Performed by: SURGERY

## 2023-01-13 PROCEDURE — C1769 GUIDE WIRE: HCPCS | Performed by: SURGERY

## 2023-01-13 PROCEDURE — 80048 BASIC METABOLIC PNL TOTAL CA: CPT | Performed by: SURGERY

## 2023-01-13 PROCEDURE — C1894 INTRO/SHEATH, NON-LASER: HCPCS | Performed by: SURGERY

## 2023-01-13 PROCEDURE — 0 IOPAMIDOL PER 1 ML: Performed by: SURGERY

## 2023-01-13 PROCEDURE — C1887 CATHETER, GUIDING: HCPCS | Performed by: SURGERY

## 2023-01-13 PROCEDURE — 75710 ARTERY X-RAYS ARM/LEG: CPT | Performed by: SURGERY

## 2023-01-13 PROCEDURE — 25010000002 FENTANYL CITRATE (PF) 50 MCG/ML SOLUTION: Performed by: SURGERY

## 2023-01-13 PROCEDURE — 25010000002 PROTAMINE SULFATE PER 10 MG: Performed by: SURGERY

## 2023-01-13 PROCEDURE — 75625 CONTRAST EXAM ABDOMINL AORTA: CPT | Performed by: SURGERY

## 2023-01-13 PROCEDURE — 37224 PR REVSC OPN/PRG FEM/POP W/ANGIOPLASTY UNI: CPT | Performed by: SURGERY

## 2023-01-13 PROCEDURE — 99153 MOD SED SAME PHYS/QHP EA: CPT | Performed by: SURGERY

## 2023-01-13 PROCEDURE — 85025 COMPLETE CBC W/AUTO DIFF WBC: CPT | Performed by: SURGERY

## 2023-01-13 PROCEDURE — 99152 MOD SED SAME PHYS/QHP 5/>YRS: CPT | Performed by: SURGERY

## 2023-01-13 PROCEDURE — C1725 CATH, TRANSLUMIN NON-LASER: HCPCS

## 2023-01-13 RX ORDER — FENTANYL CITRATE 50 UG/ML
INJECTION, SOLUTION INTRAMUSCULAR; INTRAVENOUS
Status: DISCONTINUED | OUTPATIENT
Start: 2023-01-13 | End: 2023-01-13 | Stop reason: HOSPADM

## 2023-01-13 RX ORDER — PROTAMINE SULFATE 10 MG/ML
INJECTION, SOLUTION INTRAVENOUS
Status: DISCONTINUED | OUTPATIENT
Start: 2023-01-13 | End: 2023-01-13 | Stop reason: HOSPADM

## 2023-01-13 RX ORDER — MIDAZOLAM HYDROCHLORIDE 1 MG/ML
INJECTION INTRAMUSCULAR; INTRAVENOUS
Status: DISCONTINUED | OUTPATIENT
Start: 2023-01-13 | End: 2023-01-13 | Stop reason: HOSPADM

## 2023-01-13 RX ORDER — ENOXAPARIN SODIUM 100 MG/ML
40 INJECTION SUBCUTANEOUS DAILY
Status: DISCONTINUED | OUTPATIENT
Start: 2023-01-14 | End: 2023-01-13 | Stop reason: HOSPADM

## 2023-01-13 RX ORDER — CEFAZOLIN SODIUM 2 G/100ML
2 INJECTION, SOLUTION INTRAVENOUS ONCE
Status: COMPLETED | OUTPATIENT
Start: 2023-01-13 | End: 2023-01-13

## 2023-01-13 RX ORDER — SODIUM CHLORIDE 9 MG/ML
100 INJECTION, SOLUTION INTRAVENOUS CONTINUOUS
Status: DISCONTINUED | OUTPATIENT
Start: 2023-01-13 | End: 2023-01-13 | Stop reason: HOSPADM

## 2023-01-13 RX ORDER — FLUTICASONE FUROATE, UMECLIDINIUM BROMIDE AND VILANTEROL TRIFENATATE 100; 62.5; 25 UG/1; UG/1; UG/1
1 POWDER RESPIRATORY (INHALATION)
COMMUNITY

## 2023-01-13 RX ADMIN — CEFAZOLIN SODIUM 2 G: 2 INJECTION, SOLUTION INTRAVENOUS at 11:22

## 2023-01-13 NOTE — PROGRESS NOTES
Angiogram performed.  Diffuse irregularities with extensive calcifications throughout.  Significant calcification and plaque in the right common femoral artery.  Area of suspected mid right superficial femoral artery stenosis angioplastied.  For details find full report under chart review, cardiology tab.

## 2023-01-13 NOTE — Clinical Note
The left DP pulse is non-palpable. The right DP pulse is detected w/ doppler. The PT pulses are detected w/ doppler bilaterally.

## 2023-01-13 NOTE — NURSING NOTE
Patient to be discharged home with self-care.  Discharge instructions provided to patient.  Patient verbalized understanding of discharge instructions.  IV removed with tip intact.

## 2023-01-13 NOTE — Clinical Note
July 8,2021    Patient: Anant Beltre   YOB: 1977   Date of Visit: 07/8/2021         To Whom it May Concern:     This letter is being provided to support the medical necessity of Anant Beltre treatment of major depression with medications and an emotional support animal - mixed breed dog weighing 25 pounds.      Sabra Baumgarten has the diagnosis of major depression and has failed the following conservative treatment: medication.      If you have any questions or concerns, please don't hesitate to call.     Sincerely,            Gemini Vargas MD     Report was  verbally given at bedside. .

## 2023-01-18 ENCOUNTER — LAB (OUTPATIENT)
Dept: LAB | Facility: HOSPITAL | Age: 83
End: 2023-01-18
Payer: MEDICARE

## 2023-01-18 ENCOUNTER — TELEPHONE (OUTPATIENT)
Dept: UROLOGY | Facility: CLINIC | Age: 83
End: 2023-01-18
Payer: MEDICARE

## 2023-01-18 DIAGNOSIS — N40.0 BENIGN PROSTATIC HYPERPLASIA, UNSPECIFIED WHETHER LOWER URINARY TRACT SYMPTOMS PRESENT: ICD-10-CM

## 2023-01-18 LAB — PSA SERPL-MCNC: 8.07 NG/ML (ref 0–4)

## 2023-01-18 PROCEDURE — 84153 ASSAY OF PSA TOTAL: CPT

## 2023-01-18 PROCEDURE — 36415 COLL VENOUS BLD VENIPUNCTURE: CPT

## 2023-01-18 NOTE — TELEPHONE ENCOUNTER
Called pt to remind him to do his PSA prior to appt Friday 01/20. He said he will go to [facility] today or tomorrow. Order is in and active.

## 2023-01-19 PROBLEM — N40.1 BENIGN PROSTATIC HYPERPLASIA WITH LOWER URINARY TRACT SYMPTOMS: Status: ACTIVE | Noted: 2023-01-19

## 2023-01-19 NOTE — PROGRESS NOTES
Procedures       Urinalysis was checked today and was negative for signs of infection      Cytoscopy Procedure:     Procedure: Flexible cytoscope was passed per urethra into the bladder without difficulty after proper consent. The bladder was inspected in a systematic meridian fashion.     3 cm prostate      Large bladder with some minor trabeculations throughout.        There were no tumors, lesions, stones, or other abnormalities noted within the bladder. Of note, there was no increased vascularity as well. Both ureteral orifices were identified and were normal in appearance. The flexible cytoscope was removed. The patient tolerated the procedure well.       PLAN       1/23         8.0      PSA       Continue Flomax to 0.4 mg twice daily.    PSA is elevated we will going get MRI prostate have him follow-up after.  Discussed we do need to further work-up his elevated PSA before we go any further with work-up for benign BPH disease    Patient does need TURP we will have to his MRI shows first             This document has been electronically signed by Del Mai MD  January 19, 2023 06:41 EST

## 2023-01-20 ENCOUNTER — PROCEDURE VISIT (OUTPATIENT)
Dept: UROLOGY | Facility: CLINIC | Age: 83
End: 2023-01-20
Payer: MEDICARE

## 2023-01-20 DIAGNOSIS — N40.1 BENIGN PROSTATIC HYPERPLASIA WITH LOWER URINARY TRACT SYMPTOMS, SYMPTOM DETAILS UNSPECIFIED: Primary | ICD-10-CM

## 2023-01-20 DIAGNOSIS — R97.20 ELEVATED PROSTATE SPECIFIC ANTIGEN (PSA): ICD-10-CM

## 2023-01-20 DIAGNOSIS — N40.0 BENIGN PROSTATIC HYPERPLASIA, UNSPECIFIED WHETHER LOWER URINARY TRACT SYMPTOMS PRESENT: ICD-10-CM

## 2023-01-20 DIAGNOSIS — N39.0 RECURRENT URINARY TRACT INFECTION: ICD-10-CM

## 2023-01-20 PROCEDURE — 52000 CYSTOURETHROSCOPY: CPT | Performed by: UROLOGY

## 2023-01-30 ENCOUNTER — TELEPHONE (OUTPATIENT)
Dept: UROLOGY | Facility: CLINIC | Age: 83
End: 2023-01-30
Payer: MEDICARE

## 2023-01-30 NOTE — TELEPHONE ENCOUNTER
I called the patient and gave him the address, per [place], of [address] [place] [address], [place] [address].

## 2023-01-30 NOTE — TELEPHONE ENCOUNTER
Patient said he is scheduled for a prostate MRI on 02/20/23 at [facility].  He asked for the address.

## 2023-02-01 ENCOUNTER — OFFICE VISIT (OUTPATIENT)
Dept: VASCULAR SURGERY | Facility: HOSPITAL | Age: 83
End: 2023-02-01
Payer: MEDICARE

## 2023-02-01 VITALS
RESPIRATION RATE: 18 BRPM | HEART RATE: 77 BPM | SYSTOLIC BLOOD PRESSURE: 140 MMHG | TEMPERATURE: 97.4 F | DIASTOLIC BLOOD PRESSURE: 60 MMHG | OXYGEN SATURATION: 97 %

## 2023-02-01 DIAGNOSIS — I70.211 ATHEROSCLEROSIS OF NATIVE ARTERIES OF EXTREMITIES WITH INTERMITTENT CLAUDICATION, RIGHT LEG: ICD-10-CM

## 2023-02-01 DIAGNOSIS — I70.219 ATHEROSCLEROSIS OF LOWER EXTREMITY WITH CLAUDICATION: Primary | ICD-10-CM

## 2023-02-01 PROCEDURE — G0463 HOSPITAL OUTPT CLINIC VISIT: HCPCS | Performed by: SURGERY

## 2023-02-01 PROCEDURE — 99212 OFFICE O/P EST SF 10 MIN: CPT | Performed by: SURGERY

## 2023-02-01 NOTE — PROGRESS NOTES
[facility]   Follow up Office    Patient Name: Carlos Zimmerman  : 1940  MRN: [id number]  Primary Care Physician:  Payam Carroll MD      Subjective   Subjective     HPI:    Carlos Zimmerman is a 83 y.o. male here for follow-up after angiography with endovascular intervention 2023.  He is not sure if his walking is any better because due to the weather he has not done any significant walking.  No other complaints at this time.      Objective     Vitals:   Temp:  [97.4 °F (36.3 °C)] 97.4 °F (36.3 °C)  Heart Rate:  [77] 77  Resp:  [18] 18  BP: (140)/(60) 140/60    Physical Exam      General: Alert, no acute distress  Pulses: +2 right dorsalis pedis    Diagnostic studies: Angiography performed 2023 has been reviewed.  Area of suspected stenosis in the mid right superficial femoral artery angioplasty.  Modest change.    Assessment & Plan   Assessment / Plan     Diagnoses and all orders for this visit:    1. Atherosclerosis of lower extremity with claudication (HCC) (Primary)  -     Duplex Lower Extremity Art / Grafts - Right CAR; Future    2. Atherosclerosis of native arteries of extremities with intermittent claudication, right leg (HCC)  -     Duplex Lower Extremity Art / Grafts - Right CAR; Future       Assessment/Plan:   Doing well following endovascular intervention of the right lower extremity.  I would not recommend any further intervention.  Recommend follow-up with an arterial duplex of the right lower extremity in 3 months.        Electronically signed by Cuauhtemoc Thomas MD, 23, 1:13 PM EST.

## 2023-02-23 DIAGNOSIS — R97.20 ELEVATED PROSTATE SPECIFIC ANTIGEN (PSA): ICD-10-CM

## 2023-02-24 ENCOUNTER — HOSPITAL ENCOUNTER (OUTPATIENT)
Dept: CT IMAGING | Facility: HOSPITAL | Age: 83
Discharge: HOME | End: 2023-02-24
Admitting: INTERNAL MEDICINE
Payer: MEDICARE

## 2023-02-24 DIAGNOSIS — R91.1 SOLITARY LUNG NODULE: ICD-10-CM

## 2023-02-24 DIAGNOSIS — J98.4 LUNG DISEASE: ICD-10-CM

## 2023-02-24 PROCEDURE — 71250 CT THORAX DX C-: CPT

## 2023-02-27 ENCOUNTER — LAB (OUTPATIENT)
Dept: ONCOLOGY | Facility: HOSPITAL | Age: 83
End: 2023-02-27
Payer: MEDICARE

## 2023-02-27 DIAGNOSIS — D50.9 IRON DEFICIENCY ANEMIA, UNSPECIFIED IRON DEFICIENCY ANEMIA TYPE: ICD-10-CM

## 2023-02-27 LAB
BASOPHILS # BLD AUTO: 0.03 10*3/MM3 (ref 0–0.2)
BASOPHILS NFR BLD AUTO: 0.5 % (ref 0–1.5)
DEPRECATED RDW RBC AUTO: 45.5 FL (ref 37–54)
EOSINOPHIL # BLD AUTO: 0.18 10*3/MM3 (ref 0–0.4)
EOSINOPHIL NFR BLD AUTO: 2.7 % (ref 0.3–6.2)
ERYTHROCYTE [DISTWIDTH] IN BLOOD BY AUTOMATED COUNT: 13.9 % (ref 12.3–15.4)
FERRITIN SERPL-MCNC: 84.06 NG/ML (ref 30–400)
HCT VFR BLD AUTO: 41.7 % (ref 37.5–51)
HGB BLD-MCNC: 13.5 G/DL (ref 13–17.7)
IMM GRANULOCYTES # BLD AUTO: 0.01 10*3/MM3 (ref 0–0.05)
IMM GRANULOCYTES NFR BLD AUTO: 0.2 % (ref 0–0.5)
IRON 24H UR-MRATE: 114 MCG/DL (ref 59–158)
IRON SATN MFR SERPL: 29 % (ref 20–50)
LYMPHOCYTES # BLD AUTO: 1.68 10*3/MM3 (ref 0.7–3.1)
LYMPHOCYTES NFR BLD AUTO: 25.3 % (ref 19.6–45.3)
MCH RBC QN AUTO: 29 PG (ref 26.6–33)
MCHC RBC AUTO-ENTMCNC: 32.4 G/DL (ref 31.5–35.7)
MCV RBC AUTO: 89.5 FL (ref 79–97)
MONOCYTES # BLD AUTO: 0.56 10*3/MM3 (ref 0.1–0.9)
MONOCYTES NFR BLD AUTO: 8.4 % (ref 5–12)
NEUTROPHILS NFR BLD AUTO: 4.18 10*3/MM3 (ref 1.7–7)
NEUTROPHILS NFR BLD AUTO: 62.9 % (ref 42.7–76)
PLATELET # BLD AUTO: 174 10*3/MM3 (ref 140–450)
PMV BLD AUTO: 9.8 FL (ref 6–12)
RBC # BLD AUTO: 4.66 10*6/MM3 (ref 4.14–5.8)
TIBC SERPL-MCNC: 393 MCG/DL (ref 298–536)
TRANSFERRIN SERPL-MCNC: 264 MG/DL (ref 200–360)
WBC NRBC COR # BLD: 6.64 10*3/MM3 (ref 3.4–10.8)

## 2023-02-27 PROCEDURE — 36415 COLL VENOUS BLD VENIPUNCTURE: CPT

## 2023-02-27 PROCEDURE — 82728 ASSAY OF FERRITIN: CPT

## 2023-02-27 PROCEDURE — 83540 ASSAY OF IRON: CPT

## 2023-02-27 PROCEDURE — 84466 ASSAY OF TRANSFERRIN: CPT

## 2023-02-27 PROCEDURE — 85025 COMPLETE CBC W/AUTO DIFF WBC: CPT

## 2023-02-28 ENCOUNTER — OFFICE VISIT (OUTPATIENT)
Dept: ONCOLOGY | Facility: HOSPITAL | Age: 83
End: 2023-02-28
Payer: MEDICARE

## 2023-02-28 VITALS
OXYGEN SATURATION: 96 % | DIASTOLIC BLOOD PRESSURE: 71 MMHG | WEIGHT: 163.14 LBS | SYSTOLIC BLOOD PRESSURE: 130 MMHG | TEMPERATURE: 97.5 F | HEART RATE: 87 BPM | RESPIRATION RATE: 16 BRPM | BODY MASS INDEX: 25.55 KG/M2

## 2023-02-28 DIAGNOSIS — D50.9 IRON DEFICIENCY ANEMIA, UNSPECIFIED IRON DEFICIENCY ANEMIA TYPE: Primary | ICD-10-CM

## 2023-02-28 DIAGNOSIS — R97.20 ELEVATED PSA: ICD-10-CM

## 2023-02-28 PROCEDURE — 99214 OFFICE O/P EST MOD 30 MIN: CPT | Performed by: INTERNAL MEDICINE

## 2023-02-28 PROCEDURE — G0463 HOSPITAL OUTPT CLINIC VISIT: HCPCS

## 2023-02-28 RX ORDER — LOSARTAN POTASSIUM 50 MG/1
TABLET ORAL
COMMUNITY
Start: 2023-01-23

## 2023-02-28 RX ORDER — FERROUS SULFATE 325(65) MG
325 TABLET ORAL
Qty: 90 TABLET | Refills: 1 | Status: SHIPPED | OUTPATIENT
Start: 2023-02-28

## 2023-02-28 NOTE — PROGRESS NOTES
Chief Complaint  Lung cancer (HCC)    Payam Carroll MD Houk, Brandon Lyle, MD      Subjective          Carlos Zimmerman presents to [facility] for iron defieiency anemia    History of Present Illness     Mr. Carlos Zimmerman presents for follow up for iron deficiency anemia. History of prior left lung NSCLC with adenocarcinoma with left VATS procedure and prior chemotherapy per Dr. Saul. He follows with pulmonary for yearly low dose CT screening. 2/24/23 CT Chest with 3 mm nodule in right upper lobe      We are following now for iron deficiency anemia. He was started on oral iron 1 tab QD back in April 2022. He is tolerating med well.  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 Previously did Neurontin but then came off of med after neurology visit. Stool for OB was negative.  He reports he has good energy for his age.     2/27/23: Iron studies normal. CBC normal. No anemia.     He is following with Dr. Mai with urology for elevated PSA. MRI of prostate on 2/23/23 showed a small solitary suspicious lesion in the left posterior mid gland peripheral zone, PI-RADS 4. He reports he has follow up with Dr. Mai on Friday.     Cancer Staging  No matching staging information was found for the patient.     Treatment intent: curative    Oncology/Hematology History    No history exists.   This is a very pleasant 76-year-old gentleman who presents with follow-up for     lobectomy with adjuvant chemotherapy.            1) Left lung:  Pathology 5/2016: FNA: Poorly differentiated adenocarcinoma: 5.5     mm BONNIE nodule. 5/2016.       Left lung lobectomy: VATS procedure: Dr. Saul in [place].Path:multifocal     invasive moderately differentiated adenocarcinoma, acinar type, 2.0 x 1.6 x 1.4     cm. Additional nodule 0.6 x 0.5 x 0.3 cm: margins free of tumor, negative LN's.     Visceral pleural invasion noted.  (7/12/16). Staging: Stage IIB:  pT3N0.       Carboplatin / Paclitaxel x 4 cycles: 8/10/16 - 10/14/16.      CT CAP: No evidence of disease. 2/2017.       CT CAP: No evidence of disease. 12/11/17.       CT chest: Right lower base: improving consolidation. 2/18/19.       CT chest: T6 / T10 compression fractures: non-pathologic. (8/28/19)      CT chest in ER: likely pneumonia/inflammation. No new evidence of recurring     cancer: (4/4/20)      CT chest: Stable 1.5 cm subpleural ground glass opacity in the BONNIE, new patchy     areas on non-specific ground glass opacity throughout the LLL. (3/2021).            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                    Review of Systems   Constitutional: Positive for appetite change (decreased appetite) and unexpected weight loss. Negative for diaphoresis, fever and unexpected weight gain.   HENT: Negative for hearing loss, sore throat and voice change.    Eyes: Negative for blurred vision, double vision, pain, redness and visual disturbance.   Respiratory: Negative for cough, shortness of breath and wheezing.    Cardiovascular: Negative for chest pain, palpitations and leg swelling.   Endocrine: Negative for cold intolerance, heat intolerance, polydipsia and polyuria.   Genitourinary: Negative for decreased urine volume, difficulty urinating, frequency and urinary incontinence.   Musculoskeletal: Negative for arthralgias, back pain, joint swelling and myalgias.   Skin: Negative for color change, rash, skin lesions and wound.   Neurological: Negative for dizziness, seizures, numbness and headache.   Hematological: Negative for adenopathy. Does not bruise/bleed easily.   Psychiatric/Behavioral: Negative for depressed mood. The patient is not nervous/anxious.    All other systems reviewed and are negative.      Current Outpatient Medications on File Prior to Visit   Medication Sig  Dispense Refill   • albuterol sulfate  (90 Base) MCG/ACT inhaler Inhale 2 puffs Every 4 (Four) Hours As Needed for Wheezing or Shortness of Air. 54 g 3   • amLODIPine (NORVASC) 5 MG tablet Take 5 mg by mouth Daily.     • Aspirin Low Dose 81 MG EC tablet Take 81 mg by mouth Daily.     • cetirizine (zyrTEC) 10 MG tablet Take 10 mg by mouth Daily.     • Cholecalciferol 25 MCG (1000 UT) capsule Take 1,000 Units by mouth Daily.     • cilostazol (PLETAL) 100 MG tablet Take 100 mg by mouth 2 (Two) Times a Day.     • cyclobenzaprine (FLEXERIL) 10 MG tablet Take 10 mg by mouth 3 (Three) Times a Day As Needed for Muscle Spasms.     • ferrous sulfate 325 (65 FE) MG tablet Take 1 tablet by mouth Daily With Breakfast. 90 tablet 1   • Fluticasone-Umeclidin-Vilant (Trelegy Ellipta) 100-62.5-25 MCG/ACT inhaler Inhale 1 puff Daily.     • losartan (COZAAR) 50 MG tablet      • metoprolol succinate XL (TOPROL-XL) 25 MG 24 hr tablet Take 25 mg by mouth Daily.     • multivitamins-minerals (PRESERVISION AREDS 2) capsule capsule Take 1 capsule by mouth 2 (Two) Times a Day.     • pantoprazole (PROTONIX) 40 MG EC tablet Take 40 mg by mouth Daily.     • pravastatin (PRAVACHOL) 40 MG tablet Take 40 mg by mouth Every Night.     • Refresh Tears 0.5 % solution Administer 1 drop to both eyes Daily As Needed for Dry Eyes.     • tamsulosin (FLOMAX) 0.4 MG capsule 24 hr capsule Take 1 capsule by mouth 2 (Two) Times a Day. 180 capsule 4     No current facility-administered medications on file prior to visit.       No Known Allergies  Past Medical History:   Diagnosis Date   • Anemia    • Arthritis    • COPD (chronic obstructive pulmonary disease) (HCC)     INHALER  PRN   • Hyperlipidemia    • Hypertension     ON MEDS   • Lung cancer (HCC)    • Pneumonia     LEFT LUNG CA   • SOB (shortness of breath)    • Stroke (HCC)    • TIA (transient ischemic attack)     NO RESIDUAL 2010     Past Surgical History:   Procedure Laterality Date   • CARDIAC  CATHETERIZATION Left 11/10/2022    Procedure: Aortogram with left leg angiogram, possible angioplasty or stenting;  Surgeon: Cuauhtemoc Thomas MD;  Location: [facility] CATH INVASIVE LOCATION;  Service: Vascular;  Laterality: Left;   • CARDIAC CATHETERIZATION Right 2023    Procedure: Right leg angiogram, possible angioplasty or stenting;  Surgeon: Cuauhtemoc Thomas MD;  Location: [facility] CATH INVASIVE LOCATION;  Service: Vascular;  Laterality: Right;   • LUNG BIOPSY     • LUNG SURGERY      REMOVAL HALF OF UPPER PORTION LEFT LUNG   • ORTHOPEDIC SURGERY Left     ROTATOR CUFF   • THROAT SURGERY       Social History     Socioeconomic History   • Marital status:    Tobacco Use   • Smoking status: Former     Packs/day: 1.00     Years: 55.00     Pack years: 55.00     Types: Cigarettes     Quit date:      Years since quittin.   • Smokeless tobacco: Former   Vaping Use   • Vaping Use: Never used   Substance and Sexual Activity   • Alcohol use: Yes     Comment: 3-4 GIN TONICS/NIGHT   • Drug use: Never   • Sexual activity: Defer     Family History   Problem Relation Age of Onset   • Stomach cancer Other    • Cancer Mother      Immunization History   Administered Date(s) Administered   • COVID-19 (PFIZER) PURPLE CAP 2021, 2021, 10/18/2021   • Fluad Quad 65+ 10/07/2020, 2021   • Fluzone High-Dose 65+yrs 10/06/2022   • Influenza, Unspecified 2020   • TD Preservative Free 2022       Objective   Physical Exam  Vitals and nursing note reviewed.   Constitutional:       Appearance: Normal appearance. He is normal weight.   HENT:      Head: Normocephalic.      Nose: Nose normal.      Mouth/Throat:      Mouth: Mucous membranes are moist.   Cardiovascular:      Heart sounds: No murmur heard.  Pulmonary:      Effort: Pulmonary effort is normal. No respiratory distress.   Abdominal:      General: Bowel sounds are normal.      Palpations: Abdomen is soft.   Musculoskeletal:         General: Normal  range of motion.      Cervical back: Normal range of motion and neck supple.   Neurological:      General: No focal deficit present.      Mental Status: He is alert and oriented to person, place, and time.   Psychiatric:         Mood and Affect: Mood normal.         Behavior: Behavior normal.         Thought Content: Thought content normal.         Judgment: Judgment normal.         Vitals:    02/28/23 1059   BP: 130/71   Pulse: 87   Resp: 16   Temp: 97.5 °F (36.4 °C)   TempSrc: Temporal   SpO2: 96%   Weight: 74 kg (163 lb 2.3 oz)   PainSc: 0-No pain               ECOG: (0) Fully Active - Able to Carry On All Pre-disease Performance Without Restriction  Fall Risk Assessment was completed, and patient is at low risk for falls.  PHQ-9 Total Score:         The patient is  experiencing fatigue. Fatigue score: 1    PT/OT Functional Screening: PT fx screen: No needs identified  Speech Functional Screening: Speech fx screen: No needs identified  Rehab to be ordered: Rehab to be ordered: No needs identified        Result Review :   The following data was reviewed by: Juan Jose Alatorre MD on 02/28/23:  Lab Results   Component Value Date    HGB 13.5 02/27/2023    HCT 41.7 02/27/2023    MCV 89.5 02/27/2023     02/27/2023    WBC 6.64 02/27/2023    NEUTROABS 4.18 02/27/2023    LYMPHSABS 1.68 02/27/2023    MONOSABS 0.56 02/27/2023    EOSABS 0.18 02/27/2023    BASOSABS 0.03 02/27/2023     Lab Results   Component Value Date    GLUCOSE 104 (H) 01/13/2023    BUN 13 01/13/2023    CREATININE 0.70 (L) 01/13/2023     01/13/2023    K 4.2 01/13/2023     01/13/2023    CO2 28.9 01/13/2023    CALCIUM 9.1 01/13/2023    PROTEINTOT 6.1 12/13/2022    ALBUMIN 4.10 12/13/2022    BILITOT 0.7 12/13/2022    ALKPHOS 119 (H) 12/13/2022    AST 23 12/13/2022    ALT 21 12/13/2022     Labs personally reviewed. No anemia. Normal iron studies.    Recent CT Chest personally reviewed and per my read with small nodule in RUL. Otherwise  negative.     CT Chest Without Contrast Diagnostic    Result Date: 2/24/2023    1. The 3 mm nodular density along the major fissure within the right upper lobe.  Follow-up noncontrast CT scan of chest would be recommended in 6-12 months 2. Emphysema     RILEY CARLIN MD       Electronically Signed and Approved By: RILEY CARLIN MD on 2/24/2023 at 12:51             2/23/23 MRI prostate showed a small solitary suspicious lesion in the left posterior mid gland peripheral zone, PI-RADS 4.       Assessment and Plan    Diagnoses and all orders for this visit:    1. Iron deficiency anemia, unspecified iron deficiency anemia type (Primary)  -     CBC & Differential; Future  -     Iron Profile; Future  -     Ferritin; Future  -     ferrous sulfate 325 (65 FE) MG tablet; Take 1 tablet by mouth Daily With Breakfast.  Dispense: 90 tablet; Refill: 1    2. Elevated PSA      2/2023 CT chest reviewed. Small 3 mm nodule in RUL. Plan to repeat in 1 year. Due 2/2024.     For iron deficiency, iron labs normal and no anemia on labs. Continue oral iron daily. Script refilled today. Repeat labs in 6 months    Elevated PSA  Small concerning lesion on recent MRI. PSA less than 10. Patient follows with Dr. Mai with urology. Will defer management to him.       Patient Follow Up: 6 months with labs        Patient was given instructions and counseling regarding his condition or for health maintenance advice. Please see specific information pulled into the AVS if appropriate.

## 2023-03-01 NOTE — PROGRESS NOTES
Chief Complaint: Urologic complaint    Subjective         History of Present Illness  Carlos Zimmerman is a 83 y.o. male         Recurrent UTI  BPH  Elevated PSA      currently on Flomax 0.4 mg BID.  Not sure if it is helping.  No straining.  Nocturia x0.  Not bothered      1/23 cystoscopy-3 cm prostate large bladder with mild trabeculation, no pathology.        No cardiopulmonary history.  Non-smoker.  No anticoagulation.  History of lung cancer years ago treated with surgery      His family is late to be in their 90s.      7/22 renal ultrasound-normal  7/22 0.59, GFR 96    PVR    9/22  061    Urine cultures    7/22 Serratia-resistant to Ancef and nitrofurantoin  2/21 Serratia     No gross hematuria      No history of kidney  stone.    No urologic family history,   no history of urologic surgery.        PSA      2/20/2023 MRI prostate-49 g  PI - RADS 4 - left posterior mid gland peripheral zone.  1.0 x 0.7 cm.  Seminal vesicles, neurovascular bundle and lymph nodes negative  1/23   8.0                Objective     Past Medical History:   Diagnosis Date   • Anemia    • Arthritis    • COPD (chronic obstructive pulmonary disease) (HCC)     INHALER  PRN   • Hyperlipidemia    • Hypertension     ON MEDS   • Lung cancer (HCC)    • Pneumonia     LEFT LUNG CA   • SOB (shortness of breath)    • Stroke (HCC)    • TIA (transient ischemic attack)     NO RESIDUAL 2010       Past Surgical History:   Procedure Laterality Date   • CARDIAC CATHETERIZATION Left 11/10/2022    Procedure: Aortogram with left leg angiogram, possible angioplasty or stenting;  Surgeon: Cuauhtemoc Thomas MD;  Location: [facility] CATH INVASIVE LOCATION;  Service: Vascular;  Laterality: Left;   • CARDIAC CATHETERIZATION Right 1/13/2023    Procedure: Right leg angiogram, possible angioplasty or stenting;  Surgeon: Cuauhtemoc Thomas MD;  Location: [facility] CATH INVASIVE LOCATION;  Service: Vascular;  Laterality: Right;   • LUNG BIOPSY     • LUNG SURGERY      REMOVAL  HALF OF UPPER PORTION LEFT LUNG   • ORTHOPEDIC SURGERY Left     ROTATOR CUFF   • THROAT SURGERY           Current Outpatient Medications:   •  albuterol sulfate  (90 Base) MCG/ACT inhaler, Inhale 2 puffs Every 4 (Four) Hours As Needed for Wheezing or Shortness of Air., Disp: 54 g, Rfl: 3  •  amLODIPine (NORVASC) 5 MG tablet, Take 1 tablet by mouth Daily., Disp: , Rfl:   •  Aspirin Low Dose 81 MG EC tablet, Take 1 tablet by mouth Daily., Disp: , Rfl:   •  cetirizine (zyrTEC) 10 MG tablet, Take 1 tablet by mouth Daily., Disp: , Rfl:   •  Cholecalciferol 25 MCG (1000 UT) capsule, Take 1 capsule by mouth Daily., Disp: , Rfl:   •  cilostazol (PLETAL) 100 MG tablet, Take 1 tablet by mouth 2 (Two) Times a Day., Disp: , Rfl:   •  cyclobenzaprine (FLEXERIL) 10 MG tablet, Take 1 tablet by mouth 3 (Three) Times a Day As Needed for Muscle Spasms., Disp: , Rfl:   •  ferrous sulfate 325 (65 FE) MG tablet, Take 1 tablet by mouth Daily With Breakfast., Disp: 90 tablet, Rfl: 1  •  Fluticasone-Umeclidin-Vilant (Trelegy Ellipta) 100-62.5-25 MCG/ACT inhaler, Inhale 1 puff Daily., Disp: , Rfl:   •  losartan (COZAAR) 50 MG tablet, , Disp: , Rfl:   •  metoprolol succinate XL (TOPROL-XL) 25 MG 24 hr tablet, Take 1 tablet by mouth Daily., Disp: , Rfl:   •  multivitamins-minerals (PRESERVISION AREDS 2) capsule capsule, Take 1 capsule by mouth 2 (Two) Times a Day., Disp: , Rfl:   •  pantoprazole (PROTONIX) 40 MG EC tablet, Take 1 tablet by mouth Daily., Disp: , Rfl:   •  pravastatin (PRAVACHOL) 40 MG tablet, Take 1 tablet by mouth Every Night., Disp: , Rfl:   •  Refresh Tears 0.5 % solution, Administer 1 drop to both eyes Daily As Needed for Dry Eyes., Disp: , Rfl:   •  tamsulosin (FLOMAX) 0.4 MG capsule 24 hr capsule, Take 1 capsule by mouth 2 (Two) Times a Day., Disp: 180 capsule, Rfl: 4    No Known Allergies     Family History   Problem Relation Age of Onset   • Stomach cancer Other    • Cancer Mother        Social History      Socioeconomic History   • Marital status:    Tobacco Use   • Smoking status: Former     Packs/day: 1.00     Years: 55.00     Pack years: 55.00     Types: Cigarettes     Quit date:      Years since quittin.1   • Smokeless tobacco: Former   Vaping Use   • Vaping Use: Never used   Substance and Sexual Activity   • Alcohol use: Yes     Comment: 3-4 GIN TONICS/NIGHT   • Drug use: Never   • Sexual activity: Defer       Vital Signs:   There were no vitals taken for this visit.                 Assessment and Plan    Diagnoses and all orders for this visit:    1. Elevated PSA (Primary)    2. Benign prostatic hyperplasia with lower urinary tract symptoms, symptom details unspecified          Elevated PSA    Discussed patient's MRI today.  He does have a PI - RADS 4 lesion and a PSA of 8.  Discussed this with him because of his age and his lesion on MRI he does have a 50% or higher chance of this being a prostate cancer.  We discussed risk and benefits of prostate biopsy today.  We discussed his age and medical comorbidities.  At this time after discussion we are going to monitor this conservatively     Patient does not want biopsy and understands the risk.  We did discuss if he had a high risk prostate cancer and lived to be in his 90s that this could cause him problems with his health or death.    We also discussed watchful waiting/conservative monitoring of this lesion.       PSA in 6 months        BPH with recurrent UTIs      Cont  Flomax to 0.4 mg twice daily.       We were considering TURP but because we are in limbo with his prostate lesion at this time we will continue to monitor conservatively we will see if increasing his Flomax, decreases his risk of infections.  Patient is okay with not undergoing procedure at this time and would like conservative monitoring as long as he is doing okay.      Follow-up in 6 months    PVR follow-up

## 2023-03-03 ENCOUNTER — OFFICE VISIT (OUTPATIENT)
Dept: UROLOGY | Facility: CLINIC | Age: 83
End: 2023-03-03
Payer: MEDICARE

## 2023-03-03 VITALS — HEIGHT: 67 IN | RESPIRATION RATE: 14 BRPM | BODY MASS INDEX: 25.62 KG/M2 | WEIGHT: 163.2 LBS

## 2023-03-03 DIAGNOSIS — N39.0 RECURRENT URINARY TRACT INFECTION: ICD-10-CM

## 2023-03-03 DIAGNOSIS — N40.1 BENIGN PROSTATIC HYPERPLASIA WITH LOWER URINARY TRACT SYMPTOMS, SYMPTOM DETAILS UNSPECIFIED: ICD-10-CM

## 2023-03-03 DIAGNOSIS — R97.20 ELEVATED PSA: Primary | ICD-10-CM

## 2023-03-03 PROCEDURE — 99213 OFFICE O/P EST LOW 20 MIN: CPT | Performed by: UROLOGY

## 2023-05-03 ENCOUNTER — OFFICE VISIT (OUTPATIENT)
Dept: VASCULAR SURGERY | Facility: HOSPITAL | Age: 83
End: 2023-05-03
Payer: MEDICARE

## 2023-05-03 ENCOUNTER — HOSPITAL ENCOUNTER (OUTPATIENT)
Dept: CARDIOLOGY | Facility: HOSPITAL | Age: 83
Discharge: HOME | End: 2023-05-03
Payer: MEDICARE

## 2023-05-03 VITALS
HEART RATE: 81 BPM | SYSTOLIC BLOOD PRESSURE: 101 MMHG | RESPIRATION RATE: 18 BRPM | OXYGEN SATURATION: 97 % | TEMPERATURE: 97.6 F | DIASTOLIC BLOOD PRESSURE: 53 MMHG

## 2023-05-03 DIAGNOSIS — I73.9 PAD (PERIPHERAL ARTERY DISEASE): Primary | ICD-10-CM

## 2023-05-03 DIAGNOSIS — I70.219 ATHEROSCLEROSIS OF LOWER EXTREMITY WITH CLAUDICATION: ICD-10-CM

## 2023-05-03 DIAGNOSIS — I70.211 ATHEROSCLEROSIS OF NATIVE ARTERIES OF EXTREMITIES WITH INTERMITTENT CLAUDICATION, RIGHT LEG: ICD-10-CM

## 2023-05-03 LAB
BH CV GRAFT BRACHIAL PRESSURE LEFT: 101 MMHG
BH CV GRAFT BRACHIAL PRESSURE RIGHT: 124 MMHG
BH CV LEA LEFT DPA PRESSURE: 91 MMHG
BH CV LEA LEFT PTA PRESSURE: 99 MMHG
BH CV LEA RIGHT ANT TIBIAL A MID EDV: 0 CM/S
BH CV LEA RIGHT ANT TIBIAL A MID PSV: 26 CM/S
BH CV LEA RIGHT ANT TIBIAL A PROX EDV: 9 CM/S
BH CV LEA RIGHT ANT TIBIAL A PROX PSV: 47 CM/S
BH CV LEA RIGHT CFA DISTAL EDV: 13 CM/S
BH CV LEA RIGHT CFA DISTAL PSV: 105 CM/S
BH CV LEA RIGHT CFA PROX EDV: 9 CM/S
BH CV LEA RIGHT CFA PROX PSV: 118 CM/S
BH CV LEA RIGHT DFA PROX EDV: 10 CM/S
BH CV LEA RIGHT DFA PROX PSV: 119 CM/S
BH CV LEA RIGHT DPA PRESSURE: 87 MMHG
BH CV LEA RIGHT PERONEAL  DISTAL EDV: 0 CM/S
BH CV LEA RIGHT PERONEAL  DISTAL PSV: 35 CM/S
BH CV LEA RIGHT PERONEAL  MID EDV: 0 CM/S
BH CV LEA RIGHT PERONEAL  MID PSV: 40 CM/S
BH CV LEA RIGHT PERONEAL  PROX EDV: 0 CM/S
BH CV LEA RIGHT PERONEAL  PROX PSV: 44 CM/S
BH CV LEA RIGHT POPITEAL A  DISTAL EDV: 13 CM/S
BH CV LEA RIGHT POPITEAL A  DISTAL PSV: 82 CM/S
BH CV LEA RIGHT POPITEAL A  MID EDV: 0 CM/S
BH CV LEA RIGHT POPITEAL A  MID PSV: 101 CM/S
BH CV LEA RIGHT POPITEAL A  PROX EDV: 13 CM/S
BH CV LEA RIGHT POPITEAL A  PROX PSV: 80 CM/S
BH CV LEA RIGHT PTA DISTAL EDV: 0 CM/S
BH CV LEA RIGHT PTA DISTAL PSV: 31 CM/S
BH CV LEA RIGHT PTA MID EDV: 0 CM/S
BH CV LEA RIGHT PTA MID PSV: 44 CM/S
BH CV LEA RIGHT PTA PRESSURE: 108 MMHG
BH CV LEA RIGHT PTA PROX EDV: 1 CM/S
BH CV LEA RIGHT PTA PROX PSV: 56 CM/S
BH CV LEA RIGHT SFA DISTAL EDV: 0 CM/S
BH CV LEA RIGHT SFA DISTAL PSV: 110 CM/S
BH CV LEA RIGHT SFA MID EDV: 0 CM/S
BH CV LEA RIGHT SFA MID PSV: 87 CM/S
BH CV LEA RIGHT SFA PROX EDV: 2 CM/S
BH CV LEA RIGHT SFA PROX PSV: 125 CM/S
BH CV LEA RIGHT TIBEOPERONEAL EDV: 0 CM/S
BH CV LEA RIGHT TIBEOPERONEAL PSV: 49 CM/S
BH CV LOWER ARTERIAL LEFT ABI RATIO: 0.8
BH CV LOWER ARTERIAL RIGHT ABI RATIO: 0.9
MAXIMAL PREDICTED HEART RATE: 136 BPM
STRESS TARGET HR: 116 BPM

## 2023-05-03 PROCEDURE — 93926 LOWER EXTREMITY STUDY: CPT

## 2023-05-03 NOTE — PROGRESS NOTES
[facility]   Follow up Office    Patient Name: Carlos Zimmerman  : 1940  MRN: [id number]  Primary Care Physician:  Payam Carroll MD      Subjective   Subjective     HPI:    Carlos Zimmerman is a 83 y.o. male here for follow-up after endovascular intervention of the right lower extremity.  He feels no different.  No other complaints at this time.      Objective     Vitals:   Temp:  [97.6 °F (36.4 °C)] 97.6 °F (36.4 °C)  Heart Rate:  [81] 81  Resp:  [18] 18  BP: (101-124)/(53-66) 101/53    Physical Exam      General: Alert, no acute distress.  Extremities: Symmetric.    Diagnostic studies: An arterial duplex in the office today demonstrates a satisfactory NIKKIE of greater than 0.9 without any evidence of significant stenosis.  This is a verbal report as the study was not available on the PACS system nearly an hour after completed.    Assessment & Plan   Assessment / Plan     Diagnoses and all orders for this visit:    1. PAD (peripheral artery disease) (Primary)       Assessment/Plan:   Mr. Jackman demonstrates no significant change in his symptoms despite what appeared to be a satisfactory endovascular intervention Intra-Op and by follow-up noninvasive studies.  At this time I would recommend just regular follow-up from our standpoint with a repeat study in 1 year.  Recommend follow-up with his primary care provider to discuss other possible etiologies and management options.        Electronically signed by Cuauhtemoc Thomas MD, 23, 2:54 PM EDT.  
Skin normal color for race, warm, dry and intact. diffuse blanching erythematous rash on extremities

## 2023-05-15 NOTE — PROGRESS NOTES
Primary Care Provider  Payam Carroll MD   Referring Provider  No ref. provider found    Patient Complaint  Follow-up, COPD, and Lung Nodule      SUBJECTIVE    History of Presenting Illness  Carlos Zimmerman is a pleasant 83 y.o. male who presents to [facility] for 6-month follow-up.  Patient saw Dr. Choudhary 11/3/2022.  Patient has a history ofCOPD, iron deficiency anemia, history of Pseudomonas pneumonia in the past, tobacco abuse of cigarettes in remission for 10 years, elevated PSA, history of prior left lung NSCLC with adenocarcinoma with left VATS procedure and prior chemotherapy per Dr. Saul.  He stopped using Trelegy a while back.  Patient states he did not feel that it worked for him at all he did not notice any difference with Trelegy.  He states he only uses albuterol now maybe once or twice a week.  He does not feel the need to be on any maintenance inhalers at this time. He continues to be under the care of of hematology/oncology.  His last CT was February 2023 which showed a new 3 mm nodule in the right upper lobe with recommendation to repeat CT in 6 to 12 months.  He will be scheduled for a follow-up in February 2024.  He continues to be under the care of of Dr. Mai urologist for his elevated PSA.    Denies dyspnea, coughing, wheezing, headaches, chest pain, weight loss or hemoptysis. Denies fevers, chills and night sweats. Carlos Zimmerman is able to perform ADLs without difficulties and denies any swollen glands/lymph nodes in the head or neck.    I have personally reviewed the review of systems, past family, social, medical and surgical histories; and agree with their findings.    Review of Systems  Constitutional symptoms:  Denied complaints   Ear, nose, throat: Denied complaints  Cardiovascular:  Denied complaints  Respiratory: Denied complaints  Gastrointestinal: Denied complaints  Musculoskeletal: Denied complaints    Family History    Problem Relation Age of Onset   • Stomach cancer Other    • Cancer Mother         Social History     Socioeconomic History   • Marital status:    Tobacco Use   • Smoking status: Former     Packs/day: 1.00     Years: 55.00     Pack years: 55.00     Types: Cigarettes     Start date:      Quit date:      Years since quittin.3     Passive exposure: Past   • Smokeless tobacco: Former   Vaping Use   • Vaping Use: Never used   Substance and Sexual Activity   • Alcohol use: Yes     Comment: 3-4 GIN TONICS/NIGHT   • Drug use: Never   • Sexual activity: Defer        Past Medical History:   Diagnosis Date   • Anemia    • Arthritis    • COPD (chronic obstructive pulmonary disease)     INHALER  PRN   • Hyperlipidemia    • Hypertension     ON MEDS   • Lung cancer    • Pneumonia     LEFT LUNG CA   • SOB (shortness of breath)    • Stroke    • TIA (transient ischemic attack)     NO RESIDUAL         Immunization History   Administered Date(s) Administered   • COVID-19 (PFIZER) Purple Cap Monovalent 2021, 2021   • Fluad Quad 65+ 10/07/2020, 2021   • Fluzone High-Dose 65+yrs 10/06/2022   • Influenza, Unspecified 2020   • TD Preservative Free 2022       No Known Allergies       Current Outpatient Medications:   •  albuterol sulfate  (90 Base) MCG/ACT inhaler, Inhale 2 puffs Every 4 (Four) Hours As Needed for Wheezing or Shortness of Air., Disp: 54 g, Rfl: 3  •  amLODIPine (NORVASC) 5 MG tablet, Take 1 tablet by mouth Daily., Disp: , Rfl:   •  Aspirin Low Dose 81 MG EC tablet, Take 1 tablet by mouth Daily., Disp: , Rfl:   •  cetirizine (zyrTEC) 10 MG tablet, Take 1 tablet by mouth Daily., Disp: , Rfl:   •  Cholecalciferol 25 MCG (1000 UT) capsule, Take 1 capsule by mouth Daily., Disp: , Rfl:   •  cilostazol (PLETAL) 100 MG tablet, Take 1 tablet by mouth 2 (Two) Times a Day., Disp: , Rfl:   •  cyclobenzaprine (FLEXERIL) 10 MG tablet, Take 1 tablet by mouth 3 (Three) Times a  "Day As Needed for Muscle Spasms., Disp: , Rfl:   •  ferrous sulfate 325 (65 FE) MG tablet, Take 1 tablet by mouth Daily With Breakfast., Disp: 90 tablet, Rfl: 1  •  losartan (COZAAR) 50 MG tablet, , Disp: , Rfl:   •  metoprolol succinate XL (TOPROL-XL) 25 MG 24 hr tablet, Take 1 tablet by mouth Daily., Disp: , Rfl:   •  multivitamins-minerals (PRESERVISION AREDS 2) capsule capsule, Take 1 capsule by mouth 2 (Two) Times a Day., Disp: , Rfl:   •  pantoprazole (PROTONIX) 40 MG EC tablet, Take 1 tablet by mouth Daily., Disp: , Rfl:   •  pravastatin (PRAVACHOL) 40 MG tablet, Take 1 tablet by mouth Every Night., Disp: , Rfl:   •  Refresh Tears 0.5 % solution, Administer 1 drop to both eyes Daily As Needed for Dry Eyes., Disp: , Rfl:   •  tamsulosin (FLOMAX) 0.4 MG capsule 24 hr capsule, Take 1 capsule by mouth 2 (Two) Times a Day., Disp: 180 capsule, Rfl: 4  •  clopidogrel (PLAVIX) 75 MG tablet, Take 1 tablet by mouth. (Patient not taking: Reported on 5/18/2023), Disp: , Rfl:   •  Fluticasone-Umeclidin-Vilant (Trelegy Ellipta) 100-62.5-25 MCG/ACT inhaler, Inhale 1 puff Daily. (Patient not taking: Reported on 5/18/2023), Disp: , Rfl:            Vital Signs   /53 (BP Location: Left arm, Patient Position: Sitting, Cuff Size: Adult)   Pulse 99   Temp 97.8 °F (36.6 °C) (Tympanic)   Resp 18   Ht 170.2 cm (67\")   Wt 72.7 kg (160 lb 4.8 oz)   SpO2 100% Comment: room air  BMI 25.11 kg/m²       OBJECTIVE    Physical Exam  Vital Signs Reviewed   WDWN, Alert, NAD.    HEENT:  PERRL, EOMI.  OP, nares clear  Neck:  Supple, no JVD, no thyromegaly  Chest:  good aeration, clear to auscultation bilaterally, tympanic to percussion bilaterally, no work of breathing noted  CV: RRR, no MGR, pulses 2+, equal.  Abd:  Soft, NT, ND, + BS, no HSM  EXT:  no clubbing, no cyanosis, no edema  Neuro:  A&Ox3, CN grossly intact, no focal deficits.  Skin: No rashes or lesions noted    Results Review  I have personally reviewed the prior office " notes, hospital records, labs, and diagnostics.  CT Chest Without Contrast Diagnostic [[id number]] (Order [id number])  Order  Status: Final result     Appointment Information    PACS Images     Radiology Images  Study Result    Narrative & Impression   PROCEDURE:  CT CHEST WO CONTRAST DIAGNOSTIC     COMPARISON: [facility], CT, CT CHEST WO CONTRAST DIAGNOSTIC, 7/25/2022, 7:37.    [facility], CT, CT ANGIO ABDOMINAL AORTA BILAT ILIOFEM RUNOFF, 10/19/2022, 8:40.     INDICATIONS:  FOLLOW UP LUNG CA.     TECHNIQUE:    CT images were created without the administration of contrast material.       PROTOCOL:     Standard imaging protocol performed                 RADIATION:      DLP: 312.4mGy*cm               Automated exposure control was utilized to minimize radiation dose.      FINDINGS:          There is no mediastinal, hilar, axillary adenopathy.  There is a large amount of coronary artery   calcification.  There are no pleural effusions.  There is emphysematous change of the lungs.  There   is linear atelectasis or scarring within both lung bases.  There is right middle lobe atelectasis   which is unchanged.  Patient is status post left upper lobectomy.  There is a subpleural bulla   within the superior segment of the left lower lobe which is unchanged.  There is a new 3 mm nodular   density along the major fissure within the right upper lobe.  There are no suspicious noncalcified   pulmonary nodules.     Bilateral adrenal glands are within normal limits.  Other visualized portions of the upper abdomen   are unremarkable.  Again seen are old compression fractures at the T7 and T11 levels.  No   suspicious lytic or sclerotic bony lesion identified.     IMPRESSION:                 1. The 3 mm nodular density along the major fissure within the right upper lobe.  Follow-up   noncontrast CT scan of chest would be recommended in 6-12 months  2. Emphysema            RILEY CARLIN MD          Electronically Signed and Approved By: RILEY CARLIN MD on 2/24/2023 at 12:51         ASSESSMENT         Patient Active Problem List   Diagnosis   • Cancer   • COPD (chronic obstructive pulmonary disease)   • Hypertension   • Low back pain   • Lung cancer   • Lung disease   • Solitary lung nodule   • Pneumonia of right lung due to infectious organism   • Hematuria   • Complicated UTI (urinary tract infection)   • Recurrent urinary tract infection   • Benign prostatic hyperplasia   • Atherosclerosis of lower extremity with claudication   • Benign prostatic hyperplasia with lower urinary tract symptoms   • Elevated PSA       Encounter Diagnoses   Name Primary?   • Pulmonary emphysema, unspecified emphysema type Yes   • Malignant neoplasm of lower lobe of left lung    • Lung nodules    • Dyspnea on exertion    • Chronic obstructive pulmonary disease, unspecified COPD type       PLAN  Refill on albuterol inhaler sent to his pharmacy.  Patient will have a follow-up CT in February 2024.  Patient to follow-up in 6 months or sooner if needed.  Diagnoses and all orders for this visit:    1. Pulmonary emphysema, unspecified emphysema type (Primary)  -     CT Chest Without Contrast; Future    2. Malignant neoplasm of lower lobe of left lung  -     CT Chest Without Contrast; Future    3. Lung nodules  -     CT Chest Without Contrast; Future    4. Dyspnea on exertion  -     CT Chest Without Contrast; Future    5. Chronic obstructive pulmonary disease, unspecified COPD type  -     albuterol sulfate  (90 Base) MCG/ACT inhaler; Inhale 2 puffs Every 4 (Four) Hours As Needed for Wheezing or Shortness of Air.  Dispense: 54 g; Refill: 3           Smoking status: Former  Vaccination status: Up-to-date  Medications personally reviewed    Follow Up  Return in about 6 months (around 11/18/2023) for with dr. banegas.    Patient was given instructions and counseling regarding his condition or for health maintenance advice. Please see  specific information pulled into the AVS if appropriate.

## 2023-05-18 ENCOUNTER — OFFICE VISIT (OUTPATIENT)
Dept: PULMONOLOGY | Facility: CLINIC | Age: 83
End: 2023-05-18
Payer: MEDICARE

## 2023-05-18 VITALS
BODY MASS INDEX: 25.16 KG/M2 | TEMPERATURE: 97.8 F | SYSTOLIC BLOOD PRESSURE: 109 MMHG | DIASTOLIC BLOOD PRESSURE: 53 MMHG | HEIGHT: 67 IN | OXYGEN SATURATION: 100 % | RESPIRATION RATE: 18 BRPM | WEIGHT: 160.3 LBS | HEART RATE: 99 BPM

## 2023-05-18 DIAGNOSIS — J43.9 PULMONARY EMPHYSEMA, UNSPECIFIED EMPHYSEMA TYPE: Primary | ICD-10-CM

## 2023-05-18 DIAGNOSIS — C34.32 MALIGNANT NEOPLASM OF LOWER LOBE OF LEFT LUNG: ICD-10-CM

## 2023-05-18 DIAGNOSIS — J44.9 CHRONIC OBSTRUCTIVE PULMONARY DISEASE, UNSPECIFIED COPD TYPE: ICD-10-CM

## 2023-05-18 DIAGNOSIS — R06.09 DYSPNEA ON EXERTION: ICD-10-CM

## 2023-05-18 DIAGNOSIS — R91.8 LUNG NODULES: ICD-10-CM

## 2023-05-18 RX ORDER — CLOPIDOGREL BISULFATE 75 MG/1
75 TABLET ORAL
COMMUNITY
Start: 2023-03-15

## 2023-05-18 RX ORDER — ALBUTEROL SULFATE 90 UG/1
2 AEROSOL, METERED RESPIRATORY (INHALATION) EVERY 4 HOURS PRN
Qty: 54 G | Refills: 3 | Status: SHIPPED | OUTPATIENT
Start: 2023-05-18

## 2023-06-12 ENCOUNTER — HOSPITAL ENCOUNTER (OUTPATIENT)
Dept: GENERAL RADIOLOGY | Facility: HOSPITAL | Age: 83
Discharge: HOME | End: 2023-06-12
Admitting: INTERNAL MEDICINE
Payer: MEDICARE

## 2023-06-12 ENCOUNTER — TRANSCRIBE ORDERS (OUTPATIENT)
Dept: GENERAL RADIOLOGY | Facility: HOSPITAL | Age: 83
End: 2023-06-12
Payer: MEDICARE

## 2023-06-12 DIAGNOSIS — M51.36 DDD (DEGENERATIVE DISC DISEASE), LUMBAR: ICD-10-CM

## 2023-06-12 DIAGNOSIS — M51.36 DDD (DEGENERATIVE DISC DISEASE), LUMBAR: Primary | ICD-10-CM

## 2023-06-12 PROCEDURE — 72114 X-RAY EXAM L-S SPINE BENDING: CPT

## 2023-07-25 ENCOUNTER — APPOINTMENT (OUTPATIENT)
Dept: CT IMAGING | Facility: HOSPITAL | Age: 83
End: 2023-07-25
Payer: MEDICARE

## 2023-07-25 ENCOUNTER — HOSPITAL ENCOUNTER (EMERGENCY)
Facility: HOSPITAL | Age: 83
Discharge: HOME | End: 2023-07-25
Attending: EMERGENCY MEDICINE | Admitting: EMERGENCY MEDICINE
Payer: MEDICARE

## 2023-07-25 VITALS
HEIGHT: 67 IN | WEIGHT: 157.85 LBS | RESPIRATION RATE: 16 BRPM | SYSTOLIC BLOOD PRESSURE: 127 MMHG | BODY MASS INDEX: 24.78 KG/M2 | OXYGEN SATURATION: 95 % | HEART RATE: 82 BPM | TEMPERATURE: 97.9 F | DIASTOLIC BLOOD PRESSURE: 87 MMHG

## 2023-07-25 DIAGNOSIS — R42 VERTIGO: ICD-10-CM

## 2023-07-25 DIAGNOSIS — H91.92 HEARING LOSS OF LEFT EAR, UNSPECIFIED HEARING LOSS TYPE: ICD-10-CM

## 2023-07-25 DIAGNOSIS — R42 DIZZINESS: Primary | ICD-10-CM

## 2023-07-25 PROCEDURE — 99283 EMERGENCY DEPT VISIT LOW MDM: CPT

## 2023-07-25 PROCEDURE — 70450 CT HEAD/BRAIN W/O DYE: CPT

## 2023-07-25 RX ORDER — MECLIZINE HYDROCHLORIDE 25 MG/1
25 TABLET ORAL 3 TIMES DAILY PRN
Qty: 15 TABLET | Refills: 0 | Status: SHIPPED | OUTPATIENT
Start: 2023-07-25

## 2023-07-25 RX ORDER — MECLIZINE HYDROCHLORIDE 25 MG/1
25 TABLET ORAL ONCE
Status: COMPLETED | OUTPATIENT
Start: 2023-07-25 | End: 2023-07-25

## 2023-07-25 RX ADMIN — MECLIZINE HYDROCHLORIDE 25 MG: 25 TABLET ORAL at 13:46

## 2023-07-25 NOTE — DISCHARGE INSTRUCTIONS
Drink plenty of fluids.  Take medication as directed.  Follow-up with Dr. Rosas's office on August 3rd.

## 2023-07-25 NOTE — ED PROVIDER NOTES
"Time: 11:38 AM EDT  Date of encounter:  7/25/2023  Independent Historian/Clinical History and Information was obtained by:   Patient    History is limited by: N/A    Chief Complaint: Left ear pain      History of Present Illness:  Patient is a 83 y.o. year old male who presents to the emergency department for evaluation of \"I got an ear infection.\"  Patient complains of left ear pain and decreased hearing x2 weeks.  States he has seen 4 different doctors over the past 2 weeks and he is not any better.  States was initially prescribed amoxicillin and prednisone and then switched to Augmentin and nasal spray.  States his ear feels full when he cannot hear out of that ear.  States he has had difficulty with his balance since this started and he is requesting a CT of his head to make sure nothing else is going on.        Patient Care Team  Primary Care Provider: Payam Carroll MD    Past Medical History:     No Known Allergies  Past Medical History:   Diagnosis Date    Anemia     Arthritis     COPD (chronic obstructive pulmonary disease)     INHALER  PRN    Hyperlipidemia     Hypertension     ON MEDS    Lung cancer     Pneumonia     LEFT LUNG CA    SOB (shortness of breath)     Stroke     TIA (transient ischemic attack)     NO RESIDUAL 2010     Past Surgical History:   Procedure Laterality Date    CARDIAC CATHETERIZATION Left 11/10/2022    Procedure: Aortogram with left leg angiogram, possible angioplasty or stenting;  Surgeon: Cuauhtemoc Thomas MD;  Location: [facility] CATH INVASIVE LOCATION;  Service: Vascular;  Laterality: Left;    CARDIAC CATHETERIZATION Right 1/13/2023    Procedure: Right leg angiogram, possible angioplasty or stenting;  Surgeon: Cuauhtemoc Thomas MD;  Location: [facility] CATH INVASIVE LOCATION;  Service: Vascular;  Laterality: Right;    LUNG BIOPSY      LUNG SURGERY      REMOVAL HALF OF UPPER PORTION LEFT LUNG    ORTHOPEDIC SURGERY Left     ROTATOR CUFF    THROAT SURGERY       Family History   Problem " Relation Age of Onset    Stomach cancer Other     Cancer Mother        Home Medications:  Prior to Admission medications    Medication Sig Start Date End Date Taking? Authorizing Provider   albuterol sulfate  (90 Base) MCG/ACT inhaler Inhale 2 puffs Every 4 (Four) Hours As Needed for Wheezing or Shortness of Air. 5/18/23  Yes Gely Canales APRN   amLODIPine (NORVASC) 5 MG tablet Take 1 tablet by mouth Daily.   Yes Maxine Espinoza MD   amoxicillin-clavulanate (AUGMENTIN) 875-125 MG per tablet Take 1 tablet by mouth 2 (Two) Times a Day for 10 days. 7/20/23 7/30/23 Yes Cooksey, John Calvin, PA-C   Aspirin Low Dose 81 MG EC tablet Take 1 tablet by mouth Daily. 4/19/21  Yes Maxine Espinoza MD   Azelastine HCl 137 MCG/SPRAY solution 2 sprays into the nostril(s) as directed by provider 2 (Two) Times a Day As Needed (CONGESTION, SINUS PAIN) for up to 30 days. 7/22/23 8/21/23 Yes Vandana Marin MD   cetirizine (zyrTEC) 10 MG tablet Take 1 tablet by mouth Daily.   Yes Maxine Espinoza MD   Cholecalciferol 25 MCG (1000 UT) capsule Take 1 capsule by mouth Daily.   Yes Maxine Espinoza MD   cilostazol (PLETAL) 100 MG tablet Take 1 tablet by mouth 2 (Two) Times a Day. 5/24/21  Yes Maxine Espinoza MD   cyclobenzaprine (FLEXERIL) 10 MG tablet Take 1 tablet by mouth 3 (Three) Times a Day As Needed for Muscle Spasms.   Yes Maxine Espinoza MD   ferrous sulfate 325 (65 FE) MG tablet Take 1 tablet by mouth Daily With Breakfast. 2/28/23  Yes Juan Jose Alatorre MD   metoprolol succinate XL (TOPROL-XL) 25 MG 24 hr tablet Take 1 tablet by mouth Daily.   Yes Maxine Espinoza MD   multivitamins-minerals (PRESERVISION AREDS 2) capsule capsule Take 1 capsule by mouth 2 (Two) Times a Day. 7/26/21  Yes Maxine Espinoza MD   pantoprazole (PROTONIX) 40 MG EC tablet Take 1 tablet by mouth Daily. 4/19/21  Yes Maxine Espinoza MD   pravastatin (PRAVACHOL) 40 MG tablet Take 1 tablet by  "mouth Every Night. 22  Yes Maxine Espinoza MD   Refresh Tears 0.5 % solution Administer 1 drop to both eyes Daily As Needed for Dry Eyes. 22  Yes Maxine Espinoza MD   tamsulosin (FLOMAX) 0.4 MG capsule 24 hr capsule Take 1 capsule by mouth 2 (Two) Times a Day. 22  Yes Del Mai MD        Social History:   Social History     Tobacco Use    Smoking status: Former     Packs/day: 1.00     Years: 55.00     Pack years: 55.00     Types: Cigarettes     Start date:      Quit date:      Years since quittin.5     Passive exposure: Past    Smokeless tobacco: Former   Vaping Use    Vaping Use: Never used   Substance Use Topics    Alcohol use: Not Currently     Comment: 3-4 GIN TONICS/NIGHT    Drug use: Never         Review of Systems:  Review of Systems   Constitutional:  Negative for chills and fever.   HENT:  Positive for hearing loss. Negative for congestion and sore throat.    Eyes:  Negative for pain.   Respiratory:  Negative for cough, chest tightness and shortness of breath.    Cardiovascular:  Negative for chest pain.   Gastrointestinal:  Negative for abdominal pain, diarrhea, nausea and vomiting.   Genitourinary:  Negative for flank pain and hematuria.   Musculoskeletal:  Negative for joint swelling.   Skin:  Negative for pallor.   Neurological:  Positive for dizziness. Negative for seizures and headaches.   Hematological: Negative.    Psychiatric/Behavioral: Negative.     All other systems reviewed and are negative.     Physical Exam:  /87   Pulse 82   Temp 97.9 °F (36.6 °C) (Oral)   Resp 16   Ht 170.2 cm (67.01\")   Wt 71.6 kg (157 lb 13.6 oz)   SpO2 95%   BMI 24.72 kg/m²     Physical Exam  Vitals and nursing note reviewed.   Constitutional:       General: He is not in acute distress.     Appearance: Normal appearance. He is not toxic-appearing.   HENT:      Head: Normocephalic and atraumatic.      Left Ear: A middle ear effusion is present.      Ears:      " Comments: Serous fluid only     Mouth/Throat:      Mouth: Mucous membranes are moist.   Eyes:      General: No scleral icterus.     Extraocular Movements: Extraocular movements intact.      Conjunctiva/sclera: Conjunctivae normal.      Pupils: Pupils are equal, round, and reactive to light.   Cardiovascular:      Rate and Rhythm: Normal rate and regular rhythm.      Pulses: Normal pulses.      Heart sounds: Normal heart sounds.   Pulmonary:      Effort: Pulmonary effort is normal. No respiratory distress.      Breath sounds: Normal breath sounds.   Abdominal:      General: Abdomen is flat.      Palpations: Abdomen is soft.      Tenderness: There is no abdominal tenderness.   Musculoskeletal:         General: Normal range of motion.      Cervical back: Normal range of motion and neck supple.   Skin:     General: Skin is warm and dry.   Neurological:      General: No focal deficit present.      Mental Status: He is alert and oriented to person, place, and time. Mental status is at baseline.      Comments: Patient has a NIH stroke score of 0 and cerebellar function is normal.   Psychiatric:         Mood and Affect: Mood normal.         Behavior: Behavior normal.         Thought Content: Thought content normal.         Judgment: Judgment normal.              Procedures:  Procedures      Medical Decision Making:      Comorbidities that affect care:    Hypertension    External Notes reviewed:    Previous Clinic Note: for similar symptoms      The following orders were placed and all results were independently analyzed by me:  Orders Placed This Encounter   Procedures    CT Head Without Contrast       Medications Given in the Emergency Department:  Medications   meclizine (ANTIVERT) tablet 25 mg (25 mg Oral Given 7/25/23 1346)        ED Course:         Labs:    Results for orders placed or performed during the hospital encounter of 05/03/23   Duplex Lower Extremity Art / Grafts - Right CAR   Result Value Ref Range     Target HR (85%) 116 bpm    Max. Pred. HR (100%) 136 bpm    CFA Prox PSV-Right 118.00 cm/s    CFA Prox EDV-Right 9.00 cm/s    CFA Distal PSV-Right 105.00 cm/s    CFA Distal EDV-Right 13.00 cm/s    DFA Prox PSV-Right 119.00 cm/s    DFA Prox EDV-Right 10.00 cm/s    SFA Prox PSV-Right 125.00 cm/s    SFA Prox EDV-Right 2.00 cm/s    SFA Mid PSV-Right 87.00 cm/s    SFA Mid EDV-Right 0.00 cm/s    SFA Distal PSV-Right 110.00 cm/s    SFA Distal EDV-Right 0.00 cm/s    Popiteal A Prox PSV-Right 80.00 cm/s    Popiteal A Prox EDV-Right 13.00 cm/s    Popiteal A Mid PSV-Right 101.00 cm/s    Popiteal A Mid EDV-Right 0.00 cm/s    Popiteal A Distal PSV-Right 82.00 cm/s    Popiteal A Distal EDV-Right 13.00 cm/s    Ant Tibial A Prox PSV-Right 47.00 cm/s    Ant Tibial A Prox EDV-Right 9.00 cm/s    Ant Tibial A Mid PSV-Right 26.00 cm/s    Ant Tibial A Mid EDV-Right 0.00 cm/s    Rt. Tibeoperoneal PSV 49.00 cm/s    Rt Tibeoperoneal EDV 0.00 cm/s    PTA Prox PSV-Right 56.00 cm/s    PTA Prox EDV-Right 1.00 cm/s    PTA Mid PSV-Right 44.00 cm/s    PTA Mid EDV-Right 0.00 cm/s    PTA Distal PSV-Right 31.00 cm/s    PTA Distal EDV-Right 0.00 cm/s    Peroneal Prox PSV-Right 44.00 cm/s    Peroneal Prox EDV-Right 0.00 cm/s    Peroneal Mid PSV-Right 40.00 cm/s    Peroneal Mid EDV-Right 0.00 cm/s    Peroneal Distal PSV-Right 35.00 cm/s    Peroneal Distal EDV-Right 0.00 cm/s    Right Brachial Pressure 124 mmHg    Left Brachial Pressure 101 mmHg    RIGHT PTA PRESSURE 108 mmHg    LEFT PTA PRESSURE 99 mmHg    RIGHT DPA PRESSURE 87 mmHg    LEFT DPA PRESSURE 91 mmHg    RIGHT NIKKIE RATIO 0.9     LEFT NIKKIE RATIO 0.8          Lab Results (last 24 hours)       ** No results found for the last 24 hours. **             Imaging:    CT Head Without Contrast    Result Date: 7/25/2023  Narrative: PROCEDURE: CT HEAD WO CONTRAST  COMPARISON:  [facility], CT, HEAD W/O CONTRAST, 5/27/2012, 17:53.  INDICATIONS: headache, dizziness, left earache  PROTOCOL:    Standard imaging protocol performed    RADIATION:   DLP: 1081.2 mGy*cm   MA and/or KV was adjusted to minimize radiation dose.    TECHNIQUE: CT images were obtained without non-ionic intravenous contrast material.  FINDINGS:  The ventricles, sulci, and cerebellar folia are moderately and diffusely prominent consistent with atrophy.  Ill-defined diminished density in cerebral white matter is consistent with marked gliosis and/or numerous old lacunar infarcts.  Small areas of encephalomalacia adjacent to the head of the caudate nucleus on the left are consistent with old infarcts, and were evident on the prior CT scan.  There is no CT evidence of acute intracranial hemorrhage, mass, or mass effect.  The orbits have a normal appearance.  The paranasal sinuses, middle ears, and mastoid air cells are well aerated.      Impression:   CT scan of the head without IV contrast demonstrating diffuse atrophy and white matter changes.  No acute intracranial abnormality is seen.      CHELI SEBASTIAN MD       Electronically Signed and Approved By: CHELI SEBASTIAN MD on 7/25/2023 at 13:47                CT Head Without Contrast    Result Date: 7/25/2023  PROCEDURE: CT HEAD WO CONTRAST  COMPARISON:  [facility], CT, HEAD W/O CONTRAST, 5/27/2012, 17:53.  INDICATIONS: headache, dizziness, left earache  PROTOCOL:   Standard imaging protocol performed    RADIATION:   DLP: 1081.2 mGy*cm   MA and/or KV was adjusted to minimize radiation dose.    TECHNIQUE: CT images were obtained without non-ionic intravenous contrast material.  FINDINGS:  The ventricles, sulci, and cerebellar folia are moderately and diffusely prominent consistent with atrophy.  Ill-defined diminished density in cerebral white matter is consistent with marked gliosis and/or numerous old lacunar infarcts.  Small areas of encephalomalacia adjacent to the head of the caudate nucleus on the left are consistent with old infarcts, and were evident on the prior CT  scan.  There is no CT evidence of acute intracranial hemorrhage, mass, or mass effect.  The orbits have a normal appearance.  The paranasal sinuses, middle ears, and mastoid air cells are well aerated.        CT scan of the head without IV contrast demonstrating diffuse atrophy and white matter changes.  No acute intracranial abnormality is seen.      CHELI SEBASTIAN MD       Electronically Signed and Approved By: CHELI SEBASTIAN MD on 7/25/2023 at 13:47                Differential Diagnosis and Discussion:    Dizziness: Based on the patient's history, signs, and symptoms, the diffential diagnosis includes but is not limited to meningitis, stroke, sepsis, subarachnoid hemorrhage, intracranial bleeding, encephalitis, vertigo, electrolyte imbalance, and metabolic disorders.        MDM     Amount and/or Complexity of Data Reviewed  Tests in the radiology section of CPT®: reviewed             Patient Care Considerations:    MRI: I considered ordering an MRI however the patient's symptoms dictate CT first      Consultants/Shared Management Plan:    Consultant: I have discussed the case with Dr Rosas's office staff to establish earlier appointment who states the patient can be seen next week    Social Determinants of Health:    Patient is independent, reliable, and has access to care.       Disposition and Care Coordination:    Discharged: I considered escalation of care by admitting this patient to the hospital, however the patient has improved and is suitable and stable for discharge.    I have explained discharge medications and the need for follow up with the patient/caretakers. This was also printed in the discharge instructions. Patient was discharged with the following medications and follow up:      Medication List        New Prescriptions      meclizine 25 MG tablet  Commonly known as: ANTIVERT  Take 1 tablet by mouth 3 (Three) Times a Day As Needed for Dizziness.               Where to Get Your Medications         These medications were sent to [facility] - [place], [place] - [address] - [phone]  - [phone] FX  [address] [address]      Phone: [phone]   meclizine 25 MG tablet      Ervin Rosas MD  [address]  [address]  [place] [address]  [phone]    On 8/3/2023  At 1 PM       Final diagnoses:   Dizziness   Vertigo   Hearing loss of left ear, unspecified hearing loss type        ED Disposition       ED Disposition   Discharge    Condition   Stable    Comment   --               This medical record created using voice recognition software.             Rashid See,   07/26/23 1153

## 2023-07-27 ENCOUNTER — HOSPITAL ENCOUNTER (OUTPATIENT)
Dept: MRI IMAGING | Facility: HOSPITAL | Age: 83
Discharge: HOME | End: 2023-07-27
Admitting: INTERNAL MEDICINE
Payer: MEDICARE

## 2023-07-27 DIAGNOSIS — M51.36 DDD (DEGENERATIVE DISC DISEASE), LUMBAR: ICD-10-CM

## 2023-07-27 PROCEDURE — 72148 MRI LUMBAR SPINE W/O DYE: CPT

## 2023-08-03 ENCOUNTER — PROCEDURE VISIT (OUTPATIENT)
Dept: OTOLARYNGOLOGY | Facility: CLINIC | Age: 83
End: 2023-08-03
Payer: MEDICARE

## 2023-08-03 ENCOUNTER — OFFICE VISIT (OUTPATIENT)
Dept: OTOLARYNGOLOGY | Facility: CLINIC | Age: 83
End: 2023-08-03
Payer: MEDICARE

## 2023-08-03 VITALS — TEMPERATURE: 97.8 F | BODY MASS INDEX: 24.71 KG/M2 | HEIGHT: 67 IN | WEIGHT: 157.4 LBS

## 2023-08-03 DIAGNOSIS — H90.3 ASYMMETRICAL SENSORINEURAL HEARING LOSS: ICD-10-CM

## 2023-08-03 DIAGNOSIS — H90.A22 SENSORINEURAL HEARING LOSS (SNHL) OF LEFT EAR WITH RESTRICTED HEARING OF RIGHT EAR: ICD-10-CM

## 2023-08-03 DIAGNOSIS — H90.A21 SENSORINEURAL HEARING LOSS (SNHL) OF RIGHT EAR WITH RESTRICTED HEARING OF LEFT EAR: ICD-10-CM

## 2023-08-03 DIAGNOSIS — H91.22 SUDDEN LEFT HEARING LOSS: Primary | ICD-10-CM

## 2023-08-03 RX ORDER — PREDNISONE 10 MG/1
TABLET ORAL
Qty: 84 TABLET | Refills: 0 | Status: SHIPPED | OUTPATIENT
Start: 2023-08-03

## 2023-08-25 ENCOUNTER — OFFICE VISIT (OUTPATIENT)
Dept: OTOLARYNGOLOGY | Facility: CLINIC | Age: 83
End: 2023-08-25
Payer: MEDICARE

## 2023-08-25 ENCOUNTER — PROCEDURE VISIT (OUTPATIENT)
Dept: OTOLARYNGOLOGY | Facility: CLINIC | Age: 83
End: 2023-08-25
Payer: MEDICARE

## 2023-08-25 VITALS — HEART RATE: 91 BPM | SYSTOLIC BLOOD PRESSURE: 150 MMHG | DIASTOLIC BLOOD PRESSURE: 69 MMHG | TEMPERATURE: 97.4 F

## 2023-08-25 DIAGNOSIS — H91.22 SUDDEN LEFT HEARING LOSS: ICD-10-CM

## 2023-08-25 DIAGNOSIS — H90.3 ASYMMETRICAL SENSORINEURAL HEARING LOSS: ICD-10-CM

## 2023-08-25 DIAGNOSIS — H91.22 SUDDEN LEFT HEARING LOSS: Primary | ICD-10-CM

## 2023-08-25 DIAGNOSIS — H90.A22 SENSORINEURAL HEARING LOSS (SNHL) OF LEFT EAR WITH RESTRICTED HEARING OF RIGHT EAR: ICD-10-CM

## 2023-08-25 DIAGNOSIS — H90.A21 SENSORINEURAL HEARING LOSS (SNHL) OF RIGHT EAR WITH RESTRICTED HEARING OF LEFT EAR: ICD-10-CM

## 2023-08-25 DIAGNOSIS — H90.3 ASYMMETRICAL SENSORINEURAL HEARING LOSS: Primary | ICD-10-CM

## 2023-08-25 RX ORDER — ASPIRIN 81 MG
TABLET,CHEWABLE ORAL
COMMUNITY
Start: 2023-08-22

## 2023-08-25 NOTE — PROGRESS NOTES
AUDIOMETRIC EVALUATION      Name:  Carlos Zimmerman  :  1940  Age:  83 y.o.  Date of Evaluation:  2023       History:  Mr. Zimmerman is seen today for a follow-up hearing evaluation due to sudden hearing loss at the left ear.    Audiologic Information:  Concerns for Hearing: Yes  PETs: No  Other otologic surgical history: None  Aural Pressure/Fullness: No  Otalgia: No  Otorrhea: No  Tinnitus: Periodically  Dizziness: Denies  Noise Exposure: No  Family history of hearing loss: No  Head trauma requiring hospital stay: No  Chemotherapy: No  Other significant history: No    **Case history obtained in office and through EMR system      EVALUATION:    See audiogram    RESULTS:    Otoscopic Evaluation:  Unremarkable        NOTE: Follow-up with Dr. Contreras     Tympanometry (226 Hz):  Right: Type A  Left: Type A    IMPRESSIONS:  Pure tone thresholds for the right ear shows low normal hearing 0.25 K to 1K hertz.  Mild sensorineural hearing loss at 2K hertz sloping to a severe and profound loss at 3K6K 8K hertz.  Pure tone thresholds for the left ear shows severe and profound mixed hearing loss.  Very little improvement at the left ear.  Patient was counseled with regard to the findings.    RECOMMENDATIONS/PLAN:  Follow-up with Dr. Contreras.  Discussed results and recommendations with patient.           Rosalio Wright M.S, [facility]  Licensed Audiologist

## 2023-08-25 NOTE — PROGRESS NOTES
Patient Name: Carlos Zimmerman   Visit Date: 08/25/2023   Patient ID: [id number]  Provider: Serafin Contreras MD    Sex: male  Location: [facility]   YOB: 1940  Location Address: [address] [place] [address], [address] [place] [address],?[place]?[id number]    Primary Care Provider Payam Carroll MD  Location Phone: [phone]    Referring Provider: No ref. provider found        Chief Complaint  3 week follow up w/ repeat audio    History of Present Illness  Carlos Zimmerman is a 83 y.o. male who presents to [facility] today as a consult from No ref. provider found for evaluation of sudden hearing loss.  He was originally seen by ARTEMIO Nino on 8/3/2023 at which time he reported a 3-week history of left-sided hearing loss which developed acutely.  He had been on multiple rounds of antibiotics and prednisone without improvement.  He denied any tinnitus or vertigo.  He had not undergone any prior otologic surgery or otologic trauma.  He did report a significant history of noise exposure in the .  CT scan of the head without contrast on 7/25/2023 was unremarkable aside from diffuse atrophy and white matter changes.  Audiogram on 8/3/2023 revealed right normal downsloping to severe sensorineural hearing loss from 3000 8000 Hz and left-sided profound sensorineural hearing loss.  SRT was 25 on the right and 90 on the left.  Speech discrimination was 87% on the right at 60 dB and 40% on the left at 100 dB.  Tympanograms are type A.  He was placed on a burst and taper of prednisone.    He returns today for follow-up.  He tells me that he has noticed a small improvement in his left-sided hearing loss.  He is not experiencing any otalgia, otorrhea, tinnitus, or vertigo.  He mentions that he was not ill around the time he noticed his hearing change.  He did recently and her brother passed away from a brain tumor.  Audiogram on 8/25/2023 revealed right normal  downsloping to severe to profound sensorineural hearing loss from 3000 to 8000 Hz and left-sided severe to profound mixed loss.  SRT is 25 on the right and 90 on the left.  Speech discrimination was 80% on the right at 65 dB and 47% on the left at 105 dB.  Tympanograms are type A.    Past Medical History:   Diagnosis Date    Anemia     Arthritis     COPD (chronic obstructive pulmonary disease)     INHALER  PRN    Hyperlipidemia     Hypertension     ON MEDS    Lung cancer     Pneumonia     LEFT LUNG CA    SOB (shortness of breath)     Stroke     TIA (transient ischemic attack)     NO RESIDUAL 2010       Past Surgical History:   Procedure Laterality Date    CARDIAC CATHETERIZATION Left 11/10/2022    Procedure: Aortogram with left leg angiogram, possible angioplasty or stenting;  Surgeon: Cuauhtemoc Thomas MD;  Location: [facility] CATH INVASIVE LOCATION;  Service: Vascular;  Laterality: Left;    CARDIAC CATHETERIZATION Right 1/13/2023    Procedure: Right leg angiogram, possible angioplasty or stenting;  Surgeon: Cuauhtemoc Thomas MD;  Location: [facility] CATH INVASIVE LOCATION;  Service: Vascular;  Laterality: Right;    LUNG BIOPSY      LUNG SURGERY      REMOVAL HALF OF UPPER PORTION LEFT LUNG    ORTHOPEDIC SURGERY Left     ROTATOR CUFF    THROAT SURGERY           Current Outpatient Medications:     albuterol sulfate  (90 Base) MCG/ACT inhaler, Inhale 2 puffs Every 4 (Four) Hours As Needed for Wheezing or Shortness of Air., Disp: 54 g, Rfl: 3    amLODIPine (NORVASC) 5 MG tablet, Take 1 tablet by mouth Daily., Disp: , Rfl:     Aspirin Low Dose 81 MG chewable tablet, , Disp: , Rfl:     cetirizine (zyrTEC) 10 MG tablet, Take 1 tablet by mouth Daily., Disp: , Rfl:     Cholecalciferol 25 MCG (1000 UT) capsule, Take 1 capsule by mouth Daily., Disp: , Rfl:     cilostazol (PLETAL) 100 MG tablet, Take 1 tablet by mouth 2 (Two) Times a Day., Disp: , Rfl:     cyclobenzaprine (FLEXERIL) 10 MG tablet, Take 1 tablet by mouth 3 (Three)  Times a Day As Needed for Muscle Spasms., Disp: , Rfl:     ferrous sulfate 325 (65 FE) MG tablet, Take 1 tablet by mouth Daily With Breakfast., Disp: 90 tablet, Rfl: 1    meclizine (ANTIVERT) 25 MG tablet, Take 1 tablet by mouth 3 (Three) Times a Day As Needed for Dizziness., Disp: 15 tablet, Rfl: 0    metoprolol succinate XL (TOPROL-XL) 25 MG 24 hr tablet, Take 1 tablet by mouth Daily., Disp: , Rfl:     multivitamins-minerals (PRESERVISION AREDS 2) capsule capsule, Take 1 capsule by mouth 2 (Two) Times a Day., Disp: , Rfl:     pantoprazole (PROTONIX) 40 MG EC tablet, Take 1 tablet by mouth Daily., Disp: , Rfl:     pravastatin (PRAVACHOL) 40 MG tablet, Take 1 tablet by mouth Every Night., Disp: , Rfl:     Refresh Tears 0.5 % solution, Administer 1 drop to both eyes Daily As Needed for Dry Eyes., Disp: , Rfl:     tamsulosin (FLOMAX) 0.4 MG capsule 24 hr capsule, Take 1 capsule by mouth 2 (Two) Times a Day., Disp: 180 capsule, Rfl: 4    predniSONE (DELTASONE) 10 MG tablet, 6 tabs daily x 4 days, 5 tabs daily x 4 days, 4 tabs x 4 days, 3 tablets x 4 days, 2 tablets x 4 days, and 1 tab x 4 days., Disp: 84 tablet, Rfl: 0     No Known Allergies    Social History     Tobacco Use    Smoking status: Former     Packs/day: 1.00     Years: 55.00     Pack years: 55.00     Types: Cigarettes     Start date:      Quit date:      Years since quittin.6     Passive exposure: Past    Smokeless tobacco: Former     Types: Snuff   Vaping Use    Vaping Use: Never used   Substance Use Topics    Alcohol use: Not Currently     Comment: 3-4 GIN TONICS/NIGHT    Drug use: Never        Objective     Vital Signs:   /69   Pulse 91   Temp 97.4 øF (36.3 øC)       Physical Exam    General: Well developed, well nourished patient of stated age in no acute distress. Voice is strong and clear.   Head: Normocephalic and atraumatic.  Face: No lesions.  Bilateral parotid and submandibular glands are unremarkable.  Stensen's and Warthin's  ducts are productive of clear saliva bilaterally.  House-Brackmann I/VI     bilaterally.   muscles and temporomandibular joint nontender to palpation.  No TMJ crepitus.  Eyes: PERRLA, sclerae anicteric, no conjunctival injection. Extraocular movements are intact and full. No nystagmus.   Ears: Auricles are normal in appearance. Bilateral external auditory canals are unremarkable. Bilateral tympanic membranes are clear and without effusion. Hearing normal to conversational voice.   Nose: External nose is normal in appearance. Bilateral nares are patent with normal appearing mucosa. Septum midline. Turbinates are unremarkable. No lesions.   Oral Cavity: Lips are normal in appearance. Oral mucosa is unremarkable. Gingiva is unremarkable.  Partial dentition for age. Tongue is unremarkable with good movement. Hard palate is unremarkable.   Oropharynx: Soft palate is unremarkable with full movement. Uvula is unremarkable. Bilateral tonsils are unremarkable. Posterior oropharynx is unremarkable.    Larynx and hypopharynx: Deferred secondary to gag reflex.  Neck: Supple.  No mass.  Nontender to palpation.  Trachea midline. Thyroid normal size and without nodules to palpation.   Lymphatic: No lymphadenopathy upon palpation.   Psychiatric: Appropriate affect, cooperative   Neurologic: Oriented x 3, strength symmetric in all extremities, Cranial Nerves II-XII are grossly intact to confrontation   Skin: Warm and dry. No rashes.    Procedures     Procedure: Intratympanic dexamethasone injection, left.    Summary: The left ear was viewed using the operating microscope.  The anterior superior quadrant was anesthetized with phenol and 2 punctures were created using a 25-gauge spinal needle.  On the second puncture 0.5 mL of dexamethasone 4 mg/mL (NDC 4296-6810-40) was infiltrated into the middle ear.  The patient was allowed to rest for 30 minutes with his head turned to the right.  The patient tolerated the procedure  well.    Result Review :               Assessment and Plan    Diagnoses and all orders for this visit:    1. Sudden left hearing loss (Primary)    2. Asymmetrical sensorineural hearing loss      Impressions and findings were discussed at great length.  Currently, he is seen for follow-up of left-sided sudden predominantly sensorineural hearing loss.  We reviewed and compared the results of his 8/3/2023 and 8/25/2023 audiograms which revealed only a slight improvement in the left sided hearing.  We discussed the differential for this condition.  Options for further evaluation and management were discussed and he elected to pursue intratympanic dexamethasone injections.  We discussed the risks, benefits, and alternatives.  We will pursue a series of 3 injections and he understands that he will require an MRI of the internal auditory canals with and without contrast at some point in the near future.  He was given ample time to ask questions, all of which were answered to his satisfaction.    Follow Up   No follow-ups on file.  Patient was given instructions and counseling regarding his condition or for health maintenance advice. Please see specific information pulled into the AVS if appropriate.

## 2023-08-28 ENCOUNTER — LAB (OUTPATIENT)
Dept: ONCOLOGY | Facility: HOSPITAL | Age: 83
End: 2023-08-28
Payer: MEDICARE

## 2023-08-28 ENCOUNTER — PROCEDURE VISIT (OUTPATIENT)
Dept: OTOLARYNGOLOGY | Facility: CLINIC | Age: 83
End: 2023-08-28
Payer: MEDICARE

## 2023-08-28 VITALS — BODY MASS INDEX: 24.64 KG/M2 | WEIGHT: 157 LBS | TEMPERATURE: 97.5 F | HEIGHT: 67 IN

## 2023-08-28 DIAGNOSIS — H90.3 ASYMMETRICAL SENSORINEURAL HEARING LOSS: ICD-10-CM

## 2023-08-28 DIAGNOSIS — R97.20 ELEVATED PSA: ICD-10-CM

## 2023-08-28 DIAGNOSIS — D50.9 IRON DEFICIENCY ANEMIA, UNSPECIFIED IRON DEFICIENCY ANEMIA TYPE: ICD-10-CM

## 2023-08-28 DIAGNOSIS — N39.0 RECURRENT URINARY TRACT INFECTION: ICD-10-CM

## 2023-08-28 DIAGNOSIS — N40.1 BENIGN PROSTATIC HYPERPLASIA WITH LOWER URINARY TRACT SYMPTOMS, SYMPTOM DETAILS UNSPECIFIED: ICD-10-CM

## 2023-08-28 DIAGNOSIS — H91.22 SUDDEN LEFT HEARING LOSS: Primary | ICD-10-CM

## 2023-08-28 LAB
BASOPHILS # BLD AUTO: 0.02 10*3/MM3 (ref 0–0.2)
BASOPHILS NFR BLD AUTO: 0.2 % (ref 0–1.5)
DEPRECATED RDW RBC AUTO: 47.4 FL (ref 37–54)
EOSINOPHIL # BLD AUTO: 0.12 10*3/MM3 (ref 0–0.4)
EOSINOPHIL NFR BLD AUTO: 1.3 % (ref 0.3–6.2)
ERYTHROCYTE [DISTWIDTH] IN BLOOD BY AUTOMATED COUNT: 14 % (ref 12.3–15.4)
FERRITIN SERPL-MCNC: 176.8 NG/ML (ref 30–400)
HCT VFR BLD AUTO: 43.7 % (ref 37.5–51)
HGB BLD-MCNC: 13.8 G/DL (ref 13–17.7)
IMM GRANULOCYTES # BLD AUTO: 0.07 10*3/MM3 (ref 0–0.05)
IMM GRANULOCYTES NFR BLD AUTO: 0.8 % (ref 0–0.5)
IRON 24H UR-MRATE: 107 MCG/DL (ref 59–158)
IRON SATN MFR SERPL: 39 % (ref 20–50)
LYMPHOCYTES # BLD AUTO: 2.73 10*3/MM3 (ref 0.7–3.1)
LYMPHOCYTES NFR BLD AUTO: 29.5 % (ref 19.6–45.3)
MCH RBC QN AUTO: 29.2 PG (ref 26.6–33)
MCHC RBC AUTO-ENTMCNC: 31.6 G/DL (ref 31.5–35.7)
MCV RBC AUTO: 92.4 FL (ref 79–97)
MONOCYTES # BLD AUTO: 0.51 10*3/MM3 (ref 0.1–0.9)
MONOCYTES NFR BLD AUTO: 5.5 % (ref 5–12)
NEUTROPHILS NFR BLD AUTO: 5.79 10*3/MM3 (ref 1.7–7)
NEUTROPHILS NFR BLD AUTO: 62.7 % (ref 42.7–76)
PLATELET # BLD AUTO: 153 10*3/MM3 (ref 140–450)
PMV BLD AUTO: 9.9 FL (ref 6–12)
PSA SERPL-MCNC: 11.9 NG/ML (ref 0–4)
RBC # BLD AUTO: 4.73 10*6/MM3 (ref 4.14–5.8)
TIBC SERPL-MCNC: 277 MCG/DL (ref 298–536)
TRANSFERRIN SERPL-MCNC: 186 MG/DL (ref 200–360)
WBC NRBC COR # BLD: 9.24 10*3/MM3 (ref 3.4–10.8)

## 2023-08-28 PROCEDURE — 85025 COMPLETE CBC W/AUTO DIFF WBC: CPT

## 2023-08-28 PROCEDURE — 83540 ASSAY OF IRON: CPT

## 2023-08-28 PROCEDURE — 82728 ASSAY OF FERRITIN: CPT

## 2023-08-28 PROCEDURE — 36415 COLL VENOUS BLD VENIPUNCTURE: CPT

## 2023-08-28 PROCEDURE — 69801 INCISE INNER EAR: CPT | Performed by: OTOLARYNGOLOGY

## 2023-08-28 PROCEDURE — 84153 ASSAY OF PSA TOTAL: CPT

## 2023-08-28 PROCEDURE — 84466 ASSAY OF TRANSFERRIN: CPT

## 2023-08-28 NOTE — PROGRESS NOTES
Procedures      Procedure: Intratympanic dexamethasone injection, left.     Summary: The left ear was viewed using the operating microscope.  The anterior superior perforations were found to still be open..  A 25-gauge spinal needle was inserted through the open puncture and 0.5 mL of dexamethasone 4 mg/mL (NDC 19302-229-45) was infiltrated into the middle ear.  The patient was allowed to rest for 30 minutes with his head turned to the right.  The patient tolerated the procedure well.

## 2023-08-29 ENCOUNTER — OFFICE VISIT (OUTPATIENT)
Dept: ONCOLOGY | Facility: HOSPITAL | Age: 83
End: 2023-08-29
Payer: MEDICARE

## 2023-08-29 VITALS
TEMPERATURE: 97.3 F | SYSTOLIC BLOOD PRESSURE: 121 MMHG | HEART RATE: 97 BPM | BODY MASS INDEX: 24.96 KG/M2 | WEIGHT: 159.39 LBS | DIASTOLIC BLOOD PRESSURE: 57 MMHG | RESPIRATION RATE: 16 BRPM | OXYGEN SATURATION: 96 %

## 2023-08-29 DIAGNOSIS — D50.9 IRON DEFICIENCY ANEMIA, UNSPECIFIED IRON DEFICIENCY ANEMIA TYPE: ICD-10-CM

## 2023-08-29 DIAGNOSIS — R97.20 ELEVATED PSA: ICD-10-CM

## 2023-08-29 DIAGNOSIS — C34.32 MALIGNANT NEOPLASM OF LOWER LOBE OF LEFT LUNG: Primary | ICD-10-CM

## 2023-08-29 PROBLEM — C80.1 CANCER: Status: RESOLVED | Noted: 2021-06-16 | Resolved: 2023-08-29

## 2023-08-29 PROCEDURE — G0463 HOSPITAL OUTPT CLINIC VISIT: HCPCS | Performed by: INTERNAL MEDICINE

## 2023-08-29 RX ORDER — FERROUS SULFATE 325(65) MG
325 TABLET ORAL
Qty: 90 TABLET | Refills: 1 | Status: SHIPPED | OUTPATIENT
Start: 2023-08-29

## 2023-08-29 NOTE — PROGRESS NOTES
Chief Complaint  Lung Cancer    Payam Carroll MD Houk, Brandon Lyle, MD      Subjective          Carlos Zimmerman presents to [facility] for iron defieiency anemia    Lung Cancer  Pertinent negatives include no arthralgias, chest pain, coughing, diaphoresis, fever, joint swelling, myalgias, numbness, rash or sore throat.      Mr. Carlos Zimmerman presents for follow up for iron deficiency anemia. History of prior left lung NSCLC with adenocarcinoma with left VATS procedure and prior chemotherapy per Dr. Saul. He follows with pulmonary for yearly low dose CT screening. 2/24/23 CT Chest with 3 mm nodule in right upper lobe    We are following now for iron deficiency anemia.  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 Previously did Neurontin but then came off of med after neurology visit. Stool for OB was negative.  He reports he has good energy for his age. He mows his yard. Currently dealing with a left ear infection but is seeing ENT for shots into his ear. Breathing is poor on exertion but not worsening. No bleeding reported. Still on oral iron and tolerating it well.     Hematology History  He was started on oral iron 1 tab QD back in April 2022. He is tolerating med well.    2/27/23: Iron studies normal. CBC normal. No anemia.     He is following with Dr. Mai with urology for elevated PSA. MRI of prostate on 2/23/23 showed a small solitary suspicious lesion in the left posterior mid gland peripheral zone, PI-RADS 4.    8/28/23: Hgb 13.8, MCV 92.4, plt 153, WBC 9.24. TIBC 277 (low), ferritin 176.8, iron sat 39%    Cancer Staging  No matching staging information was found for the patient.     Treatment intent: curative    Oncology/Hematology History    No history exists.   This is a very pleasant 76-year-old gentleman who presents with follow-up for     lobectomy with adjuvant chemotherapy.             1) Left lung:  Pathology 5/2016: FNA: Poorly differentiated adenocarcinoma: 5.5     mm BONNIE nodule. 5/2016.       Left lung lobectomy: VATS procedure: Dr. Saul in [facility]:multifocal     invasive moderately differentiated adenocarcinoma, acinar type, 2.0 x 1.6 x 1.4     cm. Additional nodule 0.6 x 0.5 x 0.3 cm: margins free of tumor, negative LN's.     Visceral pleural invasion noted. (7/12/16). Staging: Stage IIB:  pT3N0.       Carboplatin / Paclitaxel x 4 cycles: 8/10/16 - 10/14/16.      CT CAP: No evidence of disease. 2/2017.       CT CAP: No evidence of disease. 12/11/17.       CT chest: Right lower base: improving consolidation. 2/18/19.       CT chest: T6 / T10 compression fractures: non-pathologic. (8/28/19)      CT chest in ER: likely pneumonia/inflammation. No new evidence of recurring     cancer: (4/4/20)      CT chest: Stable 1.5 cm subpleural ground glass opacity in the BONNIE, new patchy     areas on non-specific ground glass opacity throughout the LLL. (3/2021).            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                    Review of Systems   Constitutional:  Positive for unexpected weight loss. Negative for appetite change, diaphoresis, fever and unexpected weight gain.   HENT:  Positive for hearing loss (Left ear). Negative for sore throat and voice change.    Eyes:  Negative for blurred vision, double vision, pain, redness and visual disturbance.   Respiratory:  Negative for cough, shortness of breath and wheezing.    Cardiovascular:  Negative for chest pain, palpitations and leg swelling.   Endocrine: Negative for cold intolerance, heat intolerance, polydipsia and polyuria.   Genitourinary:  Negative for decreased urine volume, difficulty urinating, frequency and urinary incontinence.   Musculoskeletal:  Negative for arthralgias, back pain, joint swelling and myalgias.   Skin:  Negative  for color change, rash, skin lesions and wound.   Neurological:  Negative for dizziness, seizures, numbness and headache.   Hematological:  Negative for adenopathy. Does not bruise/bleed easily.   Psychiatric/Behavioral:  Negative for depressed mood. The patient is not nervous/anxious.    All other systems reviewed and are negative.    Current Outpatient Medications on File Prior to Visit   Medication Sig Dispense Refill    albuterol sulfate  (90 Base) MCG/ACT inhaler Inhale 2 puffs Every 4 (Four) Hours As Needed for Wheezing or Shortness of Air. 54 g 3    amLODIPine (NORVASC) 5 MG tablet Take 1 tablet by mouth Daily.      Aspirin Low Dose 81 MG chewable tablet       cetirizine (zyrTEC) 10 MG tablet Take 1 tablet by mouth Daily.      Cholecalciferol 25 MCG (1000 UT) capsule Take 1 capsule by mouth Daily.      cilostazol (PLETAL) 100 MG tablet Take 1 tablet by mouth 2 (Two) Times a Day.      cyclobenzaprine (FLEXERIL) 10 MG tablet Take 1 tablet by mouth 3 (Three) Times a Day As Needed for Muscle Spasms.      ferrous sulfate 325 (65 FE) MG tablet Take 1 tablet by mouth Daily With Breakfast. 90 tablet 1    meclizine (ANTIVERT) 25 MG tablet Take 1 tablet by mouth 3 (Three) Times a Day As Needed for Dizziness. 15 tablet 0    metoprolol succinate XL (TOPROL-XL) 25 MG 24 hr tablet Take 1 tablet by mouth Daily.      multivitamins-minerals (PRESERVISION AREDS 2) capsule capsule Take 1 capsule by mouth 2 (Two) Times a Day.      pantoprazole (PROTONIX) 40 MG EC tablet Take 1 tablet by mouth Daily.      pravastatin (PRAVACHOL) 40 MG tablet Take 1 tablet by mouth Every Night.      predniSONE (DELTASONE) 10 MG tablet 6 tabs daily x 4 days, 5 tabs daily x 4 days, 4 tabs x 4 days, 3 tablets x 4 days, 2 tablets x 4 days, and 1 tab x 4 days. 84 tablet 0    Refresh Tears 0.5 % solution Administer 1 drop to both eyes Daily As Needed for Dry Eyes.      tamsulosin (FLOMAX) 0.4 MG capsule 24 hr capsule Take 1 capsule by mouth 2  (Two) Times a Day. 180 capsule 4     No current facility-administered medications on file prior to visit.       No Known Allergies  Past Medical History:   Diagnosis Date    Anemia     Arthritis     COPD (chronic obstructive pulmonary disease)     INHALER  PRN    Hyperlipidemia     Hypertension     ON MEDS    Lung cancer     Pneumonia     LEFT LUNG CA    SOB (shortness of breath)     Stroke     TIA (transient ischemic attack)     NO RESIDUAL      Past Surgical History:   Procedure Laterality Date    CARDIAC CATHETERIZATION Left 11/10/2022    Procedure: Aortogram with left leg angiogram, possible angioplasty or stenting;  Surgeon: Cuauhtemoc Thomas MD;  Location: [facility] CATH INVASIVE LOCATION;  Service: Vascular;  Laterality: Left;    CARDIAC CATHETERIZATION Right 2023    Procedure: Right leg angiogram, possible angioplasty or stenting;  Surgeon: Cuauhtemoc Thomas MD;  Location: [facility] CATH INVASIVE LOCATION;  Service: Vascular;  Laterality: Right;    LUNG BIOPSY      LUNG SURGERY      REMOVAL HALF OF UPPER PORTION LEFT LUNG    ORTHOPEDIC SURGERY Left     ROTATOR CUFF    THROAT SURGERY       Social History     Socioeconomic History    Marital status:    Tobacco Use    Smoking status: Former     Packs/day: 1.00     Years: 55.00     Pack years: 55.00     Types: Cigarettes     Start date:      Quit date:      Years since quittin.6     Passive exposure: Past    Smokeless tobacco: Former     Types: Snuff   Vaping Use    Vaping Use: Never used   Substance and Sexual Activity    Alcohol use: Not Currently     Comment: 3-4 GIN TONICS/NIGHT    Drug use: Never    Sexual activity: Defer     Family History   Problem Relation Age of Onset    Stomach cancer Other     Cancer Mother      Immunization History   Administered Date(s) Administered    COVID-19 (PFIZER) Purple Cap Monovalent 2021, 2021, 10/18/2021    Fluad Quad 65+ 10/07/2020, 2021    Fluzone High-Dose 65+yrs 10/06/2022     Influenza, Unspecified 09/01/2020    TD Preservative Free (Tenivac) 06/27/2022       Objective   Physical Exam  Constitutional:       Appearance: Normal appearance.   HENT:      Head: Normocephalic and atraumatic.      Nose: Nose normal.   Eyes:      Conjunctiva/sclera: Conjunctivae normal.   Pulmonary:      Effort: Pulmonary effort is normal.   Neurological:      General: No focal deficit present.      Mental Status: He is alert. Mental status is at baseline.   Psychiatric:         Mood and Affect: Mood normal.         Behavior: Behavior normal.         Thought Content: Thought content normal.       Vitals:    08/29/23 0920   BP: 121/57   Pulse: 97   Resp: 16   Temp: 97.3 øF (36.3 øC)   TempSrc: Temporal   SpO2: 96%   Weight: 72.3 kg (159 lb 6.3 oz)   PainSc: 0-No pain               ECOG: (0) Fully Active - Able to Carry On All Pre-disease Performance Without Restriction  Fall Risk Assessment was completed, and patient is at low risk for falls.  PHQ-9 Total Score:         The patient is  experiencing fatigue. Fatigue score: 1    PT/OT Functional Screening: PT fx screen: No needs identified  Speech Functional Screening: Speech fx screen: No needs identified  Rehab to be ordered: Rehab to be ordered: No needs identified        Result Review :   The following data was reviewed by: Juan Jose Alatorre MD on 08/29/23:  Lab Results   Component Value Date    HGB 13.8 08/28/2023    HCT 43.7 08/28/2023    MCV 92.4 08/28/2023     08/28/2023    WBC 9.24 08/28/2023    NEUTROABS 5.79 08/28/2023    LYMPHSABS 2.73 08/28/2023    MONOSABS 0.51 08/28/2023    EOSABS 0.12 08/28/2023    BASOSABS 0.02 08/28/2023     Lab Results   Component Value Date    GLUCOSE 104 (H) 01/13/2023    BUN 13 01/13/2023    CREATININE 0.70 (L) 01/13/2023     01/13/2023    K 4.2 01/13/2023     01/13/2023    CO2 28.9 01/13/2023    CALCIUM 9.1 01/13/2023    PROTEINTOT 6.1 12/13/2022    ALBUMIN 4.10 12/13/2022    BILITOT 0.7 12/13/2022     ALKPHOS 119 (H) 12/13/2022    AST 23 12/13/2022    ALT 21 12/13/2022     Labs personally reviewed. No anemia. Normal iron studies.    Urology note 5/2023 personally reviewed.        No radiology results for the last 30 days.    2/23/23 MRI prostate showed a small solitary suspicious lesion in the left posterior mid gland peripheral zone, PI-RADS 4.       Assessment and Plan    Diagnoses and all orders for this visit:    1. Malignant neoplasm of lower lobe of left lung (Primary)    2. Elevated PSA    3. Iron deficiency anemia, unspecified iron deficiency anemia type    Hx of lung cancer  S/p lobectomy and completed adjuvant chemothearpy 10/2016. 2/2023 CT chest reviewed. Small 3 mm nodule in RUL. Plan to repeat in 1 year. Due 2/2024, already ordered by pulmonology     For iron deficiency, 8/2023 iron labs normal and no anemia on labs. Continue oral iron daily. Script refilled today. Repeat labs in 6 months    Elevated PSA  Small concerning lesion on recent MRI. PSA less than 10. Patient follows with Dr. aMi with urology. Will defer management to him. Plan for watchful waiting/conservative monitoring      Patient Follow Up: 6 months with repeat labs        Patient was given instructions and counseling regarding his condition or for health maintenance advice. Please see specific information pulled into the AVS if appropriate.

## 2023-08-30 ENCOUNTER — OFFICE VISIT (OUTPATIENT)
Dept: NEUROSURGERY | Facility: CLINIC | Age: 83
End: 2023-08-30
Payer: MEDICARE

## 2023-08-30 ENCOUNTER — PROCEDURE VISIT (OUTPATIENT)
Dept: OTOLARYNGOLOGY | Facility: CLINIC | Age: 83
End: 2023-08-30
Payer: MEDICARE

## 2023-08-30 VITALS
DIASTOLIC BLOOD PRESSURE: 69 MMHG | SYSTOLIC BLOOD PRESSURE: 121 MMHG | HEART RATE: 86 BPM | WEIGHT: 158 LBS | BODY MASS INDEX: 24.8 KG/M2 | HEIGHT: 67 IN

## 2023-08-30 VITALS — BODY MASS INDEX: 24.8 KG/M2 | HEIGHT: 67 IN | WEIGHT: 158 LBS | TEMPERATURE: 97.6 F

## 2023-08-30 DIAGNOSIS — H91.22 SUDDEN LEFT HEARING LOSS: Primary | ICD-10-CM

## 2023-08-30 DIAGNOSIS — M47.816 FACET ARTHROPATHY, LUMBAR: ICD-10-CM

## 2023-08-30 DIAGNOSIS — M51.36 DDD (DEGENERATIVE DISC DISEASE), LUMBAR: Primary | ICD-10-CM

## 2023-08-30 DIAGNOSIS — H90.3 ASYMMETRICAL SENSORINEURAL HEARING LOSS: ICD-10-CM

## 2023-08-30 DIAGNOSIS — M48.061 FORAMINAL STENOSIS OF LUMBAR REGION: ICD-10-CM

## 2023-08-30 NOTE — PROGRESS NOTES
"Chief Complaint  Extremity Weakness (legs)    Subjective          Carlos Zimmerman who is a 83 y.o. year old male who presents to [facility] for Evaluation of the Spine.     He denies pain in the lower back or legs.    He reports \"tiredness\" in the legs with walking, even to the mailbox. This has been ongoing since that past 1 year or so. It developed gradually.    He denies any other complaints.    He has had not specific treatments.    Was this the result of an injury or accident? : No    History of Previous Spinal Surgery?: No     reports that he quit smoking about 13 years ago. His smoking use included cigarettes. He started smoking about 73 years ago. He has a 55.00 pack-year smoking history. He has been exposed to tobacco smoke. His smokeless tobacco use includes snuff.    Review of Systems   Neurological:  Positive for weakness (bilateral legs).      Objective   Vital Signs:   /69   Pulse 86   Ht 170.2 cm (67\")   Wt 71.7 kg (158 lb)   BMI 24.75 kg/mý       Physical Exam  Constitutional:       Appearance: Normal appearance. He is normal weight.   Pulmonary:      Effort: Pulmonary effort is normal.   Musculoskeletal:         General: No tenderness.      Comments: SLR negative bilaterally   Neurological:      General: No focal deficit present.      Mental Status: He is alert and oriented to person, place, and time.      Sensory: No sensory deficit.      Motor: No weakness.      Deep Tendon Reflexes: Reflexes normal.   Psychiatric:         Mood and Affect: Mood normal.         Behavior: Behavior normal.      Neurologic Exam     Mental Status   Oriented to person, place, and time.      Result Review     I have personally reviewed the MRI of lumbar spine without contrast from 7/27/2023 which shows two-level degenerative disc disease and facet arthritis.  There is severe right foraminal stenosis and moderate severe left foraminal stenosis at L4-L5.  There is " "moderate central canal narrowing and moderate to severe bilateral foraminal narrowing L3-L4.  There is moderate severe bilateral foraminal narrowing at L2-L3.     Assessment and Plan    Diagnoses and all orders for this visit:    1. DDD (degenerative disc disease), lumbar (Primary)  -     Ambulatory Referral to Pain Management    2. Facet arthropathy, lumbar  -     Ambulatory Referral to Pain Management    3. Foraminal stenosis of lumbar region  -     Ambulatory Referral to Pain Management    He has no pain in the back or legs.    He reports \"tiredness\" in the legs with walking, even to the mailbox.    He does have relatively significant stenosis centrally at L2-L3 and L3-L4. It is possible that this could produce neurogenic claudication, which may be what he describes.    I do not know how effective surgery would be without pain in the legs.    Similarly, I expect injection therapies in the spine to be less likely to help his \"tiredness\" complaints.    I am happy to have him discuss the possibility of surgery further with Dr. Tom, or to refer him to pain management to consult for spinal injections.    He would like to be referred to [facility] to discuss possible spinal injections.    He may consider PT as conservative treatment.    The patient was counseled on basic recommendations for the reduction and prevention of back, neck, or spine pain in association with spinal disorders, including: cessation/avoidance of nicotine use, maintenance of a healthy BMI and weight, focusing on building/maintaining core strength through core exercise, and avoidance of activities which worsen the pain. The patient will monitor for changes in symptoms and notify our clinic of these changes as needed.    He will follow-up here PRN.    Follow Up   Return if symptoms worsen or fail to improve.  Patient was given instructions and counseling regarding his condition or for health maintenance advice. Please see specific information pulled " into the AVS if appropriate.

## 2023-08-30 NOTE — PROGRESS NOTES
Procedures      Procedure: Intratympanic dexamethasone injection, left.     Summary: The left ear was viewed using the operating microscope.  The anterior superior perforations were found to still be open..  A 25-gauge spinal needle was inserted through the open puncture and 0.5 mL of dexamethasone 4 mg/mL (NDC 17708-021-23) was infiltrated into the middle ear.  The patient was allowed to rest for 30 minutes with his head turned to the right.  The patient tolerated the procedure well.

## 2023-09-06 NOTE — PROGRESS NOTES
Chief Complaint: Urologic complaint    Subjective         History of Present Illness  Carlos Zimmerman is a 83 y.o. male         Recurrent UTI  BPH  Elevated PSA      on Flomax 0.4 mg BID.  Not sure if it is helping.  No straining.  Nocturia x0.  Not bothered      1/23 cystoscopy-3 cm prostate large bladder with mild trabeculation, no pathology.        No cardiopulmonary history.  Non-smoker.  No anticoagulation.  History of lung cancer years ago treated with surgery      His family is late to be in their 90s.      PVR    9/23  000  9/22  061        Previous      7/22 renal ultrasound-normal  7/22 0.59, GFR 96    Urine cultures    7/22 Serratia-resistant to Ancef and nitrofurantoin  2/21 Serratia     No gross hematuria      No history of kidney  stone.    No urologic family history,   no history of urologic surgery.        PSA    8/23    11.9      2/20/2023 MRI prostate-49 g  PI - RADS 4 - left posterior mid gland peripheral zone.  1.0 x 0.7 cm.  Seminal vesicles, neurovascular bundle and lymph nodes negative  1/23     8.0            Bladder Scan interpretation 09/08/2023    Estimation of residual urine via BVI 3000 Verathon Bladder Scan  MA/nurse performing: SUE Ledesma  Residual Urine: 000 ml  Indication: Benign prostatic hyperplasia with lower urinary tract symptoms, symptom details unspecified    Recurrent urinary tract infection    Elevated PSA   Position: Supine  Examination: Incremental scanning of the suprapubic area using 2.0 MHz transducer using copious amounts of acoustic gel.   Findings: An anechoic area was demonstrated which represented the bladder, with measurement of residual urine as noted. I inspected this myself. In that the residual urine was stable or insignificant, refer to plan for treatment and plan necessary at this time.          Objective     Past Medical History:   Diagnosis Date    Anemia     Arthritis     COPD (chronic obstructive pulmonary disease)     INHALER  PRN    Hyperlipidemia      Hypertension     ON MEDS    Lung cancer     Pneumonia     LEFT LUNG CA    SOB (shortness of breath)     Stroke     TIA (transient ischemic attack)     NO RESIDUAL 2010       Past Surgical History:   Procedure Laterality Date    CARDIAC CATHETERIZATION Left 11/10/2022    Procedure: Aortogram with left leg angiogram, possible angioplasty or stenting;  Surgeon: Cuauhtemoc Thomas MD;  Location: [facility] CATH INVASIVE LOCATION;  Service: Vascular;  Laterality: Left;    CARDIAC CATHETERIZATION Right 1/13/2023    Procedure: Right leg angiogram, possible angioplasty or stenting;  Surgeon: Cuauhtemoc Thomas MD;  Location: [facility] CATH INVASIVE LOCATION;  Service: Vascular;  Laterality: Right;    LUNG BIOPSY      LUNG SURGERY      REMOVAL HALF OF UPPER PORTION LEFT LUNG    ORTHOPEDIC SURGERY Left     ROTATOR CUFF    THROAT SURGERY           Current Outpatient Medications:     albuterol sulfate  (90 Base) MCG/ACT inhaler, Inhale 2 puffs Every 4 (Four) Hours As Needed for Wheezing or Shortness of Air. (Patient not taking: Reported on 8/30/2023), Disp: 54 g, Rfl: 3    amLODIPine (NORVASC) 5 MG tablet, Take 1 tablet by mouth Daily. (Patient not taking: Reported on 8/30/2023), Disp: , Rfl:     Aspirin Low Dose 81 MG chewable tablet, , Disp: , Rfl:     cetirizine (zyrTEC) 10 MG tablet, Take 1 tablet by mouth Daily., Disp: , Rfl:     Cholecalciferol 25 MCG (1000 UT) capsule, Take 1 capsule by mouth Daily. (Patient not taking: Reported on 8/30/2023), Disp: , Rfl:     cilostazol (PLETAL) 100 MG tablet, Take 1 tablet by mouth 2 (Two) Times a Day., Disp: , Rfl:     cyclobenzaprine (FLEXERIL) 10 MG tablet, Take 1 tablet by mouth 3 (Three) Times a Day As Needed for Muscle Spasms. (Patient not taking: Reported on 8/30/2023), Disp: , Rfl:     ferrous sulfate 325 (65 FE) MG tablet, Take 1 tablet by mouth Daily With Breakfast., Disp: 90 tablet, Rfl: 1    meclizine (ANTIVERT) 25 MG tablet, Take 1 tablet by mouth 3 (Three) Times a Day As Needed  for Dizziness. (Patient not taking: Reported on 2023), Disp: 15 tablet, Rfl: 0    metoprolol succinate XL (TOPROL-XL) 25 MG 24 hr tablet, Take 1 tablet by mouth Daily., Disp: , Rfl:     multivitamins-minerals (PRESERVISION AREDS 2) capsule capsule, Take 1 capsule by mouth 2 (Two) Times a Day., Disp: , Rfl:     pantoprazole (PROTONIX) 40 MG EC tablet, Take 1 tablet by mouth Daily., Disp: , Rfl:     pravastatin (PRAVACHOL) 40 MG tablet, Take 1 tablet by mouth Every Night., Disp: , Rfl:     Refresh Tears 0.5 % solution, Administer 1 drop to both eyes Daily As Needed for Dry Eyes., Disp: , Rfl:     tamsulosin (FLOMAX) 0.4 MG capsule 24 hr capsule, Take 1 capsule by mouth 2 (Two) Times a Day., Disp: 180 capsule, Rfl: 4    vitamin D3 125 MCG (5000 UT) capsule capsule, Take 1 capsule by mouth Daily., Disp: , Rfl:     No Known Allergies     Family History   Problem Relation Age of Onset    Stomach cancer Other     Cancer Mother        Social History     Socioeconomic History    Marital status:    Tobacco Use    Smoking status: Former     Packs/day: 1.00     Years: 55.00     Pack years: 55.00     Types: Cigarettes     Start date:      Quit date:      Years since quittin.6     Passive exposure: Past    Smokeless tobacco: Current     Types: Snuff   Vaping Use    Vaping Use: Never used   Substance and Sexual Activity    Alcohol use: Not Currently     Comment: 3-4 GIN TONICS/NIGHT    Drug use: Never    Sexual activity: Defer       Vital Signs:   There were no vitals taken for this visit.                 Assessment and Plan    Diagnoses and all orders for this visit:    1. Benign prostatic hyperplasia with lower urinary tract symptoms, symptom details unspecified (Primary)    2. Recurrent urinary tract infection    3. Elevated PSA          Elevated PSA    Discussed patient's PSA and his previous prostate cancer screening.  PSA has risen.  I did recommend MRI fusion transrectal ultrasound-guided prostate  biopsy.    Discussed the natural history of prostate cancer and also prostate cancer screening.  We discussed his elevated PSA.  After risk and benefits were discussed the patient would like to proceed with prostate biopsy.  Risk of bleeding in the urine/semen/stool was discussed and also the 3% risk of sepsis.  We discussed the risk of severe rectal bleeding and also the risk of urinary retention. Patient voiced understanding.    We discussed not going ahead with biopsy could miss a urologic malignancy which could be detriment to his health or cause death.  Patient voiced understanding    Patient does not want biopsy and understands the risk.  We did discuss if he had a high risk prostate cancer and lived to be in his 90s that this could cause him problems with his health or death.    We also discussed watchful waiting/conservative monitoring of this lesion.       PSA in 3 months        BPH with recurrent UTIs      Cont  Flomax to 0.4 mg twice daily.     We have discussed TURP in the past, we are holding off as he is doing well

## 2023-09-08 ENCOUNTER — OFFICE VISIT (OUTPATIENT)
Dept: UROLOGY | Facility: CLINIC | Age: 83
End: 2023-09-08
Payer: MEDICARE

## 2023-09-08 VITALS — WEIGHT: 161 LBS | RESPIRATION RATE: 16 BRPM | BODY MASS INDEX: 25.27 KG/M2 | HEIGHT: 67 IN

## 2023-09-08 DIAGNOSIS — R97.20 ELEVATED PSA: ICD-10-CM

## 2023-09-08 DIAGNOSIS — N40.1 BENIGN PROSTATIC HYPERPLASIA WITH LOWER URINARY TRACT SYMPTOMS, SYMPTOM DETAILS UNSPECIFIED: Primary | ICD-10-CM

## 2023-09-08 DIAGNOSIS — N39.0 RECURRENT URINARY TRACT INFECTION: ICD-10-CM

## 2023-09-08 LAB
BILIRUB BLD-MCNC: NEGATIVE MG/DL
CLARITY, POC: CLEAR
COLOR UR: YELLOW
EXPIRATION DATE: NORMAL
GLUCOSE UR STRIP-MCNC: NEGATIVE MG/DL
KETONES UR QL: NEGATIVE
LEUKOCYTE EST, POC: NEGATIVE
Lab: NORMAL
NITRITE UR-MCNC: NEGATIVE MG/ML
PH UR: 7 [PH] (ref 5–8)
PROT UR STRIP-MCNC: NEGATIVE MG/DL
RBC # UR STRIP: NEGATIVE /UL
SP GR UR: 1.02 (ref 1–1.03)
SPECIMEN VOL 24H UR: 0 L
UROBILINOGEN UR QL: NORMAL

## 2023-09-20 ENCOUNTER — HOSPITAL ENCOUNTER (OUTPATIENT)
Dept: MRI IMAGING | Facility: HOSPITAL | Age: 83
Discharge: HOME | End: 2023-09-20
Admitting: OTOLARYNGOLOGY
Payer: MEDICARE

## 2023-09-20 DIAGNOSIS — H90.3 ASYMMETRICAL SENSORINEURAL HEARING LOSS: ICD-10-CM

## 2023-09-20 DIAGNOSIS — H91.22 SUDDEN LEFT HEARING LOSS: ICD-10-CM

## 2023-09-20 LAB
CREAT BLDA-MCNC: 0.7 MG/DL
EGFRCR SERPLBLD CKD-EPI 2021: 90.9 ML/MIN/1.73

## 2023-09-20 PROCEDURE — 70553 MRI BRAIN STEM W/O & W/DYE: CPT

## 2023-09-20 PROCEDURE — 0 GADOBENATE DIMEGLUMINE 529 MG/ML SOLUTION: Performed by: OTOLARYNGOLOGY

## 2023-09-20 PROCEDURE — A9577 INJ MULTIHANCE: HCPCS | Performed by: OTOLARYNGOLOGY

## 2023-09-20 PROCEDURE — 82565 ASSAY OF CREATININE: CPT

## 2023-09-20 RX ADMIN — GADOBENATE DIMEGLUMINE 15 ML: 529 INJECTION, SOLUTION INTRAVENOUS at 12:44

## 2023-09-28 ENCOUNTER — TELEPHONE (OUTPATIENT)
Dept: PULMONOLOGY | Facility: CLINIC | Age: 83
End: 2023-09-28
Payer: MEDICARE

## 2023-09-28 DIAGNOSIS — J43.9 PULMONARY EMPHYSEMA, UNSPECIFIED EMPHYSEMA TYPE: Primary | ICD-10-CM

## 2023-09-28 RX ORDER — FLUTICASONE FUROATE, UMECLIDINIUM BROMIDE AND VILANTEROL TRIFENATATE 100; 62.5; 25 UG/1; UG/1; UG/1
1 POWDER RESPIRATORY (INHALATION)
Qty: 1 EACH | Refills: 2 | Status: SHIPPED | OUTPATIENT
Start: 2023-09-28

## 2023-09-28 NOTE — TELEPHONE ENCOUNTER
Patient is needing to be put back on trelegy, he originally stopped the med on 05/18/23 but he had an extra at home he found about 3 weeks ago that he has been taking again and would like to continue. He tried picking up samples today but we could not give them without patient being re-placed on the med. Please advise, thank you.

## 2023-10-02 ENCOUNTER — TELEPHONE (OUTPATIENT)
Dept: UROLOGY | Facility: CLINIC | Age: 83
End: 2023-10-02
Payer: MEDICARE

## 2023-10-02 DIAGNOSIS — N40.0 BENIGN PROSTATIC HYPERPLASIA, UNSPECIFIED WHETHER LOWER URINARY TRACT SYMPTOMS PRESENT: ICD-10-CM

## 2023-10-02 RX ORDER — TAMSULOSIN HYDROCHLORIDE 0.4 MG/1
1 CAPSULE ORAL 2 TIMES DAILY
Qty: 180 CAPSULE | Refills: 4 | Status: SHIPPED | OUTPATIENT
Start: 2023-10-02

## 2023-10-04 ENCOUNTER — PROCEDURE VISIT (OUTPATIENT)
Dept: OTOLARYNGOLOGY | Facility: CLINIC | Age: 83
End: 2023-10-04
Payer: MEDICARE

## 2023-10-04 ENCOUNTER — OFFICE VISIT (OUTPATIENT)
Dept: OTOLARYNGOLOGY | Facility: CLINIC | Age: 83
End: 2023-10-04
Payer: MEDICARE

## 2023-10-04 VITALS — DIASTOLIC BLOOD PRESSURE: 58 MMHG | HEART RATE: 88 BPM | TEMPERATURE: 97.6 F | SYSTOLIC BLOOD PRESSURE: 146 MMHG

## 2023-10-04 DIAGNOSIS — H91.22 SUDDEN LEFT HEARING LOSS: Primary | ICD-10-CM

## 2023-10-04 DIAGNOSIS — H90.3 ASYMMETRICAL SENSORINEURAL HEARING LOSS: Primary | ICD-10-CM

## 2023-10-04 DIAGNOSIS — H91.22 SUDDEN LEFT HEARING LOSS: ICD-10-CM

## 2023-10-04 DIAGNOSIS — H90.A21 SENSORINEURAL HEARING LOSS (SNHL) OF RIGHT EAR WITH RESTRICTED HEARING OF LEFT EAR: ICD-10-CM

## 2023-10-04 DIAGNOSIS — H90.3 ASYMMETRICAL SENSORINEURAL HEARING LOSS: ICD-10-CM

## 2023-10-04 DIAGNOSIS — H90.A22 SENSORINEURAL HEARING LOSS (SNHL) OF LEFT EAR WITH RESTRICTED HEARING OF RIGHT EAR: ICD-10-CM

## 2023-10-04 PROCEDURE — 3077F SYST BP >= 140 MM HG: CPT | Performed by: OTOLARYNGOLOGY

## 2023-10-04 PROCEDURE — 3078F DIAST BP <80 MM HG: CPT | Performed by: OTOLARYNGOLOGY

## 2023-10-04 PROCEDURE — 99212 OFFICE O/P EST SF 10 MIN: CPT | Performed by: OTOLARYNGOLOGY

## 2023-10-04 RX ORDER — GABAPENTIN 300 MG/1
CAPSULE ORAL
COMMUNITY
Start: 2023-10-03 | End: 2023-10-05

## 2023-10-04 RX ORDER — SULFAMETHOXAZOLE AND TRIMETHOPRIM 800; 160 MG/1; MG/1
TABLET ORAL
COMMUNITY
Start: 2023-10-03 | End: 2023-10-05

## 2023-10-04 NOTE — PROGRESS NOTES
Patient Name: Carlos Zimmerman   Visit Date: 10/04/2023   Patient ID: [id number]  Provider: Serafin Contreras MD    Sex: male  Location: [facility]   YOB: 1940  Location Address: [address] [place] [address], [address] [place] [address],?[place]?[id number]    Primary Care Provider Payam Carroll MD  Location Phone: [phone]    Referring Provider: No ref. provider found        Chief Complaint  MRI/Audio results    History of Present Illness  Carlos Zimmerman is a 83 y.o. male who presents to [facility] today as a consult from No ref. provider found for evaluation of sudden hearing loss.  He was originally seen by ARTEMIO Nino on 8/3/2023 at which time he reported a 3-week history of left-sided hearing loss which developed acutely.  He had been on multiple rounds of antibiotics and prednisone without improvement.  He denied any tinnitus or vertigo.  He had not undergone any prior otologic surgery or otologic trauma.  He did report a significant history of noise exposure in the .  CT scan of the head without contrast on 7/25/2023 was unremarkable aside from diffuse atrophy and white matter changes.  Audiogram on 8/3/2023 revealed right normal downsloping to severe sensorineural hearing loss from 3000 8000 Hz and left-sided profound sensorineural hearing loss.  SRT was 25 on the right and 90 on the left.  Speech discrimination was 87% on the right at 60 dB and 40% on the left at 100 dB.  Tympanograms are type A.  He was placed on a burst and taper of prednisone.Audiogram on 8/25/2023 revealed right normal downsloping to severe to profound sensorineural hearing loss from 3000 to 8000 Hz and left-sided severe to profound mixed loss.  SRT is 25 on the right and 90 on the left.  Speech discrimination was 80% on the right at 65 dB and 47% on the left at 105 dB.  Tympanograms are type A.    He returns today for follow-up having completed intratympanic  dexamethasone injections of the left ear on 8/30/2023.  Unfortunately, he has not noticed any change in the hearing in his left ear.  Audiogram on 10/4/2023 revealed right normal downsloping to mild sensorineural hearing loss from 250 to 2000 Hz downsloping to severe to profound loss from 3000 8000 Hz and unchanged left severe to profound sensorineural hearing loss.  SRT is 35 on the right and 85 on the left.  Speech discrimination was 87% on the right at 65 dB and 40% on the left at 100 dB.  Tympanograms are type A.  MRI of the internal auditory canals with and without contrast on 9/20/2023 did not reveal any evidence of retrocochlear pathology.    Past Medical History:   Diagnosis Date    Anemia     Arthritis     COPD (chronic obstructive pulmonary disease)     INHALER  PRN    Hyperlipidemia     Hypertension     ON MEDS    Lung cancer     Pneumonia     LEFT LUNG CA    SOB (shortness of breath)     Stroke     TIA (transient ischemic attack)     NO RESIDUAL 2010       Past Surgical History:   Procedure Laterality Date    CARDIAC CATHETERIZATION Left 11/10/2022    Procedure: Aortogram with left leg angiogram, possible angioplasty or stenting;  Surgeon: Cuauhtemoc Thomas MD;  Location: [facility] CATH INVASIVE LOCATION;  Service: Vascular;  Laterality: Left;    CARDIAC CATHETERIZATION Right 1/13/2023    Procedure: Right leg angiogram, possible angioplasty or stenting;  Surgeon: Cuauhtemoc Thomas MD;  Location: [facility] CATH INVASIVE LOCATION;  Service: Vascular;  Laterality: Right;    LUNG BIOPSY      LUNG SURGERY      REMOVAL HALF OF UPPER PORTION LEFT LUNG    ORTHOPEDIC SURGERY Left     ROTATOR CUFF    THROAT SURGERY           Current Outpatient Medications:     albuterol sulfate  (90 Base) MCG/ACT inhaler, Inhale 2 puffs Every 4 (Four) Hours As Needed for Wheezing or Shortness of Air., Disp: 54 g, Rfl: 3    Aspirin Low Dose 81 MG chewable tablet, , Disp: , Rfl:     cetirizine (zyrTEC) 10 MG tablet, Take 1 tablet by  mouth Daily., Disp: , Rfl:     ferrous sulfate 325 (65 FE) MG tablet, Take 1 tablet by mouth Daily With Breakfast., Disp: 90 tablet, Rfl: 1    Fluticasone-Umeclidin-Vilant (Trelegy Ellipta) 100-62.5-25 MCG/ACT inhaler, Inhale 1 puff Daily., Disp: 1 each, Rfl: 2    metoprolol succinate XL (TOPROL-XL) 25 MG 24 hr tablet, Take 1 tablet by mouth Daily., Disp: , Rfl:     multivitamins-minerals (PRESERVISION AREDS 2) capsule capsule, Take 1 capsule by mouth 2 (Two) Times a Day., Disp: , Rfl:     pravastatin (PRAVACHOL) 40 MG tablet, Take 1 tablet by mouth Every Night., Disp: , Rfl:     Refresh Tears 0.5 % solution, Administer 1 drop to both eyes Daily As Needed for Dry Eyes., Disp: , Rfl:     sulfamethoxazole-trimethoprim (BACTRIM DS,SEPTRA DS) 800-160 MG per tablet, , Disp: , Rfl:     tamsulosin (FLOMAX) 0.4 MG capsule 24 hr capsule, Take 1 capsule by mouth 2 (Two) Times a Day., Disp: 180 capsule, Rfl: 4    vitamin D3 125 MCG (5000 UT) capsule capsule, Take 1 capsule by mouth Daily., Disp: , Rfl:     amLODIPine (NORVASC) 5 MG tablet, Take 1 tablet by mouth Daily. (Patient not taking: Reported on 8/30/2023), Disp: , Rfl:     Cholecalciferol 25 MCG (1000 UT) capsule, Take 1 capsule by mouth Daily., Disp: , Rfl:     cilostazol (PLETAL) 100 MG tablet, Take 1 tablet by mouth 2 (Two) Times a Day. (Patient not taking: Reported on 10/4/2023), Disp: , Rfl:     cyclobenzaprine (FLEXERIL) 10 MG tablet, Take 1 tablet by mouth 3 (Three) Times a Day As Needed for Muscle Spasms. (Patient not taking: Reported on 8/30/2023), Disp: , Rfl:     gabapentin (NEURONTIN) 300 MG capsule, , Disp: , Rfl:     meclizine (ANTIVERT) 25 MG tablet, Take 1 tablet by mouth 3 (Three) Times a Day As Needed for Dizziness. (Patient not taking: Reported on 8/30/2023), Disp: 15 tablet, Rfl: 0    pantoprazole (PROTONIX) 40 MG EC tablet, Take 1 tablet by mouth Daily. (Patient not taking: Reported on 10/4/2023), Disp: , Rfl:      No Known Allergies    Social  History     Tobacco Use    Smoking status: Former     Packs/day: 1.00     Years: 55.00     Pack years: 55.00     Types: Cigarettes     Start date:      Quit date:      Years since quittin.7     Passive exposure: Past    Smokeless tobacco: Current     Types: Snuff   Vaping Use    Vaping Use: Never used   Substance Use Topics    Alcohol use: Not Currently     Comment: 3-4 GIN TONICS/NIGHT    Drug use: Never        Objective     Vital Signs:   /58   Pulse 88   Temp 97.6 °F (36.4 °C)       Physical Exam    General: Well developed, well nourished patient of stated age in no acute distress. Voice is strong and clear.   Head: Normocephalic and atraumatic.  Face: No lesions.  Bilateral parotid and submandibular glands are unremarkable.  Stensen's and Warthin's ducts are productive of clear saliva bilaterally.  House-Brackmann I/VI     bilaterally.   muscles and temporomandibular joint nontender to palpation.  No TMJ crepitus.  Eyes: PERRLA, sclerae anicteric, no conjunctival injection. Extraocular movements are intact and full. No nystagmus.   Ears: Auricles are normal in appearance. Bilateral external auditory canals are unremarkable. Bilateral tympanic membranes are clear and without effusion. Hearing normal to conversational voice.   Nose: External nose is normal in appearance. Bilateral nares are patent with normal appearing mucosa. Septum midline. Turbinates are unremarkable. No lesions.   Oral Cavity: Lips are normal in appearance. Oral mucosa is unremarkable. Gingiva is unremarkable.  Partial dentition for age. Tongue is unremarkable with good movement. Hard palate is unremarkable.   Oropharynx: Soft palate is unremarkable with full movement. Uvula is unremarkable. Bilateral tonsils are unremarkable. Posterior oropharynx is unremarkable.    Larynx and hypopharynx: Deferred secondary to gag reflex.  Neck: Supple.  No mass.  Nontender to palpation.  Trachea midline. Thyroid normal size  and without nodules to palpation.   Lymphatic: No lymphadenopathy upon palpation.   Psychiatric: Appropriate affect, cooperative   Neurologic: Oriented x 3, strength symmetric in all extremities, Cranial Nerves II-XII are grossly intact to confrontation   Skin: Warm and dry. No rashes.    Procedures     Result Review :               Assessment and Plan    Diagnoses and all orders for this visit:    1. Sudden left hearing loss (Primary)    2. Asymmetrical sensorineural hearing loss        Impressions and findings were discussed at great length.  Unfortunately, he has not experienced any improvement in his left-sided hearing.  We reviewed and discussed the results of his MRI of the internal auditory canals with and without contrast which did not reveal any evidence of retrocochlear pathology.  We again discussed the differential for his sudden sensorineural hearing loss.  Options for management were discussed and he is cleared for right-sided amplification.  We discussed that a hearing aid would not be helpful in his left ear.  He will call to arrange follow-up if there is any change in his right-sided hearing.  He was given ample time to ask questions, all of which were answered to his satisfaction.    Follow Up   No follow-ups on file.  Patient was given instructions and counseling regarding his condition or for health maintenance advice. Please see specific information pulled into the AVS if appropriate.

## 2023-10-05 ENCOUNTER — OFFICE VISIT (OUTPATIENT)
Dept: NEUROLOGY | Facility: CLINIC | Age: 83
End: 2023-10-05
Payer: MEDICARE

## 2023-10-05 VITALS
HEIGHT: 67 IN | WEIGHT: 163 LBS | SYSTOLIC BLOOD PRESSURE: 120 MMHG | BODY MASS INDEX: 25.58 KG/M2 | DIASTOLIC BLOOD PRESSURE: 54 MMHG | HEART RATE: 99 BPM

## 2023-10-05 DIAGNOSIS — G56.03 BILATERAL CARPAL TUNNEL SYNDROME: Primary | ICD-10-CM

## 2023-10-05 DIAGNOSIS — M54.12 CERVICAL RADICULOPATHY: ICD-10-CM

## 2023-10-05 NOTE — PROGRESS NOTES
AUDIOMETRIC EVALUATION      Name:  Carlos Zimmerman  :  1940  Age:  83 y.o.  Date of Evaluation:  10/04/2023       History:  Mr. Zimmerman is seen today for a hearing evaluation for follow-up of sudden hearing loss at the left ear.    Audiologic Information:  Concerns for Hearing: Yes  PETs: No  Other otologic surgical history: No  Aural Pressure/Fullness: No  Otalgia: No  Otorrhea: No  Tinnitus: Yes  Dizziness: No  Noise Exposure: No  Family history of hearing loss: No  Head trauma requiring hospital stay: No  Chemotherapy: No  Other significant history: No    **Case history obtained in office and through EMR system      EVALUATION:    See audiogram    RESULTS:    Otoscopic Evaluation:        NOTE: Testing completed after ears were examined by Dr. Contreras.    Tympanometry (226 Hz):  Right: Type A  Left: Type A    IMPRESSIONS:  Pure tone thresholds for the right ear shows a mild sensorineural hearing loss at 1K and 2K hertz.  Severe and profound loss at 3K to 8K hertz.  Pure tone thresholds for the left ear shows severe and profound sensorineural hearing loss.  Hearing remains poor and unchanged compared to previous audio.  Patient was counseled with regard to the findings.    RECOMMENDATIONS/PLAN:  Follow-up recommendations Dr. Contreras.  Discussed results and recommendations with patient.         Rosalio Wright M.S, [facility]  Licensed Audiologist

## 2023-10-05 NOTE — ASSESSMENT & PLAN NOTE
Nerve conduction studies abnormal shows electrophysiologic evidence for probable severe bilateral carpal tunnel syndrome.  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  I would recommend for him to have carpal tunnel surgery since he has significant sensory deficits and atrophy of abductor pollicis brevis muscles left greater than right.

## 2023-10-05 NOTE — ASSESSMENT & PLAN NOTE
I will do an MRI of the cervical spine and if he does not have significant C8-T1 radiculopathy to explain his hand weakness I would recommend for him to have carpal tunnel surgery.

## 2023-10-05 NOTE — PROGRESS NOTES
"Chief Complaint  Neurologic Problem (Would like to discuss medication options for Neuropathy.)    Subjective          Carlos Zimmerman is a 83 y.o. male who presents to [facility] NEUROLOGY & NEUROSURGERY  History of Present Illness  83-year-old man evaluated for progressive numbness in his hands and feet.  He states that he had chemotherapy in 2016 and he started having progressive numbness in his hands and feet.  The numbness in his feet is only only to his ankle however also has hand numbness only to the wrist.  He drops things and he cannot hold on and tie his shoes.  He states that the hand numbness was never intermittent that have to shake his hands.  It involves all hand.  Objective   Vital Signs:   /54 (BP Location: Left arm, Patient Position: Sitting, Cuff Size: Adult)   Pulse 99   Ht 170.2 cm (67.01\")   Wt 73.9 kg (163 lb)   BMI 25.52 kg/m²     Physical Exam   There is no weakness proximally of the upper extremity on individual muscle testing.  There is no weakness of wrist flexors, finger flexors.  There is significant atrophy of the left abductor pollicis brevis and 3/5 strength.  There is atrophy of the right abductor pollicis brevis as well and 4-/5 strength.  There is 4+/5 strength of the intrinsic hand muscles supplied by the ulnar nerve.  Sensation is decreased to pinprick in a stocking distribution to the mid leg.  Reflexes are absent in biceps, triceps, brachioradialis, patellar's and ankles.  He is walking with right mono paretic and he states that he had a stroke in his left side.        Assessment and Plan  Diagnoses and all orders for this visit:    1. Bilateral carpal tunnel syndrome (Primary)  Assessment & Plan:  Nerve conduction studies abnormal shows electrophysiologic evidence for probable severe bilateral carpal tunnel syndrome.  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  I would recommend for him to have carpal tunnel surgery since he has significant " sensory deficits and atrophy of abductor pollicis brevis muscles left greater than right.    Orders:  -     Ambulatory Referral to Orthopedic Surgery    2. Cervical radiculopathy  Assessment & Plan:  I will do an MRI of the cervical spine and if he does not have significant C8-T1 radiculopathy to explain his hand weakness I would recommend for him to have carpal tunnel surgery.    Orders:  -     MRI Cervical Spine Without Contrast; Future  -     Ambulatory Referral to Orthopedic Surgery         Nerve Conduction Study:  8 nerves     EMG:  None    Total time spent with the patient and coordinating patient care was 30 minutes.    Follow Up  No follow-ups on file.  Patient was given instructions and counseling regarding his condition or for health maintenance advice. Please see specific information pulled into the AVS if appropriate.

## 2023-10-17 ENCOUNTER — OFFICE VISIT (OUTPATIENT)
Dept: ORTHOPEDIC SURGERY | Facility: CLINIC | Age: 83
End: 2023-10-17
Payer: MEDICARE

## 2023-10-17 VITALS
WEIGHT: 160 LBS | DIASTOLIC BLOOD PRESSURE: 77 MMHG | HEART RATE: 88 BPM | SYSTOLIC BLOOD PRESSURE: 130 MMHG | HEIGHT: 67 IN | OXYGEN SATURATION: 98 % | BODY MASS INDEX: 25.11 KG/M2

## 2023-10-17 DIAGNOSIS — M79.641 RIGHT HAND PAIN: Primary | ICD-10-CM

## 2023-10-17 DIAGNOSIS — M79.642 LEFT HAND PAIN: ICD-10-CM

## 2023-10-17 NOTE — PROGRESS NOTES
"Chief Complaint  Follow-up and Pain of the Left Hand and Follow-up and Pain of the Right Hand     Subjective      Carlos Zimmerman presents to [facility] ORTHOPEDICS for follow up of bilateral hands. He has had carpal tunnel symptoms for awhile. He notes he has numbness in his hands.  He notes the left one is worse.  He notes no pain at night. He notes they are asleep right now.  He has full .      No Known Allergies     Social History     Socioeconomic History    Marital status:    Tobacco Use    Smoking status: Former     Packs/day: 1.00     Years: 55.00     Additional pack years: 0.00     Total pack years: 55.00     Types: Cigarettes     Start date:      Quit date:      Years since quittin.8     Passive exposure: Past    Smokeless tobacco: Current     Types: Snuff   Vaping Use    Vaping Use: Never used   Substance and Sexual Activity    Alcohol use: Not Currently     Comment: 3-4 GIN TONICS/NIGHT    Drug use: Never    Sexual activity: Defer        I reviewed the patient's chief complaint, history of present illness, review of systems, past medical history, surgical history, family history, social history, medications, and allergy list.     Review of Systems     Constitutional: Denies fevers, chills, weight loss  Cardiovascular: Denies chest pain, shortness of breath  Skin: Denies rashes, acute skin changes  Neurologic: Denies headache, loss of consciousness        Vital Signs:   /77   Pulse 88   Ht 170.2 cm (67\")   Wt 72.6 kg (160 lb)   SpO2 98%   BMI 25.06 kg/m²          Physical Exam  General: Alert. No acute distress    Ortho Exam        BILATERAL HANDS Negative Compression testing/ Positive Tinels. NegativeFinkelsteins. Negative Paz's testing. Negative [facility] grind testing. Positive Phalens. Full ROM of the hand, fingers, elbow and wrist. Negative Triggering of the digit. Sensation grossly intact to light touch, median, radial and ulnar nerve. Positive AIN, " PIN and ulnar nerve motor function intact. Axillary nerve intact. Positive pulses.  Muscle atrophy noted.       Procedures      Imaging Results (Most Recent)       None             Result Review :      EMG 10/5/23  Nerve conduction studies abnormal shows electrophysiologic evidence for probable severe bilateral carpal tunnel syndrome.         Assessment and Plan     Diagnoses and all orders for this visit:    1. Right hand pain (Primary)    2. Left hand pain        Discussed the treatment plan with the patient. I reviewed the EMG with the patient.     Discussed the treatment options with the patient, operative vs non-operative. The patient is a candidate for carpal tunnel release.     The patient expressed understanding and wished to proceed with conservative measures as he notes it is not too bothersome.     HEP exercises.       Educated on risk of smoking. Discussed options for smoking cessation. and Call or return if worsening symptoms.    Follow Up     PRN      Patient was given instructions and counseling regarding his condition or for health maintenance advice. Please see specific information pulled into the AVS if appropriate.     Scribed for Jody Nieto MD by Helen Grimes MA.  10/17/23   10:32 EDT    I have personally performed the services described in this document as scribed by the above individual and it is both accurate and complete. Jody Nieto MD 10/17/23

## 2023-11-07 DIAGNOSIS — M54.12 CERVICAL RADICULOPATHY: ICD-10-CM

## 2023-11-14 ENCOUNTER — OFFICE VISIT (OUTPATIENT)
Dept: NEUROLOGY | Facility: CLINIC | Age: 83
End: 2023-11-14
Payer: MEDICARE

## 2023-11-14 VITALS
SYSTOLIC BLOOD PRESSURE: 128 MMHG | HEART RATE: 89 BPM | WEIGHT: 164 LBS | DIASTOLIC BLOOD PRESSURE: 56 MMHG | HEIGHT: 67 IN | BODY MASS INDEX: 25.74 KG/M2

## 2023-11-14 DIAGNOSIS — M47.812 CERVICAL SPONDYLOSIS WITHOUT MYELOPATHY: ICD-10-CM

## 2023-11-14 DIAGNOSIS — G56.02 CARPAL TUNNEL SYNDROME OF LEFT WRIST: Primary | ICD-10-CM

## 2023-11-14 PROCEDURE — 3074F SYST BP LT 130 MM HG: CPT | Performed by: PSYCHIATRY & NEUROLOGY

## 2023-11-14 PROCEDURE — 99213 OFFICE O/P EST LOW 20 MIN: CPT | Performed by: PSYCHIATRY & NEUROLOGY

## 2023-11-14 PROCEDURE — 3078F DIAST BP <80 MM HG: CPT | Performed by: PSYCHIATRY & NEUROLOGY

## 2023-11-14 PROCEDURE — 1160F RVW MEDS BY RX/DR IN RCRD: CPT | Performed by: PSYCHIATRY & NEUROLOGY

## 2023-11-14 PROCEDURE — 1159F MED LIST DOCD IN RCRD: CPT | Performed by: PSYCHIATRY & NEUROLOGY

## 2023-11-14 RX ORDER — FLUTICASONE FUROATE, UMECLIDINIUM BROMIDE AND VILANTEROL TRIFENATATE 100; 62.5; 25 UG/1; UG/1; UG/1
1 POWDER RESPIRATORY (INHALATION)
COMMUNITY
Start: 2023-10-28

## 2023-11-14 NOTE — PROGRESS NOTES
"Chief Complaint  Results (Review MRI)    Subjective          Carols Zimmerman is a 83 y.o. male who presents to [facility] NEUROLOGY & NEUROSURGERY  History of Present Illness  83-year-old man here for follow-up of his MRI of the cervical spine.  MRI of the cervical spine shows significant neuroforaminal narrowing at several levels.  He was told that carpal tunnel surgery will not be helpful to him.   Objective   Vital Signs:   /56   Pulse 89   Ht 170.2 cm (67.01\")   Wt 74.4 kg (164 lb)   BMI 25.68 kg/m²     Physical Exam       On examination there is atrophy of the left abductor pollicis brevis out of proportion to the left ulnar innervated muscles.  In the right side there is bilateral 4/5 strength in the abductor pollicis brevis muscle as well as the ulnar innervated intrinsic hand muscles.  There is no weakness of finger flexors, and extension of the wrist, triceps.  Reflexes are decreased in the biceps, triceps, patellar's and absent in the ankles.     Assessment and Plan  Diagnoses and all orders for this visit:    1. Carpal tunnel syndrome of left wrist (Primary)  Assessment & Plan:  Discussed with him that it is difficult to make a clinical or and electrophysiologic diagnosis of carpal tunnel syndrome because of his neuropathy however I discussed with him that I would recommend for him to have carpal tunnel surgery to the left hand to see if this can have improvement of symptoms.  I will refer him to neurosurgery.  Thank you for letting me participate in his care.    Orders:  -     Ambulatory Referral to Neurosurgery    2. Cervical spondylosis without myelopathy         Total time spent with the patient and coordinating patient care was 20 minutes.    Follow Up  No follow-ups on file.  Patient was given instructions and counseling regarding his condition or for health maintenance advice. Please see specific information pulled into the AVS if appropriate.       "

## 2023-11-14 NOTE — ASSESSMENT & PLAN NOTE
Discussed with him that it is difficult to make a clinical or and electrophysiologic diagnosis of carpal tunnel syndrome because of his neuropathy however I discussed with him that I would recommend for him to have carpal tunnel surgery to the left hand to see if this can have improvement of symptoms.  I will refer him to neurosurgery.  Thank you for letting me participate in his care.

## 2023-11-27 ENCOUNTER — LAB (OUTPATIENT)
Dept: LAB | Facility: HOSPITAL | Age: 83
End: 2023-11-27
Payer: MEDICARE

## 2023-11-27 DIAGNOSIS — R97.20 ELEVATED PSA: ICD-10-CM

## 2023-11-27 DIAGNOSIS — N39.0 RECURRENT URINARY TRACT INFECTION: ICD-10-CM

## 2023-11-27 DIAGNOSIS — N40.1 BENIGN PROSTATIC HYPERPLASIA WITH LOWER URINARY TRACT SYMPTOMS, SYMPTOM DETAILS UNSPECIFIED: ICD-10-CM

## 2023-11-27 DIAGNOSIS — D50.9 IRON DEFICIENCY ANEMIA, UNSPECIFIED IRON DEFICIENCY ANEMIA TYPE: ICD-10-CM

## 2023-11-27 LAB
BASOPHILS # BLD AUTO: 0.03 10*3/MM3 (ref 0–0.2)
BASOPHILS NFR BLD AUTO: 0.4 % (ref 0–1.5)
DEPRECATED RDW RBC AUTO: 44.2 FL (ref 37–54)
EOSINOPHIL # BLD AUTO: 0.15 10*3/MM3 (ref 0–0.4)
EOSINOPHIL NFR BLD AUTO: 1.8 % (ref 0.3–6.2)
ERYTHROCYTE [DISTWIDTH] IN BLOOD BY AUTOMATED COUNT: 13.2 % (ref 12.3–15.4)
FERRITIN SERPL-MCNC: 150.4 NG/ML (ref 30–400)
HCT VFR BLD AUTO: 44.3 % (ref 37.5–51)
HGB BLD-MCNC: 13.9 G/DL (ref 13–17.7)
IMM GRANULOCYTES # BLD AUTO: 0.03 10*3/MM3 (ref 0–0.05)
IMM GRANULOCYTES NFR BLD AUTO: 0.4 % (ref 0–0.5)
IRON 24H UR-MRATE: 111 MCG/DL (ref 59–158)
IRON SATN MFR SERPL: 32 % (ref 20–50)
LYMPHOCYTES # BLD AUTO: 2.31 10*3/MM3 (ref 0.7–3.1)
LYMPHOCYTES NFR BLD AUTO: 27.6 % (ref 19.6–45.3)
MCH RBC QN AUTO: 28.5 PG (ref 26.6–33)
MCHC RBC AUTO-ENTMCNC: 31.4 G/DL (ref 31.5–35.7)
MCV RBC AUTO: 91 FL (ref 79–97)
MONOCYTES # BLD AUTO: 0.63 10*3/MM3 (ref 0.1–0.9)
MONOCYTES NFR BLD AUTO: 7.5 % (ref 5–12)
NEUTROPHILS NFR BLD AUTO: 5.22 10*3/MM3 (ref 1.7–7)
NEUTROPHILS NFR BLD AUTO: 62.3 % (ref 42.7–76)
NRBC BLD AUTO-RTO: 0 /100 WBC (ref 0–0.2)
PLATELET # BLD AUTO: 209 10*3/MM3 (ref 140–450)
PMV BLD AUTO: 10.7 FL (ref 6–12)
PSA SERPL-MCNC: 11.8 NG/ML (ref 0–4)
RBC # BLD AUTO: 4.87 10*6/MM3 (ref 4.14–5.8)
TIBC SERPL-MCNC: 344 MCG/DL (ref 298–536)
TRANSFERRIN SERPL-MCNC: 231 MG/DL (ref 200–360)
WBC NRBC COR # BLD AUTO: 8.37 10*3/MM3 (ref 3.4–10.8)

## 2023-11-27 PROCEDURE — 83540 ASSAY OF IRON: CPT

## 2023-11-27 PROCEDURE — 84153 ASSAY OF PSA TOTAL: CPT

## 2023-11-27 PROCEDURE — 85025 COMPLETE CBC W/AUTO DIFF WBC: CPT

## 2023-11-27 PROCEDURE — 36415 COLL VENOUS BLD VENIPUNCTURE: CPT

## 2023-11-27 PROCEDURE — 82728 ASSAY OF FERRITIN: CPT

## 2023-11-27 PROCEDURE — 84466 ASSAY OF TRANSFERRIN: CPT

## 2023-11-27 RX ORDER — FLUTICASONE FUROATE, UMECLIDINIUM BROMIDE AND VILANTEROL TRIFENATATE 100; 62.5; 25 UG/1; UG/1; UG/1
1 POWDER RESPIRATORY (INHALATION)
Qty: 60 EACH | Refills: 5 | Status: SHIPPED | OUTPATIENT
Start: 2023-11-27

## 2023-11-27 NOTE — TELEPHONE ENCOUNTER
Patient called and stated he is needing a RX sent into [facility] for Trelegy. Patient would like a call back.

## 2023-11-29 RX ORDER — FLUTICASONE FUROATE, UMECLIDINIUM BROMIDE AND VILANTEROL TRIFENATATE 100; 62.5; 25 UG/1; UG/1; UG/1
1 POWDER RESPIRATORY (INHALATION)
Qty: 2 EACH | Refills: 0 | COMMUNITY
Start: 2023-11-29 | End: 2023-11-30

## 2023-12-04 ENCOUNTER — HOSPITAL ENCOUNTER (OUTPATIENT)
Dept: GENERAL RADIOLOGY | Facility: HOSPITAL | Age: 83
Discharge: HOME | End: 2023-12-04
Admitting: INTERNAL MEDICINE
Payer: MEDICARE

## 2023-12-04 ENCOUNTER — TRANSCRIBE ORDERS (OUTPATIENT)
Dept: GENERAL RADIOLOGY | Facility: HOSPITAL | Age: 83
End: 2023-12-04
Payer: MEDICARE

## 2023-12-04 DIAGNOSIS — M25.511 RIGHT SHOULDER PAIN, UNSPECIFIED CHRONICITY: Primary | ICD-10-CM

## 2023-12-04 DIAGNOSIS — M25.511 RIGHT SHOULDER PAIN, UNSPECIFIED CHRONICITY: ICD-10-CM

## 2023-12-04 PROCEDURE — 73030 X-RAY EXAM OF SHOULDER: CPT

## 2023-12-05 NOTE — PROGRESS NOTES
Chief Complaint: Urologic complaint    Subjective         History of Present Illness  Carlos Zimmerman is a 83 y.o. male         Recurrent UTI  BPH  Elevated PSA      on Flomax 0.4 mg BID.    No straining.  Nocturia x0.  Not bothered          No cardiopulmonary history.  Non-smoker.  No anticoagulation.  History of lung cancer years ago treated with surgery      Some of this family did live in the 90s      PVR    9/23  000  9/22  061      2016 lung cancer s/p resection and chemo -does still deal with neuropathy      Previous        1/23 cystoscopy-3 cm prostate large bladder with mild trabeculation, no pathology.    7/22 renal ultrasound-normal  7/22 0.59, GFR 96    Urine cultures    7/22 Serratia-resistant to Ancef and nitrofurantoin  2/21 Serratia     No gross hematuria      No history of kidney  stone.    No urologic family history,   no history of urologic surgery.        PSA    11/23    11.8   8/23      11.9      2/20/2023 MRI prostate-49 g  PI - RADS 4 - left posterior mid gland peripheral zone.  1.0 x 0.7 cm.  Seminal vesicles, neurovascular bundle and lymph nodes negative  1/23       8.0             Objective     Past Medical History:   Diagnosis Date    Anemia     Arthritis     COPD (chronic obstructive pulmonary disease)     INHALER  PRN    Hyperlipidemia     Hypertension     ON MEDS    Lung cancer     Pneumonia     LEFT LUNG CA    SOB (shortness of breath)     Stroke     TIA (transient ischemic attack)     NO RESIDUAL 2010       Past Surgical History:   Procedure Laterality Date    CARDIAC CATHETERIZATION Left 11/10/2022    Procedure: Aortogram with left leg angiogram, possible angioplasty or stenting;  Surgeon: Cuauhtemoc Thomas MD;  Location: [facility] CATH INVASIVE LOCATION;  Service: Vascular;  Laterality: Left;    CARDIAC CATHETERIZATION Right 1/13/2023    Procedure: Right leg angiogram, possible angioplasty or stenting;  Surgeon: Cuauhtemoc Thomas MD;  Location: [facility] CATH INVASIVE LOCATION;  Service:  Vascular;  Laterality: Right;    LUNG BIOPSY      LUNG SURGERY      REMOVAL HALF OF UPPER PORTION LEFT LUNG    ORTHOPEDIC SURGERY Left     ROTATOR CUFF    THROAT SURGERY           Current Outpatient Medications:     Aspirin Low Dose 81 MG chewable tablet, , Disp: , Rfl:     Cholecalciferol 25 MCG (1000 UT) capsule, Take 1 capsule by mouth Daily., Disp: , Rfl:     cilostazol (PLETAL) 100 MG tablet, Take 1 tablet by mouth 2 (Two) Times a Day., Disp: , Rfl:     ferrous sulfate 325 (65 FE) MG tablet, Take 1 tablet by mouth Daily With Breakfast., Disp: 90 tablet, Rfl: 1    metoprolol succinate XL (TOPROL-XL) 25 MG 24 hr tablet, Take 1 tablet by mouth Daily., Disp: , Rfl:     multivitamins-minerals (PRESERVISION AREDS 2) capsule capsule, Take 1 capsule by mouth 2 (Two) Times a Day., Disp: , Rfl:     pantoprazole (PROTONIX) 40 MG EC tablet, Take 1 tablet by mouth Daily., Disp: , Rfl:     pravastatin (PRAVACHOL) 40 MG tablet, Take 1 tablet by mouth Every Night., Disp: , Rfl:     Refresh Tears 0.5 % solution, Administer 1 drop to both eyes Daily As Needed for Dry Eyes., Disp: , Rfl:     tamsulosin (FLOMAX) 0.4 MG capsule 24 hr capsule, Take 1 capsule by mouth 2 (Two) Times a Day., Disp: 180 capsule, Rfl: 4    Trelegy Ellipta 100-62.5-25 MCG/ACT inhaler, Inhale 1 puff Daily., Disp: 60 each, Rfl: 5    vitamin D3 125 MCG (5000 UT) capsule capsule, Take 1 capsule by mouth Daily., Disp: , Rfl:     No Known Allergies     Family History   Problem Relation Age of Onset    Stomach cancer Other     Cancer Mother        Social History     Socioeconomic History    Marital status:    Tobacco Use    Smoking status: Former     Packs/day: 1.00     Years: 55.00     Additional pack years: 0.00     Total pack years: 55.00     Types: Cigarettes     Start date:      Quit date:      Years since quittin.9     Passive exposure: Past    Smokeless tobacco: Current     Types: Snuff   Vaping Use    Vaping Use: Never used   Substance  and Sexual Activity    Alcohol use: Not Currently     Comment: 3-4 GIN TONICS/NIGHT    Drug use: Never    Sexual activity: Defer       Vital Signs:   There were no vitals taken for this visit.                 Assessment and Plan    Diagnoses and all orders for this visit:    1. Benign prostatic hyperplasia with lower urinary tract symptoms, symptom details unspecified (Primary)    2. Recurrent urinary tract infection    3. Elevated PSA          Elevated PSA      We have recommended prostate biopsy in the past, after discussion patient had elected not to do this.  He understood the risk        PSA over the last few months has stayed the same        Patient at this time would like to conservatively monitor.       F/u with PSA in 6 months, we will plan on doing repeat MRI if PSA rises      Patient understands rule out malignancy must follow-up          BPH with recurrent UTIs      Cont  Flomax to 0.4 mg twice daily.  Not currently interested in procedures

## 2023-12-08 ENCOUNTER — OFFICE VISIT (OUTPATIENT)
Dept: UROLOGY | Facility: CLINIC | Age: 83
End: 2023-12-08
Payer: MEDICARE

## 2023-12-08 VITALS — WEIGHT: 162 LBS | RESPIRATION RATE: 16 BRPM | HEIGHT: 67 IN | BODY MASS INDEX: 25.43 KG/M2

## 2023-12-08 DIAGNOSIS — R97.20 ELEVATED PSA: ICD-10-CM

## 2023-12-08 DIAGNOSIS — N40.1 BENIGN PROSTATIC HYPERPLASIA WITH LOWER URINARY TRACT SYMPTOMS, SYMPTOM DETAILS UNSPECIFIED: Primary | ICD-10-CM

## 2023-12-08 DIAGNOSIS — N39.0 RECURRENT URINARY TRACT INFECTION: ICD-10-CM

## 2023-12-27 ENCOUNTER — TRANSCRIBE ORDERS (OUTPATIENT)
Dept: LAB | Facility: HOSPITAL | Age: 83
End: 2023-12-27
Payer: MEDICARE

## 2023-12-27 ENCOUNTER — LAB (OUTPATIENT)
Dept: LAB | Facility: HOSPITAL | Age: 83
End: 2023-12-27
Payer: MEDICARE

## 2023-12-27 DIAGNOSIS — E78.5 HYPERLIPIDEMIA, UNSPECIFIED HYPERLIPIDEMIA TYPE: ICD-10-CM

## 2023-12-27 DIAGNOSIS — I10 ESSENTIAL HYPERTENSION, MALIGNANT: ICD-10-CM

## 2023-12-27 DIAGNOSIS — I10 ESSENTIAL HYPERTENSION, MALIGNANT: Primary | ICD-10-CM

## 2023-12-27 LAB
ALBUMIN SERPL-MCNC: 4 G/DL (ref 3.5–5.2)
ALBUMIN/GLOB SERPL: 1.8 G/DL
ALP SERPL-CCNC: 122 U/L (ref 39–117)
ALT SERPL W P-5'-P-CCNC: 21 U/L (ref 1–41)
ANION GAP SERPL CALCULATED.3IONS-SCNC: 11.9 MMOL/L (ref 5–15)
AST SERPL-CCNC: 23 U/L (ref 1–40)
BILIRUB SERPL-MCNC: 0.5 MG/DL (ref 0–1.2)
BUN SERPL-MCNC: 9 MG/DL (ref 8–23)
BUN/CREAT SERPL: 13 (ref 7–25)
CALCIUM SPEC-SCNC: 9.4 MG/DL (ref 8.6–10.5)
CHLORIDE SERPL-SCNC: 105 MMOL/L (ref 98–107)
CHOLEST SERPL-MCNC: 164 MG/DL (ref 0–200)
CO2 SERPL-SCNC: 27.1 MMOL/L (ref 22–29)
CREAT SERPL-MCNC: 0.69 MG/DL (ref 0.76–1.27)
EGFRCR SERPLBLD CKD-EPI 2021: 91.3 ML/MIN/1.73
GLOBULIN UR ELPH-MCNC: 2.2 GM/DL
GLUCOSE SERPL-MCNC: 93 MG/DL (ref 65–99)
HDLC SERPL-MCNC: 64 MG/DL (ref 40–60)
LDLC SERPL CALC-MCNC: 87 MG/DL (ref 0–100)
LDLC/HDLC SERPL: 1.34 {RATIO}
POTASSIUM SERPL-SCNC: 4.1 MMOL/L (ref 3.5–5.2)
PROT SERPL-MCNC: 6.2 G/DL (ref 6–8.5)
SODIUM SERPL-SCNC: 144 MMOL/L (ref 136–145)
TRIGL SERPL-MCNC: 70 MG/DL (ref 0–150)
VLDLC SERPL-MCNC: 13 MG/DL (ref 5–40)

## 2023-12-27 PROCEDURE — 80061 LIPID PANEL: CPT

## 2023-12-27 PROCEDURE — 36415 COLL VENOUS BLD VENIPUNCTURE: CPT

## 2023-12-27 PROCEDURE — 80053 COMPREHEN METABOLIC PANEL: CPT

## 2024-01-23 ENCOUNTER — OFFICE VISIT (OUTPATIENT)
Dept: NEUROSURGERY | Facility: CLINIC | Age: 84
End: 2024-01-23
Payer: MEDICARE

## 2024-01-23 VITALS
SYSTOLIC BLOOD PRESSURE: 132 MMHG | DIASTOLIC BLOOD PRESSURE: 72 MMHG | HEIGHT: 67 IN | BODY MASS INDEX: 26.16 KG/M2 | WEIGHT: 166.7 LBS

## 2024-01-23 DIAGNOSIS — G56.02 CARPAL TUNNEL SYNDROME OF LEFT WRIST: Primary | ICD-10-CM

## 2024-01-23 PROCEDURE — 1160F RVW MEDS BY RX/DR IN RCRD: CPT | Performed by: NEUROLOGICAL SURGERY

## 2024-01-23 PROCEDURE — 99203 OFFICE O/P NEW LOW 30 MIN: CPT | Performed by: NEUROLOGICAL SURGERY

## 2024-01-23 PROCEDURE — 1159F MED LIST DOCD IN RCRD: CPT | Performed by: NEUROLOGICAL SURGERY

## 2024-01-23 PROCEDURE — 3075F SYST BP GE 130 - 139MM HG: CPT | Performed by: NEUROLOGICAL SURGERY

## 2024-01-23 PROCEDURE — 3078F DIAST BP <80 MM HG: CPT | Performed by: NEUROLOGICAL SURGERY

## 2024-01-23 RX ORDER — GABAPENTIN 300 MG/1
300 CAPSULE ORAL 3 TIMES DAILY
COMMUNITY
Start: 2024-01-13

## 2024-01-23 NOTE — PROGRESS NOTES
"Chief Complaint  Numbness    Subjective          Carlos Zimmerman who is a 84 y.o. year old male who presents to [facility] NEUROLOGY & NEUROSURGERY for Evaluation of Peripheral Nerves.     The patient complains of tingling/weakness located in the Media Nerve Distribution.  The Patient is right handed. Patients states the tingling and weakness has been present for several years.  The weakness has gotten worse over the last 6 months or so. The symptoms came on gradually.  The pain scaled level is 0.  The tingling is constant.  The tingling is worse at no particular time of day. Patient states nothing improves the symptoms.      He was sent here by Dr. Landa who performed his nerve study. From his note: \"Discussed with him that it is difficult to make a clinical or and electrophysiologic diagnosis of carpal tunnel syndrome because of his neuropathy however I discussed with him that I would recommend for him to have carpal tunnel surgery to the left hand to see if this can have improvement of symptoms.  I will refer him to neurosurgery.\"    Associated Symptoms Include: Left hand weakness   Conservative Interventions Include: None      Review of Systems   Neurological:  Positive for numbness.        Objective   Vital Signs:   /72 (BP Location: Left arm, Patient Position: Sitting)   Ht 170.2 cm (67\")   Wt 75.6 kg (166 lb 11.2 oz)   BMI 26.11 kg/m²       Physical Exam  Constitutional:       Appearance: He is normal weight.   Cardiovascular:      Comments: No notable edema   Pulmonary:      Effort: Pulmonary effort is normal.   Neurological:      Mental Status: He is alert.      Motor: Weakness present.        Neurologic Exam   Phalen's : Absent, Both  Wrist, Tinel's: Present, Both  Opponens Pollicis Brevis Strength: 3/5 on the left  Hand Atrophy: Present,  mild on the left          Result Review :   See above for EMG/NCV       Assessment and Plan    Diagnoses and all orders for this " visit:    1. Carpal tunnel syndrome of left wrist (Primary)  -     Case Request; Standing  -     Follow Anesthesia Guidelines / Protocol; Standing  -     Obtain informed consent; Standing  -     SCD (sequential compression device)- to be placed on patient in Pre-op; Standing  -     Verify / Perform Chlorhexidine Skin Prep; Standing  -     ceFAZolin (ANCEF) 2 g in sodium chloride 0.9 % 100 mL IVPB  -     Case Request    I feel the symptoms are at least in part due to carpal tunnel at the left wrist. He would like to pursue release in an attempt reduce the symptoms.      Follow Up   No follow-ups on file.  Patient was given instructions and counseling regarding his condition or for health maintenance advice. Please see specific information pulled into the AVS if appropriate.

## 2024-01-23 NOTE — H&P (VIEW-ONLY)
"Chief Complaint  Numbness    Subjective          Carlos Zimmerman who is a 84 y.o. year old male who presents to [facility] NEUROLOGY & NEUROSURGERY for Evaluation of Peripheral Nerves.     The patient complains of tingling/weakness located in the Media Nerve Distribution.  The Patient is right handed. Patients states the tingling and weakness has been present for several years.  The weakness has gotten worse over the last 6 months or so. The symptoms came on gradually.  The pain scaled level is 0.  The tingling is constant.  The tingling is worse at no particular time of day. Patient states nothing improves the symptoms.      He was sent here by Dr. Landa who performed his nerve study. From his note: \"Discussed with him that it is difficult to make a clinical or and electrophysiologic diagnosis of carpal tunnel syndrome because of his neuropathy however I discussed with him that I would recommend for him to have carpal tunnel surgery to the left hand to see if this can have improvement of symptoms.  I will refer him to neurosurgery.\"    Associated Symptoms Include: Left hand weakness   Conservative Interventions Include: None      Review of Systems   Neurological:  Positive for numbness.        Objective   Vital Signs:   /72 (BP Location: Left arm, Patient Position: Sitting)   Ht 170.2 cm (67\")   Wt 75.6 kg (166 lb 11.2 oz)   BMI 26.11 kg/m²       Physical Exam  Constitutional:       Appearance: He is normal weight.   Cardiovascular:      Comments: No notable edema   Pulmonary:      Effort: Pulmonary effort is normal.   Neurological:      Mental Status: He is alert.      Motor: Weakness present.        Neurologic Exam   Phalen's : Absent, Both  Wrist, Tinel's: Present, Both  Opponens Pollicis Brevis Strength: 3/5 on the left  Hand Atrophy: Present,  mild on the left          Result Review :   See above for EMG/NCV       Assessment and Plan    Diagnoses and all orders for this " visit:    1. Carpal tunnel syndrome of left wrist (Primary)  -     Case Request; Standing  -     Follow Anesthesia Guidelines / Protocol; Standing  -     Obtain informed consent; Standing  -     SCD (sequential compression device)- to be placed on patient in Pre-op; Standing  -     Verify / Perform Chlorhexidine Skin Prep; Standing  -     ceFAZolin (ANCEF) 2 g in sodium chloride 0.9 % 100 mL IVPB  -     Case Request    I feel the symptoms are at least in part due to carpal tunnel at the left wrist. He would like to pursue release in an attempt reduce the symptoms.      Follow Up   No follow-ups on file.  Patient was given instructions and counseling regarding his condition or for health maintenance advice. Please see specific information pulled into the AVS if appropriate.

## 2024-01-31 ENCOUNTER — OFFICE VISIT (OUTPATIENT)
Dept: PULMONOLOGY | Facility: CLINIC | Age: 84
End: 2024-01-31
Payer: MEDICARE

## 2024-01-31 VITALS
TEMPERATURE: 97.5 F | DIASTOLIC BLOOD PRESSURE: 58 MMHG | BODY MASS INDEX: 25.93 KG/M2 | SYSTOLIC BLOOD PRESSURE: 125 MMHG | HEIGHT: 67 IN | RESPIRATION RATE: 16 BRPM | HEART RATE: 92 BPM | OXYGEN SATURATION: 97 % | WEIGHT: 165.2 LBS

## 2024-01-31 DIAGNOSIS — J44.9 CHRONIC OBSTRUCTIVE PULMONARY DISEASE, UNSPECIFIED COPD TYPE: ICD-10-CM

## 2024-01-31 DIAGNOSIS — R91.1 SOLITARY LUNG NODULE: ICD-10-CM

## 2024-01-31 DIAGNOSIS — J18.9 PNEUMONIA OF RIGHT LUNG DUE TO INFECTIOUS ORGANISM, UNSPECIFIED PART OF LUNG: ICD-10-CM

## 2024-01-31 DIAGNOSIS — J98.4 LUNG DISEASE: ICD-10-CM

## 2024-01-31 DIAGNOSIS — C34.32 MALIGNANT NEOPLASM OF LOWER LOBE OF LEFT LUNG: Primary | ICD-10-CM

## 2024-01-31 RX ORDER — BUDESONIDE, GLYCOPYRROLATE, AND FORMOTEROL FUMARATE 160; 9; 4.8 UG/1; UG/1; UG/1
2 AEROSOL, METERED RESPIRATORY (INHALATION) 2 TIMES DAILY
Qty: 1 EACH | Refills: 9 | Status: SHIPPED | OUTPATIENT
Start: 2024-01-31

## 2024-01-31 NOTE — PROGRESS NOTES
"Chief Complaint  COPD, Shortness of Breath, and Follow-up (Pt here for 6 month follow up)    Subjective          Carlos Zimmerman presents to [facility]  COPD  He complains of shortness of breath.   Lung Cancer    Pneumonia  He complains of shortness of breath.   Shortness of Breath         Mr. 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  He is here for follow-up.  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  There was new 3 mm lung nodule seen on the last CT scan of the chest in February 2023.  He continues to be on trilogy Ellipta once daily and albuterol which he uses once or twice a week.  He is using Trelegy Ellipta irregularly and intermittently.  He does not feel like trilogy helps and has looked at his side effects and is worried that he is having more problems with it.  He has some leg pain with walking more than 50 yards.  He continues to have mild leg swelling.  He has been seeing Dr. Mcnair in cardiology, has not been on any diuretics.  He had echocardiogram through cardiology service and was told it was normal.  He already is up-to-date on Covid vaccine.  He has not had any changes in weight or appetite.  No chest pain no chest tightness.  No nausea or vomiting.  No fever or chills.  He already had flu shot through his pharmacy.  He had pneumonia vaccine through pharmacy.  He has not had RSV vaccine yet.      Objective   Vital Signs:   /58 (BP Location: Right arm, Patient Position: Sitting, Cuff Size: Adult)   Pulse 92   Temp 97.5 °F (36.4 °C) (Temporal)   Resp 16   Ht 170.2 cm (67\")   Wt 74.9 kg (165 lb 3.2 oz)   SpO2 97% Comment: room air  BMI 25.87 kg/m²     Physical Exam  Vitals and nursing note reviewed.   Constitutional: "       General: He is not in acute distress.     Appearance: Normal appearance. He is normal weight.   HENT:      Head: Normocephalic and atraumatic.      Right Ear: Hearing normal.      Left Ear: Hearing normal.      Nose: No nasal tenderness or congestion.      Mouth/Throat:      Mouth: Mucous membranes are moist. No oral lesions.      Pharynx: Oropharynx is clear.   Eyes:      Extraocular Movements: Extraocular movements intact.      Pupils: Pupils are equal, round, and reactive to light.   Neck:      Thyroid: No thyroid mass or thyromegaly.   Cardiovascular:      Rate and Rhythm: Normal rate and regular rhythm.      Pulses: Normal pulses.      Heart sounds: No murmur heard.  Pulmonary:      Effort: Pulmonary effort is normal. No respiratory distress.      Breath sounds: No wheezing, rhonchi or rales.      Comments: Bilateral diminished breath sounds, no wheezing crackles or rhonchi, resonant to percussion bilaterally  Chest:      Chest wall: No tenderness.   Abdominal:      General: Bowel sounds are normal. There is no distension.      Palpations: Abdomen is soft. There is no mass.      Tenderness: There is no abdominal tenderness. There is no guarding.   Musculoskeletal:         General: Normal range of motion.      Cervical back: Normal range of motion and neck supple.      Right lower leg: No edema.      Left lower leg: No edema.   Lymphadenopathy:      Cervical: No cervical adenopathy.      Upper Body:      Right upper body: No supraclavicular or axillary adenopathy.      Left upper body: No supraclavicular or axillary adenopathy.   Skin:     General: Skin is warm and dry.      Capillary Refill: Capillary refill takes less than 2 seconds.      Findings: No lesion or rash.   Neurological:      General: No focal deficit present.      Mental Status: He is alert and oriented to person, place, and time.   Psychiatric:         Mood and Affect: Mood and affect normal. Mood is not anxious or depressed.          Behavior: Behavior normal.        Result Review :   The following data was reviewed by: Dayne Choudhary MD on 06/16/2021:  Common labs          9/20/2023    12:18 11/27/2023    08:46 12/27/2023    08:37   Common Labs   Glucose   93    BUN   9    Creatinine 0.70   0.69    Sodium   144    Potassium   4.1    Chloride   105    Calcium   9.4    Albumin   4.0    Total Bilirubin   0.5    Alkaline Phosphatase   122    AST (SGOT)   23    ALT (SGPT)   21    WBC  8.37     Hemoglobin  13.9     Hematocrit  44.3     Platelets  209     Total Cholesterol   164    Triglycerides   70    HDL Cholesterol   64    LDL Cholesterol    87    PSA  11.800       CMP          9/20/2023    12:18 12/27/2023    08:37   CMP   Glucose  93    BUN  9    Creatinine 0.70  0.69    EGFR 90.9  91.3    Sodium  144    Potassium  4.1    Chloride  105    Calcium  9.4    Total Protein  6.2    Albumin  4.0    Globulin  2.2    Total Bilirubin  0.5    Alkaline Phosphatase  122    AST (SGOT)  23    ALT (SGPT)  21    Albumin/Globulin Ratio  1.8    BUN/Creatinine Ratio  13.0    Anion Gap  11.9      Data reviewed: Radiologic studies CT scan of the chest from 2/24/2023 was reviewed.  3 mm nodular density along major fissure in right upper lobe. Stable appearance of left lung bleb or pneumatocele with surrounding parenchymal haziness.           Assessment and Plan    Diagnoses and all orders for this visit:    1. Malignant neoplasm of lower lobe of left lung (Primary)  -     Budeson-Glycopyrrol-Formoterol (Breztri Aerosphere) 160-9-4.8 MCG/ACT aerosol inhaler; Inhale 2 puffs 2 (Two) Times a Day.  Dispense: 1 each; Refill: 9    2. Chronic obstructive pulmonary disease, unspecified COPD type  -     Budeson-Glycopyrrol-Formoterol (Breztri Aerosphere) 160-9-4.8 MCG/ACT aerosol inhaler; Inhale 2 puffs 2 (Two) Times a Day.  Dispense: 1 each; Refill: 9    3. Lung disease  -     Budeson-Glycopyrrol-Formoterol (Breztri Aerosphere) 160-9-4.8 MCG/ACT aerosol inhaler; Inhale 2  puffs 2 (Two) Times a Day.  Dispense: 1 each; Refill: 9    4. Solitary lung nodule  -     Budeson-Glycopyrrol-Formoterol (Breztri Aerosphere) 160-9-4.8 MCG/ACT aerosol inhaler; Inhale 2 puffs 2 (Two) Times a Day.  Dispense: 1 each; Refill: 9    5. Pneumonia of right lung due to infectious organism, unspecified part of lung  -     Budeson-Glycopyrrol-Formoterol (Breztri Aerosphere) 160-9-4.8 MCG/ACT aerosol inhaler; Inhale 2 puffs 2 (Two) Times a Day.  Dispense: 1 each; Refill: 9      Lung nodule with attached pneumatocele, pneumatocele with hazy groundglass opacity surrounding it.  So far it has been stable.  There was 3 mm right upper lobe nodular density along the major fissure.  Repeat CT scan of the chest is already scheduled for February 15, 2024.    COPD: He does not like trelegy Ellipta.  We will switch it to Breztri inhaler 2 puffs twice daily.  Continue with albuterol as needed.  Overall COPD seems to be stable for now.    He is up-to-date on COVID-19 vaccine.  He had flu shot already.  He is planned to have pneumonia vaccine through his pharmacy.  Advised him to get RSV vaccine as soon as possible.  We have run out of RSV vaccine in our clinic.    Leg swelling: Per his report, had echocardiogram through Dr. Mcnair.  I have given him metolazone 5 mg once daily for 14 days.  May need chronic low-dose diuretics.  Need to monitor renal function closely.    Follow Up   Return in about 6 months (around 7/31/2024).  Patient was given instructions and counseling regarding his condition or for health maintenance advice. Please see specific information pulled into the AVS if appropriate.

## 2024-02-08 ENCOUNTER — ANESTHESIA EVENT (OUTPATIENT)
Dept: PERIOP | Facility: HOSPITAL | Age: 84
End: 2024-02-08
Payer: MEDICARE

## 2024-02-08 NOTE — PRE-PROCEDURE INSTRUCTIONS
IMPORTANT INSTRUCTIONS - PRE-ADMISSION TESTING  DO NOT EAT OR CHEW anything after midnight the night before your procedure.    You may have CLEAR liquids up to __2__ hours prior to ARRIVAL time.   Take the following medications the morning of your procedure with JUST A SIP OF WATER:  _______________________________________________________________________________________________________________________________________________________________________________________    DO NOT BRING your medications to the hospital with you, UNLESS something has changed since your PRE-Admission Testing appointment.  Hold all vitamins, supplements, and NSAIDS (Non- steroidal anti-inflammatory meds) for one week prior to surgery (you MAY take Tylenol or Acetaminophen).  If you are diabetic, check your blood sugar the morning of your procedure. If it is less than 70 or if you are feeling symptomatic, call the following number for further instructions: [phone]_.  Use your inhalers/nebulizers as usual, the morning of your procedure. BRING YOUR INHALERS with you.   Bring your CPAP or BIPAP to hospital, ONLY IF YOU WILL BE SPENDING THE NIGHT.   Make sure you have a ride home and have someone who will stay with you the day of your procedure after you go home.  If you have any questions, please call your Pre-Admission Testing Nurse, __ARLEEN _ at [phone]____.   Per anesthesia request, do not smoke for 24 hours before your procedure or as instructed by your surgeon.    Clear Liquid Diet        Find out when you need to start a clear liquid diet.   Think of “clear liquids” as anything you could read a newspaper through. This includes things like water, broth, sports drinks, or tea WITHOUT any kind of milk or cream.           Once you are told to start a clear liquid diet, only drink these things until 2 hours before arrival to the hospital or when the hospital says to stop. Total volume limitation: 8 oz.       Clear liquids you CAN  drink:   Water   Clear broth: beef, chicken, vegetable, or bone broth with nothing in it   Gatorade   Lemonade or Masood-aid   Soda   Tea, coffee (NO cream or honey)   Jell-O (without fruit)   Popsicles (without fruit or cream)   Italian ices   Juice without pulp: apple, white, grape   You may use salt, pepper, and sugar  NO RED LIQUIDS     Do NOT drink:   Milk or cream   Soy milk, almond milk, coconut milk, or other non-dairy drinks and   creamers   Milkshakes or smoothies   Tomato juice   Orange juice   Grapefruit juice   Cream soups or any other than broth         Clear Liquid Diet:  Do NOT eat any solid food.  Do NOT eat or suck on mints or candy.  Do NOT chew gum.  Do NOT drink thick liquids like milk or juice with pulp in it.  Do NOT add milk, cream, or anything like soy milk or almond milk to coffee or tea.       PREOPERATIVE (BEFORE SURGERY)              BATHING INSTRUCTIONS  Instructions:    You will need to shower 1 time utilizing the soap provided; at the times indicated   below:     MORNING OF SURGERY      Wash your hair and face with normal shampoo and soap, rinse it well before using the surgical soap.      In the shower, wet the skin completely with water from your neck to your feet. Apply the cleanser to your   body ONLY FROM THE NECK TO YOUR FEET.     Do NOT USE THE CLEANSER ON YOUR FACE, HEAD, OR GENITAL (PRIVATE) AREAS.   Keep it out of your eyes, ears, and mouth because of the risk of injury to those areas.      Scrub with a clean washcloth for each bath utilizing the soap provided from the top of your body to the   bottom starting at the neck area.      Pay close attention to your armpits, groin area, and the site of surgery.      Wash your body gently for 5 minutes. Stand outside the stream or turn off the water while scrubbing your   body. Do NOT wash with your regular soap after the surgical cleanser is used.      RINSE THE CLEANSER OFF COMPLETELY with plenty of water. Rinse the area again  thoroughly.      Dry off with a clean towel. The surgical soap can cause dryness; however do NOT APPLY LOTION,   CREAM, POWDER, and/or DEODORANT AFTER SHOWERING.     Be sure to where clean clothes after showering.      Ensure CLEAN BED LINENS AFTER FIRST wash with the surgical soap.      NO PETS ALLOWED IN THE BED with you after utilizing the surgical soap.

## 2024-02-09 ENCOUNTER — ANESTHESIA (OUTPATIENT)
Dept: PERIOP | Facility: HOSPITAL | Age: 84
End: 2024-02-09
Payer: MEDICARE

## 2024-02-09 ENCOUNTER — HOSPITAL ENCOUNTER (OUTPATIENT)
Facility: HOSPITAL | Age: 84
Setting detail: HOSPITAL OUTPATIENT SURGERY
Discharge: HOME | End: 2024-02-09
Attending: NEUROLOGICAL SURGERY | Admitting: NEUROLOGICAL SURGERY
Payer: MEDICARE

## 2024-02-09 VITALS
WEIGHT: 164.68 LBS | BODY MASS INDEX: 25.85 KG/M2 | SYSTOLIC BLOOD PRESSURE: 112 MMHG | TEMPERATURE: 97.6 F | OXYGEN SATURATION: 94 % | HEIGHT: 67 IN | HEART RATE: 76 BPM | RESPIRATION RATE: 16 BRPM | DIASTOLIC BLOOD PRESSURE: 62 MMHG

## 2024-02-09 DIAGNOSIS — G56.02 CARPAL TUNNEL SYNDROME OF LEFT WRIST: ICD-10-CM

## 2024-02-09 PROCEDURE — 25010000002 MIDAZOLAM PER 1MG: Performed by: ANESTHESIOLOGY

## 2024-02-09 PROCEDURE — 25010000002 CEFAZOLIN IN DEXTROSE 2000 MG/ 100 ML SOLUTION: Performed by: NEUROLOGICAL SURGERY

## 2024-02-09 PROCEDURE — 64721 CARPAL TUNNEL SURGERY: CPT | Performed by: NEUROLOGICAL SURGERY

## 2024-02-09 PROCEDURE — 25010000002 PROPOFOL 10 MG/ML EMULSION: Performed by: NURSE ANESTHETIST, CERTIFIED REGISTERED

## 2024-02-09 PROCEDURE — 25810000003 LACTATED RINGERS PER 1000 ML: Performed by: ANESTHESIOLOGY

## 2024-02-09 PROCEDURE — 25010000002 LIDOCAINE 1 % SOLUTION: Performed by: NEUROLOGICAL SURGERY

## 2024-02-09 RX ORDER — OXYCODONE HYDROCHLORIDE 5 MG/1
5 TABLET ORAL
Status: DISCONTINUED | OUTPATIENT
Start: 2024-02-09 | End: 2024-02-09 | Stop reason: HOSPADM

## 2024-02-09 RX ORDER — MIDAZOLAM HYDROCHLORIDE 2 MG/2ML
1 INJECTION, SOLUTION INTRAMUSCULAR; INTRAVENOUS ONCE
Status: COMPLETED | OUTPATIENT
Start: 2024-02-09 | End: 2024-02-09

## 2024-02-09 RX ORDER — ONDANSETRON 2 MG/ML
4 INJECTION INTRAMUSCULAR; INTRAVENOUS ONCE AS NEEDED
Status: DISCONTINUED | OUTPATIENT
Start: 2024-02-09 | End: 2024-02-09 | Stop reason: HOSPADM

## 2024-02-09 RX ORDER — MEPERIDINE HYDROCHLORIDE 25 MG/ML
12.5 INJECTION INTRAMUSCULAR; INTRAVENOUS; SUBCUTANEOUS
Status: DISCONTINUED | OUTPATIENT
Start: 2024-02-09 | End: 2024-02-09 | Stop reason: HOSPADM

## 2024-02-09 RX ORDER — IBUPROFEN 600 MG/1
600 TABLET ORAL EVERY 6 HOURS PRN
Status: DISCONTINUED | OUTPATIENT
Start: 2024-02-09 | End: 2024-02-09 | Stop reason: HOSPADM

## 2024-02-09 RX ORDER — HYDROCODONE BITARTRATE AND ACETAMINOPHEN 5; 325 MG/1; MG/1
1 TABLET ORAL EVERY 6 HOURS PRN
Qty: 10 TABLET | Refills: 0 | Status: SHIPPED | OUTPATIENT
Start: 2024-02-09

## 2024-02-09 RX ORDER — PHENYLEPHRINE HCL IN 0.9% NACL 1 MG/10 ML
SYRINGE (ML) INTRAVENOUS AS NEEDED
Status: DISCONTINUED | OUTPATIENT
Start: 2024-02-09 | End: 2024-02-09 | Stop reason: SURG

## 2024-02-09 RX ORDER — PROPOFOL 10 MG/ML
VIAL (ML) INTRAVENOUS AS NEEDED
Status: DISCONTINUED | OUTPATIENT
Start: 2024-02-09 | End: 2024-02-09 | Stop reason: SURG

## 2024-02-09 RX ORDER — GINSENG 100 MG
CAPSULE ORAL AS NEEDED
Status: DISCONTINUED | OUTPATIENT
Start: 2024-02-09 | End: 2024-02-09 | Stop reason: HOSPADM

## 2024-02-09 RX ORDER — ACETAMINOPHEN 500 MG
1000 TABLET ORAL ONCE
Status: COMPLETED | OUTPATIENT
Start: 2024-02-09 | End: 2024-02-09

## 2024-02-09 RX ORDER — SODIUM CHLORIDE, SODIUM LACTATE, POTASSIUM CHLORIDE, CALCIUM CHLORIDE 600; 310; 30; 20 MG/100ML; MG/100ML; MG/100ML; MG/100ML
9 INJECTION, SOLUTION INTRAVENOUS CONTINUOUS PRN
Status: DISCONTINUED | OUTPATIENT
Start: 2024-02-09 | End: 2024-02-09 | Stop reason: HOSPADM

## 2024-02-09 RX ORDER — PROMETHAZINE HYDROCHLORIDE 25 MG/1
25 SUPPOSITORY RECTAL ONCE AS NEEDED
Status: DISCONTINUED | OUTPATIENT
Start: 2024-02-09 | End: 2024-02-09 | Stop reason: HOSPADM

## 2024-02-09 RX ORDER — MAGNESIUM HYDROXIDE 1200 MG/15ML
LIQUID ORAL AS NEEDED
Status: DISCONTINUED | OUTPATIENT
Start: 2024-02-09 | End: 2024-02-09 | Stop reason: HOSPADM

## 2024-02-09 RX ORDER — CEFAZOLIN SODIUM 2 G/100ML
2 INJECTION, SOLUTION INTRAVENOUS ONCE
Status: COMPLETED | OUTPATIENT
Start: 2024-02-09 | End: 2024-02-09

## 2024-02-09 RX ORDER — LIDOCAINE HYDROCHLORIDE 10 MG/ML
INJECTION, SOLUTION INFILTRATION; PERINEURAL AS NEEDED
Status: DISCONTINUED | OUTPATIENT
Start: 2024-02-09 | End: 2024-02-09 | Stop reason: HOSPADM

## 2024-02-09 RX ORDER — PROMETHAZINE HYDROCHLORIDE 12.5 MG/1
25 TABLET ORAL ONCE AS NEEDED
Status: DISCONTINUED | OUTPATIENT
Start: 2024-02-09 | End: 2024-02-09 | Stop reason: HOSPADM

## 2024-02-09 RX ORDER — LIDOCAINE HYDROCHLORIDE 20 MG/ML
INJECTION, SOLUTION EPIDURAL; INFILTRATION; INTRACAUDAL; PERINEURAL AS NEEDED
Status: DISCONTINUED | OUTPATIENT
Start: 2024-02-09 | End: 2024-02-09 | Stop reason: SURG

## 2024-02-09 RX ORDER — EPHEDRINE SULFATE 50 MG/ML
INJECTION, SOLUTION INTRAVENOUS AS NEEDED
Status: DISCONTINUED | OUTPATIENT
Start: 2024-02-09 | End: 2024-02-09 | Stop reason: SURG

## 2024-02-09 RX ADMIN — MIDAZOLAM HYDROCHLORIDE 1 MG: 1 INJECTION, SOLUTION INTRAMUSCULAR; INTRAVENOUS at 08:05

## 2024-02-09 RX ADMIN — LIDOCAINE HYDROCHLORIDE 100 MG: 20 INJECTION, SOLUTION EPIDURAL; INFILTRATION; INTRACAUDAL; PERINEURAL at 08:15

## 2024-02-09 RX ADMIN — EPHEDRINE SULFATE 5 MG: 50 INJECTION INTRAVENOUS at 08:37

## 2024-02-09 RX ADMIN — EPHEDRINE SULFATE 5 MG: 50 INJECTION INTRAVENOUS at 08:36

## 2024-02-09 RX ADMIN — PROPOFOL 150 MCG/KG/MIN: 10 INJECTION, EMULSION INTRAVENOUS at 08:15

## 2024-02-09 RX ADMIN — Medication 100 MCG: at 08:39

## 2024-02-09 RX ADMIN — Medication 100 MCG: at 08:37

## 2024-02-09 RX ADMIN — PROPOFOL 30 MG: 10 INJECTION, EMULSION INTRAVENOUS at 08:15

## 2024-02-09 RX ADMIN — Medication 100 MCG: at 08:36

## 2024-02-09 RX ADMIN — CEFAZOLIN SODIUM 2 G: 2 INJECTION, SOLUTION INTRAVENOUS at 08:11

## 2024-02-09 RX ADMIN — EPHEDRINE SULFATE 5 MG: 50 INJECTION INTRAVENOUS at 08:39

## 2024-02-09 RX ADMIN — ACETAMINOPHEN 1000 MG: 500 TABLET ORAL at 07:53

## 2024-02-09 RX ADMIN — SODIUM CHLORIDE, POTASSIUM CHLORIDE, SODIUM LACTATE AND CALCIUM CHLORIDE 9 ML/HR: 600; 310; 30; 20 INJECTION, SOLUTION INTRAVENOUS at 07:44

## 2024-02-09 NOTE — INTERVAL H&P NOTE
H&P reviewed.  The patient was examined and there are no changes to the H&P    Cardiovascular: no notable edema.  Pulmonary: non-labored breathing.     Risks and benefits discussed with patient. No new allergies reported.

## 2024-02-09 NOTE — OP NOTE
CARPAL TUNNEL RELEASE  Procedure Report    Patient Name:  Carlos Zimmerman  YOB: 1940    Date of Surgery:  2/9/2024     Indications: Left carpal tunnel syndrome    Pre-op Diagnosis:   Carpal tunnel syndrome of left wrist [G56.02]       Post-Op Diagnosis Codes:     * Carpal tunnel syndrome of left wrist [G56.02]    Procedure/CPT® Codes:      Procedure(s):  CARPAL TUNNEL RELEASE, left    Staff:  Surgeon(s):  Rg Tom MD    Assistant: Irina Farias RN CSA    Anesthesia: Monitored Anesthesia Care    Estimated Blood Loss: 2 mL      Specimen:          None        Findings: Compression of median nerve at the wrist    Complications: No apparent intraoperative complications    Description of Procedure: After informed consent was obtained, the patient was brought to the operating room.  After the administration of IV sedation, the  left hand and arm were prepped and draped in the typical fashion.  A timeout was performed.  An incision approximately an inch and a half in length was made in line with the middle finger and starting just distal to the distal wrist crease.  This was taken down through the palmar surface of the hand and the palmar fat.  The transverse carpal ligament was identified just beneath this and divided until the median nerve was encountered.  It was divided distally until fat was reached.  A soft tissue retractor was used to retract the soft tissue proximally and the Penfield 4 was used to dissect the transverse carpal ligament from the underlying median nerve.  A tenotomy scissor was then used to divide the transverse carpal ligament well proximal to the distal wrist crease.  After this a [place] elevator passed very freely along the proximal median nerve with no evidence of additional compression.  There were no significant adhesions and mild erythema of the median nerve.  The wound was then irrigated with normal saline.  Hemostasis was assured using the bipolar electrocautery.  Physical Therapy  Facility/Department: [facility] MED SURG [id number]  Physical Therapy Discharge      NAME: Dick Domingo    : 1939 (79 y.o.)  MRN: [id number]    Account: [de-identified]  Gender: female      Patient has been discharged from acute care hospital. DC patient from current PT program.      Electronically signed by Vargas Rapp PT on 22 at 12:35 PM EST  Subcutaneous tissue was reapproximated using interrupted 3-0 Vicryl followed by vertical mattress nylon sutures to reapproximate the skin edges.  The wound was dressed with bacitracin, Telfa, strung out 4 x 4's, Kerlix and an Ace bandage.  All sponge and needle counts were correct.     Assistant: Irina Farias RN CSA  was responsible for performing the following activities: Retraction, Suction, Irrigation, and Placing Dressing and their skilled assistance was necessary for the success of this case.    Rg Tom MD     Date: 2/9/2024  Time: 08:57 EST

## 2024-02-09 NOTE — ANESTHESIA PREPROCEDURE EVALUATION
Anesthesia Evaluation     Patient summary reviewed and Nursing notes reviewed   no history of anesthetic complications:   NPO Solid Status: > 8 hours  NPO Liquid Status: > 2 hours           Airway   Mallampati: II  TM distance: >3 FB  Neck ROM: full  No difficulty expected  Dental    (+) lower dentures and upper dentures    Pulmonary - normal exam    breath sounds clear to auscultation  (+) a smoker Former, lung cancer, COPD (2016, remission),  Cardiovascular - normal exam  Exercise tolerance: good (4-7 METS)    Rhythm: regular  Rate: normal    (+) hypertension, PVD, hyperlipidemia      Neuro/Psych  (+) CVA  GI/Hepatic/Renal/Endo - negative ROS     Musculoskeletal     Abdominal    Substance History      OB/GYN          Other   arthritis,   history of cancer remission    ROS/Med Hx Other: PAT Nursing Notes unavailable.                Anesthesia Plan    ASA 3     general and MAC   total IV anesthesia    Anesthetic plan, risks, benefits, and alternatives have been provided, discussed and informed consent has been obtained with: patient and spouse/significant other.    Plan discussed with CRNA.    CODE STATUS:

## 2024-02-09 NOTE — DISCHARGE INSTRUCTIONS
DISCHARGE INSTRUCTIONS  CARPAL TUNNEL RELEASE      For your surgery you had:  General anesthesia (you may have a sore throat for the first 24 hours)    You may experience dizziness, drowsiness, or lightheadedness for several hours following surgery.  Do not stay alone today or tonight.  Limit your activity for 24 hours.  Resume your diet slowly.    You should not drive or operate machinery, drink alcohol, or sign legally binding documents for 24 hours or while you are taking pain medication.  If you had an axillary or regional block, you may not have control of the involved limb for up to 12 hours. Protect the arm with a sling or follow your physician's specific instructions. Carry the upper arm in a sling so that the hand and wrist are above the level of the heart. Elevate affected arm on a pillow when resting.     ACTIVITY  Use ice to affected area for 48-72 hours. Apply 20 minutes on - 20 minutes off. Do NOT apply directly to skin.  Exercise fingers frequently by making a full fist and fully straightening the fingers. This will help prevent swelling and stiffness.  Do NOT do any heavy lifting, pulling or strenuous activities using the affected hand. [x] Keep splint clean and dry.  [x]  Discontinue ace bandage in 24 hours.  [x] Remove gauze in  3-4 days.  [x] Do NOT submerge in water.  [x] Keep incision area clean and dry.  [x] You may shower in 24 hours, keep site dry.    NOTIFY YOUR DOCTOR IF YOU EXPERIENCE ANY OF THE FOLLOWING:  Temperature greater than 101 degrees Fahrenheit  Shaking Chills  Redness or excessive drainage from incision  Nausea, vomiting and/or pain that is not controlled by prescribed medications  Increase in bleeding or bleeding that is excessive  Unable to urinate in 6 hours after surgery  If unable to reach your doctor, please go to the closest Emergency Room    Last dose of pain medication was given at:     .    SPECIAL INSTRUCTIONS:  Move fingers several times a day.   No lifting greater  than 5-10 lbs.   No vibrating or jarring activities.   Keep the hand and arm clean and dry.

## 2024-02-09 NOTE — ANESTHESIA POSTPROCEDURE EVALUATION
Patient: Carlos Zimmerman    Procedure Summary       Date: 02/09/24 Room / Location: [facility] OR 05 / [facility] MAIN OR    Anesthesia Start: 0810 Anesthesia Stop: 0849    Procedure: CARPAL TUNNEL RELEASE, left (Left: Wrist) Diagnosis:       Carpal tunnel syndrome of left wrist      (Carpal tunnel syndrome of left wrist [G56.02])    Surgeons: Rg Tom MD Provider: Fabio Patel MD    Anesthesia Type: general, MAC ASA Status: 3            Anesthesia Type: general, MAC    Vitals  Vitals Value Taken Time   /58 02/09/24 0913   Temp 36.2 °C (97.1 °F) 02/09/24 0905   Pulse 82 02/09/24 0914   Resp 16 02/09/24 0910   SpO2 93 % 02/09/24 0914   Vitals shown include unfiled device data.        Post Anesthesia Care and Evaluation    Patient location during evaluation: bedside  Patient participation: complete - patient participated  Level of consciousness: awake  Pain management: adequate    Airway patency: patent  PONV Status: none  Cardiovascular status: acceptable and stable  Respiratory status: acceptable  Hydration status: acceptable    Comments: An Anesthesiologist personally participated in the most demanding procedures (including induction and emergence if applicable) in the anesthesia plan, monitored the course of anesthesia administration at frequent intervals and remained physically present and available for immediate diagnosis and treatment of emergencies.

## 2024-02-15 ENCOUNTER — HOSPITAL ENCOUNTER (OUTPATIENT)
Dept: CT IMAGING | Facility: HOSPITAL | Age: 84
Discharge: HOME | End: 2024-02-15
Admitting: NURSE PRACTITIONER
Payer: MEDICARE

## 2024-02-15 DIAGNOSIS — R06.09 DYSPNEA ON EXERTION: ICD-10-CM

## 2024-02-15 DIAGNOSIS — C34.32 MALIGNANT NEOPLASM OF LOWER LOBE OF LEFT LUNG: ICD-10-CM

## 2024-02-15 DIAGNOSIS — J43.9 PULMONARY EMPHYSEMA, UNSPECIFIED EMPHYSEMA TYPE: ICD-10-CM

## 2024-02-15 DIAGNOSIS — R91.8 LUNG NODULES: ICD-10-CM

## 2024-02-15 PROCEDURE — 71250 CT THORAX DX C-: CPT

## 2024-02-16 DIAGNOSIS — R91.1 SOLITARY LUNG NODULE: Primary | ICD-10-CM

## 2024-02-16 DIAGNOSIS — C34.32 MALIGNANT NEOPLASM OF LOWER LOBE OF LEFT LUNG: ICD-10-CM

## 2024-02-22 ENCOUNTER — OFFICE VISIT (OUTPATIENT)
Dept: NEUROSURGERY | Facility: CLINIC | Age: 84
End: 2024-02-22
Payer: MEDICARE

## 2024-02-22 VITALS
HEART RATE: 96 BPM | WEIGHT: 165 LBS | BODY MASS INDEX: 25.9 KG/M2 | SYSTOLIC BLOOD PRESSURE: 140 MMHG | DIASTOLIC BLOOD PRESSURE: 60 MMHG | HEIGHT: 67 IN

## 2024-02-22 DIAGNOSIS — G56.02 CARPAL TUNNEL SYNDROME OF LEFT WRIST: Primary | ICD-10-CM

## 2024-02-22 DIAGNOSIS — Z98.890 STATUS POST CARPAL TUNNEL RELEASE: ICD-10-CM

## 2024-02-22 NOTE — PROGRESS NOTES
Patient being seen for today for Post-op  .    Subjective    Carlos Zimmerman is a 84 y.o. male that presents with Post-op  .    HPI  Previously: He is status post carpal tunnel release on the left on 2/9/2024.    Today: He is doing well after surgery.     reports that he quit smoking about 14 years ago. His smoking use included cigarettes. He started smoking about 74 years ago. He has a 55.00 pack-year smoking history. He has been exposed to tobacco smoke. His smokeless tobacco use includes snuff.    Review of Systems   Neurological:  Positive for numbness.       Objective   Vitals:    02/22/24 1524   BP: 140/60   Pulse: 96        Physical Exam  Constitutional:       Appearance: Normal appearance.   Pulmonary:      Effort: Pulmonary effort is normal.   Musculoskeletal:         General: No tenderness.      Comments: Biswas's negative bilaterally   Skin:     Comments: Left wrist/hand incision is well-healed without erythema or discharge   Neurological:      General: No focal deficit present.      Mental Status: He is alert and oriented to person, place, and time.      Sensory: No sensory deficit.      Motor: No weakness.      Deep Tendon Reflexes: Reflexes normal.   Psychiatric:         Mood and Affect: Mood normal.         Behavior: Behavior normal.          Result Review   None.     Assessment and Plan {CC Problem List  Visit Diagnosis  ROS  Review (Popup)  Health Maintenance  Quality  BestPractice  Medications  SmartSets  SnapShot Encounters  Media :23}   Diagnoses and all orders for this visit:    1. Carpal tunnel syndrome of left wrist (Primary)    2. Status post carpal tunnel release    4x sutures were removed today without incident.    He will monitor for new or worsening complaints and notify us of change.    He will follow-up here PRN.  Follow Up {Instructions Charge Capture  Follow-up Communications :23}   Return if symptoms worsen or fail to improve.

## 2024-02-23 ENCOUNTER — LAB (OUTPATIENT)
Dept: ONCOLOGY | Facility: HOSPITAL | Age: 84
End: 2024-02-23
Payer: MEDICARE

## 2024-02-23 ENCOUNTER — HOSPITAL ENCOUNTER (OUTPATIENT)
Dept: PET IMAGING | Facility: HOSPITAL | Age: 84
Discharge: HOME | End: 2024-02-23
Payer: MEDICARE

## 2024-02-23 DIAGNOSIS — D50.9 IRON DEFICIENCY ANEMIA, UNSPECIFIED IRON DEFICIENCY ANEMIA TYPE: ICD-10-CM

## 2024-02-23 DIAGNOSIS — R91.1 SOLITARY LUNG NODULE: ICD-10-CM

## 2024-02-23 DIAGNOSIS — C34.32 MALIGNANT NEOPLASM OF LOWER LOBE OF LEFT LUNG: ICD-10-CM

## 2024-02-23 DIAGNOSIS — D50.9 IRON DEFICIENCY ANEMIA, UNSPECIFIED IRON DEFICIENCY ANEMIA TYPE: Primary | ICD-10-CM

## 2024-02-23 DIAGNOSIS — R94.2 ABNORMAL PET SCAN OF LUNG: Primary | ICD-10-CM

## 2024-02-23 DIAGNOSIS — R91.1 LUNG NODULE: ICD-10-CM

## 2024-02-23 LAB
BASOPHILS # BLD AUTO: 0.02 10*3/MM3 (ref 0–0.2)
BASOPHILS NFR BLD AUTO: 0.3 % (ref 0–1.5)
DEPRECATED RDW RBC AUTO: 45.1 FL (ref 37–54)
EOSINOPHIL # BLD AUTO: 0.06 10*3/MM3 (ref 0–0.4)
EOSINOPHIL NFR BLD AUTO: 0.9 % (ref 0.3–6.2)
ERYTHROCYTE [DISTWIDTH] IN BLOOD BY AUTOMATED COUNT: 13.9 % (ref 12.3–15.4)
FERRITIN SERPL-MCNC: 148.9 NG/ML (ref 30–400)
HCT VFR BLD AUTO: 44.4 % (ref 37.5–51)
HGB BLD-MCNC: 14.2 G/DL (ref 13–17.7)
IMM GRANULOCYTES # BLD AUTO: 0.01 10*3/MM3 (ref 0–0.05)
IMM GRANULOCYTES NFR BLD AUTO: 0.1 % (ref 0–0.5)
IRON 24H UR-MRATE: 115 MCG/DL (ref 59–158)
IRON SATN MFR SERPL: 31 % (ref 20–50)
LYMPHOCYTES # BLD AUTO: 1.71 10*3/MM3 (ref 0.7–3.1)
LYMPHOCYTES NFR BLD AUTO: 24.5 % (ref 19.6–45.3)
MCH RBC QN AUTO: 28 PG (ref 26.6–33)
MCHC RBC AUTO-ENTMCNC: 32 G/DL (ref 31.5–35.7)
MCV RBC AUTO: 87.6 FL (ref 79–97)
MONOCYTES # BLD AUTO: 0.5 10*3/MM3 (ref 0.1–0.9)
MONOCYTES NFR BLD AUTO: 7.2 % (ref 5–12)
NEUTROPHILS NFR BLD AUTO: 4.67 10*3/MM3 (ref 1.7–7)
NEUTROPHILS NFR BLD AUTO: 67 % (ref 42.7–76)
PLATELET # BLD AUTO: 204 10*3/MM3 (ref 140–450)
PMV BLD AUTO: 10.3 FL (ref 6–12)
RBC # BLD AUTO: 5.07 10*6/MM3 (ref 4.14–5.8)
TIBC SERPL-MCNC: 370 MCG/DL (ref 298–536)
TRANSFERRIN SERPL-MCNC: 248 MG/DL (ref 200–360)
WBC NRBC COR # BLD AUTO: 6.97 10*3/MM3 (ref 3.4–10.8)

## 2024-02-23 PROCEDURE — 78815 PET IMAGE W/CT SKULL-THIGH: CPT

## 2024-02-23 PROCEDURE — 0 FLUDEOXYGLUCOSE F18 SOLUTION: Performed by: NURSE PRACTITIONER

## 2024-02-23 PROCEDURE — 82728 ASSAY OF FERRITIN: CPT

## 2024-02-23 PROCEDURE — 85025 COMPLETE CBC W/AUTO DIFF WBC: CPT

## 2024-02-23 PROCEDURE — 36415 COLL VENOUS BLD VENIPUNCTURE: CPT

## 2024-02-23 PROCEDURE — 83540 ASSAY OF IRON: CPT

## 2024-02-23 PROCEDURE — 84466 ASSAY OF TRANSFERRIN: CPT

## 2024-02-23 PROCEDURE — A9552 F18 FDG: HCPCS | Performed by: NURSE PRACTITIONER

## 2024-02-23 RX ORDER — FERROUS SULFATE 325(65) MG
325 TABLET ORAL
Qty: 90 TABLET | Refills: 1 | Status: SHIPPED | OUTPATIENT
Start: 2024-02-23

## 2024-02-23 RX ADMIN — FLUDEOXYGLUCOSE F 18 1 DOSE: 200 INJECTION, SOLUTION INTRAVENOUS at 09:37

## 2024-02-23 NOTE — PROGRESS NOTES
Patient notified of PET scan results which shows hypermetabolic activity left upper lobe bulla there is a soft tissue nodularity measuring up to 1.4 cm most concerning for neoplasm with recommendation for biopsy.  Called patient to notify of PET scan results and need for Ion robotic bronchoscopy.  Offered an appointment for the patient to come in to discuss findings and plan of treatment.  However patient stated go ahead and get the bronchoscopy set up. Discussed with patient risk and benefits of procedure.  Patient is currently on a baby aspirin was advised that he should stop his aspirin 5 days before the procedure.  Advised patient that our office nurse navigator, Mona, would be calling the patient to get this process and procedure set up.  Offered an appointment for the patient to come in to discuss findings and plan of treatment.  However patient stated go ahead and get the bronchoscopy set up..

## 2024-02-26 NOTE — TELEPHONE ENCOUNTER
Caller: Carlos Zimmerman    Relationship: Self    Best call back number: [phone]     Requested Prescriptions:   Requested Prescriptions     Pending Prescriptions Disp Refills    gabapentin (NEURONTIN) 300 MG capsule       Sig: Take 1 capsule by mouth 3 (Three) Times a Day.        Pharmacy where request should be sent: [facility] [address] [phone] [facility] [phone]      Last office visit with prescribing clinician: 8/29/2023   Last telemedicine visit with prescribing clinician: Visit date not found   Next office visit with prescribing clinician: 3/1/2024     Additional details provided by patient:     Does the patient have less than a 3 day supply:  [x] Yes  [] No    Would you like a call back once the refill request has been completed: [] Yes [x] No    If the office needs to give you a call back, can they leave a voicemail: [] Yes [x] No

## 2024-02-27 RX ORDER — GABAPENTIN 300 MG/1
300 CAPSULE ORAL 3 TIMES DAILY
OUTPATIENT
Start: 2024-02-27

## 2024-03-01 ENCOUNTER — OFFICE VISIT (OUTPATIENT)
Dept: ONCOLOGY | Facility: HOSPITAL | Age: 84
End: 2024-03-01
Payer: MEDICARE

## 2024-03-01 VITALS
RESPIRATION RATE: 18 BRPM | TEMPERATURE: 98.1 F | SYSTOLIC BLOOD PRESSURE: 137 MMHG | OXYGEN SATURATION: 100 % | DIASTOLIC BLOOD PRESSURE: 83 MMHG | BODY MASS INDEX: 25.59 KG/M2 | HEART RATE: 97 BPM | WEIGHT: 163.4 LBS

## 2024-03-01 DIAGNOSIS — D50.9 IRON DEFICIENCY ANEMIA, UNSPECIFIED IRON DEFICIENCY ANEMIA TYPE: ICD-10-CM

## 2024-03-01 DIAGNOSIS — C34.32 MALIGNANT NEOPLASM OF LOWER LOBE OF LEFT LUNG: ICD-10-CM

## 2024-03-01 DIAGNOSIS — G62.9 NEUROPATHY: Primary | ICD-10-CM

## 2024-03-01 PROBLEM — R94.2 ABNORMAL PET SCAN OF LUNG: Status: ACTIVE | Noted: 2024-02-23

## 2024-03-01 PROBLEM — R91.1 LUNG NODULE: Status: ACTIVE | Noted: 2024-02-23

## 2024-03-01 RX ORDER — GABAPENTIN 300 MG/1
300 CAPSULE ORAL 3 TIMES DAILY
Qty: 90 CAPSULE | Refills: 2 | Status: SHIPPED | OUTPATIENT
Start: 2024-03-01

## 2024-03-01 NOTE — PROGRESS NOTES
Chief Complaint  Malignant neoplasm of lower lobe of left lung    Payam Carroll MD Houk, Brandon Lyle, MD      Subjective          Carlos Zimmerman presents to [facility] for iron defieiency anemia    Lung Cancer  Pertinent negatives include no arthralgias, chest pain, coughing, diaphoresis, fever, joint swelling, myalgias, numbness, rash or sore throat.      Mr. Carlos Zimmerman presents for follow up for iron deficiency anemia. History of prior left lung NSCLC with adenocarcinoma with left VATS procedure and prior chemotherapy per Dr. Saul. He follows with pulmonary for yearly low dose CT screening. Most recent imaging including PET scan with concern for recurrence on left. Plan for biopsy on Monday morning.      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 Requesting refill of gabapentin. He reports he has good energy for his age. Breathing stable.  No bleeding reported. Still on oral iron and tolerating it well.     Hematology History  He was started on oral iron 1 tab QD back in April 2022. He is tolerating med well.    2/27/23: Iron studies normal. CBC normal. No anemia.     He is following with Dr. Mai with urology for elevated PSA. MRI of prostate on 2/23/23 showed a small solitary suspicious lesion in the left posterior mid gland peripheral zone, PI-RADS 4.    8/28/23: Hgb 13.8, MCV 92.4, plt 153, WBC 9.24. TIBC 277 (low), ferritin 176.8, iron sat 39%    2/23/24: Iron studies normal. No anemia, normal CBC. Hold oral iron.       Cancer Staging  No matching staging information was found for the patient.     Treatment intent: curative    Oncology/Hematology History    No history exists.   This is a very pleasant 76-year-old gentleman who presents with follow-up for     lobectomy with adjuvant chemotherapy.            1) Left lung:  Pathology 5/2016: FNA: Poorly differentiated adenocarcinoma: 5.5      mm BONNIE nodule. 5/2016.       Left lung lobectomy: VATS procedure: Dr. Saul in [place].Path:multifocal     invasive moderately differentiated adenocarcinoma, acinar type, 2.0 x 1.6 x 1.4     cm. Additional nodule 0.6 x 0.5 x 0.3 cm: margins free of tumor, negative LN's.     Visceral pleural invasion noted. (7/12/16). Staging: Stage IIB:  pT3N0.       Carboplatin / Paclitaxel x 4 cycles: 8/10/16 - 10/14/16.      CT CAP: No evidence of disease. 2/2017.       CT CAP: No evidence of disease. 12/11/17.       CT chest: Right lower base: improving consolidation. 2/18/19.       CT chest: T6 / T10 compression fractures: non-pathologic. (8/28/19)      CT chest in ER: likely pneumonia/inflammation. No new evidence of recurring     cancer: (4/4/20)      CT chest: Stable 1.5 cm subpleural ground glass opacity in the BONNIE, new patchy     areas on non-specific ground glass opacity throughout the LLL. (3/2021).       2/15/24: CT chest without contrast: small area of nodularity/consolidation in left upper lung. Stable 3 mm right upper lobe nodule. No new nodule.      2/23/24: NM PET:  Along the posterior medial margin, there is soft tissue nodularity measuring up to about 1.4 centimeters which is hypermetabolic maximum SUV 4.9 most concerning for neoplasm.  This appears larger and more solid compared to more remote CT scans.  Consider biopsy. No other lesions hypermetabolic.        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                    Review of Systems   Constitutional:  Positive for unexpected weight loss. Negative for appetite change, diaphoresis, fever and unexpected weight gain.   HENT:  Positive for hearing loss (Left ear). Negative for sore throat and voice change.    Eyes:  Negative for blurred vision, double vision, pain, redness and visual disturbance.   Respiratory:  Negative for cough, shortness of breath and  wheezing.    Cardiovascular:  Negative for chest pain, palpitations and leg swelling.   Endocrine: Negative for cold intolerance, heat intolerance, polydipsia and polyuria.   Genitourinary:  Negative for decreased urine volume, difficulty urinating, frequency and urinary incontinence.   Musculoskeletal:  Negative for arthralgias, back pain, joint swelling and myalgias.   Skin:  Negative for color change, rash, skin lesions and wound.   Neurological:  Negative for dizziness, seizures, numbness and headache.   Hematological:  Negative for adenopathy. Does not bruise/bleed easily.   Psychiatric/Behavioral:  Negative for depressed mood. The patient is not nervous/anxious.    All other systems reviewed and are negative.    Current Outpatient Medications on File Prior to Visit   Medication Sig Dispense Refill    Aspirin Low Dose 81 MG chewable tablet Chew 1 tablet Daily. LAST DOSE OVER 02/1/24      Budeson-Glycopyrrol-Formoterol (Breztri Aerosphere) 160-9-4.8 MCG/ACT aerosol inhaler Inhale 2 puffs 2 (Two) Times a Day. 1 each 9    Cholecalciferol 25 MCG (1000 UT) capsule Take 1 capsule by mouth Daily.      cilostazol (PLETAL) 100 MG tablet Take 1 tablet by mouth 2 (Two) Times a Day.      ferrous sulfate 325 (65 FE) MG tablet Take 1 tablet by mouth Daily With Breakfast. 90 tablet 1    gabapentin (NEURONTIN) 300 MG capsule Take 1 capsule by mouth 3 (Three) Times a Day.      metoprolol succinate XL (TOPROL-XL) 25 MG 24 hr tablet Take 1 tablet by mouth Daily.      multivitamins-minerals (PRESERVISION AREDS 2) capsule capsule Take 1 capsule by mouth 2 (Two) Times a Day.      pantoprazole (PROTONIX) 40 MG EC tablet Take 1 tablet by mouth Daily.      pravastatin (PRAVACHOL) 40 MG tablet Take 1 tablet by mouth Every Night.      Refresh Tears 0.5 % solution Administer 1 drop to both eyes Daily As Needed for Dry Eyes.      tamsulosin (FLOMAX) 0.4 MG capsule 24 hr capsule Take 1 capsule by mouth 2 (Two) Times a Day. 180 capsule 4     vitamin D3 125 MCG (5000 UT) capsule capsule Take 1 capsule by mouth Daily.       No current facility-administered medications on file prior to visit.       No Known Allergies  Past Medical History:   Diagnosis Date    Anemia     Arthritis     COPD (chronic obstructive pulmonary disease)     INHALER  PRN    Hyperlipidemia     Hypertension     ON MEDS    Lung cancer     REMOVED - 2016    Pneumonia     LEFT LUNG CA    SOB (shortness of breath)     Stroke     LEFT LEG/FOOT DROP    TIA (transient ischemic attack)     NO RESIDUAL 2010     Past Surgical History:   Procedure Laterality Date    CARDIAC CATHETERIZATION Left 11/10/2022    Procedure: Aortogram with left leg angiogram, possible angioplasty or stenting;  Surgeon: Cuauhtemoc Thomas MD;  Location: [facility] CATH INVASIVE LOCATION;  Service: Vascular;  Laterality: Left;    CARDIAC CATHETERIZATION Right 2023    Procedure: Right leg angiogram, possible angioplasty or stenting;  Surgeon: Cuauhtemoc Thomas MD;  Location: [facility] CATH INVASIVE LOCATION;  Service: Vascular;  Laterality: Right;    CARPAL TUNNEL RELEASE Left 2024    Procedure: CARPAL TUNNEL RELEASE, left;  Surgeon: Rg Tom MD;  Location: [facility] MAIN OR;  Service: Neurosurgery;  Laterality: Left;    LUNG BIOPSY      LUNG SURGERY      REMOVAL HALF OF UPPER PORTION LEFT LUNG    ORTHOPEDIC SURGERY Left     ROTATOR CUFF    THROAT SURGERY       Social History     Socioeconomic History    Marital status:    Tobacco Use    Smoking status: Former     Packs/day: 1.00     Years: 55.00     Additional pack years: 0.00     Total pack years: 55.00     Types: Cigarettes     Start date:      Quit date:      Years since quittin.1     Passive exposure: Past    Smokeless tobacco: Current     Types: Snuff    Tobacco comments:     Last use this am   Vaping Use    Vaping Use: Never used   Substance and Sexual Activity    Alcohol use: Not Currently     Comment: 3-4 GIN TONICS/NIGHT    Drug use: Never     Sexual activity: Defer     Family History   Problem Relation Age of Onset    Stomach cancer Other     Cancer Mother      Immunization History   Administered Date(s) Administered    COVID-19 (PFIZER) Purple Cap Monovalent 02/28/2021, 03/11/2021, 10/18/2021    COVID-19 F23 (PFIZER) 12YRS+ (COMIRNATY) 11/10/2023    Fluad Quad 65+ 10/07/2020, 09/24/2021    Fluzone High-Dose 65+yrs 10/06/2022, 11/11/2023    Influenza, Unspecified 09/01/2020    TD Preservative Free (Tenivac) 06/27/2022       Objective   Physical Exam  Constitutional:       Appearance: Normal appearance.   HENT:      Head: Normocephalic and atraumatic.      Nose: Nose normal.   Eyes:      Conjunctiva/sclera: Conjunctivae normal.   Pulmonary:      Effort: Pulmonary effort is normal.   Neurological:      General: No focal deficit present.      Mental Status: He is alert. Mental status is at baseline.   Psychiatric:         Mood and Affect: Mood normal.         Behavior: Behavior normal.         Thought Content: Thought content normal.       Vitals:    03/01/24 0923   BP: 137/83   Pulse: 97   Resp: 18   Temp: 98.1 °F (36.7 °C)   TempSrc: Temporal   SpO2: 100%   Weight: 74.1 kg (163 lb 6.4 oz)   PainSc: 0-No pain                 ECOG: (0) Fully Active - Able to Carry On All Pre-disease Performance Without Restriction  Fall Risk Assessment was completed, and patient is at low risk for falls.  PHQ-9 Total Score:         The patient is  experiencing fatigue. Fatigue score: 1    PT/OT Functional Screening: PT fx screen: No needs identified  Speech Functional Screening: Speech fx screen: No needs identified  Rehab to be ordered: Rehab to be ordered: No needs identified        Result Review :   The following data was reviewed by: Juan Jose Alatorre MD on 03/01/24:  Lab Results   Component Value Date    HGB 14.2 02/23/2024    HCT 44.4 02/23/2024    MCV 87.6 02/23/2024     02/23/2024    WBC 6.97 02/23/2024    NEUTROABS 4.67 02/23/2024    LYMPHSABS 1.71  02/23/2024    MONOSABS 0.50 02/23/2024    EOSABS 0.06 02/23/2024    BASOSABS 0.02 02/23/2024     Lab Results   Component Value Date    GLUCOSE 93 12/27/2023    BUN 9 12/27/2023    CREATININE 0.69 (L) 12/27/2023     12/27/2023    K 4.1 12/27/2023     12/27/2023    CO2 27.1 12/27/2023    CALCIUM 9.4 12/27/2023    PROTEINTOT 6.2 12/27/2023    ALBUMIN 4.0 12/27/2023    BILITOT 0.5 12/27/2023    ALKPHOS 122 (H) 12/27/2023    AST 23 12/27/2023    ALT 21 12/27/2023     Labs personally reviewed. No anemia. Normal iron studies.      Pulm note personally reviewed    PET personally reviewed and per my independent read with 1.4 cm hypermetabolic lesion in left lung.       NM PET/CT Skull Base to Mid Thigh    Result Date: 2/23/2024    1. Previous partial left upper lobectomy.  Redemonstration of left upper lobe bulla.  Along the posterior medial margin, there is soft tissue nodularity measuring up to about 1.4 centimeters which is hypermetabolic maximum SUV 4.9 most concerning for neoplasm.  This appears larger and more solid compared to more remote CT scans.  Consider biopsy. 2. Tiny 3 millimeter right upper lobe nodular density too small to characterize. 3. 1.5 centimeter cyst in the tail the pancreas.  No hypermetabolic activity.  This appears similar to prior PET scan from 2016.     DEEPALI DILL MD       Electronically Signed and Approved By: DEEPALI DILL MD on 2/23/2024 at 14:19             CT Chest Without Contrast Diagnostic    Result Date: 2/15/2024    1. Small area of nodularity/consolidation adjacent to bulla in left upper lung has gradually increased in size measuring 12 mm, previously 8 mm.  This could relate to an area of developing scarring, consider short-term CT follow-up in 3 months, alternatively PET-CT could be considered to exclude any hypermetabolic uptake. 2. Stable 3 mm right upper lobe pulmonary nodule.  No new pulmonary nodule. 3. Postsurgical changes of left upper lobectomy. 4. No  intrathoracic adenopathy. 5. Emphysema, coronary artery calcifications, and additional chronic findings above.     HUNTER BELLE MD       Electronically Signed and Approved By: HUNTER BELLE MD on 2/15/2024 at 11:34              2/23/23 MRI prostate showed a small solitary suspicious lesion in the left posterior mid gland peripheral zone, PI-RADS 4.       Assessment and Plan    Diagnoses and all orders for this visit:    1. Neuropathy (Primary)  -     gabapentin (NEURONTIN) 300 MG capsule; Take 1 capsule by mouth 3 (Three) Times a Day.  Dispense: 90 capsule; Refill: 2    2. Iron deficiency anemia, unspecified iron deficiency anemia type    3. Malignant neoplasm of lower lobe of left lung      Hx of lung cancer  S/p lobectomy and completed adjuvant chemothearpy 10/2016. 2/2024 CT chest reviewed with concern for recurrence on left side. Subsequent PET with 1.4 cm lesion, SUV 4.9. Biopsy planned per pulm on 3/4/24. Will follow up results. If malignancy found, plan to refer to rad onc for SBRT. Will also need MRI brain. RTC 3 months.     For iron deficiency, 2/2024 iron labs normal and no anemia on labs. Ok to hold oral iron. Repeat labs in 6 months    Chemo induced neuropathy  Gabapentin refilled.     Elevated PSA  Small concerning lesion on recent MRI. PSA less than 10. Patient follows with Dr. Mai with urology. Will defer management to him. Plan for watchful waiting/conservative monitoring      Patient Follow Up: 3 months        Patient was given instructions and counseling regarding his condition or for health maintenance advice. Please see specific information pulled into the AVS if appropriate.

## 2024-03-01 NOTE — PRE-PROCEDURE INSTRUCTIONS
PRE-OP INSTRUCTIONS REVIEWED WITH PATIENT: FASTING/BATHING/ARRIVAL PROCEDURES.  INSTRUCTED TO TAKE A.M. DAY OF SURGERY: BREZTRI INHALER, PANTOPRAZOLE, GABAPENTIN, METOPROLOL, TAMSULOSIN  UNDERSTANDING VERBALIZED.

## 2024-03-02 ENCOUNTER — ANESTHESIA EVENT (OUTPATIENT)
Dept: PERIOP | Facility: HOSPITAL | Age: 84
End: 2024-03-02
Payer: MEDICARE

## 2024-03-04 ENCOUNTER — APPOINTMENT (OUTPATIENT)
Dept: GENERAL RADIOLOGY | Facility: HOSPITAL | Age: 84
End: 2024-03-04
Payer: MEDICARE

## 2024-03-04 ENCOUNTER — HOSPITAL ENCOUNTER (OUTPATIENT)
Facility: HOSPITAL | Age: 84
Setting detail: HOSPITAL OUTPATIENT SURGERY
Discharge: HOME | End: 2024-03-04
Attending: INTERNAL MEDICINE | Admitting: INTERNAL MEDICINE
Payer: MEDICARE

## 2024-03-04 ENCOUNTER — ANESTHESIA (OUTPATIENT)
Dept: PERIOP | Facility: HOSPITAL | Age: 84
End: 2024-03-04
Payer: MEDICARE

## 2024-03-04 VITALS
DIASTOLIC BLOOD PRESSURE: 55 MMHG | BODY MASS INDEX: 26.22 KG/M2 | HEART RATE: 71 BPM | RESPIRATION RATE: 16 BRPM | WEIGHT: 163.14 LBS | TEMPERATURE: 97.4 F | SYSTOLIC BLOOD PRESSURE: 108 MMHG | OXYGEN SATURATION: 94 % | HEIGHT: 66 IN

## 2024-03-04 DIAGNOSIS — R91.1 LUNG NODULE: ICD-10-CM

## 2024-03-04 DIAGNOSIS — R94.2 ABNORMAL PET SCAN OF LUNG: ICD-10-CM

## 2024-03-04 LAB
ACB CMPLX DNA BAL NAA+NON-PRB-NCNCRNG: NOT DETECTED
BLACTX-M ISLT/SPM QL: ABNORMAL
BLAIMP ISLT/SPM QL: ABNORMAL
BLAKPC ISLT/SPM QL: ABNORMAL
BLAOXA-48-LIKE ISLT/SPM QL: ABNORMAL
BLAVIM ISLT/SPM QL: ABNORMAL
C PNEUM DNA NPH QL NAA+NON-PROBE: NOT DETECTED
CILIATED BAL QL: 22 %
E CLOAC COMP DNA BAL NAA+NON-PRB-NCNCRNG: NOT DETECTED
E COLI DNA BAL NAA+NON-PRB-NCNCRNG: NOT DETECTED
FLUAV SUBTYP SPEC NAA+PROBE: NOT DETECTED
FLUBV RNA ISLT QL NAA+PROBE: NOT DETECTED
GP B STREP DNA BAL NAA+NON-PRB-NCNCRNG: NOT DETECTED
HADV DNA SPEC NAA+PROBE: NOT DETECTED
HAEM INFLU DNA BAL NAA+NON-PRB-NCNCRNG: NOT DETECTED
HCOV RNA LOWER RESP QL NAA+NON-PROBE: NOT DETECTED
HMPV RNA NPH QL NAA+NON-PROBE: NOT DETECTED
HPIV RNA LOWER RESP QL NAA+NON-PROBE: DETECTED
K AEROGENES DNA BAL NAA+NON-PRB-NCNCRNG: NOT DETECTED
K OXYTOCA DNA BAL NAA+NON-PRB-NCNCRNG: NOT DETECTED
K PNEU GRP DNA BAL NAA+NON-PRB-NCNCRNG: NOT DETECTED
L PNEUMO DNA LOWER RESP QL NAA+NON-PROBE: NOT DETECTED
LYMPHOCYTES NFR FLD MANUAL: 6 %
M CATARRHALIS DNA BAL NAA+NON-PRB-NCNCRNG: NOT DETECTED
M PNEUMO IGG SER IA-ACNC: NOT DETECTED
MACROPHAGE FLUID: 5 %
MECA+MECC ISLT/SPM QL: ABNORMAL
NDM GENE: ABNORMAL
NEUTROPHILS NFR FLD MANUAL: 67 %
P AERUGINOSA DNA BAL NAA+NON-PRB-NCNCRNG: NOT DETECTED
PROTEUS SP DNA BAL NAA+NON-PRB-NCNCRNG: NOT DETECTED
RHINOVIRUS RNA SPEC NAA+PROBE: NOT DETECTED
RSV RNA NPH QL NAA+NON-PROBE: NOT DETECTED
S AUREUS DNA BAL NAA+NON-PRB-NCNCRNG: NOT DETECTED
S MARCESCENS DNA BAL NAA+NON-PRB-NCNCRNG: NOT DETECTED
S PNEUM DNA BAL NAA+NON-PRB-NCNCRNG: NOT DETECTED
S PYO DNA BAL NAA+NON-PRB-NCNCRNG: NOT DETECTED
VISUAL PRESENCE OF BLOOD: NORMAL

## 2024-03-04 PROCEDURE — 87102 FUNGUS ISOLATION CULTURE: CPT | Performed by: INTERNAL MEDICINE

## 2024-03-04 PROCEDURE — 87071 CULTURE AEROBIC QUANT OTHER: CPT | Performed by: INTERNAL MEDICINE

## 2024-03-04 PROCEDURE — 88108 CYTOPATH CONCENTRATE TECH: CPT | Performed by: INTERNAL MEDICINE

## 2024-03-04 PROCEDURE — 93005 ELECTROCARDIOGRAM TRACING: CPT | Performed by: INTERNAL MEDICINE

## 2024-03-04 PROCEDURE — 87206 SMEAR FLUORESCENT/ACID STAI: CPT | Performed by: INTERNAL MEDICINE

## 2024-03-04 PROCEDURE — 25010000002 PHENYLEPHRINE 10 MG/ML SOLUTION 1 ML VIAL: Performed by: NURSE ANESTHETIST, CERTIFIED REGISTERED

## 2024-03-04 PROCEDURE — 87116 MYCOBACTERIA CULTURE: CPT | Performed by: INTERNAL MEDICINE

## 2024-03-04 PROCEDURE — 31645 BRNCHSC W/THER ASPIR 1ST: CPT | Performed by: INTERNAL MEDICINE

## 2024-03-04 PROCEDURE — 25810000003 SODIUM CHLORIDE 0.9 % SOLUTION 250 ML FLEX CONT: Performed by: NURSE ANESTHETIST, CERTIFIED REGISTERED

## 2024-03-04 PROCEDURE — 87070 CULTURE OTHR SPECIMN AEROBIC: CPT | Performed by: INTERNAL MEDICINE

## 2024-03-04 PROCEDURE — 99214 OFFICE O/P EST MOD 30 MIN: CPT | Performed by: INTERNAL MEDICINE

## 2024-03-04 PROCEDURE — 88173 CYTOPATH EVAL FNA REPORT: CPT | Performed by: INTERNAL MEDICINE

## 2024-03-04 PROCEDURE — 89051 BODY FLUID CELL COUNT: CPT | Performed by: INTERNAL MEDICINE

## 2024-03-04 PROCEDURE — 88305 TISSUE EXAM BY PATHOLOGIST: CPT | Performed by: INTERNAL MEDICINE

## 2024-03-04 PROCEDURE — 93010 ELECTROCARDIOGRAM REPORT: CPT | Performed by: INTERNAL MEDICINE

## 2024-03-04 PROCEDURE — 25810000003 LACTATED RINGERS PER 1000 ML: Performed by: ANESTHESIOLOGY

## 2024-03-04 PROCEDURE — 76000 FLUOROSCOPY <1 HR PHYS/QHP: CPT

## 2024-03-04 PROCEDURE — 25010000002 SUGAMMADEX 200 MG/2ML SOLUTION: Performed by: NURSE ANESTHETIST, CERTIFIED REGISTERED

## 2024-03-04 PROCEDURE — 25010000002 ONDANSETRON PER 1 MG: Performed by: NURSE ANESTHETIST, CERTIFIED REGISTERED

## 2024-03-04 PROCEDURE — 88341 IMHCHEM/IMCYTCHM EA ADD ANTB: CPT | Performed by: INTERNAL MEDICINE

## 2024-03-04 PROCEDURE — 31627 NAVIGATIONAL BRONCHOSCOPY: CPT | Performed by: INTERNAL MEDICINE

## 2024-03-04 PROCEDURE — 88342 IMHCHEM/IMCYTCHM 1ST ANTB: CPT | Performed by: INTERNAL MEDICINE

## 2024-03-04 PROCEDURE — 31629 BRONCHOSCOPY/NEEDLE BX EACH: CPT | Performed by: INTERNAL MEDICINE

## 2024-03-04 PROCEDURE — 87633 RESP VIRUS 12-25 TARGETS: CPT | Performed by: INTERNAL MEDICINE

## 2024-03-04 PROCEDURE — 25010000002 PROPOFOL 10 MG/ML EMULSION: Performed by: NURSE ANESTHETIST, CERTIFIED REGISTERED

## 2024-03-04 PROCEDURE — 31654 BRONCH EBUS IVNTJ PERPH LES: CPT | Performed by: INTERNAL MEDICINE

## 2024-03-04 PROCEDURE — C1726 CATH, BAL DIL, NON-VASCULAR: HCPCS | Performed by: INTERNAL MEDICINE

## 2024-03-04 PROCEDURE — 31624 DX BRONCHOSCOPE/LAVAGE: CPT | Performed by: INTERNAL MEDICINE

## 2024-03-04 PROCEDURE — 87205 SMEAR GRAM STAIN: CPT | Performed by: INTERNAL MEDICINE

## 2024-03-04 PROCEDURE — 25010000002 MIDAZOLAM PER 1MG: Performed by: ANESTHESIOLOGY

## 2024-03-04 PROCEDURE — 25010000002 DEXAMETHASONE PER 1 MG: Performed by: NURSE ANESTHETIST, CERTIFIED REGISTERED

## 2024-03-04 PROCEDURE — 31628 BRONCHOSCOPY/LUNG BX EACH: CPT | Performed by: INTERNAL MEDICINE

## 2024-03-04 RX ORDER — PROPOFOL 10 MG/ML
VIAL (ML) INTRAVENOUS AS NEEDED
Status: DISCONTINUED | OUTPATIENT
Start: 2024-03-04 | End: 2024-03-04 | Stop reason: SURG

## 2024-03-04 RX ORDER — PROMETHAZINE HYDROCHLORIDE 25 MG/1
25 SUPPOSITORY RECTAL ONCE AS NEEDED
Status: DISCONTINUED | OUTPATIENT
Start: 2024-03-04 | End: 2024-03-04 | Stop reason: HOSPADM

## 2024-03-04 RX ORDER — ROCURONIUM BROMIDE 10 MG/ML
INJECTION, SOLUTION INTRAVENOUS AS NEEDED
Status: DISCONTINUED | OUTPATIENT
Start: 2024-03-04 | End: 2024-03-04 | Stop reason: SURG

## 2024-03-04 RX ORDER — DEXAMETHASONE SODIUM PHOSPHATE 4 MG/ML
INJECTION, SOLUTION INTRA-ARTICULAR; INTRALESIONAL; INTRAMUSCULAR; INTRAVENOUS; SOFT TISSUE AS NEEDED
Status: DISCONTINUED | OUTPATIENT
Start: 2024-03-04 | End: 2024-03-04 | Stop reason: SURG

## 2024-03-04 RX ORDER — ONDANSETRON 2 MG/ML
4 INJECTION INTRAMUSCULAR; INTRAVENOUS ONCE AS NEEDED
Status: DISCONTINUED | OUTPATIENT
Start: 2024-03-04 | End: 2024-03-04 | Stop reason: HOSPADM

## 2024-03-04 RX ORDER — MEPERIDINE HYDROCHLORIDE 25 MG/ML
12.5 INJECTION INTRAMUSCULAR; INTRAVENOUS; SUBCUTANEOUS
Status: DISCONTINUED | OUTPATIENT
Start: 2024-03-04 | End: 2024-03-04 | Stop reason: HOSPADM

## 2024-03-04 RX ORDER — SODIUM CHLORIDE, SODIUM LACTATE, POTASSIUM CHLORIDE, CALCIUM CHLORIDE 600; 310; 30; 20 MG/100ML; MG/100ML; MG/100ML; MG/100ML
9 INJECTION, SOLUTION INTRAVENOUS CONTINUOUS PRN
Status: DISCONTINUED | OUTPATIENT
Start: 2024-03-04 | End: 2024-03-04 | Stop reason: HOSPADM

## 2024-03-04 RX ORDER — LIDOCAINE HYDROCHLORIDE 20 MG/ML
INJECTION, SOLUTION EPIDURAL; INFILTRATION; INTRACAUDAL; PERINEURAL AS NEEDED
Status: DISCONTINUED | OUTPATIENT
Start: 2024-03-04 | End: 2024-03-04 | Stop reason: SURG

## 2024-03-04 RX ORDER — ACETAMINOPHEN 500 MG
1000 TABLET ORAL ONCE
Status: COMPLETED | OUTPATIENT
Start: 2024-03-04 | End: 2024-03-04

## 2024-03-04 RX ORDER — PROMETHAZINE HYDROCHLORIDE 12.5 MG/1
25 TABLET ORAL ONCE AS NEEDED
Status: DISCONTINUED | OUTPATIENT
Start: 2024-03-04 | End: 2024-03-04 | Stop reason: HOSPADM

## 2024-03-04 RX ORDER — PHENYLEPHRINE HCL IN 0.9% NACL 1 MG/10 ML
SYRINGE (ML) INTRAVENOUS AS NEEDED
Status: DISCONTINUED | OUTPATIENT
Start: 2024-03-04 | End: 2024-03-04 | Stop reason: SURG

## 2024-03-04 RX ORDER — ONDANSETRON 2 MG/ML
INJECTION INTRAMUSCULAR; INTRAVENOUS AS NEEDED
Status: DISCONTINUED | OUTPATIENT
Start: 2024-03-04 | End: 2024-03-04 | Stop reason: SURG

## 2024-03-04 RX ORDER — OXYCODONE HYDROCHLORIDE 5 MG/1
5 TABLET ORAL
Status: DISCONTINUED | OUTPATIENT
Start: 2024-03-04 | End: 2024-03-04 | Stop reason: HOSPADM

## 2024-03-04 RX ORDER — MIDAZOLAM HYDROCHLORIDE 2 MG/2ML
1 INJECTION, SOLUTION INTRAMUSCULAR; INTRAVENOUS ONCE
Status: COMPLETED | OUTPATIENT
Start: 2024-03-04 | End: 2024-03-04

## 2024-03-04 RX ADMIN — SODIUM CHLORIDE, POTASSIUM CHLORIDE, SODIUM LACTATE AND CALCIUM CHLORIDE 9 ML/HR: 600; 310; 30; 20 INJECTION, SOLUTION INTRAVENOUS at 07:12

## 2024-03-04 RX ADMIN — SUGAMMADEX 200 MG: 100 INJECTION, SOLUTION INTRAVENOUS at 09:30

## 2024-03-04 RX ADMIN — PHENYLEPHRINE HYDROCHLORIDE 1 MCG/KG/MIN: 10 INJECTION INTRAVENOUS at 08:45

## 2024-03-04 RX ADMIN — DEXAMETHASONE SODIUM PHOSPHATE 8 MG: 4 INJECTION, SOLUTION INTRAMUSCULAR; INTRAVENOUS at 07:53

## 2024-03-04 RX ADMIN — PROPOFOL 150 MCG/KG/MIN: 10 INJECTION, EMULSION INTRAVENOUS at 07:55

## 2024-03-04 RX ADMIN — MIDAZOLAM HYDROCHLORIDE 1 MG: 1 INJECTION, SOLUTION INTRAMUSCULAR; INTRAVENOUS at 07:41

## 2024-03-04 RX ADMIN — ROCURONIUM BROMIDE 50 MG: 10 INJECTION, SOLUTION INTRAVENOUS at 07:53

## 2024-03-04 RX ADMIN — PROPOFOL 100 MG: 10 INJECTION, EMULSION INTRAVENOUS at 07:53

## 2024-03-04 RX ADMIN — Medication 200 MCG: at 08:14

## 2024-03-04 RX ADMIN — ONDANSETRON HYDROCHLORIDE 4 MG: 2 SOLUTION INTRAMUSCULAR; INTRAVENOUS at 07:53

## 2024-03-04 RX ADMIN — Medication 200 MCG: at 08:43

## 2024-03-04 RX ADMIN — Medication 200 MCG: at 08:35

## 2024-03-04 RX ADMIN — ACETAMINOPHEN 1000 MG: 500 TABLET ORAL at 07:12

## 2024-03-04 RX ADMIN — LIDOCAINE HYDROCHLORIDE 60 MG: 20 INJECTION, SOLUTION INTRAVENOUS at 07:53

## 2024-03-04 RX ADMIN — Medication 200 MCG: at 08:22

## 2024-03-04 RX ADMIN — SODIUM CHLORIDE, POTASSIUM CHLORIDE, SODIUM LACTATE AND CALCIUM CHLORIDE: 600; 310; 30; 20 INJECTION, SOLUTION INTRAVENOUS at 09:32

## 2024-03-04 RX ADMIN — Medication 200 MCG: at 08:06

## 2024-03-04 NOTE — DISCHARGE INSTRUCTIONS
DISCHARGE INSTRUCTIONS  BRONCHOSCOPY/  ENDOBRONCHIAL ULTRASOUND      For your procedure you had:  General Anesthesia (you may have a sore throat for the first 24 hours)    You may experience dizziness, drowsiness, or lightheadedness for several hours following surgery/procedure.  Do not stay alone today or tonight.  Limit your activity for 24 hours.  You should not drive or operate machinery or drink alcohol for 24 hours or while you are taking pain medication.  You should not sign legally binding documents.  Resume your diet slowly.  Follow any special dietary instructions you may have been given by your doctor.    NOTIFY YOUR DOCTOR IF YOU EXPERIENCE ANY OF THE FOLLOWING:  Temperature greater than 101 degrees Fahrenheit  Shaking Chills  Redness or excessive drainage from incision  Nausea, vomiting and/or pain that is not controlled by prescribed medications  Increase in bleeding or bleeding that is excessive  Unable to urinate in 6 hours after surgery  If unable to reach your doctor, please go to the closest Emergency Room     Following your bronchoscopy, you may experience a mild sore throat or cough up small amounts of blood.  Extremes of either should be reported to your doctor.  If you have sudden shortness of breath, sense of urgency, chest pain, chest pressure that worsens over time or sharp chest pains that worsen with deep breaths or coughs go the ED or call 911.  Smokers are encouraged not to smoke for 6 to 8 hours after the procedure to decrease the risk of coughing and bleeding.  Nausea and vomiting can occur, but is not a common side effect of the procedure you have had today. If you experience any nausea or vomiting, take clear liquids for your next meal.  Coughing (slight dry cough to hacking cough) is completely expected for 3 to 4 days.  Pink/ blood tinged sputum is expected for several days ( or while coughing)  Notify MD or go to the ED if significant blood is found in the sputum.  Missing  your appointment/follow-up could lead to serious complications.    LAST DOSE OF PAIN MEDICATION:  0712AM 1000mg of Tylenol

## 2024-03-04 NOTE — ANESTHESIA PREPROCEDURE EVALUATION
Anesthesia Evaluation     Patient summary reviewed and Nursing notes reviewed   no history of anesthetic complications:   NPO Solid Status: > 8 hours  NPO Liquid Status: > 2 hours           Airway   Mallampati: II  TM distance: >3 FB  Neck ROM: full  No difficulty expected  Dental    (+) upper dentures and lower dentures    Pulmonary - normal exam    breath sounds clear to auscultation  (+) lung cancer (s/p left upper lobectomy, now with new 12 mm pulm nodule), COPD,  Cardiovascular - normal exam  Exercise tolerance: good (4-7 METS)    ECG reviewed  Rhythm: regular  Rate: normal    (+) hypertension, hyperlipidemia      Neuro/Psych  (+) CVA (left foot drop)  GI/Hepatic/Renal/Endo - negative ROS     Musculoskeletal     Abdominal    Substance History - negative use     OB/GYN negative ob/gyn ROS         Other   arthritis,   history of cancer    ROS/Med Hx Other: PAT Nursing Notes unavailable.               Anesthesia Plan    ASA 3     general     (Patient understands anesthesia not responsible for dental damage.)  intravenous induction     Anesthetic plan, risks, benefits, and alternatives have been provided, discussed and informed consent has been obtained with: patient.    Use of blood products discussed with patient .    Plan discussed with CRNA.    CODE STATUS:

## 2024-03-04 NOTE — OP NOTE
Bronchoscopy Procedure Note    Procedure:    1.  Robotic bronchoscopy with Ion procedure  2. Robotic bronchoscopy with radial EBUS guided lung nodule aspiration  3. Robotic bronchoscopy with radial EBUS guided transbronchial biopsy left upper lobe lung nodule  4. Bronchoalveolar lavage left upper lobe with fluroscopy  5. Bronchial washings tracheobronchial tree  6. Airway inspection    Pre-Operative Diagnosis: Left upper lobe lung nodule, enlarging    Post-Operative Diagnosis: Same    Indication: Left upper lobe lung nodule    Anesthesia: General anesthesia, sedated and paralyzed with anesthesia    Procedure Details: Patient was consented for the procedure with all risks and benefits of the procedure explained in detail. Patient was given the opportunity to ask questions and all concerns were answered.  Patient was intubated with size 8.5 ET tube and placed on invasive mechanical ventilator.  Patient was sedated and paralyzed on invasive mechanical ventilator with anesthesia service.    Then regular bronchoscopy was used to suction the secretions through the ET tube.  ET tube was ending in mid trachea.  Robotic bronchoscopy with Ion was started with planning and registration of the patient, navigational mapping then procedure done with radial probe ultrasound and fluoroscopy.  The left upper lobe lung nodule was mapped and navigated with the robotic bronchoscopy.  Fine-needle aspiration biopsy and forceps biopsy were done of the left upper lobe lung nodule area with the help of navigational bronchoscopy, radial probe ultrasound and fluoroscopy.  Bronchoalveolar lavage was collected from left upper lobe area with injection of 20 cc saline. Then scope was switched to endobronchial ultrasound bronchoscope (EBUS), which was inserted into the main airway via the ET tube. An anatomical and mediastinal survey was done of the main airways and the subsegmental bronchus with EBUS scope.  No enlarged lymph nodes were seen.  Then scope was changed to regular bronchoscope, airway inspection was done.  No active bleeding was seen.  Bronchial washing was obtained from entire tracheobronchial tree.  Post procedure no active bleeding was seen.    Estimated Blood Loss: Minimal      Specimens:  -Fine-needle aspiration biopsy left upper lobe lung nodule  -Transbronchial forceps biopsy left upper lobe lung nodule  -20 cc bronchoalveolar lavage left upper lobe  - Bronchial washing tracheobronchial tree      Complications: No complications during or after the procedure.    Disposition: To home, if stable in recovery. Follow up with me in clinic in a week.    Patient tolerated the procedure well.      Electronically signed by Dayne Choudhary MD, 03/04/24, 9:57 AM EST.

## 2024-03-04 NOTE — ANESTHESIA POSTPROCEDURE EVALUATION
Patient: Carlos Zimmerman    Procedure Summary       Date: 03/04/24 Room / Location: [facility] OR 04 / [facility] MAIN OR    Anesthesia Start: 0748 Anesthesia Stop: 0954    Procedure: BRONCHOSCOPY WITH ION ROBOT, REBUS, NEEDLE ASPIRATE, BIOPSIES, BAL, WASHINGS (Bronchus) Diagnosis:       Abnormal PET scan of lung      Lung nodule      (Abnormal PET scan of lung [R94.2])      (Lung nodule [R91.1])    Surgeons: Dayne Choudhary MD Provider: Fabio Patel MD    Anesthesia Type: general ASA Status: 3            Anesthesia Type: general    Vitals  Vitals Value Taken Time   /54 03/04/24 1030   Temp 36.3 °C (97.4 °F) 03/04/24 1024   Pulse 70 03/04/24 1033   Resp 16 03/04/24 1024   SpO2 91 % 03/04/24 1033   Vitals shown include unfiled device data.        Post Anesthesia Care and Evaluation    Patient location during evaluation: bedside  Patient participation: complete - patient participated  Level of consciousness: awake  Pain management: adequate    Airway patency: patent  PONV Status: none  Cardiovascular status: acceptable and stable  Respiratory status: acceptable  Hydration status: acceptable    Comments: An Anesthesiologist personally participated in the most demanding procedures (including induction and emergence if applicable) in the anesthesia plan, monitored the course of anesthesia administration at frequent intervals and remained physically present and available for immediate diagnosis and treatment of emergencies.

## 2024-03-05 NOTE — PROGRESS NOTES
Primary Care Provider  Payam Carroll MD   Referring Provider  No ref. provider found    Patient Complaint  Hospital Follow Up Visit (Cough, soa, runny nose, wheezing)      SUBJECTIVE    History of Presenting Illness  Carlos Zimmerman is a pleasant 84 y.o. male who presents to [facility] for follow-up after bronchoscopy.  Patient here with spouse.  Patient underwent ion robotic bronchoscopy by Dr. Choudhary on 3/4/2024 for abnormal PET scan of lung and lung nodule.  Patient has a history of COPD, history of Pseudomonas pneumonia in the past, history of left lung NSCLC with adenocarcinoma with left VATS procedure and prior chemotherapy per Dr. Saul , tobacco abuse of cigarettes in remission for 10 years.  At last visit Dr. Choudhary switched him from Trelegy to Breztri 2 puffs twice daily.  Patient was to continue with albuterol as needed.  Patient had a follow-up CT of chest done 2/15/2024 which showed a small area of nodularity/consolidation adjacent to bullae and left upper lung that has gradually increased in size now measuring 12 mm previously 8 mm.  Patient then underwent PET scan on 2/23/2024 which showed hypermetabolic uptake of the nodularity concerning for neoplasm.  Therefore patient underwent Ion robotic bronchoscopy on 3/4/2024 by Dr. Choudhary.    Cytology pathology have not resulted yet.  Respiratory culture is preliminary.  Pneumonia panel positive parainfluenza virus PCR detected.  AFB fungal cultures BAL culture with no growth to date still preliminary.      Patient has wheezing, cough, nonproductive, and shortness of air with exertional activities. He denies headaches, chest pain, weight loss or hemoptysis. Denies fevers, chills and night sweats. Carlos Zimmerman is able to perform ADLs without difficulties and denies any swollen glands/lymph nodes in the head or neck.    I have personally reviewed the review of systems, past family, social, medical  and surgical histories; and agree with their findings.    Review of Systems  Constitutional symptoms:  Denied complaints   Ear, nose, throat: Denied complaints  Cardiovascular:  Denied complaints  Respiratory: shortness of air with exertion, wheeze, cough  Gastrointestinal: Denied complaints  Musculoskeletal: Denied complaints    Family History   Problem Relation Age of Onset    Cancer Mother     Stomach cancer Other     Malig Hyperthermia Neg Hx         Social History     Socioeconomic History    Marital status:    Tobacco Use    Smoking status: Former     Current packs/day: 0.00     Average packs/day: 1 pack/day for 60.0 years (60.0 ttl pk-yrs)     Types: Cigarettes     Start date:      Quit date:      Years since quittin.1     Passive exposure: Past    Smokeless tobacco: Current     Types: Snuff    Tobacco comments:     INST PER ANESTHESIA PROTOCOL   Vaping Use    Vaping status: Never Used   Substance and Sexual Activity    Alcohol use: Not Currently    Drug use: Never    Sexual activity: Defer        Past Medical History:   Diagnosis Date    Anemia     NO CURRENT ISSUES    Arthritis     COPD (chronic obstructive pulmonary disease)     INHALER  PRN    Hyperlipidemia     Hypertension     ON MEDS/FOLLOWS SANHI, PT DENIES CAD    Lung cancer     REMOVED -     Pneumonia     LEFT LUNG CA NO CURRENT PNEUMONIA    SOB (shortness of breath)     WITH EXERTION SOMETIMES    Stroke     LEFT LEG/FOOT DROP      TIA (transient ischemic attack)     NO RESIDUAL         Immunization History   Administered Date(s) Administered    COVID-19 (PFIZER) Purple Cap Monovalent 2021, 2021, 10/18/2021    COVID-19 F23 (PFIZER) 12YRS+ (COMIRNATY) 11/10/2023    Fluad Quad 65+ 10/07/2020, 2021    Fluzone High-Dose 65+yrs 10/06/2022, 2023    Influenza, Unspecified 2020    TD Preservative Free (Tenivac) 2022       No Known Allergies       Current Outpatient Medications:     Aspirin  Low Dose 81 MG chewable tablet, Chew 1 tablet Daily. LAST DOSE OVER 03/1/24  AS DIRECTED BY CLARE, Disp: , Rfl:     Budeson-Glycopyrrol-Formoterol (Breztri Aerosphere) 160-9-4.8 MCG/ACT aerosol inhaler, Inhale 2 puffs 2 (Two) Times a Day., Disp: 1 each, Rfl: 9    Cholecalciferol 25 MCG (1000 UT) capsule, Take 1 capsule by mouth Daily. LAST DOSE 3/1/24, Disp: , Rfl:     cilostazol (PLETAL) 100 MG tablet, Take 1 tablet by mouth 2 (Two) Times a Day. LAST DOSE 3/1/24, Disp: , Rfl:     gabapentin (NEURONTIN) 300 MG capsule, Take 1 capsule by mouth 3 (Three) Times a Day., Disp: 90 capsule, Rfl: 2    metoprolol succinate XL (TOPROL-XL) 25 MG 24 hr tablet, Take 1 tablet by mouth Daily., Disp: , Rfl:     multivitamins-minerals (PRESERVISION AREDS 2) capsule capsule, Take 1 capsule by mouth 2 (Two) Times a Day. LAST DOSE 3/1/24, Disp: , Rfl:     pantoprazole (PROTONIX) 40 MG EC tablet, Take 1 tablet by mouth Daily., Disp: , Rfl:     pravastatin (PRAVACHOL) 40 MG tablet, Take 1 tablet by mouth Every Night., Disp: , Rfl:     Refresh Tears 0.5 % solution, Administer 1 drop to both eyes Daily As Needed for Dry Eyes., Disp: , Rfl:     tamsulosin (FLOMAX) 0.4 MG capsule 24 hr capsule, Take 1 capsule by mouth 2 (Two) Times a Day., Disp: 180 capsule, Rfl: 4    vitamin D3 125 MCG (5000 UT) capsule capsule, Take 1 capsule by mouth Daily. LAST DOSE 3/1/24, Disp: , Rfl:     doxycycline (VIBRAMYCIN) 100 MG capsule, Take 1 capsule by mouth 2 (Two) Times a Day., Disp: 20 capsule, Rfl: 0    ipratropium-albuterol (DUO-NEB) 0.5-2.5 mg/3 ml nebulizer, Take 3 mL by nebulization 4 (Four) Times a Day As Needed for Wheezing or Shortness of Air., Disp: 120 mL, Rfl: 5    predniSONE (DELTASONE) 10 MG tablet, 6 daily x 2 days, 5 daily x 2 days, 4 daily x 2 days, 3 daily x 2 days, 2 daily x 2 days, 1 daily x 2 days, Disp: 42 tablet, Rfl: 0    Current Facility-Administered Medications:     ipratropium-albuterol (DUO-NEB) nebulizer solution 3 mL, 3 mL,  "Nebulization, Once, Gely Canales, APRN           Vital Signs   /62 (BP Location: Left arm, Patient Position: Sitting)   Pulse 90   Temp 98 °F (36.7 °C)   Resp 22   Ht 167.6 cm (66\")   Wt 73.9 kg (163 lb)   SpO2 94% Comment: room air  BMI 26.31 kg/m²       OBJECTIVE    Physical Exam  Vital Signs Reviewed   WDWN, Alert, NAD.    HEENT:  PERRL, EOMI.  OP, nares clear  Neck:  Supple, no JVD, no thyromegaly  Chest:  good aeration, clear to auscultation bilaterally, tympanic to percussion bilaterally, no work of breathing noted  CV: RRR, no MGR, pulses 2+, equal.  Abd:  Soft, NT, ND, + BS, no HSM  EXT:  no clubbing, no cyanosis, no edema  Neuro:  A&Ox3, CN grossly intact, no focal deficits.  Skin: No rashes or lesions noted    Results Review  I have personally reviewed the prior office notes, hospital records, labs, and diagnostics.  ntains abnormal data Pneumonia Panel - Lavage, Lung, Left Upper Lobe  Order: [id number]  Status: Final result       Visible to patient: No (not released)       Next appt: 03/08/2024 at 10:15 AM in Pulmonology (Gely Canales, ARTEMIO)       Dx: Lung nodule; Abnormal PET scan of lung    Specimen Information: Lung, Left Upper Lobe; Lavage   0 Result Notes      Component  Ref Range & Units    Escherichia coli PCR  Not Detected Not Detected   Acinetobacter calcoaceticus-baumannii complex PCR  Not Detected Not Detected   Enterobacter cloacae PCR  Not Detected Not Detected   Klebsiella oxytoca PCR  Not Detected Not Detected   Klebsiella pneumoniae group PCR  Not Detected Not Detected   Klebsiella aerogenes PCR  Not Detected Not Detected   Moraxella catarrhalis PCR  Not Detected Not Detected   Proteus species PCR  Not Detected Not Detected   Pseudomonas aeroginosa PCR  Not Detected Not Detected   Serratia marcescens PCR  Not Detected Not Detected   Staphylococcus aureus PCR  Not Detected Not Detected   Streptococcus pyogenes PCR  Not Detected Not Detected   Haemophilus influenzae PCR  Not " Detected Not Detected   Streptococcus agalactiae PCR  Not Detected Not Detected   Streptococcus pneumoniae PCR  Not Detected Not Detected   Chlamydophila pneumoniae PCR  Not Detected Not Detected   Legionella pneumophilia PCR  Not Detected Not Detected   Mycoplasma pneumo by PCR  Not Detected Not Detected   ADENOVIRUS, PCR  Not Detected Not Detected   CTX-M Gene  Not Detected, N/A N/A   IMP Gene  Not Detected, N/A N/A   KPC Gene  Not Detected, N/A N/A   mecA/C and MREJ Gene  Not Detected, N/A N/A   NDM Gene  Not Detected, N/A N/A   OXA-48-like Gene  Not Detected, N/A N/A   VIM Gene  Not Detected, N/A N/A   Coronavirus  Not Detected Not Detected   Human Metapneumovirus  Not Detected Not Detected   Human Rhinovirus/Enterovirus  Not Detected Not Detected   Influenza A PCR  Not Detected Not Detected   Influenza B PCR  Not Detected Not Detected   RSV, PCR  Not Detected Not Detected   Parainfluenza virus PCR  Not Detected Detected Abnormal    Resulting Agency [facility]              Specimen Collected: 03/04/24 09:17 EST Last Resulted: 03/04/24 12:08 EST           ASSESSMENT         Patient Active Problem List   Diagnosis    COPD (chronic obstructive pulmonary disease)    Hypertension    Low back pain    Lung cancer    Lung disease    Solitary lung nodule    Pneumonia of right lung due to infectious organism    Hematuria    Complicated UTI (urinary tract infection)    Recurrent urinary tract infection    Benign prostatic hyperplasia    Atherosclerosis of lower extremity with claudication    Benign prostatic hyperplasia with lower urinary tract symptoms    Elevated PSA    Cervical radiculopathy    Cervical spondylosis without myelopathy    Abnormal PET scan of lung    Lung nodule       Encounter Diagnoses   Name Primary?    Abnormal PET scan of lung Yes    Lung nodule     Malignant neoplasm of lower lobe of left lung     Dyspnea on exertion     Pulmonary emphysema, unspecified emphysema type     Nicotine dependence,  cigarettes, in remission     Wheezing     Cough, unspecified type     Shortness of breath     Parainfluenza infection       PLAN  -duo nebulizer treatment now with improvement in breathing, decrease in wheezing  -CXR now and will notify of results when available  -Doxycycline and Prednisone taper sent to pharmacy  -Duo nebulizer sent to pharmacy with instruction to use every 6 hours as needed for shortness of air or wheeze  -if symptoms persist or worsen patient advised to call 911 or go to the ED for further evaluation   -followup on Tuesday for path report from bronchoscopy    Diagnoses and all orders for this visit:    1. Abnormal PET scan of lung (Primary)  -     Home Nebulizer  -     ipratropium-albuterol (DUO-NEB) 0.5-2.5 mg/3 ml nebulizer; Take 3 mL by nebulization 4 (Four) Times a Day As Needed for Wheezing or Shortness of Air.  Dispense: 120 mL; Refill: 5  -     predniSONE (DELTASONE) 10 MG tablet; 6 daily x 2 days, 5 daily x 2 days, 4 daily x 2 days, 3 daily x 2 days, 2 daily x 2 days, 1 daily x 2 days  Dispense: 42 tablet; Refill: 0  -     doxycycline (VIBRAMYCIN) 100 MG capsule; Take 1 capsule by mouth 2 (Two) Times a Day.  Dispense: 20 capsule; Refill: 0    2. Lung nodule  -     Home Nebulizer  -     ipratropium-albuterol (DUO-NEB) 0.5-2.5 mg/3 ml nebulizer; Take 3 mL by nebulization 4 (Four) Times a Day As Needed for Wheezing or Shortness of Air.  Dispense: 120 mL; Refill: 5  -     predniSONE (DELTASONE) 10 MG tablet; 6 daily x 2 days, 5 daily x 2 days, 4 daily x 2 days, 3 daily x 2 days, 2 daily x 2 days, 1 daily x 2 days  Dispense: 42 tablet; Refill: 0  -     doxycycline (VIBRAMYCIN) 100 MG capsule; Take 1 capsule by mouth 2 (Two) Times a Day.  Dispense: 20 capsule; Refill: 0    3. Malignant neoplasm of lower lobe of left lung  -     Home Nebulizer  -     ipratropium-albuterol (DUO-NEB) 0.5-2.5 mg/3 ml nebulizer; Take 3 mL by nebulization 4 (Four) Times a Day As Needed for Wheezing or Shortness of  Air.  Dispense: 120 mL; Refill: 5  -     predniSONE (DELTASONE) 10 MG tablet; 6 daily x 2 days, 5 daily x 2 days, 4 daily x 2 days, 3 daily x 2 days, 2 daily x 2 days, 1 daily x 2 days  Dispense: 42 tablet; Refill: 0  -     doxycycline (VIBRAMYCIN) 100 MG capsule; Take 1 capsule by mouth 2 (Two) Times a Day.  Dispense: 20 capsule; Refill: 0    4. Dyspnea on exertion  -     XR Chest 2 View; Future  -     Home Nebulizer  -     ipratropium-albuterol (DUO-NEB) 0.5-2.5 mg/3 ml nebulizer; Take 3 mL by nebulization 4 (Four) Times a Day As Needed for Wheezing or Shortness of Air.  Dispense: 120 mL; Refill: 5  -     predniSONE (DELTASONE) 10 MG tablet; 6 daily x 2 days, 5 daily x 2 days, 4 daily x 2 days, 3 daily x 2 days, 2 daily x 2 days, 1 daily x 2 days  Dispense: 42 tablet; Refill: 0  -     doxycycline (VIBRAMYCIN) 100 MG capsule; Take 1 capsule by mouth 2 (Two) Times a Day.  Dispense: 20 capsule; Refill: 0    5. Pulmonary emphysema, unspecified emphysema type  -     Home Nebulizer  -     ipratropium-albuterol (DUO-NEB) 0.5-2.5 mg/3 ml nebulizer; Take 3 mL by nebulization 4 (Four) Times a Day As Needed for Wheezing or Shortness of Air.  Dispense: 120 mL; Refill: 5  -     predniSONE (DELTASONE) 10 MG tablet; 6 daily x 2 days, 5 daily x 2 days, 4 daily x 2 days, 3 daily x 2 days, 2 daily x 2 days, 1 daily x 2 days  Dispense: 42 tablet; Refill: 0  -     doxycycline (VIBRAMYCIN) 100 MG capsule; Take 1 capsule by mouth 2 (Two) Times a Day.  Dispense: 20 capsule; Refill: 0    6. Nicotine dependence, cigarettes, in remission  -     Home Nebulizer  -     ipratropium-albuterol (DUO-NEB) 0.5-2.5 mg/3 ml nebulizer; Take 3 mL by nebulization 4 (Four) Times a Day As Needed for Wheezing or Shortness of Air.  Dispense: 120 mL; Refill: 5  -     predniSONE (DELTASONE) 10 MG tablet; 6 daily x 2 days, 5 daily x 2 days, 4 daily x 2 days, 3 daily x 2 days, 2 daily x 2 days, 1 daily x 2 days  Dispense: 42 tablet; Refill: 0  -     doxycycline  (VIBRAMYCIN) 100 MG capsule; Take 1 capsule by mouth 2 (Two) Times a Day.  Dispense: 20 capsule; Refill: 0    7. Wheezing  -     XR Chest 2 View; Future  -     ipratropium-albuterol (DUO-NEB) nebulizer solution 3 mL  -     Home Nebulizer  -     ipratropium-albuterol (DUO-NEB) 0.5-2.5 mg/3 ml nebulizer; Take 3 mL by nebulization 4 (Four) Times a Day As Needed for Wheezing or Shortness of Air.  Dispense: 120 mL; Refill: 5  -     predniSONE (DELTASONE) 10 MG tablet; 6 daily x 2 days, 5 daily x 2 days, 4 daily x 2 days, 3 daily x 2 days, 2 daily x 2 days, 1 daily x 2 days  Dispense: 42 tablet; Refill: 0  -     doxycycline (VIBRAMYCIN) 100 MG capsule; Take 1 capsule by mouth 2 (Two) Times a Day.  Dispense: 20 capsule; Refill: 0    8. Cough, unspecified type  -     XR Chest 2 View; Future  -     ipratropium-albuterol (DUO-NEB) nebulizer solution 3 mL  -     Home Nebulizer  -     ipratropium-albuterol (DUO-NEB) 0.5-2.5 mg/3 ml nebulizer; Take 3 mL by nebulization 4 (Four) Times a Day As Needed for Wheezing or Shortness of Air.  Dispense: 120 mL; Refill: 5  -     predniSONE (DELTASONE) 10 MG tablet; 6 daily x 2 days, 5 daily x 2 days, 4 daily x 2 days, 3 daily x 2 days, 2 daily x 2 days, 1 daily x 2 days  Dispense: 42 tablet; Refill: 0  -     doxycycline (VIBRAMYCIN) 100 MG capsule; Take 1 capsule by mouth 2 (Two) Times a Day.  Dispense: 20 capsule; Refill: 0    9. Shortness of breath  -     XR Chest 2 View; Future  -     ipratropium-albuterol (DUO-NEB) nebulizer solution 3 mL  -     Home Nebulizer  -     ipratropium-albuterol (DUO-NEB) 0.5-2.5 mg/3 ml nebulizer; Take 3 mL by nebulization 4 (Four) Times a Day As Needed for Wheezing or Shortness of Air.  Dispense: 120 mL; Refill: 5  -     predniSONE (DELTASONE) 10 MG tablet; 6 daily x 2 days, 5 daily x 2 days, 4 daily x 2 days, 3 daily x 2 days, 2 daily x 2 days, 1 daily x 2 days  Dispense: 42 tablet; Refill: 0  -     doxycycline (VIBRAMYCIN) 100 MG capsule; Take 1 capsule  by mouth 2 (Two) Times a Day.  Dispense: 20 capsule; Refill: 0    10. Parainfluenza infection  -     predniSONE (DELTASONE) 10 MG tablet; 6 daily x 2 days, 5 daily x 2 days, 4 daily x 2 days, 3 daily x 2 days, 2 daily x 2 days, 1 daily x 2 days  Dispense: 42 tablet; Refill: 0  -     doxycycline (VIBRAMYCIN) 100 MG capsule; Take 1 capsule by mouth 2 (Two) Times a Day.  Dispense: 20 capsule; Refill: 0           Smoking status:former  Vaccination status:reviewed  Medications personally reviewed    Follow Up  Return for on Tuesday.    Patient was given instructions and counseling regarding his condition or for health maintenance advice. Please see specific information pulled into the AVS if appropriate.

## 2024-03-06 LAB
BACTERIA SPEC AEROBE CULT: NORMAL
BACTERIA SPEC RESP CULT: NORMAL
GRAM STN SPEC: NORMAL

## 2024-03-06 NOTE — H&P
"CC: Lung nodule, enlarging, hypermetabolic    Mr. 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  After recent office visit, had CT chest and PET, showing enlarging nodule and hypermetabolic. He is here for robotic bronchoscopy.             Objective  Vital Signs:   /58 (BP Location: Right arm, Patient Position: Sitting, Cuff Size: Adult)   Pulse 92   Temp 97.5 °F (36.4 °C) (Temporal)   Resp 16   Ht 170.2 cm (67\")   Wt 74.9 kg (165 lb 3.2 oz)   SpO2 97% Comment: room air  BMI 25.87 kg/m²     Physical Exam  Vitals and nursing note reviewed.   Constitutional:       General: He is not in acute distress.     Appearance: Normal appearance. He is normal weight.   HENT:      Head: Normocephalic and atraumatic.      Right Ear: Hearing normal.      Left Ear: Hearing normal.      Nose: No nasal tenderness or congestion.      Mouth/Throat:      Mouth: Mucous membranes are moist. No oral lesions.      Pharynx: Oropharynx is clear.   Eyes:      Extraocular Movements: Extraocular movements intact.      Pupils: Pupils are equal, round, and reactive to light.   Neck:      Thyroid: No thyroid mass or thyromegaly.   Cardiovascular:      Rate and Rhythm: Normal rate and regular rhythm.      Pulses: Normal pulses.      Heart sounds: No murmur heard.  Pulmonary:      Effort: Pulmonary effort is normal. No respiratory distress.      Breath sounds: No wheezing, rhonchi or rales.      Comments: Bilateral diminished breath sounds, no wheezing crackles or rhonchi, resonant to percussion bilaterally  Chest:      Chest wall: No tenderness.   Abdominal:      General: Bowel sounds are normal. There is no distension.      Palpations: Abdomen is soft. There is no mass.      Tenderness: There is no abdominal tenderness. There is no guarding.   Musculoskeletal:         General: " Normal range of motion.      Cervical back: Normal range of motion and neck supple.      Right lower leg: No edema.      Left lower leg: No edema.   Lymphadenopathy:      Cervical: No cervical adenopathy.      Upper Body:      Right upper body: No supraclavicular or axillary adenopathy.      Left upper body: No supraclavicular or axillary adenopathy.   Skin:     General: Skin is warm and dry.      Capillary Refill: Capillary refill takes less than 2 seconds.      Findings: No lesion or rash.   Neurological:      General: No focal deficit present.      Mental Status: He is alert and oriented to person, place, and time.   Psychiatric:         Mood and Affect: Mood and affect normal. Mood is not anxious or depressed.         Behavior: Behavior normal.               Result Review  :   The following data was reviewed by: Dayne Choudhary MD on 06/16/2021:  Common Labs:   Common labs            9/20/2023    12:18 11/27/2023    08:46 12/27/2023    08:37   Common Labs   Glucose     93    BUN     9    Creatinine 0.70    0.69    Sodium     144    Potassium     4.1    Chloride     105    Calcium     9.4    Albumin     4.0    Total Bilirubin     0.5    Alkaline Phosphatase     122    AST (SGOT)     23    ALT (SGPT)     21    WBC   8.37      Hemoglobin   13.9      Hematocrit   44.3      Platelets   209      Total Cholesterol     164    Triglycerides     70    HDL Cholesterol     64    LDL Cholesterol      87    PSA   11.800            CMP Results:   CMP            9/20/2023    12:18 12/27/2023    08:37   CMP   Glucose   93    BUN   9    Creatinine 0.70  0.69    EGFR 90.9  91.3    Sodium   144    Potassium   4.1    Chloride   105    Calcium   9.4    Total Protein   6.2    Albumin   4.0    Globulin   2.2    Total Bilirubin   0.5    Alkaline Phosphatase   122    AST (SGOT)   23    ALT (SGPT)   21    Albumin/Globulin Ratio   1.8    BUN/Creatinine Ratio   13.0    Anion Gap   11.9          Data reviewed:       CT Chest Without Contrast  Diagnostic    Result Date: 2/15/2024  PROCEDURE: CT CHEST WO CONTRAST DIAGNOSTIC  COMPARISON: [facility], CT, CT CHEST WO CONTRAST DIAGNOSTIC, 7/25/2022, 7:37.  [facility], CT, CT CHEST WO CONTRAST DIAGNOSTIC, 2/24/2023, 8:47.  INDICATIONS: FOLLOW UP LUNG CA AND NODULE.  TECHNIQUE: CT images were created without the administration of contrast material.   PROTOCOL:   Standard imaging protocol performed    RADIATION:   DLP: 316.3mGy*cm   Automated exposure control was utilized to minimize radiation dose.  FINDINGS:  Noncontrast soft tissues of the lower neck without acute abnormality.  Heart size normal.  Extensive coronary artery calcifications.  Small pericardial effusion, unchanged.  Extensive atherosclerotic calcifications of the aortic arch.  Scattered mediastinal lymph nodes are below size criteria.  Negative for axillary adenopathy.  No pleural effusion.  Postsurgical changes again noted related to prior left upper lobectomy.  Stable right upper lobe 3 mm nodule adjacent to the right major fissure (201/59).  No findings of pneumonia.  There is a bulla in the left upper lung with an adjacent area of nodularity/consolidation along the medial aspect of this which is increased in size from prior study measuring 12 x 11 mm, previously 8 x 7 mm (201/40).  No new pulmonary nodule.  Chronic atelectasis at the medial right middle lobe lingula.  Moderate emphysema.  Upper abdomen demonstrates normal noncontrast visualized portions of the liver, spleen, adrenal glands.  Kidneys without hydronephrosis.  No free fluid in the upper abdomen.  Extensive upper abdominal aortic atherosclerotic calcifications.  Chronic compression fractures at T8 and T12 unchanged.      Impression:   1. Small area of nodularity/consolidation adjacent to bulla in left upper lung has gradually increased in size measuring 12 mm, previously 8 mm.  This could relate to an area of developing scarring,  consider short-term CT follow-up in 3 months, alternatively PET-CT could be considered to exclude any hypermetabolic uptake. 2. Stable 3 mm right upper lobe pulmonary nodule.  No new pulmonary nodule. 3. Postsurgical changes of left upper lobectomy. 4. No intrathoracic adenopathy. 5. Emphysema, coronary artery calcifications, and additional chronic findings above.     HUNTER BELLE MD       Electronically Signed and Approved By: HUNTER BELLE MD on 2/15/2024 at 11:34                 Assessment  Assessment and Plan    Diagnoses and all orders for this visit:     1. Malignant neoplasm of lower lobe of left lung (Primary)  -     Budeson-Glycopyrrol-Formoterol (Breztri Aerosphere) 160-9-4.8 MCG/ACT aerosol inhaler; Inhale 2 puffs 2 (Two) Times a Day.  Dispense: 1 each; Refill: 9     2. Chronic obstructive pulmonary disease, unspecified COPD type  -     Budeson-Glycopyrrol-Formoterol (Breztri Aerosphere) 160-9-4.8 MCG/ACT aerosol inhaler; Inhale 2 puffs 2 (Two) Times a Day.  Dispense: 1 each; Refill: 9     3. Lung disease  -     Budeson-Glycopyrrol-Formoterol (Breztri Aerosphere) 160-9-4.8 MCG/ACT aerosol inhaler; Inhale 2 puffs 2 (Two) Times a Day.  Dispense: 1 each; Refill: 9     4. Solitary lung nodule  -     Budeson-Glycopyrrol-Formoterol (Breztri Aerosphere) 160-9-4.8 MCG/ACT aerosol inhaler; Inhale 2 puffs 2 (Two) Times a Day.  Dispense: 1 each; Refill: 9     5. Pneumonia of right lung due to infectious organism, unspecified part of lung  -     Budeson-Glycopyrrol-Formoterol (Breztri Aerosphere) 160-9-4.8 MCG/ACT aerosol inhaler; Inhale 2 puffs 2 (Two) Times a Day.  Dispense: 1 each; Refill: 9        Lung nodule with attached pneumatocele, pneumatocele with hazy groundglass opacity surrounding it.  So far it has been stable.  There was 3 mm right upper lobe nodular density along the major fissure.  Repeat CT scan of the chest with enlarging solid component.     Discussed bronchoscopy with robotic procedure, EBUS,  bronchoalveolar lavage, bronchial washing, possible transbronchial biopsy, endobronchial brushing, airway inspection.  Risks and benefits of the procedure were discussed in detail.  Alternatives and options were reviewed.  Risks including pneumothorax, pneumonia, bleeding, respiratory depression and that it could be fatal were all reviewed.  Patient understands and is agreeable for the procedure.    I have reviewed labs, imaging, pertinent clinical data and provider notes.   I have discussed with bedside nurse and primary service.           Follow Up     1-2 weeks.     Electronically signed by Dayne Choudhary MD, 03/06/24, 6:23 PM EST.

## 2024-03-08 ENCOUNTER — HOSPITAL ENCOUNTER (OUTPATIENT)
Dept: GENERAL RADIOLOGY | Facility: HOSPITAL | Age: 84
Discharge: HOME | End: 2024-03-08
Payer: MEDICARE

## 2024-03-08 ENCOUNTER — OFFICE VISIT (OUTPATIENT)
Dept: PULMONOLOGY | Facility: CLINIC | Age: 84
End: 2024-03-08
Payer: MEDICARE

## 2024-03-08 VITALS
OXYGEN SATURATION: 94 % | RESPIRATION RATE: 22 BRPM | HEART RATE: 90 BPM | TEMPERATURE: 98 F | BODY MASS INDEX: 26.2 KG/M2 | DIASTOLIC BLOOD PRESSURE: 62 MMHG | SYSTOLIC BLOOD PRESSURE: 116 MMHG | HEIGHT: 66 IN | WEIGHT: 163 LBS

## 2024-03-08 DIAGNOSIS — R06.09 DYSPNEA ON EXERTION: ICD-10-CM

## 2024-03-08 DIAGNOSIS — R06.02 SHORTNESS OF BREATH: ICD-10-CM

## 2024-03-08 DIAGNOSIS — C34.32 MALIGNANT NEOPLASM OF LOWER LOBE OF LEFT LUNG: ICD-10-CM

## 2024-03-08 DIAGNOSIS — F17.211 NICOTINE DEPENDENCE, CIGARETTES, IN REMISSION: ICD-10-CM

## 2024-03-08 DIAGNOSIS — R05.9 COUGH, UNSPECIFIED TYPE: ICD-10-CM

## 2024-03-08 DIAGNOSIS — R06.2 WHEEZING: ICD-10-CM

## 2024-03-08 DIAGNOSIS — B34.8 PARAINFLUENZA INFECTION: ICD-10-CM

## 2024-03-08 DIAGNOSIS — R91.1 LUNG NODULE: ICD-10-CM

## 2024-03-08 DIAGNOSIS — R94.2 ABNORMAL PET SCAN OF LUNG: Primary | ICD-10-CM

## 2024-03-08 DIAGNOSIS — J43.9 PULMONARY EMPHYSEMA, UNSPECIFIED EMPHYSEMA TYPE: ICD-10-CM

## 2024-03-08 PROCEDURE — 71046 X-RAY EXAM CHEST 2 VIEWS: CPT

## 2024-03-08 RX ORDER — IPRATROPIUM BROMIDE AND ALBUTEROL SULFATE 2.5; .5 MG/3ML; MG/3ML
3 SOLUTION RESPIRATORY (INHALATION) 4 TIMES DAILY PRN
Qty: 120 ML | Refills: 5 | Status: SHIPPED | OUTPATIENT
Start: 2024-03-08

## 2024-03-08 RX ORDER — IPRATROPIUM BROMIDE AND ALBUTEROL SULFATE 2.5; .5 MG/3ML; MG/3ML
3 SOLUTION RESPIRATORY (INHALATION) ONCE
Status: SHIPPED | OUTPATIENT
Start: 2024-03-08

## 2024-03-08 RX ORDER — PREDNISONE 10 MG/1
TABLET ORAL
Qty: 42 TABLET | Refills: 0 | Status: SHIPPED | OUTPATIENT
Start: 2024-03-08

## 2024-03-08 RX ORDER — DOXYCYCLINE HYCLATE 100 MG/1
100 CAPSULE ORAL 2 TIMES DAILY
Qty: 20 CAPSULE | Refills: 0 | Status: SHIPPED | OUTPATIENT
Start: 2024-03-08

## 2024-03-08 NOTE — PROGRESS NOTES
Primary Care Provider  Payam Carroll MD   Referring Provider  No ref. provider found    Patient Complaint  COPD, Solitary lung nodule, Follow-up, Shortness of Breath, Cough, and Wheezing      SUBJECTIVE    History of Presenting Illness  Carlos Zimmerman is a pleasant 84 y.o. male who presents to [facility] for followup for path result from bronchoscopy.I last saw the patient Friday 3/8/2024 as patient had increase in wheezing, cough after bronchoscopy on 344/2024. He tested positive on pneumonia panel for parainfluenza. Antibiotic and steroid as well as nebulizer machine and duo nebulizer medication was prescribed last Friday. CXR was also ordered which showed no new or acute process.  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  Pneumonia panel was positive  for parainfluenza virus, AFB and fungal cultures with no growth to date.    Denies dyspnea, coughing, wheezing, headaches, chest pain, weight loss or hemoptysis. Denies fevers, chills and night sweats. Carlos Zimmerman is able to perform ADLs without difficulties and denies any swollen glands/lymph nodes in the head or neck.    I have personally reviewed the review of systems, past family, social, medical and surgical histories; and agree with their findings.    Review of Systems  Constitutional symptoms:  Denied complaints   Ear, nose, throat: Denied complaints  Cardiovascular:  Denied complaints  Respiratory: Denied complaints  Gastrointestinal: Denied complaints  Musculoskeletal: Denied complaints    Family History   Problem Relation Age of Onset    Cancer Mother     Stomach cancer Other     Malig Hyperthermia Neg Hx         Social History     Socioeconomic History    Marital status:    Tobacco Use    Smoking status: Former     Current packs/day: 0.00     Average packs/day: 1 pack/day for 60.0 years (60.0 ttl pk-yrs)     Types: Cigarettes     Start date: 1950      Quit date:      Years since quittin.2     Passive exposure: Past    Smokeless tobacco: Current     Types: Snuff    Tobacco comments:     INST PER ANESTHESIA PROTOCOL   Vaping Use    Vaping status: Never Used   Substance and Sexual Activity    Alcohol use: Not Currently    Drug use: Never    Sexual activity: Defer        Past Medical History:   Diagnosis Date    Anemia     NO CURRENT ISSUES    Arthritis     COPD (chronic obstructive pulmonary disease)     INHALER  PRN    Hyperlipidemia     Hypertension     ON MEDS/FOLLOWS SAHNI, PT DENIES CAD    Lung cancer     REMOVED -     Pneumonia     LEFT LUNG CA NO CURRENT PNEUMONIA    SOB (shortness of breath)     WITH EXERTION SOMETIMES    Stroke     LEFT LEG/FOOT DROP      TIA (transient ischemic attack)     NO RESIDUAL         Immunization History   Administered Date(s) Administered    COVID-19 (PFIZER) Purple Cap Monovalent 2021, 2021, 10/18/2021    COVID-19 F23 (PFIZER) 12YRS+ (COMIRNATY) 11/10/2023    Fluad Quad 65+ 10/07/2020, 2021    Fluzone High-Dose 65+yrs 10/06/2022, 2023    Influenza, Unspecified 2020    TD Preservative Free (Tenivac) 2022       No Known Allergies       Current Outpatient Medications:     Aspirin Low Dose 81 MG chewable tablet, Chew 1 tablet Daily. LAST DOSE OVER 24  AS DIRECTED BY CLARE, Disp: , Rfl:     Budeson-Glycopyrrol-Formoterol (Breztri Aerosphere) 160-9-4.8 MCG/ACT aerosol inhaler, Inhale 2 puffs 2 (Two) Times a Day., Disp: 1 each, Rfl: 9    Cholecalciferol 25 MCG (1000 UT) capsule, Take 1 capsule by mouth Daily. LAST DOSE 3/1/24, Disp: , Rfl:     cilostazol (PLETAL) 100 MG tablet, Take 1 tablet by mouth 2 (Two) Times a Day. LAST DOSE 3/1/24, Disp: , Rfl:     doxycycline (VIBRAMYCIN) 100 MG capsule, Take 1 capsule by mouth 2 (Two) Times a Day., Disp: 20 capsule, Rfl: 0    gabapentin (NEURONTIN) 300 MG capsule, Take 1 capsule by mouth 3 (Three) Times a Day., Disp: 90 capsule, Rfl:  "2    ipratropium-albuterol (DUO-NEB) 0.5-2.5 mg/3 ml nebulizer, Take 3 mL by nebulization 4 (Four) Times a Day As Needed for Wheezing or Shortness of Air., Disp: 120 mL, Rfl: 5    metoprolol succinate XL (TOPROL-XL) 25 MG 24 hr tablet, Take 1 tablet by mouth Daily., Disp: , Rfl:     multivitamins-minerals (PRESERVISION AREDS 2) capsule capsule, Take 1 capsule by mouth 2 (Two) Times a Day. LAST DOSE 3/1/24, Disp: , Rfl:     pantoprazole (PROTONIX) 40 MG EC tablet, Take 1 tablet by mouth Daily., Disp: , Rfl:     pravastatin (PRAVACHOL) 40 MG tablet, Take 1 tablet by mouth Every Night., Disp: , Rfl:     predniSONE (DELTASONE) 10 MG tablet, 6 daily x 2 days, 5 daily x 2 days, 4 daily x 2 days, 3 daily x 2 days, 2 daily x 2 days, 1 daily x 2 days, Disp: 42 tablet, Rfl: 0    Refresh Tears 0.5 % solution, Administer 1 drop to both eyes Daily As Needed for Dry Eyes., Disp: , Rfl:     tamsulosin (FLOMAX) 0.4 MG capsule 24 hr capsule, Take 1 capsule by mouth 2 (Two) Times a Day., Disp: 180 capsule, Rfl: 4    vitamin D3 125 MCG (5000 UT) capsule capsule, Take 1 capsule by mouth Daily. LAST DOSE 3/1/24, Disp: , Rfl:     brompheniramine-pseudoephedrine-DM 30-2-10 MG/5ML syrup, Take 5 mL by mouth 4 (Four) Times a Day As Needed for Cough or Congestion., Disp: 118 mL, Rfl: 1    Current Facility-Administered Medications:     ipratropium-albuterol (DUO-NEB) nebulizer solution 3 mL, 3 mL, Nebulization, Once, Gely Canales APRN           Vital Signs   /80 (BP Location: Left arm, Patient Position: Sitting, Cuff Size: Large Adult)   Pulse 82   Temp 98.4 °F (36.9 °C) (Temporal)   Resp 18   Ht 167.6 cm (66\")   Wt 72.6 kg (160 lb)   SpO2 95% Comment: room air  BMI 25.82 kg/m²       OBJECTIVE    Physical Exam  Vital Signs Reviewed   WDWN, Alert, NAD.    HEENT:  PERRL, EOMI.  OP, nares clear  Neck:  Supple, no JVD, no thyromegaly  Chest:  good aeration, clear to auscultation bilaterally, tympanic to percussion bilaterally, no " work of breathing noted  CV: RRR, no MGR, pulses 2+, equal.  Abd:  Soft, NT, ND, + BS, no HSM  EXT:  no clubbing, no cyanosis, no edema  Neuro:  A&Ox3, CN grossly intact, no focal deficits.  Skin: No rashes or lesions noted    Results Review  I have personally reviewed the prior office notes, hospital records, labs, and diagnostics.  Tissue Pathology Exam: [id number]  Order: [id number]  Status: Final result       Visible to patient: No (scheduled for 3/14/2024  2:15 PM)       Next appt: 03/12/2024 at 02:00 PM in Pulmonology (ARTEMIO Caal)       Dx: Lung nodule; Abnormal PET scan of lung    Specimen Information: Lung, Left Upper Lobe; Tissue   0 Result Notes      Component    Case Report   Surgical Pathology Report                         Case: [id number]                                   Authorizing Provider:  Dayne Choudhary MD         Collected:           03/04/2024 09:09 AM           Ordering Location:     [facility] MAIN Received:            03/05/2024 09:42 AM                                  OR                                                                           Pathologist:           Alberto Singh MD                                                             Specimen:    Lung, Left Upper Lobe, BONNIE MASS BIOPSIES                                                  Clinical Information    Abnormal PET scan of lung  Lung nodule   Final Diagnosis   Lung, left upper lobe mass, biopsy:               - Adenocarcinoma, lepidic pattern     REMARKS: The above positive (malignant) diagnosis was called to Mona Iqbal RN in Dr. Choudhary's office at 13:31 EDT on 3/11/2024 by Rachel ZHONG    Electronically signed by Alberto Singh MD on 3/11/2024 at 1415   Comment    Per policy, reflex testing for PD-L1 has been ordered, and the results will be separately reported.     Please also see correlating cytology case [id number].   Gross Description    1. Lung, Left Upper Lobe.  Received in formalin and labeled  "\" left upper lobe mass\" is a 0.5 cm aggregate of tan soft tissue fragments. The specimen is entirely submitted in one cassette.   MU   Special Stains    Immunohistochemical stains for TTF-1 and Napsin A are performed to further characterize tumor cells and are positive.     All immunohistochemical/cytochemical stains (IHC) are performed on separate slides per different antibody unless otherwise specified in the documentation that a cocktail (multiple stain) was performed.  Controls are appropriate.   Microscopic Description    Microscopic examination performed.   Resulting Agency [facility]              Specimen Collected: 03/04/24 09:09 EST Last Resulted: 03/11/24 14:15 EDT         Non-gynecologic Cytology: [id number]  Order: [id number]  Status: Final result       Visible to patient: No (scheduled for 3/14/2024  2:16 PM)       Next appt: 03/12/2024 at 02:00 PM in Pulmonology (ARTEMIO Caal)       Dx: Lung nodule; Abnormal PET scan of lung    Specimen Information: A: Lung, Left Upper Lobe; Aspirate    C: Lung, Left Upper Lobe; Lavage   0 Result Notes      Component    Case Report   Medical Cytology Report                           Case: [id number]                                   Authorizing Provider:  Dayne Choudhary MD         Collected:           03/04/2024 08:55 AM           Ordering Location:     [facility] MAIN Received:            03/05/2024 09:29 AM                                  OR                                                                           Pathologist:           Alberto Singh MD                                                             Specimens:   1) - Lung, Left Upper Lobe, BONNIE NEEDLE ASPIRATE - CELL BLOCK                                        2) - Lung, Left Upper Lobe, BONNIE BAL                                                        Final Diagnosis   1. Lung, left upper lobe, FNA:               - Adenocarcinoma     2. Lung, left upper lobe, BAL:               - " Negative for malignant cells     REMARKS: The above positive (malignant) diagnosis was called to  Mona Iqbal RN in Dr. Choudhary's office at 13:31 EDT on 3/11/2024 by Rachel ZHONG    Electronically signed by Alberto Singh MD on 3/11/2024 at 1416   Clinical Information    Left upper lobe lung nodule.   Comment    Please also see correlating biopsy case [id number].   Gross Description    1. Lung, Left Upper Lobe.  Ion-Guided Fine Needle Aspiration, left upper lobe       3 rapid Diff-Quik stained slides reviewed in OR by the cytotechnologist for the determination of cellular adequacy.  In addition to the 3 rapid Diff-Quik stained slides, 3 additional pap stained slides were collected in ETOH and a cytofixative vial containing multiple passes for cell block preparation are submitted for Cytology (a cell block is prepared in addition to the 6 total prepared smears).     2. Lung, Left Upper Lobe.  BAL, left upper lobe       12 cc of thin, bloody fluid received (1 cytospin prep prepared).      Microscopic Description    Microscopic examination performed.   Resulting Agency [facility]              Specimen Collected: 03/04/24 08:55 EST Last Resulted: 03/11/24 14:16 EDT              Contains abnormal data Pneumonia Panel - Lavage, Lung, Left Upper Lobe  Order: [id number]  Status: Final result       Visible to patient: No (not released)       Next appt: 03/12/2024 at 02:00 PM in Pulmonology (ARTEMIO Caal)       Dx: Lung nodule; Abnormal PET scan of lung    Specimen Information: Lung, Left Upper Lobe; Lavage   0 Result Notes      Component  Ref Range & Units    Escherichia coli PCR  Not Detected Not Detected   Acinetobacter calcoaceticus-baumannii complex PCR  Not Detected Not Detected   Enterobacter cloacae PCR  Not Detected Not Detected   Klebsiella oxytoca PCR  Not Detected Not Detected   Klebsiella pneumoniae group PCR  Not Detected Not Detected   Klebsiella aerogenes PCR  Not Detected Not Detected   Moraxella  catarrhalis PCR  Not Detected Not Detected   Proteus species PCR  Not Detected Not Detected   Pseudomonas aeroginosa PCR  Not Detected Not Detected   Serratia marcescens PCR  Not Detected Not Detected   Staphylococcus aureus PCR  Not Detected Not Detected   Streptococcus pyogenes PCR  Not Detected Not Detected   Haemophilus influenzae PCR  Not Detected Not Detected   Streptococcus agalactiae PCR  Not Detected Not Detected   Streptococcus pneumoniae PCR  Not Detected Not Detected   Chlamydophila pneumoniae PCR  Not Detected Not Detected   Legionella pneumophilia PCR  Not Detected Not Detected   Mycoplasma pneumo by PCR  Not Detected Not Detected   ADENOVIRUS, PCR  Not Detected Not Detected   CTX-M Gene  Not Detected, N/A N/A   IMP Gene  Not Detected, N/A N/A   KPC Gene  Not Detected, N/A N/A   mecA/C and MREJ Gene  Not Detected, N/A N/A   NDM Gene  Not Detected, N/A N/A   OXA-48-like Gene  Not Detected, N/A N/A   VIM Gene  Not Detected, N/A N/A   Coronavirus  Not Detected Not Detected   Human Metapneumovirus  Not Detected Not Detected   Human Rhinovirus/Enterovirus  Not Detected Not Detected   Influenza A PCR  Not Detected Not Detected   Influenza B PCR  Not Detected Not Detected   RSV, PCR  Not Detected Not Detected   Parainfluenza virus PCR  Not Detected Detected Abnormal    Resulting Agency [facility]              Specimen Collected: 03/04/24 09:17 EST Last Resulted: 03/04/24 12:08 EST             AFB Culture - Lavage, Lung, Left Upper Lobe  Order: [id number]  Status: Preliminary result       Visible to patient: No (not released)       Next appt: 03/12/2024 at 02:00 PM in Pulmonology (ARTEMIO Caal)       Dx: Lung nodule; Abnormal PET scan of lung    Specimen Information: Lung, Left Upper Lobe; Lavage   0 Result Notes  AFB Culture No AFB isolated at 1 week               AFB Stain No acid fast bacilli seen on direct smear      No acid fast bacilli seen on concentrated smear              Resulting Agency: [facility]  [facility]           Specimen Collected: 03/04/24 09:17 EST Last Resulted: 03/11/24 11:01 EDT         Fungus Culture - Lavage, Lung, Left Upper Lobe  Order: [id number]  Status: Preliminary result       Visible to patient: No (not released)       Next appt: 03/12/2024 at 02:00 PM in Pulmonology (Gely Canales, APRN)       Dx: Lung nodule; Abnormal PET scan of lung    Specimen Information: Lung, Left Upper Lobe; Lavage   0 Result Notes  Fungus Culture No fungus isolated at 1 week           Resulting Agency: [facility]           Specimen Collected: 03/04/24 09:17 EST Last Resulted: 03/11/24 11:01 EDT           ASSESSMENT         Patient Active Problem List   Diagnosis    COPD (chronic obstructive pulmonary disease)    Hypertension    Low back pain    Lung cancer    Lung disease    Solitary lung nodule    Pneumonia of right lung due to infectious organism    Hematuria    Complicated UTI (urinary tract infection)    Recurrent urinary tract infection    Benign prostatic hyperplasia    Atherosclerosis of lower extremity with claudication    Benign prostatic hyperplasia with lower urinary tract symptoms    Elevated PSA    Cervical radiculopathy    Cervical spondylosis without myelopathy    Abnormal PET scan of lung    Lung nodule       Encounter Diagnoses   Name Primary?    Adenocarcinoma Yes    Cough, unspecified type       PLAN  -CT of head with and without contrast ordered as patient had dental implants and unable to get MRI  -Referral to radiation oncology  -Referral to hematology/oncology  -We will continue notify patient of AFB and fungal culture results as available    Diagnoses and all orders for this visit:    1. Adenocarcinoma (Primary)  -     Ambulatory Referral to Radiation Oncology-Miller  -     Ambulatory Referral to Hematology / Oncology  -     Cancel: MRI Brain With & Without Contrast; Future  -     CT Head With & Without Contrast; Future    2. Cough, unspecified type  -     brompheniramine-pseudoephedrine-DM 30-2-10  MG/5ML syrup; Take 5 mL by mouth 4 (Four) Times a Day As Needed for Cough or Congestion.  Dispense: 118 mL; Refill: 1           Smoking status:former  Vaccination status:reviewed  Medications personally reviewed    Follow Up  Return in about 6 weeks (around 4/23/2024).    Patient was given instructions and counseling regarding his condition or for health maintenance advice. Please see specific information pulled into the AVS if appropriate.

## 2024-03-10 LAB
QT INTERVAL: 355 MS
QTC INTERVAL: 406 MS

## 2024-03-11 DIAGNOSIS — C34.32 MALIGNANT NEOPLASM OF LOWER LOBE OF LEFT LUNG: Primary | ICD-10-CM

## 2024-03-11 LAB
CYTO UR: NORMAL
LAB AP CASE REPORT: NORMAL
LAB AP CLINICAL INFORMATION: NORMAL
LAB AP DIAGNOSIS COMMENT: NORMAL
PATH REPORT.FINAL DX SPEC: NORMAL
PATH REPORT.GROSS SPEC: NORMAL

## 2024-03-12 ENCOUNTER — HOSPITAL ENCOUNTER (OUTPATIENT)
Dept: CT IMAGING | Facility: HOSPITAL | Age: 84
Discharge: HOME | End: 2024-03-12
Admitting: NURSE PRACTITIONER
Payer: MEDICARE

## 2024-03-12 ENCOUNTER — OFFICE VISIT (OUTPATIENT)
Dept: PULMONOLOGY | Facility: CLINIC | Age: 84
End: 2024-03-12
Payer: MEDICARE

## 2024-03-12 VITALS
HEART RATE: 82 BPM | BODY MASS INDEX: 25.71 KG/M2 | RESPIRATION RATE: 18 BRPM | SYSTOLIC BLOOD PRESSURE: 160 MMHG | DIASTOLIC BLOOD PRESSURE: 80 MMHG | HEIGHT: 66 IN | WEIGHT: 160 LBS | TEMPERATURE: 98.4 F | OXYGEN SATURATION: 95 %

## 2024-03-12 DIAGNOSIS — C80.1 ADENOCARCINOMA: Primary | ICD-10-CM

## 2024-03-12 DIAGNOSIS — C80.1 ADENOCARCINOMA: ICD-10-CM

## 2024-03-12 DIAGNOSIS — R05.9 COUGH, UNSPECIFIED TYPE: ICD-10-CM

## 2024-03-12 LAB
CREAT BLDA-MCNC: 0.7 MG/DL (ref 0.6–1.3)
EGFRCR SERPLBLD CKD-EPI 2021: 90.3 ML/MIN/1.73

## 2024-03-12 PROCEDURE — 70470 CT HEAD/BRAIN W/O & W/DYE: CPT

## 2024-03-12 PROCEDURE — 82565 ASSAY OF CREATININE: CPT

## 2024-03-12 PROCEDURE — 25510000001 IOPAMIDOL 61 % SOLUTION: Performed by: NURSE PRACTITIONER

## 2024-03-12 RX ORDER — BROMPHENIRAMINE MALEATE, PSEUDOEPHEDRINE HYDROCHLORIDE, AND DEXTROMETHORPHAN HYDROBROMIDE 2; 30; 10 MG/5ML; MG/5ML; MG/5ML
5 SYRUP ORAL 4 TIMES DAILY PRN
Qty: 118 ML | Refills: 1 | Status: SHIPPED | OUTPATIENT
Start: 2024-03-12

## 2024-03-12 RX ADMIN — IOPAMIDOL 100 ML: 612 INJECTION, SOLUTION INTRAVENOUS at 15:17

## 2024-03-15 ENCOUNTER — HOSPITAL ENCOUNTER (OUTPATIENT)
Dept: RADIATION ONCOLOGY | Facility: HOSPITAL | Age: 84
Setting detail: RADIATION/ONCOLOGY SERIES
End: 2024-03-15
Payer: MEDICARE

## 2024-03-15 ENCOUNTER — CONSULT (OUTPATIENT)
Dept: RADIATION ONCOLOGY | Facility: HOSPITAL | Age: 84
End: 2024-03-15
Payer: MEDICARE

## 2024-03-15 VITALS
BODY MASS INDEX: 26.4 KG/M2 | TEMPERATURE: 97.9 F | DIASTOLIC BLOOD PRESSURE: 57 MMHG | SYSTOLIC BLOOD PRESSURE: 116 MMHG | OXYGEN SATURATION: 95 % | WEIGHT: 163.58 LBS | RESPIRATION RATE: 12 BRPM | HEART RATE: 90 BPM

## 2024-03-15 DIAGNOSIS — C34.32 MALIGNANT NEOPLASM OF LOWER LOBE OF LEFT LUNG: Primary | ICD-10-CM

## 2024-03-15 PROCEDURE — G0463 HOSPITAL OUTPT CLINIC VISIT: HCPCS | Performed by: RADIOLOGY

## 2024-03-15 RX ORDER — MULTIPLE VITAMINS W/ MINERALS TAB 9MG-400MCG
1 TAB ORAL DAILY
COMMUNITY

## 2024-03-15 RX ORDER — CETIRIZINE HYDROCHLORIDE 10 MG/1
10 TABLET ORAL DAILY
COMMUNITY

## 2024-03-15 NOTE — PROGRESS NOTES
New Patient Office Visit      Encounter Date: 03/15/2024   Patient Name: Carlos Zimmerman  YOB: 1940   Medical Record Number: [id number]   Primary Diagnosis: Malignant neoplasm of lower lobe of left lung [C34.32]         Chief Complaint:    Chief Complaint   Patient presents with    Consult    Lung Cancer       History of Present Illness: Carlos Zimmerman is an 84-year-old gentleman with early-stage adenocarcinoma of the left lung who is seen in consultation regarding radiotherapy as definitive management.  Mr. Zimmerman underwent CT scan of the chest without contrast on 2/15/2024 as part of management for COPD.  Scan revealed increased size of a nodule within the left upper lobe now measuring 12 mm, previously measuring 8 mm.  PET/CT performed on 2/23/2024 revealed this to be FDG avid with no evidence of metastatic disease.  Pathology from biopsy performed on 3/4/2024 revealed adenocarcinoma with lipidic growth pattern.  Mr. Zimmerman reports feeling well overall with no changes in breathing over the past year.  He additionally denies headaches, vision changes or focal weakness.  He underwent CT scan of the head with and without contrast on 3/12/2024 revealing no evidence of intracranial metastatic disease.    Subjective      Review of Systems: Review of Systems   Constitutional:  Positive for activity change (Decreased due to SOA and balance issues). Negative for appetite change, chills, fatigue, fever and unexpected weight change.   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 Negative for sore throat and trouble swallowing.    Eyes:  Negative for visual disturbance (Wears glasses).   Respiratory:  Positive for cough (Dry cough, frequent, started 2 weeks ago.), shortness of breath (Noted with activity, ongoing) and wheezing (Frequent here recently.). Negative for choking and chest tightness.         Currently being treated for head/chest cold.     Cardiovascular:   Negative for chest pain, palpitations and leg swelling.   Gastrointestinal:  Negative for abdominal distention, abdominal pain, anal bleeding, blood in stool, constipation, diarrhea, nausea and vomiting.   Genitourinary:  Negative for difficulty urinating, dysuria, frequency, hematuria and urgency.   Musculoskeletal:  Positive for gait problem (Walks with cane prn.  Balance issues x1 year.). Negative for arthralgias, back pain, joint swelling, neck pain and neck stiffness.   Skin:  Negative for color change, rash and wound.   Neurological:  Negative for dizziness, weakness and headaches.   Psychiatric/Behavioral:  Negative for self-injury, sleep disturbance and suicidal ideas.        Past Medical History:   Past Medical History:   Diagnosis Date    Anemia     NO CURRENT ISSUES    Arthritis     COPD (chronic obstructive pulmonary disease)     INHALER  PRN    Hyperlipidemia     Hypertension     ON MEDS/FOLLOWS SAHNI, PT DENIES CAD    Lung cancer     REMOVED - 2016    Pneumonia     LEFT LUNG CA NO CURRENT PNEUMONIA    SOB (shortness of breath)     WITH EXERTION SOMETIMES    Stroke     LEFT LEG/FOOT DROP  2019    TIA (transient ischemic attack)     NO RESIDUAL 2010       Past Surgical History:   Past Surgical History:   Procedure Laterality Date    BRONCHOSCOPY WITH ION ROBOTIC ASSIST N/A 3/4/2024    Procedure: BRONCHOSCOPY WITH ION ROBOT, REBUS, NEEDLE ASPIRATE, BIOPSIES, BAL, WASHINGS;  Surgeon: Dayne Choudhary MD;  Location: [facility] MAIN OR;  Service: Robotics - Pulmonary;  Laterality: N/A;    CARDIAC CATHETERIZATION Left 11/10/2022    Procedure: Aortogram with left leg angiogram, possible angioplasty or stenting;  Surgeon: Cuauhtemoc Thomas MD;  Location: [facility] CATH INVASIVE LOCATION;  Service: Vascular;  Laterality: Left;    CARDIAC CATHETERIZATION Right 01/13/2023    Procedure: Right leg angiogram, possible angioplasty or stenting;  Surgeon: Cuauhtemoc Thomas MD;  Location: [facility] CATH INVASIVE LOCATION;  Service:  Vascular;  Laterality: Right;    CARPAL TUNNEL RELEASE Left 2024    Procedure: CARPAL TUNNEL RELEASE, left;  Surgeon: Rg Tom MD;  Location: [facility] MAIN OR;  Service: Neurosurgery;  Laterality: Left;    LUNG BIOPSY      LUNG SURGERY      REMOVAL HALF OF UPPER PORTION LEFT LUNG    ORTHOPEDIC SURGERY Left     ROTATOR CUFF    THROAT SURGERY      throat diverticulum repair       Family History:   Family History   Problem Relation Age of Onset    Cancer Mother     Stomach cancer Other     Malig Hyperthermia Neg Hx        Social History:   Social History     Socioeconomic History    Marital status:    Tobacco Use    Smoking status: Former     Current packs/day: 0.00     Average packs/day: 1 pack/day for 60.0 years (60.0 ttl pk-yrs)     Types: Cigarettes     Start date:      Quit date:      Years since quittin.2     Passive exposure: Past    Smokeless tobacco: Current     Types: Snuff    Tobacco comments:     INST PER ANESTHESIA PROTOCOL   Vaping Use    Vaping status: Never Used   Substance and Sexual Activity    Alcohol use: Not Currently    Drug use: Never    Sexual activity: Defer       Medications:     Current Outpatient Medications:     ALBUTEROL IN, Inhale 2 puffs 4 (Four) Times a Day As Needed., Disp: , Rfl:     Aspirin Low Dose 81 MG chewable tablet, Chew 1 tablet Daily. LAST DOSE OVER 24  AS DIRECTED BY CLARE, Disp: , Rfl:     B Complex Vitamins (VITAMIN-B COMPLEX PO), Take 1 tablet by mouth Daily., Disp: , Rfl:     brompheniramine-pseudoephedrine-DM 30-2-10 MG/5ML syrup, Take 5 mL by mouth 4 (Four) Times a Day As Needed for Cough or Congestion., Disp: 118 mL, Rfl: 1    Budeson-Glycopyrrol-Formoterol (Breztri Aerosphere) 160-9-4.8 MCG/ACT aerosol inhaler, Inhale 2 puffs 2 (Two) Times a Day., Disp: 1 each, Rfl: 9    cetirizine (zyrTEC) 10 MG tablet, Take 1 tablet by mouth Daily., Disp: , Rfl:     Cholecalciferol (VITAMIN D3 PO), Take 1 tablet by mouth Daily., Disp: , Rfl:      cilostazol (PLETAL) 100 MG tablet, Take 1 tablet by mouth 2 (Two) Times a Day. LAST DOSE 3/1/24, Disp: , Rfl:     doxycycline (VIBRAMYCIN) 100 MG capsule, Take 1 capsule by mouth 2 (Two) Times a Day., Disp: 20 capsule, Rfl: 0    gabapentin (NEURONTIN) 300 MG capsule, Take 1 capsule by mouth 3 (Three) Times a Day., Disp: 90 capsule, Rfl: 2    ipratropium-albuterol (DUO-NEB) 0.5-2.5 mg/3 ml nebulizer, Take 3 mL by nebulization 4 (Four) Times a Day As Needed for Wheezing or Shortness of Air., Disp: 120 mL, Rfl: 5    metoprolol succinate XL (TOPROL-XL) 25 MG 24 hr tablet, Take 1 tablet by mouth Daily., Disp: , Rfl:     multivitamin with minerals tablet tablet, Take 1 tablet by mouth Daily., Disp: , Rfl:     multivitamins-minerals (PRESERVISION AREDS 2) capsule capsule, Take 1 capsule by mouth 2 (Two) Times a Day. LAST DOSE 3/1/24, Disp: , Rfl:     pantoprazole (PROTONIX) 40 MG EC tablet, Take 1 tablet by mouth Daily., Disp: , Rfl:     pravastatin (PRAVACHOL) 40 MG tablet, Take 1 tablet by mouth Every Night., Disp: , Rfl:     predniSONE (DELTASONE) 10 MG tablet, 6 daily x 2 days, 5 daily x 2 days, 4 daily x 2 days, 3 daily x 2 days, 2 daily x 2 days, 1 daily x 2 days, Disp: 42 tablet, Rfl: 0    Refresh Tears 0.5 % solution, Administer 1 drop to both eyes Daily As Needed for Dry Eyes., Disp: , Rfl:     tamsulosin (FLOMAX) 0.4 MG capsule 24 hr capsule, Take 1 capsule by mouth 2 (Two) Times a Day., Disp: 180 capsule, Rfl: 4    Current Facility-Administered Medications:     ipratropium-albuterol (DUO-NEB) nebulizer solution 3 mL, 3 mL, Nebulization, Once, Gely Canales APRN    Allergies:   No Known Allergies    Pain: (on a scale of 0-10)   Pain Score    03/15/24 1019   PainSc: 0-No pain       Carlos D Elsie reports a pain score of 0.  Given his pain assessment as noted, treatment options were discussed and the following options were decided upon as a follow-up plan to address the patient's pain: continuation of current  treatment plan for pain.     Advanced Care Plan: N   Quality of Life: 100 - Full Activity    Objective     Physical Exam:   Vital Signs:   Vitals:    03/15/24 1019   BP: 116/57   Pulse: 90   Resp: 12   Temp: 97.9 °F (36.6 °C)   TempSrc: Temporal   SpO2: 95%   Weight: 74.2 kg (163 lb 9.3 oz)   PainSc: 0-No pain     Body mass index is 26.4 kg/m².     Physical Exam  Constitutional:       General: He is not in acute distress.     Appearance: He is normal weight. He is not ill-appearing, toxic-appearing or diaphoretic.   HENT:      Head: Normocephalic and atraumatic.   Pulmonary:      Effort: Pulmonary effort is normal. No respiratory distress.   Skin:     General: Skin is warm and dry.      Coloration: Skin is not jaundiced.      Findings: No lesion.   Neurological:      General: No focal deficit present.      Mental Status: He is alert and oriented to person, place, and time.      Cranial Nerves: No cranial nerve deficit.      Gait: Gait normal.   Psychiatric:         Mood and Affect: Mood normal.         Behavior: Behavior normal.         Judgment: Judgment normal.         Results:   Radiographs: XR Chest 2 View    Result Date: 3/8/2024    1. Small area of nodular consolidation at the left upper lung adjacent to bulla, correlate with recent biopsy results. 2. Emphysema. 3. No new or acute process.     HUNTER BELLE MD       Electronically Signed and Approved By: HUNTER BELLE MD on 3/08/2024 at 11:57             NM PET/CT Skull Base to Mid Thigh    Result Date: 2/23/2024    1. Previous partial left upper lobectomy.  Redemonstration of left upper lobe bulla.  Along the posterior medial margin, there is soft tissue nodularity measuring up to about 1.4 centimeters which is hypermetabolic maximum SUV 4.9 most concerning for neoplasm.  This appears larger and more solid compared to more remote CT scans.  Consider biopsy. 2. Tiny 3 millimeter right upper lobe nodular density too small to characterize. 3. 1.5 centimeter cyst  in the tail the pancreas.  No hypermetabolic activity.  This appears similar to prior PET scan from 2016.     DEEPALI DILL MD       Electronically Signed and Approved By: DEEPALI IDLL MD on 2/23/2024 at 14:19             CT Chest Without Contrast Diagnostic    Result Date: 2/15/2024    1. Small area of nodularity/consolidation adjacent to bulla in left upper lung has gradually increased in size measuring 12 mm, previously 8 mm.  This could relate to an area of developing scarring, consider short-term CT follow-up in 3 months, alternatively PET-CT could be considered to exclude any hypermetabolic uptake. 2. Stable 3 mm right upper lobe pulmonary nodule.  No new pulmonary nodule. 3. Postsurgical changes of left upper lobectomy. 4. No intrathoracic adenopathy. 5. Emphysema, coronary artery calcifications, and additional chronic findings above.     HUNTER BELLE MD       Electronically Signed and Approved By: HUNTER BELLE MD on 2/15/2024 at 11:34            I personally reviewed the CT scan of the chest without contrast performed on 2/15/2024.  The pertinent findings are as above.    Pathology: I personally reviewed the pathology report from the procedure performed on 3/4/2024.  The pertinent findings are as above in HPI.      Labs:   WBC   Date Value Ref Range Status   02/23/2024 6.97 3.40 - 10.80 10*3/mm3 Final     Hemoglobin   Date Value Ref Range Status   02/23/2024 14.2 13.0 - 17.7 g/dL Final     Hematocrit   Date Value Ref Range Status   02/23/2024 44.4 37.5 - 51.0 % Final     Platelets   Date Value Ref Range Status   02/23/2024 204 140 - 450 10*3/mm3 Final      PSA   Date Value Ref Range Status   11/27/2023 11.800 (H) 0.000 - 4.000 ng/mL Final   08/28/2023 11.900 (H) 0.000 - 4.000 ng/mL Final   01/18/2023 8.070 (H) 0.000 - 4.000 ng/mL Final       Assessment / Plan      Assessment/Plan:   Deepali Zimmerman is an 84-year-old gentleman with 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  He has no  clinical or radiographic evidence of regional or distant metastatic disease.  He has a history of lung cancer status post VATS surgery in 2016.    I discussed the clinical, radiographic and pathologic findings to date with Mr. Zimmerman.  I explained the role of radiotherapy in the definitive management of early-stage lung cancer for the medically inoperable or those who decline surgery.  I recommended stereotactic body radiotherapy to the left upper lobe nodule, outlining the rationale for this approach.  Understanding the risks, potential benefits and alternatives to SBRT, Mr. Zimmerman is agreeable to my recommendation and will undergo 4D CT simulation for treatment planning purposes.      Julio Cesar Alex MD  Radiation Oncology  [facility]    This document has been signed by Julio Cesar Alex MD on March 15, 2024 14:00 EDT

## 2024-03-18 ENCOUNTER — HOSPITAL ENCOUNTER (OUTPATIENT)
Dept: RADIATION ONCOLOGY | Facility: HOSPITAL | Age: 84
Discharge: HOME | End: 2024-03-18
Payer: MEDICARE

## 2024-03-18 DIAGNOSIS — C34.12 MALIGNANT NEOPLASM OF UPPER LOBE, LEFT BRONCHUS OR LUNG: ICD-10-CM

## 2024-03-18 LAB
CYTO UR: NORMAL
LAB AP CASE REPORT: NORMAL
LAB AP CLINICAL INFORMATION: NORMAL
LAB AP DIAGNOSIS COMMENT: NORMAL
LAB AP SPECIAL STAINS: NORMAL
PATH REPORT.ADDENDUM SPEC: NORMAL
PATH REPORT.FINAL DX SPEC: NORMAL
PATH REPORT.GROSS SPEC: NORMAL

## 2024-03-18 PROCEDURE — 77334 RADIATION TREATMENT AID(S): CPT | Performed by: RADIOLOGY

## 2024-03-22 ENCOUNTER — HOSPITAL ENCOUNTER (OUTPATIENT)
Dept: RADIATION ONCOLOGY | Facility: HOSPITAL | Age: 84
Discharge: HOME | End: 2024-03-22

## 2024-03-24 PROCEDURE — 77300 RADIATION THERAPY DOSE PLAN: CPT | Performed by: RADIOLOGY

## 2024-03-24 PROCEDURE — 77293 RESPIRATOR MOTION MGMT SIMUL: CPT | Performed by: RADIOLOGY

## 2024-03-24 PROCEDURE — 77338 DESIGN MLC DEVICE FOR IMRT: CPT | Performed by: RADIOLOGY

## 2024-03-24 PROCEDURE — 77301 RADIOTHERAPY DOSE PLAN IMRT: CPT | Performed by: RADIOLOGY

## 2024-03-25 ENCOUNTER — HOSPITAL ENCOUNTER (OUTPATIENT)
Dept: RADIATION ONCOLOGY | Facility: HOSPITAL | Age: 84
Discharge: HOME | End: 2024-03-25

## 2024-03-25 ENCOUNTER — HOSPITAL ENCOUNTER (OUTPATIENT)
Dept: RADIATION ONCOLOGY | Facility: HOSPITAL | Age: 84
Discharge: HOME | End: 2024-03-25
Payer: MEDICARE

## 2024-03-25 VITALS
DIASTOLIC BLOOD PRESSURE: 65 MMHG | HEART RATE: 96 BPM | TEMPERATURE: 98.3 F | OXYGEN SATURATION: 92 % | SYSTOLIC BLOOD PRESSURE: 108 MMHG | RESPIRATION RATE: 18 BRPM | WEIGHT: 158.73 LBS | BODY MASS INDEX: 25.62 KG/M2

## 2024-03-25 DIAGNOSIS — C34.12 MALIGNANT NEOPLASM OF UPPER LOBE, LEFT BRONCHUS OR LUNG: Primary | ICD-10-CM

## 2024-03-25 LAB
RAD ONC ARIA COURSE ID: NORMAL
RAD ONC ARIA COURSE INTENT: NORMAL
RAD ONC ARIA COURSE LAST TREATMENT DATE: NORMAL
RAD ONC ARIA COURSE START DATE: NORMAL
RAD ONC ARIA COURSE TREATMENT ELAPSED DAYS: 0
RAD ONC ARIA FIRST TREATMENT DATE: NORMAL
RAD ONC ARIA PLAN FRACTIONS TREATED TO DATE: 1
RAD ONC ARIA PLAN ID: NORMAL
RAD ONC ARIA PLAN PRESCRIBED DOSE PER FRACTION: 11 GY
RAD ONC ARIA PLAN PRIMARY REFERENCE POINT: NORMAL
RAD ONC ARIA PLAN TOTAL FRACTIONS PRESCRIBED: 5
RAD ONC ARIA PLAN TOTAL PRESCRIBED DOSE: 5500 CGY
RAD ONC ARIA REFERENCE POINT DOSAGE GIVEN TO DATE: 11 GY
RAD ONC ARIA REFERENCE POINT ID: NORMAL
RAD ONC ARIA REFERENCE POINT SESSION DOSAGE GIVEN: 11 GY

## 2024-03-25 PROCEDURE — 77373 STRTCTC BDY RAD THER TX DLVR: CPT | Performed by: RADIOLOGY

## 2024-03-25 NOTE — PROGRESS NOTES
On Treatment Visit       Patient: Carlos Zimmerman   YOB: 1940   Medical Record Number: [id number]     Date of Visit  March 25, 2024   Primary Diagnosis:Malignant neoplasm of upper lobe, left bronchus or lung [C34.12]         was seen today for an on treatment visit.  He is receiving radiation therapy to the left upper lobe. He  has received 1100 cGy in 1 fractions out of a planned dose of 5500 cGy in 5 fractions.     Today on exam the patient is tolerating radiation therapy well and has no new disease or treatment-related complaints.                                           Review of Systems:   Review of Systems   Constitutional:  Positive for unexpected weight change (down around 4 lbs.). Negative for appetite change, chills, fatigue and fever.   HENT:  Negative for hearing loss, sore throat and trouble swallowing.    Eyes:  Negative for visual disturbance.   Respiratory:  Negative for cough, chest tightness, shortness of breath and wheezing.    Cardiovascular:  Negative for chest pain and palpitations.   Gastrointestinal:  Negative for abdominal pain, blood in stool, constipation, diarrhea, nausea and vomiting.   Genitourinary:  Negative for difficulty urinating, dysuria, hematuria and urgency.   Musculoskeletal:  Positive for gait problem (uses cane for stability). Negative for arthralgias and back pain.   Skin:  Negative for color change and rash.   Neurological:  Negative for dizziness, weakness, light-headedness and headaches.   Psychiatric/Behavioral:  Negative for sleep disturbance.        Vitals:     Vitals:    03/25/24 0813   BP: 108/65   Pulse: 96   Resp: 18   Temp: 98.3 °F (36.8 °C)   SpO2: 92%       Weight:   Wt Readings from Last 3 Encounters:   03/25/24 72 kg (158 lb 11.7 oz)   03/15/24 74.2 kg (163 lb 9.3 oz)   03/12/24 72.6 kg (160 lb)      Pain:    Pain Score    03/25/24 0813   PainSc: 0-No pain         Physical Exam:  Gen: WD/WN; NAD  HEENT: MMM  Trachea: midline  Chest:  symmetric  Resp: normal respiratory effort  Extr: warm, well-perfused  Neuro: awake and alert; no aphasia or neglect    Plan: I have reviewed treatment setup notes, checked and approved the daily guidance images.  I reviewed dose delivery, treatment parameters and deemed them appropriate. We plan to continue radiation therapy as prescribed.          Radiation Oncology    Electronically signed by Julio Cesar Alex MD 3/25/2024  15:04 EDT

## 2024-03-25 NOTE — PROGRESS NOTES
Stereotactic Body Radiotherapy Note    03/25/2024    [unfilled]    Treatment Site:  Energy: 6X  Dose:  #Fx:  Start Date:  Elapsed Days:      [Associated problem not found]  Cancer Staging   No matching staging information was found for the patient.       Current Outpatient Medications on File Prior to Encounter   Medication Sig    ALBUTEROL IN Inhale 2 puffs 4 (Four) Times a Day As Needed.    Aspirin Low Dose 81 MG chewable tablet Chew 1 tablet Daily. LAST DOSE OVER 03/1/24  AS DIRECTED BY CLARE    B Complex Vitamins (VITAMIN-B COMPLEX PO) Take 1 tablet by mouth Daily.    brompheniramine-pseudoephedrine-DM 30-2-10 MG/5ML syrup Take 5 mL by mouth 4 (Four) Times a Day As Needed for Cough or Congestion.    Budeson-Glycopyrrol-Formoterol (Breztri Aerosphere) 160-9-4.8 MCG/ACT aerosol inhaler Inhale 2 puffs 2 (Two) Times a Day.    cetirizine (zyrTEC) 10 MG tablet Take 1 tablet by mouth Daily.    Cholecalciferol (VITAMIN D3 PO) Take 1 tablet by mouth Daily.    cilostazol (PLETAL) 100 MG tablet Take 1 tablet by mouth 2 (Two) Times a Day. LAST DOSE 3/1/24    doxycycline (VIBRAMYCIN) 100 MG capsule Take 1 capsule by mouth 2 (Two) Times a Day.    gabapentin (NEURONTIN) 300 MG capsule Take 1 capsule by mouth 3 (Three) Times a Day.    ipratropium-albuterol (DUO-NEB) 0.5-2.5 mg/3 ml nebulizer Take 3 mL by nebulization 4 (Four) Times a Day As Needed for Wheezing or Shortness of Air.    metoprolol succinate XL (TOPROL-XL) 25 MG 24 hr tablet Take 1 tablet by mouth Daily.    multivitamin with minerals tablet tablet Take 1 tablet by mouth Daily.    multivitamins-minerals (PRESERVISION AREDS 2) capsule capsule Take 1 capsule by mouth 2 (Two) Times a Day. LAST DOSE 3/1/24    pantoprazole (PROTONIX) 40 MG EC tablet Take 1 tablet by mouth Daily.    pravastatin (PRAVACHOL) 40 MG tablet Take 1 tablet by mouth Every Night.    predniSONE (DELTASONE) 10 MG tablet 6 daily x 2 days, 5 daily x 2 days, 4 daily x 2 days, 3 daily x 2 days, 2  daily x 2 days, 1 daily x 2 days    Refresh Tears 0.5 % solution Administer 1 drop to both eyes Daily As Needed for Dry Eyes.    tamsulosin (FLOMAX) 0.4 MG capsule 24 hr capsule Take 1 capsule by mouth 2 (Two) Times a Day.     Current Facility-Administered Medications on File Prior to Encounter   Medication    ipratropium-albuterol (DUO-NEB) nebulizer solution 3 mL     Vitals:    03/25/24 0813   BP: 108/65   Pulse: 96   Resp: 18   Temp: 98.3 °F (36.8 °C)   SpO2: 92%     Pain Score    03/25/24 0813   PainSc: 0-No pain       Oncology/Hematology History   Malignant neoplasm of upper lobe, left bronchus or lung   3/18/2024 Initial Diagnosis    Malignant neoplasm of upper lobe, left bronchus or lung     3/18/2024 -  Radiation    RADIATION THERAPY Treatment Details (Noted on 3/18/2024)  Site: Left Lung  Technique: SBRT  Goal: No goal specified  Planned Treatment Start Date: No planned start date specified         Review of Systems   Constitutional:  Positive for unexpected weight loss (down around 4 lbs, eating healthier). Negative for appetite change, chills and fever.   HENT:  Positive for hearing loss. Negative for sore throat and trouble swallowing.    Eyes:  Negative for visual disturbance.   Respiratory:  Positive for cough (with exertion, on occassion. non-productive.) and shortness of breath (with exertion and rest). Negative for chest tightness and wheezing.    Cardiovascular:  Negative for chest pain and palpitations.   Gastrointestinal:  Negative for abdominal pain, blood in stool, constipation, diarrhea, nausea and vomiting.   Genitourinary:  Negative for difficulty urinating, dysuria, hematuria and urgency.   Musculoskeletal:  Positive for gait problem (uses cane for stability). Negative for arthralgias and back pain.   Skin:  Negative for color change, rash and wound.   Neurological:  Negative for dizziness, weakness, light-headedness and headache.   Psychiatric/Behavioral:  Negative for sleep disturbance.              Physical Exam    Comments: A stereotactic ablative radiation plan was devised to deliver the dose as indicated above. The patient was positioned on the treatment couch in a Vac-Fix immobilization device.  We then aligned with CBCT image guidance, which I personally checked. Once alignment was verified, we delivered the prescription dose using dynamic respiratory motion compensation under my guidance.    The Medical Physicist was also in attendance during patient set-up and treatment delivery.    The patient tolerated the procedure without incident.    Julio Cesar Alex MD  Radiation Oncology  03/25/2024

## 2024-03-25 NOTE — PROGRESS NOTES
Stereotactic Body Radiotherapy Note    03/25/2024        Treatment Site: Left upper lobe  Energy: 6X  Dose: 1100 cGy  #Fx: 1  Start Date: 3/25/2024  Elapsed Days: 0             Current Outpatient Medications on File Prior to Encounter   Medication Sig    ALBUTEROL IN Inhale 2 puffs 4 (Four) Times a Day As Needed.    Aspirin Low Dose 81 MG chewable tablet Chew 1 tablet Daily. LAST DOSE OVER 03/1/24  AS DIRECTED BY CLARE    B Complex Vitamins (VITAMIN-B COMPLEX PO) Take 1 tablet by mouth Daily.    brompheniramine-pseudoephedrine-DM 30-2-10 MG/5ML syrup Take 5 mL by mouth 4 (Four) Times a Day As Needed for Cough or Congestion.    Budeson-Glycopyrrol-Formoterol (Breztri Aerosphere) 160-9-4.8 MCG/ACT aerosol inhaler Inhale 2 puffs 2 (Two) Times a Day.    cetirizine (zyrTEC) 10 MG tablet Take 1 tablet by mouth Daily.    Cholecalciferol (VITAMIN D3 PO) Take 1 tablet by mouth Daily.    cilostazol (PLETAL) 100 MG tablet Take 1 tablet by mouth 2 (Two) Times a Day. LAST DOSE 3/1/24    doxycycline (VIBRAMYCIN) 100 MG capsule Take 1 capsule by mouth 2 (Two) Times a Day.    gabapentin (NEURONTIN) 300 MG capsule Take 1 capsule by mouth 3 (Three) Times a Day.    ipratropium-albuterol (DUO-NEB) 0.5-2.5 mg/3 ml nebulizer Take 3 mL by nebulization 4 (Four) Times a Day As Needed for Wheezing or Shortness of Air.    metoprolol succinate XL (TOPROL-XL) 25 MG 24 hr tablet Take 1 tablet by mouth Daily.    multivitamin with minerals tablet tablet Take 1 tablet by mouth Daily.    multivitamins-minerals (PRESERVISION AREDS 2) capsule capsule Take 1 capsule by mouth 2 (Two) Times a Day. LAST DOSE 3/1/24    pantoprazole (PROTONIX) 40 MG EC tablet Take 1 tablet by mouth Daily.    pravastatin (PRAVACHOL) 40 MG tablet Take 1 tablet by mouth Every Night.    predniSONE (DELTASONE) 10 MG tablet 6 daily x 2 days, 5 daily x 2 days, 4 daily x 2 days, 3 daily x 2 days, 2 daily x 2 days, 1 daily x 2 days    Refresh Tears 0.5 % solution Administer 1 drop  to both eyes Daily As Needed for Dry Eyes.    tamsulosin (FLOMAX) 0.4 MG capsule 24 hr capsule Take 1 capsule by mouth 2 (Two) Times a Day.     Current Facility-Administered Medications on File Prior to Encounter   Medication    ipratropium-albuterol (DUO-NEB) nebulizer solution 3 mL     Vitals:    03/25/24 0813   BP: 108/65   Pulse: 96   Resp: 18   Temp: 98.3 °F (36.8 °C)   SpO2: 92%     Pain Score    03/25/24 0813   PainSc: 0-No pain       Oncology/Hematology History   Malignant neoplasm of upper lobe, left bronchus or lung   3/18/2024 Initial Diagnosis    Malignant neoplasm of upper lobe, left bronchus or lung     3/18/2024 -  Radiation    RADIATION THERAPY Treatment Details (Noted on 3/18/2024)  Site: Left Lung  Technique: SBRT  Goal: No goal specified  Planned Treatment Start Date: No planned start date specified         Review of Systems         Physical Exam    Comments: A stereotactic ablative radiation plan was devised to deliver the dose as indicated above. The patient was positioned on the treatment couch in a Vac-Fix immobilization device.  We then aligned with CBCT image guidance, which I personally checked. Once alignment was verified, we delivered the prescription dose using dynamic respiratory motion compensation under my guidance.    The Medical Physicist was also in attendance during patient set-up and treatment delivery.    The patient tolerated the procedure without incident.    Julio Cesar Alex MD  Radiation Oncology  03/25/2024

## 2024-03-26 ENCOUNTER — HOSPITAL ENCOUNTER (OUTPATIENT)
Dept: RADIATION ONCOLOGY | Facility: HOSPITAL | Age: 84
Discharge: HOME | End: 2024-03-26

## 2024-03-26 VITALS
TEMPERATURE: 98.3 F | WEIGHT: 159.17 LBS | BODY MASS INDEX: 25.69 KG/M2 | HEART RATE: 92 BPM | DIASTOLIC BLOOD PRESSURE: 55 MMHG | OXYGEN SATURATION: 92 % | RESPIRATION RATE: 16 BRPM | SYSTOLIC BLOOD PRESSURE: 93 MMHG

## 2024-03-26 DIAGNOSIS — C34.12 MALIGNANT NEOPLASM OF UPPER LOBE, LEFT BRONCHUS OR LUNG: Primary | ICD-10-CM

## 2024-03-26 LAB
RAD ONC ARIA COURSE ID: NORMAL
RAD ONC ARIA COURSE INTENT: NORMAL
RAD ONC ARIA COURSE LAST TREATMENT DATE: NORMAL
RAD ONC ARIA COURSE START DATE: NORMAL
RAD ONC ARIA COURSE TREATMENT ELAPSED DAYS: 1
RAD ONC ARIA FIRST TREATMENT DATE: NORMAL
RAD ONC ARIA PLAN FRACTIONS TREATED TO DATE: 2
RAD ONC ARIA PLAN ID: NORMAL
RAD ONC ARIA PLAN PRESCRIBED DOSE PER FRACTION: 11 GY
RAD ONC ARIA PLAN PRIMARY REFERENCE POINT: NORMAL
RAD ONC ARIA PLAN TOTAL FRACTIONS PRESCRIBED: 5
RAD ONC ARIA PLAN TOTAL PRESCRIBED DOSE: 5500 CGY
RAD ONC ARIA REFERENCE POINT DOSAGE GIVEN TO DATE: 22 GY
RAD ONC ARIA REFERENCE POINT ID: NORMAL
RAD ONC ARIA REFERENCE POINT SESSION DOSAGE GIVEN: 11 GY

## 2024-03-26 PROCEDURE — 77373 STRTCTC BDY RAD THER TX DLVR: CPT | Performed by: RADIOLOGY

## 2024-03-26 NOTE — PROGRESS NOTES
Stereotactic Body Radiotherapy Note    03/26/2024    [unfilled]    Treatment Site:  Energy: 6X  Dose:2200  #Fx:2  Start Date:3/25/24  Elapsed Days:1      [Associated problem not found]  Cancer Staging   No matching staging information was found for the patient.       Current Outpatient Medications on File Prior to Encounter   Medication Sig    ALBUTEROL IN Inhale 2 puffs 4 (Four) Times a Day As Needed.    Aspirin Low Dose 81 MG chewable tablet Chew 1 tablet Daily. LAST DOSE OVER 03/1/24  AS DIRECTED BY [facility]    B Complex Vitamins (VITAMIN-B COMPLEX PO) Take 1 tablet by mouth Daily.    brompheniramine-pseudoephedrine-DM 30-2-10 MG/5ML syrup Take 5 mL by mouth 4 (Four) Times a Day As Needed for Cough or Congestion.    Budeson-Glycopyrrol-Formoterol (Breztri Aerosphere) 160-9-4.8 MCG/ACT aerosol inhaler Inhale 2 puffs 2 (Two) Times a Day.    cetirizine (zyrTEC) 10 MG tablet Take 1 tablet by mouth Daily.    Cholecalciferol (VITAMIN D3 PO) Take 1 tablet by mouth Daily.    cilostazol (PLETAL) 100 MG tablet Take 1 tablet by mouth 2 (Two) Times a Day. LAST DOSE 3/1/24    doxycycline (VIBRAMYCIN) 100 MG capsule Take 1 capsule by mouth 2 (Two) Times a Day.    gabapentin (NEURONTIN) 300 MG capsule Take 1 capsule by mouth 3 (Three) Times a Day.    ipratropium-albuterol (DUO-NEB) 0.5-2.5 mg/3 ml nebulizer Take 3 mL by nebulization 4 (Four) Times a Day As Needed for Wheezing or Shortness of Air.    metoprolol succinate XL (TOPROL-XL) 25 MG 24 hr tablet Take 1 tablet by mouth Daily.    multivitamin with minerals tablet tablet Take 1 tablet by mouth Daily.    multivitamins-minerals (PRESERVISION AREDS 2) capsule capsule Take 1 capsule by mouth 2 (Two) Times a Day. LAST DOSE 3/1/24    pantoprazole (PROTONIX) 40 MG EC tablet Take 1 tablet by mouth Daily.    pravastatin (PRAVACHOL) 40 MG tablet Take 1 tablet by mouth Every Night.    predniSONE (DELTASONE) 10 MG tablet 6 daily x 2 days, 5 daily x 2 days, 4 daily x 2 days, 3 daily  x 2 days, 2 daily x 2 days, 1 daily x 2 days    Refresh Tears 0.5 % solution Administer 1 drop to both eyes Daily As Needed for Dry Eyes.    tamsulosin (FLOMAX) 0.4 MG capsule 24 hr capsule Take 1 capsule by mouth 2 (Two) Times a Day.     Current Facility-Administered Medications on File Prior to Encounter   Medication    ipratropium-albuterol (DUO-NEB) nebulizer solution 3 mL     Vitals:    03/26/24 0901   BP: 93/55   Pulse: 92   Resp: 16   Temp: 98.3 °F (36.8 °C)   SpO2: 92%     Pain Score    03/26/24 0901   PainSc: 0-No pain       Oncology/Hematology History   Malignant neoplasm of upper lobe, left bronchus or lung   3/18/2024 Initial Diagnosis    Malignant neoplasm of upper lobe, left bronchus or lung     3/18/2024 -  Radiation    RADIATION THERAPY Treatment Details (Noted on 3/18/2024)  Site: Left Lung  Technique: SBRT  Goal: No goal specified  Planned Treatment Start Date: No planned start date specified         Review of Systems   Constitutional:  Positive for fatigue (3/10). Negative for appetite change.   HENT:  Positive for tinnitus (ongoing). Negative for sore throat and trouble swallowing.    Eyes:  Positive for visual disturbance (wears glasses).   Respiratory:  Positive for shortness of breath (with excertion). Negative for cough.    Cardiovascular:  Negative for chest pain, palpitations and leg swelling.   Gastrointestinal:  Negative for abdominal pain, constipation, diarrhea, nausea and vomiting.   Genitourinary:  Negative for difficulty urinating, frequency and urgency.   Musculoskeletal:  Positive for arthralgias (ongoing) and back pain (ongoing).   Neurological:  Negative for dizziness and headache.   Psychiatric/Behavioral:  Negative for sleep disturbance.             Physical Exam    Gen: WD/WN  NAD  Chest: Symmetrical chest expansion, lungs clear to auscultation.  Normal respiratory effort.  Extr: Warm, well-perfused  Neuro: AAO.  No aphasia or neglect.      Comments: A stereotactic ablative  radiation plan was devised to deliver the dose as indicated above. The patient was positioned on the treatment couch in a Vac-Fix immobilization device.  We then aligned with CBCT image guidance, which I personally checked. Once alignment was verified, we delivered the prescription dose using dynamic respiratory motion compensation under my guidance.    The Medical Physicist was also in attendance during patient set-up and treatment delivery.    The patient tolerated the procedure without incident.    Kathe Vasquez MD  Radiation Oncology  03/26/2024

## 2024-03-27 ENCOUNTER — HOSPITAL ENCOUNTER (OUTPATIENT)
Dept: RADIATION ONCOLOGY | Facility: HOSPITAL | Age: 84
Discharge: HOME | End: 2024-03-27

## 2024-03-27 VITALS
RESPIRATION RATE: 16 BRPM | WEIGHT: 161.38 LBS | SYSTOLIC BLOOD PRESSURE: 135 MMHG | HEART RATE: 95 BPM | DIASTOLIC BLOOD PRESSURE: 68 MMHG | OXYGEN SATURATION: 96 % | TEMPERATURE: 97.9 F | BODY MASS INDEX: 26.05 KG/M2

## 2024-03-27 DIAGNOSIS — C34.12 MALIGNANT NEOPLASM OF UPPER LOBE, LEFT BRONCHUS OR LUNG: Primary | ICD-10-CM

## 2024-03-27 LAB
RAD ONC ARIA COURSE ID: NORMAL
RAD ONC ARIA COURSE INTENT: NORMAL
RAD ONC ARIA COURSE LAST TREATMENT DATE: NORMAL
RAD ONC ARIA COURSE START DATE: NORMAL
RAD ONC ARIA COURSE TREATMENT ELAPSED DAYS: 2
RAD ONC ARIA FIRST TREATMENT DATE: NORMAL
RAD ONC ARIA PLAN FRACTIONS TREATED TO DATE: 3
RAD ONC ARIA PLAN ID: NORMAL
RAD ONC ARIA PLAN PRESCRIBED DOSE PER FRACTION: 11 GY
RAD ONC ARIA PLAN PRIMARY REFERENCE POINT: NORMAL
RAD ONC ARIA PLAN TOTAL FRACTIONS PRESCRIBED: 5
RAD ONC ARIA PLAN TOTAL PRESCRIBED DOSE: 5500 CGY
RAD ONC ARIA REFERENCE POINT DOSAGE GIVEN TO DATE: 33 GY
RAD ONC ARIA REFERENCE POINT ID: NORMAL
RAD ONC ARIA REFERENCE POINT SESSION DOSAGE GIVEN: 11 GY

## 2024-03-27 PROCEDURE — 77373 STRTCTC BDY RAD THER TX DLVR: CPT | Performed by: RADIOLOGY

## 2024-03-27 NOTE — PROGRESS NOTES
Stereotactic Body Radiotherapy Note    03/27/2024        Treatment Site: Left upper lobe  Energy: 6X  Dose: 3300 cGy  #Fx: 3  Start Date: 3/25/2024  Elapsed Days: 2             Current Outpatient Medications on File Prior to Encounter   Medication Sig    ALBUTEROL IN Inhale 2 puffs 4 (Four) Times a Day As Needed.    Aspirin Low Dose 81 MG chewable tablet Chew 1 tablet Daily. LAST DOSE OVER 03/1/24  AS DIRECTED BY [facility]    B Complex Vitamins (VITAMIN-B COMPLEX PO) Take 1 tablet by mouth Daily.    brompheniramine-pseudoephedrine-DM 30-2-10 MG/5ML syrup Take 5 mL by mouth 4 (Four) Times a Day As Needed for Cough or Congestion.    Budeson-Glycopyrrol-Formoterol (Breztri Aerosphere) 160-9-4.8 MCG/ACT aerosol inhaler Inhale 2 puffs 2 (Two) Times a Day.    cetirizine (zyrTEC) 10 MG tablet Take 1 tablet by mouth Daily.    Cholecalciferol (VITAMIN D3 PO) Take 1 tablet by mouth Daily.    cilostazol (PLETAL) 100 MG tablet Take 1 tablet by mouth 2 (Two) Times a Day. LAST DOSE 3/1/24    doxycycline (VIBRAMYCIN) 100 MG capsule Take 1 capsule by mouth 2 (Two) Times a Day.    gabapentin (NEURONTIN) 300 MG capsule Take 1 capsule by mouth 3 (Three) Times a Day.    ipratropium-albuterol (DUO-NEB) 0.5-2.5 mg/3 ml nebulizer Take 3 mL by nebulization 4 (Four) Times a Day As Needed for Wheezing or Shortness of Air.    metoprolol succinate XL (TOPROL-XL) 25 MG 24 hr tablet Take 1 tablet by mouth Daily.    multivitamin with minerals tablet tablet Take 1 tablet by mouth Daily.    multivitamins-minerals (PRESERVISION AREDS 2) capsule capsule Take 1 capsule by mouth 2 (Two) Times a Day. LAST DOSE 3/1/24    pantoprazole (PROTONIX) 40 MG EC tablet Take 1 tablet by mouth Daily.    pravastatin (PRAVACHOL) 40 MG tablet Take 1 tablet by mouth Every Night.    predniSONE (DELTASONE) 10 MG tablet 6 daily x 2 days, 5 daily x 2 days, 4 daily x 2 days, 3 daily x 2 days, 2 daily x 2 days, 1 daily x 2 days    Refresh Tears 0.5 % solution Administer 1 drop  to both eyes Daily As Needed for Dry Eyes.    tamsulosin (FLOMAX) 0.4 MG capsule 24 hr capsule Take 1 capsule by mouth 2 (Two) Times a Day.     Current Facility-Administered Medications on File Prior to Encounter   Medication    ipratropium-albuterol (DUO-NEB) nebulizer solution 3 mL     Vitals:    03/27/24 0826   BP: 135/68   Pulse: 95   Resp: 16   Temp: 97.9 °F (36.6 °C)   SpO2: 96%     Pain Score    03/27/24 0826   PainSc: 0-No pain       Oncology/Hematology History   Malignant neoplasm of upper lobe, left bronchus or lung   3/18/2024 Initial Diagnosis    Malignant neoplasm of upper lobe, left bronchus or lung     3/18/2024 -  Radiation    RADIATION THERAPY Treatment Details (Noted on 3/18/2024)  Site: Left Lung  Technique: SBRT  Goal: No goal specified  Planned Treatment Start Date: No planned start date specified         Review of Systems   Constitutional:  Positive for fatigue (3/10). Negative for appetite change.   HENT:  Positive for tinnitus (ONGOING). Negative for sore throat and trouble swallowing.    Eyes:  Positive for visual disturbance (FLOATERS, ONGOING).   Respiratory:  Positive for cough (SMALL AMOUNT OF CLEAR PRODUCTION) and shortness of breath (ONGOING DUE TO COPD).    Cardiovascular:  Negative for chest pain and leg swelling.   Gastrointestinal:  Negative for abdominal pain, constipation, diarrhea, nausea and vomiting.   Genitourinary:  Negative for difficulty urinating, frequency and urgency.   Musculoskeletal:  Positive for arthralgias (ONGOING). Negative for back pain.   Neurological:  Negative for dizziness and headache.   Psychiatric/Behavioral:  Negative for sleep disturbance.             Physical Exam    Comments: A stereotactic ablative radiation plan was devised to deliver the dose as indicated above. The patient was positioned on the treatment couch in a Vac-Fix immobilization device.  We then aligned with CBCT image guidance, which I personally checked. Once alignment was verified, we  delivered the prescription dose using dynamic respiratory motion compensation under my guidance.    The Medical Physicist was also in attendance during patient set-up and treatment delivery.    The patient tolerated the procedure without incident.    Julio Cesar Alex MD  Radiation Oncology  03/27/2024

## 2024-03-28 ENCOUNTER — HOSPITAL ENCOUNTER (OUTPATIENT)
Dept: RADIATION ONCOLOGY | Facility: HOSPITAL | Age: 84
Discharge: HOME | End: 2024-03-28

## 2024-03-28 VITALS
SYSTOLIC BLOOD PRESSURE: 101 MMHG | TEMPERATURE: 97.9 F | RESPIRATION RATE: 16 BRPM | DIASTOLIC BLOOD PRESSURE: 62 MMHG | WEIGHT: 159.61 LBS | BODY MASS INDEX: 25.76 KG/M2 | OXYGEN SATURATION: 94 % | HEART RATE: 96 BPM

## 2024-03-28 DIAGNOSIS — C34.12 MALIGNANT NEOPLASM OF UPPER LOBE, LEFT BRONCHUS OR LUNG: Primary | ICD-10-CM

## 2024-03-28 LAB
RAD ONC ARIA COURSE ID: NORMAL
RAD ONC ARIA COURSE INTENT: NORMAL
RAD ONC ARIA COURSE LAST TREATMENT DATE: NORMAL
RAD ONC ARIA COURSE START DATE: NORMAL
RAD ONC ARIA COURSE TREATMENT ELAPSED DAYS: 3
RAD ONC ARIA FIRST TREATMENT DATE: NORMAL
RAD ONC ARIA PLAN FRACTIONS TREATED TO DATE: 4
RAD ONC ARIA PLAN ID: NORMAL
RAD ONC ARIA PLAN PRESCRIBED DOSE PER FRACTION: 11 GY
RAD ONC ARIA PLAN PRIMARY REFERENCE POINT: NORMAL
RAD ONC ARIA PLAN TOTAL FRACTIONS PRESCRIBED: 5
RAD ONC ARIA PLAN TOTAL PRESCRIBED DOSE: 5500 CGY
RAD ONC ARIA REFERENCE POINT DOSAGE GIVEN TO DATE: 44 GY
RAD ONC ARIA REFERENCE POINT ID: NORMAL
RAD ONC ARIA REFERENCE POINT SESSION DOSAGE GIVEN: 11 GY

## 2024-03-28 PROCEDURE — 77373 STRTCTC BDY RAD THER TX DLVR: CPT | Performed by: RADIOLOGY

## 2024-03-28 NOTE — PROGRESS NOTES
Stereotactic Body Radiotherapy Note    03/28/2024        Treatment Site: Left upper lobe  Energy: 6X  Dose: 4400 cGy  #Fx: 4  Start Date: 3/25/2024  Elapsed Days: 3             Current Outpatient Medications on File Prior to Encounter   Medication Sig    ALBUTEROL IN Inhale 2 puffs 4 (Four) Times a Day As Needed.    Aspirin Low Dose 81 MG chewable tablet Chew 1 tablet Daily. LAST DOSE OVER 03/1/24  AS DIRECTED BY [facility]    B Complex Vitamins (VITAMIN-B COMPLEX PO) Take 1 tablet by mouth Daily.    brompheniramine-pseudoephedrine-DM 30-2-10 MG/5ML syrup Take 5 mL by mouth 4 (Four) Times a Day As Needed for Cough or Congestion.    Budeson-Glycopyrrol-Formoterol (Breztri Aerosphere) 160-9-4.8 MCG/ACT aerosol inhaler Inhale 2 puffs 2 (Two) Times a Day.    cetirizine (zyrTEC) 10 MG tablet Take 1 tablet by mouth Daily.    Cholecalciferol (VITAMIN D3 PO) Take 1 tablet by mouth Daily.    cilostazol (PLETAL) 100 MG tablet Take 1 tablet by mouth 2 (Two) Times a Day. LAST DOSE 3/1/24    doxycycline (VIBRAMYCIN) 100 MG capsule Take 1 capsule by mouth 2 (Two) Times a Day.    gabapentin (NEURONTIN) 300 MG capsule Take 1 capsule by mouth 3 (Three) Times a Day.    ipratropium-albuterol (DUO-NEB) 0.5-2.5 mg/3 ml nebulizer Take 3 mL by nebulization 4 (Four) Times a Day As Needed for Wheezing or Shortness of Air.    metoprolol succinate XL (TOPROL-XL) 25 MG 24 hr tablet Take 1 tablet by mouth Daily.    multivitamin with minerals tablet tablet Take 1 tablet by mouth Daily.    multivitamins-minerals (PRESERVISION AREDS 2) capsule capsule Take 1 capsule by mouth 2 (Two) Times a Day. LAST DOSE 3/1/24    pantoprazole (PROTONIX) 40 MG EC tablet Take 1 tablet by mouth Daily.    pravastatin (PRAVACHOL) 40 MG tablet Take 1 tablet by mouth Every Night.    predniSONE (DELTASONE) 10 MG tablet 6 daily x 2 days, 5 daily x 2 days, 4 daily x 2 days, 3 daily x 2 days, 2 daily x 2 days, 1 daily x 2 days    Refresh Tears 0.5 % solution Administer 1 drop  to both eyes Daily As Needed for Dry Eyes.    tamsulosin (FLOMAX) 0.4 MG capsule 24 hr capsule Take 1 capsule by mouth 2 (Two) Times a Day.     Current Facility-Administered Medications on File Prior to Encounter   Medication    ipratropium-albuterol (DUO-NEB) nebulizer solution 3 mL     Vitals:    03/28/24 0832   BP: 101/62   Pulse: 96   Resp: 16   Temp: 97.9 °F (36.6 °C)   SpO2: 94%     Pain Score    03/28/24 0832   PainSc: 0-No pain       Oncology/Hematology History   Malignant neoplasm of upper lobe, left bronchus or lung   3/18/2024 Initial Diagnosis    Malignant neoplasm of upper lobe, left bronchus or lung     3/18/2024 -  Radiation    RADIATION THERAPY Treatment Details (Noted on 3/18/2024)  Site: Left Lung  Technique: SBRT  Goal: No goal specified  Planned Treatment Start Date: No planned start date specified         Review of Systems   Constitutional:  Positive for fatigue (3/10). Negative for appetite change.   HENT:  Positive for tinnitus (ONGOING). Negative for sore throat and trouble swallowing.    Eyes:  Positive for visual disturbance (FLOATERS, ONGOING).   Respiratory:  Positive for cough (SMALL AMOUNT OF CLEAR PRODUCTION) and shortness of breath (ONGOING DUE TO COPD).    Cardiovascular:  Negative for chest pain and leg swelling.   Gastrointestinal:  Negative for abdominal pain, constipation, diarrhea, nausea and vomiting.   Genitourinary:  Negative for difficulty urinating, frequency and urgency.   Musculoskeletal:  Positive for arthralgias (ONGOING). Negative for back pain.   Neurological:  Negative for dizziness and headache.   Psychiatric/Behavioral:  Negative for sleep disturbance.             Physical Exam    Comments: A stereotactic ablative radiation plan was devised to deliver the dose as indicated above. The patient was positioned on the treatment couch in a Vac-Fix immobilization device.  We then aligned with CBCT image guidance, which I personally checked. Once alignment was verified, we  delivered the prescription dose using dynamic respiratory motion compensation under my guidance.    The Medical Physicist was also in attendance during patient set-up and treatment delivery.    The patient tolerated the procedure without incident.    Julio Cesar Alex MD  Radiation Oncology  03/28/2024

## 2024-03-29 ENCOUNTER — HOSPITAL ENCOUNTER (OUTPATIENT)
Dept: RADIATION ONCOLOGY | Facility: HOSPITAL | Age: 84
Discharge: HOME | End: 2024-03-29

## 2024-03-29 VITALS
HEART RATE: 77 BPM | RESPIRATION RATE: 12 BRPM | TEMPERATURE: 98.3 F | DIASTOLIC BLOOD PRESSURE: 65 MMHG | SYSTOLIC BLOOD PRESSURE: 123 MMHG | BODY MASS INDEX: 26.08 KG/M2 | OXYGEN SATURATION: 98 % | WEIGHT: 161.6 LBS

## 2024-03-29 DIAGNOSIS — C34.32 MALIGNANT NEOPLASM OF LOWER LOBE OF LEFT LUNG: Primary | ICD-10-CM

## 2024-03-29 LAB
RAD ONC ARIA COURSE ID: NORMAL
RAD ONC ARIA COURSE INTENT: NORMAL
RAD ONC ARIA COURSE LAST TREATMENT DATE: NORMAL
RAD ONC ARIA COURSE START DATE: NORMAL
RAD ONC ARIA COURSE TREATMENT ELAPSED DAYS: 4
RAD ONC ARIA FIRST TREATMENT DATE: NORMAL
RAD ONC ARIA PLAN FRACTIONS TREATED TO DATE: 5
RAD ONC ARIA PLAN ID: NORMAL
RAD ONC ARIA PLAN PRESCRIBED DOSE PER FRACTION: 11 GY
RAD ONC ARIA PLAN PRIMARY REFERENCE POINT: NORMAL
RAD ONC ARIA PLAN TOTAL FRACTIONS PRESCRIBED: 5
RAD ONC ARIA PLAN TOTAL PRESCRIBED DOSE: 5500 CGY
RAD ONC ARIA REFERENCE POINT DOSAGE GIVEN TO DATE: 55 GY
RAD ONC ARIA REFERENCE POINT ID: NORMAL
RAD ONC ARIA REFERENCE POINT SESSION DOSAGE GIVEN: 11 GY

## 2024-03-29 PROCEDURE — 77336 RADIATION PHYSICS CONSULT: CPT | Performed by: RADIOLOGY

## 2024-03-29 PROCEDURE — 77373 STRTCTC BDY RAD THER TX DLVR: CPT | Performed by: RADIOLOGY

## 2024-03-29 NOTE — PROGRESS NOTES
Stereotactic Body Radiotherapy Note    03/29/2024        Treatment Site: Left upper lobe  Energy: 6X  Dose: 5500 cGy  #Fx: 5  Start Date: 3/25/2024  Elapsed Days: 4             Current Outpatient Medications on File Prior to Encounter   Medication Sig    ALBUTEROL IN Inhale 2 puffs 4 (Four) Times a Day As Needed.    Aspirin Low Dose 81 MG chewable tablet Chew 1 tablet Daily. LAST DOSE OVER 03/1/24  AS DIRECTED BY [facility]    B Complex Vitamins (VITAMIN-B COMPLEX PO) Take 1 tablet by mouth Daily.    brompheniramine-pseudoephedrine-DM 30-2-10 MG/5ML syrup Take 5 mL by mouth 4 (Four) Times a Day As Needed for Cough or Congestion.    Budeson-Glycopyrrol-Formoterol (Breztri Aerosphere) 160-9-4.8 MCG/ACT aerosol inhaler Inhale 2 puffs 2 (Two) Times a Day.    cetirizine (zyrTEC) 10 MG tablet Take 1 tablet by mouth Daily.    Cholecalciferol (VITAMIN D3 PO) Take 1 tablet by mouth Daily.    cilostazol (PLETAL) 100 MG tablet Take 1 tablet by mouth 2 (Two) Times a Day. LAST DOSE 3/1/24    doxycycline (VIBRAMYCIN) 100 MG capsule Take 1 capsule by mouth 2 (Two) Times a Day.    gabapentin (NEURONTIN) 300 MG capsule Take 1 capsule by mouth 3 (Three) Times a Day.    ipratropium-albuterol (DUO-NEB) 0.5-2.5 mg/3 ml nebulizer Take 3 mL by nebulization 4 (Four) Times a Day As Needed for Wheezing or Shortness of Air.    metoprolol succinate XL (TOPROL-XL) 25 MG 24 hr tablet Take 1 tablet by mouth Daily.    multivitamin with minerals tablet tablet Take 1 tablet by mouth Daily.    multivitamins-minerals (PRESERVISION AREDS 2) capsule capsule Take 1 capsule by mouth 2 (Two) Times a Day. LAST DOSE 3/1/24    pantoprazole (PROTONIX) 40 MG EC tablet Take 1 tablet by mouth Daily.    pravastatin (PRAVACHOL) 40 MG tablet Take 1 tablet by mouth Every Night.    predniSONE (DELTASONE) 10 MG tablet 6 daily x 2 days, 5 daily x 2 days, 4 daily x 2 days, 3 daily x 2 days, 2 daily x 2 days, 1 daily x 2 days    Refresh Tears 0.5 % solution Administer 1 drop  to both eyes Daily As Needed for Dry Eyes.    tamsulosin (FLOMAX) 0.4 MG capsule 24 hr capsule Take 1 capsule by mouth 2 (Two) Times a Day.     Current Facility-Administered Medications on File Prior to Encounter   Medication    ipratropium-albuterol (DUO-NEB) nebulizer solution 3 mL     Vitals:    03/29/24 1155   BP: 123/65   Pulse: 77   Resp: 12   Temp: 98.3 °F (36.8 °C)   SpO2: 98%     Pain Score    03/29/24 1155   PainSc: 0-No pain       Oncology/Hematology History   Malignant neoplasm of upper lobe, left bronchus or lung   3/18/2024 Initial Diagnosis    Malignant neoplasm of upper lobe, left bronchus or lung     3/18/2024 -  Radiation    RADIATION THERAPY Treatment Details (Noted on 3/18/2024)  Site: Left Lung  Technique: SBRT  Goal: No goal specified  Planned Treatment Start Date: No planned start date specified         Review of Systems   Constitutional:  Positive for fatigue (3/10). Negative for appetite change.   HENT:  Positive for tinnitus (ONGOING). Negative for sore throat and trouble swallowing.    Eyes:  Positive for visual disturbance (FLOATERS, ONGOING).   Respiratory:  Positive for cough (SMALL AMOUNT OF CLEAR PRODUCTION) and shortness of breath (ONGOING DUE TO COPD).    Cardiovascular:  Negative for chest pain and leg swelling.   Gastrointestinal:  Negative for abdominal pain, constipation, diarrhea, nausea and vomiting.   Genitourinary:  Negative for difficulty urinating, frequency and urgency.   Musculoskeletal:  Positive for arthralgias (ONGOING). Negative for back pain.   Neurological:  Negative for dizziness and headache.   Psychiatric/Behavioral:  Negative for sleep disturbance.             Physical Exam    Comments: A stereotactic ablative radiation plan was devised to deliver the dose as indicated above. The patient was positioned on the treatment couch in a Vac-Fix immobilization device.  We then aligned with CBCT image guidance, which I personally checked. Once alignment was verified, we  delivered the prescription dose using dynamic respiratory motion compensation under my guidance.    The Medical Physicist was also in attendance during patient set-up and treatment delivery.    The patient tolerated the procedure without incident.    Julio Cesar Alex MD  Radiation Oncology  03/29/2024

## 2024-04-01 LAB — FUNGUS WND CULT: NORMAL

## 2024-04-10 ENCOUNTER — PATIENT ROUNDING (BHMG ONLY) (OUTPATIENT)
Dept: RADIATION ONCOLOGY | Facility: HOSPITAL | Age: 84
End: 2024-04-10
Payer: MEDICARE

## 2024-04-10 NOTE — PROGRESS NOTES
"Patient completed radiation treatment for Lung cancer on 3/29/2024. Following up with patient regarding any concerns the patient may have at this time and receiving feedback in regards to patient over all care while under treatment.     Patient reports that he is doing good and no complaints.  He states that his fatigue is no different than it normally is.    Patient states that Pain is at 0 on scale of 0/10. Patient states medications have not changed. No refills needed at this time.    Patient was asked if there was anything that could've made their experience better at [facility] while they were under treatment. Patient states: \"No ma'am, everything was good!\"    Patient encouraged to call the office if any concerns arise. Patient reminded of Follow up appointment on 5/31/2024.  "

## 2024-04-15 LAB
MYCOBACTERIUM SPEC CULT: NORMAL
NIGHT BLUE STAIN TISS: NORMAL
NIGHT BLUE STAIN TISS: NORMAL

## 2024-04-22 ENCOUNTER — OFFICE VISIT (OUTPATIENT)
Dept: PULMONOLOGY | Facility: CLINIC | Age: 84
End: 2024-04-22
Payer: MEDICARE

## 2024-04-22 VITALS
SYSTOLIC BLOOD PRESSURE: 106 MMHG | OXYGEN SATURATION: 96 % | WEIGHT: 160.9 LBS | TEMPERATURE: 97.5 F | RESPIRATION RATE: 16 BRPM | HEART RATE: 87 BPM | BODY MASS INDEX: 25.25 KG/M2 | HEIGHT: 67 IN | DIASTOLIC BLOOD PRESSURE: 60 MMHG

## 2024-04-22 DIAGNOSIS — C80.1 ADENOCARCINOMA: Primary | ICD-10-CM

## 2024-04-22 DIAGNOSIS — F17.211 NICOTINE DEPENDENCE, CIGARETTES, IN REMISSION: ICD-10-CM

## 2024-04-22 DIAGNOSIS — R06.09 DYSPNEA ON EXERTION: ICD-10-CM

## 2024-04-22 DIAGNOSIS — Z23 NEED FOR VACCINATION: ICD-10-CM

## 2024-04-22 DIAGNOSIS — J43.9 PULMONARY EMPHYSEMA, UNSPECIFIED EMPHYSEMA TYPE: ICD-10-CM

## 2024-04-22 PROCEDURE — 1160F RVW MEDS BY RX/DR IN RCRD: CPT | Performed by: NURSE PRACTITIONER

## 2024-04-22 PROCEDURE — 3074F SYST BP LT 130 MM HG: CPT | Performed by: NURSE PRACTITIONER

## 2024-04-22 PROCEDURE — 99214 OFFICE O/P EST MOD 30 MIN: CPT | Performed by: NURSE PRACTITIONER

## 2024-04-22 PROCEDURE — 3078F DIAST BP <80 MM HG: CPT | Performed by: NURSE PRACTITIONER

## 2024-04-22 PROCEDURE — G0009 ADMIN PNEUMOCOCCAL VACCINE: HCPCS | Performed by: NURSE PRACTITIONER

## 2024-04-22 PROCEDURE — 1159F MED LIST DOCD IN RCRD: CPT | Performed by: NURSE PRACTITIONER

## 2024-04-22 PROCEDURE — 90677 PCV20 VACCINE IM: CPT | Performed by: NURSE PRACTITIONER

## 2024-04-22 RX ORDER — ATORVASTATIN CALCIUM 40 MG/1
TABLET, FILM COATED ORAL
COMMUNITY
Start: 2024-04-15 | End: 2025-04-15

## 2024-04-22 NOTE — PROGRESS NOTES
Primary Care Provider  Payma Carroll MD   Referring Provider  No ref. provider found    Patient Complaint  Malignant neoplasm of lower lobe of left lung and Follow-up (6 week )      SUBJECTIVE    History of Presenting Illness  Carlos Zimmerman is a pleasant 84 y.o. male who presents to [facility] for results from AFB and fungal culture which are negative from bronchoscopy 3/4/2024. 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  Pneumonia panel was positive  for parainfluenza virus, AFB and fungal cultures with no growth. Patient continues under the care of hematology/oncology and radiation oncology.  Patient states he has been completed his chemotherapy and radiation.  He states he is scheduled to have a follow-up CT scan by Dr. Isai london.  At present time patient continues on albuterol inhaler duo nebulizer as needed for shortness of air or wheeze and Breztri maintenance inhaler 2 puffs twice daily.  Patient states he does have shortness of air with exertional activities but improves with rest.  At present time he denies coughing, wheezing, headaches, chest pain, weight loss or hemoptysis. Denies fevers, chills and night sweats. Carlos Zimmerman is able to perform ADLs without difficulties and denies any swollen glands/lymph nodes in the head or neck.    I have personally reviewed the review of systems, past family, social, medical and surgical histories; and agree with their findings.    Review of Systems  Constitutional symptoms:  Denied complaints   Ear, nose, throat: Denied complaints  Cardiovascular:  Denied complaints  Respiratory: Shortness of air with exertional activity   gastrointestinal: Denied complaints  Musculoskeletal: Denied complaints    Family History   Problem Relation Age of Onset    Cancer Mother     Stomach cancer Other     Malig Hyperthermia Neg Hx         Social History     Socioeconomic History    Marital  status:    Tobacco Use    Smoking status: Former     Current packs/day: 0.00     Average packs/day: 1 pack/day for 60.0 years (60.0 ttl pk-yrs)     Types: Cigarettes     Start date:      Quit date:      Years since quittin.3     Passive exposure: Past    Smokeless tobacco: Current     Types: Snuff    Tobacco comments:     INST PER ANESTHESIA PROTOCOL   Vaping Use    Vaping status: Never Used   Substance and Sexual Activity    Alcohol use: Not Currently    Drug use: Never    Sexual activity: Defer        Past Medical History:   Diagnosis Date    Anemia     NO CURRENT ISSUES    Arthritis     COPD (chronic obstructive pulmonary disease)     INHALER  PRN    Hyperlipidemia     Hypertension     ON MEDS/FOLLOWS SAHNI, PT DENIES CAD    Lung cancer     REMOVED -     Pneumonia     LEFT LUNG CA NO CURRENT PNEUMONIA    SOB (shortness of breath)     WITH EXERTION SOMETIMES    Stroke     LEFT LEG/FOOT DROP      TIA (transient ischemic attack)     NO RESIDUAL         Immunization History   Administered Date(s) Administered    COVID-19 (PFIZER) Purple Cap Monovalent 2021, 2021, 10/18/2021    COVID-19 F23 (PFIZER) 12YRS+ (COMIRNATY) 11/10/2023    Fluad Quad 65+ 10/07/2020, 2021    Fluzone High-Dose 65+yrs 10/06/2022, 2023    Influenza, Unspecified 2020    Pneumococcal Conjugate 20-Valent (PCV20) 2024    TD Preservative Free (Tenivac) 2022       No Known Allergies       Current Outpatient Medications:     ALBUTEROL IN, Inhale 2 puffs 4 (Four) Times a Day As Needed., Disp: , Rfl:     Aspirin Low Dose 81 MG chewable tablet, Chew 1 tablet Daily. LAST DOSE OVER 24  AS DIRECTED BY CLARE, Disp: , Rfl:     atorvastatin (LIPITOR) 40 MG tablet, Take with food/milk.Take or use exactly as directed.Obtain advice for OTCs.Do not take if pregnant.Avoid grapefruit and grapefruit juice., Disp: , Rfl:     B Complex Vitamins (VITAMIN-B COMPLEX PO), Take 1 tablet by mouth  Daily., Disp: , Rfl:     Budeson-Glycopyrrol-Formoterol (Breztri Aerosphere) 160-9-4.8 MCG/ACT aerosol inhaler, Inhale 2 puffs 2 (Two) Times a Day., Disp: 1 each, Rfl: 9    cetirizine (zyrTEC) 10 MG tablet, Take 1 tablet by mouth Daily., Disp: , Rfl:     Cholecalciferol (VITAMIN D3 PO), Take 1 tablet by mouth Daily., Disp: , Rfl:     cilostazol (PLETAL) 100 MG tablet, Take 1 tablet by mouth 2 (Two) Times a Day. LAST DOSE 3/1/24, Disp: , Rfl:     gabapentin (NEURONTIN) 300 MG capsule, Take 1 capsule by mouth 3 (Three) Times a Day., Disp: 90 capsule, Rfl: 2    ipratropium-albuterol (DUO-NEB) 0.5-2.5 mg/3 ml nebulizer, Take 3 mL by nebulization 4 (Four) Times a Day As Needed for Wheezing or Shortness of Air., Disp: 120 mL, Rfl: 5    metoprolol succinate XL (TOPROL-XL) 25 MG 24 hr tablet, Take 1 tablet by mouth Daily., Disp: , Rfl:     multivitamin with minerals tablet tablet, Take 1 tablet by mouth Daily., Disp: , Rfl:     multivitamins-minerals (PRESERVISION AREDS 2) capsule capsule, Take 1 capsule by mouth 2 (Two) Times a Day. LAST DOSE 3/1/24, Disp: , Rfl:     pantoprazole (PROTONIX) 40 MG EC tablet, Take 1 tablet by mouth Daily., Disp: , Rfl:     Refresh Tears 0.5 % solution, Administer 1 drop to both eyes Daily As Needed for Dry Eyes., Disp: , Rfl:     tamsulosin (FLOMAX) 0.4 MG capsule 24 hr capsule, Take 1 capsule by mouth 2 (Two) Times a Day., Disp: 180 capsule, Rfl: 4    pravastatin (PRAVACHOL) 40 MG tablet, Take 1 tablet by mouth Every Night. (Patient not taking: Reported on 4/22/2024), Disp: , Rfl:     RSVPreF3 Vac Recomb Adjuvanted (AREXVY) 120 MCG/0.5ML reconstituted suspension injection, Inject 0.5 mL into the appropriate muscle as directed by prescriber 1 (One) Time for 1 dose., Disp: 0.5 mL, Rfl: 0    Current Facility-Administered Medications:     ipratropium-albuterol (DUO-NEB) nebulizer solution 3 mL, 3 mL, Nebulization, Once, Gely Canales APRN           Vital Signs   /60 (BP Location: Left  "arm, Patient Position: Sitting, Cuff Size: Adult)   Pulse 87   Temp 97.5 °F (36.4 °C) (Oral)   Resp 16   Ht 170.2 cm (67\")   Wt 73 kg (160 lb 14.4 oz)   SpO2 96% Comment: Room air  BMI 25.20 kg/m²       OBJECTIVE    Physical Exam  Vital Signs Reviewed   WDWN, Alert, NAD.    HEENT:  PERRL, EOMI.  OP, nares clear  Neck:  Supple, no JVD, no thyromegaly  Chest:  good aeration, clear to auscultation bilaterally, tympanic to percussion bilaterally, no work of breathing noted  CV: RRR, no MGR, pulses 2+, equal.  Abd:  Soft, NT, ND, + BS, no HSM  EXT:  no clubbing, no cyanosis, no edema  Neuro:  A&Ox3, CN grossly intact, no focal deficits.  Skin: No rashes or lesions noted    Results Review  I have personally reviewed the prior office notes, hospital records, labs, and diagnostics.  AFB Culture - Lavage, Lung, Left Upper Lobe  Order: [id number]  Status: Final result       Visible to patient: No (not released)       Next appt: Today at 03:00 PM in Pulmonology (Gely Canales, APRN)       Dx: Lung nodule; Abnormal PET scan of lung    Specimen Information: Lung, Left Upper Lobe; Lavage   0 Result Notes  AFB Culture No AFB isolated at 6 weeks               AFB Stain No acid fast bacilli seen on direct smear      No acid fast bacilli seen on concentrated smear              Resulting Agency: [facility]           Specimen Collected: 03/04/24 09:17 EST Last Resulted: 04/15/24 11:01 EDT         Fungus Culture - Lavage, Lung, Left Upper Lobe  Order: [id number]  Status: Final result       Visible to patient: No (not released)       Next appt: Today at 03:00 PM in Pulmonology (Gely Canales, ARTEMIO)       Dx: Lung nodule; Abnormal PET scan of lung    Specimen Information: Lung, Left Upper Lobe; Lavage   0 Result Notes  Fungus Culture No fungus isolated at 4 weeks           Resulting Agency: [facility]           Specimen Collected: 03/04/24 09:17 EST Last Resulted: 04/01/24 11:01 EDT             ASSESSMENT         Patient Active Problem " List   Diagnosis    COPD (chronic obstructive pulmonary disease)    Hypertension    Low back pain    Lung cancer    Lung disease    Solitary lung nodule    Pneumonia of right lung due to infectious organism    Hematuria    Complicated UTI (urinary tract infection)    Recurrent urinary tract infection    Benign prostatic hyperplasia    Atherosclerosis of lower extremity with claudication    Benign prostatic hyperplasia with lower urinary tract symptoms    Elevated PSA    Cervical radiculopathy    Cervical spondylosis without myelopathy    Abnormal PET scan of lung    Lung nodule    Malignant neoplasm of upper lobe, left bronchus or lung       Encounter Diagnoses   Name Primary?    Adenocarcinoma Yes    Pulmonary emphysema, unspecified emphysema type     Nicotine dependence, cigarettes, in remission     Dyspnea on exertion     Need for vaccination       PLAN  -Continue with albuterol inhaler and duo nebulizer as needed for shortness of air or wheeze  -Continue with Breztri 2 puffs twice daily rinsing out his mouth after each use to prevent oral thrush  -Prevnar 20 administered today per patient request  -RSV vaccine sent to pharmacy per patient request  -Patient will follow-up at neck scheduled appointment or sooner if needed  Diagnoses and all orders for this visit:    1. Adenocarcinoma (Primary)    2. Pulmonary emphysema, unspecified emphysema type    3. Nicotine dependence, cigarettes, in remission    4. Dyspnea on exertion    5. Need for vaccination  -     Pneumococcal Conjugate Vaccine 20-Valent (PCV20)  -     RSVPreF3 Vac Recomb Adjuvanted (AREXVY) 120 MCG/0.5ML reconstituted suspension injection; Inject 0.5 mL into the appropriate muscle as directed by prescriber 1 (One) Time for 1 dose.  Dispense: 0.5 mL; Refill: 0           Smoking status:former  Vaccination status:reviewed  Medications personally reviewed    Follow Up  Return for Next scheduled follow up.    Patient was given instructions and counseling  regarding his condition or for health maintenance advice. Please see specific information pulled into the AVS if appropriate.

## 2024-05-18 ENCOUNTER — HOSPITAL ENCOUNTER (EMERGENCY)
Facility: HOSPITAL | Age: 84
Discharge: HOME | End: 2024-05-18
Attending: EMERGENCY MEDICINE
Payer: MEDICARE

## 2024-05-18 ENCOUNTER — APPOINTMENT (OUTPATIENT)
Dept: GENERAL RADIOLOGY | Facility: HOSPITAL | Age: 84
End: 2024-05-18
Payer: MEDICARE

## 2024-05-18 VITALS
DIASTOLIC BLOOD PRESSURE: 73 MMHG | OXYGEN SATURATION: 98 % | TEMPERATURE: 97.8 F | WEIGHT: 160 LBS | HEIGHT: 67 IN | RESPIRATION RATE: 17 BRPM | SYSTOLIC BLOOD PRESSURE: 148 MMHG | HEART RATE: 76 BPM | BODY MASS INDEX: 25.11 KG/M2
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
## 2024-05-18 DIAGNOSIS — S51.811A SKIN TEAR OF RIGHT FOREARM WITHOUT COMPLICATION, INITIAL ENCOUNTER: ICD-10-CM

## 2024-05-18 DIAGNOSIS — S51.811A LACERATION OF RIGHT FOREARM, INITIAL ENCOUNTER: ICD-10-CM

## 2024-05-18 PROCEDURE — 99283 EMERGENCY DEPT VISIT LOW MDM: CPT

## 2024-05-18 PROCEDURE — 73080 X-RAY EXAM OF ELBOW: CPT

## 2024-05-18 PROCEDURE — 73562 X-RAY EXAM OF KNEE 3: CPT

## 2024-05-18 RX ORDER — LIDOCAINE HYDROCHLORIDE AND EPINEPHRINE 10; 10 MG/ML; UG/ML
10 INJECTION, SOLUTION INFILTRATION; PERINEURAL ONCE
Status: COMPLETED | OUTPATIENT
Start: 2024-05-18 | End: 2024-05-18

## 2024-05-18 RX ORDER — LIDOCAINE HYDROCHLORIDE AND EPINEPHRINE 10; 10 MG/ML; UG/ML
10 INJECTION, SOLUTION INFILTRATION; PERINEURAL ONCE
Status: DISCONTINUED | OUTPATIENT
Start: 2024-05-18 | End: 2024-05-18

## 2024-05-18 RX ADMIN — LIDOCAINE HYDROCHLORIDE,EPINEPHRINE BITARTRATE 10 ML: 10; .01 INJECTION, SOLUTION INFILTRATION; PERINEURAL at 12:49

## 2024-05-18 NOTE — ED PROVIDER NOTES
Time: 12:52 PM EDT  Date of encounter:  5/18/2024  Independent Historian/Clinical History and Information was obtained by:   Patient and Family    History is limited by: N/A    Chief Complaint: Fall this morning      History of Present Illness:  Patient is a 84 y.o. year old male who presents to the emergency department for evaluation of fall this morning    Patient is an 84-year-old male with history of lung cancer, COPD, TIA, hyperlipidemia, hypertension, arthritis, and stroke presenting today for evaluation following a fall this morning about 10:00.  Patient reports that he was walking in the [employer], and his foot caught something on the floor, causing him to fall into a metal rack.  He cut his right elbow on a sharp piece of metal and landed on his left knee.  He is having pain in the left knee as well as the right elbow at this time.  He takes a baby aspirin but no other blood thinners.  Patient does have a history of falls, and reports that he has bilateral distal lower extremity neuropathy.  He reports that he usually gets around pretty well.  Left knee pain is located on the medial border and is tender with palpation.  He does not know if he is able to walk on it on this time, as he was transported by ambulance to the hospital and has not attempted to walk independently since time of injury.  No precipitating dizziness no loss of consciousness.  He did not hit his head.          Patient Care Team  Primary Care Provider: Payam Carroll MD    Past Medical History:     No Known Allergies  Past Medical History:   Diagnosis Date    Anemia     NO CURRENT ISSUES    Arthritis     COPD (chronic obstructive pulmonary disease)     INHALER  PRN    Hyperlipidemia     Hypertension     ON MEDS/FOLLOWS SAHNI, PT DENIES CAD    Lung cancer     REMOVED - 2016    Pneumonia     LEFT LUNG CA NO CURRENT PNEUMONIA    SOB (shortness of breath)     WITH EXERTION SOMETIMES    Stroke     LEFT LEG/FOOT DROP  2019    TIA  (transient ischemic attack)     NO RESIDUAL 2010     Past Surgical History:   Procedure Laterality Date    BRONCHOSCOPY WITH ION ROBOTIC ASSIST N/A 3/4/2024    Procedure: BRONCHOSCOPY WITH ION ROBOT, REBUS, NEEDLE ASPIRATE, BIOPSIES, BAL, WASHINGS;  Surgeon: Dayne Choudhary MD;  Location: [facility] MAIN OR;  Service: Robotics - Pulmonary;  Laterality: N/A;    CARDIAC CATHETERIZATION Left 11/10/2022    Procedure: Aortogram with left leg angiogram, possible angioplasty or stenting;  Surgeon: Cuauhtemoc Thomas MD;  Location: [facility] CATH INVASIVE LOCATION;  Service: Vascular;  Laterality: Left;    CARDIAC CATHETERIZATION Right 01/13/2023    Procedure: Right leg angiogram, possible angioplasty or stenting;  Surgeon: Cuauhtemoc Thomas MD;  Location: [facility] CATH INVASIVE LOCATION;  Service: Vascular;  Laterality: Right;    CARPAL TUNNEL RELEASE Left 02/09/2024    Procedure: CARPAL TUNNEL RELEASE, left;  Surgeon: Rg Tom MD;  Location: [facility] MAIN OR;  Service: Neurosurgery;  Laterality: Left;    LUNG BIOPSY      LUNG SURGERY      REMOVAL HALF OF UPPER PORTION LEFT LUNG    ORTHOPEDIC SURGERY Left     ROTATOR CUFF    THROAT SURGERY      throat diverticulum repair     Family History   Problem Relation Age of Onset    Cancer Mother     Stomach cancer Other     Malig Hyperthermia Neg Hx        Home Medications:  Prior to Admission medications    Medication Sig Start Date End Date Taking? Authorizing Provider   ALBUTEROL IN Inhale 2 puffs 4 (Four) Times a Day As Needed.    Maxine Espinoza MD   Aspirin Low Dose 81 MG chewable tablet Chew 1 tablet Daily. LAST DOSE OVER 03/1/24  AS DIRECTED BY [facility] 8/22/23   Maxine Espinoza MD   atorvastatin (LIPITOR) 40 MG tablet Take with food/milk.Take or use exactly as directed.Obtain advice for OTCs.Do not take if pregnant.Avoid grapefruit and grapefruit juice. 4/15/24 4/15/25  Maxine Espinoza MD   B Complex Vitamins (VITAMIN-B COMPLEX PO) Take 1 tablet by mouth Daily.     Maxine Espinoza MD   Budeson-Glycopyrrol-Formoterol (Breztri Aerosphere) 160-9-4.8 MCG/ACT aerosol inhaler Inhale 2 puffs 2 (Two) Times a Day. 1/31/24   Dayne Choudhary MD   cetirizine (zyrTEC) 10 MG tablet Take 1 tablet by mouth Daily.    Maxine Espinoza MD   Cholecalciferol (VITAMIN D3 PO) Take 1 tablet by mouth Daily.    Maxine Espinoza MD   cilostazol (PLETAL) 100 MG tablet Take 1 tablet by mouth 2 (Two) Times a Day. LAST DOSE 3/1/24 5/24/21   Maxine Espinoza MD   gabapentin (NEURONTIN) 300 MG capsule Take 1 capsule by mouth 3 (Three) Times a Day. 3/1/24   Juan Jose Alatorre MD   ipratropium-albuterol (DUO-NEB) 0.5-2.5 mg/3 ml nebulizer Take 3 mL by nebulization 4 (Four) Times a Day As Needed for Wheezing or Shortness of Air. 3/8/24   Gely Canales APRN   metoprolol succinate XL (TOPROL-XL) 25 MG 24 hr tablet Take 1 tablet by mouth Daily.    Maxine Espinoza MD   multivitamin with minerals tablet tablet Take 1 tablet by mouth Daily.    Maxine Espinoza MD   multivitamins-minerals (PRESERVISION AREDS 2) capsule capsule Take 1 capsule by mouth 2 (Two) Times a Day. LAST DOSE 3/1/24 7/26/21   Maxine Espinoza MD   pantoprazole (PROTONIX) 40 MG EC tablet Take 1 tablet by mouth Daily. 4/19/21   Maxine Espinoza MD   pravastatin (PRAVACHOL) 40 MG tablet Take 1 tablet by mouth Every Night.  Patient not taking: Reported on 4/22/2024 5/31/22   Maxine Espinoza MD   Refresh Tears 0.5 % solution Administer 1 drop to both eyes Daily As Needed for Dry Eyes. 8/24/22   Maxine Espinoza MD   tamsulosin (FLOMAX) 0.4 MG capsule 24 hr capsule Take 1 capsule by mouth 2 (Two) Times a Day. 10/2/23   Del Mai MD        Social History:   Social History     Tobacco Use    Smoking status: Former     Current packs/day: 0.00     Average packs/day: 1 pack/day for 60.0 years (60.0 ttl pk-yrs)     Types: Cigarettes     Start date: 1950     Quit date: 2010     Years since  "quittin.3     Passive exposure: Past    Smokeless tobacco: Current     Types: Snuff    Tobacco comments:     INST PER ANESTHESIA PROTOCOL   Vaping Use    Vaping status: Never Used   Substance Use Topics    Alcohol use: Not Currently    Drug use: Never         Review of Systems:  Review of Systems   Constitutional:  Negative for chills, fatigue and fever.   HENT: Negative.  Negative for congestion, rhinorrhea and sore throat.    Eyes: Negative.  Negative for pain and visual disturbance.   Respiratory:  Negative for apnea, cough, chest tightness, shortness of breath and wheezing.    Cardiovascular:  Negative for chest pain, palpitations and leg swelling.   Gastrointestinal:  Negative for abdominal pain, blood in stool, constipation, diarrhea, nausea and vomiting.   Genitourinary:  Negative for difficulty urinating, dysuria and hematuria.   Musculoskeletal:  Positive for arthralgias. Negative for joint swelling, myalgias, neck pain and neck stiffness.   Skin:  Positive for wound. Negative for color change.   Neurological:  Negative for dizziness, seizures, syncope, weakness and headaches.   Psychiatric/Behavioral: Negative.  Negative for confusion.    All other systems reviewed and are negative.       Physical Exam:  /73   Pulse 76   Temp 97.8 °F (36.6 °C) (Oral)   Resp 17   Ht 170.2 cm (67\")   Wt 72.6 kg (160 lb)   SpO2 98%   BMI 25.06 kg/m²     Physical Exam  Vitals and nursing note reviewed.   Constitutional:       General: He is not in acute distress.     Appearance: Normal appearance. He is not ill-appearing, toxic-appearing or diaphoretic.   HENT:      Head: Normocephalic and atraumatic.      Jaw: There is normal jaw occlusion.      Nose: Nose normal.   Eyes:      General: Lids are normal.      Extraocular Movements: Extraocular movements intact.      Conjunctiva/sclera: Conjunctivae normal.      Pupils: Pupils are equal, round, and reactive to light.   Cardiovascular:      Rate and Rhythm: " Normal rate and regular rhythm.      Pulses: Normal pulses.      Heart sounds: Normal heart sounds.   Pulmonary:      Effort: Pulmonary effort is normal. No respiratory distress.      Breath sounds: No wheezing or rhonchi.      Comments: Absent breath sounds noted in the left upper lobe secondary to prior lobectomy.  Patient has history of lung cancer and COPD and reports these are not acute findings.  Abdominal:      General: Abdomen is flat.      Palpations: Abdomen is soft.      Tenderness: There is no abdominal tenderness. There is no guarding or rebound.   Musculoskeletal:         General: Tenderness and signs of injury present. No deformity. Normal range of motion.      Left elbow: Normal.        Arms:       Cervical back: Normal range of motion and neck supple.      Right knee: Normal.      Left knee: Swelling and bony tenderness present. No deformity, effusion, erythema, ecchymosis or lacerations. Normal range of motion. Tenderness present over the medial joint line and MCL.      Right lower leg: No edema.      Left lower leg: No edema.      Comments: No point tenderness or signs of internal derangement of the left knee; however patient had significant pain in the medial knee with valgus stress.    Skin:     General: Skin is warm and dry.      Findings: Bruising present.   Neurological:      General: No focal deficit present.      Mental Status: He is alert and oriented to person, place, and time. Mental status is at baseline.   Psychiatric:         Mood and Affect: Mood normal.         Behavior: Behavior normal.         Thought Content: Thought content normal.         Judgment: Judgment normal.                Procedures:  Procedures      Medical Decision Making:      Comorbidities that affect care:    COPD    External Notes reviewed:    Previous Clinic Note: 4/22/2024 pulmonology      The following orders were placed and all results were independently analyzed by me:  Orders Placed This Encounter    Procedures    XR Knee 3 View Left    XR Elbow 3+ View Right       Medications Given in the Emergency Department:  Medications   lidocaine 1% - EPINEPHrine 1:238075 (XYLOCAINE W/EPI) 1 %-1:038821 injection 10 mL (10 mL Injection Given 5/18/24 7346)        ED Course:         Labs:    Lab Results (last 24 hours)       ** No results found for the last 24 hours. **             Imaging:    XR Knee 3 View Left    Result Date: 5/18/2024  XR KNEE 3 VW LEFT-  Date of Exam: 5/18/2024 12:40 PM  Indication: fall, pain  Comparison: None available.  Findings: No evidence of fracture. No acute bony abnormality. Coarsened trabeculae in the distal femoral epiphysis. No fluid in the suprapatellar bursa. Vascular calcifications noted      Impression:  1. No evidence of fracture or effusion 2. No acute process 3. 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  Findings: No evidence of fracture. No fat pad displacement. No acute bony abnormality. Suspected bubbles of gas in the lateral upper forearm suggesting open soft tissue injury. No radiopaque foreign body      Impression: No evidence of fracture   Electronically Signed ByKirsten Hawthorne On:5/18/2024 1:00 PM         Differential Diagnosis and Discussion:    Extremity Pain: Differential diagnosis includes but is not limited to soft tissue sprain, tendonitis, tendon injury, dislocation, fracture, deep vein thrombosis, arterial insufficiency, osteoarthritis, bursitis, and ligamentous damage.  Laceration: Laceration was evaluated for arterial injury, ligamentous damage, and other neurovascular injury.  Orthopedic Injuries: Differential diagnosis includes but is not limited to fractures, soft tissue injuries, dislocations, contusions, ligamentous injuries, tendon  injuries, nerve injuries, compartment syndrome, bursitis, and vascular injuries.    All X-rays impressions were independently interpreted by me.    MDM  Number of Diagnoses or Management Options  Fall, initial encounter  Laceration of right forearm, initial encounter  Skin tear of right forearm without complication, initial encounter  Diagnosis management comments: In summary this is an 84-year-old male patient presents to the emerged from and for evaluation status post fall.  He has multiple skin tears to the right upper extremity along with laceration.  X-ray of the right elbow is unremarkable for acute traumatic injury, x-ray of the left knee is unremarkable and patient medicated.  Lacerations have been repaired.  Very strict return to ER and follow-up instructions have been provided to the patient.                   Patient Care Considerations:    LABS: I considered ordering labs, however no signs of metabolic derangement      Consultants/Shared Management Plan:    None    Social Determinants of Health:    Patient is independent, reliable, and has access to care.       Disposition and Care Coordination:    Discharged: The patient is suitable and stable for discharge with no need for consideration of admission.    I have explained the patient´s condition, diagnoses and treatment plan based on the information available to me at this time. I have answered questions and addressed any concerns. The patient has a good  understanding of the patient´s diagnosis, condition, and treatment plan as can be expected at this point. The vital signs have been stable. The patient´s condition is stable and appropriate for discharge from the emergency department.      The patient will pursue further outpatient evaluation with the primary care physician or other designated or consulting physician as outlined in the discharge instructions. They are agreeable to this plan of care and follow-up instructions have been explained in  detail. The patient has received these instructions in written format and has expressed an understanding of the discharge instructions. The patient is aware that any significant change in condition or worsening of symptoms should prompt an immediate return to this or the closest emergency department or call to 911.  I have explained discharge medications and the need for follow up with the patient/caretakers. This was also printed in the discharge instructions. Patient was discharged with the following medications and follow up:      Medication List      No changes were made to your prescriptions during this visit.      Payam Carroll MD  [address] [facility] [place] [id number]  [phone]    In 1 week         Final diagnoses:   Fall, initial encounter   Skin tear of right forearm without complication, initial encounter   Laceration of right forearm, initial encounter        ED Disposition       ED Disposition   Discharge    Condition   Stable    Comment   --               This medical record created using voice recognition software.             Danny Carney MD  05/18/24 2433

## 2024-05-18 NOTE — ED PROVIDER NOTES
Laceration Repair    Date/Time: 5/18/2024 4:15 PM    Performed by: Tomasa Alonzo APRN  Authorized by: Danny Carney MD    Consent:     Consent obtained:  Verbal    Consent given by:  Patient    Risks, benefits, and alternatives were discussed: yes      Risks discussed:  Infection, poor cosmetic result and pain    Alternatives discussed:  No treatment  Universal protocol:     Procedure explained and questions answered to patient or proxy's satisfaction: yes      Relevant documents present and verified: yes      Imaging studies available: yes      Site/side marked: yes      Immediately prior to procedure, a time out was called: yes      Patient identity confirmed:  Verbally with patient, arm band and hospital-assigned identification number  Anesthesia:     Anesthesia method:  Local infiltration    Local anesthetic:  Lidocaine 1% WITH epi  Laceration details:     Location:  Shoulder/arm    Shoulder/arm location:  R lower arm    Length (cm):  8  Pre-procedure details:     Preparation:  Patient was prepped and draped in usual sterile fashion and imaging obtained to evaluate for foreign bodies  Exploration:     Limited defect created (wound extended): no      Hemostasis achieved with:  Epinephrine    Imaging obtained: x-ray      Imaging outcome: foreign body not noted      Wound exploration: wound explored through full range of motion and entire depth of wound visualized      Wound extent: no foreign bodies/material noted, no muscle damage noted, no nerve damage noted, no underlying fracture noted and no vascular damage noted      Contaminated: no    Treatment:     Area cleansed with:  Saline    Amount of cleaning:  Standard    Irrigation solution:  Sterile saline    Irrigation method:  Syringe and tap    Visualized foreign bodies/material removed: no      Debridement:  None    Undermining:  None    Scar revision: no    Skin repair:     Repair method:  Sutures    Suture size:  4-0    Suture material:  Nylon     Suture technique:  Simple interrupted    Number of sutures:  10  Approximation:     Approximation:  Loose  Repair type:     Repair type:  Simple  Post-procedure details:     Dressing:  Non-adherent dressing    Procedure completion:  Tolerated well, no immediate complications     Tomasa Alonzo, APRN  05/18/24 2630

## 2024-05-28 ENCOUNTER — LAB (OUTPATIENT)
Dept: LAB | Facility: HOSPITAL | Age: 84
End: 2024-05-28
Payer: MEDICARE

## 2024-05-28 DIAGNOSIS — N40.1 BENIGN PROSTATIC HYPERPLASIA WITH LOWER URINARY TRACT SYMPTOMS, SYMPTOM DETAILS UNSPECIFIED: ICD-10-CM

## 2024-05-28 DIAGNOSIS — R97.20 ELEVATED PSA: ICD-10-CM

## 2024-05-28 LAB — PSA SERPL-MCNC: 16.6 NG/ML (ref 0–4)

## 2024-05-28 PROCEDURE — 84153 ASSAY OF PSA TOTAL: CPT

## 2024-05-28 PROCEDURE — 36415 COLL VENOUS BLD VENIPUNCTURE: CPT

## 2024-05-29 ENCOUNTER — HOSPITAL ENCOUNTER (OUTPATIENT)
Dept: CT IMAGING | Facility: HOSPITAL | Age: 84
Discharge: HOME | End: 2024-05-29
Admitting: RADIOLOGY
Payer: MEDICARE

## 2024-05-29 DIAGNOSIS — C34.12 MALIGNANT NEOPLASM OF UPPER LOBE, LEFT BRONCHUS OR LUNG: ICD-10-CM

## 2024-05-29 PROCEDURE — 71250 CT THORAX DX C-: CPT

## 2024-05-30 PROBLEM — C34.90 LUNG CANCER: Status: RESOLVED | Noted: 2021-06-16 | Resolved: 2024-05-30

## 2024-05-30 NOTE — PROGRESS NOTES
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          History of Present Illness: Carlos Zimmerman is a 84 y.o. male who returns to [facility] for short interval follow-up after completing stereotactic body radiotherapy to the left upper lobe on 3/29/24. He reports feeling well overall with no treatment related concerns. He denies any changes in his breathing since completion of radiation. He chronically has shortness of breath with exertion related to his COPD and this has not worsened from his baseline. Occasional nonproductive cough. Denies hemoptysis.            Subjective      Review of Systems: Review of Systems   Constitutional:  Negative for activity change, appetite change, chills, fatigue, fever and unexpected weight change.   HENT:  Negative for hearing loss, sore throat and trouble swallowing.    Eyes:  Negative for visual disturbance (Wears glasses).   Respiratory:  Positive for cough (Occasional, non productive, ongoing) and shortness of breath (r/t copd, noted with activity, ongoing). Negative for chest tightness and wheezing.    Cardiovascular:  Negative for chest pain, palpitations and leg swelling.   Gastrointestinal:  Negative for abdominal pain, blood in stool, constipation, diarrhea, nausea and vomiting.   Genitourinary:   Negative for difficulty urinating, dysuria, frequency, hematuria and urgency.   Musculoskeletal:  Positive for arthralgias (Generalized, ongoing), gait problem (uses cane for stability) and joint swelling (Noted in ankles, noted after fall almost 2 weeks ago.). Negative for back pain.   Skin:  Positive for wound (Noted on right arm after fall almost 2 weeks ago, dressing intact.). Negative for color change and rash.   Neurological:  Negative for dizziness, weakness, light-headedness and headaches.   Psychiatric/Behavioral:  Negative for sleep disturbance.        The following portions of the patient's history were reviewed and updated as appropriate: allergies, current medications, past family history, past medical history, past social history, past surgical history and problem list.    Medications:     Current Outpatient Medications:     ALBUTEROL IN, Inhale 2 puffs 4 (Four) Times a Day As Needed., Disp: , Rfl:     Aspirin Low Dose 81 MG chewable tablet, Chew 1 tablet Daily. LAST DOSE OVER 03/1/24  AS DIRECTED BY CLARE, Disp: , Rfl:     atorvastatin (LIPITOR) 40 MG tablet, Take 1 tablet by mouth Daily., Disp: , Rfl:     B Complex Vitamins (VITAMIN-B COMPLEX PO), Take 1 tablet by mouth Daily., Disp: , Rfl:     Budeson-Glycopyrrol-Formoterol (Breztri Aerosphere) 160-9-4.8 MCG/ACT aerosol inhaler, Inhale 2 puffs 2 (Two) Times a Day., Disp: 1 each, Rfl: 9    cetirizine (zyrTEC) 10 MG tablet, Take 1 tablet by mouth Daily., Disp: , Rfl:     Cholecalciferol (VITAMIN D3 PO), Take 1 tablet by mouth Daily., Disp: , Rfl:     cilostazol (PLETAL) 100 MG tablet, Take 1 tablet by mouth 2 (Two) Times a Day. LAST DOSE 3/1/24, Disp: , Rfl:     gabapentin (NEURONTIN) 300 MG capsule, Take 1 capsule by mouth 3 (Three) Times a Day., Disp: 90 capsule, Rfl: 2    ipratropium-albuterol (DUO-NEB) 0.5-2.5 mg/3 ml nebulizer, Take 3 mL by nebulization 4 (Four) Times a Day As Needed for Wheezing or Shortness of Air., Disp: 120 mL, Rfl: 5     metoprolol succinate XL (TOPROL-XL) 25 MG 24 hr tablet, Take 1 tablet by mouth Daily., Disp: , Rfl:     multivitamin with minerals tablet tablet, Take 1 tablet by mouth Daily., Disp: , Rfl:     multivitamins-minerals (PRESERVISION AREDS 2) capsule capsule, Take 1 capsule by mouth 2 (Two) Times a Day. LAST DOSE 3/1/24, Disp: , Rfl:     NON FORMULARY, Antibiotic 1 tablet q6 hours., Disp: , Rfl:     pantoprazole (PROTONIX) 40 MG EC tablet, Take 1 tablet by mouth Daily., Disp: , Rfl:     Refresh Tears 0.5 % solution, Administer 1 drop to both eyes Daily As Needed for Dry Eyes., Disp: , Rfl:     tamsulosin (FLOMAX) 0.4 MG capsule 24 hr capsule, Take 1 capsule by mouth 2 (Two) Times a Day., Disp: 180 capsule, Rfl: 4    pravastatin (PRAVACHOL) 40 MG tablet, Take 1 tablet by mouth Every Night. (Patient not taking: Reported on 2024), Disp: , Rfl:     Current Facility-Administered Medications:     ipratropium-albuterol (DUO-NEB) nebulizer solution 3 mL, 3 mL, Nebulization, Once, Gely Canales APRN    Allergies:   No Known Allergies    Patient Smoking History:   Social History     Tobacco Use   Smoking Status Former    Current packs/day: 0.00    Average packs/day: 1 pack/day for 60.0 years (60.0 ttl pk-yrs)    Types: Cigarettes    Start date:     Quit date:     Years since quittin.4    Passive exposure: Past   Smokeless Tobacco Current   Tobacco Comments    INST PER ANESTHESIA PROTOCOL       Measures:  PHQ-9 Total Score: 0   Quality of Life: 90 - Limited Activity   ECOG score: 1  ECOG: (1) Restricted in physically strenuous activity, ambulatory and able to do work of light nature  Pain: (on a scale of 0-10)   Pain Score    24 0748   PainSc: 0-No pain       Objective     Physical Exam:   Vital Signs:   Vitals:    24 0748   BP: 122/60   Pulse: 103   Resp: 16   Temp: 98.1 °F (36.7 °C)   TempSrc: Temporal   SpO2: 96%   Weight: 72.6 kg (160 lb 0.9 oz)   PainSc: 0-No pain     Body mass index is 25.07  kg/m².   Wt Readings from Last 3 Encounters:   05/31/24 72.6 kg (160 lb 0.9 oz)   05/18/24 72.6 kg (160 lb)   04/22/24 73 kg (160 lb 14.4 oz)       Physical Exam  Vitals reviewed.   Constitutional:       General: He is not in acute distress.     Appearance: Normal appearance. He is normal weight. He is not ill-appearing.   HENT:      Head: Normocephalic and atraumatic.      Mouth/Throat:      Mouth: Mucous membranes are moist.      Pharynx: Oropharynx is clear. No oropharyngeal exudate or posterior oropharyngeal erythema.   Eyes:      Conjunctiva/sclera: Conjunctivae normal.      Pupils: Pupils are equal, round, and reactive to light.   Cardiovascular:      Rate and Rhythm: Normal rate and regular rhythm.      Pulses: Normal pulses.      Heart sounds: Normal heart sounds.   Pulmonary:      Effort: Pulmonary effort is normal. No respiratory distress.      Breath sounds: Normal breath sounds.   Musculoskeletal:         General: Normal range of motion.      Cervical back: Normal range of motion.   Skin:     General: Skin is warm and dry.   Neurological:      General: No focal deficit present.      Mental Status: He is alert and oriented to person, place, and time. Mental status is at baseline.   Psychiatric:         Mood and Affect: Mood normal.         Behavior: Behavior normal.       Result Review: I independently reviewed the following data. I discussed and reviewed imaging with Dr. Alex.   -- CT Chest Without Contrast (05/29/2024 09:04)   -- PSA DIAGNOSTIC (05/28/2024 11:28)   -- Tissue Pathology Exam (03/04/2024 09:09)   -- Non-gynecologic Cytology (03/04/2024 08:55)   -- NM PET/CT Skull Base to Mid Thigh (02/23/2024 10:21)     Pathology:   Lab Results   Component Value Date    CLININFO  03/04/2024     Abnormal PET scan of lung  Lung nodule      FINALDX  03/04/2024     Lung, left upper lobe mass, biopsy:   - Adenocarcinoma, lepidic pattern    REMARKS: The above positive (malignant) diagnosis was called to  Mona Iqbal RN in Dr. Choudhary's office at 13:31 EDT on 3/11/2024 by Rachel ZHONG        Imaging: CT Chest Without Contrast Diagnostic    Result Date: 5/30/2024  1. Small area of nodularity consistent with known malignancy abutting cystic cavity in the left upper lung has increased in size measuring 18 x 11 mm, previously 12 x 9 mm. 2. New complete right middle lobe collapse with mucous within right middle lobe bronchus which may relate to sequela of aspiration. 3. Stable postsurgical changes of left upper lobectomy. 4. Coronary artery calcifications, emphysema, and additional chronic findings above.  Electronically Signed By-Hunter Aguilar MD On:5/30/2024 4:47 PM      XR Knee 3 View Left    Result Date: 5/18/2024  Impression:  1. No evidence of fracture or effusion 2. No acute process 3. 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 Small area of nodular consolidation at the left upper lung adjacent to bulla, correlate with recent biopsy results. 2. Emphysema. 3. No new or acute process.     HUNTER AGUILAR MD       Electronically Signed and Approved By: HUNTER AGUILAR MD on 3/08/2024 at 11:57               Labs:   WBC   Date Value Ref Range Status   02/23/2024 6.97 3.40 - 10.80 10*3/mm3 Final     Hemoglobin   Date Value Ref Range Status   02/23/2024 14.2 13.0 - 17.7 g/dL Final     Hematocrit   Date Value Ref Range Status   02/23/2024 44.4 37.5 - 51.0 % Final     Platelets   Date Value Ref Range Status   02/23/2024 204 140 - 450 10*3/mm3 Final     Creatinine   Date Value Ref Range Status   03/12/2024 0.70 0.60 - 1.30 mg/dL Final     Comment:     Serial Number: [id number]:  [id number]     BUN   Date Value Ref Range Status   12/27/2023 9 8 - 23 mg/dL Final      eGFR   Date Value Ref Range Status   03/12/2024 90.3 >60.0 mL/min/1.73 Final     TSH   Date Value Ref Range Status   12/13/2022 1.760 0.270 - 4.200 uIU/mL Final      PSA   Date Value Ref Range Status   05/28/2024 16.600 (H) 0.000 - 4.000 ng/mL Final   11/27/2023 11.800 (H) 0.000 - 4.000 ng/mL Final   08/28/2023 11.900 (H) 0.000 - 4.000 ng/mL Final   01/18/2023 8.070 (H) 0.000 - 4.000 ng/mL Final     Assessment / Plan      Impression: Carlos Zimmerman is a pleasant 84 y.o. male with history of left lung NSCLC (adenocarcinoma) status post VATS surgery and adjuvant chemotherapy in 2016. He most recently was diagnosed with cT1b cN0 cM0 adenocarcinoma of the left upper lobe. Pathology from biopsy of left upper lobe mass on 3/4/24 revealed adenocarcinoma, lepidic pattern. Staging PET/CT scan on 2/23/24 shows no evidence of metastatic disease. He is now status post SBRT to the left upper lobe, completed on 3/29/2024. Received 5500 cGy in 5 fractions. His restaging CT chest on 5/29/2024 demonstrates a slight interval increase in size of the treated left upper lobe nodule now measuring 18 x 11 mm (previously 12 x 9 mm). Additionally there is complete right middle lobe collapse with mucous within right middle lobe bronchus. Dr. Alex independently reviewed CT images. The finding in the left upper lobe is favored to be evolving treatment related change at this time. We will monitor closely with repeat imaging in 3 months. In regards to the right middle lobe collapse, patient is doing well overall with essentially no changes in his breathing. Denies signs or symptoms of aspiration. Discussed that this is unlikely to affect his breathing and we will notify Pulmonology of this finding. Will defer to Pulmonology as to whether or not treatment is indicated but I discussed with patient that I do not anticipate any treatment being recommended at this time.     COPD: followed by Pulmonology.     Elevated PSA: small concerning  lesion on recent MRI. PSA 16.6 ng/mL on 5/28/24. Patient follows with Dr. Mai with Urology. Will defer management to him at this time as no biopsy has been performed.     Assessment/Plan:   Diagnoses and all orders for this visit:    1. Malignant neoplasm of lower lobe of left lung (Primary)  -     CT Chest Without Contrast; Future    2. Status post radiation therapy within four to twelve weeks  -     CT Chest Without Contrast; Future    3. Encounter for follow-up examination after completed treatment for malignant neoplasm    4. Chronic obstructive pulmonary disease, unspecified COPD type    5. Rising PSA level       Follow Up:   Return for follow-up in 3 months with CT chest prior.    Follow-up with Dr. Alatorre on 6/3/24.  Follow-up with Dr. Mai on 6/7/24.     Follow-up with Dr. Choudhary on 8/7/24.     Return in about 3 months (around 8/31/2024) for Office Visit.  Carlos OSEAS Zimmerman was encouraged to contact me in the interim with any questions or concerns regarding his care.      ARTEMIO Campbell  Radiation Oncology  [facility]    This document has been signed by ARTEMIO Garcia on May 31, 2024 08:25 EDT

## 2024-05-31 ENCOUNTER — DOCUMENTATION (OUTPATIENT)
Dept: PULMONOLOGY | Facility: CLINIC | Age: 84
End: 2024-05-31
Payer: MEDICARE

## 2024-05-31 ENCOUNTER — OFFICE VISIT (OUTPATIENT)
Dept: RADIATION ONCOLOGY | Facility: HOSPITAL | Age: 84
End: 2024-05-31
Payer: MEDICARE

## 2024-05-31 VITALS
SYSTOLIC BLOOD PRESSURE: 122 MMHG | WEIGHT: 160.05 LBS | DIASTOLIC BLOOD PRESSURE: 60 MMHG | TEMPERATURE: 98.1 F | HEART RATE: 103 BPM | BODY MASS INDEX: 25.07 KG/M2 | OXYGEN SATURATION: 96 % | RESPIRATION RATE: 16 BRPM

## 2024-05-31 DIAGNOSIS — Z08 ENCOUNTER FOR FOLLOW-UP EXAMINATION AFTER COMPLETED TREATMENT FOR MALIGNANT NEOPLASM: ICD-10-CM

## 2024-05-31 DIAGNOSIS — Z92.3 STATUS POST RADIATION THERAPY WITHIN FOUR TO TWELVE WEEKS: ICD-10-CM

## 2024-05-31 DIAGNOSIS — C34.32 MALIGNANT NEOPLASM OF LOWER LOBE OF LEFT LUNG: Primary | ICD-10-CM

## 2024-05-31 DIAGNOSIS — J44.9 CHRONIC OBSTRUCTIVE PULMONARY DISEASE, UNSPECIFIED COPD TYPE: ICD-10-CM

## 2024-05-31 DIAGNOSIS — R97.20 RISING PSA LEVEL: ICD-10-CM

## 2024-05-31 PROCEDURE — G0463 HOSPITAL OUTPT CLINIC VISIT: HCPCS | Performed by: NURSE PRACTITIONER

## 2024-05-31 NOTE — PROGRESS NOTES
Notified of patient's CT of chest from 5/29/2024 by ARTEMIO Garcia at Rad/Onc which shows new complete right middle lobe collapse with mucous within right middle lobe bronchus which may relate to sequela of aspiration.Telephoned patient to call office to schedule appointment for review and assessment. Patient will need bronchoscopy.

## 2024-06-03 ENCOUNTER — TELEPHONE (OUTPATIENT)
Dept: PULMONOLOGY | Facility: CLINIC | Age: 84
End: 2024-06-03

## 2024-06-03 ENCOUNTER — OFFICE VISIT (OUTPATIENT)
Dept: ONCOLOGY | Facility: HOSPITAL | Age: 84
End: 2024-06-03
Payer: MEDICARE

## 2024-06-03 VITALS
DIASTOLIC BLOOD PRESSURE: 58 MMHG | SYSTOLIC BLOOD PRESSURE: 126 MMHG | RESPIRATION RATE: 16 BRPM | WEIGHT: 157 LBS | BODY MASS INDEX: 24.59 KG/M2 | HEART RATE: 108 BPM | OXYGEN SATURATION: 94 % | TEMPERATURE: 98.3 F

## 2024-06-03 DIAGNOSIS — C34.12 MALIGNANT NEOPLASM OF UPPER LOBE, LEFT BRONCHUS OR LUNG: ICD-10-CM

## 2024-06-03 DIAGNOSIS — D50.9 IRON DEFICIENCY ANEMIA, UNSPECIFIED IRON DEFICIENCY ANEMIA TYPE: Primary | ICD-10-CM

## 2024-06-03 DIAGNOSIS — G62.9 NEUROPATHY: ICD-10-CM

## 2024-06-03 DIAGNOSIS — R97.20 ELEVATED PSA: ICD-10-CM

## 2024-06-03 PROCEDURE — 3078F DIAST BP <80 MM HG: CPT | Performed by: INTERNAL MEDICINE

## 2024-06-03 PROCEDURE — G2211 COMPLEX E/M VISIT ADD ON: HCPCS | Performed by: INTERNAL MEDICINE

## 2024-06-03 PROCEDURE — G0463 HOSPITAL OUTPT CLINIC VISIT: HCPCS | Performed by: INTERNAL MEDICINE

## 2024-06-03 PROCEDURE — 3074F SYST BP LT 130 MM HG: CPT | Performed by: INTERNAL MEDICINE

## 2024-06-03 PROCEDURE — 1126F AMNT PAIN NOTED NONE PRSNT: CPT | Performed by: INTERNAL MEDICINE

## 2024-06-03 PROCEDURE — 99214 OFFICE O/P EST MOD 30 MIN: CPT | Performed by: INTERNAL MEDICINE

## 2024-06-03 RX ORDER — GABAPENTIN 300 MG/1
300 CAPSULE ORAL 3 TIMES DAILY
Qty: 90 CAPSULE | Refills: 5 | Status: SHIPPED | OUTPATIENT
Start: 2024-06-03

## 2024-06-03 RX ORDER — CEPHALEXIN 500 MG/1
CAPSULE ORAL
COMMUNITY
Start: 2024-05-28 | End: 2025-05-28

## 2024-06-03 NOTE — TELEPHONE ENCOUNTER
Hub staff attempted to follow warm transfer process and was unsuccessful     Caller: Carlos Zimmerman    Relationship to patient: Self    Best call back number: [phone]     Patient is needing: PATIENT STATES THAT HE GOT A CALL FROM THE OFFICE AND A VM WAS LEFT. THE ONLY HE COULD HEAR IN THE VM WAS TO CALL OFFICE PHONE NUMBER BACK. HE STATES THAT THIS WAS NOT AN AUTOMATED CALL. I WAS UNABLE TO FIND ANYTHING DOCUMENTED IN CHART OF WHAT CALL WAS ABOUT. PLEASE CALL BACK TO CLARIFY.

## 2024-06-03 NOTE — PROGRESS NOTES
Chief Complaint  Malignant neoplasm of lower lobe of left lung    Payam Carroll MD Houk, Brandon Lyle, MD      Subjective          Carlos Zimmerman presents to [facility] for iron defieiency anemia    Lung Cancer  Pertinent negatives include no arthralgias, chest pain, coughing, diaphoresis, fever, joint swelling, myalgias, numbness, rash or sore throat.      Mr. Carlos Zimmerman presents for follow up for iron deficiency anemia. History of prior left lung NSCLC with adenocarcinoma with left VATS procedure and prior chemotherapy per Dr. Saul. He follows with pulmonary for yearly low dose CT screening. PET scan with concern for recurrence on left. Biopsy confirmed adenocarcinoma, lepidic pattern. Completed SBRT to this lesion 3/29/24. Had recent fall into metal display case at [employer] and injured his right arm. It is bandaged. Healing. No bleeding reported. No melena. Currently off oral iron since last visit. Breathing stable. No new bone pain. 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 Requesting refill of gabapentin.     Hematology History  He was started on oral iron 1 tab QD back in April 2022. He is tolerating med well.    2/27/23: Iron studies normal. CBC normal. No anemia.     He is following with Dr. Mai with urology for elevated PSA. MRI of prostate on 2/23/23 showed a small solitary suspicious lesion in the left posterior mid gland peripheral zone, PI-RADS 4.    8/28/23: Hgb 13.8, MCV 92.4, plt 153, WBC 9.24. TIBC 277 (low), ferritin 176.8, iron sat 39%    2/23/24: Iron studies normal. No anemia, normal CBC. Hold oral iron.       Cancer Staging  No matching staging information was found for the patient.     Treatment intent: curative    Oncology/Hematology History   Malignant neoplasm of upper lobe, left bronchus or lung   3/18/2024 Initial Diagnosis    Malignant neoplasm of upper lobe,  left bronchus or lung     3/25/2024 - 3/29/2024 Radiation    Radiation OncologyTreatment Course:  Carlos Zimmerman received 5500 cGy in 5 fractions to via SBRT to the left upper lobe.      This is a very pleasant 76-year-old gentleman who presents with follow-up for     lobectomy with adjuvant chemotherapy.            1) Left lung:  Pathology 5/2016: FNA: Poorly differentiated adenocarcinoma: 5.5     mm BONNIE nodule. 5/2016.       Left lung lobectomy: VATS procedure: Dr. Saul in [place].Path:multifocal     invasive moderately differentiated adenocarcinoma, acinar type, 2.0 x 1.6 x 1.4     cm. Additional nodule 0.6 x 0.5 x 0.3 cm: margins free of tumor, negative LN's.     Visceral pleural invasion noted. (7/12/16). Staging: Stage IIB:  pT3N0.       Carboplatin / Paclitaxel x 4 cycles: 8/10/16 - 10/14/16.      CT CAP: No evidence of disease. 2/2017.       CT CAP: No evidence of disease. 12/11/17.       CT chest: Right lower base: improving consolidation. 2/18/19.       CT chest: T6 / T10 compression fractures: non-pathologic. (8/28/19)      CT chest in ER: likely pneumonia/inflammation. No new evidence of recurring     cancer: (4/4/20)      CT chest: Stable 1.5 cm subpleural ground glass opacity in the BONNIE, new patchy     areas on non-specific ground glass opacity throughout the LLL. (3/2021).       2/15/24: CT chest without contrast: small area of nodularity/consolidation in left upper lung. Stable 3 mm right upper lobe nodule. No new nodule.      2/23/24: NM PET:  Along the posterior medial margin, there is soft tissue nodularity measuring up to about 1.4 centimeters which is hypermetabolic maximum SUV 4.9 most concerning for neoplasm.  This appears larger and more solid compared to more remote CT scans.  Consider biopsy. No other lesions hypermetabolic.        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                     Review of Systems   Constitutional:  Positive for unexpected weight loss. Negative for appetite change, diaphoresis, fever and unexpected weight gain.   HENT:  Positive for hearing loss (Left ear). Negative for sore throat and voice change.    Eyes:  Negative for blurred vision, double vision, pain, redness and visual disturbance.   Respiratory:  Negative for cough, shortness of breath and wheezing.    Cardiovascular:  Negative for chest pain, palpitations and leg swelling.   Endocrine: Negative for cold intolerance, heat intolerance, polydipsia and polyuria.   Genitourinary:  Negative for decreased urine volume, difficulty urinating, frequency and urinary incontinence.   Musculoskeletal:  Negative for arthralgias, back pain, joint swelling and myalgias.   Skin:  Negative for color change, rash, skin lesions and wound.   Neurological:  Negative for dizziness, seizures, numbness and headache.   Hematological:  Negative for adenopathy. Does not bruise/bleed easily.   Psychiatric/Behavioral:  Negative for depressed mood. The patient is not nervous/anxious.    All other systems reviewed and are negative.    Current Outpatient Medications on File Prior to Visit   Medication Sig Dispense Refill    cephalexin (KEFLEX) 500 MG capsule Finish the prescription.      ALBUTEROL IN Inhale 2 puffs 4 (Four) Times a Day As Needed.      Aspirin Low Dose 81 MG chewable tablet Chew 1 tablet Daily. LAST DOSE OVER 03/1/24  AS DIRECTED BY CLARE      atorvastatin (LIPITOR) 40 MG tablet Take 1 tablet by mouth Daily.      B Complex Vitamins (VITAMIN-B COMPLEX PO) Take 1 tablet by mouth Daily.      Budeson-Glycopyrrol-Formoterol (Breztri Aerosphere) 160-9-4.8 MCG/ACT aerosol inhaler Inhale 2 puffs 2 (Two) Times a Day. 1 each 9    cetirizine (zyrTEC) 10 MG tablet Take 1 tablet by mouth Daily.      Cholecalciferol (VITAMIN D3 PO) Take 1 tablet by mouth Daily.      cilostazol (PLETAL) 100 MG tablet Take 1 tablet by mouth 2 (Two) Times a Day.  LAST DOSE 3/1/24      gabapentin (NEURONTIN) 300 MG capsule Take 1 capsule by mouth 3 (Three) Times a Day. 90 capsule 2    ipratropium-albuterol (DUO-NEB) 0.5-2.5 mg/3 ml nebulizer Take 3 mL by nebulization 4 (Four) Times a Day As Needed for Wheezing or Shortness of Air. 120 mL 5    metoprolol succinate XL (TOPROL-XL) 25 MG 24 hr tablet Take 1 tablet by mouth Daily.      multivitamin with minerals tablet tablet Take 1 tablet by mouth Daily.      multivitamins-minerals (PRESERVISION AREDS 2) capsule capsule Take 1 capsule by mouth 2 (Two) Times a Day. LAST DOSE 3/1/24      NON FORMULARY Antibiotic 1 tablet q6 hours.      pantoprazole (PROTONIX) 40 MG EC tablet Take 1 tablet by mouth Daily.      pravastatin (PRAVACHOL) 40 MG tablet Take 1 tablet by mouth Every Night. (Patient not taking: Reported on 4/22/2024)      Refresh Tears 0.5 % solution Administer 1 drop to both eyes Daily As Needed for Dry Eyes.      tamsulosin (FLOMAX) 0.4 MG capsule 24 hr capsule Take 1 capsule by mouth 2 (Two) Times a Day. 180 capsule 4     Current Facility-Administered Medications on File Prior to Visit   Medication Dose Route Frequency Provider Last Rate Last Admin    ipratropium-albuterol (DUO-NEB) nebulizer solution 3 mL  3 mL Nebulization Once Gely Canales, ARTEMIO           No Known Allergies  Past Medical History:   Diagnosis Date    Anemia     NO CURRENT ISSUES    Arthritis     COPD (chronic obstructive pulmonary disease)     INHALER  PRN    Hyperlipidemia     Hypertension     ON MEDS/FOLLOWS SAHNI, PT DENIES CAD    Lung cancer     REMOVED - 2016    Pneumonia     LEFT LUNG CA NO CURRENT PNEUMONIA    SOB (shortness of breath)     WITH EXERTION SOMETIMES    Stroke     LEFT LEG/FOOT DROP  2019    TIA (transient ischemic attack)     NO RESIDUAL 2010     Past Surgical History:   Procedure Laterality Date    BRONCHOSCOPY WITH ION ROBOTIC ASSIST N/A 3/4/2024    Procedure: BRONCHOSCOPY WITH ION ROBOT, REBUS, NEEDLE ASPIRATE, BIOPSIES, BAL,  WASHINGS;  Surgeon: Dayne Choudhary MD;  Location: [facility] MAIN OR;  Service: Robotics - Pulmonary;  Laterality: N/A;    CARDIAC CATHETERIZATION Left 11/10/2022    Procedure: Aortogram with left leg angiogram, possible angioplasty or stenting;  Surgeon: Cuauhtemoc Thomas MD;  Location: [facility] CATH INVASIVE LOCATION;  Service: Vascular;  Laterality: Left;    CARDIAC CATHETERIZATION Right 2023    Procedure: Right leg angiogram, possible angioplasty or stenting;  Surgeon: Cuauhtemoc Thomas MD;  Location: [facility] CATH INVASIVE LOCATION;  Service: Vascular;  Laterality: Right;    CARPAL TUNNEL RELEASE Left 2024    Procedure: CARPAL TUNNEL RELEASE, left;  Surgeon: Rg Tom MD;  Location: [facility] MAIN OR;  Service: Neurosurgery;  Laterality: Left;    LUNG BIOPSY      LUNG SURGERY      REMOVAL HALF OF UPPER PORTION LEFT LUNG    ORTHOPEDIC SURGERY Left     ROTATOR CUFF    THROAT SURGERY      throat diverticulum repair     Social History     Socioeconomic History    Marital status:    Tobacco Use    Smoking status: Former     Current packs/day: 0.00     Average packs/day: 1 pack/day for 60.0 years (60.0 ttl pk-yrs)     Types: Cigarettes     Start date:      Quit date:      Years since quittin.4     Passive exposure: Past    Smokeless tobacco: Current     Types: Snuff    Tobacco comments:     INST PER ANESTHESIA PROTOCOL   Vaping Use    Vaping status: Never Used   Substance and Sexual Activity    Alcohol use: Not Currently    Drug use: Never    Sexual activity: Defer     Family History   Problem Relation Age of Onset    Cancer Mother     Stomach cancer Other     Malig Hyperthermia Neg Hx      Immunization History   Administered Date(s) Administered    COVID-19 (PFIZER) Purple Cap Monovalent 2021, 2021, 10/18/2021    COVID-19 F23 (PFIZER) 12YRS+ (COMIRNATY) 11/10/2023    Fluad Quad 65+ 10/07/2020, 2021    Fluzone High-Dose 65+yrs 10/06/2022, 2023    Influenza, Unspecified  09/01/2020    Pneumococcal Conjugate 20-Valent (PCV20) 04/22/2024    TD Preservative Free (Tenivac) 06/27/2022       Objective   Physical Exam  Constitutional:       Appearance: Normal appearance.   HENT:      Head: Normocephalic and atraumatic.      Nose: Nose normal.   Eyes:      Conjunctiva/sclera: Conjunctivae normal.   Pulmonary:      Effort: Pulmonary effort is normal.   Neurological:      General: No focal deficit present.      Mental Status: He is alert. Mental status is at baseline.   Psychiatric:         Mood and Affect: Mood normal.         Behavior: Behavior normal.         Thought Content: Thought content normal.       Vitals:    06/03/24 0812   BP: 126/58   Pulse: 108   Resp: 16   Temp: 98.3 °F (36.8 °C)   TempSrc: Temporal   SpO2: 94%   Weight: 71.2 kg (157 lb)   PainSc: 0-No pain                   ECOG: (0) Fully Active - Able to Carry On All Pre-disease Performance Without Restriction  Fall Risk Assessment was completed, and patient is at low risk for falls.  PHQ-9 Total Score:         The patient is  experiencing fatigue. Fatigue score: 1    PT/OT Functional Screening: PT fx screen: No needs identified  Speech Functional Screening: Speech fx screen: No needs identified  Rehab to be ordered: Rehab to be ordered: No needs identified        Result Review :   The following data was reviewed by: Juan Jose Alatorre MD on 06/03/24:  Lab Results   Component Value Date    HGB 14.2 02/23/2024    HCT 44.4 02/23/2024    MCV 87.6 02/23/2024     02/23/2024    WBC 6.97 02/23/2024    NEUTROABS 4.67 02/23/2024    LYMPHSABS 1.71 02/23/2024    MONOSABS 0.50 02/23/2024    EOSABS 0.06 02/23/2024    BASOSABS 0.02 02/23/2024     Lab Results   Component Value Date    GLUCOSE 93 12/27/2023    BUN 9 12/27/2023    CREATININE 0.70 03/12/2024     12/27/2023    K 4.1 12/27/2023     12/27/2023    CO2 27.1 12/27/2023    CALCIUM 9.4 12/27/2023    PROTEINTOT 6.2 12/27/2023    ALBUMIN 4.0 12/27/2023    BILITOT  0.5 12/27/2023    ALKPHOS 122 (H) 12/27/2023    AST 23 12/27/2023    ALT 21 12/27/2023     Labs personally reviewed. No anemia. Normal iron studies. PSA increased to 16.6.     Rad onc note personally reviewed from 5/31/24.      CT Chest Without Contrast Diagnostic    Result Date: 5/30/2024  1. Small area of nodularity consistent with known malignancy abutting cystic cavity in the left upper lung has increased in size measuring 18 x 11 mm, previously 12 x 9 mm. 2. New complete right middle lobe collapse with mucous within right middle lobe bronchus which may relate to sequela of aspiration. 3. Stable postsurgical changes of left upper lobectomy. 4. Coronary artery calcifications, emphysema, and additional chronic findings above.  Electronically Signed By-Ramón Aguilar MD On:5/30/2024 4:47 PM      XR Knee 3 View Left    Result Date: 5/18/2024  Impression:  1. No evidence of fracture or effusion 2. No acute process 3. 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       Assessment and Plan    Diagnoses and all orders for this visit:    1. Iron deficiency anemia, unspecified iron deficiency anemia type (Primary)  -     CBC & Differential; Future  -     Ferritin; Future  -     Iron Profile; Future    2. Neuropathy  -     gabapentin (NEURONTIN) 300 MG capsule; Take 1 capsule by mouth 3 (Three) Times a Day.  Dispense: 90 capsule; Refill: 5    3. Malignant neoplasm of upper lobe, left bronchus or lung    4. Elevated PSA        BONNIE adenocarcinoma of lung  S/p lobectomy and completed adjuvant chemothearpy 10/2016. 2/2024 CT chest reviewed with concern for recurrence on left side.  Subsequent PET with 1.4 cm lesion, SUV 4.9. 3/4/24 biopsy positive for adenocarcinoma. Completed SBRT with Dr. Alex on 3/29/24. Defer f/u imaging to Dr. Alex. CT scheduled for 9/2024.     JESÚS  For iron deficiency, 2/2024 iron labs normal and no anemia on labs. Ok to hold oral iron. Repeat labs in 3 months.    Chemo induced neuropathy  Gabapentin refilled.    Elevated PSA  Small concerning lesion on recent MRI. PSA now more than 16. Patient follows with Dr. Mai with urology. Will defer management to him.      Patient Follow Up: 3 months        Patient was given instructions and counseling regarding his condition or for health maintenance advice. Please see specific information pulled into the AVS if appropriate.

## 2024-06-04 NOTE — PROGRESS NOTES
Chief Complaint: Urologic complaint    Subjective         History of Present Illness  Carlos Zimmerman is a 84 y.o. male         Recurrent UTI  BPH  Elevated PSA      Patient with recent fall with some stitches in his right arm.  Otherwise been doing okay.    Voiding without issue.  No change    on Flomax 0.4 mg BID.    No straining.  Nocturia x0.    Not bothered by symptoms          No cardiopulmonary history.  Non-smoker.  No anticoagulation.  History of lung cancer years ago treated with surgery      Some of this family did live in the 90s      PVR    9/23  000  9/22  061 2016 lung cancer s/p resection and chemo -does still deal with neuropathy      Previous        1/23 cystoscopy-3 cm prostate large bladder with mild trabeculation, no pathology.    7/22 renal ultrasound-normal  7/22 0.59, GFR 96    Urine cultures    7/22 Serratia-resistant to Ancef and nitrofurantoin  2/21 Serratia     No gross hematuria      No history of kidney  stone.    No urologic family history,   no history of urologic surgery.        PSA    5/24      16.6  11/23    11.8   8/23      11.9      2/20/2023 MRI prostate-49 g  PI - RADS 4 - left posterior mid gland peripheral zone.  1.0 x 0.7 cm.  Seminal vesicles, neurovascular bundle and lymph nodes negative  1/23       8.0             Objective     Past Medical History:   Diagnosis Date    Anemia     NO CURRENT ISSUES    Arthritis     COPD (chronic obstructive pulmonary disease)     INHALER  PRN    Hyperlipidemia     Hypertension     ON MEDS/FOLLOWS SAHNI, PT DENIES CAD    Lung cancer     REMOVED - 2016    Pneumonia     LEFT LUNG CA NO CURRENT PNEUMONIA    SOB (shortness of breath)     WITH EXERTION SOMETIMES    Stroke     LEFT LEG/FOOT DROP  2019    TIA (transient ischemic attack)     NO RESIDUAL 2010           No Known Allergies           Vital Signs:   There were no vitals taken for this visit.                 Assessment and Plan    Diagnoses and all orders for this visit:    1.  Benign prostatic hyperplasia with lower urinary tract symptoms, symptom details unspecified (Primary)    2. Recurrent urinary tract infection    3. Elevated PSA          Elevated PSA      Patient's PSA has increased, we will go ahead and repeat PSA and get MRI prostate.  If PSA is still elevated and MRI looks the same or worse as previous MRI we will proceed with MRI fusion prostate biopsy.  Patient is aware.  Patient understands rule out prostate cancer he must follow-up.    We discussed not moving forward treatment if we do find prostate cancer could be detriment to his health cause death.  Patient voiced understanding.          BPH with recurrent UTIs      Cont  Flomax to 0.4 mg twice daily.  Refilled today   not currently interested in procedures

## 2024-06-07 ENCOUNTER — OFFICE VISIT (OUTPATIENT)
Dept: UROLOGY | Facility: CLINIC | Age: 84
End: 2024-06-07
Payer: MEDICARE

## 2024-06-07 VITALS — BODY MASS INDEX: 24.01 KG/M2 | RESPIRATION RATE: 16 BRPM | HEIGHT: 67 IN | WEIGHT: 153 LBS

## 2024-06-07 DIAGNOSIS — N40.1 BENIGN PROSTATIC HYPERPLASIA WITH LOWER URINARY TRACT SYMPTOMS, SYMPTOM DETAILS UNSPECIFIED: Primary | ICD-10-CM

## 2024-06-07 DIAGNOSIS — N39.0 RECURRENT URINARY TRACT INFECTION: ICD-10-CM

## 2024-06-07 DIAGNOSIS — R97.20 ELEVATED PSA: ICD-10-CM

## 2024-06-12 ENCOUNTER — TELEPHONE (OUTPATIENT)
Dept: UROLOGY | Facility: CLINIC | Age: 84
End: 2024-06-12
Payer: MEDICARE

## 2024-06-12 NOTE — TELEPHONE ENCOUNTER
MRI Prostate scheduled at [facility] ([address]) on 07/05/24 at 0900, arrive at 0830. No prep.     He will follow up with us on 7/9 as previously scheduled.    Pt has been notified of above appt information. He has verbalized understanding of all instruction via teach back.

## 2024-07-02 ENCOUNTER — LAB (OUTPATIENT)
Dept: LAB | Facility: HOSPITAL | Age: 84
End: 2024-07-02
Payer: MEDICARE

## 2024-07-02 DIAGNOSIS — R97.20 ELEVATED PSA: ICD-10-CM

## 2024-07-02 LAB — PSA SERPL-MCNC: 15.8 NG/ML (ref 0–4)

## 2024-07-02 PROCEDURE — 84153 ASSAY OF PSA TOTAL: CPT

## 2024-07-02 PROCEDURE — 36415 COLL VENOUS BLD VENIPUNCTURE: CPT

## 2024-07-03 NOTE — PROGRESS NOTES
Chief Complaint: Urologic complaint    Subjective         History of Present Illness  Carlos Zimmerman is a 84 y.o. male         Recurrent UTI  BPH  Elevated PSA      Follows up today with MRI and PSA    Voiding ok.  No change    on Flomax 0.4 mg BID.    No straining.  Nocturia x0.    Not bothered      No cardiopulmonary history.  Non-smoker.  No anticoagulation.  History of lung cancer years ago treated with surgery      Some of this family did live in the 90s      PVR    9/23  000  9/22  061    3/24 lung lesion -with XRT treatments.  Monitoring currently    2016 lung cancer s/p resection and chemo -does still deal with neuropathy          Previous        1/23 cystoscopy-3 cm prostate large bladder with mild trabeculation, no pathology.    7/22 renal ultrasound-normal  7/22 0.59, GFR 96    Urine cultures    7/22 Serratia-resistant to Ancef and nitrofurantoin  2/21 Serratia      No history of kidney  stone.    No urologic family history,   no history of urologic surgery.        PSA    7/24 MRI prostate - 51 g   PSAd  0.30  PI - RADS 4-posterior lateral left peripheral zone, mid gland 1.4 cm, previously 1 cm on 2/23 7/24     15.8      5/24      16.6  11/23    11.8   8/23      11.9      2/20/2023 MRI prostate-49 g  PI - RADS 4 - left posterior mid gland peripheral zone.  1.0 x 0.7 cm.  Seminal vesicles, neurovascular bundle and lymph nodes negative  1/23       8.0             Objective     Past Medical History:   Diagnosis Date    Anemia     NO CURRENT ISSUES    Arthritis     COPD (chronic obstructive pulmonary disease)     INHALER  PRN    Hyperlipidemia     Hypertension     ON MEDS/FOLLOWS SAHNI, PT DENIES CAD    Lung cancer     REMOVED - 2016    Pneumonia     LEFT LUNG CA NO CURRENT PNEUMONIA    SOB (shortness of breath)     WITH EXERTION SOMETIMES    Stroke     LEFT LEG/FOOT DROP  2019    TIA (transient ischemic attack)     NO RESIDUAL 2010           No Known Allergies           Vital Signs:   There were no vitals  taken for this visit.                 Assessment and Plan    Diagnoses and all orders for this visit:    1. Benign prostatic hyperplasia with lower urinary tract symptoms, symptom details unspecified (Primary)    2. Recurrent urinary tract infection    3. Elevated PSA          Elevated PSA      MRI reviewed with the patient.  We discussed he does have about a 50% chance at least of having a prostate cancer.  We discussed his age and his medical comorbidities.  We discussed the possibility of overtreatment versus undertreatment of prostate cancer.  Patient voiced understanding    At this time after discussion he would like to follow this conservatively.  He understands the risk of having prostate cancer that could metastatic and cause harm or death.    We did discuss possibility of XRT or ADT if we get a positive diagnosis.  Risk and benefits discussed.    At this time after discussion we will conservatively monitor    Follow-up in 6 months with PSA, likely do MRI in 7/25        BPH with recurrent UTIs      Cont  Flomax to 0.4 mg twice daily.    not currently interested in procedures

## 2024-07-09 ENCOUNTER — OFFICE VISIT (OUTPATIENT)
Dept: UROLOGY | Facility: CLINIC | Age: 84
End: 2024-07-09
Payer: MEDICARE

## 2024-07-09 VITALS — WEIGHT: 153 LBS | HEIGHT: 67 IN | BODY MASS INDEX: 24.01 KG/M2

## 2024-07-09 DIAGNOSIS — N39.0 RECURRENT URINARY TRACT INFECTION: ICD-10-CM

## 2024-07-09 DIAGNOSIS — R97.20 ELEVATED PSA: ICD-10-CM

## 2024-07-09 DIAGNOSIS — N40.1 BENIGN PROSTATIC HYPERPLASIA WITH LOWER URINARY TRACT SYMPTOMS, SYMPTOM DETAILS UNSPECIFIED: Primary | ICD-10-CM

## 2024-07-09 PROCEDURE — 99213 OFFICE O/P EST LOW 20 MIN: CPT | Performed by: UROLOGY

## 2024-07-09 PROCEDURE — 1159F MED LIST DOCD IN RCRD: CPT | Performed by: UROLOGY

## 2024-07-09 PROCEDURE — 1160F RVW MEDS BY RX/DR IN RCRD: CPT | Performed by: UROLOGY

## 2024-08-06 ENCOUNTER — OFFICE VISIT (OUTPATIENT)
Dept: PULMONOLOGY | Facility: CLINIC | Age: 84
End: 2024-08-06
Payer: MEDICARE

## 2024-08-06 VITALS
HEIGHT: 67 IN | TEMPERATURE: 97.4 F | SYSTOLIC BLOOD PRESSURE: 125 MMHG | RESPIRATION RATE: 14 BRPM | WEIGHT: 156.9 LBS | HEART RATE: 94 BPM | DIASTOLIC BLOOD PRESSURE: 58 MMHG | OXYGEN SATURATION: 96 % | BODY MASS INDEX: 24.63 KG/M2

## 2024-08-06 DIAGNOSIS — R91.1 LUNG NODULE: ICD-10-CM

## 2024-08-06 DIAGNOSIS — C34.12 MALIGNANT NEOPLASM OF UPPER LOBE, LEFT BRONCHUS OR LUNG: ICD-10-CM

## 2024-08-06 DIAGNOSIS — R94.2 ABNORMAL PET SCAN OF LUNG: ICD-10-CM

## 2024-08-06 DIAGNOSIS — J44.9 CHRONIC OBSTRUCTIVE PULMONARY DISEASE, UNSPECIFIED COPD TYPE: ICD-10-CM

## 2024-08-06 DIAGNOSIS — J44.9 CHRONIC OBSTRUCTIVE PULMONARY DISEASE, UNSPECIFIED COPD TYPE: Primary | ICD-10-CM

## 2024-08-06 DIAGNOSIS — R91.1 SOLITARY LUNG NODULE: ICD-10-CM

## 2024-08-06 PROCEDURE — 3078F DIAST BP <80 MM HG: CPT | Performed by: INTERNAL MEDICINE

## 2024-08-06 PROCEDURE — 99214 OFFICE O/P EST MOD 30 MIN: CPT | Performed by: INTERNAL MEDICINE

## 2024-08-06 PROCEDURE — 3074F SYST BP LT 130 MM HG: CPT | Performed by: INTERNAL MEDICINE

## 2024-08-06 RX ORDER — FLUTICASONE FUROATE, UMECLIDINIUM BROMIDE AND VILANTEROL TRIFENATATE 200; 62.5; 25 UG/1; UG/1; UG/1
1 POWDER RESPIRATORY (INHALATION)
Qty: 2 EACH | Refills: 0 | COMMUNITY
Start: 2024-08-06 | End: 2024-08-07

## 2024-08-06 NOTE — PROGRESS NOTES
Chief Complaint  COPD, Lung Nodule, Shortness of Breath, and Follow-up (Pt here for 6 month follow up)    Subjective          Carlos Zimmerman presents to [facility]  COPD  He complains of shortness of breath.   Lung Cancer    Pneumonia  He complains of shortness of breath.   Shortness of Breath    Follow-up  Conditions present:  COPD  Current symptoms: shortness of breath.        Mr. 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  It was treated with stereotactic radiation through radiation oncology.  He is here for follow-up.  His last CT scan of the chest from May 2024 showed right middle lobe atelectasis.  He does not have significant symptoms and has repeat CT scan of the chest scheduled by radiation oncology later this month.  He is currently on Breztri inhaler twice daily but feels like it does not help as much.  He wants to go back to [facility] once daily.  He has some leg pain with walking more than 50 yards.  He has no significant leg swelling.  He has been seeing Dr. Mcnair in cardiology, has not been on any diuretics.  He had echocardiogram through cardiology service and was told it was normal.  He already is up-to-date on Covid vaccine.  He has not had any changes in weight or appetite.  No chest pain no chest tightness.  No nausea or vomiting.  No fever or chills.  He already had flu shot through his pharmacy.  He had pneumonia vaccine through pharmacy.  Has no changes in weight or appetite.  He is limited with activities given his weakness.      Objective   Vital Signs:   /58 (BP Location: Left arm, Patient Position: Sitting, Cuff Size: Adult)   Pulse 94   Temp 97.4 °F (36.3 °C)  "(Temporal)   Resp 14   Ht 170.2 cm (67\")   Wt 71.2 kg (156 lb 14.4 oz)   SpO2 96% Comment: room air  BMI 24.57 kg/m²     Physical Exam  Vitals and nursing note reviewed.   Constitutional:       General: He is not in acute distress.     Appearance: Normal appearance. He is normal weight.   HENT:      Head: Normocephalic and atraumatic.      Right Ear: Hearing normal.      Left Ear: Hearing normal.      Nose: No nasal tenderness or congestion.      Mouth/Throat:      Mouth: Mucous membranes are moist. No oral lesions.      Pharynx: Oropharynx is clear.   Eyes:      Extraocular Movements: Extraocular movements intact.      Pupils: Pupils are equal, round, and reactive to light.   Neck:      Thyroid: No thyroid mass or thyromegaly.   Cardiovascular:      Rate and Rhythm: Normal rate and regular rhythm.      Pulses: Normal pulses.      Heart sounds: No murmur heard.  Pulmonary:      Effort: Pulmonary effort is normal. No respiratory distress.      Breath sounds: No wheezing, rhonchi or rales.      Comments: Bilateral diminished breath sounds, no wheezing crackles or rhonchi, resonant to percussion bilaterally  Chest:      Chest wall: No tenderness.   Abdominal:      General: Bowel sounds are normal. There is no distension.      Palpations: Abdomen is soft. There is no mass.      Tenderness: There is no abdominal tenderness. There is no guarding.   Musculoskeletal:         General: Normal range of motion.      Cervical back: Normal range of motion and neck supple.      Right lower leg: No edema.      Left lower leg: No edema.   Lymphadenopathy:      Cervical: No cervical adenopathy.      Upper Body:      Right upper body: No supraclavicular or axillary adenopathy.      Left upper body: No supraclavicular or axillary adenopathy.   Skin:     General: Skin is warm and dry.      Capillary Refill: Capillary refill takes less than 2 seconds.      Findings: No lesion or rash.   Neurological:      General: No focal deficit " present.      Mental Status: He is alert and oriented to person, place, and time.   Psychiatric:         Mood and Affect: Mood and affect normal. Mood is not anxious or depressed.         Behavior: Behavior normal.        Result Review :   The following data was reviewed by: Dayne Choudhary MD on 06/16/2021:  Common labs          3/12/2024    14:57 5/28/2024    11:28 7/2/2024    09:11   Common Labs   Creatinine 0.70      PSA  16.600  15.800      CMP          9/20/2023    12:18 12/27/2023    08:37 3/12/2024    14:57   CMP   Glucose  93     BUN  9     Creatinine 0.70  0.69  0.70    EGFR 90.9  91.3  90.3    Sodium  144     Potassium  4.1     Chloride  105     Calcium  9.4     Total Protein  6.2     Albumin  4.0     Globulin  2.2     Total Bilirubin  0.5     Alkaline Phosphatase  122     AST (SGOT)  23     ALT (SGPT)  21     Albumin/Globulin Ratio  1.8     BUN/Creatinine Ratio  13.0     Anion Gap  11.9       Data reviewed: Radiologic studies .      DATE OF EXAM:  5/29/2024 9:02 AM     PROCEDURE:  CT CHEST WO CONTRAST DIAGNOSTIC-     INDICATIONS:   Lung cancer monitoring; C34.12-Malignant neoplasm of upper lobe, left  bronchus or lung     COMPARISON:   CT chest without contrast 2/15/2024, PET/CT 2/13/2024     TECHNIQUE:  Routine transaxial slices were obtained through the chest without the  administration of intravenous contrast. Reconstructed coronal and  sagittal images were also obtained. Automated exposure control and  iterative construction methods were used.      FINDINGS:  Noncontrast soft tissues of the lower neck are without acute  abnormality. Heart size normal. Coronary artery calcifications and  coronary stents noted. Trace pericardial effusion. Scattered mediastinal  lymph nodes are below size criteria. No axillary adenopathy.  Postsurgical changes again noted related to left upper lobectomy.     There is new complete collapse of the right middle lobe which may relate  to mucous or debris within the right  middle lobe bronchus (2/88).  Moderate emphysema. At the left upper lung there is redemonstration of a  cavity abutting the pleura which measures 2.6 cm, unchanged. Area of  nodularity adjacent to this medial aspect of this cavity measures 18 x  11 mm, previously 12 x 9 mm (3/39). No new pulmonary nodule.     Noncontrast visualized portions of the liver, spleen, adrenal glands,  and pancreas are without acute abnormality. No free fluid in the upper  abdomen. No aggressive osseous lesion or acute fracture. Chronic  compression fractures again noted at T8 and T12, unchanged.     IMPRESSION:  1. Small area of nodularity consistent with known malignancy abutting  cystic cavity in the left upper lung has increased in size measuring 18  x 11 mm, previously 12 x 9 mm.  2. New complete right middle lobe collapse with mucous within right  middle lobe bronchus which may relate to sequela of aspiration.  3. Stable postsurgical changes of left upper lobectomy.  4. Coronary artery calcifications, emphysema, and additional chronic  findings above.     Electronically Signed By-Ramón Aguilar MD On:5/30/2024 4:47 PM     Assessment and Plan    Diagnoses and all orders for this visit:    1. Chronic obstructive pulmonary disease, unspecified COPD type (Primary)  -     Fluticasone-Umeclidin-Vilant (TRELEGY ELLIPTA) 200-62.5-25 MCG/ACT inhaler; Inhale 1 puff Daily.  Dispense: 1 each; Refill: 11    2. Solitary lung nodule  -     Fluticasone-Umeclidin-Vilant (TRELEGY ELLIPTA) 200-62.5-25 MCG/ACT inhaler; Inhale 1 puff Daily.  Dispense: 1 each; Refill: 11    3. Abnormal PET scan of lung  -     Fluticasone-Umeclidin-Vilant (TRELEGY ELLIPTA) 200-62.5-25 MCG/ACT inhaler; Inhale 1 puff Daily.  Dispense: 1 each; Refill: 11    4. Lung nodule  -     Fluticasone-Umeclidin-Vilant (TRELEGY ELLIPTA) 200-62.5-25 MCG/ACT inhaler; Inhale 1 puff Daily.  Dispense: 1 each; Refill: 11    5. Malignant neoplasm of upper lobe, left bronchus or lung  -      Fluticasone-Umeclidin-Vilant (TRELEGY ELLIPTA) 200-62.5-25 MCG/ACT inhaler; Inhale 1 puff Daily.  Dispense: 1 each; Refill: 11        Lung nodule with attached pneumatocele, pneumatocele with hazy groundglass opacity surrounding it.  Found to have adenocarcinoma left upper lobe with history of previous partial left upper lobectomy.  He is status post stereotactic radiation.  Repeat CT scan of the chest showed right middle lobe atelectasis.  CT scan of the chest is supposed to be scheduled for later this month.  I will follow-up on the imaging, may need bronchoscopy.      COPD: He does not like Breo inhaler and wants to go back to trelegy Ellipta.  I have switched him back to trelegy inhaler. Continue with albuterol as needed.  Overall COPD seems to be stable for now.    He is up-to-date on COVID-19 vaccine.  He had flu shot already.  He is planned to have pneumonia vaccine through his pharmacy. RSV vaccine through pharmacy.     Leg swelling: Per his report, had echocardiogram through Dr. Mcnair.  I have given him metolazone 5 mg once daily for 14 days.  May need chronic low-dose diuretics.  Need to monitor renal function closely.    Follow Up   Return in about 3 months (around 11/6/2024).  Patient was given instructions and counseling regarding his condition or for health maintenance advice. Please see specific information pulled into the AVS if appropriate.

## 2024-08-15 ENCOUNTER — SPECIMEN COLLECTION (OUTPATIENT)
Dept: RADIATION ONCOLOGY | Facility: HOSPITAL | Age: 84
End: 2024-08-15
Payer: MEDICARE

## 2024-08-19 ENCOUNTER — APPOINTMENT (OUTPATIENT)
Dept: CT IMAGING | Facility: HOSPITAL | Age: 84
DRG: 871 | End: 2024-08-19
Payer: MEDICARE

## 2024-08-19 ENCOUNTER — APPOINTMENT (OUTPATIENT)
Dept: GENERAL RADIOLOGY | Facility: HOSPITAL | Age: 84
DRG: 871 | End: 2024-08-19
Payer: MEDICARE

## 2024-08-19 ENCOUNTER — HOSPITAL ENCOUNTER (INPATIENT)
Facility: HOSPITAL | Age: 84
LOS: 2 days | Discharge: HOME | DRG: 871 | End: 2024-08-21
Attending: EMERGENCY MEDICINE | Admitting: STUDENT IN AN ORGANIZED HEALTH CARE EDUCATION/TRAINING PROGRAM
Payer: MEDICARE

## 2024-08-19 DIAGNOSIS — A41.9 SEPSIS, DUE TO UNSPECIFIED ORGANISM, UNSPECIFIED WHETHER ACUTE ORGAN DYSFUNCTION PRESENT: ICD-10-CM

## 2024-08-19 DIAGNOSIS — J18.9 PNEUMONIA DUE TO INFECTIOUS ORGANISM, UNSPECIFIED LATERALITY, UNSPECIFIED PART OF LUNG: Primary | ICD-10-CM

## 2024-08-19 DIAGNOSIS — R26.2 DIFFICULTY WALKING: ICD-10-CM

## 2024-08-19 LAB
ALBUMIN SERPL-MCNC: 3.5 G/DL (ref 3.5–5.2)
ALBUMIN/GLOB SERPL: 1.5 G/DL
ALP SERPL-CCNC: 360 U/L (ref 39–117)
ALT SERPL W P-5'-P-CCNC: 138 U/L (ref 1–41)
ANION GAP SERPL CALCULATED.3IONS-SCNC: 10.9 MMOL/L (ref 5–15)
AST SERPL-CCNC: 109 U/L (ref 1–40)
BASOPHILS # BLD AUTO: 0.03 10*3/MM3 (ref 0–0.2)
BASOPHILS NFR BLD AUTO: 0.1 % (ref 0–1.5)
BILIRUB SERPL-MCNC: 1.5 MG/DL (ref 0–1.2)
BUN SERPL-MCNC: 11 MG/DL (ref 8–23)
BUN/CREAT SERPL: 13.8 (ref 7–25)
CALCIUM SPEC-SCNC: 8.8 MG/DL (ref 8.6–10.5)
CHLORIDE SERPL-SCNC: 98 MMOL/L (ref 98–107)
CO2 SERPL-SCNC: 27.1 MMOL/L (ref 22–29)
CREAT SERPL-MCNC: 0.8 MG/DL (ref 0.76–1.27)
D-LACTATE SERPL-SCNC: 1.3 MMOL/L (ref 0.5–2)
DEPRECATED RDW RBC AUTO: 49.5 FL (ref 37–54)
EGFRCR SERPLBLD CKD-EPI 2021: 86.7 ML/MIN/1.73
EOSINOPHIL # BLD AUTO: 0.02 10*3/MM3 (ref 0–0.4)
EOSINOPHIL NFR BLD AUTO: 0.1 % (ref 0.3–6.2)
ERYTHROCYTE [DISTWIDTH] IN BLOOD BY AUTOMATED COUNT: 15.9 % (ref 12.3–15.4)
FLUAV SUBTYP SPEC NAA+PROBE: NOT DETECTED
FLUBV RNA ISLT QL NAA+PROBE: NOT DETECTED
GLOBULIN UR ELPH-MCNC: 2.3 GM/DL
GLUCOSE SERPL-MCNC: 104 MG/DL (ref 65–99)
HCT VFR BLD AUTO: 42.9 % (ref 37.5–51)
HGB BLD-MCNC: 13.6 G/DL (ref 13–17.7)
HOLD SPECIMEN: NORMAL
HOLD SPECIMEN: NORMAL
IMM GRANULOCYTES # BLD AUTO: 0.1 10*3/MM3 (ref 0–0.05)
IMM GRANULOCYTES NFR BLD AUTO: 0.5 % (ref 0–0.5)
LYMPHOCYTES # BLD AUTO: 0.87 10*3/MM3 (ref 0.7–3.1)
LYMPHOCYTES NFR BLD AUTO: 4.3 % (ref 19.6–45.3)
MCH RBC QN AUTO: 27.3 PG (ref 26.6–33)
MCHC RBC AUTO-ENTMCNC: 31.7 G/DL (ref 31.5–35.7)
MCV RBC AUTO: 86 FL (ref 79–97)
MONOCYTES # BLD AUTO: 1.3 10*3/MM3 (ref 0.1–0.9)
MONOCYTES NFR BLD AUTO: 6.4 % (ref 5–12)
NEUTROPHILS NFR BLD AUTO: 17.92 10*3/MM3 (ref 1.7–7)
NEUTROPHILS NFR BLD AUTO: 88.6 % (ref 42.7–76)
NRBC BLD AUTO-RTO: 0 /100 WBC (ref 0–0.2)
NT-PROBNP SERPL-MCNC: 211.4 PG/ML (ref 0–1800)
PLATELET # BLD AUTO: 197 10*3/MM3 (ref 140–450)
PMV BLD AUTO: 10.3 FL (ref 6–12)
POTASSIUM SERPL-SCNC: 3.4 MMOL/L (ref 3.5–5.2)
PROT SERPL-MCNC: 5.8 G/DL (ref 6–8.5)
QT INTERVAL: 312 MS
QTC INTERVAL: 433 MS
RBC # BLD AUTO: 4.99 10*6/MM3 (ref 4.14–5.8)
RSV RNA NPH QL NAA+NON-PROBE: NOT DETECTED
SARS-COV-2 RNA RESP QL NAA+PROBE: NOT DETECTED
SODIUM SERPL-SCNC: 136 MMOL/L (ref 136–145)
TROPONIN T SERPL HS-MCNC: 49 NG/L
WBC NRBC COR # BLD AUTO: 20.24 10*3/MM3 (ref 3.4–10.8)
WHOLE BLOOD HOLD COAG: NORMAL
WHOLE BLOOD HOLD SPECIMEN: NORMAL

## 2024-08-19 PROCEDURE — 87637 SARSCOV2&INF A&B&RSV AMP PRB: CPT | Performed by: EMERGENCY MEDICINE

## 2024-08-19 PROCEDURE — 94799 UNLISTED PULMONARY SVC/PX: CPT

## 2024-08-19 PROCEDURE — 84484 ASSAY OF TROPONIN QUANT: CPT | Performed by: EMERGENCY MEDICINE

## 2024-08-19 PROCEDURE — 80053 COMPREHEN METABOLIC PANEL: CPT | Performed by: EMERGENCY MEDICINE

## 2024-08-19 PROCEDURE — 99223 1ST HOSP IP/OBS HIGH 75: CPT | Performed by: STUDENT IN AN ORGANIZED HEALTH CARE EDUCATION/TRAINING PROGRAM

## 2024-08-19 PROCEDURE — 93005 ELECTROCARDIOGRAM TRACING: CPT | Performed by: EMERGENCY MEDICINE

## 2024-08-19 PROCEDURE — 25010000002 HEPARIN (PORCINE) PER 1000 UNITS: Performed by: STUDENT IN AN ORGANIZED HEALTH CARE EDUCATION/TRAINING PROGRAM

## 2024-08-19 PROCEDURE — 25010000002 CEFTRIAXONE PER 250 MG: Performed by: EMERGENCY MEDICINE

## 2024-08-19 PROCEDURE — 25810000003 SODIUM CHLORIDE 0.9 % SOLUTION: Performed by: EMERGENCY MEDICINE

## 2024-08-19 PROCEDURE — 25010000002 AZITHROMYCIN PER 500 MG: Performed by: EMERGENCY MEDICINE

## 2024-08-19 PROCEDURE — 63710000001 REVEFENACIN 175 MCG/3ML SOLUTION: Performed by: STUDENT IN AN ORGANIZED HEALTH CARE EDUCATION/TRAINING PROGRAM

## 2024-08-19 PROCEDURE — 25510000001 IOPAMIDOL PER 1 ML: Performed by: STUDENT IN AN ORGANIZED HEALTH CARE EDUCATION/TRAINING PROGRAM

## 2024-08-19 PROCEDURE — 99291 CRITICAL CARE FIRST HOUR: CPT

## 2024-08-19 PROCEDURE — 36415 COLL VENOUS BLD VENIPUNCTURE: CPT

## 2024-08-19 PROCEDURE — 83880 ASSAY OF NATRIURETIC PEPTIDE: CPT | Performed by: EMERGENCY MEDICINE

## 2024-08-19 PROCEDURE — 83605 ASSAY OF LACTIC ACID: CPT | Performed by: EMERGENCY MEDICINE

## 2024-08-19 PROCEDURE — 74177 CT ABD & PELVIS W/CONTRAST: CPT

## 2024-08-19 PROCEDURE — 85025 COMPLETE CBC W/AUTO DIFF WBC: CPT | Performed by: EMERGENCY MEDICINE

## 2024-08-19 PROCEDURE — 93005 ELECTROCARDIOGRAM TRACING: CPT

## 2024-08-19 PROCEDURE — 71045 X-RAY EXAM CHEST 1 VIEW: CPT

## 2024-08-19 PROCEDURE — 25810000003 SEPSIS FLUID NS 0.9 % SOLUTION: Performed by: EMERGENCY MEDICINE

## 2024-08-19 PROCEDURE — 94640 AIRWAY INHALATION TREATMENT: CPT

## 2024-08-19 PROCEDURE — 87040 BLOOD CULTURE FOR BACTERIA: CPT | Performed by: EMERGENCY MEDICINE

## 2024-08-19 RX ORDER — POTASSIUM CHLORIDE 7.45 MG/ML
10 INJECTION INTRAVENOUS
Status: DISCONTINUED | OUTPATIENT
Start: 2024-08-19 | End: 2024-08-19

## 2024-08-19 RX ORDER — ALBUTEROL SULFATE 90 UG/1
2 AEROSOL, METERED RESPIRATORY (INHALATION) EVERY 4 HOURS PRN
COMMUNITY

## 2024-08-19 RX ORDER — POLYETHYLENE GLYCOL 3350 17 G/17G
17 POWDER, FOR SOLUTION ORAL DAILY PRN
Status: DISCONTINUED | OUTPATIENT
Start: 2024-08-19 | End: 2024-08-21 | Stop reason: HOSPADM

## 2024-08-19 RX ORDER — BUDESONIDE 0.5 MG/2ML
0.5 INHALANT ORAL
Status: DISCONTINUED | OUTPATIENT
Start: 2024-08-19 | End: 2024-08-21 | Stop reason: HOSPADM

## 2024-08-19 RX ORDER — AMOXICILLIN 250 MG
2 CAPSULE ORAL 2 TIMES DAILY PRN
Status: DISCONTINUED | OUTPATIENT
Start: 2024-08-19 | End: 2024-08-21 | Stop reason: HOSPADM

## 2024-08-19 RX ORDER — ALBUTEROL SULFATE 0.83 MG/ML
2.5 SOLUTION RESPIRATORY (INHALATION) EVERY 6 HOURS PRN
Status: DISCONTINUED | OUTPATIENT
Start: 2024-08-19 | End: 2024-08-21 | Stop reason: HOSPADM

## 2024-08-19 RX ORDER — BISACODYL 10 MG
10 SUPPOSITORY, RECTAL RECTAL DAILY PRN
Status: DISCONTINUED | OUTPATIENT
Start: 2024-08-19 | End: 2024-08-21 | Stop reason: HOSPADM

## 2024-08-19 RX ORDER — SODIUM CHLORIDE 9 MG/ML
40 INJECTION, SOLUTION INTRAVENOUS AS NEEDED
Status: DISCONTINUED | OUTPATIENT
Start: 2024-08-19 | End: 2024-08-21 | Stop reason: HOSPADM

## 2024-08-19 RX ORDER — IOPAMIDOL 755 MG/ML
100 INJECTION, SOLUTION INTRAVASCULAR
Status: COMPLETED | OUTPATIENT
Start: 2024-08-19 | End: 2024-08-19

## 2024-08-19 RX ORDER — SODIUM CHLORIDE 0.9 % (FLUSH) 0.9 %
10 SYRINGE (ML) INJECTION AS NEEDED
Status: DISCONTINUED | OUTPATIENT
Start: 2024-08-19 | End: 2024-08-21 | Stop reason: HOSPADM

## 2024-08-19 RX ORDER — NITROGLYCERIN 0.4 MG/1
0.4 TABLET SUBLINGUAL
Status: DISCONTINUED | OUTPATIENT
Start: 2024-08-19 | End: 2024-08-21 | Stop reason: HOSPADM

## 2024-08-19 RX ORDER — HEPARIN SODIUM 5000 [USP'U]/ML
5000 INJECTION, SOLUTION INTRAVENOUS; SUBCUTANEOUS EVERY 8 HOURS SCHEDULED
Status: DISCONTINUED | OUTPATIENT
Start: 2024-08-19 | End: 2024-08-21 | Stop reason: HOSPADM

## 2024-08-19 RX ORDER — BISACODYL 5 MG/1
5 TABLET, DELAYED RELEASE ORAL DAILY PRN
Status: DISCONTINUED | OUTPATIENT
Start: 2024-08-19 | End: 2024-08-21 | Stop reason: HOSPADM

## 2024-08-19 RX ORDER — ARFORMOTEROL TARTRATE 15 UG/2ML
15 SOLUTION RESPIRATORY (INHALATION)
Status: DISCONTINUED | OUTPATIENT
Start: 2024-08-19 | End: 2024-08-21 | Stop reason: HOSPADM

## 2024-08-19 RX ORDER — POTASSIUM CHLORIDE 750 MG/1
40 CAPSULE, EXTENDED RELEASE ORAL ONCE
Status: COMPLETED | OUTPATIENT
Start: 2024-08-19 | End: 2024-08-19

## 2024-08-19 RX ORDER — SODIUM CHLORIDE 0.9 % (FLUSH) 0.9 %
10 SYRINGE (ML) INJECTION EVERY 12 HOURS SCHEDULED
Status: DISCONTINUED | OUTPATIENT
Start: 2024-08-19 | End: 2024-08-21 | Stop reason: HOSPADM

## 2024-08-19 RX ADMIN — HEPARIN SODIUM 5000 UNITS: 5000 INJECTION INTRAVENOUS; SUBCUTANEOUS at 23:08

## 2024-08-19 RX ADMIN — Medication 10 ML: at 20:36

## 2024-08-19 RX ADMIN — AZITHROMYCIN 500 MG: 500 INJECTION, POWDER, LYOPHILIZED, FOR SOLUTION INTRAVENOUS at 11:13

## 2024-08-19 RX ADMIN — REVEFENACIN 175 MCG: 175 SOLUTION RESPIRATORY (INHALATION) at 16:06

## 2024-08-19 RX ADMIN — HEPARIN SODIUM 5000 UNITS: 5000 INJECTION INTRAVENOUS; SUBCUTANEOUS at 15:43

## 2024-08-19 RX ADMIN — ARFORMOTEROL TARTRATE 15 MCG: 15 SOLUTION RESPIRATORY (INHALATION) at 16:06

## 2024-08-19 RX ADMIN — SODIUM CHLORIDE 2000 ML: 9 INJECTION, SOLUTION INTRAVENOUS at 10:30

## 2024-08-19 RX ADMIN — CEFTRIAXONE SODIUM 2000 MG: 2 INJECTION, POWDER, FOR SOLUTION INTRAMUSCULAR; INTRAVENOUS at 12:38

## 2024-08-19 RX ADMIN — BUDESONIDE 0.5 MG: 0.5 SUSPENSION RESPIRATORY (INHALATION) at 16:06

## 2024-08-19 RX ADMIN — POTASSIUM CHLORIDE 40 MEQ: 750 CAPSULE, EXTENDED RELEASE ORAL at 15:43

## 2024-08-19 RX ADMIN — IOPAMIDOL 100 ML: 755 INJECTION, SOLUTION INTRAVENOUS at 13:16

## 2024-08-19 NOTE — CASE MANAGEMENT/SOCIAL WORK
Discharge Planning Assessment   Angela     Patient Name: Carlos Zimmerman  MRN: [id number]  Today's Date: 8/19/2024    Admit Date: 8/19/2024        Discharge Needs Assessment       Row Name 08/19/24 1031       Living Environment    People in Home spouse    Current Living Arrangements home    Potentially Unsafe Housing Conditions none    In the past 12 months has the electric, gas, oil, or water company threatened to shut off services in your home? No    Primary Care Provided by self    Provides Primary Care For no one    Family Caregiver if Needed spouse    Quality of Family Relationships involved;helpful;supportive    Able to Return to Prior Arrangements yes       Resource/Environmental Concerns    Resource/Environmental Concerns none    Transportation Concerns none       Transportation Needs    In the past 12 months, has lack of transportation kept you from medical appointments or from getting medications? no    In the past 12 months, has lack of transportation kept you from meetings, work, or from getting things needed for daily living? No       Food Insecurity    Within the past 12 months, you worried that your food would run out before you got the money to buy more. Never true    Within the past 12 months, the food you bought just didn't last and you didn't have money to get more. Never true       Transition Planning    Patient/Family Anticipates Transition to home with family    Patient/Family Anticipated Services at Transition none    Transportation Anticipated car, drives self;family or friend will provide       Discharge Needs Assessment    Readmission Within the Last 30 Days no previous admission in last 30 days    Equipment Currently Used at Home cane, straight;walker, rolling    Concerns to be Addressed no discharge needs identified;denies needs/concerns at this time    Anticipated Changes Related to Illness none    Equipment Needed After Discharge none                   Discharge Plan    No  documentation.                 Continued Care and Services - Admitted Since 8/19/2024    No active coordination exists for this encounter.          Demographic Summary       Row Name 08/19/24 1029       General Information    Admission Type inpatient    Arrived From home    Referral Source emergency department    Reason for Consult discharge planning    Preferred Language English       Contact Information    Permission Granted to Share Info With family/designee                   Functional Status       Row Name 08/19/24 1031       Functional Status    Usual Activity Tolerance good    Current Activity Tolerance moderate       Physical Activity    On average, how many days per week do you engage in moderate to strenuous exercise (like a brisk walk)? 7 days    On average, how many minutes do you engage in exercise at this level? 30 min    Number of minutes of exercise per week 210       Assessment of Health Literacy    How often do you have someone help you read hospital materials? Sometimes    How often do you have problems learning about your medical condition because of difficulty understanding written information? Sometimes    How often do you have a problem understanding what is told to you about your medical condition? Sometimes    How confident are you filling out medical forms by yourself? Somewhat    Health Literacy Moderate       Functional Status, IADL    Medications independent    Meal Preparation independent    Housekeeping independent    Laundry independent    Shopping independent       Mental Status    General Appearance WDL WDL       Mental Status Summary    Recent Changes in Mental Status/Cognitive Functioning no changes       Employment/    Employment Status retired;, previous service    Current or Previous Occupation            Current or Previous  Service active duty, past     [employer]                   Psychosocial    No documentation.                   Abuse/Neglect       Row Name 08/19/24 1031       Personal Safety    Feels Unsafe at Home or Work/School no    Feels Threatened by Someone no    Does Anyone Try to Keep You From Having Contact with Others or Doing Things Outside Your Home? no    Physical Signs of Abuse Present no                   Legal       Row Name 08/19/24 1031       Financial Resource Strain    How hard is it for you to pay for the very basics like food, housing, medical care, and heating? Not very       Financial/Legal    Source of Income pension/prison                   Substance Abuse    No documentation.                  Patient Forms    No documentation.                 SW met with pt and family at bedside this date to complete discharge needs assessment. Pt denied any needs at this time. Pt reports that he is able to meet his own ADLs and still drive independently. Pt reports that his PCP is Payam Carroll and his pharmacy of choice is [facility]  on [address]. No other concerns or needs identified at the time of this assessment.     DEDRICK Santos

## 2024-08-19 NOTE — PLAN OF CARE
Problem: Adult Inpatient Plan of Care  Goal: Plan of Care Review  Outcome: Ongoing, Progressing  Flowsheets (Taken 8/19/2024 4534)  Plan of Care Reviewed With: patient  Outcome Evaluation: Received patient from ED alert and oriented. Patient is pleasant. On 2 liters NC with no signs of distress. Patient plan of care discussed at bedside with wife. Bed alert in place. Will continue with patient plan of care.   Goal Outcome Evaluation:  Plan of Care Reviewed With: patient           Outcome Evaluation: Received patient from ED alert and oriented. Patient is pleasant. On 2 liters NC with no signs of distress. Patient plan of care discussed at bedside with wife. Bed alert in place. Will continue with patient plan of care.

## 2024-08-19 NOTE — ED PROVIDER NOTES
Time: 9:00 AM EDT  Date of encounter:  8/19/2024  Independent Historian/Clinical History and Information was obtained by:   Patient and Family    History is limited by: N/A    Chief Complaint: Shortness of breath, fever, weakness, cough      History of Present Illness:  Patient is a 84 y.o. year old male who presents to the emergency department for evaluation of shortness of breath, weakness, fever and cough.  This patient presents to the emerged part complaint of shortness of breath, weakness fever and cough over the last 24 to 48 hours.  He has had generalized weakness and difficulty ambulating because of the weakness.    He has had no vomiting or diarrhea  Patient Care Team  Primary Care Provider: Payam Carroll MD    Past Medical History:     No Known Allergies  Past Medical History:   Diagnosis Date    Anemia     NO CURRENT ISSUES    Arthritis     COPD (chronic obstructive pulmonary disease)     INHALER  PRN    Hyperlipidemia     Hypertension     ON MEDS/FOLLOWS SAHNI, PT DENIES CAD    Lung cancer     REMOVED - 2016    Pneumonia     LEFT LUNG CA NO CURRENT PNEUMONIA    SOB (shortness of breath)     WITH EXERTION SOMETIMES    Stroke     LEFT LEG/FOOT DROP  2019    TIA (transient ischemic attack)     NO RESIDUAL 2010     Past Surgical History:   Procedure Laterality Date    BRONCHOSCOPY WITH ION ROBOTIC ASSIST N/A 3/4/2024    Procedure: BRONCHOSCOPY WITH ION ROBOT, REBUS, NEEDLE ASPIRATE, BIOPSIES, BAL, WASHINGS;  Surgeon: Dayne Choudhary MD;  Location: [facility] OR;  Service: Robotics - Pulmonary;  Laterality: N/A;    CARDIAC CATHETERIZATION Left 11/10/2022    Procedure: Aortogram with left leg angiogram, possible angioplasty or stenting;  Surgeon: Cuauhtemoc Thomas MD;  Location: [facility] CATH INVASIVE LOCATION;  Service: Vascular;  Laterality: Left;    CARDIAC CATHETERIZATION Right 01/13/2023    Procedure: Right leg angiogram, possible angioplasty or stenting;  Surgeon: Cuauhtemoc Thomas MD;  Location: [facility]  CATH INVASIVE LOCATION;  Service: Vascular;  Laterality: Right;    CARPAL TUNNEL RELEASE Left 02/09/2024    Procedure: CARPAL TUNNEL RELEASE, left;  Surgeon: Rg Tom MD;  Location: [facility] MAIN OR;  Service: Neurosurgery;  Laterality: Left;    LUNG BIOPSY      LUNG SURGERY      REMOVAL HALF OF UPPER PORTION LEFT LUNG    ORTHOPEDIC SURGERY Left     ROTATOR CUFF    THROAT SURGERY      throat diverticulum repair     Family History   Problem Relation Age of Onset    Cancer Mother     Stomach cancer Other     Malig Hyperthermia Neg Hx        Home Medications:  Prior to Admission medications    Medication Sig Start Date End Date Taking? Authorizing Provider   ALBUTEROL IN Inhale 2 puffs 4 (Four) Times a Day As Needed.    Maxine Espinoza MD   Aspirin Low Dose 81 MG chewable tablet Chew 1 tablet Daily. LAST DOSE OVER 03/1/24  AS DIRECTED BY CLARE 8/22/23   Maxine Espinoza MD   atorvastatin (LIPITOR) 40 MG tablet Take 1 tablet by mouth Daily. 4/15/24 4/15/25  Maxine Espinoza MD   B Complex Vitamins (VITAMIN-B COMPLEX PO) Take 1 tablet by mouth Daily.    Maxine Espinoza MD   cetirizine (zyrTEC) 10 MG tablet Take 1 tablet by mouth Daily.    Maxine Espinoza MD   Cholecalciferol (VITAMIN D3 PO) Take 1 tablet by mouth Daily.    Maxine Espinoza MD   cilostazol (PLETAL) 100 MG tablet Take 1 tablet by mouth 2 (Two) Times a Day. LAST DOSE 3/1/24 5/24/21   Maxine Espinoza MD   Fluticasone-Umeclidin-Vilant (TRELEGY ELLIPTA) 200-62.5-25 MCG/ACT inhaler Inhale 1 puff Daily. 8/6/24   Dayne Choudhary MD   gabapentin (NEURONTIN) 300 MG capsule Take 1 capsule by mouth 3 (Three) Times a Day. 6/3/24   Juan Jose Alatorre MD   ipratropium-albuterol (DUO-NEB) 0.5-2.5 mg/3 ml nebulizer Take 3 mL by nebulization 4 (Four) Times a Day As Needed for Wheezing or Shortness of Air. 3/8/24   Gely Canales APRN   metoprolol succinate XL (TOPROL-XL) 25 MG 24 hr tablet Take 1 tablet by mouth Daily.     Maxine Espinoza MD   multivitamin with minerals tablet tablet Take 1 tablet by mouth Daily.    Maxine Espinoza MD   multivitamins-minerals (PRESERVISION AREDS 2) capsule capsule Take 1 capsule by mouth 2 (Two) Times a Day. LAST DOSE 3/1/24 7/26/21   Maxine Espinoza MD   NON FORMULARY Antibiotic 1 tablet q6 hours.    Maxine Espinoza MD   pantoprazole (PROTONIX) 40 MG EC tablet Take 1 tablet by mouth Daily. 21   Maxine Espinoza MD   pravastatin (PRAVACHOL) 40 MG tablet Take 1 tablet by mouth Every Night. 22   Maxine Espinoza MD   Refresh Tears 0.5 % solution Administer 1 drop to both eyes Daily As Needed for Dry Eyes. 22   Maxine Espinoza MD   tamsulosin (FLOMAX) 0.4 MG capsule 24 hr capsule Take 1 capsule by mouth 2 (Two) Times a Day. 10/2/23   Del Mai MD        Social History:   Social History     Tobacco Use    Smoking status: Former     Current packs/day: 0.00     Average packs/day: 1 pack/day for 60.0 years (60.0 ttl pk-yrs)     Types: Cigarettes     Start date:      Quit date:      Years since quittin.6     Passive exposure: Past    Smokeless tobacco: Current     Types: Snuff    Tobacco comments:     INST PER ANESTHESIA PROTOCOL   Vaping Use    Vaping status: Never Used   Substance Use Topics    Alcohol use: Not Currently    Drug use: Never         Review of Systems:  Review of Systems   Constitutional:  Positive for fatigue and fever. Negative for chills.   HENT:  Negative for congestion, ear pain and sore throat.    Eyes:  Negative for pain.   Respiratory:  Positive for cough and shortness of breath. Negative for chest tightness.    Cardiovascular:  Negative for chest pain.   Gastrointestinal:  Negative for abdominal pain, diarrhea, nausea and vomiting.   Genitourinary:  Negative for flank pain and hematuria.   Musculoskeletal:  Negative for joint swelling.   Skin:  Negative for pallor.   Neurological:  Negative for seizures and  "headaches.   All other systems reviewed and are negative.       Physical Exam:  BP (!) 87/47 (BP Location: Left arm, Patient Position: Sitting)   Pulse 116   Temp 99.1 °F (37.3 °C) (Oral)   Resp 18   Ht 170.2 cm (67\")   SpO2 93%   BMI 24.57 kg/m²     Physical Exam  Vitals and nursing note reviewed.   Constitutional:       General: He is in acute distress.      Appearance: Normal appearance. He is not toxic-appearing.   HENT:      Head: Normocephalic and atraumatic.      Mouth/Throat:      Mouth: Mucous membranes are moist.   Eyes:      General: No scleral icterus.  Cardiovascular:      Rate and Rhythm: Normal rate and regular rhythm. Tachycardia present.      Pulses: Normal pulses.      Heart sounds: Normal heart sounds.   Pulmonary:      Effort: Pulmonary effort is normal. Respiratory distress present.      Breath sounds: Decreased breath sounds, wheezing and rhonchi present.   Abdominal:      General: Abdomen is flat.      Palpations: Abdomen is soft.      Tenderness: There is no abdominal tenderness.   Musculoskeletal:         General: Normal range of motion.      Cervical back: Normal range of motion and neck supple.   Skin:     General: Skin is warm and dry.      Capillary Refill: Capillary refill takes less than 2 seconds.   Neurological:      General: No focal deficit present.      Mental Status: He is alert and oriented to person, place, and time. Mental status is at baseline.                  Procedures:  Procedures      Medical Decision Making:      Comorbidities that affect care:    Cancer, COPD    External Notes reviewed:    Previous Clinic Note: Pulmonology visit for COPD.      The following orders were placed and all results were independently analyzed by me:  Orders Placed This Encounter   Procedures    COVID-19, FLU A/B, RSV PCR 1 HR TAT - Swab, Nasopharynx    Blood Culture - Blood,    Blood Culture - Blood,    XR Chest 1 View    [place] Draw    Comprehensive Metabolic Panel    BNP    Single " High Sensitivity Troponin T    CBC Auto Differential    Lactic Acid, Plasma    Urinalysis With Culture If Indicated - Urine, Clean Catch    NPO Diet NPO Type: Strict NPO    Undress & Gown    Continuous Pulse Oximetry    Vital Signs    Hospitalist (on-call MD unless specified)    Oxygen Therapy- Nasal Cannula; Titrate 1-6 LPM Per SpO2; 90 - 95%    ECG 12 Lead ED Triage Standing Order; SOA    Insert Peripheral IV    CBC & Differential    Green Top (Gel)    Lavender Top    Gold Top - SST    Light Blue Top       Medications Given in the Emergency Department:  Medications   sodium chloride 0.9 % flush 10 mL (has no administration in time range)   sepsis fluid NS 0.9 % bolus 2,136 mL (has no administration in time range)   cefTRIAXone (ROCEPHIN) 2,000 mg in sodium chloride 0.9 % 100 mL IVPB-VTB (has no administration in time range)   AZITHROMYCIN 500 MG/250 ML 0.9% NS IVPB (vial-mate) (has no administration in time range)        ED Course:       EKG:    Sinus tachycardia rate of 116 bpm no acute ischemic changes are noted.      Labs:    Lab Results (last 24 hours)       Procedure Component Value Units Date/Time    CBC & Differential [[id number]]  (Abnormal) Collected: 08/19/24 0816    Specimen: Blood from Arm, Left Updated: 08/19/24 0832    Narrative:      The following orders were created for panel order CBC & Differential.  Procedure                               Abnormality         Status                     ---------                               -----------         ------                     CBC Auto Differential[[id number]]        Abnormal            Final result                 Please view results for these tests on the individual orders.    Comprehensive Metabolic Panel [[id number]]  (Abnormal) Collected: 08/19/24 0816    Specimen: Blood from Arm, Left Updated: 08/19/24 0855     Glucose 104 mg/dL      BUN 11 mg/dL      Creatinine 0.80 mg/dL      Sodium 136 mmol/L      Potassium 3.4 mmol/L      Chloride 98 mmol/L       CO2 27.1 mmol/L      Calcium 8.8 mg/dL      Total Protein 5.8 g/dL      Albumin 3.5 g/dL      ALT (SGPT) 138 U/L      AST (SGOT) 109 U/L      Alkaline Phosphatase 360 U/L      Total Bilirubin 1.5 mg/dL      Globulin 2.3 gm/dL      A/G Ratio 1.5 g/dL      BUN/Creatinine Ratio 13.8     Anion Gap 10.9 mmol/L      eGFR 86.7 mL/min/1.73     Narrative:      GFR Normal >60  Chronic Kidney Disease <60  Kidney Failure <15    The GFR formula is only valid for adults with stable renal function between ages 18 and 70.    BNP [[id number]]  (Normal) Collected: 08/19/24 0816    Specimen: Blood from Arm, Left Updated: 08/19/24 0853     proBNP 211.4 pg/mL     Narrative:      This assay is used as an aid in the diagnosis of individuals suspected of having heart failure. It can be used as an aid in the diagnosis of acute decompensated heart failure (ADHF) in patients presenting with signs and symptoms of ADHF to the emergency department (ED). In addition, NT-proBNP of <300 pg/mL indicates ADHF is not likely.    Age Range Result Interpretation  NT-proBNP Concentration (pg/mL:      <50             Positive            >450                   Gray                 300-450                    Negative             <300    50-75           Positive            >900                  Gray                300-900                  Negative            <300      >75             Positive            >1800                  Gray                300-1800                  Negative            <300    Single High Sensitivity Troponin T [[id number]]  (Abnormal) Collected: 08/19/24 0816    Specimen: Blood from Arm, Left Updated: 08/19/24 0855     HS Troponin T 49 ng/L     Narrative:      High Sensitive Troponin T Reference Range:  <14.0 ng/L- Negative Female for AMI  <22.0 ng/L- Negative Male for AMI  >=14 - Abnormal Female indicating possible myocardial injury.  >=22 - Abnormal Male indicating possible myocardial injury.   Clinicians would have to utilize clinical  acumen, EKG, Troponin, and serial changes to determine if it is an Acute Myocardial Infarction or myocardial injury due to an underlying chronic condition.         CBC Auto Differential [[id number]]  (Abnormal) Collected: 08/19/24 0816    Specimen: Blood from Arm, Left Updated: 08/19/24 0832     WBC 20.24 10*3/mm3      RBC 4.99 10*6/mm3      Hemoglobin 13.6 g/dL      Hematocrit 42.9 %      MCV 86.0 fL      MCH 27.3 pg      MCHC 31.7 g/dL      RDW 15.9 %      RDW-SD 49.5 fl      MPV 10.3 fL      Platelets 197 10*3/mm3      Neutrophil % 88.6 %      Lymphocyte % 4.3 %      Monocyte % 6.4 %      Eosinophil % 0.1 %      Basophil % 0.1 %      Immature Grans % 0.5 %      Neutrophils, Absolute 17.92 10*3/mm3      Lymphocytes, Absolute 0.87 10*3/mm3      Monocytes, Absolute 1.30 10*3/mm3      Eosinophils, Absolute 0.02 10*3/mm3      Basophils, Absolute 0.03 10*3/mm3      Immature Grans, Absolute 0.10 10*3/mm3      nRBC 0.0 /100 WBC              Imaging:    XR Chest 1 View    Result Date: 8/19/2024  XR CHEST 1 VW Date of Exam: 8/19/2024 8:24 AM EDT Indication: SOA Triage Protocol Comparison: Chest x-ray 3/8/2024 Findings: The heart is stable in size. There is right lower lung airspace disease compatible with pneumonia. There is scarring with a cavitary lesion in the peripheral left upper lung partially obscured by overlying wire measuring 1.7 x 1.0 cm. No pneumothorax or pleural effusion.     Impression: 1. Development of right lower lobe airspace disease compatible with pneumonia. 2. Scarring and cavitary lesion in the peripheral left upper lung measuring 1.7 cm. Electronically Signed: Fara Parker MD  8/19/2024 8:48 AM EDT  Workstation ID: [id number]       Differential Diagnosis and Discussion:    Chest Pain:  Based on the patient's signs and symptoms, I considered aortic dissection, myocardial infaction, pulmonary embolism, cardiac tamponade, pericarditis, pneumothorax, musculoskeletal chest pain and other differential  diagnosis as an etiology of the patient's chest pain.   Cough: Differential diagnosis includes but is not limited to pneumonia, acute bronchitis, upper respiratory infection, ACE inhibitor use, allergic reaction, epiglottitis, seasonal allergies, chemical irritants, exercise-induced asthma, viral syndrome.  Dyspnea: Differential diagnosis includes but is not limited to metabolic acidosis, neurological disorders, psychogenic, asthma, pneumothorax, upper airway obstruction, COPD, pneumonia, noncardiogenic pulmonary edema, interstitial lung disease, anemia, congestive heart failure, and pulmonary embolism    All labs were reviewed and interpreted by me.  All X-rays impressions were independently interpreted by me.  EKG was interpreted by me.    MDM     Amount and/or Complexity of Data Reviewed  Clinical lab tests: reviewed  Tests in the radiology section of CPT®: reviewed  Tests in the medicine section of CPT®: reviewed         Critical Care Note: Total Critical Care time of 55 minutes. Total critical care time documented does not include time spent on separately billed procedures for services of nurses or physician assistants. I personally saw and examined the patient. I have reviewed all diagnostic interpretations and treatment plans as written. I was present for the key portions of any procedures performed and the inclusive time noted in any critical care statement. Critical care time includes patient management by me, time spent at the patients bedside,  time to review lab and imaging results, discussing patient care, documentation in the medical record, and time spent with family or caregiver.    Sepsis criteria was met in the emergency department and the Sepsis protocol (including antibiotic administration) was initiated.      SIRS criteria considered:   1.  Temperature > 100.4 or <96.8    2.  Heart Rate > 90    3.  Respiratory Rate > 22    4.  WBC > 12K or <4K.             Severe Sepsis:     Respiratory:  Mechanical Ventilation or Bipap  Hypotension: SBP > 90 or MAP < 65  Renal: Creatinine > 2  Metabolic: Lactic Acid > 2  Hematologic: Platelets < 100K or INR > 1.5  Hepatic: BILI  >  2  CNS: Sudden AMS     Septic Shock:     Severe Sepsis + Persistent hypotension or Lactic Acid > 4     Normal saline bolus, Antibiotics, and final disposition was based on these definitions.        Sepsis was recognized at 0900    Antibiotics were ordered.     30 cc/kg bolus was  indicated.        The patient was ordered 2136 mL of fluids.    The patient presents with 2 out of the 4 SIRS criteria and a suspected source for sepsis.  Patient was evaluated and placed on a cardiac monitor for fear of worsening tachycardia and life-threatening hypotension.  Patient was monitored for shock and signs of end-organ damage.  Mental status was repeatedly checked throughout the ED stay.  Medications were ordered by me which includes Rocephin and Zithromax.  The case was discussed at length with admitting physician.    Total Critical Care time of 55 minutes. Total critical care time documented does not include time spent on separately billed procedures for services of nurses or physician assistants. I personally saw and examined the patient. I have reviewed all diagnostic interpretations and treatment plans as written. I was present for the key portions of any procedures performed and the inclusive time noted in any critical care statement. Critical care time includes patient management by me, time spent at the patients bedside,  time to review lab and imaging results, discussing patient care, documentation in the medical record, and time spent with family or caregiver.    Patient Care Considerations:    CT CHEST: I considered ordering a CT scan of the chest, however patient appears to have pneumonia which does not require CT at this time.      Consultants/Shared Management Plan:    Hospitalist: I have discussed the case with hospitalist who agrees to  accept the patient for admission.    Social Determinants of Health:    Patient is unable to carry out activities of daily life. Escalation of care is necessary.       Disposition and Care Coordination:    Admit:   Through independent evaluation of the patient's history, physical, and imperical data, the patient meets criteria for inpatient admission to the hospital.        Final diagnoses:   Pneumonia due to infectious organism, unspecified laterality, unspecified part of lung   Sepsis, due to unspecified organism, unspecified whether acute organ dysfunction present        ED Disposition       ED Disposition   Intended Admit    Condition   --    Comment   --               This medical record created using voice recognition software.             Rashid See, DO  08/19/24 1624

## 2024-08-19 NOTE — H&P
[facility] HISTORY AND PHYSICAL  Date: 2024   Patient Name: Carlos Zimmerman  : 1940  MRN: [id number]  Primary Care Physician:  Payam Carroll MD  Date of admission: 2024    Subjective   Subjective     Chief Complaint: Fever, cough    HPI:    Carlos Zimmerman is a 84 y.o. 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  Denies any sputum production.  Denies any abdominal pain, nausea, vomiting, chest pain.  Does not use oxygen at home.    In the ED, vital signs temperature 99.1, pulse 106, respiratory 18, blood pressure 87/47 on room air saturating around 93%.  Initial troponin 49, normal proBNP, potassium 3.4, , AST//138, total bili 1.5, WBC 20.24, normal hemoglobin, platelets.  Chest x-ray showed development of right lower lobe airspace disease compatible with pneumonia.  Scarring and cavitary lesion in the peripheral left upper lung measuring 1.7 cm..  COVID flu RSV pending.  Received IV fluids and ceftriaxone azithromycin in the ED.  The patient has been admitted for further evaluation and management of sepsis, right lower lobe pneumonia, hypokalemia, transaminitis      Personal History     Past Medical History:   Diagnosis Date    Anemia     NO CURRENT ISSUES    Arthritis     COPD (chronic obstructive pulmonary disease)     INHALER  PRN    Hyperlipidemia     Hypertension     ON MEDS/FOLLOWS SAHNI, PT DENIES CAD    Lung cancer     REMOVED -     Pneumonia     LEFT LUNG CA NO CURRENT PNEUMONIA    SOB (shortness of breath)     WITH EXERTION SOMETIMES    Stroke     LEFT LEG/FOOT DROP      TIA (transient ischemic attack)     NO RESIDUAL          Past Surgical History:   Procedure Laterality Date    BRONCHOSCOPY WITH  ION ROBOTIC ASSIST N/A 3/4/2024    Procedure: BRONCHOSCOPY WITH ION ROBOT, REBUS, NEEDLE ASPIRATE, BIOPSIES, BAL, WASHINGS;  Surgeon: Dayne Chuodhary MD;  Location: [facility] MAIN OR;  Service: Robotics - Pulmonary;  Laterality: N/A;    CARDIAC CATHETERIZATION Left 11/10/2022    Procedure: Aortogram with left leg angiogram, possible angioplasty or stenting;  Surgeon: Cuauhtemoc Thomas MD;  Location: [facility] CATH INVASIVE LOCATION;  Service: Vascular;  Laterality: Left;    CARDIAC CATHETERIZATION Right 2023    Procedure: Right leg angiogram, possible angioplasty or stenting;  Surgeon: Cuauhtemoc Thomas MD;  Location: [facility] CATH INVASIVE LOCATION;  Service: Vascular;  Laterality: Right;    CARPAL TUNNEL RELEASE Left 2024    Procedure: CARPAL TUNNEL RELEASE, left;  Surgeon: Rg Tom MD;  Location: [facility] MAIN OR;  Service: Neurosurgery;  Laterality: Left;    LUNG BIOPSY      LUNG SURGERY      REMOVAL HALF OF UPPER PORTION LEFT LUNG    ORTHOPEDIC SURGERY Left     ROTATOR CUFF    THROAT SURGERY      throat diverticulum repair         Family History   Problem Relation Age of Onset    Cancer Mother     Stomach cancer Other     Malig Hyperthermia Neg Hx          Social History     Socioeconomic History    Marital status:    Tobacco Use    Smoking status: Former     Current packs/day: 0.00     Average packs/day: 1 pack/day for 60.0 years (60.0 ttl pk-yrs)     Types: Cigarettes     Start date:      Quit date:      Years since quittin.6     Passive exposure: Past    Smokeless tobacco: Current     Types: Snuff    Tobacco comments:     INST PER ANESTHESIA PROTOCOL   Vaping Use    Vaping status: Never Used   Substance and Sexual Activity    Alcohol use: Not Currently    Drug use: Never    Sexual activity: Defer         Home Medications:  Albuterol, B Complex Vitamins, Cholecalciferol, Fluticasone-Umeclidin-Vilant, NON FORMULARY, aspirin, atorvastatin, carboxymethylcellulose, cetirizine, cilostazol,  gabapentin, ipratropium-albuterol, metoprolol succinate XL, multivitamin with minerals, multivitamins-minerals, pantoprazole, pravastatin, and tamsulosin    Allergies:  No Known Allergies    Review of Systems   All other systems reviewed and negative except as mentioned above in the HPI    Objective   Objective     Vitals:   Temp:  [99.1 °F (37.3 °C)] 99.1 °F (37.3 °C)  Heart Rate:  [116] 116  Resp:  [18] 18  BP: (87)/(47) 87/47    Physical Exam    Constitutional: Awake, alert, no acute distress   Eyes: Pupils equal, sclerae anicteric, no conjunctival injection   HENT: NCAT, mucous membranes moist   Respiratory: Bilateral air entry present, rhonchi noted in the right middle and lower lobe region, slightly decreased breath sounds on the left upper lung region (partial lobectomy ), nonlabored respirations    Cardiovascular: Regular, tachycardia, no murmurs   Gastrointestinal: Positive bowel sounds, soft, nontender, nondistended   Musculoskeletal: No bilateral ankle edema, no clubbing or cyanosis to extremities   Neurologic: Oriented x 3, speech clear   Skin: No rashes     Result Review    Result Review:  I have personally reviewed the results from the time of this admission to 8/19/2024 10:25 EDT and agree with these findings:  [x]  Laboratory  []  Microbiology  [x]  Radiology  [x]  EKG/Telemetry   []  Cardiology/Vascular   []  Pathology  []  Old records  []  Other:        Imaging Results (Last 24 Hours)       Procedure Component Value Units Date/Time    XR Chest 1 View [[id number]] Collected: 08/19/24 0846     Updated: 08/19/24 0850    Narrative:      XR CHEST 1 VW    Date of Exam: 8/19/2024 8:24 AM EDT    Indication: SOA Triage Protocol    Comparison: Chest x-ray 3/8/2024    Findings:  The heart is stable in size. There is right lower lung airspace disease compatible with pneumonia. There is scarring with a cavitary lesion in the peripheral left upper lung partially obscured by overlying wire measuring 1.7 x 1.0 cm.  No pneumothorax or   pleural effusion.      Impression:      Impression:    1. Development of right lower lobe airspace disease compatible with pneumonia.    2. Scarring and cavitary lesion in the peripheral left upper lung measuring 1.7 cm.      Electronically Signed: Fara Parker MD    8/19/2024 8:48 AM EDT    Workstation ID: [id number]             azithromycin, 500 mg, Intravenous, Once  cefTRIAXone, 2,000 mg, Intravenous, Once  ipratropium-albuterol, 3 mL, Nebulization, Once  potassium chloride, 10 mEq, Intravenous, Q1H  sepsis fluid NS, 30 mL/kg, Intravenous, Once         Assessment & Plan   Assessment / Plan     Assessment/Plan:   Right lower lobe pneumonia  Sepsis, present on admission, tachycardia, elevated white count  Hypokalemia  Transaminitis  Mildly elevated bilirubin  Elevated troponin likely NSTEMI type II from sepsis, tachycardia  History of lung cancer status post partial left upper lobectomy s/p radiation  Cavitary lesion in the left peripheral lung measuring 1.7 cm, seen in the previous imaging   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, foot drop  COPD  Hyperlipidemia  Hypertension    Plan  Admit to inpatient, telemetry  Continue ceftriaxone and azithromycin  Follow-up on urinalysis, blood cultures, respiratory panel, respiratory culture, urine Legionella, urine strep, MRSA swab, lactic acid  Adjust antibiotics based on the culture data  IV potassium 40 mEq, recheck and replace as needed  CT abdomen pelvis with contrast to evaluate for elevated LFTs  Brovana, Pulmicort, revefenacin  Bronchodilator protocol  Albuterol every 6 hours as needed  Bronchopulmonary hygiene  Incentive spirometry  Consider pulmonology consult based on the clinical response  Follow-up on repeat troponin levels  Nursing dysphagia screen  Heart healthy diet  Fall precautions  PT OT consult  Resume other appropriate home medications once reconciled  Monitor renal function, LFTs, electrolytes with a.m. labs      VTE  Prophylaxis:  Pharmacologic VTE prophylaxis orders are signed & held.      CODE STATUS:    Level Of Support Discussed With: Patient  Code Status (Patient has no pulse and is not breathing): CPR (Attempt to Resuscitate)  Medical Interventions (Patient has pulse or is breathing): Full Support      Admission Status:  I believe this patient meets inpatient status.    Part of this note may be an electronic transcription/translation of spoken language to printed text using the Dragon Dictation System    Kvng Coyle MD

## 2024-08-20 LAB
ALBUMIN SERPL-MCNC: 2.9 G/DL (ref 3.5–5.2)
ALBUMIN/GLOB SERPL: 1.2 G/DL
ALP SERPL-CCNC: 292 U/L (ref 39–117)
ALT SERPL W P-5'-P-CCNC: 91 U/L (ref 1–41)
ANION GAP SERPL CALCULATED.3IONS-SCNC: 7 MMOL/L (ref 5–15)
AST SERPL-CCNC: 68 U/L (ref 1–40)
BACTERIA UR QL AUTO: ABNORMAL /HPF
BASOPHILS # BLD AUTO: 0.04 10*3/MM3 (ref 0–0.2)
BASOPHILS NFR BLD AUTO: 0.2 % (ref 0–1.5)
BILIRUB SERPL-MCNC: 1.5 MG/DL (ref 0–1.2)
BILIRUB UR QL STRIP: NEGATIVE
BUN SERPL-MCNC: 9 MG/DL (ref 8–23)
BUN/CREAT SERPL: 14.5 (ref 7–25)
CALCIUM SPEC-SCNC: 8.6 MG/DL (ref 8.6–10.5)
CHLORIDE SERPL-SCNC: 105 MMOL/L (ref 98–107)
CLARITY UR: CLEAR
CO2 SERPL-SCNC: 26 MMOL/L (ref 22–29)
COLOR UR: YELLOW
CREAT SERPL-MCNC: 0.62 MG/DL (ref 0.76–1.27)
DEPRECATED RDW RBC AUTO: 50.4 FL (ref 37–54)
EGFRCR SERPLBLD CKD-EPI 2021: 93.7 ML/MIN/1.73
EOSINOPHIL # BLD AUTO: 0.04 10*3/MM3 (ref 0–0.4)
EOSINOPHIL NFR BLD AUTO: 0.2 % (ref 0.3–6.2)
ERYTHROCYTE [DISTWIDTH] IN BLOOD BY AUTOMATED COUNT: 16.1 % (ref 12.3–15.4)
GLOBULIN UR ELPH-MCNC: 2.4 GM/DL
GLUCOSE SERPL-MCNC: 97 MG/DL (ref 65–99)
GLUCOSE UR STRIP-MCNC: NEGATIVE MG/DL
HCT VFR BLD AUTO: 37.5 % (ref 37.5–51)
HGB BLD-MCNC: 11.9 G/DL (ref 13–17.7)
HGB UR QL STRIP.AUTO: NEGATIVE
HYALINE CASTS UR QL AUTO: ABNORMAL /LPF
IMM GRANULOCYTES # BLD AUTO: 0.12 10*3/MM3 (ref 0–0.05)
IMM GRANULOCYTES NFR BLD AUTO: 0.5 % (ref 0–0.5)
KETONES UR QL STRIP: NEGATIVE
L PNEUMO1 AG UR QL IA: NEGATIVE
LEUKOCYTE ESTERASE UR QL STRIP.AUTO: ABNORMAL
LYMPHOCYTES # BLD AUTO: 1.83 10*3/MM3 (ref 0.7–3.1)
LYMPHOCYTES NFR BLD AUTO: 8.4 % (ref 19.6–45.3)
MAGNESIUM SERPL-MCNC: 1.6 MG/DL (ref 1.6–2.4)
MCH RBC QN AUTO: 27.4 PG (ref 26.6–33)
MCHC RBC AUTO-ENTMCNC: 31.7 G/DL (ref 31.5–35.7)
MCV RBC AUTO: 86.4 FL (ref 79–97)
MONOCYTES # BLD AUTO: 1.28 10*3/MM3 (ref 0.1–0.9)
MONOCYTES NFR BLD AUTO: 5.9 % (ref 5–12)
NEUTROPHILS NFR BLD AUTO: 18.52 10*3/MM3 (ref 1.7–7)
NEUTROPHILS NFR BLD AUTO: 84.8 % (ref 42.7–76)
NITRITE UR QL STRIP: NEGATIVE
NRBC BLD AUTO-RTO: 0 /100 WBC (ref 0–0.2)
PH UR STRIP.AUTO: 7.5 [PH] (ref 5–8)
PHOSPHATE SERPL-MCNC: 2.2 MG/DL (ref 2.5–4.5)
PLATELET # BLD AUTO: 182 10*3/MM3 (ref 140–450)
PMV BLD AUTO: 10.5 FL (ref 6–12)
POTASSIUM SERPL-SCNC: 3.9 MMOL/L (ref 3.5–5.2)
PROT SERPL-MCNC: 5.3 G/DL (ref 6–8.5)
PROT UR QL STRIP: NEGATIVE
RBC # BLD AUTO: 4.34 10*6/MM3 (ref 4.14–5.8)
RBC # UR STRIP: ABNORMAL /HPF
REF LAB TEST METHOD: ABNORMAL
S PNEUM AG SPEC QL LA: NEGATIVE
SODIUM SERPL-SCNC: 138 MMOL/L (ref 136–145)
SP GR UR STRIP: 1.01 (ref 1–1.03)
SQUAMOUS #/AREA URNS HPF: ABNORMAL /HPF
UROBILINOGEN UR QL STRIP: ABNORMAL
WBC # UR STRIP: ABNORMAL /HPF
WBC NRBC COR # BLD AUTO: 21.83 10*3/MM3 (ref 3.4–10.8)

## 2024-08-20 PROCEDURE — 99232 SBSQ HOSP IP/OBS MODERATE 35: CPT | Performed by: INTERNAL MEDICINE

## 2024-08-20 PROCEDURE — 87205 SMEAR GRAM STAIN: CPT | Performed by: STUDENT IN AN ORGANIZED HEALTH CARE EDUCATION/TRAINING PROGRAM

## 2024-08-20 PROCEDURE — 87899 AGENT NOS ASSAY W/OPTIC: CPT | Performed by: STUDENT IN AN ORGANIZED HEALTH CARE EDUCATION/TRAINING PROGRAM

## 2024-08-20 PROCEDURE — 87449 NOS EACH ORGANISM AG IA: CPT | Performed by: STUDENT IN AN ORGANIZED HEALTH CARE EDUCATION/TRAINING PROGRAM

## 2024-08-20 PROCEDURE — 97161 PT EVAL LOW COMPLEX 20 MIN: CPT

## 2024-08-20 PROCEDURE — 87070 CULTURE OTHR SPECIMN AEROBIC: CPT | Performed by: STUDENT IN AN ORGANIZED HEALTH CARE EDUCATION/TRAINING PROGRAM

## 2024-08-20 PROCEDURE — 84100 ASSAY OF PHOSPHORUS: CPT | Performed by: STUDENT IN AN ORGANIZED HEALTH CARE EDUCATION/TRAINING PROGRAM

## 2024-08-20 PROCEDURE — 25810000003 SODIUM CHLORIDE 0.9 % SOLUTION: Performed by: STUDENT IN AN ORGANIZED HEALTH CARE EDUCATION/TRAINING PROGRAM

## 2024-08-20 PROCEDURE — 25010000002 HEPARIN (PORCINE) PER 1000 UNITS: Performed by: STUDENT IN AN ORGANIZED HEALTH CARE EDUCATION/TRAINING PROGRAM

## 2024-08-20 PROCEDURE — 25010000002 CEFTRIAXONE PER 250 MG: Performed by: STUDENT IN AN ORGANIZED HEALTH CARE EDUCATION/TRAINING PROGRAM

## 2024-08-20 PROCEDURE — 94799 UNLISTED PULMONARY SVC/PX: CPT

## 2024-08-20 PROCEDURE — 94760 N-INVAS EAR/PLS OXIMETRY 1: CPT

## 2024-08-20 PROCEDURE — 94664 DEMO&/EVAL PT USE INHALER: CPT

## 2024-08-20 PROCEDURE — 87086 URINE CULTURE/COLONY COUNT: CPT | Performed by: STUDENT IN AN ORGANIZED HEALTH CARE EDUCATION/TRAINING PROGRAM

## 2024-08-20 PROCEDURE — 85025 COMPLETE CBC W/AUTO DIFF WBC: CPT | Performed by: STUDENT IN AN ORGANIZED HEALTH CARE EDUCATION/TRAINING PROGRAM

## 2024-08-20 PROCEDURE — 63710000001 REVEFENACIN 175 MCG/3ML SOLUTION: Performed by: STUDENT IN AN ORGANIZED HEALTH CARE EDUCATION/TRAINING PROGRAM

## 2024-08-20 PROCEDURE — 25010000002 AZITHROMYCIN PER 500 MG: Performed by: STUDENT IN AN ORGANIZED HEALTH CARE EDUCATION/TRAINING PROGRAM

## 2024-08-20 PROCEDURE — 83735 ASSAY OF MAGNESIUM: CPT | Performed by: STUDENT IN AN ORGANIZED HEALTH CARE EDUCATION/TRAINING PROGRAM

## 2024-08-20 PROCEDURE — 80053 COMPREHEN METABOLIC PANEL: CPT | Performed by: STUDENT IN AN ORGANIZED HEALTH CARE EDUCATION/TRAINING PROGRAM

## 2024-08-20 PROCEDURE — 81001 URINALYSIS AUTO W/SCOPE: CPT | Performed by: STUDENT IN AN ORGANIZED HEALTH CARE EDUCATION/TRAINING PROGRAM

## 2024-08-20 RX ADMIN — BUDESONIDE 0.5 MG: 0.5 SUSPENSION RESPIRATORY (INHALATION) at 18:48

## 2024-08-20 RX ADMIN — AZITHROMYCIN 500 MG: 500 INJECTION, POWDER, LYOPHILIZED, FOR SOLUTION INTRAVENOUS at 10:17

## 2024-08-20 RX ADMIN — HEPARIN SODIUM 5000 UNITS: 5000 INJECTION INTRAVENOUS; SUBCUTANEOUS at 05:56

## 2024-08-20 RX ADMIN — Medication 10 ML: at 09:16

## 2024-08-20 RX ADMIN — REVEFENACIN 175 MCG: 175 SOLUTION RESPIRATORY (INHALATION) at 08:50

## 2024-08-20 RX ADMIN — BUDESONIDE 0.5 MG: 0.5 SUSPENSION RESPIRATORY (INHALATION) at 08:50

## 2024-08-20 RX ADMIN — ARFORMOTEROL TARTRATE 15 MCG: 15 SOLUTION RESPIRATORY (INHALATION) at 18:48

## 2024-08-20 RX ADMIN — HEPARIN SODIUM 5000 UNITS: 5000 INJECTION INTRAVENOUS; SUBCUTANEOUS at 22:03

## 2024-08-20 RX ADMIN — HEPARIN SODIUM 5000 UNITS: 5000 INJECTION INTRAVENOUS; SUBCUTANEOUS at 14:34

## 2024-08-20 RX ADMIN — ALBUTEROL SULFATE 2.5 MG: 2.5 SOLUTION RESPIRATORY (INHALATION) at 03:48

## 2024-08-20 RX ADMIN — ARFORMOTEROL TARTRATE 15 MCG: 15 SOLUTION RESPIRATORY (INHALATION) at 08:50

## 2024-08-20 RX ADMIN — CEFTRIAXONE SODIUM 2000 MG: 2 INJECTION, POWDER, FOR SOLUTION INTRAMUSCULAR; INTRAVENOUS at 09:15

## 2024-08-20 RX ADMIN — Medication 10 ML: at 22:03

## 2024-08-20 NOTE — PROGRESS NOTES
Respiratory Therapist Broncho-Pulmonary Hygiene Progress Note      Patient Name:  Carlos Zimmerman  YOB: 1940    Carlos Zimmerman meets the qualification for Level 2 of the Bronco-Pulmonary Hygiene Protocol. This was based on my daily patient assessment and includes review of chest x-ray results, cough ability and quality, oxygenation, secretions or risk for secretion development and patient mobility.     Broncho-Pulmonary Hygiene Assessment:    Level of Movement: Actively changing positions without assistance  Alert/ oriented/ cooperative    Breath Sounds: Diminished and/or coarse rhonchi    Cough: Strong, effective    Chest X-Ray: Mild consolidation and/or atelectasis.  Development of right lower lobe airspace disease compatible with pneumonia.   Sputum Productions: Able to produce small to moderate amount, of moderately thick secretions    History and Physical: New onset of bronchitis or existing chronic pulmonary conditions.  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

## 2024-08-20 NOTE — PROGRESS NOTES
[facility]   Hospitalist Progress Note  Date: 2024  Patient Name: Carlos Zimmerman  : 1940  MRN: [id number]  Date of admission: 2024    Subjective   Subjective     Chief Complaint:   Shortness of air    Summary:   Carlos Zimmerman is a 84 y.o. 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  Denies any sputum production.  Denies any abdominal pain, nausea, vomiting, chest pain.  Does not use oxygen at home.     In the ED, vital signs temperature 99.1, pulse 106, respiratory 18, blood pressure 87/47 on room air saturating around 93%.  Initial troponin 49, normal proBNP, potassium 3.4, , AST//138, total bili 1.5, WBC 20.24, normal hemoglobin, platelets.  Chest x-ray showed development of right lower lobe airspace disease compatible with pneumonia.  Scarring and cavitary lesion in the peripheral left upper lung measuring 1.7 cm..  COVID flu RSV pending.  Received IV fluids and ceftriaxone azithromycin in the ED.  The patient has been admitted for further evaluation and management of sepsis, right lower lobe pneumonia, hypokalemia, transaminitis    Interval Followup:   No acute events overnight, patient resting comfortably in bed, breathing improving    Objective   Objective     Vitals:   Temp:  [97.7 °F (36.5 °C)-98.2 °F (36.8 °C)] 97.9 °F (36.6 °C)  Heart Rate:  [85-97] 85  Resp:  [16-20] 18  BP: (109-147)/(50-79) 147/79  Flow (L/min):  [2-3] 2    Physical Exam   GEN: No acute distress  HEENT: Moist mucous membranes  LUNGS: Equal chest rise bilaterally  CARDIAC: Regular rate and rhythm  NEURO: Moving all 4 extremities spontaneously  SKIN: No obvious breakdown    Result Review    Result Review:  I have personally reviewed the results as below  [x]   Laboratory CBC, CMP personally reviewed  CBC          2/23/2024    09:31 8/19/2024    08:16 8/20/2024    03:55   CBC   WBC 6.97  20.24  21.83    RBC 5.07  4.99  4.34    Hemoglobin 14.2  13.6  11.9    Hematocrit 44.4  42.9  37.5    MCV 87.6  86.0  86.4    MCH 28.0  27.3  27.4    MCHC 32.0  31.7  31.7    RDW 13.9  15.9  16.1    Platelets 204  197  182      CMP          3/12/2024    14:57 8/19/2024    08:16 8/20/2024    03:55   CMP   Glucose  104  97    BUN  11  9    Creatinine 0.70  0.80  0.62    EGFR 90.3  86.7  93.7    Sodium  136  138    Potassium  3.4  3.9    Chloride  98  105    Calcium  8.8  8.6    Total Protein  5.8  5.3    Albumin  3.5  2.9    Globulin  2.3  2.4    Total Bilirubin  1.5  1.5    Alkaline Phosphatase  360  292    AST (SGOT)  109  68    ALT (SGPT)  138  91    Albumin/Globulin Ratio  1.5  1.2    BUN/Creatinine Ratio  13.8  14.5    Anion Gap  10.9  7.0      []  Microbiology  []  Radiology  []  EKG/Telemetry   []  Cardiology/Vascular   []  Pathology  []  Old records  []  Other:    Scheduled medications:  arformoterol, 15 mcg, Nebulization, BID - RT  azithromycin, 500 mg, Intravenous, Q24H  budesonide, 0.5 mg, Nebulization, BID - RT  cefTRIAXone, 2,000 mg, Intravenous, Q24H  heparin (porcine), 5,000 Units, Subcutaneous, Q8H  revefenacin, 175 mcg, Nebulization, Daily - RT  sodium chloride, 10 mL, Intravenous, Q12H      As needed medications:    albuterol    senna-docusate sodium **AND** polyethylene glycol **AND** bisacodyl **AND** bisacodyl    nitroglycerin    sodium chloride    sodium chloride    sodium chloride  Infusions:          Assessment & Plan   Assessment / Plan     Assessment:  Right lower lobe pneumonia  Sepsis, present on admission, tachycardia, elevated white count  Hypokalemia  Transaminitis  Mildly elevated bilirubin  Elevated troponin likely NSTEMI type II from sepsis, tachycardia  History of lung cancer status post partial left upper lobectomy s/p radiation  Cavitary lesion in the  left peripheral lung measuring 1.7 cm, seen in the previous imaging   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, foot drop  COPD  Hyperlipidemia  Hypertension    Plan:  Admit to inpatient, telemetry  Continue ceftriaxone and azithromycin  Strep pneumo, Legionella, negative  Adjust antibiotics based on the culture data  Continue to monitor potassium closely replace as needed  CT scan of the abdomen and pelvis significant for esophagitis, nonobstructing left renal nephrolithiasis, right lower lobe pneumonia  Continue Brovana, Pulmicort, revefenacin  Continue bronchodilator protocol  Continue albuterol every 6 hours as needed  Bronchopulmonary hygiene  Incentive spirometry  Consider pulmonology consult based on the clinical response  Wean O2 for SpO2 greater than 90%  Follow-up on repeat troponin levels  Nursing dysphagia screen  Heart healthy diet  Fall precautions  PT OT consult  LFTs improving  CBC, CMP reviewed  Repeat CBC, CMP, mag and Phos in a.m.     Reviewed patients labs and imaging, and discussed with patient and nurse at bedside.    VTE Prophylaxis:  Pharmacologic VTE prophylaxis orders are present.        CODE STATUS:   Level Of Support Discussed With: Patient  Code Status (Patient has no pulse and is not breathing): CPR (Attempt to Resuscitate)  Medical Interventions (Patient has pulse or is breathing): Full Support      Electronically signed by Chase Klein MD, 8/20/2024, 13:53 EDT.

## 2024-08-20 NOTE — PLAN OF CARE
Goal Outcome Evaluation:  Plan of Care Reviewed With: patient           Outcome Evaluation: Pt was able to demonstrate bed mobility and transfers with supervison and CGA, respectively. Pt was able to ambulate 75ft RW with CGA, declining to ambulate longer distances. Pt does not need inpatient PT services at this time. Recommend home health or outpatient physical therapy services upon hospital discharge.      Anticipated Discharge Disposition (PT): home with home health

## 2024-08-20 NOTE — THERAPY EVALUATION
Acute Care - Physical Therapy Initial Evaluation   Angela     Patient Name: Carlos Zimmerman  : 1940  MRN: [id number]  Today's Date: 2024      Visit Dx:     ICD-10-CM ICD-9-CM   1. Pneumonia due to infectious organism, unspecified laterality, unspecified part of lung  J18.9 486   2. Sepsis, due to unspecified organism, unspecified whether acute organ dysfunction present  A41.9 038.9     995.91   3. Difficulty walking  R26.2 719.7     Patient Active Problem List   Diagnosis    COPD (chronic obstructive pulmonary disease)    Hypertension    Low back pain    Lung disease    Solitary lung nodule    Pneumonia of right lung due to infectious organism    Hematuria    Complicated UTI (urinary tract infection)    Recurrent urinary tract infection    Benign prostatic hyperplasia    Atherosclerosis of lower extremity with claudication    Benign prostatic hyperplasia with lower urinary tract symptoms    Elevated PSA    Cervical radiculopathy    Cervical spondylosis without myelopathy    Abnormal PET scan of lung    Lung nodule    Malignant neoplasm of upper lobe, left bronchus or lung    Sepsis due to pneumonia     Past Medical History:   Diagnosis Date    Anemia     NO CURRENT ISSUES    Arthritis     COPD (chronic obstructive pulmonary disease)     INHALER  PRN    Hyperlipidemia     Hypertension     ON MEDS/FOLLOWS SAHNI, PT DENIES CAD    Lung cancer     REMOVED -     Pneumonia     LEFT LUNG CA NO CURRENT PNEUMONIA    SOB (shortness of breath)     WITH EXERTION SOMETIMES    Stroke     LEFT LEG/FOOT DROP      TIA (transient ischemic attack)     NO RESIDUAL      Past Surgical History:   Procedure Laterality Date    BRONCHOSCOPY WITH ION ROBOTIC ASSIST N/A 3/4/2024    Procedure: BRONCHOSCOPY WITH ION ROBOT, REBUS, NEEDLE ASPIRATE, BIOPSIES, BAL, WASHINGS;  Surgeon: Dayne Choudhary MD;  Location: [facility];  Service: Robotics - Pulmonary;  Laterality: N/A;    CARDIAC CATHETERIZATION Left 11/10/2022     Procedure: Aortogram with left leg angiogram, possible angioplasty or stenting;  Surgeon: Cuauhtemoc Thomas MD;  Location: [facility] CATH INVASIVE LOCATION;  Service: Vascular;  Laterality: Left;    CARDIAC CATHETERIZATION Right 01/13/2023    Procedure: Right leg angiogram, possible angioplasty or stenting;  Surgeon: Cuauhtemoc Thomas MD;  Location: [facility] CATH INVASIVE LOCATION;  Service: Vascular;  Laterality: Right;    CARPAL TUNNEL RELEASE Left 02/09/2024    Procedure: CARPAL TUNNEL RELEASE, left;  Surgeon: Rg Tom MD;  Location: [facility] MAIN OR;  Service: Neurosurgery;  Laterality: Left;    LUNG BIOPSY      LUNG SURGERY      REMOVAL HALF OF UPPER PORTION LEFT LUNG    ORTHOPEDIC SURGERY Left     ROTATOR CUFF    THROAT SURGERY      throat diverticulum repair     PT Assessment (Last 12 Hours)       PT Evaluation and Treatment       Row Name 08/20/24 1029          Physical Therapy Time and Intention    Subjective Information no complaints  -DP     Document Type evaluation  -DP     Mode of Treatment individual therapy;physical therapy  -DP     Patient Effort good  -DP       Row Name 08/20/24 1029          General Information    Patient Profile Reviewed yes  -DP     Patient Observations alert;cooperative;agree to therapy  -DP     Prior Level of Function independent:;gait;transfer;bed mobility;ADL's  -DP     Equipment Currently Used at Home cane, straight;walker, standard  AD PRN  -DP     Barriers to Rehab none identified  -DP       Row Name 08/20/24 1029          Living Environment    Current Living Arrangements home  -DP     Home Accessibility stairs within home  -DP     People in Home spouse  -DP     Primary Care Provided by self  -DP       Row Name 08/20/24 1029          Stairs Within Home, Primary    Number of Stairs, Within Home, Primary eight  -DP       Row Name 08/20/24 1029          Home Use of Assistive/Adaptive Equipment    Equipment Currently Used at Home cane, straight;walker, standard  AD PRN  -DP        Row Name 08/20/24 1029          Pain    Pretreatment Pain Rating 0/10 - no pain  -DP       Row Name 08/20/24 1029          Range of Motion (ROM)    Range of Motion ROM is WFL;bilateral lower extremities  -DP       Row Name 08/20/24 1029          Strength (Manual Muscle Testing)    Strength (Manual Muscle Testing) strength is WFL;bilateral lower extremities  -DP       Row Name 08/20/24 1029          Bed Mobility    Bed Mobility supine-sit  -DP     Supine-Sit [place] (Bed Mobility) supervision  -DP     Assistive Device (Bed Mobility) head of bed elevated;draw sheet  -DP       Row Name 08/20/24 1029          Transfers    Transfers sit-stand transfer  -DP       Row Name 08/20/24 1029          Sit-Stand Transfer    Sit-Stand [place] (Transfers) standby assist  -DP     Assistive Device (Sit-Stand Transfers) walker, front-wheeled  -DP       Row Name 08/20/24 1029          Gait/Stairs (Locomotion)    Gait/Stairs Locomotion gait/ambulation assistive device  -DP     [place] Level (Gait) standby assist  -DP     Assistive Device (Gait) walker, front-wheeled  -DP     Patient was able to Ambulate yes  -DP     Distance in Feet (Gait) 75  pt declined to ambulate long distances  -DP     Pattern (Gait) step-through  -DP     Deviations/Abnormal Patterns (Gait) gait speed decreased;base of support, wide  -DP       Row Name 08/20/24 1029          Balance    Balance Assessment standing dynamic balance  -DP     Dynamic Standing Balance standby assist  -DP     Position/Device Used, Standing Balance walker, front-wheeled  -DP       Row Name             Wound 05/18/24 1126 Right arm Abrasion    Wound - Properties Group Placement Date: 05/18/24  -JS Placement Time: 1126  -JS Present on Original Admission: Y  -JS Side: Right  -JS Location: arm  -JS Primary Wound Type: Abrasion  -JS    Retired Wound - Properties Group Placement Date: 05/18/24  -JS Placement Time: 1126  -JS Present on Original Admission: Y  -JS Side: Right  -JS  Location: arm  -JS Primary Wound Type: Abrasion  -JS    Retired Wound - Properties Group Date first assessed: 05/18/24  -JS Time first assessed: 1126  -JS Present on Original Admission: Y  -JS Side: Right  -JS Location: arm  -JS Primary Wound Type: Abrasion  -JS      Row Name 08/20/24 1029          Plan of Care Review    Plan of Care Reviewed With patient  -DP     Outcome Evaluation Pt was able to demonstrate bed mobility and transfers with supervison and CGA, respectively. Pt was able to ambulate 75ft RW with CGA, declining to ambulate longer distances. Pt does not need inpatient PT services at this time. Recommend home health or outpatient physical therapy services upon hospital discharge.  -DP       Row Name 08/20/24 1029          Positioning and Restraints    Pre-Treatment Position in bed  -DP     Post Treatment Position bed  -DP     In Bed call light within reach;exit alarm on;encouraged to call for assist;supine  -DP       Row Name 08/20/24 1029          Therapy Assessment/Plan (PT)    Criteria for Skilled Interventions Met (PT) no problems identified which require skilled intervention  -DP     Therapy Frequency (PT) evaluation only  -DP       Row Name 08/20/24 1029          PT Evaluation Complexity    History, PT Evaluation Complexity no personal factors and/or comorbidities  -DP     Examination of Body Systems (PT Eval Complexity) total of 3 or more elements  -DP     Clinical Presentation (PT Evaluation Complexity) stable  -DP     Clinical Decision Making (PT Evaluation Complexity) low complexity  -DP     Overall Complexity (PT Evaluation Complexity) low complexity  -DP       Row Name 08/20/24 1029          Therapy Plan Review/Discharge Plan (PT)    Therapy Plan Review (PT) evaluation/treatment results reviewed;patient  -DP       Row Name 08/20/24 1029          Physical Therapy Goals    Problem Specific Goal Selection (PT) problem specific goal 1, PT  -DP       Row Name 08/20/24 1029          Problem  Specific Goal 1 (PT)    Problem Specific Goal 1 (PT) Pt will complete initial PT evaluation.  -DP     Progress/Outcome (Problem Specific Goal 1, PT) goal met  -DP               User Key  (r) = Recorded By, (t) = Taken By, (c) = Cosigned By      Initials Name Provider Type    Deanne Spain, RN Registered Nurse    Taylor Pires, PT Physical Therapist                    Physical Therapy Education       Title: PT OT SLP Therapies (Not Started)       Topic: Physical Therapy (Not Started)       Point: Mobility training (Not Started)       Learner Progress:  Not documented in this visit.              Point: Home exercise program (Not Started)       Learner Progress:  Not documented in this visit.              Point: Body mechanics (Not Started)       Learner Progress:  Not documented in this visit.              Point: Precautions (Not Started)       Learner Progress:  Not documented in this visit.                                  PT Recommendation and Plan  Anticipated Discharge Disposition (PT): home with home health  Therapy Frequency (PT): evaluation only  Plan of Care Reviewed With: patient  Outcome Evaluation: Pt was able to demonstrate bed mobility and transfers with supervison and CGA, respectively. Pt was able to ambulate 75ft RW with CGA, declining to ambulate longer distances. Pt does not need inpatient PT services at this time. Recommend home health or outpatient physical therapy services upon hospital discharge.   Outcome Measures       Row Name 08/20/24 4167             How much help from another person do you currently need...    Turning from your back to your side while in flat bed without using bedrails? 4  -DP      Moving from lying on back to sitting on the side of a flat bed without bedrails? 4  -DP      Moving to and from a bed to a chair (including a wheelchair)? 4  -DP      Standing up from a chair using your arms (e.g., wheelchair, bedside chair)? 4  -DP      Climbing 3-5 steps with a  railing? 3  -DP      To walk in hospital room? 3  -DP      AM-PAC 6 Clicks Score (PT) 22  -DP      Highest Level of Mobility Goal 7 --> Walk 25 feet or more  -DP                User Key  (r) = Recorded By, (t) = Taken By, (c) = Cosigned By      Initials Name Provider Type    Taylor Pires, PT Physical Therapist                     Time Calculation:    PT Charges       Row Name 08/20/24 1048             Time Calculation    PT Received On 08/20/24  -DP         Untimed Charges    PT Eval/Re-eval Minutes 35  -DP         Total Minutes    Untimed Charges Total Minutes 35  -DP       Total Minutes 35  -DP                User Key  (r) = Recorded By, (t) = Taken By, (c) = Cosigned By      Initials Name Provider Type    Taylor Pires, PT Physical Therapist                        PT G-Codes  AM-PAC 6 Clicks Score (PT): 22    Taylor Edwards, PT  8/20/2024

## 2024-08-20 NOTE — PLAN OF CARE
Problem: Adult Inpatient Plan of Care  Goal: Plan of Care Review  Outcome: Ongoing, Progressing  Flowsheets (Taken 8/20/2024 9687)  Plan of Care Reviewed With: patient  Outcome Evaluation: Patient alert and oriented throughout shift. On 2 liters NC with no signs of distress. Patient plan of care discussed at bedside. Patient is pleasant and able to ambulate to the bathroom with a standby assist while utilizing his cane from home. UA and sputum sample sent to lab on shift. Will continue with patient plan of care.   Goal Outcome Evaluation:  Plan of Care Reviewed With: patient           Outcome Evaluation: Patient alert and oriented throughout shift. On 2 liters NC with no signs of distress. Patient plan of care discussed at bedside. Patient is pleasant and able to ambulate to the bathroom with a standby assist while utilizing his cane from home. UA and sputum sample sent to lab on shift. Will continue with patient plan of care.

## 2024-08-21 ENCOUNTER — READMISSION MANAGEMENT (OUTPATIENT)
Dept: CALL CENTER | Facility: HOSPITAL | Age: 84
End: 2024-08-21
Payer: MEDICARE

## 2024-08-21 VITALS
HEIGHT: 67 IN | TEMPERATURE: 98.2 F | OXYGEN SATURATION: 97 % | DIASTOLIC BLOOD PRESSURE: 71 MMHG | RESPIRATION RATE: 18 BRPM | HEART RATE: 82 BPM | BODY MASS INDEX: 24.57 KG/M2 | SYSTOLIC BLOOD PRESSURE: 142 MMHG

## 2024-08-21 PROCEDURE — 99223 1ST HOSP IP/OBS HIGH 75: CPT | Performed by: INTERNAL MEDICINE

## 2024-08-21 PROCEDURE — 94799 UNLISTED PULMONARY SVC/PX: CPT

## 2024-08-21 PROCEDURE — 25010000002 AZITHROMYCIN PER 500 MG: Performed by: STUDENT IN AN ORGANIZED HEALTH CARE EDUCATION/TRAINING PROGRAM

## 2024-08-21 PROCEDURE — 63710000001 REVEFENACIN 175 MCG/3ML SOLUTION: Performed by: STUDENT IN AN ORGANIZED HEALTH CARE EDUCATION/TRAINING PROGRAM

## 2024-08-21 PROCEDURE — 99239 HOSP IP/OBS DSCHRG MGMT >30: CPT | Performed by: INTERNAL MEDICINE

## 2024-08-21 PROCEDURE — 25810000003 SODIUM CHLORIDE 0.9 % SOLUTION: Performed by: STUDENT IN AN ORGANIZED HEALTH CARE EDUCATION/TRAINING PROGRAM

## 2024-08-21 PROCEDURE — 94664 DEMO&/EVAL PT USE INHALER: CPT

## 2024-08-21 PROCEDURE — 25010000002 CEFTRIAXONE PER 250 MG: Performed by: STUDENT IN AN ORGANIZED HEALTH CARE EDUCATION/TRAINING PROGRAM

## 2024-08-21 PROCEDURE — 25010000002 HEPARIN (PORCINE) PER 1000 UNITS: Performed by: STUDENT IN AN ORGANIZED HEALTH CARE EDUCATION/TRAINING PROGRAM

## 2024-08-21 PROCEDURE — 94618 PULMONARY STRESS TESTING: CPT

## 2024-08-21 RX ADMIN — HEPARIN SODIUM 5000 UNITS: 5000 INJECTION INTRAVENOUS; SUBCUTANEOUS at 06:18

## 2024-08-21 RX ADMIN — AZITHROMYCIN 500 MG: 500 INJECTION, POWDER, LYOPHILIZED, FOR SOLUTION INTRAVENOUS at 08:44

## 2024-08-21 RX ADMIN — BUDESONIDE 0.5 MG: 0.5 SUSPENSION RESPIRATORY (INHALATION) at 09:19

## 2024-08-21 RX ADMIN — HEPARIN SODIUM 5000 UNITS: 5000 INJECTION INTRAVENOUS; SUBCUTANEOUS at 14:22

## 2024-08-21 RX ADMIN — REVEFENACIN 175 MCG: 175 SOLUTION RESPIRATORY (INHALATION) at 09:19

## 2024-08-21 RX ADMIN — CEFTRIAXONE SODIUM 2000 MG: 2 INJECTION, POWDER, FOR SOLUTION INTRAMUSCULAR; INTRAVENOUS at 08:44

## 2024-08-21 RX ADMIN — ARFORMOTEROL TARTRATE 15 MCG: 15 SOLUTION RESPIRATORY (INHALATION) at 09:19

## 2024-08-21 RX ADMIN — Medication 10 ML: at 08:45

## 2024-08-21 NOTE — PLAN OF CARE
Goal Outcome Evaluation:      Stable, no complications, awaiting anxiously to be discharged

## 2024-08-21 NOTE — DISCHARGE SUMMARY
[facility]         HOSPITALIST  DISCHARGE SUMMARY    Patient Name: Carlos Zimmerman  : 1940  MRN: [id number]    Date of Admission: 2024  Date of Discharge:  2024  Primary Care Physician: Payam Carroll MD    Consults       Date and Time Order Name Status Description    2024  8:01 AM Inpatient Pulmonology Consult      2024  9:10 AM Hospitalist (on-call MD unless specified)              Active and Resolved Hospital Problems:  Right lower lobe pneumonia  Sepsis, present on admission, tachycardia, elevated white count  Hypokalemia  Transaminitis  Mildly elevated bilirubin  Elevated troponin likely NSTEMI type II from sepsis, tachycardia  History of lung cancer status post partial left upper lobectomy s/p radiation  Cavitary lesion in the left peripheral lung measuring 1.7 cm, seen in the previous imaging   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  Denies any sputum production.  Denies any abdominal pain, nausea, vomiting, chest pain.  Does not use oxygen at home.     In the ED, vital signs temperature 99.1, pulse 106, respiratory 18, blood pressure 87/47 on room air saturating around 93%.  Initial troponin 49, normal proBNP, potassium 3.4, , AST//138, total bili 1.5, WBC 20.24, normal hemoglobin, platelets.  Chest x-ray showed development of right lower lobe airspace disease compatible with pneumonia.  Scarring and cavitary lesion in the peripheral left upper lung measuring 1.7 cm..  Negative.  Received IV fluids and ceftriaxone azithromycin in the ED.   The patient has been admitted for further evaluation and management of sepsis, right lower lobe pneumonia, hypokalemia, transaminitis, patient's respiratory status improved, he will be treated with 5 additional days of Augmentin.  Patient should follow-up with pulmonology as an outpatient.  Patient underwent 6-minute walk test for which he passed, he did not require home O2.  Patient is discharged home today in stable condition.    Day of Discharge     Vital Signs:  Temp:  [97.3 °F (36.3 °C)-98.2 °F (36.8 °C)] 98.2 °F (36.8 °C)  Heart Rate:  [79-93] 82  Resp:  [18] 18  BP: (142-158)/(71-84) 142/71  Flow (L/min):  [2] 2    Physical Exam:   GEN: No acute distress  HEENT: Moist mucous membranes  LUNGS: Equal chest rise bilaterally  CARDIAC: Regular rate and rhythm  NEURO: Moving all 4 extremities spontaneously  SKIN: No obvious breakdown    Discharge Details        Discharge Medications        New Medications        Instructions Start Date   amoxicillin-clavulanate 875-125 MG per tablet  Commonly known as: AUGMENTIN   1 tablet, Oral, 2 Times Daily             Continue These Medications        Instructions Start Date   albuterol sulfate  (90 Base) MCG/ACT inhaler  Commonly known as: PROVENTIL HFA;VENTOLIN HFA;PROAIR HFA   2 puffs, Inhalation, Every 4 Hours PRN      aspirin 81 MG EC tablet   Take 1 tablet by mouth Daily.      atorvastatin 40 MG tablet  Commonly known as: LIPITOR   Take 1 tablet by mouth Daily.      cetirizine 10 MG tablet  Commonly known as: zyrTEC   10 mg, Oral, Daily      cilostazol 100 MG tablet  Commonly known as: PLETAL   100 mg, Oral, 2 Times Daily      Fluticasone-Umeclidin-Vilant 200-62.5-25 MCG/ACT inhaler  Commonly known as: TRELEGY ELLIPTA   1 puff, Inhalation, Daily - RT      gabapentin 300 MG capsule  Commonly known as: NEURONTIN   300 mg, Oral, 3 Times Daily      ipratropium-albuterol 0.5-2.5 mg/3 ml nebulizer  Commonly known as: DUO-NEB   3 mL, Nebulization, 4 Times Daily PRN       metoprolol succinate XL 25 MG 24 hr tablet  Commonly known as: TOPROL-XL   25 mg, Oral, Daily      multivitamins-minerals capsule capsule   1 capsule, Oral, 2 Times Daily      multivitamin with minerals tablet tablet   1 tablet, Oral, Daily      pantoprazole 40 MG EC tablet  Commonly known as: PROTONIX   40 mg, Oral, Daily      Refresh Tears 0.5 % solution  Generic drug: carboxymethylcellulose   1 drop, Both Eyes, Daily PRN      tamsulosin 0.4 MG capsule 24 hr capsule  Commonly known as: FLOMAX   0.4 mg, Oral, 2 Times Daily      VITAMIN D3 PO   1 tablet, Oral, Daily      VITAMIN-B COMPLEX PO   1 tablet, Oral, Daily               No Known Allergies    Discharge Disposition:  Home or Self Care    Diet:  Hospital:  Diet Order   Procedures    Diet: Cardiac; Healthy Heart (2-3 Na+); Fluid Consistency: Thin (IDDSI 0)       Discharge Activity:       CODE STATUS:  Code Status and Medical Interventions: CPR (Attempt to Resuscitate); Full Support   Ordered at: 08/19/24 1025     Level Of Support Discussed With:    Patient     Code Status (Patient has no pulse and is not breathing):    CPR (Attempt to Resuscitate)     Medical Interventions (Patient has pulse or is breathing):    Full Support       Future Appointments   Date Time Provider Department Center   9/3/2024  9:00 AM [address]   9/5/2024  8:30 AM Brook Murphy APRN [facility] RO [facility]   9/5/2024 10:00 AM Juan Jose Alatorre MD [facility] ONC [address] [facility]   11/7/2024 10:00 AM Gely Canales APRN [facility] PCC ETW [facility]   1/10/2025  9:30 AM Del Mai MD [facility] U ETRING [facility]       Additional Instructions for the Follow-ups that You Need to Schedule       Discharge Follow-up with PCP   As directed       Currently Documented PCP:    Payam Carroll MD    PCP Phone Number:    [phone]     Follow Up Details: 3 to 7 days        Discharge Follow-up with Specified Provider: pulmonology; 2 Weeks   As directed      To: pulmonology   Follow Up: 2 Weeks                 Pertinent  and/or Most Recent Results     IMAGING:  CT Abdomen Pelvis With Contrast    Result Date: 8/19/2024  CT ABDOMEN PELVIS W CONTRAST Date of Exam: 8/19/2024 12:50 PM EDT Indication: Sepsis, elevated LFTs, No abdominal pain/tenderness. Comparison: 2/23/2024 PET/CT and 10/19/2022 CTA Technique: Axial CT images were obtained of the abdomen and pelvis after the uneventful intravenous administration of iodinated contrast. Reconstructed coronal and sagittal images were also obtained. Automated exposure control and iterative construction methods were used. Findings: There are dense airspace consolidations in the right lower lobe. Trace pericardial effusion partially visualized. Dense coronary artery calcifications. Tiny left hepatic lobe hypodensities, which are likely cysts or benign hemangiomas. The gallbladder is unremarkable. No significant biliary ductal dilation. The spleen is unremarkable. There are multiple pancreatic cysts, measuring up to 1.4 cm at the uncinate process and 2.4 cm along the distal tail as seen on series 2 images 82 and coronal series 3 image 76. These are unchanged from 2022 CTA. No pancreatic  ductal dilation. No suspicious enhancing pancreatic mass. Bilateral adrenal glands are unremarkable. There is no hydronephrosis. There is nonobstructing left renal nephrolithiasis measuring up to 4 mm on series 2 image 104. No suspicious renal lesion. There is circumferential wall thickening of the distal esophagus. No evidence of bowel obstruction. Diverticulosis of the colon without evidence of diverticulitis. There are prominent portacaval lymph nodes. For reference there is a 1.2 cm lymph node in short axis on series 2 image 66. No evidence of abdominal aortic aneurysm. Dense atherosclerotic plaque of the aorta and branch vessels. The portal vein is patent. Prostatomegaly. The urinary bladder is unremarkable. There is no acute abnormality of the abdominal or pelvic wall soft tissues.  Unchanged T11 vertebral body compression deformity. No evidence of acute fracture or suspicious osseous lesion. Osteopenia.     Impression: Partially visualized right lower lobe pneumonia. Circumferential wall thickening of the distal esophagus which can be seen with esophagitis/reflux. Nonobstructing left renal nephrolithiasis. Prominent portacaval lymph nodes which are presumably reactive. Additional chronic ancillary findings as above. Electronically Signed: Andres Corrigan MD  8/19/2024 1:35 PM EDT  Workstation ID: [id number]    XR Chest 1 View    Result Date: 8/19/2024  XR CHEST 1 VW Date of Exam: 8/19/2024 8:24 AM EDT Indication: SOA Triage Protocol Comparison: Chest x-ray 3/8/2024 Findings: The heart is stable in size. There is right lower lung airspace disease compatible with pneumonia. There is scarring with a cavitary lesion in the peripheral left upper lung partially obscured by overlying wire measuring 1.7 x 1.0 cm. No pneumothorax or pleural effusion.     Impression: 1. Development of right lower lobe airspace disease compatible with pneumonia. 2. Scarring and cavitary lesion in the peripheral left upper lung measuring 1.7 cm. Electronically Signed: Fara Parker MD  8/19/2024 8:48 AM EDT  Workstation ID: [id number]      LAB RESULTS:      Lab 08/20/24  0355 08/19/24  1038 08/19/24  0816   WBC 21.83*  --  20.24*   HEMOGLOBIN 11.9*  --  13.6   HEMATOCRIT 37.5  --  42.9   PLATELETS 182  --  197   NEUTROS ABS 18.52*  --  17.92*   IMMATURE GRANS (ABS) 0.12*  --  0.10*   LYMPHS ABS 1.83  --  0.87   MONOS ABS 1.28*  --  1.30*   EOS ABS 0.04  --  0.02   MCV 86.4  --  86.0   LACTATE  --  1.3  --          Lab 08/20/24  0355 08/19/24  0816   SODIUM 138 136   POTASSIUM 3.9 3.4*   CHLORIDE 105 98   CO2 26.0 27.1   ANION GAP 7.0 10.9   BUN 9 11   CREATININE 0.62* 0.80   EGFR 93.7 86.7   GLUCOSE 97 104*   CALCIUM 8.6 8.8   MAGNESIUM 1.6  --    PHOSPHORUS 2.2*  --          Lab 08/20/24  0355 08/19/24  0816   TOTAL PROTEIN  5.3* 5.8*   ALBUMIN 2.9* 3.5   GLOBULIN 2.4 2.3   ALT (SGPT) 91* 138*   AST (SGOT) 68* 109*   BILIRUBIN 1.5* 1.5*   ALK PHOS 292* 360*         Lab 08/19/24  0816   PROBNP 211.4   HSTROP T 49*                 Brief Urine Lab Results  (Last result in the past 365 days)        Color   Clarity   Blood   Leuk Est   Nitrite   Protein   CREAT   Urine HCG        08/20/24 1346 Yellow   Clear   Negative   Trace   Negative   Negative                 Microbiology Results (last 10 days)       Procedure Component Value - Date/Time    S. Pneumo Ag Urine or CSF - Urine, Urine, Clean Catch [[id number]]  (Normal) Collected: 08/20/24 1346    Lab Status: Final result Specimen: Urine, Clean Catch Updated: 08/20/24 1556     Strep Pneumo Ag Negative    Legionella Antigen, Urine - Urine, Urine, Clean Catch [[id number]]  (Normal) Collected: 08/20/24 1346    Lab Status: Final result Specimen: Urine, Clean Catch Updated: 08/20/24 1555     LEGIONELLA ANTIGEN, URINE Negative    Urine Culture - Urine, Urine, Clean Catch [[id number]]  (Normal) Collected: 08/20/24 1346    Lab Status: Preliminary result Specimen: Urine, Clean Catch Updated: 08/21/24 1156     Urine Culture No growth    Respiratory Culture - Sputum, Cough [[id number]] Collected: 08/20/24 1241    Lab Status: Preliminary result Specimen: Sputum from Cough Updated: 08/21/24 1030     Respiratory Culture Scant growth (1+) The culture consists of normal respiratory lucinda. This is a preliminary report; final report to follow.     Gram Stain Moderate (3+) WBCs seen      Moderate (3+) Mucous strands      Rare (1+) Epithelial cells per low power field      Few (2+) Gram positive cocci in pairs and chains      Occasional Gram negative bacilli    COVID-19, FLU A/B, RSV PCR 1 HR TAT - Swab, Nasopharynx [[id number]]  (Normal) Collected: 08/19/24 1156    Lab Status: Final result Specimen: Swab from Nasopharynx Updated: 08/19/24 1237     COVID19 Not Detected     Influenza A PCR Not Detected     Influenza  B PCR Not Detected     RSV, PCR Not Detected    Narrative:      Fact sheet for providers: https://www.fda.gov/media/338141/download    Fact sheet for patients: https://www.fda.gov/media/265997/download    Test performed by PCR.    Blood Culture - Blood, Arm, Left [[id number]]  (Normal) Collected: 08/19/24 1038    Lab Status: Preliminary result Specimen: Blood from Arm, Left Updated: 08/21/24 1100     Blood Culture No growth at 2 days    Blood Culture - Blood, Arm, Left [[id number]]  (Normal) Collected: 08/19/24 1038    Lab Status: Preliminary result Specimen: Blood from Arm, Left Updated: 08/21/24 1100     Blood Culture No growth at 2 days          Results for orders placed during the hospital encounter of 05/03/23    Duplex Lower Extremity Art / Grafts - Right CAR    Interpretation Summary    Normal right ankle-brachial index.  Mild decreased left ankle-brachial index.    Patent right common femoral and profunda femoris arteries without significant stenosis.  Moderate atherosclerotic plaque is present.    Patent right SFA with diffuse atherosclerotic plaque.  No evidence of stenosis.    Patent right popliteal, and tibial vessels with monophasic to biphasic signals.  No high-grade stenosis detected.    Results for orders placed during the hospital encounter of 05/03/23    Duplex Lower Extremity Art / Grafts - Right CAR    Interpretation Summary    Normal right ankle-brachial index.  Mild decreased left ankle-brachial index.    Patent right common femoral and profunda femoris arteries without significant stenosis.  Moderate atherosclerotic plaque is present.    Patent right SFA with diffuse atherosclerotic plaque.  No evidence of stenosis.    Patent right popliteal, and tibial vessels with monophasic to biphasic signals.  No high-grade stenosis detected.        Time spent on Discharge including face to face service: Greater than 30 minutes      Electronically signed by Chase Klein MD, 08/21/24, 3:14 PM EDT.

## 2024-08-21 NOTE — CONSULTS
Pulmonary / Critical Care Consult Note      Patient Name: Carlos Zimmerman  : 1940  MRN: [id number]  Primary Care Physician:  Paaym Carroll MD  Referring Physician: No ref. provider found  Date of admission: 2024    Subjective   Subjective     Reason for Consult/ Chief Complaint: Hypoxia, history of lung cancer    HPI:  Carlos Zimmerman is a 84 y.o. 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  In the emergency department, CXR revealed developing right lower lobe airspace disease consistent with pneumonia with scarring and cavitary lesion in left peripheral upper lobe measuring 1.7 cm.  Labs revealed leukocytosis with elevated liver enzymes.  Patient was admitted to hospitalist service for sepsis, right lower lobe pneumonia and transaminitis.  Pulmonary was then consulted for further evaluation.    Upon exam, patient was noted to be resting comfortably in bed on 2 L nasal cannula.  He reports worsening shortness of breath with cough and fever x 2 days which prompted his evaluation in the emergency room.  He reports occasional cough with scant amount of sputum.  Denies issues with airway clearance.  He has had generalized fatigue and malaise.  Overall patient reports improvement since hospitalization. Denies any known sick contact exposure.  No chest pain or hemoptysis.  Does not use home oxygen.    Review of Systems:  All systems were reviewed and negative except as above      Personal History     Past Medical History:   Diagnosis Date    Anemia     NO CURRENT ISSUES    Arthritis     COPD (chronic obstructive pulmonary disease)     INHALER  PRN    Hyperlipidemia     Hypertension     ON MEDS/FOLLOWS  KAITLYN, PT DENIES CAD    Lung cancer     REMOVED - 2016    Pneumonia     LEFT LUNG CA NO CURRENT PNEUMONIA    SOB (shortness of breath)     WITH EXERTION SOMETIMES    Stroke     LEFT LEG/FOOT DROP  2019    TIA (transient ischemic attack)     NO RESIDUAL 2010       Past Surgical History:   Procedure Laterality Date    BRONCHOSCOPY WITH ION ROBOTIC ASSIST N/A 3/4/2024    Procedure: BRONCHOSCOPY WITH ION ROBOT, REBUS, NEEDLE ASPIRATE, BIOPSIES, BAL, WASHINGS;  Surgeon: Dayne Choudhary MD;  Location: [facility] OR;  Service: Robotics - Pulmonary;  Laterality: N/A;    CARDIAC CATHETERIZATION Left 11/10/2022    Procedure: Aortogram with left leg angiogram, possible angioplasty or stenting;  Surgeon: Cuauhtemoc Thomas MD;  Location: [facility] CATH INVASIVE LOCATION;  Service: Vascular;  Laterality: Left;    CARDIAC CATHETERIZATION Right 01/13/2023    Procedure: Right leg angiogram, possible angioplasty or stenting;  Surgeon: Cuauhtemoc Thomas MD;  Location: [facility] CATH INVASIVE LOCATION;  Service: Vascular;  Laterality: Right;    CARPAL TUNNEL RELEASE Left 02/09/2024    Procedure: CARPAL TUNNEL RELEASE, left;  Surgeon: Rg Tom MD;  Location: [facility] MAIN OR;  Service: Neurosurgery;  Laterality: Left;    LUNG BIOPSY      LUNG SURGERY      REMOVAL HALF OF UPPER PORTION LEFT LUNG    ORTHOPEDIC SURGERY Left     ROTATOR CUFF    THROAT SURGERY      throat diverticulum repair       Family History: family history includes Cancer in his mother; Stomach cancer in an other family member. Otherwise pertinent FHx was reviewed and not pertinent to current issue.    Social History:  reports that he quit smoking about 14 years ago. His smoking use included cigarettes. He started smoking about 74 years ago. He has a 60 pack-year smoking history. He has been exposed to tobacco smoke. His smokeless tobacco use includes snuff. He reports that he does not currently use alcohol. He reports that he does not use drugs.    Home Medications:  B  Complex Vitamins, Cholecalciferol, Fluticasone-Umeclidin-Vilant, albuterol sulfate HFA, aspirin, atorvastatin, carboxymethylcellulose, cetirizine, cilostazol, gabapentin, ipratropium-albuterol, metoprolol succinate XL, multivitamin with minerals, multivitamins-minerals, pantoprazole, and tamsulosin    Allergies:  No Known Allergies    Objective    Objective     Vitals:   Temp:  [97.3 °F (36.3 °C)-98.1 °F (36.7 °C)] 97.9 °F (36.6 °C)  Heart Rate:  [79-93] 79  Resp:  [18] 18  BP: (142-158)/(73-84) 158/79  Flow (L/min):  [2] 2    Physical Exam:  Vital Signs Reviewed   General: Pleasant, elderly male, Alert, NAD, lying in bed.    Chest:  good aeration, rhonchi and wheezes noted on auscultation to right lower lobe, no work of breathing noted on 2 L nasal cannula  CV: regular rate and rhythm regular  EXT:  no clubbing, no cyanosis, no edema  Neuro:  A&Ox3, moving all 4 extremities spontaneously  Skin: No rashes or lesions noted      Result Review    Result Review:  I have personally reviewed the results from the time of this admission to 8/21/2024 08:27 EDT and agree with these findings:  [x]  Laboratory  [x]  Microbiology  [x]  Radiology  [x]  EKG/Telemetry   []  Cardiology/Vascular   []  Pathology  [x]  Old records  []  Other:  Most notable findings include:         Lab 08/20/24  0355 08/19/24  0816   WBC 21.83* 20.24*   HEMOGLOBIN 11.9* 13.6   HEMATOCRIT 37.5 42.9   PLATELETS 182 197   SODIUM 138 136   POTASSIUM 3.9 3.4*   CHLORIDE 105 98   CO2 26.0 27.1   BUN 9 11   CREATININE 0.62* 0.80   GLUCOSE 97 104*   CALCIUM 8.6 8.8   PHOSPHORUS 2.2*  --    TOTAL PROTEIN 5.3* 5.8*   ALBUMIN 2.9* 3.5   GLOBULIN 2.4 2.3     XR Chest 1 View    Result Date: 8/19/2024  XR CHEST 1 VW Date of Exam: 8/19/2024 8:24 AM EDT Indication: SOA Triage Protocol Comparison: Chest x-ray 3/8/2024 Findings: The heart is stable in size. There is right lower lung airspace disease compatible with pneumonia. There is scarring with a cavitary lesion in  the peripheral left upper lung partially obscured by overlying wire measuring 1.7 x 1.0 cm. No pneumothorax or pleural effusion.     Impression: Impression: 1. Development of right lower lobe airspace disease compatible with pneumonia. 2. Scarring and cavitary lesion in the peripheral left upper lung measuring 1.7 cm. Electronically Signed: Fara Parker MD  8/19/2024 8:48 AM EDT  Workstation ID: [id number]     Assessment & Plan   Assessment / Plan     Active Hospital Problems:  Active Hospital Problems    Diagnosis     **Sepsis due to pneumonia      Impression:   Right lower lobe community-acquired pneumonia, unknown organism  Cavitary lesion in left peripheral lung measuring 1.7 cm, seen on previous imaging.  History with previous left upper lobe cancer.  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  -CXR revealed right lower lobe airspace disease consistent with pneumonia with scarring and cavitary lesion in peripheral left upper lobe measuring 1.7 cm  -Continue ceftriaxone and azithromycin for pneumonia, may de-escalate pending cultures.  Okay to transition to Augmentin at time of discharge.  -S pneumo, Legionella and sputum culture negative.  MRSA PCR pending.  -Continue Brovana, Pulmicort and Yupelri  -Continue BPH protocol.  Encourage use of I-S and flutter valve.  -Encourage mobilization.  Out of bed to chair.  -Follow-up with pulmonology in 1 to 2 weeks         Labs, microbiology, radiology, medications, and provider notes personally reviewed.  Discussed with primary services and bedside RN.     Thank you for this consult and allowing me to participate in the care of Mr. Zimmerman    Electronically signed by ARTEMIO Galvan, 08/21/24, 8:27 AM EDT.    IDr. Dominguez  LENKA Pink, have spent more than 50% of the total time managing the patient in this encounter today.  This included personally reviewing all pertinent labs, imaging, microbiology and documentation. Also discussing the case with the patient and any available family, the admitting physician and any available ancillary staff.    Electronically signed by Alberto Pink MD, 08/21/24, 3:39 PM EDT.

## 2024-08-21 NOTE — PLAN OF CARE
Problem: Adult Inpatient Plan of Care  Goal: Plan of Care Review  Outcome: Met  Goal: Patient-Specific Goal (Individualized)  Outcome: Met  Goal: Absence of Hospital-Acquired Illness or Injury  Outcome: Met  Intervention: Identify and Manage Fall Risk  Recent Flowsheet Documentation  Taken 8/21/2024 1500 by Liliam Davenport RN  Safety Promotion/Fall Prevention:   toileting scheduled   safety round/check completed   room organization consistent   nonskid shoes/slippers when out of bed   fall prevention program maintained   clutter free environment maintained   assistive device/personal items within reach  Taken 8/21/2024 1300 by Liliam Davenport RN  Safety Promotion/Fall Prevention:   toileting scheduled   safety round/check completed   room organization consistent   nonskid shoes/slippers when out of bed   fall prevention program maintained   clutter free environment maintained   assistive device/personal items within reach  Taken 8/21/2024 0915 by Liliam Davenport RN  Safety Promotion/Fall Prevention:   toileting scheduled   safety round/check completed   room organization consistent   nonskid shoes/slippers when out of bed   fall prevention program maintained   clutter free environment maintained   assistive device/personal items within reach  Intervention: Prevent Skin Injury  Recent Flowsheet Documentation  Taken 8/21/2024 1500 by Liliam Davenport RN  Body Position:   position changed independently   weight shifting  Taken 8/21/2024 1300 by Liliam Davenport RN  Body Position:   position changed independently   weight shifting  Taken 8/21/2024 0915 by Liliam Davenport RN  Body Position:   position changed independently   weight shifting  Intervention: Prevent and Manage VTE (Venous Thromboembolism) Risk  Recent Flowsheet Documentation  Taken 8/21/2024 0915 by Liliam Davenport RN  Activity Management: activity encouraged  Taken 8/21/2024 0715 by Liliam Davenport RN  Range of Motion: active ROM  (range of motion) encouraged  Intervention: Prevent Infection  Recent Flowsheet Documentation  Taken 8/21/2024 1500 by Liliam Davenport RN  Infection Prevention:   rest/sleep promoted   hand hygiene promoted   environmental surveillance performed  Taken 8/21/2024 1300 by Liliam Davenport RN  Infection Prevention:   rest/sleep promoted   hand hygiene promoted   environmental surveillance performed  Taken 8/21/2024 0915 by Liliam Davenport RN  Infection Prevention:   rest/sleep promoted   hand hygiene promoted   environmental surveillance performed  Goal: Optimal Comfort and Wellbeing  Outcome: Met  Intervention: Provide Person-Centered Care  Recent Flowsheet Documentation  Taken 8/21/2024 0715 by Liliam Davenport RN  Trust Relationship/Rapport:   care explained   reassurance provided   questions encouraged   questions answered   thoughts/feelings acknowledged  Goal: Readiness for Transition of Care  Outcome: Met     Problem: Skin Injury Risk Increased  Goal: Skin Health and Integrity  Outcome: Met  Intervention: Optimize Skin Protection  Recent Flowsheet Documentation  Taken 8/21/2024 1500 by Liliam Davenport RN  Head of Bed (HOB) Positioning: HOB elevated  Taken 8/21/2024 1300 by Liliam Davenport RN  Head of Bed (HOB) Positioning: HOB elevated  Taken 8/21/2024 0915 by Liliam Davenport RN  Head of Bed (HOB) Positioning: HOB elevated     Problem: Fall Injury Risk  Goal: Absence of Fall and Fall-Related Injury  Outcome: Met  Intervention: Promote Injury-Free Environment  Recent Flowsheet Documentation  Taken 8/21/2024 1500 by Liliam Davenport RN  Safety Promotion/Fall Prevention:   toileting scheduled   safety round/check completed   room organization consistent   nonskid shoes/slippers when out of bed   fall prevention program maintained   clutter free environment maintained   assistive device/personal items within reach  Taken 8/21/2024 1300 by Liliam Davenport RN  Safety Promotion/Fall  Prevention:   toileting scheduled   safety round/check completed   room organization consistent   nonskid shoes/slippers when out of bed   fall prevention program maintained   clutter free environment maintained   assistive device/personal items within reach  Taken 8/21/2024 0915 by Liliam Davenport RN  Safety Promotion/Fall Prevention:   toileting scheduled   safety round/check completed   room organization consistent   nonskid shoes/slippers when out of bed   fall prevention program maintained   clutter free environment maintained   assistive device/personal items within reach   Goal Outcome Evaluation:         Goal Met. Pt is discharged

## 2024-08-21 NOTE — PROGRESS NOTES
Walking Oximetry Progress Note      Patient Name:  Carlos Zimmerman  YOB: 1940  Date of Procedure: 08/21/24              ROOM AIR BASELINE   SpO2% 94   Heart Rate 84     EXERCISE ON ROOM AIR SpO2% EXERCISE ON O2 LPM SpO2%   1 MINUTE 95 1 MINUTE     2 MINUTES 94 2 MINUTES     3 MINUTES 94 3 MINUTES     4 MINUTES 92 4 MINUTES     5 MINUTES 93 5 MINUTES     6 MINUTES 92 6 MINUTES                Time to Recovery  no time    SpO2% Post Exercise  95 on room air.    HR Post Exercise  90     Comments: patient used walker for the test and did well. He stated he did not feel and shortness of air or tiredness.           Electronically signed by Alley Hernandez RRT, 08/21/24, 2:58 PM EDT.

## 2024-08-22 LAB
BACTERIA SPEC AEROBE CULT: NO GROWTH
BACTERIA SPEC RESP CULT: NORMAL
GRAM STN SPEC: NORMAL

## 2024-08-22 NOTE — OUTREACH NOTE
Prep Survey      Flowsheet Row Responses   [facility] facility patient discharged from? [facility]   Is LACE score < 7 ? No   Eligibility Readm Mgmt   Discharge diagnosis Sepsis due to PNA   Does the patient have one of the following disease processes/diagnoses(primary or secondary)? Sepsis   Does the patient have Home health ordered? No   Is there a DME ordered? No   Prep survey completed? Yes            MAHI BOTELLO - Registered Nurse

## 2024-08-24 LAB
BACTERIA SPEC AEROBE CULT: NORMAL
BACTERIA SPEC AEROBE CULT: NORMAL

## 2024-08-27 ENCOUNTER — READMISSION MANAGEMENT (OUTPATIENT)
Dept: CALL CENTER | Facility: HOSPITAL | Age: 84
End: 2024-08-27
Payer: MEDICARE

## 2024-08-27 NOTE — OUTREACH NOTE
Sepsis Week 1 Survey      Flowsheet Row Responses   [facility] patient discharged from? [facility]   Does the patient have one of the following disease processes/diagnoses(primary or secondary)? Sepsis   Week 1 attempt successful? Yes   Call start time 1523   Call end time 1528   Discharge diagnosis Sepsis due to PNA   Person spoke with today (if not patient) and relationship pt   Meds reviewed with patient/caregiver? Yes   Is the patient having any side effects they believe may be caused by any medication additions or changes? No   Does the patient have all medications related to this admission filled (includes all antibiotics, inhalers, nebulizers,steroids,etc.) Yes   Is the patient taking all medications as directed (includes completed medication regime)? Yes   Does the patient have a primary care provider?  Yes   Does the patient have an appointment with their PCP within 7 days of discharge? Yes   Has the patient kept scheduled appointments due by today? Yes   Comments Oncology 9/5/24, CT Chest 9/3/24   Psychosocial issues? No   Did the patient receive a copy of their discharge instructions? Yes   Nursing interventions Reviewed instructions with patient   What is the patient's perception of their health status since discharge? Improving   Nursing interventions Nurse provided patient education   Is the patient/caregiver able to teach back TIME? T emperature - higher or lower than normal, I nfection - may have signs and symptoms of an infection, M ental Decline - confused, sleepy, difficult to arouse, E xtremely Ill - severe pain, discomfort, shortness of breath   Nursing interventions Nurse provided reassurance to patient   Is patient/caregiver able to teach back steps to recovery at home? Rest and regain strength, Eat a balanced diet   Is the patient/caregiver able to teach back signs and symptoms of worsening condition: Fever, Shortness of breath/rapid respiratory rate, Altered mental status(confusion/coma),  Hyperthermia   Is the patient/caregiver able to teach back the hierarchy of who to call/visit for symptoms/problems? PCP, Specialist, Home health nurse, Urgent Care, ED, 911 Yes   Week 1 call completed? Yes   Is the patient interested in additional calls from an ambulatory ? No   Would this patient benefit from a Referral to [facility] Social Work? No   Wrap up additional comments Pt denies any chest pain, SOB, cough. Educated pt on warning s/s of sepsis, and when to call 911. Reviewed AVS/meds with pt. Pt had PCP fu appt, and Oncology follows pt   Call end time 1528            Nora ALEJANDRO - Registered Nurse

## 2024-09-03 ENCOUNTER — HOSPITAL ENCOUNTER (OUTPATIENT)
Dept: CT IMAGING | Facility: HOSPITAL | Age: 84
Discharge: HOME | End: 2024-09-03
Admitting: NURSE PRACTITIONER
Payer: MEDICARE

## 2024-09-03 DIAGNOSIS — Z92.3 STATUS POST RADIATION THERAPY WITHIN FOUR TO TWELVE WEEKS: ICD-10-CM

## 2024-09-03 DIAGNOSIS — C34.32 MALIGNANT NEOPLASM OF LOWER LOBE OF LEFT LUNG: ICD-10-CM

## 2024-09-03 PROCEDURE — 71250 CT THORAX DX C-: CPT

## 2024-09-04 ENCOUNTER — LAB (OUTPATIENT)
Dept: ONCOLOGY | Facility: HOSPITAL | Age: 84
End: 2024-09-04
Payer: MEDICARE

## 2024-09-04 DIAGNOSIS — D50.9 IRON DEFICIENCY ANEMIA, UNSPECIFIED IRON DEFICIENCY ANEMIA TYPE: ICD-10-CM

## 2024-09-04 LAB
BASOPHILS # BLD AUTO: 0.04 10*3/MM3 (ref 0–0.2)
BASOPHILS NFR BLD AUTO: 0.5 % (ref 0–1.5)
DEPRECATED RDW RBC AUTO: 52.1 FL (ref 37–54)
EOSINOPHIL # BLD AUTO: 0.11 10*3/MM3 (ref 0–0.4)
EOSINOPHIL NFR BLD AUTO: 1.3 % (ref 0.3–6.2)
ERYTHROCYTE [DISTWIDTH] IN BLOOD BY AUTOMATED COUNT: 16 % (ref 12.3–15.4)
FERRITIN SERPL-MCNC: 86.5 NG/ML (ref 30–400)
HCT VFR BLD AUTO: 41.4 % (ref 37.5–51)
HGB BLD-MCNC: 12.9 G/DL (ref 13–17.7)
IMM GRANULOCYTES # BLD AUTO: 0.01 10*3/MM3 (ref 0–0.05)
IMM GRANULOCYTES NFR BLD AUTO: 0.1 % (ref 0–0.5)
IRON 24H UR-MRATE: 86 MCG/DL (ref 59–158)
IRON SATN MFR SERPL: 23 % (ref 20–50)
LYMPHOCYTES # BLD AUTO: 1.47 10*3/MM3 (ref 0.7–3.1)
LYMPHOCYTES NFR BLD AUTO: 17.8 % (ref 19.6–45.3)
MCH RBC QN AUTO: 27.6 PG (ref 26.6–33)
MCHC RBC AUTO-ENTMCNC: 31.2 G/DL (ref 31.5–35.7)
MCV RBC AUTO: 88.7 FL (ref 79–97)
MONOCYTES # BLD AUTO: 0.79 10*3/MM3 (ref 0.1–0.9)
MONOCYTES NFR BLD AUTO: 9.6 % (ref 5–12)
NEUTROPHILS NFR BLD AUTO: 5.83 10*3/MM3 (ref 1.7–7)
NEUTROPHILS NFR BLD AUTO: 70.7 % (ref 42.7–76)
PLATELET # BLD AUTO: 214 10*3/MM3 (ref 140–450)
PMV BLD AUTO: 10 FL (ref 6–12)
RBC # BLD AUTO: 4.67 10*6/MM3 (ref 4.14–5.8)
TIBC SERPL-MCNC: 367 MCG/DL (ref 298–536)
TRANSFERRIN SERPL-MCNC: 246 MG/DL (ref 200–360)
WBC NRBC COR # BLD AUTO: 8.25 10*3/MM3 (ref 3.4–10.8)

## 2024-09-04 PROCEDURE — 83540 ASSAY OF IRON: CPT

## 2024-09-04 PROCEDURE — 84466 ASSAY OF TRANSFERRIN: CPT

## 2024-09-04 PROCEDURE — 85025 COMPLETE CBC W/AUTO DIFF WBC: CPT

## 2024-09-04 PROCEDURE — 82728 ASSAY OF FERRITIN: CPT

## 2024-09-04 PROCEDURE — 36415 COLL VENOUS BLD VENIPUNCTURE: CPT

## 2024-09-05 ENCOUNTER — OFFICE VISIT (OUTPATIENT)
Dept: ONCOLOGY | Facility: HOSPITAL | Age: 84
End: 2024-09-05
Payer: MEDICARE

## 2024-09-05 ENCOUNTER — OFFICE VISIT (OUTPATIENT)
Dept: RADIATION ONCOLOGY | Facility: HOSPITAL | Age: 84
End: 2024-09-05
Payer: MEDICARE

## 2024-09-05 VITALS
HEIGHT: 67 IN | OXYGEN SATURATION: 91 % | WEIGHT: 155.2 LBS | BODY MASS INDEX: 24.36 KG/M2 | RESPIRATION RATE: 16 BRPM | HEART RATE: 92 BPM | DIASTOLIC BLOOD PRESSURE: 65 MMHG | SYSTOLIC BLOOD PRESSURE: 120 MMHG | TEMPERATURE: 98 F

## 2024-09-05 VITALS
HEART RATE: 101 BPM | TEMPERATURE: 98 F | BODY MASS INDEX: 24.31 KG/M2 | WEIGHT: 155.2 LBS | DIASTOLIC BLOOD PRESSURE: 57 MMHG | SYSTOLIC BLOOD PRESSURE: 112 MMHG | OXYGEN SATURATION: 94 % | RESPIRATION RATE: 20 BRPM

## 2024-09-05 DIAGNOSIS — R97.20 ELEVATED PSA: ICD-10-CM

## 2024-09-05 DIAGNOSIS — C34.32 MALIGNANT NEOPLASM OF LOWER LOBE OF LEFT LUNG: Primary | ICD-10-CM

## 2024-09-05 DIAGNOSIS — Z87.01 HISTORY OF RECENT PNEUMONIA: ICD-10-CM

## 2024-09-05 DIAGNOSIS — Z90.2 HISTORY OF LOBECTOMY OF LUNG: ICD-10-CM

## 2024-09-05 DIAGNOSIS — Z92.3 HISTORY OF EXTERNAL BEAM RADIATION THERAPY: ICD-10-CM

## 2024-09-05 DIAGNOSIS — T45.1X5A CHEMOTHERAPY-INDUCED NEUROPATHY: ICD-10-CM

## 2024-09-05 DIAGNOSIS — D50.9 IRON DEFICIENCY ANEMIA, UNSPECIFIED IRON DEFICIENCY ANEMIA TYPE: Primary | ICD-10-CM

## 2024-09-05 DIAGNOSIS — C34.12 MALIGNANT NEOPLASM OF UPPER LOBE, LEFT BRONCHUS OR LUNG: ICD-10-CM

## 2024-09-05 DIAGNOSIS — N40.1 BENIGN LOCALIZED PROSTATIC HYPERPLASIA WITH LOWER URINARY TRACT SYMPTOMS (LUTS): ICD-10-CM

## 2024-09-05 DIAGNOSIS — Z08 ENCOUNTER FOR FOLLOW-UP SURVEILLANCE OF LUNG CANCER: ICD-10-CM

## 2024-09-05 DIAGNOSIS — J44.9 CHRONIC OBSTRUCTIVE PULMONARY DISEASE, UNSPECIFIED COPD TYPE: ICD-10-CM

## 2024-09-05 DIAGNOSIS — G62.0 CHEMOTHERAPY-INDUCED NEUROPATHY: ICD-10-CM

## 2024-09-05 DIAGNOSIS — Z85.118 ENCOUNTER FOR FOLLOW-UP SURVEILLANCE OF LUNG CANCER: ICD-10-CM

## 2024-09-05 PROCEDURE — G0463 HOSPITAL OUTPT CLINIC VISIT: HCPCS | Performed by: NURSE PRACTITIONER

## 2024-09-05 NOTE — PROGRESS NOTES
Chief Complaint  LUNG CA-- 3 MO FOLLOW UP patient will have labs at [facility]    Glen, MD Glen Padilla, Payam Allen MD      Subjective          Carlos Zimmerman presents to [facility] for iron defieiency anemia    Lung Cancer  Pertinent negatives include no abdominal pain, arthralgias, chest pain, coughing, diaphoresis, fatigue, fever, joint swelling, myalgias, nausea, numbness, rash, sore throat, swollen glands, vomiting or weakness.        Mr. Carlos Zimmerman presents for follow up for iron deficiency anemia and lung cancer. History of prior left lung NSCLC with adenocarcinoma with left VATS procedure and prior chemotherapy per Dr. Saul. Completed SBRT for left sided recurrence 3/29/24. Recently in hospital for pneumonia. Completed antibiotics. Feels back to baseline. No fevers, chills. Cough resolved. No bleeding. Remains off oral iron.  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 Follows with Dr. Mai for elevated PSA, following conservatively.     Hematology History  He was started on oral iron 1 tab QD back in April 2022. He is tolerating med well.    2/27/23: Iron studies normal. CBC normal. No anemia.     He is following with Dr. Mai with urology for elevated PSA. MRI of prostate on 2/23/23 showed a small solitary suspicious lesion in the left posterior mid gland peripheral zone, PI-RADS 4.    8/28/23: Hgb 13.8, MCV 92.4, plt 153, WBC 9.24. TIBC 277 (low), ferritin 176.8, iron sat 39%    2/23/24: Iron studies normal. No anemia, normal CBC. Hold oral iron.     9/4/24: Hgb 12.9, ferritin 86, iron sat 23%      Cancer Staging  No matching staging information was found for the patient.     Treatment intent: curative    Oncology/Hematology History   Malignant neoplasm of upper lobe, left bronchus or lung   3/18/2024 Initial Diagnosis    Malignant neoplasm of upper lobe, left bronchus or lung     3/25/2024 - 3/29/2024 Radiation    Radiation  OncologyTreatment Course:  Carlos Zimmerman received 5500 cGy in 5 fractions to via SBRT to the left upper lobe.      This is a very pleasant 76-year-old gentleman who presents with follow-up for     lobectomy with adjuvant chemotherapy.            1) Left lung:  Pathology 5/2016: FNA: Poorly differentiated adenocarcinoma: 5.5     mm BONNIE nodule. 5/2016.       Left lung lobectomy: VATS procedure: Dr. Saul in [place].Path:multifocal     invasive moderately differentiated adenocarcinoma, acinar type, 2.0 x 1.6 x 1.4     cm. Additional nodule 0.6 x 0.5 x 0.3 cm: margins free of tumor, negative LN's.     Visceral pleural invasion noted. (7/12/16). Staging: Stage IIB:  pT3N0.       Carboplatin / Paclitaxel x 4 cycles: 8/10/16 - 10/14/16.      CT CAP: No evidence of disease. 2/2017.       CT CAP: No evidence of disease. 12/11/17.       CT chest: Right lower base: improving consolidation. 2/18/19.       CT chest: T6 / T10 compression fractures: non-pathologic. (8/28/19)      CT chest in ER: likely pneumonia/inflammation. No new evidence of recurring     cancer: (4/4/20)      CT chest: Stable 1.5 cm subpleural ground glass opacity in the BONNIE, new patchy     areas on non-specific ground glass opacity throughout the LLL. (3/2021).       2/15/24: CT chest without contrast: small area of nodularity/consolidation in left upper lung. Stable 3 mm right upper lobe nodule. No new nodule.      2/23/24: NM PET:  Along the posterior medial margin, there is soft tissue nodularity measuring up to about 1.4 centimeters which is hypermetabolic maximum SUV 4.9 most concerning for neoplasm.  This appears larger and more solid compared to more remote CT scans.  Consider biopsy. No other lesions hypermetabolic.        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                    Review of Systems   Constitutional:  Positive for unexpected weight  loss. Negative for appetite change, diaphoresis, fatigue, fever and unexpected weight gain.   HENT:  Positive for hearing loss (Left ear). Negative for mouth sores, sore throat, swollen glands, trouble swallowing and voice change.    Eyes:  Negative for blurred vision, double vision, pain, redness and visual disturbance.   Respiratory:  Negative for cough, shortness of breath and wheezing.    Cardiovascular:  Negative for chest pain, palpitations and leg swelling.   Gastrointestinal:  Negative for abdominal pain, blood in stool, constipation, diarrhea, nausea and vomiting.   Endocrine: Negative for cold intolerance, heat intolerance, polydipsia and polyuria.   Genitourinary:  Negative for decreased urine volume, difficulty urinating, dysuria, frequency, hematuria and urinary incontinence.   Musculoskeletal:  Negative for arthralgias, back pain, joint swelling and myalgias.   Skin:  Negative for color change, rash, skin lesions and wound.   Neurological:  Negative for dizziness, seizures, weakness, numbness and headache.   Hematological:  Negative for adenopathy. Does not bruise/bleed easily.   Psychiatric/Behavioral:  Negative for depressed mood. The patient is not nervous/anxious.    All other systems reviewed and are negative.      Current Outpatient Medications on File Prior to Visit   Medication Sig Dispense Refill    albuterol sulfate  (90 Base) MCG/ACT inhaler Inhale 2 puffs Every 4 (Four) Hours As Needed for Wheezing or Shortness of Air.      aspirin 81 MG EC tablet Take 1 tablet by mouth Daily.      atorvastatin (LIPITOR) 40 MG tablet Take 1 tablet by mouth Daily.      B Complex Vitamins (VITAMIN-B COMPLEX PO) Take 1 tablet by mouth Daily.      cetirizine (zyrTEC) 10 MG tablet Take 1 tablet by mouth Daily.      Cholecalciferol (VITAMIN D3 PO) Take 1 tablet by mouth Daily.      cilostazol (PLETAL) 100 MG tablet Take 1 tablet by mouth 2 (Two) Times a Day.      Fluticasone-Umeclidin-Vilant (TRELEGY  ELLIPTA) 200-62.5-25 MCG/ACT inhaler Inhale 1 puff Daily. 1 each 11    gabapentin (NEURONTIN) 300 MG capsule Take 1 capsule by mouth 3 (Three) Times a Day. 90 capsule 5    ipratropium-albuterol (DUO-NEB) 0.5-2.5 mg/3 ml nebulizer Take 3 mL by nebulization 4 (Four) Times a Day As Needed for Wheezing or Shortness of Air. 120 mL 5    metoprolol succinate XL (TOPROL-XL) 25 MG 24 hr tablet Take 1 tablet by mouth Daily.      multivitamin with minerals tablet tablet Take 1 tablet by mouth Daily.      multivitamins-minerals (PRESERVISION AREDS 2) capsule capsule Take 1 capsule by mouth 2 (Two) Times a Day.      pantoprazole (PROTONIX) 40 MG EC tablet Take 1 tablet by mouth Daily.      Refresh Tears 0.5 % solution Administer 1 drop to both eyes Daily As Needed for Dry Eyes.      tamsulosin (FLOMAX) 0.4 MG capsule 24 hr capsule Take 1 capsule by mouth 2 (Two) Times a Day. 180 capsule 4     Current Facility-Administered Medications on File Prior to Visit   Medication Dose Route Frequency Provider Last Rate Last Admin    ipratropium-albuterol (DUO-NEB) nebulizer solution 3 mL  3 mL Nebulization Once Gely Canales, ARTEMIO           No Known Allergies  Past Medical History:   Diagnosis Date    Anemia     NO CURRENT ISSUES    Arthritis     COPD (chronic obstructive pulmonary disease)     INHALER  PRN    Hyperlipidemia     Hypertension     ON MEDS/FOLLOWS SAHNI, PT DENIES CAD    Lung cancer     REMOVED - 2016    Pneumonia     LEFT LUNG CA NO CURRENT PNEUMONIA    SOB (shortness of breath)     WITH EXERTION SOMETIMES    Stroke     LEFT LEG/FOOT DROP  2019    TIA (transient ischemic attack)     NO RESIDUAL 2010     Past Surgical History:   Procedure Laterality Date    BRONCHOSCOPY WITH ION ROBOTIC ASSIST N/A 3/4/2024    Procedure: BRONCHOSCOPY WITH ION ROBOT, REBUS, NEEDLE ASPIRATE, BIOPSIES, BAL, WASHINGS;  Surgeon: Dayne Choudhary MD;  Location: [facility] MAIN OR;  Service: Robotics - Pulmonary;  Laterality: N/A;    CARDIAC  CATHETERIZATION Left 11/10/2022    Procedure: Aortogram with left leg angiogram, possible angioplasty or stenting;  Surgeon: Cuauhtemoc Thomas MD;  Location: [facility] CATH INVASIVE LOCATION;  Service: Vascular;  Laterality: Left;    CARDIAC CATHETERIZATION Right 2023    Procedure: Right leg angiogram, possible angioplasty or stenting;  Surgeon: Cuauhtemoc Thomas MD;  Location: [facility] CATH INVASIVE LOCATION;  Service: Vascular;  Laterality: Right;    CARPAL TUNNEL RELEASE Left 2024    Procedure: CARPAL TUNNEL RELEASE, left;  Surgeon: Rg Tom MD;  Location: [facility] MAIN OR;  Service: Neurosurgery;  Laterality: Left;    LUNG BIOPSY      LUNG SURGERY      REMOVAL HALF OF UPPER PORTION LEFT LUNG    ORTHOPEDIC SURGERY Left     ROTATOR CUFF    THROAT SURGERY      throat diverticulum repair     Social History     Socioeconomic History    Marital status:    Tobacco Use    Smoking status: Former     Current packs/day: 0.00     Average packs/day: 1 pack/day for 60.0 years (60.0 ttl pk-yrs)     Types: Cigarettes     Start date:      Quit date:      Years since quittin.6     Passive exposure: Past    Smokeless tobacco: Current     Types: Snuff    Tobacco comments:     INST PER ANESTHESIA PROTOCOL   Vaping Use    Vaping status: Never Used   Substance and Sexual Activity    Alcohol use: Not Currently    Drug use: Never    Sexual activity: Defer     Family History   Problem Relation Age of Onset    Cancer Mother     Stomach cancer Other     Malig Hyperthermia Neg Hx      Immunization History   Administered Date(s) Administered    COVID-19 (PFIZER) Purple Cap Monovalent 2021, 2021, 10/18/2021    COVID-19 F23 (PFIZER) 12YRS+ (COMIRNATY) 11/10/2023    Fluad Quad 65+ 10/07/2020, 2021    Fluzone High-Dose 65+yrs 10/06/2022, 2023    Influenza, Unspecified 2020    Pneumococcal Conjugate 20-Valent (PCV20) 2024    TD Preservative Free (Tenivac) 2022       Objective  "  Physical Exam  Constitutional:       Appearance: Normal appearance.   HENT:      Head: Normocephalic and atraumatic.      Nose: Nose normal.   Eyes:      Conjunctiva/sclera: Conjunctivae normal.   Pulmonary:      Effort: Pulmonary effort is normal.   Neurological:      General: No focal deficit present.      Mental Status: He is alert. Mental status is at baseline.   Psychiatric:         Mood and Affect: Mood normal.         Behavior: Behavior normal.         Thought Content: Thought content normal.         Vitals:    09/05/24 0952   Pulse: 92   Resp: 16   Temp: 98 °F (36.7 °C)   TempSrc: Temporal   SpO2: 91%   Weight: 70.4 kg (155 lb 3.3 oz)   Height: 170.2 cm (67.01\")   PainSc: 0-No pain         ECOG score: 1         ECOG: (0) Fully Active - Able to Carry On All Pre-disease Performance Without Restriction  Fall Risk Assessment was completed, and patient is at low risk for falls.  PHQ-9 Total Score: 0       The patient is  experiencing fatigue. Fatigue score: 1    PT/OT Functional Screening: PT fx screen: No needs identified  Speech Functional Screening: Speech fx screen: No needs identified  Rehab to be ordered: Rehab to be ordered: No needs identified        Result Review :   The following data was reviewed by: Juan Jose Alatorre MD on 09/05/24:  Lab Results   Component Value Date    HGB 12.9 (L) 09/04/2024    HCT 41.4 09/04/2024    MCV 88.7 09/04/2024     09/04/2024    WBC 8.25 09/04/2024    NEUTROABS 5.83 09/04/2024    LYMPHSABS 1.47 09/04/2024    MONOSABS 0.79 09/04/2024    EOSABS 0.11 09/04/2024    BASOSABS 0.04 09/04/2024     Lab Results   Component Value Date    GLUCOSE 97 08/20/2024    BUN 9 08/20/2024    CREATININE 0.62 (L) 08/20/2024     08/20/2024    K 3.9 08/20/2024     08/20/2024    CO2 26.0 08/20/2024    CALCIUM 8.6 08/20/2024    PROTEINTOT 5.3 (L) 08/20/2024    ALBUMIN 2.9 (L) 08/20/2024    BILITOT 1.5 (H) 08/20/2024    ALKPHOS 292 (H) 08/20/2024    AST 68 (H) 08/20/2024    " ALT 91 (H) 08/20/2024     Labs personally reviewed. Mild anemia. Normal iron studies. PSA increased to 15.     Rad onc note personally reviewed    Urology note personally reviewed      CT chest personally reviewed with opacities in RLL and BONNIE per my independent review.     CT Chest Without Contrast Diagnostic    Result Date: 9/3/2024  Impression: 1.Previous left upper lobectomy. 2.Interval development of multiple airspace opacities predominantly in the right lower lobe but also elsewhere as noted above. Multifocal pneumonia is favored as the most likely etiology. Consider a follow-up chest CT in 2 to 3 months to reevaluate. 3.Chronic T8 and T12 compression fractures, unchanged. Electronically Signed: Chauncey Montejo MD  9/3/2024 4:16 PM EDT  Workstation ID: [id number]    CT Abdomen Pelvis With Contrast    Result Date: 8/19/2024  Impression: Partially visualized right lower lobe pneumonia. Circumferential wall thickening of the distal esophagus which can be seen with esophagitis/reflux. Nonobstructing left renal nephrolithiasis. Prominent portacaval lymph nodes which are presumably reactive. Additional chronic ancillary findings as above. Electronically Signed: Andres Corrigan MD  8/19/2024 1:35 PM EDT  Workstation ID: [id number]    XR Chest 1 View    Result Date: 8/19/2024  Impression: 1. Development of right lower lobe airspace disease compatible with pneumonia. 2. Scarring and cavitary lesion in the peripheral left upper lung measuring 1.7 cm. Electronically Signed: Fara Parker MD  8/19/2024 8:48 AM EDT  Workstation ID: [id number]          Assessment and Plan    Diagnoses and all orders for this visit:    1. Iron deficiency anemia, unspecified iron deficiency anemia type (Primary)  -     CBC & Differential; Future  -     Ferritin; Future  -     Iron Profile; Future    2. Chemotherapy-induced neuropathy    3. Elevated PSA    4. Malignant neoplasm of upper lobe, left bronchus or lung          BONNIE adenocarcinoma of  lung  S/p lobectomy and completed adjuvant chemotherapy 10/2016. 2/2024 CT chest reviewed with concern for recurrence on left side. Subsequent PET with 1.4 cm lesion, SUV 4.9. 3/4/24 biopsy positive for adenocarcinoma. Completed SBRT with Dr. Alex on 3/29/24. CT 9/3/24 with multiple airspace opacities, no evidence of cancer, repeat scan in 2 months ordered by rad onc.      JESÚS  For iron deficiency, 9/2024 iron labs normal and only mild anemia on labs. Ok to hold oral iron. Repeat labs in 4 months.    Chemo induced neuropathy  Gabapentin refilled.    Elevated PSA  Small concerning lesion on recent MRI. PSA up to 16.6 5/28/24, but now at 15.8 as of 7/2/24. Patient follows with Dr. Mai with urology. Following conservatively. Will defer management to urology.      Patient Follow Up: 4 months        Patient was given instructions and counseling regarding his condition or for health maintenance advice. Please see specific information pulled into the AVS if appropriate.

## 2024-09-05 NOTE — PROGRESS NOTES
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          History of Present Illness: Carlos Zimmerman is a 84 y.o. male who returns to [facility] for short interval 3 month follow-up of his lung cancer. He was hospitalized 8/19/24 - 8/21/24 for right lower lobe pneumonia and sepsis. He reports his breathing has improved since being discharged and today he tells me that his breathing is at his baseline. He chronically has shortness of breath with exertion related to his COPD and this has not worsened from his baseline. Denies cough or hemoptysis. In regards to his elevated PSA, he has undergone MRI prostate and after discussion with Dr. Mai he has opted to follow this conservatively at this time. Repeat PSA will be done in January and if PSA continues to rise then Urology will have another discussion regarding prostate biopsy.            Subjective      Review of Systems: Review of Systems   Constitutional:  Negative for appetite change, chills, fatigue and fever.   HENT:  Positive for hearing loss. Negative for sore throat and trouble swallowing.    Eyes:  Negative for visual disturbance (Wears glasses).   Respiratory:  Positive for cough (Now very seldom, improved since last visit.) and shortness of breath (r/t copd, noted with activity,  ongoing). Negative for chest tightness and wheezing.    Cardiovascular:  Negative for chest pain, palpitations and leg swelling.   Gastrointestinal:  Negative for abdominal pain, blood in stool, constipation, diarrhea, nausea and vomiting.   Genitourinary:  Negative for difficulty urinating, dysuria, frequency, hematuria and urgency.   Musculoskeletal:  Positive for arthralgias (Generalized, ongoing) and gait problem (uses cane for stability). Negative for back pain and joint swelling.   Skin:  Positive for wound. Negative for color change and rash.   Neurological:  Negative for dizziness, weakness, light-headedness and headaches.   Psychiatric/Behavioral:  Negative for sleep disturbance.        The following portions of the patient's history were reviewed and updated as appropriate: allergies, current medications, past family history, past medical history, past social history, past surgical history and problem list.    Medications:     Current Outpatient Medications:     aspirin 81 MG EC tablet, Take 1 tablet by mouth Daily., Disp: , Rfl:     atorvastatin (LIPITOR) 40 MG tablet, Take 1 tablet by mouth Daily., Disp: , Rfl:     B Complex Vitamins (VITAMIN-B COMPLEX PO), Take 1 tablet by mouth Daily., Disp: , Rfl:     cetirizine (zyrTEC) 10 MG tablet, Take 1 tablet by mouth Daily., Disp: , Rfl:     Cholecalciferol (VITAMIN D3 PO), Take 1 tablet by mouth Daily., Disp: , Rfl:     cilostazol (PLETAL) 100 MG tablet, Take 1 tablet by mouth 2 (Two) Times a Day., Disp: , Rfl:     Fluticasone-Umeclidin-Vilant (TRELEGY ELLIPTA) 200-62.5-25 MCG/ACT inhaler, Inhale 1 puff Daily., Disp: 1 each, Rfl: 11    gabapentin (NEURONTIN) 300 MG capsule, Take 1 capsule by mouth 3 (Three) Times a Day., Disp: 90 capsule, Rfl: 5    metoprolol succinate XL (TOPROL-XL) 25 MG 24 hr tablet, Take 1 tablet by mouth Daily., Disp: , Rfl:     multivitamin with minerals tablet tablet, Take 1 tablet by mouth Daily., Disp: , Rfl:      multivitamins-minerals (PRESERVISION AREDS 2) capsule capsule, Take 1 capsule by mouth 2 (Two) Times a Day., Disp: , Rfl:     pantoprazole (PROTONIX) 40 MG EC tablet, Take 1 tablet by mouth Daily., Disp: , Rfl:     Refresh Tears 0.5 % solution, Administer 1 drop to both eyes Daily As Needed for Dry Eyes., Disp: , Rfl:     tamsulosin (FLOMAX) 0.4 MG capsule 24 hr capsule, Take 1 capsule by mouth 2 (Two) Times a Day., Disp: 180 capsule, Rfl: 4    albuterol sulfate  (90 Base) MCG/ACT inhaler, Inhale 2 puffs Every 4 (Four) Hours As Needed for Wheezing or Shortness of Air., Disp: , Rfl:     ipratropium-albuterol (DUO-NEB) 0.5-2.5 mg/3 ml nebulizer, Take 3 mL by nebulization 4 (Four) Times a Day As Needed for Wheezing or Shortness of Air., Disp: 120 mL, Rfl: 5    Current Facility-Administered Medications:     ipratropium-albuterol (DUO-NEB) nebulizer solution 3 mL, 3 mL, Nebulization, Once, Gely Canales APRN    Allergies:   No Known Allergies    Patient Smoking History:   Social History     Tobacco Use   Smoking Status Former    Current packs/day: 0.00    Average packs/day: 1 pack/day for 60.0 years (60.0 ttl pk-yrs)    Types: Cigarettes    Start date:     Quit date:     Years since quittin.6    Passive exposure: Past   Smokeless Tobacco Current   Tobacco Comments    INST PER ANESTHESIA PROTOCOL       Measures:  PHQ-9 Total Score: 0   Quality of Life: 90 - Limited Activity   ECOG score: 1  ECOG: (1) Restricted in physically strenuous activity, ambulatory and able to do work of light nature  Pain: (on a scale of 0-10)   Pain Score    24   PainSc: 0-No pain     Objective     Physical Exam:   Vital Signs:   Vitals:    24   BP: 112/57   Pulse: 101   Resp: 20   Temp: 98 °F (36.7 °C)   TempSrc: Temporal   SpO2: 94%   Weight: 70.4 kg (155 lb 3.3 oz)   PainSc: 0-No pain     Body mass index is 24.31 kg/m².   Wt Readings from Last 3 Encounters:   24 70.4 kg (155 lb 3.3 oz)    08/06/24 71.2 kg (156 lb 14.4 oz)   07/09/24 69.4 kg (153 lb)       Physical Exam  Vitals reviewed.   Constitutional:       General: He is not in acute distress.     Appearance: Normal appearance. He is normal weight. He is not ill-appearing.   HENT:      Head: Normocephalic and atraumatic.      Mouth/Throat:      Mouth: Mucous membranes are moist.      Pharynx: Oropharynx is clear. No oropharyngeal exudate or posterior oropharyngeal erythema.   Eyes:      Conjunctiva/sclera: Conjunctivae normal.      Pupils: Pupils are equal, round, and reactive to light.   Cardiovascular:      Rate and Rhythm: Normal rate and regular rhythm.      Pulses: Normal pulses.      Heart sounds: Normal heart sounds.   Pulmonary:      Effort: Pulmonary effort is normal. No respiratory distress.      Comments: Diminished RLL. No adventitious lung sounds throughout.   Musculoskeletal:         General: Normal range of motion.      Cervical back: Normal range of motion.   Skin:     General: Skin is warm and dry.   Neurological:      General: No focal deficit present.      Mental Status: He is alert and oriented to person, place, and time. Mental status is at baseline.   Psychiatric:         Mood and Affect: Mood normal.         Behavior: Behavior normal.       Result Review: I independently reviewed the following data. I discussed and reviewed imaging with Dr. Alex.   -- CT Chest Without Contrast Diagnostic (09/03/2024 09:00)   -- Recent hospitalization records/discharge summary   -- CT Abdomen Pelvis With Contrast (08/19/2024 13:16)   -- MRI Prostate W & WO Contrast (07/03/2024 10:41)   -- PSA DIAGNOSTIC (07/02/2024 09:11)   -- CT Chest Without Contrast (05/29/2024 09:04)   -- PSA DIAGNOSTIC (05/28/2024 11:28)   -- Tissue Pathology Exam (03/04/2024 09:09)   -- Non-gynecologic Cytology (03/04/2024 08:55)   -- NM PET/CT Skull Base to Mid Thigh (02/23/2024 10:21)     Pathology:   Lab Results   Component Value Date    CLININFO  03/04/2024      Abnormal PET scan of lung  Lung nodule      FINALDX  03/04/2024     Lung, left upper lobe mass, biopsy:   - Adenocarcinoma, lepidic pattern    REMARKS: The above positive (malignant) diagnosis was called to Mona Iqbal RN in Dr. Choudhary's office at 13:31 EDT on 3/11/2024 by Rachel ZHONG        Imaging: CT Chest Without Contrast Diagnostic    Result Date: 9/3/2024  Impression: 1.Previous left upper lobectomy. 2.Interval development of multiple airspace opacities predominantly in the right lower lobe but also elsewhere as noted above. Multifocal pneumonia is favored as the most likely etiology. Consider a follow-up chest CT in 2 to 3 months to reevaluate. 3.Chronic T8 and T12 compression fractures, unchanged. Electronically Signed: Chauncey Montejo MD  9/3/2024 4:16 PM EDT  Workstation ID: [id number]    CT Abdomen Pelvis With Contrast    Result Date: 8/19/2024  Impression: Partially visualized right lower lobe pneumonia. Circumferential wall thickening of the distal esophagus which can be seen with esophagitis/reflux. Nonobstructing left renal nephrolithiasis. Prominent portacaval lymph nodes which are presumably reactive. Additional chronic ancillary findings as above. Electronically Signed: Andres Corrigan MD  8/19/2024 1:35 PM EDT  Workstation ID: [id number]    XR Chest 1 View    Result Date: 8/19/2024  Impression: 1. Development of right lower lobe airspace disease compatible with pneumonia. 2. Scarring and cavitary lesion in the peripheral left upper lung measuring 1.7 cm. Electronically Signed: Fara Parker MD  8/19/2024 8:48 AM EDT  Workstation ID: [id number]      Labs:   WBC   Date Value Ref Range Status   09/04/2024 8.25 3.40 - 10.80 10*3/mm3 Final     Hemoglobin   Date Value Ref Range Status   09/04/2024 12.9 (L) 13.0 - 17.7 g/dL Final     Hematocrit   Date Value Ref Range Status   09/04/2024 41.4 37.5 - 51.0 % Final     Platelets   Date Value Ref Range Status   09/04/2024 214 140 - 450 10*3/mm3 Final      Creatinine   Date Value Ref Range Status   08/20/2024 0.62 (L) 0.76 - 1.27 mg/dL Final   03/12/2024 0.70 0.60 - 1.30 mg/dL Final     Comment:     Serial Number: [id number]:  [id number]     BUN   Date Value Ref Range Status   08/20/2024 9 8 - 23 mg/dL Final     eGFR   Date Value Ref Range Status   08/20/2024 93.7 >60.0 mL/min/1.73 Final     TSH   Date Value Ref Range Status   12/13/2022 1.760 0.270 - 4.200 uIU/mL Final      PSA   Date Value Ref Range Status   07/02/2024 15.800 (H) 0.000 - 4.000 ng/mL Final   05/28/2024 16.600 (H) 0.000 - 4.000 ng/mL Final   11/27/2023 11.800 (H) 0.000 - 4.000 ng/mL Final   08/28/2023 11.900 (H) 0.000 - 4.000 ng/mL Final   01/18/2023 8.070 (H) 0.000 - 4.000 ng/mL Final     Assessment / Plan      Impression: Carlos Zimmerman is a pleasant 84 y.o. male with history of left lung NSCLC (adenocarcinoma) status post VATS left upper lobectomy and adjuvant chemotherapy in 2016. He most recently was diagnosed with cT1b cN0 cM0 adenocarcinoma of the left upper lobe. Pathology from biopsy of left upper lobe mass on 3/4/24 revealed adenocarcinoma, lepidic pattern. Staging PET/CT scan on 2/23/24 shows no evidence of metastatic disease. He is status post SBRT to the left upper lobe, completed on 3/29/2024. Received 5500 cGy in 5 fractions. His restaging CT chest on 5/29/2024 demonstrates a slight interval increase in size of the treated left upper lobe nodule now measuring 18 x 11 mm (previously 12 x 9 mm). Additionally there is complete right middle lobe collapse with mucous within right middle lobe bronchus. The finding in the left upper lobe is favored to be evolving treatment related change at this time and we will continue to monitor. In regards to the right middle lobe collapse, he has been evaluated by Pulmonology and they are continuing to monitor this. His recent CT chest on 9/3/24 demonstrates stable findings in the left lung. There are also findings representing his recent right  lower lobe pneumonia. CT chest results have been forward to Dr. Choudhary per his request. He is clinically doing well overall and his breathing has returned to his baseline. Will repeat CT chest in 2 months to document resolution of pneumonia.     COPD: followed by Pulmonology. He was hospitalized 8/19/24 - 8/21/24 for right lower lobe pneumonia and sepsis. He is not scheduled with Pulmonology for hospital follow-up and is currently scheduled to follow-up with them in November.     Elevated PSA: PSA 16.6 ng/mL on 5/28/24. PSA slightly decreased to 15.8 ng/mL on 7/2/24.  Followed by Dr. Mai with Urology. MRI prostate on 7/3/24 demonstrates enlarging PI-RADS 4 lesion in the posterolateral left peripheral zone of the mid gland, now measuring 1.4 cm (previously 1 cm on 2/20/23). At visit with Dr. Mai on 7/9/24 it was decided to follow conservatively and repeat PSA in 6 months (1/2025). Will defer management to Urology at this time as no biopsy has been performed.     Assessment/Plan:   Diagnoses and all orders for this visit:    1. Malignant neoplasm of lower lobe of left lung (Primary)  -     CT Chest Without Contrast; Future    2. History of lobectomy of lung  Comments:  left upper lobectomy in 2016    3. History of external beam radiation therapy  -     CT Chest Without Contrast; Future    4. Encounter for follow-up surveillance of lung cancer    5. Chronic obstructive pulmonary disease, unspecified COPD type    6. History of recent pneumonia  Comments:  8/2024  Orders:  -     CT Chest Without Contrast; Future    7. Elevated PSA    8. Benign localized prostatic hyperplasia with lower urinary tract symptoms (LUTS)       Follow Up:   Return for follow-up in 2 months with CT chest prior.    Follow-up with Dr. Alatorre today.   Clinic staff to contact Pulmonology regarding scheduling hospital follow-up with their office. Currently scheduled with ARTEMIO Caal on 11/7/24.   Follow-up with Dr. Mai on  1/10/25.     Return in about 2 months (around 11/5/2024) for Office Visit.  Carlos Zimmerman was encouraged to contact me in the interim with any questions or concerns regarding his care.      ARETMIO Campbell  Radiation Oncology  [facility]    This document has been signed by ARTEMIO Garcia on September 5, 2024 09:14 EDT

## 2024-09-07 LAB
QT INTERVAL: 312 MS
QTC INTERVAL: 433 MS

## 2024-09-10 ENCOUNTER — OFFICE VISIT (OUTPATIENT)
Dept: PULMONOLOGY | Facility: CLINIC | Age: 84
End: 2024-09-10
Payer: MEDICARE

## 2024-09-10 VITALS
TEMPERATURE: 97.9 F | WEIGHT: 156.3 LBS | OXYGEN SATURATION: 99 % | DIASTOLIC BLOOD PRESSURE: 58 MMHG | HEART RATE: 46 BPM | RESPIRATION RATE: 16 BRPM | HEIGHT: 67 IN | BODY MASS INDEX: 24.53 KG/M2 | SYSTOLIC BLOOD PRESSURE: 121 MMHG

## 2024-09-10 DIAGNOSIS — J18.9 PNEUMONIA OF RIGHT LUNG DUE TO INFECTIOUS ORGANISM, UNSPECIFIED PART OF LUNG: ICD-10-CM

## 2024-09-10 DIAGNOSIS — J44.9 CHRONIC OBSTRUCTIVE PULMONARY DISEASE, UNSPECIFIED COPD TYPE: ICD-10-CM

## 2024-09-10 DIAGNOSIS — R06.02 SHORTNESS OF BREATH: ICD-10-CM

## 2024-09-10 DIAGNOSIS — C34.12 MALIGNANT NEOPLASM OF UPPER LOBE, LEFT BRONCHUS OR LUNG: ICD-10-CM

## 2024-09-10 DIAGNOSIS — R91.8 ABNORMAL CT SCAN OF LUNG: Primary | ICD-10-CM

## 2024-09-10 DIAGNOSIS — F17.211 NICOTINE DEPENDENCE, CIGARETTES, IN REMISSION: ICD-10-CM

## 2024-09-10 PROCEDURE — 3074F SYST BP LT 130 MM HG: CPT | Performed by: NURSE PRACTITIONER

## 2024-09-10 PROCEDURE — 1160F RVW MEDS BY RX/DR IN RCRD: CPT | Performed by: NURSE PRACTITIONER

## 2024-09-10 PROCEDURE — 1159F MED LIST DOCD IN RCRD: CPT | Performed by: NURSE PRACTITIONER

## 2024-09-10 PROCEDURE — 3078F DIAST BP <80 MM HG: CPT | Performed by: NURSE PRACTITIONER

## 2024-09-10 PROCEDURE — 99214 OFFICE O/P EST MOD 30 MIN: CPT | Performed by: NURSE PRACTITIONER

## 2024-09-10 NOTE — PROGRESS NOTES
Primary Care Provider  Payam Carroll MD   Referring Provider  No ref. provider found    Patient Complaint  Hospital Follow Up Visit, COPD, and Pneumonia      Subjective       History of Presenting Illness  Carlos Zimmerman is a pleasant 84 y.o. male who presents to [facility] for follow-up.  Patient was admitted to [facility] 8/19 through 8/21/2024 for right lower lobe pneumonia, sepsis.  Hospital course includes the following: Carlos Zimmerman is a 84 y.o. 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  Denies any sputum production.  Denies any abdominal pain, nausea, vomiting, chest pain.  Does not use oxygen at home.     In the ED, vital signs temperature 99.1, pulse 106, respiratory 18, blood pressure 87/47 on room air saturating around 93%.  Initial troponin 49, normal proBNP, potassium 3.4, , AST//138, total bili 1.5, WBC 20.24, normal hemoglobin, platelets.  Chest x-ray showed development of right lower lobe airspace disease compatible with pneumonia.  Scarring and cavitary lesion in the peripheral left upper lung measuring 1.7 cm..  Negative.  Received IV fluids and ceftriaxone azithromycin in the ED.  The patient has been admitted for further evaluation and management of sepsis, right lower lobe pneumonia, hypokalemia, transaminitis, patient's respiratory status improved, he will be treated with 5 additional days of Augmentin.  Patient should follow-up with pulmonology as an outpatient.  Patient underwent 6-minute walk test for which he passed, he did not require home O2.  Patient is discharged home today in stable condition.    Patient last saw Dr. Choudhary 8/6/2024.  Patient had a CT scan on  9/3/2024 which showed interval development of multiple airspace opacities predominantly in the right lobe but also elsewhere noted .  Multifocal pneumonia is favored as the most likely etiology.  Patient is here to discuss possible bronchoscopy.    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  He had bronchoscopy in March 2024 and  was found to have adenocarcinoma of left upper lobe, with lung nodule with pneumatocele 1.5 cm.  It was treated with stereotactic radiation through radiation oncology.  He is also under the care of cardiology.  He continues on Trelegy 200 daily and albuterol and/or DuoNeb as needed for shortness of air or wheeze.      Patient presents today stating he is feeling much better since his hospitalization.  He has completed the Augmentin he was discharged home on.  He continues on Trelegy 200, albuterol and duo nebulizer.  He does feel like he has some phlegm at times. Patient denies  wheezing, headaches, chest pain, weight loss or hemoptysis. Patient denies fevers, chills and night sweats. Carlos Zimmerman is able to perform ADLs without difficulties.    I have personally reviewed the review of systems, past family, social, medical and surgical histories; and agree with their findings.      Review of Systems   Constitutional: Negative.    HENT:          Phlegm in throat   Respiratory:  Positive for cough, shortness of breath and wheezing.    Cardiovascular: Negative.    Musculoskeletal: Negative.    Neurological: Negative.    Psychiatric/Behavioral: Negative.           Family History   Problem Relation Age of Onset    Cancer Mother     Stomach cancer Other     Malig Hyperthermia Neg Hx         Social History     Socioeconomic History    Marital status:    Tobacco Use    Smoking status: Former     Current packs/day: 0.00     Average  packs/day: 1 pack/day for 60.0 years (60.0 ttl pk-yrs)     Types: Cigarettes     Start date:      Quit date: 2010     Years since quittin.7     Passive exposure: Past    Smokeless tobacco: Current     Types: Snuff    Tobacco comments:     INST PER ANESTHESIA PROTOCOL   Vaping Use    Vaping status: Never Used   Substance and Sexual Activity    Alcohol use: Not Currently    Drug use: Never    Sexual activity: Defer        Past Medical History:   Diagnosis Date    Anemia     NO CURRENT ISSUES    Arthritis     COPD (chronic obstructive pulmonary disease)     INHALER  PRN    Hyperlipidemia     Hypertension     ON MEDS/FOLLOWS SAHNI, PT DENIES CAD    Lung cancer     REMOVED -     Pneumonia     LEFT LUNG CA NO CURRENT PNEUMONIA    SOB (shortness of breath)     WITH EXERTION SOMETIMES    Stroke     LEFT LEG/FOOT DROP      TIA (transient ischemic attack)     NO RESIDUAL         Immunization History   Administered Date(s) Administered    COVID-19 (PFIZER) Purple Cap Monovalent 2021, 2021, 10/18/2021    COVID-19 F23 (PFIZER) 12YRS+ (COMIRNATY) 11/10/2023    Fluad Quad 65+ 10/07/2020, 2021    Fluzone High-Dose 65+yrs 10/06/2022, 2023    Influenza, Unspecified 2020    Pneumococcal Conjugate 20-Valent (PCV20) 2024    TD Preservative Free (Tenivac) 2022       No Known Allergies       Current Outpatient Medications:     albuterol sulfate  (90 Base) MCG/ACT inhaler, Inhale 2 puffs Every 4 (Four) Hours As Needed for Wheezing or Shortness of Air., Disp: , Rfl:     aspirin 81 MG EC tablet, Take 1 tablet by mouth Daily., Disp: , Rfl:     atorvastatin (LIPITOR) 40 MG tablet, Take 1 tablet by mouth Daily., Disp: , Rfl:     B Complex Vitamins (VITAMIN-B COMPLEX PO), Take 1 tablet by mouth Daily., Disp: , Rfl:     cetirizine (zyrTEC) 10 MG tablet, Take 1 tablet by mouth Daily., Disp: , Rfl:     Cholecalciferol (VITAMIN D3 PO), Take 1 tablet by mouth Daily., Disp: , Rfl:     " cilostazol (PLETAL) 100 MG tablet, Take 1 tablet by mouth 2 (Two) Times a Day., Disp: , Rfl:     Fluticasone-Umeclidin-Vilant (TRELEGY ELLIPTA) 200-62.5-25 MCG/ACT inhaler, Inhale 1 puff Daily., Disp: 1 each, Rfl: 11    gabapentin (NEURONTIN) 300 MG capsule, Take 1 capsule by mouth 3 (Three) Times a Day., Disp: 90 capsule, Rfl: 5    ipratropium-albuterol (DUO-NEB) 0.5-2.5 mg/3 ml nebulizer, Take 3 mL by nebulization 4 (Four) Times a Day As Needed for Wheezing or Shortness of Air., Disp: 120 mL, Rfl: 5    metoprolol succinate XL (TOPROL-XL) 25 MG 24 hr tablet, Take 1 tablet by mouth Daily., Disp: , Rfl:     multivitamin with minerals tablet tablet, Take 1 tablet by mouth Daily., Disp: , Rfl:     multivitamins-minerals (PRESERVISION AREDS 2) capsule capsule, Take 1 capsule by mouth 2 (Two) Times a Day., Disp: , Rfl:     pantoprazole (PROTONIX) 40 MG EC tablet, Take 1 tablet by mouth Daily., Disp: , Rfl:     Refresh Tears 0.5 % solution, Administer 1 drop to both eyes Daily As Needed for Dry Eyes., Disp: , Rfl:     tamsulosin (FLOMAX) 0.4 MG capsule 24 hr capsule, Take 1 capsule by mouth 2 (Two) Times a Day., Disp: 180 capsule, Rfl: 4    Current Facility-Administered Medications:     ipratropium-albuterol (DUO-NEB) nebulizer solution 3 mL, 3 mL, Nebulization, Once, Gely Canales APRN         Vital Signs   /58 (BP Location: Left arm, Patient Position: Sitting, Cuff Size: Adult)   Pulse (!) 46   Temp 97.9 °F (36.6 °C)   Resp 16   Ht 170.2 cm (67\")   Wt 70.9 kg (156 lb 4.8 oz)   SpO2 99% Comment: room air  BMI 24.48 kg/m²       Objective     Physical Exam  Vitals reviewed.   Constitutional:       General: He is not in acute distress.     Appearance: Normal appearance. He is not ill-appearing.   HENT:      Head: Normocephalic and atraumatic.      Nose: Nose normal.      Mouth/Throat:      Mouth: Mucous membranes are moist.      Pharynx: Oropharynx is clear.   Eyes:      Extraocular Movements: Extraocular " movements intact.      Conjunctiva/sclera: Conjunctivae normal.      Pupils: Pupils are equal, round, and reactive to light.   Cardiovascular:      Rate and Rhythm: Normal rate and regular rhythm.      Pulses: Normal pulses.      Heart sounds: Normal heart sounds.   Pulmonary:      Effort: Pulmonary effort is normal. No respiratory distress.      Breath sounds: Normal breath sounds. No stridor. No wheezing, rhonchi or rales.   Abdominal:      General: Bowel sounds are normal.   Musculoskeletal:         General: Normal range of motion.      Cervical back: Normal range of motion and neck supple.   Skin:     General: Skin is warm and dry.   Neurological:      Mental Status: He is alert and oriented to person, place, and time.   Psychiatric:         Behavior: Behavior normal.         Results Review  I have personally reviewed the prior office notes, hospital records, labs, and diagnostics.  CT Chest Without Contrast Diagnostic [[id number]] (Order [id number])  Order  Status: Final result     Study Notes     Luz Rosario on 9/3/2024  9:01 AM EDT   [facility]    CTDI 3.43  PT HERE FOR EVAL OF LUNG CANCER, DENIES COMPLAINTS     Appointment Information    PACS Images     Radiology Images  Study Result    Narrative & Impression   CT CHEST WO CONTRAST DIAGNOSTIC     Date of Exam: 9/3/2024 8:50 AM EDT     Indication: NSCLC of left upper lobe s/p SBRT, surveillance.     Comparison: None available.     Technique: Axial CT images were obtained of the chest without contrast administration.  Reconstructed coronal and sagittal images were also obtained. Automated exposure control and iterative construction methods were used.        Findings:  No significant pleural or pericardial effusion is seen. No adenopathy or effusion is identified. Moderate atherosclerotic changes are noted in the thoracic aorta. Severe coronary artery atherosclerotic disease is noted.     Moderate emphysematous changes are noted. A left upper  lobectomy has been performed. In the lateral aspect of the upper left lung field, there is a 2.4 cm x 1.0 cm air-containing cavity, similar to the previous study. Adjacent to this is a soft tissue   density measuring 2.3 cm x 6.7 cm, similar to the previous exam. Posteromedial to this is a new airspace opacity measuring 2.0 cm x 1.7 cm. In the right upper lobe on image 47 is a new nodular density measuring 0.6 cm x 0.5 cm. I do nodular density is   noted in the right upper lobe on image 55 measuring 0.8 cm. Several new airspace opacities are noted scattered throughout the right lower lobe. There is persistent airspace opacity in the right middle lobe which overall appears improved in the interval.     The upper abdomen appears unremarkable.     There is a compression fracture of T8 with approximately 50% loss of vertebral body height. There is a compression fracture of T12 with approximately 40% loss of vertebral body height. Findings appear similar to the previous study. No destructive osseous   lesion is seen.     IMPRESSION:  Impression:  1.Previous left upper lobectomy.  2.Interval development of multiple airspace opacities predominantly in the right lower lobe but also elsewhere as noted above. Multifocal pneumonia is favored as the most likely etiology. Consider a follow-up chest CT in 2 to 3 months to reevaluate.  3.Chronic T8 and T12 compression fractures, unchanged.           Electronically Signed: Chauncey Montejo MD    9/3/2024 4:16 PM EDT    Workstation ID: [id number]     Assessment         Patient Active Problem List   Diagnosis    COPD (chronic obstructive pulmonary disease)    Hypertension    Low back pain    Lung disease    Solitary lung nodule    Pneumonia of right lung due to infectious organism    Hematuria    Complicated UTI (urinary tract infection)    Recurrent urinary tract infection    Benign prostatic hyperplasia    Atherosclerosis of lower extremity with claudication    Benign prostatic  hyperplasia with lower urinary tract symptoms    Elevated PSA    Cervical radiculopathy    Cervical spondylosis without myelopathy    Abnormal PET scan of lung    Lung nodule    Malignant neoplasm of upper lobe, left bronchus or lung    Sepsis due to pneumonia        Plan     Diagnoses and all orders for this visit:    1. Abnormal CT scan of lung (Primary)  Comments:  patient wants to hold off on bronchoscopy at this time, repeat CT ordered 11/5, will RTO sooner if he wants bronch  Orders:  -     Respiratory Culture - Sputum, Cough; Future  -     AFB Culture - , Cough; Future    2. Chronic obstructive pulmonary disease, unspecified COPD type  -     Respiratory Culture - Sputum, Cough; Future  -     AFB Culture - , Cough; Future    3. Shortness of breath  -     Respiratory Culture - Sputum, Cough; Future  -     AFB Culture - , Cough; Future    4. Malignant neoplasm of upper lobe, left bronchus or lung  -     Respiratory Culture - Sputum, Cough; Future  -     AFB Culture - , Cough; Future    5. Nicotine dependence, cigarettes, in remission  -     Respiratory Culture - Sputum, Cough; Future  -     AFB Culture - , Cough; Future    6. Pneumonia of right lung due to infectious organism, unspecified part of lung  Comments:  AFB abd respiratory culture ordered, will notify of results when available  Orders:  -     Respiratory Culture - Sputum, Cough; Future  -     AFB Culture - , Cough; Future             Smoking status:  reports that he quit smoking about 14 years ago. His smoking use included cigarettes. He started smoking about 74 years ago. He has a 60 pack-year smoking history. He has been exposed to tobacco smoke. His smokeless tobacco use includes snuff.  Vaccination status: Reviewed  Immunization History   Administered Date(s) Administered    COVID-19 (PFIZER) Purple Cap Monovalent 02/28/2021, 03/11/2021, 10/18/2021    COVID-19 F23 (PFIZER) 12YRS+ (COMIRNATY) 11/10/2023    Fluad Quad 65+ 10/07/2020, 09/24/2021     Fluzone High-Dose 65+yrs 10/06/2022, 11/11/2023    Influenza, Unspecified 09/01/2020    Pneumococcal Conjugate 20-Valent (PCV20) 04/22/2024    TD Preservative Free (Tenivac) 06/27/2022      Medications personally reviewed    Follow Up  Return for Next scheduled follow up.    Patient was given instructions and counseling regarding his condition or for health maintenance advice. Please see specific information pulled into the AVS if appropriate.     I spent 25 minutes caring for Carlos Charlestner on this date of service. This time includes time spent by me in the following activities:preparing for the visit, reviewing tests, obtaining and/or reviewing a separately obtained history, performing a medically appropriate examination and/or evaluation, counseling and educating the patient/family/caregiver, ordering medications, tests, or procedures, documenting information in the medical record, independently interpreting results and communicating that information with the patient/family/caregiver and answered questions family members, discuss medications.

## 2024-09-30 ENCOUNTER — LAB (OUTPATIENT)
Dept: LAB | Facility: HOSPITAL | Age: 84
End: 2024-09-30
Payer: MEDICARE

## 2024-09-30 DIAGNOSIS — J18.9 PNEUMONIA OF RIGHT LUNG DUE TO INFECTIOUS ORGANISM, UNSPECIFIED PART OF LUNG: ICD-10-CM

## 2024-09-30 DIAGNOSIS — F17.211 NICOTINE DEPENDENCE, CIGARETTES, IN REMISSION: ICD-10-CM

## 2024-09-30 DIAGNOSIS — C34.12 MALIGNANT NEOPLASM OF UPPER LOBE, LEFT BRONCHUS OR LUNG: ICD-10-CM

## 2024-09-30 DIAGNOSIS — J44.9 CHRONIC OBSTRUCTIVE PULMONARY DISEASE, UNSPECIFIED COPD TYPE: ICD-10-CM

## 2024-09-30 DIAGNOSIS — R91.8 ABNORMAL CT SCAN OF LUNG: ICD-10-CM

## 2024-09-30 DIAGNOSIS — R06.02 SHORTNESS OF BREATH: ICD-10-CM

## 2024-09-30 PROCEDURE — 87205 SMEAR GRAM STAIN: CPT

## 2024-09-30 PROCEDURE — 87077 CULTURE AEROBIC IDENTIFY: CPT

## 2024-09-30 PROCEDURE — 87206 SMEAR FLUORESCENT/ACID STAI: CPT

## 2024-09-30 PROCEDURE — 87186 SC STD MICRODIL/AGAR DIL: CPT

## 2024-09-30 PROCEDURE — 87116 MYCOBACTERIA CULTURE: CPT

## 2024-09-30 PROCEDURE — 87070 CULTURE OTHR SPECIMN AEROBIC: CPT

## 2024-10-03 DIAGNOSIS — A49.8 PSEUDOMONAS INFECTION: Primary | ICD-10-CM

## 2024-10-03 LAB
BACTERIA SPEC RESP CULT: ABNORMAL
BACTERIA SPEC RESP CULT: ABNORMAL
GRAM STN SPEC: ABNORMAL

## 2024-10-03 RX ORDER — LEVOFLOXACIN 750 MG/1
750 TABLET, FILM COATED ORAL DAILY
Qty: 14 TABLET | Refills: 0 | Status: SHIPPED | OUTPATIENT
Start: 2024-10-03

## 2024-11-04 NOTE — PROGRESS NOTES
Primary Care Provider  Payam Carroll MD   Referring Provider  No ref. provider found    Patient Complaint  Follow-up, COPD, and Shortness of Breath      Subjective       History of Presenting Illness  Carlos Zimmerman is a pleasant 84 y.o. male who presents to [facility] for follow-up appointment.  I last saw the patient 9/10/2024.  Patient has a history of right lower lobe pneumonia, COPD, hyperlipidemia, hypertension, left lung cancer status post left upper lobectomy.  Patient had had a CT scan 9/3/2024 which revealed an interval development of multiple airspace opacities predominantly in the right lower lobe but also elsewhere as noted above, multifocal pneumonia is favored is the lung most likely etiology.      Follow-up CT of his chest 11/5/2024 at 10:15 AM reveals new consolidation in the left upper lobe with smaller bilateral consolidation suggesting a multifocal pneumonia.  Findings appear worse from prior CT September 3, 2024.  Cavitary lesion with adjacent soft tissue thickening along the lateral left upper lobe is difficult to visualize but likely unchanged.  There is a small left pleural effusion and mild emphysema present.    Patient continues on Trelegy 200, albuterol and duo nebulizer as needed for shortness of air or wheeze.  Patient had an AFB and respiratory sputum cultures.  The respiratory culture grew Pseudomonas aeruginosa for which patient was treated with levofloxacin for 14 days.  Preliminary result shows AFB has no growth to date at 5 weeks.    Patient states he does have shortness of air with exertional activities but he improves with rest.  He does not have oxygen at home and does not feel he needs it at this time.  Patient denies coughing, wheezing, headaches, chest pain, weight loss or hemoptysis. Patient denies fevers, chills and night sweats. Carlos Zimmerman is able to perform ADLs without difficulties.    I have personally  reviewed the review of systems, past family, social, medical and surgical histories; and agree with their findings.      Review of Systems   Constitutional: Negative.    HENT: Negative.     Respiratory: Negative.  Positive for shortness of breath.    Cardiovascular: Negative.    Musculoskeletal: Negative.    Neurological: Negative.    Psychiatric/Behavioral: Negative.           Family History   Problem Relation Age of Onset    Cancer Mother     Stomach cancer Other     Malig Hyperthermia Neg Hx         Social History     Socioeconomic History    Marital status:    Tobacco Use    Smoking status: Former     Current packs/day: 0.00     Average packs/day: 1 pack/day for 60.0 years (60.0 ttl pk-yrs)     Types: Cigarettes     Start date:      Quit date:      Years since quittin.8     Passive exposure: Past    Smokeless tobacco: Current     Types: Snuff    Tobacco comments:     INST PER ANESTHESIA PROTOCOL   Vaping Use    Vaping status: Never Used   Substance and Sexual Activity    Alcohol use: Not Currently    Drug use: Never    Sexual activity: Defer        Past Medical History:   Diagnosis Date    Anemia     NO CURRENT ISSUES    Arthritis     COPD (chronic obstructive pulmonary disease)     INHALER  PRN    Hyperlipidemia     Hypertension     ON MEDS/FOLLOWS SAHNI, PT DENIES CAD    Lung cancer     REMOVED -     Pneumonia     LEFT LUNG CA NO CURRENT PNEUMONIA    SOB (shortness of breath)     WITH EXERTION SOMETIMES    Stroke     LEFT LEG/FOOT DROP      TIA (transient ischemic attack)     NO RESIDUAL         Immunization History   Administered Date(s) Administered    COVID-19 (PFIZER) 12YRS+ (COMIRNATY) 11/10/2023    COVID-19 (PFIZER) Purple Cap Monovalent 2021, 2021, 10/18/2021    FLUAD TRI 65YR+ 2024    Fluad Quad 65+ 10/07/2020, 2021    Fluzone High-Dose 65+yrs 10/06/2022, 2023    Influenza, Unspecified 2020    Pneumococcal Conjugate 20-Valent (PCV20)  04/22/2024    TD Preservative Free (Tenivac) 06/27/2022       No Known Allergies       Current Outpatient Medications:     albuterol sulfate  (90 Base) MCG/ACT inhaler, Inhale 2 puffs Every 4 (Four) Hours As Needed for Wheezing or Shortness of Air., Disp: 18 g, Rfl: 3    aspirin 81 MG EC tablet, Take 1 tablet by mouth Daily., Disp: , Rfl:     atorvastatin (LIPITOR) 40 MG tablet, Take 1 tablet by mouth Daily., Disp: , Rfl:     B Complex Vitamins (VITAMIN-B COMPLEX PO), Take 1 tablet by mouth Daily., Disp: , Rfl:     cetirizine (zyrTEC) 10 MG tablet, Take 1 tablet by mouth Daily., Disp: , Rfl:     Cholecalciferol (VITAMIN D3 PO), Take 1 tablet by mouth Daily., Disp: , Rfl:     cilostazol (PLETAL) 100 MG tablet, Take 1 tablet by mouth 2 (Two) Times a Day., Disp: , Rfl:     clopidogrel (Plavix) 75 MG tablet, Take 1 tablet by mouth Daily., Disp: , Rfl:     cyclobenzaprine (FLEXERIL) 10 MG tablet, Every 8 (Eight) Hours., Disp: , Rfl:     Fluticasone-Umeclidin-Vilant (TRELEGY ELLIPTA) 200-62.5-25 MCG/ACT inhaler, Inhale 1 puff Daily., Disp: 1 each, Rfl: 11    gabapentin (NEURONTIN) 300 MG capsule, Take 1 capsule by mouth 3 (Three) Times a Day., Disp: 90 capsule, Rfl: 5    ipratropium-albuterol (DUO-NEB) 0.5-2.5 mg/3 ml nebulizer, Take 3 mL by nebulization 4 (Four) Times a Day As Needed for Wheezing or Shortness of Air., Disp: 120 mL, Rfl: 5    losartan (COZAAR) 50 MG tablet, Take 1 tablet by mouth Daily., Disp: , Rfl:     metoprolol succinate XL (TOPROL-XL) 25 MG 24 hr tablet, Take 1 tablet by mouth Daily., Disp: , Rfl:     metoprolol tartrate (LOPRESSOR) 25 MG tablet, Take 0.5 tablets by mouth Daily., Disp: , Rfl:     multivitamin with minerals tablet tablet, Take 1 tablet by mouth Daily., Disp: , Rfl:     multivitamins-minerals (PRESERVISION AREDS 2) capsule capsule, Take 1 capsule by mouth 2 (Two) Times a Day., Disp: , Rfl:     pantoprazole (PROTONIX) 40 MG EC tablet, Take 1 tablet by mouth Daily., Disp: , Rfl:  "    Refresh Tears 0.5 % solution, Administer 1 drop to both eyes Daily As Needed for Dry Eyes., Disp: , Rfl:     tamsulosin (FLOMAX) 0.4 MG capsule 24 hr capsule, Take 1 capsule by mouth 2 (Two) Times a Day., Disp: 180 capsule, Rfl: 4    doxycycline (VIBRAMYCIN) 100 MG capsule, Take 1 capsule by mouth 2 (Two) Times a Day., Disp: 20 capsule, Rfl: 0  No current facility-administered medications for this visit.         Vital Signs   /61 (BP Location: Right arm, Patient Position: Sitting, Cuff Size: Adult)   Pulse 101   Temp 96.9 °F (36.1 °C)   Resp 16   Ht 170.2 cm (67\")   Wt 69.9 kg (154 lb)   SpO2 95%   BMI 24.12 kg/m²       Objective     Physical Exam  Vitals reviewed.   Constitutional:       Appearance: Normal appearance.   HENT:      Head: Normocephalic and atraumatic.      Nose: Nose normal.      Mouth/Throat:      Mouth: Mucous membranes are moist.      Pharynx: Oropharynx is clear.   Eyes:      Extraocular Movements: Extraocular movements intact.      Conjunctiva/sclera: Conjunctivae normal.      Pupils: Pupils are equal, round, and reactive to light.   Cardiovascular:      Rate and Rhythm: Normal rate and regular rhythm.      Pulses: Normal pulses.      Heart sounds: Normal heart sounds.   Pulmonary:      Effort: Pulmonary effort is normal. No respiratory distress.      Breath sounds: No stridor. Examination of the right-upper field reveals decreased breath sounds. Examination of the right-middle field reveals decreased breath sounds. Decreased breath sounds present. No wheezing, rhonchi or rales.   Abdominal:      General: Bowel sounds are normal.   Musculoskeletal:         General: Normal range of motion.      Cervical back: Normal range of motion and neck supple.   Skin:     General: Skin is warm and dry.   Neurological:      Mental Status: He is alert and oriented to person, place, and time.   Psychiatric:         Behavior: Behavior normal.         Results Review  I have personally reviewed " the prior office notes, hospital records, labs, and diagnostics.    CT Chest Without Contrast Diagnostic [[id number]] (Order [id number])  Order  Status: Final result     Study Notes     Callie Butler on 11/5/2024  9:54 AM EST   LEFT LUNG CANCER, PNEUMONIA FOLLOW UP, NO CHEST COMPLAINTS     Appointment Information    PACS Images     Radiology Images  Study Result    Narrative & Impression   CT CHEST WO CONTRAST DIAGNOSTIC     Date of Exam: 11/5/2024 9:53 AM EST     Indication: Monitoring for resolution of RLL pneumonia. NSCLC of left upper lobe s/p SBRT, surveillance.     Comparison: September 3, 2024     Technique: Axial CT images were obtained of the chest without contrast administration.  Reconstructed coronal and sagittal images were also obtained. Automated exposure control and iterative construction methods were used.        Findings:  There are changes from left upper lobectomy, and the residual central tracheobronchial tree is clear. There is mild emphysema. There is a new dense consolidation within the left upper lung, with smaller consolidations within the right upper lobe, right   middle lobe, and left lower lung. A known cavitary lesion with adjacent soft tissue thickening within the lateral left upper lung is difficult to see secondary to the overlying consolidation, but is likely unchanged. There is a small left pleural   effusion. A 4 mm posterior right upper lobe nodule on image 29 is unchanged.     The heart size appears normal, with evidence of calcified coronary artery disease. The great vessels are normal in caliber. There is some mediastinal shift to the left. No abnormally enlarged lymph nodes are identified.     Partial evaluation of the upper abdomen is unremarkable.     No aggressive osseous lesions are identified. There are stable chronic compression deformities at T8 and T12.     IMPRESSION:  Impression:  1.New dense consolidation within left upper lung, with smaller bilateral consolidations,  suggesting multifocal pneumonia. Findings appear worse from prior CT from September 3, 2024.  2.Cavitary lesion with adjacent soft tissue thickening along lateral left upper lung is difficult to visualize, but likely unchanged.  3.Small left pleural effusion.  4.Mild emphysema.           Electronically Signed: Hussain Martinez MD    11/6/2024 3:39 PM EST    Workstation ID: [id number]       Assessment         Patient Active Problem List   Diagnosis    COPD (chronic obstructive pulmonary disease)    Hypertension    Low back pain    Lung disease    Solitary lung nodule    Pneumonia of right lung due to infectious organism    Hematuria    Complicated UTI (urinary tract infection)    Recurrent urinary tract infection    Benign prostatic hyperplasia    Atherosclerosis of lower extremity with claudication    Benign prostatic hyperplasia with lower urinary tract symptoms    Elevated PSA    Cervical radiculopathy    Cervical spondylosis without myelopathy    Abnormal PET scan of lung    Lung nodule    Malignant neoplasm of upper lobe, left bronchus or lung    Sepsis due to pneumonia        Plan     Diagnoses and all orders for this visit:    1. Multifocal pneumonia (Primary)  Comments:  Followup CT of chest without contrast has been ordered by Rad/Onc in 2 months  Orders:  -     doxycycline (VIBRAMYCIN) 100 MG capsule; Take 1 capsule by mouth 2 (Two) Times a Day.  Dispense: 20 capsule; Refill: 0    2. Pseudomonas infection    3. Abnormal CT scan of lung    4. Shortness of breath  Comments:  Encouraged use of nebulizer, refill on albuterol    5. Chronic obstructive pulmonary disease, unspecified COPD type    Other orders  -     albuterol sulfate  (90 Base) MCG/ACT inhaler; Inhale 2 puffs Every 4 (Four) Hours As Needed for Wheezing or Shortness of Air.  Dispense: 18 g; Refill: 3        5.  Discussed with patient recurrent pneumonias.  Patient denies any type of aspiration, coughing or choking with eating foods or drinking  liquids.  Patient will follow back up after his CT scan in January or sooner if needed      Smoking status:  reports that he quit smoking about 14 years ago. His smoking use included cigarettes. He started smoking about 74 years ago. He has a 60 pack-year smoking history. He has been exposed to tobacco smoke. His smokeless tobacco use includes snuff.    Vaccination status: Reviewed  Immunization History   Administered Date(s) Administered    COVID-19 (PFIZER) 12YRS+ (COMIRNATY) 11/10/2023    COVID-19 (PFIZER) Purple Cap Monovalent 02/28/2021, 03/11/2021, 10/18/2021    FLUAD TRI 65YR+ 09/06/2024    Fluad Quad 65+ 10/07/2020, 09/24/2021    Fluzone High-Dose 65+yrs 10/06/2022, 11/11/2023    Influenza, Unspecified 09/01/2020    Pneumococcal Conjugate 20-Valent (PCV20) 04/22/2024    TD Preservative Free (Tenivac) 06/27/2022        Medications personally reviewed    Follow Up  Return in about 2 months (around 1/20/2025) for after CT scan with Dr. Choudhary or Gely.    Patient was given instructions and counseling regarding his condition or for health maintenance advice. Please see specific information pulled into the AVS if appropriate.     I spent 20 minutes caring for Carlos Zimmerman on this date of service. This time includes time spent by me in the following activities:preparing for the visit, reviewing tests, obtaining and/or reviewing a separately obtained history, performing a medically appropriate examination and/or evaluation, counseling and educating the patient/family/caregiver, ordering medications, tests, or procedures, documenting information in the medical record, independently interpreting results and communicating that information with the patient/family/caregiver and answered questions family members, discuss medications.

## 2024-11-05 ENCOUNTER — HOSPITAL ENCOUNTER (OUTPATIENT)
Dept: CT IMAGING | Facility: HOSPITAL | Age: 84
Discharge: HOME | End: 2024-11-05
Admitting: NURSE PRACTITIONER
Payer: MEDICARE

## 2024-11-05 DIAGNOSIS — Z87.01 HISTORY OF RECENT PNEUMONIA: ICD-10-CM

## 2024-11-05 DIAGNOSIS — C34.32 MALIGNANT NEOPLASM OF LOWER LOBE OF LEFT LUNG: ICD-10-CM

## 2024-11-05 DIAGNOSIS — Z92.3 HISTORY OF EXTERNAL BEAM RADIATION THERAPY: ICD-10-CM

## 2024-11-05 PROCEDURE — 71250 CT THORAX DX C-: CPT

## 2024-11-07 ENCOUNTER — OFFICE VISIT (OUTPATIENT)
Dept: PULMONOLOGY | Facility: CLINIC | Age: 84
End: 2024-11-07
Payer: MEDICARE

## 2024-11-07 ENCOUNTER — OFFICE VISIT (OUTPATIENT)
Dept: RADIATION ONCOLOGY | Facility: HOSPITAL | Age: 84
End: 2024-11-07
Payer: MEDICARE

## 2024-11-07 VITALS
TEMPERATURE: 97.8 F | DIASTOLIC BLOOD PRESSURE: 60 MMHG | RESPIRATION RATE: 20 BRPM | BODY MASS INDEX: 24.27 KG/M2 | WEIGHT: 154.98 LBS | HEART RATE: 111 BPM | OXYGEN SATURATION: 96 % | SYSTOLIC BLOOD PRESSURE: 101 MMHG

## 2024-11-07 VITALS
BODY MASS INDEX: 24.17 KG/M2 | SYSTOLIC BLOOD PRESSURE: 108 MMHG | WEIGHT: 154 LBS | TEMPERATURE: 96.9 F | HEIGHT: 67 IN | HEART RATE: 101 BPM | RESPIRATION RATE: 16 BRPM | OXYGEN SATURATION: 95 % | DIASTOLIC BLOOD PRESSURE: 61 MMHG

## 2024-11-07 DIAGNOSIS — Z87.01 HISTORY OF RECENT PNEUMONIA: ICD-10-CM

## 2024-11-07 DIAGNOSIS — R06.02 SHORTNESS OF BREATH: ICD-10-CM

## 2024-11-07 DIAGNOSIS — R97.20 ELEVATED PSA: ICD-10-CM

## 2024-11-07 DIAGNOSIS — Z08 ENCOUNTER FOR FOLLOW-UP SURVEILLANCE OF LUNG CANCER: ICD-10-CM

## 2024-11-07 DIAGNOSIS — N40.1 BENIGN LOCALIZED PROSTATIC HYPERPLASIA WITH LOWER URINARY TRACT SYMPTOMS (LUTS): ICD-10-CM

## 2024-11-07 DIAGNOSIS — Z85.118 ENCOUNTER FOR FOLLOW-UP SURVEILLANCE OF LUNG CANCER: ICD-10-CM

## 2024-11-07 DIAGNOSIS — R91.8 ABNORMAL CT SCAN OF LUNG: ICD-10-CM

## 2024-11-07 DIAGNOSIS — Z92.3 HISTORY OF EXTERNAL BEAM RADIATION THERAPY: ICD-10-CM

## 2024-11-07 DIAGNOSIS — J44.9 CHRONIC OBSTRUCTIVE PULMONARY DISEASE, UNSPECIFIED COPD TYPE: ICD-10-CM

## 2024-11-07 DIAGNOSIS — A49.8 PSEUDOMONAS INFECTION: ICD-10-CM

## 2024-11-07 DIAGNOSIS — Z90.2 HISTORY OF LOBECTOMY OF LUNG: ICD-10-CM

## 2024-11-07 DIAGNOSIS — C34.32 MALIGNANT NEOPLASM OF LOWER LOBE OF LEFT LUNG: Primary | ICD-10-CM

## 2024-11-07 DIAGNOSIS — J18.9 MULTIFOCAL PNEUMONIA: Primary | ICD-10-CM

## 2024-11-07 PROCEDURE — 1160F RVW MEDS BY RX/DR IN RCRD: CPT | Performed by: NURSE PRACTITIONER

## 2024-11-07 PROCEDURE — 3078F DIAST BP <80 MM HG: CPT | Performed by: NURSE PRACTITIONER

## 2024-11-07 PROCEDURE — 99214 OFFICE O/P EST MOD 30 MIN: CPT | Performed by: NURSE PRACTITIONER

## 2024-11-07 PROCEDURE — 3074F SYST BP LT 130 MM HG: CPT | Performed by: NURSE PRACTITIONER

## 2024-11-07 PROCEDURE — 1159F MED LIST DOCD IN RCRD: CPT | Performed by: NURSE PRACTITIONER

## 2024-11-07 PROCEDURE — G0463 HOSPITAL OUTPT CLINIC VISIT: HCPCS | Performed by: NURSE PRACTITIONER

## 2024-11-07 RX ORDER — CYCLOBENZAPRINE HCL 10 MG
TABLET ORAL EVERY 8 HOURS SCHEDULED
COMMUNITY

## 2024-11-07 RX ORDER — ALBUTEROL SULFATE 90 UG/1
2 INHALANT RESPIRATORY (INHALATION) EVERY 4 HOURS PRN
Qty: 18 G | Refills: 3 | Status: SHIPPED | OUTPATIENT
Start: 2024-11-07

## 2024-11-07 RX ORDER — CLOPIDOGREL BISULFATE 75 MG/1
1 TABLET ORAL DAILY
COMMUNITY

## 2024-11-07 RX ORDER — METOPROLOL TARTRATE 25 MG/1
0.5 TABLET, FILM COATED ORAL DAILY
COMMUNITY
Start: 2024-10-21

## 2024-11-07 RX ORDER — LOSARTAN POTASSIUM 50 MG/1
1 TABLET ORAL DAILY
COMMUNITY

## 2024-11-07 RX ORDER — DOXYCYCLINE 100 MG/1
100 CAPSULE ORAL 2 TIMES DAILY
Qty: 20 CAPSULE | Refills: 0 | Status: SHIPPED | OUTPATIENT
Start: 2024-11-07

## 2024-11-07 NOTE — PROGRESS NOTES
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          History of Present Illness: Carlos Zimmerman is a 84 y.o. male who returns to [facility] for short interval 2 month follow-up of his lung cancer. He reports feeling well overall but does note that recently he feels like his breathing has been slightly worse from his baseline. He chronically has shortness of breath with exertion related to his COPD. He has a nonproductive cough. Denies hemoptysis. He is scheduled to see Pulmonology later this morning.            Subjective      Review of Systems: Review of Systems   Constitutional:  Negative for activity change, appetite change, chills, fatigue, fever and unexpected weight change.   HENT:  Positive for hearing loss (Ongoing). Negative for sore throat and trouble swallowing.    Eyes:  Positive for visual disturbance (Wears glasses, worsening but r/t macular degeneration.).   Respiratory:  Positive for cough (Occasional, non productive, ongoing), shortness of breath (r/t copd, noted with activity, ongoing) and wheezing (Noted a couple of days ago but resolved.). Negative for chest tightness.    Cardiovascular:  Negative for chest pain, palpitations and leg swelling.   Gastrointestinal:  Negative for abdominal pain, blood in stool, constipation,  diarrhea, nausea and vomiting.   Genitourinary:  Negative for difficulty urinating, dysuria, frequency, hematuria and urgency.   Musculoskeletal:  Positive for arthralgias (Generalized, ongoing) and gait problem (uses cane for stability). Negative for back pain and joint swelling.   Skin:  Negative for color change and rash.   Neurological:  Positive for weakness (Noted in legs, ongoing but worsening.) and numbness (Neuropathy in hands, ongoing). Negative for dizziness, tremors, seizures, syncope, speech difficulty and headaches.   Psychiatric/Behavioral:  Negative for sleep disturbance.        The following portions of the patient's history were reviewed and updated as appropriate: allergies, current medications, past family history, past medical history, past social history, past surgical history and problem list.    Medications:     Current Outpatient Medications:     albuterol sulfate  (90 Base) MCG/ACT inhaler, Inhale 2 puffs Every 4 (Four) Hours As Needed for Wheezing or Shortness of Air., Disp: , Rfl:     aspirin 81 MG EC tablet, Take 1 tablet by mouth Daily., Disp: , Rfl:     atorvastatin (LIPITOR) 40 MG tablet, Take 1 tablet by mouth Daily., Disp: , Rfl:     B Complex Vitamins (VITAMIN-B COMPLEX PO), Take 1 tablet by mouth Daily., Disp: , Rfl:     cetirizine (zyrTEC) 10 MG tablet, Take 1 tablet by mouth Daily., Disp: , Rfl:     Cholecalciferol (VITAMIN D3 PO), Take 1 tablet by mouth Daily., Disp: , Rfl:     cilostazol (PLETAL) 100 MG tablet, Take 1 tablet by mouth 2 (Two) Times a Day., Disp: , Rfl:     Fluticasone-Umeclidin-Vilant (TRELEGY ELLIPTA) 200-62.5-25 MCG/ACT inhaler, Inhale 1 puff Daily., Disp: 1 each, Rfl: 11    gabapentin (NEURONTIN) 300 MG capsule, Take 1 capsule by mouth 3 (Three) Times a Day., Disp: 90 capsule, Rfl: 5    ipratropium-albuterol (DUO-NEB) 0.5-2.5 mg/3 ml nebulizer, Take 3 mL by nebulization 4 (Four) Times a Day As Needed for Wheezing or Shortness of Air., Disp: 120 mL,  Rfl: 5    metoprolol succinate XL (TOPROL-XL) 25 MG 24 hr tablet, Take 1 tablet by mouth Daily., Disp: , Rfl:     multivitamin with minerals tablet tablet, Take 1 tablet by mouth Daily., Disp: , Rfl:     multivitamins-minerals (PRESERVISION AREDS 2) capsule capsule, Take 1 capsule by mouth 2 (Two) Times a Day., Disp: , Rfl:     pantoprazole (PROTONIX) 40 MG EC tablet, Take 1 tablet by mouth Daily., Disp: , Rfl:     Refresh Tears 0.5 % solution, Administer 1 drop to both eyes Daily As Needed for Dry Eyes., Disp: , Rfl:     tamsulosin (FLOMAX) 0.4 MG capsule 24 hr capsule, Take 1 capsule by mouth 2 (Two) Times a Day., Disp: 180 capsule, Rfl: 4  No current facility-administered medications for this visit.    Allergies:   No Known Allergies    Patient Smoking History:   Social History     Tobacco Use   Smoking Status Former    Current packs/day: 0.00    Average packs/day: 1 pack/day for 60.0 years (60.0 ttl pk-yrs)    Types: Cigarettes    Start date:     Quit date:     Years since quittin.8    Passive exposure: Past   Smokeless Tobacco Current   Tobacco Comments    INST PER ANESTHESIA PROTOCOL       Measures:  PHQ-9 Total Score: 2   Quality of Life: 90 - Limited Activity   ECOG score: 1  ECOG: (1) Restricted in physically strenuous activity, ambulatory and able to do work of light nature  Pain: (on a scale of 0-10)   Pain Score    24 0831   PainSc:   1   PainLoc: Toe  Comment: Right small toe.     Objective     Physical Exam:   Vital Signs:   Vitals:    24 0831   BP: 101/60   Pulse: 111   Resp: 20   Temp: 97.8 °F (36.6 °C)   TempSrc: Temporal   SpO2: 96%   Weight: 70.3 kg (154 lb 15.7 oz)   PainSc:   1   PainLoc: Toe  Comment: Right small toe.     Body mass index is 24.27 kg/m².   Wt Readings from Last 3 Encounters:   24 70.3 kg (154 lb 15.7 oz)   09/10/24 70.9 kg (156 lb 4.8 oz)   24 70.4 kg (155 lb 3.3 oz)       Physical Exam  Vitals reviewed.   Constitutional:       General: He is  not in acute distress.     Appearance: Normal appearance. He is normal weight. He is not ill-appearing.   HENT:      Head: Normocephalic and atraumatic.      Mouth/Throat:      Mouth: Mucous membranes are moist.      Pharynx: Oropharynx is clear. No oropharyngeal exudate or posterior oropharyngeal erythema.   Eyes:      Conjunctiva/sclera: Conjunctivae normal.      Pupils: Pupils are equal, round, and reactive to light.   Cardiovascular:      Rate and Rhythm: Normal rate and regular rhythm.      Pulses: Normal pulses.      Heart sounds: Normal heart sounds.   Pulmonary:      Effort: Pulmonary effort is normal. No respiratory distress.      Comments: Slightly diminished throughout  Musculoskeletal:         General: Normal range of motion.      Cervical back: Normal range of motion.   Skin:     General: Skin is warm and dry.   Neurological:      General: No focal deficit present.      Mental Status: He is alert and oriented to person, place, and time. Mental status is at baseline.   Psychiatric:         Mood and Affect: Mood normal.         Behavior: Behavior normal.       Result Review: I independently reviewed the following data.   -- CT Chest Without Contrast Diagnostic (11/05/2024 09:53)   -- CT Chest Without Contrast Diagnostic (09/03/2024 09:00)     Pathology:   Lab Results   Component Value Date    CLININFO  03/04/2024     Abnormal PET scan of lung  Lung nodule      FINALDX  03/04/2024     Lung, left upper lobe mass, biopsy:   - Adenocarcinoma, lepidic pattern    REMARKS: The above positive (malignant) diagnosis was called to Mona Iqbal RN in Dr. Choudhary's office at 13:31 EDT on 3/11/2024 by Rachel ZHONG        Imaging: CT Chest Without Contrast Diagnostic    Result Date: 11/6/2024  Impression: 1.New dense consolidation within left upper lung, with smaller bilateral consolidations, suggesting multifocal pneumonia. Findings appear worse from prior CT from September 3, 2024. 2.Cavitary lesion with adjacent  soft tissue thickening along lateral left upper lung is difficult to visualize, but likely unchanged. 3.Small left pleural effusion. 4.Mild emphysema. Electronically Signed: Hussain Martinez MD  11/6/2024 3:39 PM EST  Workstation ID: [id number]    CT Chest Without Contrast Diagnostic    Result Date: 9/3/2024  Impression: 1.Previous left upper lobectomy. 2.Interval development of multiple airspace opacities predominantly in the right lower lobe but also elsewhere as noted above. Multifocal pneumonia is favored as the most likely etiology. Consider a follow-up chest CT in 2 to 3 months to reevaluate. 3.Chronic T8 and T12 compression fractures, unchanged. Electronically Signed: Chauncey Montejo MD  9/3/2024 4:16 PM EDT  Workstation ID: [id number]    CT Abdomen Pelvis With Contrast    Result Date: 8/19/2024  Impression: Partially visualized right lower lobe pneumonia. Circumferential wall thickening of the distal esophagus which can be seen with esophagitis/reflux. Nonobstructing left renal nephrolithiasis. Prominent portacaval lymph nodes which are presumably reactive. Additional chronic ancillary findings as above. Electronically Signed: Andres Corrigan MD  8/19/2024 1:35 PM EDT  Workstation ID: [id number]    XR Chest 1 View    Result Date: 8/19/2024  Impression: 1. Development of right lower lobe airspace disease compatible with pneumonia. 2. Scarring and cavitary lesion in the peripheral left upper lung measuring 1.7 cm. Electronically Signed: Fara Parker MD  8/19/2024 8:48 AM EDT  Workstation ID: [id number]      Labs:   WBC   Date Value Ref Range Status   09/04/2024 8.25 3.40 - 10.80 10*3/mm3 Final     Hemoglobin   Date Value Ref Range Status   09/04/2024 12.9 (L) 13.0 - 17.7 g/dL Final     Hematocrit   Date Value Ref Range Status   09/04/2024 41.4 37.5 - 51.0 % Final     Platelets   Date Value Ref Range Status   09/04/2024 214 140 - 450 10*3/mm3 Final     Creatinine   Date Value Ref Range Status   08/20/2024 0.62 (L) 0.76  - 1.27 mg/dL Final   03/12/2024 0.70 0.60 - 1.30 mg/dL Final     Comment:     Serial Number: [id number]:  [id number]     BUN   Date Value Ref Range Status   08/20/2024 9 8 - 23 mg/dL Final     eGFR   Date Value Ref Range Status   08/20/2024 93.7 >60.0 mL/min/1.73 Final     TSH   Date Value Ref Range Status   12/13/2022 1.760 0.270 - 4.200 uIU/mL Final      PSA   Date Value Ref Range Status   07/02/2024 15.800 (H) 0.000 - 4.000 ng/mL Final   05/28/2024 16.600 (H) 0.000 - 4.000 ng/mL Final   11/27/2023 11.800 (H) 0.000 - 4.000 ng/mL Final   08/28/2023 11.900 (H) 0.000 - 4.000 ng/mL Final   01/18/2023 8.070 (H) 0.000 - 4.000 ng/mL Final     Assessment / Plan      Impression: Carlos Zimmerman is a pleasant 84 y.o. male with history of left lung NSCLC (adenocarcinoma) status post VATS left upper lobectomy and adjuvant chemotherapy in 2016. He most recently was diagnosed with cT1b cN0 cM0 adenocarcinoma of the left upper lobe. Pathology from biopsy of left upper lobe mass on 3/4/24 revealed adenocarcinoma, lepidic pattern. Staging PET/CT scan on 2/23/24 shows no evidence of metastatic disease. He is status post SBRT to the left upper lobe, completed on 3/29/2024. Received 5500 cGy in 5 fractions. His restaging CT chest on 5/29/2024 demonstrates a slight interval increase in size of the treated left upper lobe nodule measuring 18 x 11 mm (previously 12 x 9 mm). Additionally there is complete right middle lobe collapse with mucous within right middle lobe bronchus. The finding in the left upper lobe is favored to be evolving treatment related change at this time and we will continue to monitor. In regards to the right middle lobe collapse, he has been evaluated by Pulmonology and they are continuing to monitor this. His recent CT chest on 11/5/24 demonstrates new dense consolidation within left upper lung with smaller bilateral consolidations suggesting multifocal pneumonia. Findings appear worse from prior CT from 9/2024.  Cavitary lesion with adjacent soft tissue thickening along lateral left upper lung is difficult to visualize. Small left pleural effusion. He is clinically doing well although he does report mild worsening shortness of breath recently. He is scheduled to see Pulmonology APRN this morning. Will defer management to them as they deem appropriate. Will repeat CT chest in 2 months to document resolution of pneumonia.     COPD: followed by Pulmonology. He was hospitalized 8/19/24 - 8/21/24 for right lower lobe pneumonia and sepsis.     Elevated PSA: PSA 16.6 ng/mL on 5/28/24. PSA slightly decreased to 15.8 ng/mL on 7/2/24.  Followed by Dr. Mai with Urology. MRI prostate on 7/3/24 demonstrates enlarging PI-RADS 4 lesion in the posterolateral left peripheral zone of the mid gland, now measuring 1.4 cm (previously 1 cm on 2/20/23). At visit with Dr. Mai on 7/9/24 it was decided to follow conservatively and repeat PSA in 6 months (1/2025). Will defer management to Urology at this time as no biopsy has been performed.     Assessment/Plan:   Diagnoses and all orders for this visit:    1. Malignant neoplasm of lower lobe of left lung (Primary)  -     CT Chest Without Contrast; Future    2. History of lobectomy of lung  Comments:  left upper lobectomy in 2016  Orders:  -     CT Chest Without Contrast; Future    3. History of external beam radiation therapy  -     CT Chest Without Contrast; Future    4. Encounter for follow-up surveillance of lung cancer    5. Chronic obstructive pulmonary disease, unspecified COPD type  -     CT Chest Without Contrast; Future    6. History of recent pneumonia  Comments:  8/2024  Orders:  -     CT Chest Without Contrast; Future    7. Elevated PSA    8. Benign localized prostatic hyperplasia with lower urinary tract symptoms (LUTS)      Follow Up:   Return for follow-up in 2 months with CT chest prior.    Follow-up with Pulmonology today.   Follow-up with Dr. Alatorre on 1/6/25.    Follow-up with Dr. Mai on 1/10/25.     Return in about 2 months (around 1/7/2025) for Office Visit.  Carlos Zimmerman was encouraged to contact me in the interim with any questions or concerns regarding his care.      ARTEMIO Campbell  Radiation Oncology  [facility]    This document has been signed by ARTEMIO Garcia on November 7, 2024 09:17 EST

## 2024-11-11 LAB
MYCOBACTERIUM SPEC CULT: NORMAL
NIGHT BLUE STAIN TISS: NORMAL
NIGHT BLUE STAIN TISS: NORMAL

## 2024-11-13 ENCOUNTER — OFFICE VISIT (OUTPATIENT)
Dept: PULMONOLOGY | Facility: CLINIC | Age: 84
End: 2024-11-13
Payer: MEDICARE

## 2024-11-13 ENCOUNTER — TELEPHONE (OUTPATIENT)
Dept: PULMONOLOGY | Facility: CLINIC | Age: 84
End: 2024-11-13

## 2024-11-13 VITALS
HEIGHT: 67 IN | DIASTOLIC BLOOD PRESSURE: 56 MMHG | SYSTOLIC BLOOD PRESSURE: 108 MMHG | WEIGHT: 155 LBS | OXYGEN SATURATION: 92 % | BODY MASS INDEX: 24.33 KG/M2 | RESPIRATION RATE: 18 BRPM | HEART RATE: 98 BPM

## 2024-11-13 DIAGNOSIS — C34.12 MALIGNANT NEOPLASM OF UPPER LOBE, LEFT BRONCHUS OR LUNG: ICD-10-CM

## 2024-11-13 DIAGNOSIS — J44.9 CHRONIC OBSTRUCTIVE PULMONARY DISEASE, UNSPECIFIED COPD TYPE: Primary | Chronic | ICD-10-CM

## 2024-11-13 DIAGNOSIS — J18.9 RECURRENT PNEUMONIA: ICD-10-CM

## 2024-11-13 DIAGNOSIS — R91.1 LUNG NODULE: ICD-10-CM

## 2024-11-13 DIAGNOSIS — R91.1 SOLITARY LUNG NODULE: ICD-10-CM

## 2024-11-13 DIAGNOSIS — R06.89 AIRWAY CLEARANCE IMPAIRMENT: ICD-10-CM

## 2024-11-13 DIAGNOSIS — R06.09 DYSPNEA ON EXERTION: ICD-10-CM

## 2024-11-13 DIAGNOSIS — F17.211 NICOTINE DEPENDENCE, CIGARETTES, IN REMISSION: ICD-10-CM

## 2024-11-13 RX ORDER — PREDNISONE 20 MG/1
20 TABLET ORAL DAILY
Qty: 7 TABLET | Refills: 0 | Status: SHIPPED | OUTPATIENT
Start: 2024-11-13

## 2024-11-13 NOTE — H&P (VIEW-ONLY)
Primary Care Provider  Payam Carroll MD     Referring Provider  No ref. provider found     Chief Complaint  COPD, Follow-up, and Shortness of Breath    Subjective          Carlos Zimmerman presents to [facility] PULMONARY & CRITICAL CARE MEDICINE  History of Present Illness  Carlos Zimmerman is a 84 y.o. male patient of Dr. Choudhary here for an acute visit.  Patient does have COPD, pulmonary nodule and left upper lobe lung cancer.     States that he has had significant shortness of breath Estuardo patient and spouse states that he believes he still has pneumonia.  Patient has had 3 rounds of antibiotics over the past 3 months.  Pleated a course of Augmentin, Levaquin for Pseudomonas and is currently on doxycycline.  Patient states that he is unable to cough up his secretions.  Given patient's recurrence of pneumonia and multiple rounds of antibiotics, he is agreeable to having a bronchoscopy.  Risks and benefits of bronchoscopy discussed with patient and wife today in office.  Patient is on Plavix, Pletal and aspirin.  We will have patient set up for 11/19/2024 with Dr. Choudhary and to follow-up a week after.  He continues to use as scheduled respiratory medications.  He denies any fevers or chills currently.     His history of smoking is   Tobacco Use: High Risk (11/13/2024)    Patient History     Smoking Tobacco Use: Former     Smokeless Tobacco Use: Current     Passive Exposure: Past   .    Review of Systems   Constitutional:  Negative for chills, fatigue, fever, unexpected weight gain and unexpected weight loss.   HENT:  Congestion: Nasal.    Respiratory:  Positive for cough and shortness of breath. Negative for apnea and wheezing.         Negative for Hemoptysis     Cardiovascular:  Negative for chest pain, palpitations and leg swelling.   Skin:         Negative for cyanosis      Sleep: Negative for Excessive daytime sleepiness  Negative for morning headaches  Negative for Snoring    Family  History   Problem Relation Age of Onset    Cancer Mother     Stomach cancer Other     Malig Hyperthermia Neg Hx         Social History     Socioeconomic History    Marital status:    Tobacco Use    Smoking status: Former     Current packs/day: 0.00     Average packs/day: 1 pack/day for 60.0 years (60.0 ttl pk-yrs)     Types: Cigarettes     Start date:      Quit date:      Years since quittin.8     Passive exposure: Past    Smokeless tobacco: Current     Types: Snuff    Tobacco comments:     INST PER ANESTHESIA PROTOCOL   Vaping Use    Vaping status: Never Used   Substance and Sexual Activity    Alcohol use: Not Currently    Drug use: Never    Sexual activity: Defer        Past Medical History:   Diagnosis Date    Anemia     NO CURRENT ISSUES    Arthritis     COPD (chronic obstructive pulmonary disease)     INHALER  PRN    Hyperlipidemia     Hypertension     ON MEDS/FOLLOWS SAHNI, PT DENIES CAD    Lung cancer     REMOVED -     Pneumonia     LEFT LUNG CA NO CURRENT PNEUMONIA    SOB (shortness of breath)     WITH EXERTION SOMETIMES    Stroke     LEFT LEG/FOOT DROP      TIA (transient ischemic attack)     NO RESIDUAL         Immunization History   Administered Date(s) Administered    COVID-19 (PFIZER) 12YRS+ (COMIRNATY) 11/10/2023    COVID-19 (PFIZER) Purple Cap Monovalent 2021, 2021, 10/18/2021    FLUAD TRI 65YR+ 2024    Fluad Quad 65+ 10/07/2020, 2021    Fluzone High-Dose 65+yrs 10/06/2022, 2023    Influenza, Unspecified 2020    Pneumococcal Conjugate 20-Valent (PCV20) 2024    TD Preservative Free (Tenivac) 2022         No Known Allergies       Current Outpatient Medications:     albuterol sulfate  (90 Base) MCG/ACT inhaler, Inhale 2 puffs Every 4 (Four) Hours As Needed for Wheezing or Shortness of Air., Disp: 18 g, Rfl: 3    aspirin 81 MG EC tablet, Take 1 tablet by mouth Daily., Disp: , Rfl:     atorvastatin (LIPITOR) 40 MG tablet,  Take 1 tablet by mouth Daily., Disp: , Rfl:     B Complex Vitamins (VITAMIN-B COMPLEX PO), Take 1 tablet by mouth Daily., Disp: , Rfl:     cetirizine (zyrTEC) 10 MG tablet, Take 1 tablet by mouth Daily., Disp: , Rfl:     Cholecalciferol (VITAMIN D3 PO), Take 1 tablet by mouth Daily., Disp: , Rfl:     cilostazol (PLETAL) 100 MG tablet, Take 1 tablet by mouth 2 (Two) Times a Day., Disp: , Rfl:     clopidogrel (Plavix) 75 MG tablet, Take 1 tablet by mouth Daily., Disp: , Rfl:     cyclobenzaprine (FLEXERIL) 10 MG tablet, Every 8 (Eight) Hours., Disp: , Rfl:     doxycycline (VIBRAMYCIN) 100 MG capsule, Take 1 capsule by mouth 2 (Two) Times a Day., Disp: 20 capsule, Rfl: 0    Fluticasone-Umeclidin-Vilant (TRELEGY ELLIPTA) 200-62.5-25 MCG/ACT inhaler, Inhale 1 puff Daily., Disp: 1 each, Rfl: 11    gabapentin (NEURONTIN) 300 MG capsule, Take 1 capsule by mouth 3 (Three) Times a Day., Disp: 90 capsule, Rfl: 5    ipratropium-albuterol (DUO-NEB) 0.5-2.5 mg/3 ml nebulizer, Take 3 mL by nebulization 4 (Four) Times a Day As Needed for Wheezing or Shortness of Air., Disp: 120 mL, Rfl: 5    losartan (COZAAR) 50 MG tablet, Take 1 tablet by mouth Daily., Disp: , Rfl:     metoprolol succinate XL (TOPROL-XL) 25 MG 24 hr tablet, Take 1 tablet by mouth Daily., Disp: , Rfl:     metoprolol tartrate (LOPRESSOR) 25 MG tablet, Take 0.5 tablets by mouth Daily., Disp: , Rfl:     multivitamin with minerals tablet tablet, Take 1 tablet by mouth Daily., Disp: , Rfl:     multivitamins-minerals (PRESERVISION AREDS 2) capsule capsule, Take 1 capsule by mouth 2 (Two) Times a Day., Disp: , Rfl:     pantoprazole (PROTONIX) 40 MG EC tablet, Take 1 tablet by mouth Daily., Disp: , Rfl:     Refresh Tears 0.5 % solution, Administer 1 drop to both eyes Daily As Needed for Dry Eyes., Disp: , Rfl:     tamsulosin (FLOMAX) 0.4 MG capsule 24 hr capsule, Take 1 capsule by mouth 2 (Two) Times a Day., Disp: 180 capsule, Rfl: 4    predniSONE (DELTASONE) 20 MG tablet,  "Take 1 tablet by mouth Daily., Disp: 7 tablet, Rfl: 0     Objective   Physical Exam  Constitutional:       General: He is not in acute distress.     Appearance: Normal appearance. He is normal weight.   HENT:      Right Ear: Hearing normal.      Left Ear: Hearing normal.      Nose: No nasal tenderness or congestion.      Mouth/Throat:      Mouth: Mucous membranes are moist. No oral lesions.   Eyes:      Extraocular Movements: Extraocular movements intact.      Pupils: Pupils are equal, round, and reactive to light.   Cardiovascular:      Rate and Rhythm: Normal rate and regular rhythm.      Pulses: Normal pulses.      Heart sounds: Normal heart sounds. No murmur heard.  Pulmonary:      Effort: Pulmonary effort is normal.      Breath sounds: Examination of the right-lower field reveals rhonchi. Examination of the left-lower field reveals rhonchi. Decreased breath sounds and rhonchi present. No wheezing or rales.      Comments: Patient is on 4 L of oxygen.  Patient does have some difficulty speaking full sentences.  Musculoskeletal:      Right lower leg: No edema.      Left lower leg: No edema.   Skin:     General: Skin is warm and dry.      Findings: No lesion or rash.   Neurological:      General: No focal deficit present.      Mental Status: He is alert and oriented to person, place, and time.   Psychiatric:         Mood and Affect: Affect normal. Mood is not anxious or depressed.         Vital Signs:   /56 (BP Location: Right arm, Patient Position: Sitting, Cuff Size: Adult)   Pulse 98   Resp 18   Ht 170.2 cm (67\")   Wt 70.3 kg (155 lb)   SpO2 92% Comment: room air  BMI 24.28 kg/m²        Result Review :   The following data was reviewed by: ARTEMIO Hawkins on 11/13/2024:  CMP          3/12/2024    14:57 8/19/2024    08:16 8/20/2024    03:55   CMP   Glucose  104  97    BUN  11  9    Creatinine 0.70  0.80  0.62    EGFR 90.3  86.7  93.7    Sodium  136  138    Potassium  3.4  3.9    Chloride  98  105 "    Calcium  8.8  8.6    Total Protein  5.8  5.3    Albumin  3.5  2.9    Globulin  2.3  2.4    Total Bilirubin  1.5  1.5    Alkaline Phosphatase  360  292    AST (SGOT)  109  68    ALT (SGPT)  138  91    Albumin/Globulin Ratio  1.5  1.2    BUN/Creatinine Ratio  13.8  14.5    Anion Gap  10.9  7.0      CBC w/diff          8/19/2024    08:16 8/20/2024    03:55 9/4/2024    10:21   CBC w/Diff   WBC 20.24  21.83  8.25    RBC 4.99  4.34  4.67    Hemoglobin 13.6  11.9  12.9    Hematocrit 42.9  37.5  41.4    MCV 86.0  86.4  88.7    MCH 27.3  27.4  27.6    MCHC 31.7  31.7  31.2    RDW 15.9  16.1  16.0    Platelets 197  182  214    Neutrophil Rel % 88.6  84.8  70.7    Immature Granulocyte Rel % 0.5  0.5  0.1    Lymphocyte Rel % 4.3  8.4  17.8    Monocyte Rel % 6.4  5.9  9.6    Eosinophil Rel % 0.1  0.2  1.3    Basophil Rel % 0.1  0.2  0.5      Data reviewed : Radiologic studies chest CT 9/3/2024, chest [place] [address] 24 and Gely ULLOA last office note    Procedures        Assessment and Plan    Diagnoses and all orders for this visit:    1. Chronic obstructive pulmonary disease, unspecified COPD type (Primary)  Comments:  Continue Trelegy  Orders:  -     6 Minute Walk Test; Future  -     Oxygen Therapy  -     predniSONE (DELTASONE) 20 MG tablet; Take 1 tablet by mouth Daily.  Dispense: 7 tablet; Refill: 0  -     Case Request; Standing  -     Follow Anesthesia Guidlines / Standing Orders; Standing  -     Case Request    2. Lung nodule    3. Solitary lung nodule    4. Malignant neoplasm of upper lobe, left bronchus or lung    5. Dyspnea on exertion  -     6 Minute Walk Test; Future    6. Airway clearance impairment  Comments:  Bronchoscopy scheduled  Orders:  -     Case Request; Standing  -     Follow Anesthesia Guidlines / Standing Orders; Standing  -     Case Request    7. Recurrent pneumonia  Comments:  Bronchoscopy scheduled  Orders:  -     Case Request; Standing  -     Follow Anesthesia Guidlines / Standing Orders;  Standing  -     Case Request    8. Nicotine dependence, cigarettes, in remission    6-minute walk test performed in office today.  Patient does qualify for 4 L pulsed dose.  Order placed today.    I have obtained consent for bronchoscopy with brushings, biopsies, and bronchioloalveolar lavage.  Risks and benefits of bronchoscopy discussed with patient today.  Risks include pneumothorax, hemothorax, bleeding, hypoxia, required mechanical ventilation and death.  The patient recognizes these findings, acknowledges these findings and is agreeable to the procedure.         Follow Up   Return for bronch follow up.  Patient was given instructions and counseling regarding his condition or for health maintenance advice. Please see specific information pulled into the AVS if appropriate.

## 2024-11-13 NOTE — PROGRESS NOTES
Primary Care Provider  Payam Carroll MD     Referring Provider  No ref. provider found     Chief Complaint  COPD, Follow-up, and Shortness of Breath    Subjective          Carlos Zimmerman presents to [facility] PULMONARY & CRITICAL CARE MEDICINE  History of Present Illness  Carlos Zimmerman is a 84 y.o. male patient of Dr. Choudhary here for an acute visit.  Patient does have COPD, pulmonary nodule and left upper lobe lung cancer.     States that he has had significant shortness of breath [facility] patient and spouse states that he believes he still has pneumonia.  Patient has had 3 rounds of antibiotics over the past 3 months.  Pleated a course of Augmentin, Levaquin for Pseudomonas and is currently on doxycycline.  Patient states that he is unable to cough up his secretions.  Given patient's recurrence of pneumonia and multiple rounds of antibiotics, he is agreeable to having a bronchoscopy.  Risks and benefits of bronchoscopy discussed with patient and wife today in office.  Patient is on Plavix, Pletal and aspirin.  We will have patient set up for 11/19/2024 with Dr. Choudhary and to follow-up a week after.  He continues to use as scheduled respiratory medications.  He denies any fevers or chills currently.     His history of smoking is   Tobacco Use: High Risk (11/13/2024)    Patient History     Smoking Tobacco Use: Former     Smokeless Tobacco Use: Current     Passive Exposure: Past   .    Review of Systems   Constitutional:  Negative for chills, fatigue, fever, unexpected weight gain and unexpected weight loss.   HENT:  Congestion: Nasal.    Respiratory:  Positive for cough and shortness of breath. Negative for apnea and wheezing.         Negative for Hemoptysis     Cardiovascular:  Negative for chest pain, palpitations and leg swelling.   Skin:         Negative for cyanosis      Sleep: Negative for Excessive daytime sleepiness  Negative for morning headaches  Negative for Snoring    Family  History   Problem Relation Age of Onset    Cancer Mother     Stomach cancer Other     Malig Hyperthermia Neg Hx         Social History     Socioeconomic History    Marital status:    Tobacco Use    Smoking status: Former     Current packs/day: 0.00     Average packs/day: 1 pack/day for 60.0 years (60.0 ttl pk-yrs)     Types: Cigarettes     Start date:      Quit date:      Years since quittin.8     Passive exposure: Past    Smokeless tobacco: Current     Types: Snuff    Tobacco comments:     INST PER ANESTHESIA PROTOCOL   Vaping Use    Vaping status: Never Used   Substance and Sexual Activity    Alcohol use: Not Currently    Drug use: Never    Sexual activity: Defer        Past Medical History:   Diagnosis Date    Anemia     NO CURRENT ISSUES    Arthritis     COPD (chronic obstructive pulmonary disease)     INHALER  PRN    Hyperlipidemia     Hypertension     ON MEDS/FOLLOWS SAHNI, PT DENIES CAD    Lung cancer     REMOVED -     Pneumonia     LEFT LUNG CA NO CURRENT PNEUMONIA    SOB (shortness of breath)     WITH EXERTION SOMETIMES    Stroke     LEFT LEG/FOOT DROP      TIA (transient ischemic attack)     NO RESIDUAL         Immunization History   Administered Date(s) Administered    COVID-19 (PFIZER) 12YRS+ (COMIRNATY) 11/10/2023    COVID-19 (PFIZER) Purple Cap Monovalent 2021, 2021, 10/18/2021    FLUAD TRI 65YR+ 2024    Fluad Quad 65+ 10/07/2020, 2021    Fluzone High-Dose 65+yrs 10/06/2022, 2023    Influenza, Unspecified 2020    Pneumococcal Conjugate 20-Valent (PCV20) 2024    TD Preservative Free (Tenivac) 2022         No Known Allergies       Current Outpatient Medications:     albuterol sulfate  (90 Base) MCG/ACT inhaler, Inhale 2 puffs Every 4 (Four) Hours As Needed for Wheezing or Shortness of Air., Disp: 18 g, Rfl: 3    aspirin 81 MG EC tablet, Take 1 tablet by mouth Daily., Disp: , Rfl:     atorvastatin (LIPITOR) 40 MG tablet,  Take 1 tablet by mouth Daily., Disp: , Rfl:     B Complex Vitamins (VITAMIN-B COMPLEX PO), Take 1 tablet by mouth Daily., Disp: , Rfl:     cetirizine (zyrTEC) 10 MG tablet, Take 1 tablet by mouth Daily., Disp: , Rfl:     Cholecalciferol (VITAMIN D3 PO), Take 1 tablet by mouth Daily., Disp: , Rfl:     cilostazol (PLETAL) 100 MG tablet, Take 1 tablet by mouth 2 (Two) Times a Day., Disp: , Rfl:     clopidogrel (Plavix) 75 MG tablet, Take 1 tablet by mouth Daily., Disp: , Rfl:     cyclobenzaprine (FLEXERIL) 10 MG tablet, Every 8 (Eight) Hours., Disp: , Rfl:     doxycycline (VIBRAMYCIN) 100 MG capsule, Take 1 capsule by mouth 2 (Two) Times a Day., Disp: 20 capsule, Rfl: 0    Fluticasone-Umeclidin-Vilant (TRELEGY ELLIPTA) 200-62.5-25 MCG/ACT inhaler, Inhale 1 puff Daily., Disp: 1 each, Rfl: 11    gabapentin (NEURONTIN) 300 MG capsule, Take 1 capsule by mouth 3 (Three) Times a Day., Disp: 90 capsule, Rfl: 5    ipratropium-albuterol (DUO-NEB) 0.5-2.5 mg/3 ml nebulizer, Take 3 mL by nebulization 4 (Four) Times a Day As Needed for Wheezing or Shortness of Air., Disp: 120 mL, Rfl: 5    losartan (COZAAR) 50 MG tablet, Take 1 tablet by mouth Daily., Disp: , Rfl:     metoprolol succinate XL (TOPROL-XL) 25 MG 24 hr tablet, Take 1 tablet by mouth Daily., Disp: , Rfl:     metoprolol tartrate (LOPRESSOR) 25 MG tablet, Take 0.5 tablets by mouth Daily., Disp: , Rfl:     multivitamin with minerals tablet tablet, Take 1 tablet by mouth Daily., Disp: , Rfl:     multivitamins-minerals (PRESERVISION AREDS 2) capsule capsule, Take 1 capsule by mouth 2 (Two) Times a Day., Disp: , Rfl:     pantoprazole (PROTONIX) 40 MG EC tablet, Take 1 tablet by mouth Daily., Disp: , Rfl:     Refresh Tears 0.5 % solution, Administer 1 drop to both eyes Daily As Needed for Dry Eyes., Disp: , Rfl:     tamsulosin (FLOMAX) 0.4 MG capsule 24 hr capsule, Take 1 capsule by mouth 2 (Two) Times a Day., Disp: 180 capsule, Rfl: 4    predniSONE (DELTASONE) 20 MG tablet,  "Take 1 tablet by mouth Daily., Disp: 7 tablet, Rfl: 0     Objective   Physical Exam  Constitutional:       General: He is not in acute distress.     Appearance: Normal appearance. He is normal weight.   HENT:      Right Ear: Hearing normal.      Left Ear: Hearing normal.      Nose: No nasal tenderness or congestion.      Mouth/Throat:      Mouth: Mucous membranes are moist. No oral lesions.   Eyes:      Extraocular Movements: Extraocular movements intact.      Pupils: Pupils are equal, round, and reactive to light.   Cardiovascular:      Rate and Rhythm: Normal rate and regular rhythm.      Pulses: Normal pulses.      Heart sounds: Normal heart sounds. No murmur heard.  Pulmonary:      Effort: Pulmonary effort is normal.      Breath sounds: Examination of the right-lower field reveals rhonchi. Examination of the left-lower field reveals rhonchi. Decreased breath sounds and rhonchi present. No wheezing or rales.      Comments: Patient is on 4 L of oxygen.  Patient does have some difficulty speaking full sentences.  Musculoskeletal:      Right lower leg: No edema.      Left lower leg: No edema.   Skin:     General: Skin is warm and dry.      Findings: No lesion or rash.   Neurological:      General: No focal deficit present.      Mental Status: He is alert and oriented to person, place, and time.   Psychiatric:         Mood and Affect: Affect normal. Mood is not anxious or depressed.         Vital Signs:   /56 (BP Location: Right arm, Patient Position: Sitting, Cuff Size: Adult)   Pulse 98   Resp 18   Ht 170.2 cm (67\")   Wt 70.3 kg (155 lb)   SpO2 92% Comment: room air  BMI 24.28 kg/m²        Result Review :   The following data was reviewed by: ARTEMIO Hawkins on 11/13/2024:  CMP          3/12/2024    14:57 8/19/2024    08:16 8/20/2024    03:55   CMP   Glucose  104  97    BUN  11  9    Creatinine 0.70  0.80  0.62    EGFR 90.3  86.7  93.7    Sodium  136  138    Potassium  3.4  3.9    Chloride  98  105 "    Calcium  8.8  8.6    Total Protein  5.8  5.3    Albumin  3.5  2.9    Globulin  2.3  2.4    Total Bilirubin  1.5  1.5    Alkaline Phosphatase  360  292    AST (SGOT)  109  68    ALT (SGPT)  138  91    Albumin/Globulin Ratio  1.5  1.2    BUN/Creatinine Ratio  13.8  14.5    Anion Gap  10.9  7.0      CBC w/diff          8/19/2024    08:16 8/20/2024    03:55 9/4/2024    10:21   CBC w/Diff   WBC 20.24  21.83  8.25    RBC 4.99  4.34  4.67    Hemoglobin 13.6  11.9  12.9    Hematocrit 42.9  37.5  41.4    MCV 86.0  86.4  88.7    MCH 27.3  27.4  27.6    MCHC 31.7  31.7  31.2    RDW 15.9  16.1  16.0    Platelets 197  182  214    Neutrophil Rel % 88.6  84.8  70.7    Immature Granulocyte Rel % 0.5  0.5  0.1    Lymphocyte Rel % 4.3  8.4  17.8    Monocyte Rel % 6.4  5.9  9.6    Eosinophil Rel % 0.1  0.2  1.3    Basophil Rel % 0.1  0.2  0.5      Data reviewed : Radiologic studies chest CT 9/3/2024, chest CT [address] 24 and Gely ULLOA last office note    Procedures        Assessment and Plan    Diagnoses and all orders for this visit:    1. Chronic obstructive pulmonary disease, unspecified COPD type (Primary)  Comments:  Continue Trelegy  Orders:  -     6 Minute Walk Test; Future  -     Oxygen Therapy  -     predniSONE (DELTASONE) 20 MG tablet; Take 1 tablet by mouth Daily.  Dispense: 7 tablet; Refill: 0  -     Case Request; Standing  -     Follow Anesthesia Guidlines / Standing Orders; Standing  -     Case Request    2. Lung nodule    3. Solitary lung nodule    4. Malignant neoplasm of upper lobe, left bronchus or lung    5. Dyspnea on exertion  -     6 Minute Walk Test; Future    6. Airway clearance impairment  Comments:  Bronchoscopy scheduled  Orders:  -     Case Request; Standing  -     Follow Anesthesia Guidlines / Standing Orders; Standing  -     Case Request    7. Recurrent pneumonia  Comments:  Bronchoscopy scheduled  Orders:  -     Case Request; Standing  -     Follow Anesthesia Guidlines / Standing Orders;  Standing  -     Case Request    8. Nicotine dependence, cigarettes, in remission    6-minute walk test performed in office today.  Patient does qualify for 4 L pulsed dose.  Order placed today.    I have obtained consent for bronchoscopy with brushings, biopsies, and bronchioloalveolar lavage.  Risks and benefits of bronchoscopy discussed with patient today.  Risks include pneumothorax, hemothorax, bleeding, hypoxia, required mechanical ventilation and death.  The patient recognizes these findings, acknowledges these findings and is agreeable to the procedure.         Follow Up   Return for bronch follow up.  Patient was given instructions and counseling regarding his condition or for health maintenance advice. Please see specific information pulled into the AVS if appropriate.

## 2024-11-13 NOTE — TELEPHONE ENCOUNTER
I spoke with Libby in Endo. Pt has been scheduled a bronch with Dr. Choudhary for 11/19/2024 arrival time 8:30. Pt has been given instruction sheet at check out. Pt was advised to hold his Aspirin 2 days prior and to hold his plavix and pletal 4 days prior to procedure.

## 2024-11-15 ENCOUNTER — ANESTHESIA EVENT (OUTPATIENT)
Dept: GASTROENTEROLOGY | Facility: HOSPITAL | Age: 84
End: 2024-11-15
Payer: MEDICARE

## 2024-11-15 NOTE — ANESTHESIA PREPROCEDURE EVALUATION
Anesthesia Evaluation     NPO Solid Status: > 8 hours  NPO Liquid Status: > 8 hours           Airway   Mallampati: II  TM distance: >3 FB  Neck ROM: full  No difficulty expected  Dental    (+) lower dentures and upper dentures    Pulmonary - normal exam   (+) pneumonia , lung cancer (s/p left lung lobectomy), COPD,shortness of breath  Cardiovascular     ECG reviewed  PT is on anticoagulation therapy  Patient on routine beta blocker  Rhythm: regular  Rate: normal    (+) hypertension, PVD, hyperlipidemia    ROS comment: Taking metoprolol     Neuro/Psych  (+) TIA, CVA, numbness  GI/Hepatic/Renal/Endo      Musculoskeletal     Abdominal    Substance History      OB/GYN          Other   arthritis,   history of cancer    ROS/Med Hx Other: Chronic obstructive pulmonary disease, unspecified COPD type   Airway clearance impairment [  Recurrent pneumonia       Last dose Pletal?    Last dose Plavix?     EKG 08/19/24: ,   Sinus tachycardia  Ventricular premature complex  Borderline  low voltage, extremity leads  When compared with ECG of 04-Mar-2024 06:36:08,  Significant rate increase          Phys Exam Other: Does not take Plavix.   Last dose of Letal 11/15                  Anesthesia Plan    ASA 3     general   total IV anesthesia  (Total IV Anesthesia    Patient understands anesthesia not responsible for dental damage.  )  intravenous induction     Anesthetic plan, risks, benefits, and alternatives have been provided, discussed and informed consent has been obtained with: patient.    Plan discussed with CRNA.        CODE STATUS:

## 2024-11-19 ENCOUNTER — HOSPITAL ENCOUNTER (OUTPATIENT)
Facility: HOSPITAL | Age: 84
Setting detail: HOSPITAL OUTPATIENT SURGERY
Discharge: HOME | End: 2024-11-19
Attending: INTERNAL MEDICINE | Admitting: INTERNAL MEDICINE
Payer: MEDICARE

## 2024-11-19 ENCOUNTER — ANESTHESIA (OUTPATIENT)
Dept: GASTROENTEROLOGY | Facility: HOSPITAL | Age: 84
End: 2024-11-19
Payer: MEDICARE

## 2024-11-19 VITALS
WEIGHT: 150.35 LBS | DIASTOLIC BLOOD PRESSURE: 67 MMHG | SYSTOLIC BLOOD PRESSURE: 136 MMHG | TEMPERATURE: 96.8 F | HEART RATE: 82 BPM | RESPIRATION RATE: 18 BRPM | OXYGEN SATURATION: 95 % | BODY MASS INDEX: 23.55 KG/M2

## 2024-11-19 DIAGNOSIS — J18.9 RECURRENT PNEUMONIA: ICD-10-CM

## 2024-11-19 DIAGNOSIS — J44.9 CHRONIC OBSTRUCTIVE PULMONARY DISEASE, UNSPECIFIED COPD TYPE: ICD-10-CM

## 2024-11-19 DIAGNOSIS — R06.89 AIRWAY CLEARANCE IMPAIRMENT: ICD-10-CM

## 2024-11-19 LAB
ACB CMPLX DNA BAL NAA+NON-PRB-NCNCRNG: NOT DETECTED
BLACTX-M ISLT/SPM QL: NOT DETECTED
BLAIMP ISLT/SPM QL: NOT DETECTED
BLAKPC ISLT/SPM QL: NOT DETECTED
BLAOXA-48-LIKE ISLT/SPM QL: NOT DETECTED
BLAVIM ISLT/SPM QL: NOT DETECTED
C PNEUM DNA NPH QL NAA+NON-PROBE: NOT DETECTED
CILIATED BAL QL: 12 %
E CLOAC COMP DNA BAL NAA+NON-PRB-NCNCRNG: NOT DETECTED
E COLI DNA BAL NAA+NON-PRB-NCNCRNG: DETECTED
EOSINOPHIL NFR FLD MANUAL: 2 %
FLUAV SUBTYP SPEC NAA+PROBE: NOT DETECTED
FLUBV RNA ISLT QL NAA+PROBE: NOT DETECTED
GP B STREP DNA BAL NAA+NON-PRB-NCNCRNG: NOT DETECTED
HADV DNA SPEC NAA+PROBE: NOT DETECTED
HAEM INFLU DNA BAL NAA+NON-PRB-NCNCRNG: NOT DETECTED
HCOV RNA LOWER RESP QL NAA+NON-PROBE: NOT DETECTED
HMPV RNA NPH QL NAA+NON-PROBE: NOT DETECTED
HPIV RNA LOWER RESP QL NAA+NON-PROBE: NOT DETECTED
K AEROGENES DNA BAL NAA+NON-PRB-NCNCRNG: NOT DETECTED
K OXYTOCA DNA BAL NAA+NON-PRB-NCNCRNG: NOT DETECTED
K PNEU GRP DNA BAL NAA+NON-PRB-NCNCRNG: NOT DETECTED
L PNEUMO DNA LOWER RESP QL NAA+NON-PROBE: NOT DETECTED
LYMPHOCYTES NFR FLD MANUAL: 12 %
M CATARRHALIS DNA BAL NAA+NON-PRB-NCNCRNG: NOT DETECTED
M PNEUMO IGG SER IA-ACNC: NOT DETECTED
MACROPHAGE FLUID: 21 %
MECA+MECC ISLT/SPM QL: ABNORMAL
NDM GENE: NOT DETECTED
NEUTROPHILS NFR FLD MANUAL: 53 %
P AERUGINOSA DNA BAL NAA+NON-PRB-NCNCRNG: DETECTED
PROTEUS SP DNA BAL NAA+NON-PRB-NCNCRNG: NOT DETECTED
RHINOVIRUS RNA SPEC NAA+PROBE: NOT DETECTED
RSV RNA NPH QL NAA+NON-PROBE: NOT DETECTED
S AUREUS DNA BAL NAA+NON-PRB-NCNCRNG: NOT DETECTED
S MARCESCENS DNA BAL NAA+NON-PRB-NCNCRNG: NOT DETECTED
S PNEUM DNA BAL NAA+NON-PRB-NCNCRNG: NOT DETECTED
S PYO DNA BAL NAA+NON-PRB-NCNCRNG: NOT DETECTED
VISUAL PRESENCE OF BLOOD: NORMAL

## 2024-11-19 PROCEDURE — 25010000002 PROPOFOL 10 MG/ML EMULSION

## 2024-11-19 PROCEDURE — 88305 TISSUE EXAM BY PATHOLOGIST: CPT | Performed by: INTERNAL MEDICINE

## 2024-11-19 PROCEDURE — 87077 CULTURE AEROBIC IDENTIFY: CPT | Performed by: INTERNAL MEDICINE

## 2024-11-19 PROCEDURE — 87071 CULTURE AEROBIC QUANT OTHER: CPT | Performed by: INTERNAL MEDICINE

## 2024-11-19 PROCEDURE — 25010000002 LIDOCAINE HCL (PF) 4 % SOLUTION: Performed by: INTERNAL MEDICINE

## 2024-11-19 PROCEDURE — 89051 BODY FLUID CELL COUNT: CPT | Performed by: INTERNAL MEDICINE

## 2024-11-19 PROCEDURE — 31624 DX BRONCHOSCOPE/LAVAGE: CPT | Performed by: INTERNAL MEDICINE

## 2024-11-19 PROCEDURE — 88108 CYTOPATH CONCENTRATE TECH: CPT | Performed by: INTERNAL MEDICINE

## 2024-11-19 PROCEDURE — 87102 FUNGUS ISOLATION CULTURE: CPT | Performed by: INTERNAL MEDICINE

## 2024-11-19 PROCEDURE — 87206 SMEAR FLUORESCENT/ACID STAI: CPT | Performed by: INTERNAL MEDICINE

## 2024-11-19 PROCEDURE — 87205 SMEAR GRAM STAIN: CPT | Performed by: INTERNAL MEDICINE

## 2024-11-19 PROCEDURE — 25810000003 LACTATED RINGERS PER 1000 ML

## 2024-11-19 PROCEDURE — 87116 MYCOBACTERIA CULTURE: CPT | Performed by: INTERNAL MEDICINE

## 2024-11-19 PROCEDURE — 87633 RESP VIRUS 12-25 TARGETS: CPT | Performed by: INTERNAL MEDICINE

## 2024-11-19 PROCEDURE — 31628 BRONCHOSCOPY/LUNG BX EACH: CPT | Performed by: INTERNAL MEDICINE

## 2024-11-19 PROCEDURE — 87186 SC STD MICRODIL/AGAR DIL: CPT | Performed by: INTERNAL MEDICINE

## 2024-11-19 PROCEDURE — 87070 CULTURE OTHR SPECIMN AEROBIC: CPT | Performed by: INTERNAL MEDICINE

## 2024-11-19 PROCEDURE — 25010000002 LIDOCAINE PF 2% 2 % SOLUTION

## 2024-11-19 RX ORDER — LIDOCAINE HYDROCHLORIDE 20 MG/ML
INJECTION, SOLUTION EPIDURAL; INFILTRATION; INTRACAUDAL; PERINEURAL AS NEEDED
Status: DISCONTINUED | OUTPATIENT
Start: 2024-11-19 | End: 2024-11-19 | Stop reason: SURG

## 2024-11-19 RX ORDER — IPRATROPIUM BROMIDE AND ALBUTEROL SULFATE 2.5; .5 MG/3ML; MG/3ML
3 SOLUTION RESPIRATORY (INHALATION) ONCE
Status: COMPLETED | OUTPATIENT
Start: 2024-11-19 | End: 2024-11-19

## 2024-11-19 RX ORDER — LIDOCAINE HYDROCHLORIDE 40 MG/ML
INJECTION, SOLUTION RETROBULBAR AS NEEDED
Status: DISCONTINUED | OUTPATIENT
Start: 2024-11-19 | End: 2024-11-19 | Stop reason: HOSPADM

## 2024-11-19 RX ORDER — SODIUM CHLORIDE, SODIUM LACTATE, POTASSIUM CHLORIDE, CALCIUM CHLORIDE 600; 310; 30; 20 MG/100ML; MG/100ML; MG/100ML; MG/100ML
30 INJECTION, SOLUTION INTRAVENOUS CONTINUOUS
Status: DISCONTINUED | OUTPATIENT
Start: 2024-11-19 | End: 2024-11-19 | Stop reason: HOSPADM

## 2024-11-19 RX ORDER — MAGNESIUM HYDROXIDE 1200 MG/15ML
LIQUID ORAL AS NEEDED
Status: DISCONTINUED | OUTPATIENT
Start: 2024-11-19 | End: 2024-11-19 | Stop reason: HOSPADM

## 2024-11-19 RX ORDER — PROPOFOL 10 MG/ML
VIAL (ML) INTRAVENOUS AS NEEDED
Status: DISCONTINUED | OUTPATIENT
Start: 2024-11-19 | End: 2024-11-19 | Stop reason: SURG

## 2024-11-19 RX ADMIN — IPRATROPIUM BROMIDE AND ALBUTEROL SULFATE 3 ML: .5; 3 SOLUTION RESPIRATORY (INHALATION) at 11:21

## 2024-11-19 RX ADMIN — PROPOFOL 30 MG: 10 INJECTION, EMULSION INTRAVENOUS at 10:50

## 2024-11-19 RX ADMIN — PROPOFOL 150 MCG/KG/MIN: 10 INJECTION, EMULSION INTRAVENOUS at 10:47

## 2024-11-19 RX ADMIN — LIDOCAINE HYDROCHLORIDE 20 MG: 20 INJECTION, SOLUTION INTRAVENOUS at 10:47

## 2024-11-19 RX ADMIN — SODIUM CHLORIDE, POTASSIUM CHLORIDE, SODIUM LACTATE AND CALCIUM CHLORIDE 30 ML/HR: 600; 310; 30; 20 INJECTION, SOLUTION INTRAVENOUS at 08:48

## 2024-11-19 RX ADMIN — PROPOFOL 90 MG: 10 INJECTION, EMULSION INTRAVENOUS at 10:47

## 2024-11-19 NOTE — ANESTHESIA POSTPROCEDURE EVALUATION
Patient: Carlos Zimmerman    Procedure Summary       Date: 11/19/24 Room / Location: [facility] 3 / [facility]    Anesthesia Start: 1044 Anesthesia Stop: 1108    Procedure: BRONCHOSCOPY WITH BAL, BIOPSY, BRONCHIAL WASHINGS (Bilateral: Bronchus) Diagnosis:       Chronic obstructive pulmonary disease, unspecified COPD type      Airway clearance impairment      Recurrent pneumonia      (Chronic obstructive pulmonary disease, unspecified COPD type [J44.9])      (Airway clearance impairment [R06.89])      (Recurrent pneumonia [J18.9])    Surgeons: Dayne Choudhary MD Provider: Julio Cesar Gray CRNA    Anesthesia Type: general ASA Status: 3            Anesthesia Type: general    Vitals  Vitals Value Taken Time   /67 11/19/24 1131   Temp 36 °C (96.8 °F) 11/19/24 1131   Pulse 82 11/19/24 1135   Resp 18 11/19/24 1131   SpO2 93 % 11/19/24 1135   Vitals shown include unfiled device data.        Post Anesthesia Care and Evaluation    Post-procedure mental status: acceptable.  Pain management: satisfactory to patient    Airway patency: patent  Anesthetic complications: No anesthetic complications    Cardiovascular status: acceptable  Respiratory status: acceptable    Comments: Per chart review

## 2024-11-19 NOTE — OP NOTE
Bronchoscopy Procedure Note    Procedure:  Bronchoscopy with bronchoalveolar lavage left upper lobe  Bronchoscopy with transbronchial biopsies left upper lobe  Bronchoscopy with bronchial washings tracheobronchial tree  Airway inspection    Pre-Operative Diagnosis: Persistent pneumonia, left upper lobe infiltrate  Post-Operative Diagnosis: Persistent pneumonia, left upper lobe infiltrate, status post left upper lobe surgery, minimal secretions    Indication:  Persistent pneumonia, left upper lobe infiltrate    Anesthesia: MAC anesthesia    Procedure Details: Patient was consented for the procedure with all risks and benefits of the procedure explained in detail. Patient was given the opportunity to ask questions and all concerns were answered.    The bronchoscope was inserted into the main airway via the mouth. An anatomical survey was done of the main airways and the subsegmental bronchus. The findings are reported below. A bronchoalveolar lavage was performed using 60 mL aliquots of normal saline x 2 instilled into the airways then aspirated back from left upper part of the lung of lung with 50 mL serosanguineous fluid in return. Blind transbronchial biopsies were done from left upper lung with several passes blindly. Patient had mild oozing after the biopsy.  Bronchial washing was obtained from entire tracheobronchial tree.    Findings:  Status post left upper lobe surgery, surgical site without any abnormality  Friable mucosa, easy bleeding with suction  Scattered purulent secretions    Estimated Blood Loss: Insignificant    Specimens:  BAL left upper lobe  Transbronchial biopsies left upper lobe  Bronchial washings tracheobronchial tree    Complications: None; patient tolerated the procedure well.    Disposition: To home, once stable in recovery.    Patient tolerated the procedure well.      Electronically signed by Dayne Choudhary MD, 11/19/2024, 10:59 EST.

## 2024-11-20 LAB
CYTO UR: NORMAL
LAB AP CASE REPORT: NORMAL
LAB AP CLINICAL INFORMATION: NORMAL
PATH REPORT.FINAL DX SPEC: NORMAL
PATH REPORT.GROSS SPEC: NORMAL

## 2024-11-21 LAB
BACTERIA SPEC AEROBE CULT: ABNORMAL
BACTERIA SPEC RESP CULT: ABNORMAL
BACTERIA SPEC RESP CULT: ABNORMAL
CYTO UR: NORMAL
GRAM STN SPEC: ABNORMAL
LAB AP CASE REPORT: NORMAL
LAB AP CLINICAL INFORMATION: NORMAL
PATH REPORT.FINAL DX SPEC: NORMAL
PATH REPORT.GROSS SPEC: NORMAL

## 2024-11-24 LAB
FUNGUS WND CULT: NORMAL
MYCOBACTERIUM SPEC CULT: NORMAL
NIGHT BLUE STAIN TISS: NORMAL
NIGHT BLUE STAIN TISS: NORMAL

## 2024-11-25 ENCOUNTER — TELEPHONE (OUTPATIENT)
Dept: PULMONOLOGY | Facility: CLINIC | Age: 84
End: 2024-11-25
Payer: MEDICARE

## 2024-11-25 DIAGNOSIS — J15.1 PNEUMONIA DUE TO PSEUDOMONAS SPECIES, UNSPECIFIED LATERALITY, UNSPECIFIED PART OF LUNG: Primary | ICD-10-CM

## 2024-11-25 NOTE — TELEPHONE ENCOUNTER
Spoke with patient and wife.  Advised IV placement and first dose of antibiotic is scheduled for 11/26/2024 @ [facility] @ 1000.  Home health orders were sent to [facility].  IV medication and supplies for home infusion was faxed to [facility] @ [phone].

## 2024-11-26 ENCOUNTER — HOSPITAL ENCOUNTER (OUTPATIENT)
Dept: INFUSION THERAPY | Facility: HOSPITAL | Age: 84
Discharge: HOME | End: 2024-11-26
Attending: INTERNAL MEDICINE
Payer: MEDICARE

## 2024-11-26 ENCOUNTER — HOSPITAL ENCOUNTER (OUTPATIENT)
Dept: INFUSION THERAPY | Facility: HOSPITAL | Age: 84
Discharge: HOME | End: 2024-11-26
Payer: MEDICARE

## 2024-11-26 VITALS
SYSTOLIC BLOOD PRESSURE: 129 MMHG | TEMPERATURE: 98.1 F | OXYGEN SATURATION: 94 % | DIASTOLIC BLOOD PRESSURE: 61 MMHG | RESPIRATION RATE: 20 BRPM | HEART RATE: 94 BPM

## 2024-11-26 DIAGNOSIS — Z45.2 ENCOUNTER FOR ASSESSMENT OF PERIPHERALLY INSERTED CENTRAL VENOUS CATHETER (PICC): Primary | ICD-10-CM

## 2024-11-26 DIAGNOSIS — J15.1 PNEUMONIA DUE TO PSEUDOMONAS SPECIES, UNSPECIFIED LATERALITY, UNSPECIFIED PART OF LUNG: Primary | ICD-10-CM

## 2024-11-26 PROCEDURE — 36410 VNPNXR 3YR/> PHY/QHP DX/THER: CPT

## 2024-11-26 PROCEDURE — 25010000002 CEFEPIME PER 500 MG: Performed by: INTERNAL MEDICINE

## 2024-11-26 PROCEDURE — C1751 CATH, INF, PER/CENT/MIDLINE: HCPCS

## 2024-11-26 PROCEDURE — 96365 THER/PROPH/DIAG IV INF INIT: CPT

## 2024-11-26 RX ADMIN — CEFEPIME 2000 MG: 2 INJECTION, POWDER, FOR SOLUTION INTRAVENOUS at 10:51

## 2024-11-26 NOTE — CONSULTS
Midline Line Insertion Procedure Note    Procedure: Insertion of #3F PowerMidline    Indications:  Home IV therapy; 2 weeks Cefepime    Procedure Details   Sterile technique was used including antiseptics, cap, gloves, gown, hand hygiene, mask, and sheet.    #3F PowerMidline inserted to the L Basilic vein per hospital protocol.   Blood return:  yes    Findings:  Catheter inserted to 16 cm, with 0 cm. Exposed. Catheter was flushed with 20 cc NS. Patient did tolerate procedure well.    LOT: [id number]  Expiration date: 2025-11-30    Recommendations:  Midline Brochure given to patient with teaching instruction.

## 2024-11-26 NOTE — ADDENDUM NOTE
Encounter addended by: Rebeca Rogers RN on: 11/26/2024 12:40 PM   Actions taken: LDA properties accepted, Flowsheet accepted, Clinical Note Signed, Charge Capture section accepted

## 2024-12-02 ENCOUNTER — TELEPHONE (OUTPATIENT)
Dept: PULMONOLOGY | Facility: CLINIC | Age: 84
End: 2024-12-02
Payer: MEDICARE

## 2024-12-02 NOTE — TELEPHONE ENCOUNTER
Home health has been to patient's home.  Patient states no difficulty with infusion and no questions or concerns at this time.  Invited patient to call with any assistance we may provide.  He is agreeable.

## 2024-12-13 ENCOUNTER — TELEPHONE (OUTPATIENT)
Dept: PULMONOLOGY | Facility: CLINIC | Age: 84
End: 2024-12-13
Payer: MEDICARE

## 2024-12-13 NOTE — TELEPHONE ENCOUNTER
Spoke with ARTEMIO Caal.  Ok to discontinue IV access as patient has completed course of IV antibiotic therapy.   Called and spoke with BLOSSOM Edmondson with [facility].  She will visit patient today, discontinue IV access and discharge patient from home health service.

## 2024-12-17 NOTE — PROGRESS NOTES
Primary Care Provider  Payam Carroll MD   Referring Provider  No ref. provider found    Patient Complaint  Follow-up, Med Management, and Shortness of Breath      Subjective       History of Presenting Illness  Carlos Zimmerman is a pleasant 84 y.o. male  of  Dr. Choudhary who presents to [facility] with history of multifocal pneumonia, Pseudomonas infection, shortness of breath, pulmonary nodule, left upper lobe lung cancer, COPD here for follow-up appointment.  Patient is here with his spouse.  Patient had a bronchoscopy by Dr. Choudhary on 11/19/2024 due to recurrent pneumonia, airway clearance impairment.  Cytology was negative for malignant cells.  Pathology also unremarkable bronchial mucosa negative for viral inclusions granulomas and malignancy.  Pneumonia panel was positive for E. coli and Pseudomonas.  BAL positive for E. coli.  Patient was treated with cefepime 2 g IV antibiotic therapy and completed therapy on 12/13/2024.  AFB with no growth to date at 4 weeks and fungal culture final result was no fungus isolated at 4 weeks.  Patient states he just feels like he cannot get a good deep breath since having in the bronchoscopy.  He states he gets very short of with minimal exertional activity.  Gets short of air will be from 1 room to the other.  Patient states he is having to use his oxygen all the time now and is benefiting from supplemental oxygen.  He is currently on 3 L but his saturation is at 100%. At present time patient denies coughing, wheezing, headaches, chest pain, weight loss or hemoptysis. Patient denies fevers, chills and night sweats. Carlos Zimmerman is able to perform ADLs.      I have personally reviewed the review of systems, past family, social, medical and surgical histories; and agree with their findings.      Review of Systems   Constitutional: Negative.    HENT: Negative.     Respiratory:  Positive for shortness of breath.     Cardiovascular: Negative.    Musculoskeletal: Negative.    Neurological: Negative.    Psychiatric/Behavioral: Negative.           Family History   Problem Relation Age of Onset    Cancer Mother     Stomach cancer Other     Malig Hyperthermia Neg Hx         Social History     Socioeconomic History    Marital status:    Tobacco Use    Smoking status: Former     Current packs/day: 0.00     Average packs/day: 1 pack/day for 60.0 years (60.0 ttl pk-yrs)     Types: Cigarettes     Start date:      Quit date:      Years since quittin.9     Passive exposure: Past    Smokeless tobacco: Current     Types: Snuff    Tobacco comments:     INST PER ANESTHESIA PROTOCOL   Vaping Use    Vaping status: Never Used   Substance and Sexual Activity    Alcohol use: Not Currently    Drug use: Never    Sexual activity: Defer        Past Medical History:   Diagnosis Date    Anemia     NO CURRENT ISSUES    Arthritis     COPD (chronic obstructive pulmonary disease)     INHALER  PRN    Hyperlipidemia     Hypertension     ON MEDS/FOLLOWS SAHNI, PT DENIES CAD    Lung cancer     REMOVED -     Pneumonia     LEFT LUNG CA NO CURRENT PNEUMONIA    SOB (shortness of breath)     WITH EXERTION SOMETIMES    Stroke     LEFT LEG/FOOT DROP      TIA (transient ischemic attack)     NO RESIDUAL         Immunization History   Administered Date(s) Administered    COVID-19 (PFIZER) 12YRS+ (COMIRNATY) 11/10/2023    COVID-19 (PFIZER) Purple Cap Monovalent 2021, 2021, 10/18/2021    FLUAD TRI 65YR+ 2024    Fluad Quad 65+ 10/07/2020, 2021    Fluzone High-Dose 65+yrs 10/06/2022, 2023    Influenza, Unspecified 2020    Pneumococcal Conjugate 20-Valent (PCV20) 2024    TD Preservative Free (Tenivac) 2022       No Known Allergies       Current Outpatient Medications:     albuterol sulfate  (90 Base) MCG/ACT inhaler, Inhale 2 puffs Every 4 (Four) Hours As Needed for Wheezing or Shortness of  Air., Disp: 18 g, Rfl: 3    aspirin 81 MG EC tablet, Take 1 tablet by mouth Daily., Disp: , Rfl:     atorvastatin (LIPITOR) 40 MG tablet, Take 1 tablet by mouth Daily., Disp: , Rfl:     B Complex Vitamins (VITAMIN-B COMPLEX PO), Take 1 tablet by mouth Daily., Disp: , Rfl:     cetirizine (zyrTEC) 10 MG tablet, Take 1 tablet by mouth Daily., Disp: , Rfl:     Cholecalciferol (VITAMIN D3 PO), Take 1 tablet by mouth Daily., Disp: , Rfl:     cilostazol (PLETAL) 100 MG tablet, Take 1 tablet by mouth 2 (Two) Times a Day., Disp: , Rfl:     cyclobenzaprine (FLEXERIL) 10 MG tablet, Every 8 (Eight) Hours., Disp: , Rfl:     Fluticasone-Umeclidin-Vilant (TRELEGY ELLIPTA) 200-62.5-25 MCG/ACT inhaler, Inhale 1 puff Daily., Disp: 1 each, Rfl: 11    gabapentin (NEURONTIN) 300 MG capsule, Take 1 capsule by mouth 3 (Three) Times a Day., Disp: 90 capsule, Rfl: 5    ipratropium-albuterol (DUO-NEB) 0.5-2.5 mg/3 ml nebulizer, Take 3 mL by nebulization 4 (Four) Times a Day As Needed for Wheezing or Shortness of Air., Disp: 360 mL, Rfl: 3    losartan (COZAAR) 50 MG tablet, Take 1 tablet by mouth Daily., Disp: , Rfl:     metoprolol succinate XL (TOPROL-XL) 25 MG 24 hr tablet, Take 1 tablet by mouth Daily., Disp: , Rfl:     metoprolol tartrate (LOPRESSOR) 25 MG tablet, Take 0.5 tablets by mouth Daily., Disp: , Rfl:     multivitamin with minerals tablet tablet, Take 1 tablet by mouth Daily., Disp: , Rfl:     multivitamins-minerals (PRESERVISION AREDS 2) capsule capsule, Take 1 capsule by mouth 2 (Two) Times a Day., Disp: , Rfl:     pantoprazole (PROTONIX) 40 MG EC tablet, Take 1 tablet by mouth Daily., Disp: , Rfl:     Refresh Tears 0.5 % solution, Administer 1 drop to both eyes Daily As Needed for Dry Eyes., Disp: , Rfl:     tamsulosin (FLOMAX) 0.4 MG capsule 24 hr capsule, Take 1 capsule by mouth 2 (Two) Times a Day., Disp: 180 capsule, Rfl: 4    predniSONE (DELTASONE) 20 MG tablet, Take 2 tablets by mouth Daily., Disp: 14 tablet, Rfl: 0  "        Vital Signs   /54 (BP Location: Left arm, Patient Position: Sitting, Cuff Size: Adult)   Pulse 102   Temp 97.8 °F (36.6 °C)   Resp 16   Ht 170.2 cm (67\")   Wt 68 kg (150 lb)   SpO2 100% Comment: 3 liters  BMI 23.49 kg/m²       Objective     Physical Exam  Vitals reviewed.   Constitutional:       General: He is not in acute distress.     Appearance: Normal appearance. He is not ill-appearing.   HENT:      Head: Normocephalic and atraumatic.      Nose: Nose normal.      Mouth/Throat:      Mouth: Mucous membranes are moist.      Pharynx: Oropharynx is clear.   Eyes:      Extraocular Movements: Extraocular movements intact.      Conjunctiva/sclera: Conjunctivae normal.      Pupils: Pupils are equal, round, and reactive to light.   Cardiovascular:      Rate and Rhythm: Normal rate and regular rhythm.      Pulses: Normal pulses.      Heart sounds: Normal heart sounds.   Pulmonary:      Effort: Pulmonary effort is normal. No respiratory distress.      Breath sounds: Normal breath sounds. No stridor. No wheezing, rhonchi or rales.   Abdominal:      General: Bowel sounds are normal.   Musculoskeletal:         General: Normal range of motion.      Cervical back: Normal range of motion and neck supple.   Skin:     General: Skin is warm and dry.   Neurological:      Mental Status: He is alert and oriented to person, place, and time.   Psychiatric:         Behavior: Behavior normal.         Results Review  I have personally reviewed the prior office notes, hospital records, labs, and diagnostics.  BAL Culture, Quantitative - Lavage, Lung, Left Upper Lobe  Order: [id number]  Status: Final result       Visible to patient: No (inaccessible in MyChart)       Next appt: 12/19/2024 at 10:00 AM in Pulmonology (Gely Canales, ARTEMIO)       Dx: Recurrent pneumonia; Airway clearance...    Specimen Information: Lung, Left Upper Lobe; Lavage   2 Result Notes       1 Patient Communication       1 Follow-up Encounter  BAL " Culture   Lab   >100,000 CFU/mL Escherichia coli Abnormal  [facility]            Gram Stain  Lab   Few (2+) Gram positive cocci in pairs, chains and clusters [facility]   Few (2+) Gram negative bacilli  [facility]           Susceptibility     Escherichia coli    VAN    Amikacin 4 ug/ml Susceptible    Amoxicillin + Clavulanate 8 ug/ml Susceptible    Ampicillin >=32 ug/ml Resistant    Ampicillin + Sulbactam 16 ug/ml Intermediate    Cefazolin (Non Urine) 4 ug/ml Intermediate    Cefepime <=0.12 ug/ml Susceptible    Ceftazidime <=0.5 ug/ml Susceptible    Ceftriaxone <=0.25 ug/ml Susceptible    Gentamicin >=16 ug/ml Resistant    Levofloxacin >=8 ug/ml Resistant    Piperacillin + Tazobactam <=4 ug/ml Susceptible    Tetracycline >=16 ug/ml Resistant    Tobramycin 8 ug/ml Intermediate    Trimethoprim + Sulfamethoxazole >=320 ug/ml Resistant              Linear View     Susceptibility Comments    Escherichia coli   With the exception of urinary-sourced infections, aminoglycosides should not be used as monotherapy.        Specimen Collected: 11/19/24 10:53 EST Last Resulted: 11/21/24 08:31 EST       Pneumonia Panel - Lavage, Lung, Left Upper Lobe  Order: [id number]  Status: Final result       Visible to patient: No (inaccessible in MyChart)       Next appt: 12/19/2024 at 10:00 AM in Pulmonology (ARTEMIO Caal)       Dx: Recurrent pneumonia; Airway clearance...    Specimen Information: Lung, Left Upper Lobe; Lavage   2 Result Notes       1 Patient Communication       1 Follow-up Encounter      Component  Ref Range & Units    Escherichia coli PCR  Not Detected Detected Abnormal    Comment: 10^6 Bin copies/mL   Acinetobacter calcoaceticus-baumannii complex PCR  Not Detected Not Detected   Enterobacter cloacae PCR  Not Detected Not Detected   Klebsiella oxytoca PCR  Not Detected Not Detected   Klebsiella pneumoniae group PCR  Not Detected Not Detected   Klebsiella aerogenes PCR  Not Detected Not Detected   Moraxella catarrhalis  PCR  Not Detected Not Detected   Proteus species PCR  Not Detected Not Detected   Pseudomonas aeroginosa PCR  Not Detected Detected Abnormal    Comment: 10^5 Bin copies/mL   Serratia marcescens PCR  Not Detected Not Detected   Staphylococcus aureus PCR  Not Detected Not Detected   Streptococcus pyogenes PCR  Not Detected Not Detected   Haemophilus influenzae PCR  Not Detected Not Detected   Streptococcus agalactiae PCR  Not Detected Not Detected   Streptococcus pneumoniae PCR  Not Detected Not Detected   Chlamydophila pneumoniae PCR  Not Detected Not Detected   Legionella pneumophilia PCR  Not Detected Not Detected   Mycoplasma pneumo by PCR  Not Detected Not Detected   ADENOVIRUS, PCR  Not Detected Not Detected   CTX-M Gene  Not Detected, N/A Not Detected   IMP Gene  Not Detected, N/A Not Detected   KPC Gene  Not Detected, N/A Not Detected   mecA/C and MREJ Gene  Not Detected, N/A N/A   NDM Gene  Not Detected, N/A Not Detected   OXA-48-like Gene  Not Detected, N/A Not Detected   VIM Gene  Not Detected, N/A Not Detected   Coronavirus  Not Detected Not Detected   Human Metapneumovirus  Not Detected Not Detected   Human Rhinovirus/Enterovirus  Not Detected Not Detected   Influenza A PCR  Not Detected Not Detected   Influenza B PCR  Not Detected Not Detected   RSV, PCR  Not Detected Not Detected   Parainfluenza virus PCR  Not Detected Not Detected   Resulting Agency [facility]             Specimen Collected: 11/19/24 10:53 EST Last Resulted: 11/19/24 13:26 EST   Non-gynecologic Cytology: [id number]  Order: [id number]  Status: Final result       Visible to patient: No (inaccessible in MyChart)       Next appt: 12/19/2024 at 10:00 AM in Pulmonology (ARTEMIO Caal)       Dx: Recurrent pneumonia; Airway clearance...    Specimen Information: Lung, Left Upper Lobe; Lavage   2 Result Notes       1 Patient Communication       1 Follow-up Encounter      Component    Case Report   Medical Cytology Report                            Case: [id number]                                   Authorizing Provider:  Dayne Choudhary MD         Collected:           11/19/2024 10:53 AM           Ordering Location:     [facility] Received:            11/20/2024 09:54 AM                                  SUITES                                                                       Pathologist:           Linda Crandall MD                                                     Specimen:    Lung, Left Upper Lobe, LEFT UPPER LOBE BAL                                                Final Diagnosis   Lung, left upper lobe, BAL:               - Negative for malignant cells               - No viral inclusions      Electronically signed by Linda Crandall MD on 11/20/2024 at 1426   Clinical Information    Pre-Operative Diagnosis:  Persistent pneumonia, left upper lobe infiltrate.  Post-Operative Diagnosis:  Persistent pneumonia, left upper lobe infiltrate, status post left upper lobe surgery, minimal secretions.   Gross Description    1. Lung, Left Upper Lobe.  BAL, Left Upper Lobe   20 cc of blood-tinged, mucoid fluid received (1 cytospin prep prepared).         Microscopic Description    Microscopic examination performed.   Resulting Agency [facility]             Specimen Collected: 11/19/24 10:53 EST Last Resulted: 11/20/24 14:26 EST         CT Chest Without Contrast Diagnostic [OGL270] (Order [id number])  Order  Status: Final result     Study Notes     Callie Butler on 11/5/2024  9:54 AM EST   LEFT LUNG CANCER, PNEUMONIA FOLLOW UP, NO CHEST COMPLAINTS     Appointment Information    PACS Images     Radiology Images  Study Result    Narrative & Impression   CT CHEST WO CONTRAST DIAGNOSTIC     Date of Exam: 11/5/2024 9:53 AM EST     Indication: Monitoring for resolution of RLL pneumonia. NSCLC of left upper lobe s/p SBRT, surveillance.     Comparison: September 3, 2024     Technique: Axial CT images were obtained of the chest without  contrast administration.  Reconstructed coronal and sagittal images were also obtained. Automated exposure control and iterative construction methods were used.        Findings:  There are changes from left upper lobectomy, and the residual central tracheobronchial tree is clear. There is mild emphysema. There is a new dense consolidation within the left upper lung, with smaller consolidations within the right upper lobe, right   middle lobe, and left lower lung. A known cavitary lesion with adjacent soft tissue thickening within the lateral left upper lung is difficult to see secondary to the overlying consolidation, but is likely unchanged. There is a small left pleural   effusion. A 4 mm posterior right upper lobe nodule on image 29 is unchanged.     The heart size appears normal, with evidence of calcified coronary artery disease. The great vessels are normal in caliber. There is some mediastinal shift to the left. No abnormally enlarged lymph nodes are identified.     Partial evaluation of the upper abdomen is unremarkable.     No aggressive osseous lesions are identified. There are stable chronic compression deformities at T8 and T12.     IMPRESSION:  Impression:  1.New dense consolidation within left upper lung, with smaller bilateral consolidations, suggesting multifocal pneumonia. Findings appear worse from prior CT from September 3, 2024.  2.Cavitary lesion with adjacent soft tissue thickening along lateral left upper lung is difficult to visualize, but likely unchanged.  3.Small left pleural effusion.  4.Mild emphysema.           Electronically Signed: Hussain Martinez MD    11/6/2024 3:39 PM EST    Workstation ID: [id number]       Assessment         Patient Active Problem List   Diagnosis    COPD (chronic obstructive pulmonary disease)    Hypertension    Low back pain    Lung disease    Solitary lung nodule    Pneumonia of right lung due to infectious organism    Hematuria    Complicated UTI (urinary tract  infection)    Recurrent urinary tract infection    Benign prostatic hyperplasia    Atherosclerosis of lower extremity with claudication    Benign prostatic hyperplasia with lower urinary tract symptoms    Elevated PSA    Cervical radiculopathy    Cervical spondylosis without myelopathy    Abnormal PET scan of lung    Lung nodule    Malignant neoplasm of upper lobe, left bronchus or lung    Sepsis due to pneumonia    Airway clearance impairment    Recurrent pneumonia    Pneumonia due to Pseudomonas species        Plan     Diagnoses and all orders for this visit:    1. Dyspnea on exertion (Primary)  -     ipratropium-albuterol (DUO-NEB) 0.5-2.5 mg/3 ml nebulizer; Take 3 mL by nebulization 4 (Four) Times a Day As Needed for Wheezing or Shortness of Air.  Dispense: 360 mL; Refill: 3  -     XR Chest 2 View; Future  -     predniSONE (DELTASONE) 20 MG tablet; Take 2 tablets by mouth Daily.  Dispense: 14 tablet; Refill: 0    2. Airway clearance impairment  -     ipratropium-albuterol (DUO-NEB) 0.5-2.5 mg/3 ml nebulizer; Take 3 mL by nebulization 4 (Four) Times a Day As Needed for Wheezing or Shortness of Air.  Dispense: 360 mL; Refill: 3  -     XR Chest 2 View; Future  -     predniSONE (DELTASONE) 20 MG tablet; Take 2 tablets by mouth Daily.  Dispense: 14 tablet; Refill: 0    3. Recurrent pneumonia  -     ipratropium-albuterol (DUO-NEB) 0.5-2.5 mg/3 ml nebulizer; Take 3 mL by nebulization 4 (Four) Times a Day As Needed for Wheezing or Shortness of Air.  Dispense: 360 mL; Refill: 3  -     XR Chest 2 View; Future  -     predniSONE (DELTASONE) 20 MG tablet; Take 2 tablets by mouth Daily.  Dispense: 14 tablet; Refill: 0    4. Nicotine dependence, cigarettes, in remission  -     ipratropium-albuterol (DUO-NEB) 0.5-2.5 mg/3 ml nebulizer; Take 3 mL by nebulization 4 (Four) Times a Day As Needed for Wheezing or Shortness of Air.  Dispense: 360 mL; Refill: 3  -     XR Chest 2 View; Future  -     predniSONE (DELTASONE) 20 MG tablet;  Take 2 tablets by mouth Daily.  Dispense: 14 tablet; Refill: 0    5. Malignant neoplasm of lower lobe of left lung  Comments:  Surveillance CT has been ordered for January 2025  Orders:  -     ipratropium-albuterol (DUO-NEB) 0.5-2.5 mg/3 ml nebulizer; Take 3 mL by nebulization 4 (Four) Times a Day As Needed for Wheezing or Shortness of Air.  Dispense: 360 mL; Refill: 3  -     XR Chest 2 View; Future  -     predniSONE (DELTASONE) 20 MG tablet; Take 2 tablets by mouth Daily.  Dispense: 14 tablet; Refill: 0    6. Pulmonary emphysema, unspecified emphysema type  -     ipratropium-albuterol (DUO-NEB) 0.5-2.5 mg/3 ml nebulizer; Take 3 mL by nebulization 4 (Four) Times a Day As Needed for Wheezing or Shortness of Air.  Dispense: 360 mL; Refill: 3  -     XR Chest 2 View; Future  -     predniSONE (DELTASONE) 20 MG tablet; Take 2 tablets by mouth Daily.  Dispense: 14 tablet; Refill: 0       7.  Lung assessment today reveals no adventitious breath sounds, good air exchange bilateral lung fields.  Will go ahead and get a chest x-ray on the patient at this time and put him on a prednisone burst.  Encourage patient to continue use of his duo nebulizer every 6 hours and continue with maintenance inhaler Trelegy 200.  We will notify patient of chest x-ray results when available.  Patient advised if symptoms persist or worsen he should call [phone] or go to emergency room.      Smoking status:  reports that he quit smoking about 14 years ago. His smoking use included cigarettes. He started smoking about 75 years ago. He has a 60 pack-year smoking history. He has been exposed to tobacco smoke. His smokeless tobacco use includes snuff.    Vaccination status: Reviewed  Immunization History   Administered Date(s) Administered    COVID-19 (PFIZER) 12YRS+ (COMIRNATY) 11/10/2023    COVID-19 (PFIZER) Purple Cap Monovalent 02/28/2021, 03/11/2021, 10/18/2021    FLUAD TRI 65YR+ 09/06/2024    Fluad Quad 65+ 10/07/2020, 09/24/2021    Fluzone  High-Dose 65+yrs 10/06/2022, 11/11/2023    Influenza, Unspecified 09/01/2020    Pneumococcal Conjugate 20-Valent (PCV20) 04/22/2024    TD Preservative Free (Tenivac) 06/27/2022        Medications personally reviewed    Follow Up  Return in about 3 months (around 3/19/2025) for with Dr. Choudhary or Gely.    Patient was given instructions and counseling regarding his condition or for health maintenance advice. Please see specific information pulled into the AVS if appropriate.     I spent 15 minutes caring for Carlos Zimmerman on this date of service. This time includes time spent by me in the following activities:preparing for the visit, reviewing tests, obtaining and/or reviewing a separately obtained history, performing a medically appropriate examination and/or evaluation, counseling and educating the patient/family/caregiver, ordering medications, tests, or procedures, documenting information in the medical record, independently interpreting results and communicating that information with the patient/family/caregiver and answered questions family members, discuss medications.

## 2024-12-19 ENCOUNTER — OFFICE VISIT (OUTPATIENT)
Dept: PULMONOLOGY | Facility: CLINIC | Age: 84
End: 2024-12-19
Payer: MEDICARE

## 2024-12-19 ENCOUNTER — LAB (OUTPATIENT)
Dept: LAB | Facility: HOSPITAL | Age: 84
End: 2024-12-19
Payer: MEDICARE

## 2024-12-19 ENCOUNTER — HOSPITAL ENCOUNTER (OUTPATIENT)
Dept: GENERAL RADIOLOGY | Facility: HOSPITAL | Age: 84
Discharge: HOME | End: 2024-12-19
Payer: MEDICARE

## 2024-12-19 VITALS
RESPIRATION RATE: 16 BRPM | OXYGEN SATURATION: 100 % | TEMPERATURE: 97.8 F | HEART RATE: 102 BPM | SYSTOLIC BLOOD PRESSURE: 127 MMHG | DIASTOLIC BLOOD PRESSURE: 54 MMHG | BODY MASS INDEX: 23.54 KG/M2 | HEIGHT: 67 IN | WEIGHT: 150 LBS

## 2024-12-19 DIAGNOSIS — C34.32 MALIGNANT NEOPLASM OF LOWER LOBE OF LEFT LUNG: ICD-10-CM

## 2024-12-19 DIAGNOSIS — R06.09 DYSPNEA ON EXERTION: Primary | ICD-10-CM

## 2024-12-19 DIAGNOSIS — F17.211 NICOTINE DEPENDENCE, CIGARETTES, IN REMISSION: ICD-10-CM

## 2024-12-19 DIAGNOSIS — J18.9 RECURRENT PNEUMONIA: ICD-10-CM

## 2024-12-19 DIAGNOSIS — R97.20 ELEVATED PSA: ICD-10-CM

## 2024-12-19 DIAGNOSIS — J43.9 PULMONARY EMPHYSEMA, UNSPECIFIED EMPHYSEMA TYPE: ICD-10-CM

## 2024-12-19 DIAGNOSIS — R06.89 AIRWAY CLEARANCE IMPAIRMENT: ICD-10-CM

## 2024-12-19 DIAGNOSIS — J18.9 RECURRENT PNEUMONIA: Primary | ICD-10-CM

## 2024-12-19 DIAGNOSIS — R06.09 DYSPNEA ON EXERTION: ICD-10-CM

## 2024-12-19 LAB — PSA SERPL-MCNC: 14.5 NG/ML (ref 0–4)

## 2024-12-19 PROCEDURE — 71046 X-RAY EXAM CHEST 2 VIEWS: CPT

## 2024-12-19 PROCEDURE — 36415 COLL VENOUS BLD VENIPUNCTURE: CPT

## 2024-12-19 PROCEDURE — 84153 ASSAY OF PSA TOTAL: CPT

## 2024-12-19 RX ORDER — PREDNISONE 20 MG/1
40 TABLET ORAL DAILY
Qty: 14 TABLET | Refills: 0 | Status: SHIPPED | OUTPATIENT
Start: 2024-12-19

## 2024-12-19 RX ORDER — PREDNISONE 20 MG/1
20 TABLET ORAL DAILY
Qty: 7 TABLET | Refills: 0 | Status: CANCELLED | OUTPATIENT
Start: 2024-12-19

## 2024-12-19 RX ORDER — IPRATROPIUM BROMIDE AND ALBUTEROL SULFATE 2.5; .5 MG/3ML; MG/3ML
3 SOLUTION RESPIRATORY (INHALATION) 4 TIMES DAILY PRN
Qty: 360 ML | Refills: 3 | Status: SHIPPED | OUTPATIENT
Start: 2024-12-19

## 2024-12-24 LAB
MYCOBACTERIUM SPEC CULT: NORMAL
NIGHT BLUE STAIN TISS: NORMAL
NIGHT BLUE STAIN TISS: NORMAL

## 2024-12-31 LAB
MYCOBACTERIUM SPEC CULT: NORMAL
NIGHT BLUE STAIN TISS: NORMAL
NIGHT BLUE STAIN TISS: NORMAL

## 2025-01-02 ENCOUNTER — TELEPHONE (OUTPATIENT)
Dept: ONCOLOGY | Facility: HOSPITAL | Age: 85
End: 2025-01-02
Payer: MEDICARE

## 2025-01-02 NOTE — TELEPHONE ENCOUNTER
Caller: Carlos Zimmerman    Relationship to patient: Self    Best call back number: [phone]    Chief complaint: PATIENT CALLED TO RESCHEDULE     Type of visit: FOLLOW UP       If rescheduling, when is the original appointment: 1-6-25

## 2025-01-14 ENCOUNTER — HOSPITAL ENCOUNTER (OUTPATIENT)
Dept: CT IMAGING | Facility: HOSPITAL | Age: 85
Discharge: HOME | End: 2025-01-14
Admitting: NURSE PRACTITIONER
Payer: MEDICARE

## 2025-01-14 DIAGNOSIS — Z90.2 HISTORY OF LOBECTOMY OF LUNG: ICD-10-CM

## 2025-01-14 DIAGNOSIS — C34.32 MALIGNANT NEOPLASM OF LOWER LOBE OF LEFT LUNG: ICD-10-CM

## 2025-01-14 DIAGNOSIS — Z92.3 HISTORY OF EXTERNAL BEAM RADIATION THERAPY: ICD-10-CM

## 2025-01-14 DIAGNOSIS — Z87.01 HISTORY OF RECENT PNEUMONIA: ICD-10-CM

## 2025-01-14 DIAGNOSIS — J44.9 CHRONIC OBSTRUCTIVE PULMONARY DISEASE, UNSPECIFIED COPD TYPE: ICD-10-CM

## 2025-01-14 PROCEDURE — 71250 CT THORAX DX C-: CPT

## 2025-01-15 ENCOUNTER — TELEPHONE (OUTPATIENT)
Dept: ONCOLOGY | Facility: HOSPITAL | Age: 85
End: 2025-01-15
Payer: MEDICARE

## 2025-01-15 ENCOUNTER — LAB (OUTPATIENT)
Dept: LAB | Facility: HOSPITAL | Age: 85
End: 2025-01-15
Payer: MEDICARE

## 2025-01-15 DIAGNOSIS — D50.9 IRON DEFICIENCY ANEMIA, UNSPECIFIED IRON DEFICIENCY ANEMIA TYPE: ICD-10-CM

## 2025-01-15 LAB
BASOPHILS # BLD AUTO: 0.03 10*3/MM3 (ref 0–0.2)
BASOPHILS NFR BLD AUTO: 0.4 % (ref 0–1.5)
DEPRECATED RDW RBC AUTO: 44.5 FL (ref 37–54)
EOSINOPHIL # BLD AUTO: 0.34 10*3/MM3 (ref 0–0.4)
EOSINOPHIL NFR BLD AUTO: 4.5 % (ref 0.3–6.2)
ERYTHROCYTE [DISTWIDTH] IN BLOOD BY AUTOMATED COUNT: 15.5 % (ref 12.3–15.4)
FERRITIN SERPL-MCNC: 211.9 NG/ML (ref 30–400)
HCT VFR BLD AUTO: 38.6 % (ref 37.5–51)
HGB BLD-MCNC: 12.4 G/DL (ref 13–17.7)
IMM GRANULOCYTES # BLD AUTO: 0.04 10*3/MM3 (ref 0–0.05)
IMM GRANULOCYTES NFR BLD AUTO: 0.5 % (ref 0–0.5)
IRON 24H UR-MRATE: 27 MCG/DL (ref 59–158)
IRON SATN MFR SERPL: 11 % (ref 20–50)
LYMPHOCYTES # BLD AUTO: 1.11 10*3/MM3 (ref 0.7–3.1)
LYMPHOCYTES NFR BLD AUTO: 14.6 % (ref 19.6–45.3)
MCH RBC QN AUTO: 25.7 PG (ref 26.6–33)
MCHC RBC AUTO-ENTMCNC: 32.1 G/DL (ref 31.5–35.7)
MCV RBC AUTO: 80.1 FL (ref 79–97)
MONOCYTES # BLD AUTO: 0.72 10*3/MM3 (ref 0.1–0.9)
MONOCYTES NFR BLD AUTO: 9.4 % (ref 5–12)
NEUTROPHILS NFR BLD AUTO: 5.38 10*3/MM3 (ref 1.7–7)
NEUTROPHILS NFR BLD AUTO: 70.6 % (ref 42.7–76)
NRBC BLD AUTO-RTO: 0 /100 WBC (ref 0–0.2)
PLATELET # BLD AUTO: 379 10*3/MM3 (ref 140–450)
PMV BLD AUTO: 9.2 FL (ref 6–12)
RBC # BLD AUTO: 4.82 10*6/MM3 (ref 4.14–5.8)
TIBC SERPL-MCNC: 247 MCG/DL (ref 298–536)
TRANSFERRIN SERPL-MCNC: 166 MG/DL (ref 200–360)
WBC NRBC COR # BLD AUTO: 7.62 10*3/MM3 (ref 3.4–10.8)

## 2025-01-15 PROCEDURE — 83540 ASSAY OF IRON: CPT

## 2025-01-15 PROCEDURE — 84466 ASSAY OF TRANSFERRIN: CPT

## 2025-01-15 PROCEDURE — 82728 ASSAY OF FERRITIN: CPT

## 2025-01-15 PROCEDURE — 36415 COLL VENOUS BLD VENIPUNCTURE: CPT

## 2025-01-15 PROCEDURE — 85025 COMPLETE CBC W/AUTO DIFF WBC: CPT

## 2025-01-17 ENCOUNTER — OFFICE VISIT (OUTPATIENT)
Dept: RADIATION ONCOLOGY | Facility: HOSPITAL | Age: 85
End: 2025-01-17
Payer: MEDICARE

## 2025-01-17 VITALS
DIASTOLIC BLOOD PRESSURE: 55 MMHG | RESPIRATION RATE: 16 BRPM | OXYGEN SATURATION: 94 % | HEART RATE: 106 BPM | BODY MASS INDEX: 23.74 KG/M2 | SYSTOLIC BLOOD PRESSURE: 128 MMHG | WEIGHT: 151.57 LBS

## 2025-01-17 DIAGNOSIS — C34.12 MALIGNANT NEOPLASM OF UPPER LOBE, LEFT BRONCHUS OR LUNG: Primary | ICD-10-CM

## 2025-01-17 NOTE — PROGRESS NOTES
Follow Up Office Visit      Encounter Date: 01/17/2025   Patient Name: Carlos Zimmerman  YOB: 1940   Medical Record Number: [id number]   Primary Diagnosis: Malignant neoplasm of upper lobe, left bronchus or lung [C34.12]       Completion Date: 3/29/2024    Chief Complaint:    Chief Complaint   Patient presents with    Follow-up       History of Present Illness: Carlos Zimmerman is seen in routine scheduled follow-up.  CT scan of the chest performed on 1/14/2025 reveals worsening consolidation within the right middle and lower lung compared to chest radiograph from 12/19/2024.  There is extensive alveolar airspace disease in the right middle lobe and right lower lobe not evident on CT scan from 11/5/2024.  The 7 cm x 5 cm area of consolidation and atelectasis in the left upper lobe appears slightly smaller and better defined compared to 11/5/2024.  The pericardial effusion is slightly larger.  Mr. Zimmerman does report some slight worsening of dyspnea on exertion.  He denies chest pain, cough, fevers or chills.    Subjective      Review of Systems: Review of Systems   Constitutional:  Positive for fatigue. Negative for appetite change.   HENT:  Negative for trouble swallowing.    Respiratory:  Positive for shortness of breath (worse in the last few months). Negative for cough.    Cardiovascular:  Negative for chest pain and palpitations.   Gastrointestinal:  Negative for blood in stool, constipation, diarrhea and nausea.   Genitourinary:  Negative for difficulty urinating, dysuria, frequency and urgency.   Musculoskeletal:  Positive for gait problem (ambulates with cane). Negative for arthralgias and back pain.   Skin:  Negative for rash.   Neurological:  Positive for weakness (generalized). Negative for dizziness and headaches.   Psychiatric/Behavioral:  Negative for sleep disturbance.        The following portions of the patient's history were reviewed and updated as appropriate: allergies, current  medications, past family history, past medical history, past social history, past surgical history and problem list.    Medications:     Current Outpatient Medications:     albuterol sulfate  (90 Base) MCG/ACT inhaler, Inhale 2 puffs Every 4 (Four) Hours As Needed for Wheezing or Shortness of Air., Disp: 18 g, Rfl: 3    amoxicillin-clavulanate (AUGMENTIN) 875-125 MG per tablet, Take 1 tablet by mouth 2 (Two) Times a Day., Disp: 20 tablet, Rfl: 0    aspirin 81 MG EC tablet, Take 1 tablet by mouth Daily., Disp: , Rfl:     atorvastatin (LIPITOR) 40 MG tablet, Take 1 tablet by mouth Daily., Disp: , Rfl:     B Complex Vitamins (VITAMIN-B COMPLEX PO), Take 1 tablet by mouth Daily., Disp: , Rfl:     cetirizine (zyrTEC) 10 MG tablet, Take 1 tablet by mouth Daily., Disp: , Rfl:     Cholecalciferol (VITAMIN D3 PO), Take 1 tablet by mouth Daily., Disp: , Rfl:     cilostazol (PLETAL) 100 MG tablet, Take 1 tablet by mouth 2 (Two) Times a Day., Disp: , Rfl:     cyclobenzaprine (FLEXERIL) 10 MG tablet, Every 8 (Eight) Hours., Disp: , Rfl:     Fluticasone-Umeclidin-Vilant (TRELEGY ELLIPTA) 200-62.5-25 MCG/ACT inhaler, Inhale 1 puff Daily., Disp: 1 each, Rfl: 11    gabapentin (NEURONTIN) 300 MG capsule, Take 1 capsule by mouth 3 (Three) Times a Day., Disp: 90 capsule, Rfl: 5    ipratropium-albuterol (DUO-NEB) 0.5-2.5 mg/3 ml nebulizer, Take 3 mL by nebulization 4 (Four) Times a Day As Needed for Wheezing or Shortness of Air., Disp: 360 mL, Rfl: 3    losartan (COZAAR) 50 MG tablet, Take 1 tablet by mouth Daily., Disp: , Rfl:     metoprolol succinate XL (TOPROL-XL) 25 MG 24 hr tablet, Take 1 tablet by mouth Daily., Disp: , Rfl:     metoprolol tartrate (LOPRESSOR) 25 MG tablet, Take 0.5 tablets by mouth Daily., Disp: , Rfl:     multivitamin with minerals tablet tablet, Take 1 tablet by mouth Daily., Disp: , Rfl:     multivitamins-minerals (PRESERVISION AREDS 2) capsule capsule, Take 1 capsule by mouth 2 (Two) Times a Day., Disp:  , Rfl:     pantoprazole (PROTONIX) 40 MG EC tablet, Take 1 tablet by mouth Daily., Disp: , Rfl:     predniSONE (DELTASONE) 20 MG tablet, Take 2 tablets by mouth Daily., Disp: 14 tablet, Rfl: 0    Refresh Tears 0.5 % solution, Administer 1 drop to both eyes Daily As Needed for Dry Eyes., Disp: , Rfl:     tamsulosin (FLOMAX) 0.4 MG capsule 24 hr capsule, Take 1 capsule by mouth 2 (Two) Times a Day., Disp: 180 capsule, Rfl: 4    Allergies:   No Known Allergies    ECOG: (3) Capable of limited self-care, confined to bed or chair > 50% of waking hours  Quality of Life: 70 - Difficulty Walking, Needs Assistance      Objective     Physical Exam:   Vital Signs:   Vitals:    01/17/25 1315   BP: 128/55   Pulse: 106   Resp: 16   SpO2: 94%   Weight: 68.8 kg (151 lb 9.1 oz)   PainSc: 0-No pain     Body mass index is 23.74 kg/m².     Physical Exam  Constitutional:       General: He is not in acute distress.     Appearance: Normal appearance. He is normal weight. He is not ill-appearing or toxic-appearing.   HENT:      Head: Normocephalic and atraumatic.   Pulmonary:      Effort: Pulmonary effort is normal. No respiratory distress.      Breath sounds: No stridor. Rales present.      Comments: Decreased breath sounds bilaterally seemingly chronic in nature  Skin:     General: Skin is warm and dry.   Neurological:      General: No focal deficit present.      Mental Status: He is alert and oriented to person, place, and time.      Cranial Nerves: No cranial nerve deficit.   Psychiatric:         Mood and Affect: Mood normal.         Behavior: Behavior normal.         Judgment: Judgment normal.       Carlos Zimmerman reports a pain score of 0.  Given his pain assessment as noted, treatment options were discussed and the following options were decided upon as a follow-up plan to address the patient's pain: continuation of current treatment plan for pain.     Radiographs: CT Chest Without Contrast Diagnostic    Result Date:  1/15/2025  Impression: CT scan of the chest without IV contrast demonstrating moderate emphysema. Consolidation in the right upper lobe is improved in comparison to chest radiograph 12/19/2024. Groundglass and patchy alveolar airspace disease is seen on CT scan. Worsening consolidation is seen in the right mid and lower lung in comparison to chest radiograph 12/19/2024. Extensive alveolar airspace disease in the right middle lobe and right lower lobe is not evident on CT scan 11/5/2024. A small right pleural effusion is evident. 7 cm x 5 cm area of consolidation and atelectasis in the left upper lobe appears slightly smaller and better defined in comparison to 11/5/2024. Small pericardial effusion, slightly larger. Electronically Signed: Vinicio Beltrán MD  1/15/2025 1:28 PM EST  Workstation ID: [id number]    XR Chest 2 View    Result Date: 12/19/2024  Impression: Multiple new areas of consolidation/airspace disease in both lungs compared to 8/19/2024 chest x-ray, concerning for multifocal pneumonia. Electronically Signed: Daisy Rossi  12/19/2024 12:15 PM EST  Workstation ID: [id number]    CT Chest Without Contrast Diagnostic    Result Date: 11/6/2024  Impression: 1.New dense consolidation within left upper lung, with smaller bilateral consolidations, suggesting multifocal pneumonia. Findings appear worse from prior CT from September 3, 2024. 2.Cavitary lesion with adjacent soft tissue thickening along lateral left upper lung is difficult to visualize, but likely unchanged. 3.Small left pleural effusion. 4.Mild emphysema. 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 He has no clinical or radiographic evidence of regional or distant metastatic disease. He has a history of lung cancer status post VATS surgery in 2016.  Completed  stereotactic body radiotherapy to the left upper lobe nodule and March 2024.  He has recently been treated for pneumonia and has both radiographic and clinical evidence for progression of his pneumonia.  ECOG 3    I discussed the findings of the CT scan of the chest from 1/14/2025.  I recommended follow-up with pulmonology regarding his worsening pneumonia.  Mrs. Zimmerman reported that she was contacted by pulmonology recently and plans to follow-up with their department.  I recommended being evaluated in the emergency department if Mr. Zimmerman's breathing worsens or he has any concerning symptoms.  I will see him in follow-up in 6 months as from a cancer standpoint, I have no immediate concerns.  Additionally, I would not relate his pulmonary status to his history of radiotherapy.        Julio Cesar Alex MD  Radiation Oncology  [facility]    This document has been signed by Julio Cesar Alex MD on January 17, 2025 14:03 EST

## 2025-01-19 NOTE — PROGRESS NOTES
Primary Care Provider  Payam Carroll MD   Referring Provider  No ref. provider found    Patient Complaint  Follow-up, Shortness of Breath, and Cough    Patient or patient representative verbalized consent for the use of Ambient Listening during the visit with  ARTEMIO Caal for chart documentation. 1/20/2025  10:36 EST      Subjective       History of Presenting Illness  Carlos Zimmerman is a pleasant 85 y.o. male  of  [place] who presents to [facility] with history of multifocal pneumonia, Pseudomonas infection, shortness of breath, pulmonary nodule, left upper lobe lung cancer, COPD here for follow-up appointment. CT scan of the chest performed on 1/14/2025 reveals worsening consolidation within the right middle and lower lung compared to chest radiograph from 12/19/2024. There is extensive alveolar airspace disease in the right middle lobe and right lower lobe not evident on CT scan from 11/5/2024. The 7 cm x 5 cm area of consolidation and atelectasis in the left upper lobe appears slightly smaller and better defined compared to 11/5/2024. The pericardial effusion is slightly larger.       History of Present Illness  The patient is an 85-year-old male who presents for evaluation of worsening consolidation in the lungs. He is accompanied by his wife.    He reports no symptoms of aspiration during meals or choking post-consumption. He also does not experience any coughing after eating or drinking. He had been on a 14-day course of intravenous antibiotics in December. He does not report any febrile episodes or general malaise. His wife notes that he was previously experiencing dyspnea, even during short walks from the bathroom to the kitchen, but this symptom has improved over the past three days. He has not experienced any cardiac-related pain. He has been hospitalized in the past with pneumonia without fever. He has been using supplemental oxygen  at home, set at 4 liters per minute. He does not have any steroids at home for emergency use. He has a scheduled appointment on 01/30/2025. He did not require a breathing treatment this morning.  His last bronchoscopy was in November for which he received the IV antibiotics for Pseudomonas infection.    MEDICATIONS  Current: Augmentin, doxycycline, Levaquin         At present time patient denies headaches, chest pain, weight loss or hemoptysis. Patient denies fevers, chills and night sweats. Carlos Zimmerman is able to perform ADLs.      I have personally reviewed the review of systems, past family, social, medical and surgical histories; and agree with their findings.      Review of Systems   Constitutional: Negative.    HENT: Negative.     Respiratory:  Positive for cough and shortness of breath.    Cardiovascular: Negative.    Musculoskeletal: Negative.    Neurological: Negative.    Psychiatric/Behavioral: Negative.           Family History   Problem Relation Age of Onset    Cancer Mother     Stomach cancer Other     Malig Hyperthermia Neg Hx         Social History     Socioeconomic History    Marital status:    Tobacco Use    Smoking status: Former     Current packs/day: 0.00     Average packs/day: 1 pack/day for 60.0 years (60.0 ttl pk-yrs)     Types: Cigarettes     Start date: 1950     Quit date: 2010     Years since quitting: 15.0     Passive exposure: Past    Smokeless tobacco: Current     Types: Snuff    Tobacco comments:     INST PER ANESTHESIA PROTOCOL   Vaping Use    Vaping status: Never Used   Substance and Sexual Activity    Alcohol use: Not Currently    Drug use: Never    Sexual activity: Defer        Past Medical History:   Diagnosis Date    Anemia     NO CURRENT ISSUES    Arthritis     COPD (chronic obstructive pulmonary disease)     INHALER  PRN    Hyperlipidemia     Hypertension     ON MEDS/FOLLOWS KAITLYN, PT DENIES CAD    Lung cancer     REMOVED - 2016    Pneumonia     LEFT LUNG CA NO  CURRENT PNEUMONIA    SOB (shortness of breath)     WITH EXERTION SOMETIMES    Stroke     LEFT LEG/FOOT DROP  2019    TIA (transient ischemic attack)     NO RESIDUAL 2010        Immunization History   Administered Date(s) Administered    COVID-19 (PFIZER) 12YRS+ (COMIRNATY) 11/10/2023    COVID-19 (PFIZER) Purple Cap Monovalent 02/28/2021, 03/11/2021, 10/18/2021    FLUAD TRI 65YR+ 09/06/2024    Fluad Quad 65+ 10/07/2020, 09/24/2021    Fluzone High-Dose 65+yrs 10/06/2022, 11/11/2023    Influenza, Unspecified 09/01/2020    Pneumococcal Conjugate 20-Valent (PCV20) 04/22/2024    TD Preservative Free (Tenivac) 06/27/2022       No Known Allergies       Current Outpatient Medications:     albuterol sulfate  (90 Base) MCG/ACT inhaler, Inhale 2 puffs Every 4 (Four) Hours As Needed for Wheezing or Shortness of Air., Disp: 18 g, Rfl: 3    aspirin 81 MG EC tablet, Take 1 tablet by mouth Daily., Disp: , Rfl:     atorvastatin (LIPITOR) 40 MG tablet, Take 1 tablet by mouth Daily., Disp: , Rfl:     B Complex Vitamins (VITAMIN-B COMPLEX PO), Take 1 tablet by mouth Daily., Disp: , Rfl:     cetirizine (zyrTEC) 10 MG tablet, Take 1 tablet by mouth Daily., Disp: , Rfl:     Cholecalciferol (VITAMIN D3 PO), Take 1 tablet by mouth Daily., Disp: , Rfl:     cilostazol (PLETAL) 100 MG tablet, Take 1 tablet by mouth 2 (Two) Times a Day., Disp: , Rfl:     cyclobenzaprine (FLEXERIL) 10 MG tablet, Every 8 (Eight) Hours., Disp: , Rfl:     Fluticasone-Umeclidin-Vilant (TRELEGY ELLIPTA) 200-62.5-25 MCG/ACT inhaler, Inhale 1 puff Daily., Disp: 1 each, Rfl: 11    gabapentin (NEURONTIN) 300 MG capsule, Take 1 capsule by mouth 3 (Three) Times a Day., Disp: 90 capsule, Rfl: 5    ipratropium-albuterol (DUO-NEB) 0.5-2.5 mg/3 ml nebulizer, Take 3 mL by nebulization 4 (Four) Times a Day As Needed for Wheezing or Shortness of Air., Disp: 360 mL, Rfl: 3    losartan (COZAAR) 50 MG tablet, Take 1 tablet by mouth Daily., Disp: , Rfl:     metoprolol succinate  XL (TOPROL-XL) 25 MG 24 hr tablet, Take 1 tablet by mouth Daily., Disp: , Rfl:     metoprolol tartrate (LOPRESSOR) 25 MG tablet, Take 0.5 tablets by mouth Daily., Disp: , Rfl:     multivitamin with minerals tablet tablet, Take 1 tablet by mouth Daily., Disp: , Rfl:     multivitamins-minerals (PRESERVISION AREDS 2) capsule capsule, Take 1 capsule by mouth 2 (Two) Times a Day., Disp: , Rfl:     pantoprazole (PROTONIX) 40 MG EC tablet, Take 1 tablet by mouth Daily., Disp: , Rfl:     Refresh Tears 0.5 % solution, Administer 1 drop to both eyes Daily As Needed for Dry Eyes., Disp: , Rfl:     tamsulosin (FLOMAX) 0.4 MG capsule 24 hr capsule, Take 1 capsule by mouth 2 (Two) Times a Day., Disp: 180 capsule, Rfl: 4    azithromycin (ZITHROMAX) 250 MG tablet, Take 2 by mouth today then 1 daily for 4 days, Disp: 6 tablet, Rfl: 0    predniSONE (DELTASONE) 10 MG tablet, 6 daily x 2 days, 5 daily x 2 days, 4 daily x 2 days, 3 daily x 2 days, 2 daily x 2 days, 1 daily x 2 days, Disp: 42 tablet, Rfl: 0         Vital Signs   There were no vitals taken for this visit.      Objective     Physical Exam  Vitals reviewed.   Constitutional:       General: He is not in acute distress.     Appearance: Normal appearance. He is not ill-appearing.   HENT:      Head: Normocephalic and atraumatic.      Nose: Nose normal.      Mouth/Throat:      Mouth: Mucous membranes are moist.      Pharynx: Oropharynx is clear.   Eyes:      Extraocular Movements: Extraocular movements intact.      Conjunctiva/sclera: Conjunctivae normal.      Pupils: Pupils are equal, round, and reactive to light.   Cardiovascular:      Rate and Rhythm: Normal rate and regular rhythm.      Pulses: Normal pulses.      Heart sounds: Normal heart sounds.   Pulmonary:      Effort: Pulmonary effort is normal. No respiratory distress.      Breath sounds: No stridor. No wheezing, rhonchi or rales.   Abdominal:      General: Bowel sounds are normal.   Musculoskeletal:         General:  Normal range of motion.      Cervical back: Normal range of motion and neck supple.   Skin:     General: Skin is warm and dry.   Neurological:      Mental Status: He is alert and oriented to person, place, and time.   Psychiatric:         Behavior: Behavior normal.         Physical Exam  Lung sounds are slightly more diminished on the right side than the left, but there is still good air exchange on the right.  Heart was examined.    Vital Signs  Blood pressure is normal.         Results Review  I have personally reviewed the prior office notes, hospital records, labs, and diagnostics.    CT Chest Without Contrast Diagnostic [[id number]] (Order [id number])  Order  Status: Final result     Study Notes     Callie Butler on 1/14/2025  1:03 PM EST   F/U PNEUMONIA, EXTREMELY SOA, HX OF LUNG CANCER  PREV ONLINE     Appointment Information    PACS Images     Radiology Images  Study Result    Narrative & Impression   CT CHEST WO CONTRAST DIAGNOSTIC     Date of Exam: 1/14/2025 1:01 PM EST     Indication: Monitoring for resolution of pneumonia. NSCLC of left upper lobe s/p SBRT, surveillance.     Comparison: CXR 12/19/2024, CT chest 11/5/2024     Technique: Axial CT images were obtained of the chest without contrast administration.  Reconstructed coronal and sagittal images were also obtained. Automated exposure control and iterative construction methods were used.        Findings:  In comparison to chest radiograph 12/19/2024, consolidation in the right upper lobe is improved. Worsening consolidation is seen in the right mid and lower lung. Ill-defined opacity in the left upper lobe is unchanged.     Lung window images reveal moderate centrilobular and paraseptal emphysema. Groundglass opacity and patchy alveolar airspace disease is seen in the right upper lobe. Fairly extensive alveolar airspace disease in the right middle lobe and right lower lobe   is not evident on CT scan 11/5/2024. A small right pleural effusion is  evident.     7 cm x 5 cm area of consolidation and atelectasis in the left upper lobe appears slightly smaller and better defined in comparison to 11/5/2024.     Mediastinal windows reveal no mediastinal or axillary adenopathy. Hilar structures are obscured. Calcifications are seen in the aortic valve. Extensive coronary artery calcifications are evident. Small pericardial effusion measures 6 mm in greatest   thickness, and appears slightly larger in comparison to 11/5/2024. 7 mm nonobstructing stone is seen in the mid pole collecting system of the left kidney on the very last image.     Vertebral compression fractures are unchanged.     IMPRESSION:  Impression:  CT scan of the chest without IV contrast demonstrating moderate emphysema.     Consolidation in the right upper lobe is improved in comparison to chest radiograph 12/19/2024. Groundglass and patchy alveolar airspace disease is seen on CT scan.     Worsening consolidation is seen in the right mid and lower lung in comparison to chest radiograph 12/19/2024. Extensive alveolar airspace disease in the right middle lobe and right lower lobe is not evident on CT scan 11/5/2024. A small right pleural   effusion is evident.     7 cm x 5 cm area of consolidation and atelectasis in the left upper lobe appears slightly smaller and better defined in comparison to 11/5/2024.     Small pericardial effusion, slightly larger.           Electronically Signed: Vinicio Beltrán MD    1/15/2025 1:28 PM EST    Workstation ID: [id number]             Results  Imaging  Chest CT shows worsening consolidation in the right lobe in the middle and lower part compared to the previous CT from December.          Assessment         Patient Active Problem List   Diagnosis    COPD (chronic obstructive pulmonary disease)    Hypertension    Low back pain    Lung disease    Solitary lung nodule    Pneumonia of right lung due to infectious organism    Hematuria    Complicated UTI (urinary tract  infection)    Recurrent urinary tract infection    Benign prostatic hyperplasia    Atherosclerosis of lower extremity with claudication    Benign prostatic hyperplasia with lower urinary tract symptoms    Elevated PSA    Cervical radiculopathy    Cervical spondylosis without myelopathy    Abnormal PET scan of lung    Lung nodule    Malignant neoplasm of upper lobe, left bronchus or lung    Sepsis due to pneumonia    Airway clearance impairment    Recurrent pneumonia    Pneumonia due to Pseudomonas species        Plan     Diagnoses and all orders for this visit:    1. Abnormal CT scan of lung (Primary)  -     azithromycin (ZITHROMAX) 250 MG tablet; Take 2 by mouth today then 1 daily for 4 days  Dispense: 6 tablet; Refill: 0  -     predniSONE (DELTASONE) 10 MG tablet; 6 daily x 2 days, 5 daily x 2 days, 4 daily x 2 days, 3 daily x 2 days, 2 daily x 2 days, 1 daily x 2 days  Dispense: 42 tablet; Refill: 0  -     CT Chest Without Contrast; Future    2. Recurrent pneumonia  -     azithromycin (ZITHROMAX) 250 MG tablet; Take 2 by mouth today then 1 daily for 4 days  Dispense: 6 tablet; Refill: 0  -     predniSONE (DELTASONE) 10 MG tablet; 6 daily x 2 days, 5 daily x 2 days, 4 daily x 2 days, 3 daily x 2 days, 2 daily x 2 days, 1 daily x 2 days  Dispense: 42 tablet; Refill: 0  -     CT Chest Without Contrast; Future    3. Dyspnea on exertion  -     azithromycin (ZITHROMAX) 250 MG tablet; Take 2 by mouth today then 1 daily for 4 days  Dispense: 6 tablet; Refill: 0  -     predniSONE (DELTASONE) 10 MG tablet; 6 daily x 2 days, 5 daily x 2 days, 4 daily x 2 days, 3 daily x 2 days, 2 daily x 2 days, 1 daily x 2 days  Dispense: 42 tablet; Refill: 0  -     CT Chest Without Contrast; Future    4. Nicotine dependence, cigarettes, in remission  -     azithromycin (ZITHROMAX) 250 MG tablet; Take 2 by mouth today then 1 daily for 4 days  Dispense: 6 tablet; Refill: 0  -     predniSONE (DELTASONE) 10 MG tablet; 6 daily x 2 days, 5  daily x 2 days, 4 daily x 2 days, 3 daily x 2 days, 2 daily x 2 days, 1 daily x 2 days  Dispense: 42 tablet; Refill: 0  -     CT Chest Without Contrast; Future    5. Pulmonary emphysema, unspecified emphysema type  -     azithromycin (ZITHROMAX) 250 MG tablet; Take 2 by mouth today then 1 daily for 4 days  Dispense: 6 tablet; Refill: 0  -     predniSONE (DELTASONE) 10 MG tablet; 6 daily x 2 days, 5 daily x 2 days, 4 daily x 2 days, 3 daily x 2 days, 2 daily x 2 days, 1 daily x 2 days  Dispense: 42 tablet; Refill: 0  -     CT Chest Without Contrast; Future    6. Malignant neoplasm of upper lobe, left bronchus or lung  -     azithromycin (ZITHROMAX) 250 MG tablet; Take 2 by mouth today then 1 daily for 4 days  Dispense: 6 tablet; Refill: 0  -     predniSONE (DELTASONE) 10 MG tablet; 6 daily x 2 days, 5 daily x 2 days, 4 daily x 2 days, 3 daily x 2 days, 2 daily x 2 days, 1 daily x 2 days  Dispense: 42 tablet; Refill: 0  -     CT Chest Without Contrast; Future         Assessment & Plan  1. Pulmonary consolidation.  The chest CT scan indicates a progression of consolidation in the right middle and lower lobes compared to the previous scan from December. Despite being on various antibiotics, including IV antibiotics in December, Augmentin, doxycycline in November, and Levaquin in October, there has been no significant improvement. The possibility of atypical pneumonia is being considered. Azithromycin will be prescribed, with a dosage of 2 pills on the first day, followed by 1 pill daily for the subsequent 4 days. A repeat CT scan will be scheduled to assess the response to treatment. He is advised to carry his oxygen supply with him at all times and to use it at a rate of 2 liters per minute. A prescription for steroids will also be provided for use as needed. He is advised to seek immediate medical attention at the hospital if his respiratory condition deteriorates.  I will reach out to Dr. Dr. Choudhary to see if there is  any further recommendations at this time.  We will notify patient of results as available.    Follow-up  The patient is scheduled for a follow-up visit in 2 weeks.    PROCEDURE  The patient underwent a bronchoscopy in November 2023, which did not cause him any discomfort.      Smoking status:  reports that he quit smoking about 15 years ago. His smoking use included cigarettes. He started smoking about 75 years ago. He has a 60 pack-year smoking history. He has been exposed to tobacco smoke. His smokeless tobacco use includes snuff.    Vaccination status: Reviewed  Immunization History   Administered Date(s) Administered    COVID-19 (PFIZER) 12YRS+ (COMIRNATY) 11/10/2023    COVID-19 (PFIZER) Purple Cap Monovalent 02/28/2021, 03/11/2021, 10/18/2021    FLUAD TRI 65YR+ 09/06/2024    Fluad Quad 65+ 10/07/2020, 09/24/2021    Fluzone High-Dose 65+yrs 10/06/2022, 11/11/2023    Influenza, Unspecified 09/01/2020    Pneumococcal Conjugate 20-Valent (PCV20) 04/22/2024    TD Preservative Free (Tenivac) 06/27/2022        Medications personally reviewed    Follow Up  Return for Next scheduled follow up.    Patient was given instructions and counseling regarding his condition or for health maintenance advice. Please see specific information pulled into the AVS if appropriate.     I spent 20 minutes caring for Carlos Zimmerman on this date of service. This time includes time spent by me in the following activities:preparing for the visit, reviewing tests, obtaining and/or reviewing a separately obtained history, performing a medically appropriate examination and/or evaluation, counseling and educating the patient/family/caregiver, ordering medications, tests, or procedures, documenting information in the medical record, independently interpreting results and communicating that information with the patient/family/caregiver and answered questions family members, discuss medications.

## 2025-01-20 ENCOUNTER — OFFICE VISIT (OUTPATIENT)
Dept: PULMONOLOGY | Facility: CLINIC | Age: 85
End: 2025-01-20
Payer: MEDICARE

## 2025-01-20 ENCOUNTER — OFFICE VISIT (OUTPATIENT)
Dept: ONCOLOGY | Facility: HOSPITAL | Age: 85
End: 2025-01-20
Payer: MEDICARE

## 2025-01-20 VITALS
OXYGEN SATURATION: 95 % | HEART RATE: 91 BPM | RESPIRATION RATE: 16 BRPM | SYSTOLIC BLOOD PRESSURE: 97 MMHG | WEIGHT: 154.1 LBS | HEIGHT: 67 IN | DIASTOLIC BLOOD PRESSURE: 55 MMHG | TEMPERATURE: 97.7 F | BODY MASS INDEX: 24.19 KG/M2

## 2025-01-20 DIAGNOSIS — C34.12 MALIGNANT NEOPLASM OF UPPER LOBE, LEFT BRONCHUS OR LUNG: ICD-10-CM

## 2025-01-20 DIAGNOSIS — G62.9 NEUROPATHY: ICD-10-CM

## 2025-01-20 DIAGNOSIS — F17.211 NICOTINE DEPENDENCE, CIGARETTES, IN REMISSION: ICD-10-CM

## 2025-01-20 DIAGNOSIS — J43.9 PULMONARY EMPHYSEMA, UNSPECIFIED EMPHYSEMA TYPE: ICD-10-CM

## 2025-01-20 DIAGNOSIS — R06.09 DYSPNEA ON EXERTION: ICD-10-CM

## 2025-01-20 DIAGNOSIS — R91.8 ABNORMAL CT SCAN OF LUNG: Primary | ICD-10-CM

## 2025-01-20 DIAGNOSIS — D50.9 IRON DEFICIENCY ANEMIA, UNSPECIFIED IRON DEFICIENCY ANEMIA TYPE: Primary | ICD-10-CM

## 2025-01-20 DIAGNOSIS — J18.9 RECURRENT PNEUMONIA: ICD-10-CM

## 2025-01-20 DIAGNOSIS — R97.20 ELEVATED PSA: ICD-10-CM

## 2025-01-20 PROCEDURE — 99214 OFFICE O/P EST MOD 30 MIN: CPT | Performed by: INTERNAL MEDICINE

## 2025-01-20 PROCEDURE — 1160F RVW MEDS BY RX/DR IN RCRD: CPT | Performed by: NURSE PRACTITIONER

## 2025-01-20 PROCEDURE — G0463 HOSPITAL OUTPT CLINIC VISIT: HCPCS | Performed by: INTERNAL MEDICINE

## 2025-01-20 PROCEDURE — 99214 OFFICE O/P EST MOD 30 MIN: CPT | Performed by: NURSE PRACTITIONER

## 2025-01-20 PROCEDURE — 1159F MED LIST DOCD IN RCRD: CPT | Performed by: NURSE PRACTITIONER

## 2025-01-20 PROCEDURE — G2211 COMPLEX E/M VISIT ADD ON: HCPCS | Performed by: INTERNAL MEDICINE

## 2025-01-20 PROCEDURE — 1126F AMNT PAIN NOTED NONE PRSNT: CPT | Performed by: INTERNAL MEDICINE

## 2025-01-20 PROCEDURE — G2211 COMPLEX E/M VISIT ADD ON: HCPCS | Performed by: NURSE PRACTITIONER

## 2025-01-20 RX ORDER — GABAPENTIN 300 MG/1
300 CAPSULE ORAL 3 TIMES DAILY
Qty: 90 CAPSULE | Refills: 5 | Status: SHIPPED | OUTPATIENT
Start: 2025-01-20 | End: 2025-01-20

## 2025-01-20 RX ORDER — GABAPENTIN 300 MG/1
300 CAPSULE ORAL 3 TIMES DAILY
Qty: 90 CAPSULE | Refills: 5 | Status: SHIPPED | OUTPATIENT
Start: 2025-01-20

## 2025-01-20 RX ORDER — AZITHROMYCIN 250 MG/1
TABLET, FILM COATED ORAL
Qty: 6 TABLET | Refills: 0 | Status: SHIPPED | OUTPATIENT
Start: 2025-01-20

## 2025-01-20 RX ORDER — PREDNISONE 10 MG/1
TABLET ORAL
Qty: 42 TABLET | Refills: 0 | Status: SHIPPED | OUTPATIENT
Start: 2025-01-20

## 2025-01-20 NOTE — PROGRESS NOTES
Chief Complaint  FOLLOW UP 2    Payam Carroll MD Houk, Payam Allen MD      Subjective          Carlos Zimmerman presents to [facility] for iron defieiency anemia    Lung Cancer  Associated symptoms include fatigue. Pertinent negatives include no abdominal pain, arthralgias, chest pain, coughing, diaphoresis, fever, joint swelling, myalgias, nausea, numbness, rash, sore throat, swollen glands, vomiting or weakness.        Mr. Carlos Zimmerman presents for follow up for iron deficiency anemia and lung cancer. History of prior left lung NSCLC with adenocarcinoma with left VATS procedure and prior chemotherapy per Dr. Saul. Completed SBRT for left sided recurrence 3/29/24.  Patient has been ill with pneumonia for the last few months.  He is here to have bronchoscopy.  He has completed IV antibiotics as of December.  He was on doxycycline Augmentin in November.  And Levaquin in October.  He saw pulmonology today.  Repeat CT chest on 14th January did not show much improvement in his pneumonia.  No concern for metastatic cancer.  He is going to be started on azithromycin per pulmonary today for atypical pneumonia.  He denies any fevers or chills.  Denies any productive cough.  Does have shortness of breath on exertion.  He is on supplemental oxygen at home.  Repeat CT scan is planned.  As needed prednisone was provided to patient by pulmonology.  Denies any bleeding.  Labs only show mild anemia.  Iron studies with elevated ferritin and low iron saturation consistent with anemia of chronic disease.  On gabapentin twice a day for his chronic neuropathy.  Due to see urology in March for elevated PSA follow-up      Hematology History  He was started on oral iron 1 tab QD back in April 2022. He is tolerating med well.    2/27/23: Iron studies normal. CBC normal. No anemia.     He is following with Dr. Mai with urology for elevated PSA. MRI of prostate on 2/23/23 showed a  small solitary suspicious lesion in the left posterior mid gland peripheral zone, PI-RADS 4.    8/28/23: Hgb 13.8, MCV 92.4, plt 153, WBC 9.24. TIBC 277 (low), ferritin 176.8, iron sat 39%    2/23/24: Iron studies normal. No anemia, normal CBC. Hold oral iron.     9/4/24: Hgb 12.9, ferritin 86, iron sat 23%    1/14/25: CT Chest:Consolidation in the right upper lobe is improved in comparison to chest radiograph 12/19/2024. Groundglass and patchy alveolar airspace disease is seen on CT scan.Worsening consolidation is seen in the right mid and lower lung in comparison to chest radiograph 12/19/2024. Extensive alveolar airspace disease in the right middle lobe and right lower lobe is not evident on CT scan 11/5/2024. A small right pleural effusion is evident.7 cm x 5 cm area of consolidation and atelectasis in the left upper lobe appears slightly smaller and better defined in comparison to 11/5/2024.    1/15/25: Ferritin 211.9, iron sat 11%, TIBC 247 (low), hgb 12.4, WBC 7.62, plt 379, MCV 80.1      Cancer Staging  No matching staging information was found for the patient.     Treatment intent: curative    Oncology/Hematology History   Malignant neoplasm of upper lobe, left bronchus or lung   3/18/2024 Initial Diagnosis    Malignant neoplasm of upper lobe, left bronchus or lung     3/25/2024 - 3/29/2024 Radiation    Radiation OncologyTreatment Course:  Carlos Zimmerman received 5500 cGy in 5 fractions to via SBRT to the left upper lobe.      This is a very pleasant 76-year-old gentleman who presents with follow-up for     lobectomy with adjuvant chemotherapy.            1) Left lung:  Pathology 5/2016: FNA: Poorly differentiated adenocarcinoma: 5.5     mm BONNIE nodule. 5/2016.       Left lung lobectomy: VATS procedure: Dr. Saul in [place].Path:multifocal     invasive moderately differentiated adenocarcinoma, acinar type, 2.0 x 1.6 x 1.4     cm. Additional nodule 0.6 x 0.5 x 0.3 cm: margins free of tumor, negative LN's.      Visceral pleural invasion noted. (7/12/16). Staging: Stage IIB:  pT3N0.       Carboplatin / Paclitaxel x 4 cycles: 8/10/16 - 10/14/16.      CT CAP: No evidence of disease. 2/2017.       CT CAP: No evidence of disease. 12/11/17.       CT chest: Right lower base: improving consolidation. 2/18/19.       CT chest: T6 / T10 compression fractures: non-pathologic. (8/28/19)      CT chest in ER: likely pneumonia/inflammation. No new evidence of recurring     cancer: (4/4/20)      CT chest: Stable 1.5 cm subpleural ground glass opacity in the BONNIE, new patchy     areas on non-specific ground glass opacity throughout the LLL. (3/2021).       2/15/24: CT chest without contrast: small area of nodularity/consolidation in left upper lung. Stable 3 mm right upper lobe nodule. No new nodule.      2/23/24: NM PET:  Along the posterior medial margin, there is soft tissue nodularity measuring up to about 1.4 centimeters which is hypermetabolic maximum SUV 4.9 most concerning for neoplasm.  This appears larger and more solid compared to more remote CT scans.  Consider biopsy. No other lesions hypermetabolic.        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                    Review of Systems   Constitutional:  Positive for fatigue and unexpected weight loss. Negative for appetite change, diaphoresis, fever and unexpected weight gain.   HENT:  Positive for hearing loss (Left ear). Negative for mouth sores, sore throat, swollen glands, trouble swallowing and voice change.    Eyes:  Negative for blurred vision, double vision, pain, redness and visual disturbance.   Respiratory:  Negative for cough, shortness of breath and wheezing.    Cardiovascular:  Negative for chest pain, palpitations and leg swelling.   Gastrointestinal:  Negative for abdominal pain, blood in stool, constipation, diarrhea, nausea and vomiting.   Endocrine: Negative for  cold intolerance, heat intolerance, polydipsia and polyuria.   Genitourinary:  Negative for decreased urine volume, difficulty urinating, dysuria, frequency, hematuria and urinary incontinence.   Musculoskeletal:  Negative for arthralgias, back pain, joint swelling and myalgias.   Skin:  Negative for color change, rash, skin lesions and wound.   Neurological:  Negative for dizziness, seizures, weakness, numbness and headache.   Hematological:  Negative for adenopathy. Does not bruise/bleed easily.   Psychiatric/Behavioral:  Negative for depressed mood. The patient is not nervous/anxious.    All other systems reviewed and are negative.      Current Outpatient Medications on File Prior to Visit   Medication Sig Dispense Refill    albuterol sulfate  (90 Base) MCG/ACT inhaler Inhale 2 puffs Every 4 (Four) Hours As Needed for Wheezing or Shortness of Air. 18 g 3    aspirin 81 MG EC tablet Take 1 tablet by mouth Daily.      atorvastatin (LIPITOR) 40 MG tablet Take 1 tablet by mouth Daily.      azithromycin (ZITHROMAX) 250 MG tablet Take 2 by mouth today then 1 daily for 4 days 6 tablet 0    B Complex Vitamins (VITAMIN-B COMPLEX PO) Take 1 tablet by mouth Daily.      cetirizine (zyrTEC) 10 MG tablet Take 1 tablet by mouth Daily.      Cholecalciferol (VITAMIN D3 PO) Take 1 tablet by mouth Daily.      cilostazol (PLETAL) 100 MG tablet Take 1 tablet by mouth 2 (Two) Times a Day.      cyclobenzaprine (FLEXERIL) 10 MG tablet Every 8 (Eight) Hours.      Fluticasone-Umeclidin-Vilant (TRELEGY ELLIPTA) 200-62.5-25 MCG/ACT inhaler Inhale 1 puff Daily. 1 each 11    gabapentin (NEURONTIN) 300 MG capsule Take 1 capsule by mouth 3 (Three) Times a Day. 90 capsule 5    ipratropium-albuterol (DUO-NEB) 0.5-2.5 mg/3 ml nebulizer Take 3 mL by nebulization 4 (Four) Times a Day As Needed for Wheezing or Shortness of Air. 360 mL 3    losartan (COZAAR) 50 MG tablet Take 1 tablet by mouth Daily.      metoprolol succinate XL (TOPROL-XL) 25  MG 24 hr tablet Take 1 tablet by mouth Daily.      metoprolol tartrate (LOPRESSOR) 25 MG tablet Take 0.5 tablets by mouth Daily.      multivitamin with minerals tablet tablet Take 1 tablet by mouth Daily.      multivitamins-minerals (PRESERVISION AREDS 2) capsule capsule Take 1 capsule by mouth 2 (Two) Times a Day.      pantoprazole (PROTONIX) 40 MG EC tablet Take 1 tablet by mouth Daily.      predniSONE (DELTASONE) 10 MG tablet 6 daily x 2 days, 5 daily x 2 days, 4 daily x 2 days, 3 daily x 2 days, 2 daily x 2 days, 1 daily x 2 days 42 tablet 0    Refresh Tears 0.5 % solution Administer 1 drop to both eyes Daily As Needed for Dry Eyes.      tamsulosin (FLOMAX) 0.4 MG capsule 24 hr capsule Take 1 capsule by mouth 2 (Two) Times a Day. 180 capsule 4    [DISCONTINUED] predniSONE (DELTASONE) 20 MG tablet Take 2 tablets by mouth Daily. 14 tablet 0     No current facility-administered medications on file prior to visit.       No Known Allergies  Past Medical History:   Diagnosis Date    Anemia     NO CURRENT ISSUES    Arthritis     COPD (chronic obstructive pulmonary disease)     INHALER  PRN    Hyperlipidemia     Hypertension     ON MEDS/FOLLOWS SAHNI, PT DENIES CAD    Lung cancer     REMOVED - 2016    Pneumonia     LEFT LUNG CA NO CURRENT PNEUMONIA    SOB (shortness of breath)     WITH EXERTION SOMETIMES    Stroke     LEFT LEG/FOOT DROP  2019    TIA (transient ischemic attack)     NO RESIDUAL 2010     Past Surgical History:   Procedure Laterality Date    BRONCHOSCOPY Bilateral 11/19/2024    Procedure: BRONCHOSCOPY WITH BAL, BIOPSY, BRONCHIAL WASHINGS;  Surgeon: Dayne Choudhary MD;  Location: [facility] ENDOSCOPY;  Service: Pulmonary;  Laterality: Bilateral;  PERSISTENT PNEUMONIA, LEFT UPPER LOBE INFILTRATE    BRONCHOSCOPY WITH ION ROBOTIC ASSIST N/A 3/4/2024    Procedure: BRONCHOSCOPY WITH ION ROBOT, REBUS, NEEDLE ASPIRATE, BIOPSIES, BAL, WASHINGS;  Surgeon: Dayne Choudhary MD;  Location: [facility] MAIN OR;  Service: Robotics -  Pulmonary;  Laterality: N/A;    CARDIAC CATHETERIZATION Left 11/10/2022    Procedure: Aortogram with left leg angiogram, possible angioplasty or stenting;  Surgeon: Cuauhtemoc Thomas MD;  Location: [facility] CATH INVASIVE LOCATION;  Service: Vascular;  Laterality: Left;    CARDIAC CATHETERIZATION Right 01/13/2023    Procedure: Right leg angiogram, possible angioplasty or stenting;  Surgeon: Cuauhtemoc Thomas MD;  Location: [facility] CATH INVASIVE LOCATION;  Service: Vascular;  Laterality: Right;    CARPAL TUNNEL RELEASE Left 02/09/2024    Procedure: CARPAL TUNNEL RELEASE, left;  Surgeon: Rg Tom MD;  Location: [facility] MAIN OR;  Service: Neurosurgery;  Laterality: Left;    LUNG BIOPSY      LUNG SURGERY      REMOVAL HALF OF UPPER PORTION LEFT LUNG    ORTHOPEDIC SURGERY Left     ROTATOR CUFF    THROAT SURGERY      throat diverticulum repair     Social History     Socioeconomic History    Marital status:    Tobacco Use    Smoking status: Former     Current packs/day: 0.00     Average packs/day: 1 pack/day for 60.0 years (60.0 ttl pk-yrs)     Types: Cigarettes     Start date: 1950     Quit date: 2010     Years since quitting: 15.0     Passive exposure: Past    Smokeless tobacco: Current     Types: Snuff    Tobacco comments:     INST PER ANESTHESIA PROTOCOL   Vaping Use    Vaping status: Never Used   Substance and Sexual Activity    Alcohol use: Not Currently    Drug use: Never    Sexual activity: Defer     Family History   Problem Relation Age of Onset    Cancer Mother     Stomach cancer Other     Malig Hyperthermia Neg Hx      Immunization History   Administered Date(s) Administered    COVID-19 (PFIZER) 12YRS+ (COMIRNATY) 11/10/2023    COVID-19 (PFIZER) Purple Cap Monovalent 02/28/2021, 03/11/2021, 10/18/2021    FLUAD TRI 65YR+ 09/06/2024    Fluad Quad 65+ 10/07/2020, 09/24/2021    Fluzone High-Dose 65+yrs 10/06/2022, 11/11/2023    Influenza, Unspecified 09/01/2020    Pneumococcal Conjugate 20-Valent (PCV20)  04/22/2024    TD Preservative Free (Tenivac) 06/27/2022       Objective   Physical Exam  Constitutional:       Appearance: Normal appearance.   HENT:      Head: Normocephalic and atraumatic.      Nose: Nose normal.   Eyes:      Conjunctiva/sclera: Conjunctivae normal.   Pulmonary:      Effort: Pulmonary effort is normal.   Neurological:      General: No focal deficit present.      Mental Status: He is alert. Mental status is at baseline.   Psychiatric:         Mood and Affect: Mood normal.         Behavior: Behavior normal.         Thought Content: Thought content normal.       There were no vitals filed for this visit.                  ECOG: (0) Fully Active - Able to Carry On All Pre-disease Performance Without Restriction  Fall Risk Assessment was completed, and patient is at low risk for falls.  PHQ-9 Total Score:         The patient is  experiencing fatigue. Fatigue score: 1    PT/OT Functional Screening: PT fx screen: No needs identified  Speech Functional Screening: Speech fx screen: No needs identified  Rehab to be ordered: Rehab to be ordered: No needs identified        Result Review :   The following data was reviewed by: Kimberley Crum MA on 01/20/25:  Lab Results   Component Value Date    HGB 12.4 (L) 01/15/2025    HCT 38.6 01/15/2025    MCV 80.1 01/15/2025     01/15/2025    WBC 7.62 01/15/2025    NEUTROABS 5.38 01/15/2025    LYMPHSABS 1.11 01/15/2025    MONOSABS 0.72 01/15/2025    EOSABS 0.34 01/15/2025    BASOSABS 0.03 01/15/2025     Lab Results   Component Value Date    GLUCOSE 97 08/20/2024    BUN 9 08/20/2024    CREATININE 0.62 (L) 08/20/2024     08/20/2024    K 3.9 08/20/2024     08/20/2024    CO2 26.0 08/20/2024    CALCIUM 8.6 08/20/2024    PROTEINTOT 5.3 (L) 08/20/2024    ALBUMIN 2.9 (L) 08/20/2024    BILITOT 1.5 (H) 08/20/2024    ALKPHOS 292 (H) 08/20/2024    AST 68 (H) 08/20/2024    ALT 91 (H) 08/20/2024     Labs personally reviewed. Mild anemia. Normal iron studies. PSA  increased to 15.     Rad onc note personally reviewed    Pulmonary note personally reviewed    CT chest personally reviewed with multiple bilateral opacities per my review.    CT Chest Without Contrast Diagnostic    Result Date: 1/15/2025  Impression: CT scan of the chest without IV contrast demonstrating moderate emphysema. Consolidation in the right upper lobe is improved in comparison to chest radiograph 12/19/2024. Groundglass and patchy alveolar airspace disease is seen on CT scan. Worsening consolidation is seen in the right mid and lower lung in comparison to chest radiograph 12/19/2024. Extensive alveolar airspace disease in the right middle lobe and right lower lobe is not evident on CT scan 11/5/2024. A small right pleural effusion is evident. 7 cm x 5 cm area of consolidation and atelectasis in the left upper lobe appears slightly smaller and better defined in comparison to 11/5/2024. Small pericardial effusion, slightly larger. Electronically Signed: Vinicio Beltrán MD  1/15/2025 1:28 PM EST  Workstation ID: [id number]          Assessment and Plan    Diagnoses and all orders for this visit:    1. Iron deficiency anemia, unspecified iron deficiency anemia type (Primary)  -     CBC & Differential; Future  -     Ferritin; Future  -     Iron Profile; Future    2. Neuropathy  -     Discontinue: gabapentin (NEURONTIN) 300 MG capsule; Take 1 capsule by mouth 3 (Three) Times a Day.  Dispense: 90 capsule; Refill: 5  -     gabapentin (NEURONTIN) 300 MG capsule; Take 1 capsule by mouth 3 (Three) Times a Day.  Dispense: 90 capsule; Refill: 5    3. Recurrent pneumonia    4. Elevated PSA    5. Malignant neoplasm of upper lobe, left bronchus or lung          BONNIE adenocarcinoma of lung  S/p lobectomy and completed adjuvant chemotherapy 10/2016. 2/2024 CT chest reviewed with concern for recurrence on left side. Subsequent PET with 1.4 cm lesion, SUV 4.9. 3/4/24 biopsy positive for adenocarcinoma. Completed SBRT with   [facility]ai on 3/29/24. CT 9/3/24 with multiple airspace opacities, no evidence of cancer.  CT scans being followed by rad onc and pulmonary. Most recent CT Chest 1/14/25.  Recent biopsy via bronch on 11/19/24 was negative for malignancy, infection.      JESÚS  For iron deficiency, 1/2025 iron labs cons with anemia of chronic disease with elevated ferritin and low TIBC. Only mild anemia seen. Ok to hold oral iron. Repeat labs in 4 months.    Recurrent Pneumonia  Repeat CT chest 1/15/25 without much improvement. Being treated for atypical pneumonia with azithromycin per pulmonary. Has completed multiple antibiotics recently. Recommend ER evaluation if symptoms worsen. Following with pulmonary.     Chemo induced neuropathy  Gabapentin refilled.    Elevated PSA  Small concerning lesion on recent MRI. PSA up to 16.6 5/28/24, but now at 15.8 as of 7/2/24. Patient follows with Dr. Mai with urology. Following conservatively. Will defer management to urology.      Patient Follow Up: 4 months        Patient was given instructions and counseling regarding his condition or for health maintenance advice. Please see specific information pulled into the AVS if appropriate.

## 2025-01-27 NOTE — PROGRESS NOTES
Primary Care Provider  Payam Carroll MD   Referring Provider  No ref. provider found    Patient Complaint  Follow-up, COPD, and Shortness of Breath (Seems to be getting better, able to walk farther without getting out of breath )    Patient or patient representative verbalized consent for the use of Ambient Listening during the visit with  ARTEMIO Caal for chart documentation. 1/30/2025  09:00 EST      Subjective       History of Presenting Illness  Carlos Zimmerman is a pleasant 85 y.o. male  of  Dr. Choudhary who presents to [facility] with history of multifocal pneumonia, Pseudomonas infection, shortness of breath, pulmonary nodule, left upper lobe lung cancer, COPD  here for followup appointment.  I last saw the patient 1/20/2025. Patient was treated with Azithromycin and steroid at last visit due to CT scan indicating a progression of consolidation in the right middle and lower lobes compared to the previous scan from December.  Follow-up CT has been ordered for 2/7/2025 at 10:30 AM      History of Present Illness  The patient presents for evaluation of pneumonia.  Patient is accompanied by his wife, Yulissa    He reports an improvement in his condition but believes another course of antibiotics is necessary. He has a follow-up CT scan scheduled for 06/07/2024. He does not utilize home oxygen, with his readings consistently between 96 and 97. He used the nebulizer this morning upon waking and refrains from further use throughout the day unless required. The rescue inhaler is rarely used. He has not taken any prednisone. His respiratory function today is satisfactory, a significant improvement from the previous week. He was able to walk to the garage and back without supplemental oxygen, a feat he could not achieve last week without experiencing breathlessness.    MEDICATIONS  Current: albuterol, prednisone     At present time patient denies dyspnea,  coughing, wheezing, headaches, chest pain, weight loss or hemoptysis. Patient denies fevers, chills and night sweats. Carlos Zimmerman is able to perform ADLs.      I have personally reviewed the review of systems, past family, social, medical and surgical histories; and agree with their findings.      Review of Systems   Constitutional: Negative.    HENT: Negative.     Respiratory: Negative.     Cardiovascular: Negative.    Musculoskeletal: Negative.    Neurological: Negative.    Psychiatric/Behavioral: Negative.           Family History   Problem Relation Age of Onset    Cancer Mother     Stomach cancer Other     Malig Hyperthermia Neg Hx         Social History     Socioeconomic History    Marital status:    Tobacco Use    Smoking status: Former     Current packs/day: 0.00     Average packs/day: 1 pack/day for 60.0 years (60.0 ttl pk-yrs)     Types: Cigarettes     Start date: 1950     Quit date: 2010     Years since quitting: 15.0     Passive exposure: Past    Smokeless tobacco: Current     Types: Snuff    Tobacco comments:     INST PER ANESTHESIA PROTOCOL   Vaping Use    Vaping status: Never Used   Substance and Sexual Activity    Alcohol use: Not Currently    Drug use: Never    Sexual activity: Defer        Past Medical History:   Diagnosis Date    Anemia     NO CURRENT ISSUES    Arthritis     COPD (chronic obstructive pulmonary disease)     INHALER  PRN    Hyperlipidemia     Hypertension     ON MEDS/FOLLOWS SAHNI, PT DENIES CAD    Lung cancer     REMOVED - 2016    Pneumonia     LEFT LUNG CA NO CURRENT PNEUMONIA    SOB (shortness of breath)     WITH EXERTION SOMETIMES    Stroke     LEFT LEG/FOOT DROP  2019    TIA (transient ischemic attack)     NO RESIDUAL 2010        Immunization History   Administered Date(s) Administered    COVID-19 (PFIZER) 12YRS+ (COMIRNATY) 11/10/2023    COVID-19 (PFIZER) Purple Cap Monovalent 02/28/2021, 03/11/2021, 10/18/2021    FLUAD TRI 65YR+ 09/06/2024    Fluad Quad 65+  10/07/2020, 09/24/2021    Fluzone High-Dose 65+yrs 10/06/2022, 11/11/2023    Influenza, Unspecified 09/01/2020    Pneumococcal Conjugate 20-Valent (PCV20) 04/22/2024    TD Preservative Free (Tenivac) 06/27/2022       No Known Allergies       Current Outpatient Medications:     albuterol sulfate  (90 Base) MCG/ACT inhaler, Inhale 2 puffs Every 4 (Four) Hours As Needed for Wheezing or Shortness of Air., Disp: 18 g, Rfl: 3    aspirin 81 MG EC tablet, Take 1 tablet by mouth Daily., Disp: , Rfl:     atorvastatin (LIPITOR) 40 MG tablet, Take 1 tablet by mouth Daily., Disp: , Rfl:     azithromycin (ZITHROMAX) 250 MG tablet, Take 2 by mouth today then 1 daily for 4 days, Disp: 6 tablet, Rfl: 1    B Complex Vitamins (VITAMIN-B COMPLEX PO), Take 1 tablet by mouth Daily., Disp: , Rfl:     cetirizine (zyrTEC) 10 MG tablet, Take 1 tablet by mouth Daily., Disp: , Rfl:     Cholecalciferol (VITAMIN D3 PO), Take 1 tablet by mouth Daily., Disp: , Rfl:     cilostazol (PLETAL) 100 MG tablet, Take 1 tablet by mouth 2 (Two) Times a Day., Disp: , Rfl:     cyclobenzaprine (FLEXERIL) 10 MG tablet, Every 8 (Eight) Hours., Disp: , Rfl:     Fluticasone-Umeclidin-Vilant (TRELEGY ELLIPTA) 200-62.5-25 MCG/ACT inhaler, Inhale 1 puff Daily., Disp: 1 each, Rfl: 11    gabapentin (NEURONTIN) 300 MG capsule, Take 1 capsule by mouth 3 (Three) Times a Day., Disp: 90 capsule, Rfl: 5    ipratropium-albuterol (DUO-NEB) 0.5-2.5 mg/3 ml nebulizer, Take 3 mL by nebulization 4 (Four) Times a Day As Needed for Wheezing or Shortness of Air., Disp: 360 mL, Rfl: 3    losartan (COZAAR) 50 MG tablet, Take 1 tablet by mouth Daily., Disp: , Rfl:     metoprolol succinate XL (TOPROL-XL) 25 MG 24 hr tablet, Take 1 tablet by mouth Daily., Disp: , Rfl:     metoprolol tartrate (LOPRESSOR) 25 MG tablet, Take 0.5 tablets by mouth Daily., Disp: , Rfl:     multivitamin with minerals tablet tablet, Take 1 tablet by mouth Daily., Disp: , Rfl:     multivitamins-minerals  "(PRESERVISION AREDS 2) capsule capsule, Take 1 capsule by mouth 2 (Two) Times a Day., Disp: , Rfl:     pantoprazole (PROTONIX) 40 MG EC tablet, Take 1 tablet by mouth Daily., Disp: , Rfl:     predniSONE (DELTASONE) 10 MG tablet, 6 daily x 2 days, 5 daily x 2 days, 4 daily x 2 days, 3 daily x 2 days, 2 daily x 2 days, 1 daily x 2 days, Disp: 42 tablet, Rfl: 1    Refresh Tears 0.5 % solution, Administer 1 drop to both eyes Daily As Needed for Dry Eyes., Disp: , Rfl:     tamsulosin (FLOMAX) 0.4 MG capsule 24 hr capsule, Take 1 capsule by mouth 2 (Two) Times a Day., Disp: 180 capsule, Rfl: 4         Vital Signs   /70 (BP Location: Right arm, Patient Position: Sitting, Cuff Size: Adult)   Pulse 99   Temp 97.6 °F (36.4 °C)   Resp 18   Ht 170.2 cm (67.01\")   Wt 69.9 kg (154 lb)   SpO2 98%   BMI 24.11 kg/m²       Objective     Physical Exam  Vitals reviewed.   Constitutional:       Appearance: Normal appearance.   HENT:      Head: Normocephalic and atraumatic.      Nose: Nose normal.      Mouth/Throat:      Mouth: Mucous membranes are moist.      Pharynx: Oropharynx is clear.   Eyes:      Extraocular Movements: Extraocular movements intact.      Conjunctiva/sclera: Conjunctivae normal.      Pupils: Pupils are equal, round, and reactive to light.   Cardiovascular:      Rate and Rhythm: Normal rate and regular rhythm.      Pulses: Normal pulses.      Heart sounds: Normal heart sounds.   Pulmonary:      Effort: Pulmonary effort is normal.      Breath sounds: Normal breath sounds.   Abdominal:      General: Bowel sounds are normal.   Musculoskeletal:         General: Normal range of motion.      Cervical back: Normal range of motion and neck supple.   Skin:     General: Skin is warm and dry.   Neurological:      Mental Status: He is alert and oriented to person, place, and time.   Psychiatric:         Behavior: Behavior normal.         Physical Exam  Lungs are clear.  Heart sounds are normal.    Vital Signs  Oxygen " saturation is at 98 percent. Blood pressure and heart rate are normal.         Results Review  I have personally reviewed the prior office notes, hospital records, labs, and diagnostics.  CT Chest Without Contrast Diagnostic [[id number]] (Order [id number])  Order  Status: Final result     Study Notes     Callie Butler on 1/14/2025  1:03 PM EST   F/U PNEUMONIA, EXTREMELY SOA, HX OF LUNG CANCER  PREV ONLINE     Appointment Information    PACS Images     Radiology Images  Study Result    Narrative & Impression   CT CHEST WO CONTRAST DIAGNOSTIC     Date of Exam: 1/14/2025 1:01 PM EST     Indication: Monitoring for resolution of pneumonia. NSCLC of left upper lobe s/p SBRT, surveillance.     Comparison: CXR 12/19/2024, CT chest 11/5/2024     Technique: Axial CT images were obtained of the chest without contrast administration.  Reconstructed coronal and sagittal images were also obtained. Automated exposure control and iterative construction methods were used.        Findings:  In comparison to chest radiograph 12/19/2024, consolidation in the right upper lobe is improved. Worsening consolidation is seen in the right mid and lower lung. Ill-defined opacity in the left upper lobe is unchanged.     Lung window images reveal moderate centrilobular and paraseptal emphysema. Groundglass opacity and patchy alveolar airspace disease is seen in the right upper lobe. Fairly extensive alveolar airspace disease in the right middle lobe and right lower lobe   is not evident on CT scan 11/5/2024. A small right pleural effusion is evident.     7 cm x 5 cm area of consolidation and atelectasis in the left upper lobe appears slightly smaller and better defined in comparison to 11/5/2024.     Mediastinal windows reveal no mediastinal or axillary adenopathy. Hilar structures are obscured. Calcifications are seen in the aortic valve. Extensive coronary artery calcifications are evident. Small pericardial effusion measures 6 mm in greatest    thickness, and appears slightly larger in comparison to 11/5/2024. 7 mm nonobstructing stone is seen in the mid pole collecting system of the left kidney on the very last image.     Vertebral compression fractures are unchanged.     IMPRESSION:  Impression:  CT scan of the chest without IV contrast demonstrating moderate emphysema.     Consolidation in the right upper lobe is improved in comparison to chest radiograph 12/19/2024. Groundglass and patchy alveolar airspace disease is seen on CT scan.     Worsening consolidation is seen in the right mid and lower lung in comparison to chest radiograph 12/19/2024. Extensive alveolar airspace disease in the right middle lobe and right lower lobe is not evident on CT scan 11/5/2024. A small right pleural   effusion is evident.     7 cm x 5 cm area of consolidation and atelectasis in the left upper lobe appears slightly smaller and better defined in comparison to 11/5/2024.     Small pericardial effusion, slightly larger.           Electronically Signed: Vinicio Beltrán MD    1/15/2025 1:28 PM EST    Workstation ID: [id number]     Results  Imaging  CT scan showed worsening condition.          Assessment         Patient Active Problem List   Diagnosis    COPD (chronic obstructive pulmonary disease)    Hypertension    Low back pain    Lung disease    Solitary lung nodule    Pneumonia of right lung due to infectious organism    Hematuria    Complicated UTI (urinary tract infection)    Recurrent urinary tract infection    Benign prostatic hyperplasia    Atherosclerosis of lower extremity with claudication    Benign prostatic hyperplasia with lower urinary tract symptoms    Elevated PSA    Cervical radiculopathy    Cervical spondylosis without myelopathy    Abnormal PET scan of lung    Lung nodule    Malignant neoplasm of upper lobe, left bronchus or lung    Sepsis due to pneumonia    Airway clearance impairment    Recurrent pneumonia    Pneumonia due to Pseudomonas species         Plan     Diagnoses and all orders for this visit:    1. Abnormal CT scan of lung (Primary)  -     azithromycin (ZITHROMAX) 250 MG tablet; Take 2 by mouth today then 1 daily for 4 days  Dispense: 6 tablet; Refill: 1  -     predniSONE (DELTASONE) 10 MG tablet; 6 daily x 2 days, 5 daily x 2 days, 4 daily x 2 days, 3 daily x 2 days, 2 daily x 2 days, 1 daily x 2 days  Dispense: 42 tablet; Refill: 1    2. Recurrent pneumonia  -     azithromycin (ZITHROMAX) 250 MG tablet; Take 2 by mouth today then 1 daily for 4 days  Dispense: 6 tablet; Refill: 1  -     predniSONE (DELTASONE) 10 MG tablet; 6 daily x 2 days, 5 daily x 2 days, 4 daily x 2 days, 3 daily x 2 days, 2 daily x 2 days, 1 daily x 2 days  Dispense: 42 tablet; Refill: 1    3. Dyspnea on exertion  -     azithromycin (ZITHROMAX) 250 MG tablet; Take 2 by mouth today then 1 daily for 4 days  Dispense: 6 tablet; Refill: 1  -     predniSONE (DELTASONE) 10 MG tablet; 6 daily x 2 days, 5 daily x 2 days, 4 daily x 2 days, 3 daily x 2 days, 2 daily x 2 days, 1 daily x 2 days  Dispense: 42 tablet; Refill: 1    4. Nicotine dependence, cigarettes, in remission  -     azithromycin (ZITHROMAX) 250 MG tablet; Take 2 by mouth today then 1 daily for 4 days  Dispense: 6 tablet; Refill: 1  -     predniSONE (DELTASONE) 10 MG tablet; 6 daily x 2 days, 5 daily x 2 days, 4 daily x 2 days, 3 daily x 2 days, 2 daily x 2 days, 1 daily x 2 days  Dispense: 42 tablet; Refill: 1    5. Pulmonary emphysema, unspecified emphysema type  -     azithromycin (ZITHROMAX) 250 MG tablet; Take 2 by mouth today then 1 daily for 4 days  Dispense: 6 tablet; Refill: 1  -     predniSONE (DELTASONE) 10 MG tablet; 6 daily x 2 days, 5 daily x 2 days, 4 daily x 2 days, 3 daily x 2 days, 2 daily x 2 days, 1 daily x 2 days  Dispense: 42 tablet; Refill: 1    6. Airway clearance impairment    7. Malignant neoplasm of upper lobe, left bronchus or lung  -     azithromycin (ZITHROMAX) 250 MG tablet; Take 2 by mouth  today then 1 daily for 4 days  Dispense: 6 tablet; Refill: 1  -     predniSONE (DELTASONE) 10 MG tablet; 6 daily x 2 days, 5 daily x 2 days, 4 daily x 2 days, 3 daily x 2 days, 2 daily x 2 days, 1 daily x 2 days  Dispense: 42 tablet; Refill: 1         Assessment & Plan  1. Pneumonia.  His oxygen saturation is at 98 percent, with no signs of fever or chills. He is currently on oxygen therapy at home but reports not using it regularly. His CT scan showed worsening, but his lungs sound clear upon examination. A prescription for an additional course of antibiotics and steroids will be provided, with refills available. He is advised to hold on to these medications and only start them if his condition worsens. He is also advised to maximize the use of his nebulizer, potentially requiring multiple treatments per day, and to keep his albuterol inhaler readily accessible.    Follow-up  The patient will follow up in 2 weeks after the CT scan.      Smoking status:  reports that he quit smoking about 15 years ago. His smoking use included cigarettes. He started smoking about 75 years ago. He has a 60 pack-year smoking history. He has been exposed to tobacco smoke. His smokeless tobacco use includes snuff.    Vaccination status: Reviewed  Immunization History   Administered Date(s) Administered    COVID-19 (PFIZER) 12YRS+ (COMIRNATY) 11/10/2023    COVID-19 (PFIZER) Purple Cap Monovalent 02/28/2021, 03/11/2021, 10/18/2021    FLUAD TRI 65YR+ 09/06/2024    Fluad Quad 65+ 10/07/2020, 09/24/2021    Fluzone High-Dose 65+yrs 10/06/2022, 11/11/2023    Influenza, Unspecified 09/01/2020    Pneumococcal Conjugate 20-Valent (PCV20) 04/22/2024    TD Preservative Free (Tenivac) 06/27/2022        Medications personally reviewed    Follow Up  Return in about 2 weeks (around 2/13/2025) for after CT scan.    Patient was given instructions and counseling regarding his condition or for health maintenance advice. Please see specific information  pulled into the AVS if appropriate.     I spent 15 minutes caring for Carlos Zimmerman on this date of service. This time includes time spent by me in the following activities:preparing for the visit, reviewing tests, obtaining and/or reviewing a separately obtained history, performing a medically appropriate examination and/or evaluation, counseling and educating the patient/family/caregiver, ordering medications, tests, or procedures, documenting information in the medical record, independently interpreting results and communicating that information with the patient/family/caregiver and answered questions family members, discuss medications.

## 2025-01-30 ENCOUNTER — OFFICE VISIT (OUTPATIENT)
Dept: PULMONOLOGY | Facility: CLINIC | Age: 85
End: 2025-01-30
Payer: MEDICARE

## 2025-01-30 VITALS
RESPIRATION RATE: 18 BRPM | OXYGEN SATURATION: 98 % | WEIGHT: 154 LBS | TEMPERATURE: 97.6 F | DIASTOLIC BLOOD PRESSURE: 70 MMHG | SYSTOLIC BLOOD PRESSURE: 123 MMHG | HEIGHT: 67 IN | BODY MASS INDEX: 24.17 KG/M2 | HEART RATE: 99 BPM

## 2025-01-30 DIAGNOSIS — J43.9 PULMONARY EMPHYSEMA, UNSPECIFIED EMPHYSEMA TYPE: ICD-10-CM

## 2025-01-30 DIAGNOSIS — J18.9 RECURRENT PNEUMONIA: ICD-10-CM

## 2025-01-30 DIAGNOSIS — R06.09 DYSPNEA ON EXERTION: ICD-10-CM

## 2025-01-30 DIAGNOSIS — C34.12 MALIGNANT NEOPLASM OF UPPER LOBE, LEFT BRONCHUS OR LUNG: ICD-10-CM

## 2025-01-30 DIAGNOSIS — R91.8 ABNORMAL CT SCAN OF LUNG: Primary | ICD-10-CM

## 2025-01-30 DIAGNOSIS — R06.89 AIRWAY CLEARANCE IMPAIRMENT: ICD-10-CM

## 2025-01-30 DIAGNOSIS — F17.211 NICOTINE DEPENDENCE, CIGARETTES, IN REMISSION: ICD-10-CM

## 2025-01-30 RX ORDER — AZITHROMYCIN 250 MG/1
TABLET, FILM COATED ORAL
Qty: 6 TABLET | Refills: 1 | Status: SHIPPED | OUTPATIENT
Start: 2025-01-30

## 2025-01-30 RX ORDER — PREDNISONE 10 MG/1
TABLET ORAL
Qty: 42 TABLET | Refills: 1 | Status: SHIPPED | OUTPATIENT
Start: 2025-01-30

## 2025-02-07 ENCOUNTER — HOSPITAL ENCOUNTER (OUTPATIENT)
Dept: CT IMAGING | Facility: HOSPITAL | Age: 85
Discharge: HOME | End: 2025-02-07
Payer: MEDICARE

## 2025-02-07 DIAGNOSIS — R91.8 ABNORMAL CT SCAN OF LUNG: ICD-10-CM

## 2025-02-07 DIAGNOSIS — C34.12 MALIGNANT NEOPLASM OF UPPER LOBE, LEFT BRONCHUS OR LUNG: ICD-10-CM

## 2025-02-07 DIAGNOSIS — J18.9 RECURRENT PNEUMONIA: ICD-10-CM

## 2025-02-07 DIAGNOSIS — F17.211 NICOTINE DEPENDENCE, CIGARETTES, IN REMISSION: ICD-10-CM

## 2025-02-07 DIAGNOSIS — R06.09 DYSPNEA ON EXERTION: ICD-10-CM

## 2025-02-07 DIAGNOSIS — J43.9 PULMONARY EMPHYSEMA, UNSPECIFIED EMPHYSEMA TYPE: ICD-10-CM

## 2025-02-07 PROCEDURE — 71250 CT THORAX DX C-: CPT

## 2025-02-11 NOTE — PROGRESS NOTES
Primary Care Provider  Payam Carroll MD   Referring Provider  No ref. provider found    Patient Complaint  Follow-up and Results (CT)    Patient or patient representative verbalized consent for the use of Ambient Listening during the visit with  ARTEMIO Caal for chart documentation. 2/13/2025  10:05 EST      Subjective       History of Presenting Illness  Carlos Zimmerman is a pleasant 85 y.o. male  of  Dr. Choudhary who presents to [facility] with history of  multifocal pneumonia, Pseudomonas infection, shortness of breath, pulmonary nodule, left upper lobe lung cancer, COPD  here for followup appointment.  I last saw the patient 1/30/2025.  Patient has been having persistent consolidation in the right middle and lower lobes.  Follow-up CT 2/7/2025 reveals areas of waxing and waning airspace consolidation throughout both lung fields likely an inflammatory or infectious process.  Minimal pericardial effusion and minimal pleural effusions.  He continues under the care of oncology and radiation oncology.      History of Present Illness  The patient presents for evaluation of COPD.  Patient is here with his spouse.    He reports a general sense of well-being with no current medication regimen. He has completed his course of antibiotics and is not experiencing any significant wheezing, fever, or chills. He expresses concern regarding the resolution of his pneumonia. He is able to ambulate within his residence without experiencing dyspnea. He has prednisone and azithromycin available at home.    MEDICATIONS  Current: Prednisone, azithromycin.       At present time patient denies coughing, wheezing, headaches, chest pain, weight loss or hemoptysis. Patient denies fevers, chills and night sweats. Carlos Zimmerman is able to perform ADLs.      I have personally reviewed the review of systems, past family, social, medical and surgical histories; and agree with  their findings.      Review of Systems   Constitutional: Negative.    HENT: Negative.     Respiratory:  Positive for shortness of breath.    Cardiovascular: Negative.    Musculoskeletal: Negative.    Neurological: Negative.    Psychiatric/Behavioral: Negative.           Family History   Problem Relation Age of Onset    Cancer Mother     Stomach cancer Other     Malig Hyperthermia Neg Hx         Social History     Socioeconomic History    Marital status:    Tobacco Use    Smoking status: Former     Current packs/day: 0.00     Average packs/day: 1 pack/day for 60.0 years (60.0 ttl pk-yrs)     Types: Cigarettes     Start date: 1950     Quit date: 2010     Years since quitting: 15.1     Passive exposure: Past    Smokeless tobacco: Current     Types: Snuff    Tobacco comments:     INST PER ANESTHESIA PROTOCOL   Vaping Use    Vaping status: Never Used   Substance and Sexual Activity    Alcohol use: Not Currently    Drug use: Never    Sexual activity: Defer        Past Medical History:   Diagnosis Date    Anemia     NO CURRENT ISSUES    Arthritis     COPD (chronic obstructive pulmonary disease)     INHALER  PRN    Hyperlipidemia     Hypertension     ON MEDS/FOLLOWS SAHNI, PT DENIES CAD    Lung cancer     REMOVED - 2016    Pneumonia     LEFT LUNG CA NO CURRENT PNEUMONIA    SOB (shortness of breath)     WITH EXERTION SOMETIMES    Stroke     LEFT LEG/FOOT DROP  2019    TIA (transient ischemic attack)     NO RESIDUAL 2010        Immunization History   Administered Date(s) Administered    COVID-19 (PFIZER) 12YRS+ (COMIRNATY) 11/10/2023    COVID-19 (PFIZER) Purple Cap Monovalent 02/28/2021, 03/11/2021, 10/18/2021    FLUAD TRI 65YR+ 09/06/2024    Fluad Quad 65+ 10/07/2020, 09/24/2021    Fluzone High-Dose 65+yrs 10/06/2022, 11/11/2023    Influenza, Unspecified 09/01/2020    Pneumococcal Conjugate 20-Valent (PCV20) 04/22/2024    TD Preservative Free (Tenivac) 06/27/2022       No Known Allergies       Current Outpatient  Medications:     albuterol sulfate  (90 Base) MCG/ACT inhaler, Inhale 2 puffs Every 4 (Four) Hours As Needed for Wheezing or Shortness of Air., Disp: 18 g, Rfl: 3    aspirin 81 MG EC tablet, Take 1 tablet by mouth Daily., Disp: , Rfl:     atorvastatin (LIPITOR) 40 MG tablet, Take 1 tablet by mouth Daily., Disp: , Rfl:     azithromycin (ZITHROMAX) 250 MG tablet, Take 2 by mouth today then 1 daily for 4 days, Disp: 6 tablet, Rfl: 1    B Complex Vitamins (VITAMIN-B COMPLEX PO), Take 1 tablet by mouth Daily., Disp: , Rfl:     cetirizine (zyrTEC) 10 MG tablet, Take 1 tablet by mouth Daily., Disp: , Rfl:     Cholecalciferol (VITAMIN D3 PO), Take 1 tablet by mouth Daily., Disp: , Rfl:     cilostazol (PLETAL) 100 MG tablet, Take 1 tablet by mouth 2 (Two) Times a Day., Disp: , Rfl:     cyclobenzaprine (FLEXERIL) 10 MG tablet, Every 8 (Eight) Hours., Disp: , Rfl:     Fluticasone-Umeclidin-Vilant (TRELEGY ELLIPTA) 200-62.5-25 MCG/ACT inhaler, Inhale 1 puff Daily., Disp: 1 each, Rfl: 11    gabapentin (NEURONTIN) 300 MG capsule, Take 1 capsule by mouth 3 (Three) Times a Day., Disp: 90 capsule, Rfl: 5    ipratropium-albuterol (DUO-NEB) 0.5-2.5 mg/3 ml nebulizer, Take 3 mL by nebulization 4 (Four) Times a Day As Needed for Wheezing or Shortness of Air., Disp: 360 mL, Rfl: 3    losartan (COZAAR) 50 MG tablet, Take 1 tablet by mouth Daily., Disp: , Rfl:     metoprolol succinate XL (TOPROL-XL) 25 MG 24 hr tablet, Take 1 tablet by mouth Daily., Disp: , Rfl:     metoprolol tartrate (LOPRESSOR) 25 MG tablet, Take 0.5 tablets by mouth Daily., Disp: , Rfl:     multivitamin with minerals tablet tablet, Take 1 tablet by mouth Daily., Disp: , Rfl:     multivitamins-minerals (PRESERVISION AREDS 2) capsule capsule, Take 1 capsule by mouth 2 (Two) Times a Day., Disp: , Rfl:     pantoprazole (PROTONIX) 40 MG EC tablet, Take 1 tablet by mouth Daily., Disp: , Rfl:     predniSONE (DELTASONE) 10 MG tablet, 6 daily x 2 days, 5 daily x 2 days,  "4 daily x 2 days, 3 daily x 2 days, 2 daily x 2 days, 1 daily x 2 days, Disp: 42 tablet, Rfl: 1    Refresh Tears 0.5 % solution, Administer 1 drop to both eyes Daily As Needed for Dry Eyes., Disp: , Rfl:     tamsulosin (FLOMAX) 0.4 MG capsule 24 hr capsule, Take 1 capsule by mouth 2 (Two) Times a Day., Disp: 180 capsule, Rfl: 4         Vital Signs   /78 (BP Location: Right arm, Patient Position: Sitting, Cuff Size: Adult)   Pulse 93   Temp 97.8 °F (36.6 °C)   Resp 16   Ht 170.2 cm (67.01\")   Wt 69.9 kg (154 lb)   SpO2 97%   BMI 24.11 kg/m²       Objective     Physical Exam  Vitals reviewed.   Constitutional:       General: He is not in acute distress.     Appearance: Normal appearance. He is not ill-appearing.   HENT:      Head: Normocephalic and atraumatic.      Nose: Nose normal.      Mouth/Throat:      Mouth: Mucous membranes are moist.      Pharynx: Oropharynx is clear.   Eyes:      Extraocular Movements: Extraocular movements intact.      Conjunctiva/sclera: Conjunctivae normal.      Pupils: Pupils are equal, round, and reactive to light.   Cardiovascular:      Rate and Rhythm: Normal rate and regular rhythm.      Pulses: Normal pulses.      Heart sounds: Normal heart sounds.   Pulmonary:      Effort: Pulmonary effort is normal. No respiratory distress.      Breath sounds: Normal breath sounds. No stridor. No wheezing, rhonchi or rales.   Abdominal:      General: Bowel sounds are normal.   Musculoskeletal:         General: Normal range of motion.      Cervical back: Normal range of motion and neck supple.   Skin:     General: Skin is warm and dry.   Neurological:      Mental Status: He is alert and oriented to person, place, and time.   Psychiatric:         Behavior: Behavior normal.         Physical Exam  Lungs are clear.         Results Review  I have personally reviewed the prior office notes, hospital records, labs, and diagnostics.  CT Chest Without Contrast Diagnostic [[id number]] (Order " [id number])  Order  Status: Final result     Study Notes     Maribel Daniel on 2/7/2025 10:24 AM EST   CHRONIC SHORTNESS OF BREATH, RECURRENT PNEUMONIA, FORMER SMOKER, HISTORY OF LEFT LUNG CANCER-SURGERY     Appointment Information    PACS Images     Radiology Images  Study Result    Narrative & Impression   CT CHEST WO CONTRAST DIAGNOSTIC     Date of Exam: 2/7/2025 10:19 AM EST     Indication: abnormal ct.     Comparison: 1/14/2025     Technique: Axial CT images were obtained of the chest without contrast administration.  Reconstructed coronal and sagittal images were also obtained. Automated exposure control and iterative construction methods were used.        Findings:  There are waxing and waning areas of airspace consolidation throughout both lung fields. There are minimal pleural effusions that are smaller on today's examination. There is a minimal pericardial effusion. Severe emphysema is present. Coronary artery   calcification is present. The thoracic aorta has a normal caliber. There is no mediastinal, axillary or hilar adenopathy. Images of the unenhanced upper abdomen are unremarkable. Scoliosis and degenerative change are present in the thoracic spine. Stable   compression deformities of T7 and T11.     IMPRESSION:  Impression:     1. Areas of waxing and waning airspace consolidation throughout both lung field likely an inflammatory or infectious process.     2. Minimal pleural effusions. Minimal pericardial effusion.           Electronically Signed: Lazarus Salinas MD    2/9/2025 3:53 PM EST    Workstation ID: [id number]         Results  Imaging  CT scan shows areas of waxing and waning airspace consolidation throughout the lung fields, likely an inflammatory or infectious process. Very minimal fluid in the lungs.          Assessment         Patient Active Problem List   Diagnosis    COPD (chronic obstructive pulmonary disease)    Hypertension    Low back pain    Lung disease    Solitary lung nodule     Pneumonia of right lung due to infectious organism    Hematuria    Complicated UTI (urinary tract infection)    Recurrent urinary tract infection    Benign prostatic hyperplasia    Atherosclerosis of lower extremity with claudication    Benign prostatic hyperplasia with lower urinary tract symptoms    Elevated PSA    Cervical radiculopathy    Cervical spondylosis without myelopathy    Abnormal PET scan of lung    Lung nodule    Malignant neoplasm of upper lobe, left bronchus or lung    Sepsis due to pneumonia    Airway clearance impairment    Recurrent pneumonia    Pneumonia due to Pseudomonas species        Plan     Diagnoses and all orders for this visit:    1. Abnormal CT scan of lung (Primary)    2. Recurrent pneumonia    3. Dyspnea on exertion    4. Nicotine dependence, cigarettes, in remission    5. Pulmonary emphysema, unspecified emphysema type    6. Airway clearance impairment    7. Malignant neoplasm of lower lobe of left lung    8. Malignant neoplasm of upper lobe, left bronchus or lung         Assessment & Plan  1. Chronic Obstructive Pulmonary Disease (COPD).  His condition remains stable with no significant changes observed in the CT scan. The scan shows areas of waxing and waning airspace consolidation, likely due to an inflammatory or infectious process. There is very minimal fluid in the lungs, which could be from COPD or congestive heart failure. His lung sounds are clear today. He has completed all antibiotics and is not currently on any. He has prednisone and azithromycin available at home if needed. He is advised to monitor his symptoms and start the medication if he experiences a flare-up. He should contact the office if his condition worsens.    Follow-up  The patient will follow up in 2 months or sooner if he experiences a flare-up.      Smoking status:  reports that he quit smoking about 15 years ago. His smoking use included cigarettes. He started smoking about 75 years ago. He has a 60  pack-year smoking history. He has been exposed to tobacco smoke. His smokeless tobacco use includes snuff.    Vaccination status: Reviewed  Immunization History   Administered Date(s) Administered    COVID-19 (PFIZER) 12YRS+ (COMIRNATY) 11/10/2023    COVID-19 (PFIZER) Purple Cap Monovalent 02/28/2021, 03/11/2021, 10/18/2021    FLUAD TRI 65YR+ 09/06/2024    Fluad Quad 65+ 10/07/2020, 09/24/2021    Fluzone High-Dose 65+yrs 10/06/2022, 11/11/2023    Influenza, Unspecified 09/01/2020    Pneumococcal Conjugate 20-Valent (PCV20) 04/22/2024    TD Preservative Free (Tenivac) 06/27/2022        Medications personally reviewed    Follow Up  Return in about 2 months (around 4/13/2025) for with Dr. Choudhary or Gely.    Patient was given instructions and counseling regarding his condition or for health maintenance advice. Please see specific information pulled into the AVS if appropriate.     I spent 15 minutes caring for Carlos Zimmerman on this date of service. This time includes time spent by me in the following activities:preparing for the visit, reviewing tests, obtaining and/or reviewing a separately obtained history, performing a medically appropriate examination and/or evaluation, counseling and educating the patient/family/caregiver, ordering medications, tests, or procedures, documenting information in the medical record, independently interpreting results and communicating that information with the patient/family/caregiver and answered questions family members, discuss medications.

## 2025-02-13 ENCOUNTER — OFFICE VISIT (OUTPATIENT)
Dept: PULMONOLOGY | Facility: CLINIC | Age: 85
End: 2025-02-13
Payer: MEDICARE

## 2025-02-13 VITALS
DIASTOLIC BLOOD PRESSURE: 78 MMHG | SYSTOLIC BLOOD PRESSURE: 137 MMHG | HEIGHT: 67 IN | RESPIRATION RATE: 16 BRPM | TEMPERATURE: 97.8 F | WEIGHT: 154 LBS | OXYGEN SATURATION: 97 % | BODY MASS INDEX: 24.17 KG/M2 | HEART RATE: 93 BPM

## 2025-02-13 DIAGNOSIS — C34.12 MALIGNANT NEOPLASM OF UPPER LOBE, LEFT BRONCHUS OR LUNG: ICD-10-CM

## 2025-02-13 DIAGNOSIS — F17.211 NICOTINE DEPENDENCE, CIGARETTES, IN REMISSION: ICD-10-CM

## 2025-02-13 DIAGNOSIS — R06.89 AIRWAY CLEARANCE IMPAIRMENT: ICD-10-CM

## 2025-02-13 DIAGNOSIS — R06.09 DYSPNEA ON EXERTION: ICD-10-CM

## 2025-02-13 DIAGNOSIS — J43.9 PULMONARY EMPHYSEMA, UNSPECIFIED EMPHYSEMA TYPE: ICD-10-CM

## 2025-02-13 DIAGNOSIS — J18.9 RECURRENT PNEUMONIA: ICD-10-CM

## 2025-02-13 DIAGNOSIS — R91.8 ABNORMAL CT SCAN OF LUNG: Primary | ICD-10-CM

## 2025-02-13 DIAGNOSIS — C34.32 MALIGNANT NEOPLASM OF LOWER LOBE OF LEFT LUNG: ICD-10-CM

## 2025-02-13 PROCEDURE — 1160F RVW MEDS BY RX/DR IN RCRD: CPT | Performed by: NURSE PRACTITIONER

## 2025-02-13 PROCEDURE — 99214 OFFICE O/P EST MOD 30 MIN: CPT | Performed by: NURSE PRACTITIONER

## 2025-02-13 PROCEDURE — 1159F MED LIST DOCD IN RCRD: CPT | Performed by: NURSE PRACTITIONER

## 2025-03-06 NOTE — PROGRESS NOTES
Chief Complaint: Urologic complaint    Subjective         History of Present Illness  Carlos Zimmerman is a 85 y.o. male           Recurrent UTI  BPH  Elevated PSA        Voiding ok.    No straining.  No change    on Flomax 0.4 mg BID.    No straining.  Nocturia x0.    Not bothered      No cardiopulmonary history.  Non-smoker.  No anticoagulation.  History of lung cancer years ago treated with surgery      Some of this family did live in the 90s      PVR    9/23  000  9/22  061    3/24 lung lesion -with XRT treatments.  Monitoring currently    2016 lung cancer s/p resection and chemo -does still deal with neuropathy        No CAD  H/o of CVA x 3        Previous      1/23 cystoscopy-3 cm prostate large bladder with mild trabeculation, no pathology.    7/22 renal ultrasound-normal  7/22 0.59, GFR 96    Urine cultures    7/22 Serratia-resistant to Ancef and nitrofurantoin  2/21 Serratia      No history of kidney  stone.    No urologic family history,   no history of urologic surgery.        PSA      12/24     14.5   7/24 MRI prostate - 51 g   PSAd  0.30  PI - RADS 4-posterior lateral left peripheral zone, mid gland 1.4 cm, previously 1 cm on 2/23 7/24     15.8      5/24      16.6  11/23    11.8   8/23      11.9      2/20/2023 MRI prostate-49 g  PI - RADS 4 - left posterior mid gland peripheral zone.  1.0 x 0.7 cm.  Seminal vesicles, neurovascular bundle and lymph nodes negative  1/23       8.0             Objective             No Known Allergies           Vital Signs:   There were no vitals taken for this visit.                 Assessment and Plan    Diagnoses and all orders for this visit:    1. Benign prostatic hyperplasia with lower urinary tract symptoms, symptom details unspecified (Primary)    2. Recurrent urinary tract infection    3. Elevated PSA          Elevated PSA        PSA stable.  Patient given reassurance.    We had discussed biopsy versus conservatively monitoring.  Patient has decided on conservative  monitoring in the past.  He understands the risk of having prostate cancer that could metastatic and cause harm or death.      We will continue to monitor conservatively.  Patient will follow-up in 7/25 with PSA and MRI prostate.      For stands we are following for possibility of cancer and he needs to continue to keep his follow-up.            BPH with recurrent UTIs      Cont  Flomax to 0.4 mg twice daily.    not  interested in procedures

## 2025-03-10 ENCOUNTER — OFFICE VISIT (OUTPATIENT)
Dept: UROLOGY | Age: 85
End: 2025-03-10
Payer: MEDICARE

## 2025-03-10 VITALS — WEIGHT: 154.7 LBS | HEIGHT: 67 IN | BODY MASS INDEX: 24.28 KG/M2

## 2025-03-10 DIAGNOSIS — N40.1 BENIGN PROSTATIC HYPERPLASIA WITH LOWER URINARY TRACT SYMPTOMS, SYMPTOM DETAILS UNSPECIFIED: Primary | ICD-10-CM

## 2025-03-10 DIAGNOSIS — R97.20 ELEVATED PSA: ICD-10-CM

## 2025-03-10 DIAGNOSIS — N39.0 RECURRENT URINARY TRACT INFECTION: ICD-10-CM

## 2025-03-10 PROCEDURE — 1159F MED LIST DOCD IN RCRD: CPT | Performed by: UROLOGY

## 2025-03-10 PROCEDURE — 99214 OFFICE O/P EST MOD 30 MIN: CPT | Performed by: UROLOGY

## 2025-03-10 PROCEDURE — 1160F RVW MEDS BY RX/DR IN RCRD: CPT | Performed by: UROLOGY

## 2025-03-16 ENCOUNTER — HOSPITAL ENCOUNTER (EMERGENCY)
Facility: HOSPITAL | Age: 85
Discharge: HOME | End: 2025-03-16
Attending: EMERGENCY MEDICINE | Admitting: EMERGENCY MEDICINE
Payer: MEDICARE

## 2025-03-16 ENCOUNTER — APPOINTMENT (OUTPATIENT)
Facility: HOSPITAL | Age: 85
End: 2025-03-16
Payer: MEDICARE

## 2025-03-16 VITALS
DIASTOLIC BLOOD PRESSURE: 78 MMHG | WEIGHT: 160.27 LBS | SYSTOLIC BLOOD PRESSURE: 160 MMHG | HEIGHT: 67 IN | BODY MASS INDEX: 25.16 KG/M2 | HEART RATE: 70 BPM | OXYGEN SATURATION: 96 % | RESPIRATION RATE: 16 BRPM | TEMPERATURE: 97.9 F

## 2025-03-16 DIAGNOSIS — R22.31 LOCALIZED SWELLING OF RIGHT FOREARM: Primary | ICD-10-CM

## 2025-03-16 LAB
BH CV UPPER VENOUS LEFT INTERNAL JUGULAR AUGMENT: NORMAL
BH CV UPPER VENOUS LEFT INTERNAL JUGULAR COMPRESS: NORMAL
BH CV UPPER VENOUS LEFT INTERNAL JUGULAR PHASIC: NORMAL
BH CV UPPER VENOUS LEFT INTERNAL JUGULAR SPONT: NORMAL
BH CV UPPER VENOUS LEFT SUBCLAVIAN AUGMENT: NORMAL
BH CV UPPER VENOUS LEFT SUBCLAVIAN COMPRESS: NORMAL
BH CV UPPER VENOUS LEFT SUBCLAVIAN PHASIC: NORMAL
BH CV UPPER VENOUS LEFT SUBCLAVIAN SPONT: NORMAL
BH CV UPPER VENOUS RIGHT AXILLARY AUGMENT: NORMAL
BH CV UPPER VENOUS RIGHT AXILLARY COMPRESS: NORMAL
BH CV UPPER VENOUS RIGHT AXILLARY PHASIC: NORMAL
BH CV UPPER VENOUS RIGHT AXILLARY SPONT: NORMAL
BH CV UPPER VENOUS RIGHT BASILIC FOREARM COMPRESS: NORMAL
BH CV UPPER VENOUS RIGHT BASILIC UPPER COMPRESS: NORMAL
BH CV UPPER VENOUS RIGHT BRACHIAL COMPRESS: NORMAL
BH CV UPPER VENOUS RIGHT CEPHALIC FOREARM COMPRESS: NORMAL
BH CV UPPER VENOUS RIGHT CEPHALIC UPPER COMPRESS: NORMAL
BH CV UPPER VENOUS RIGHT INTERNAL JUGULAR COMPRESS: NORMAL
BH CV UPPER VENOUS RIGHT INTERNAL JUGULAR PHASIC: NORMAL
BH CV UPPER VENOUS RIGHT INTERNAL JUGULAR SPONT: NORMAL
BH CV UPPER VENOUS RIGHT RADIAL COMPRESS: NORMAL
BH CV UPPER VENOUS RIGHT SUBCLAVIAN AUGMENT: NORMAL
BH CV UPPER VENOUS RIGHT SUBCLAVIAN COMPRESS: NORMAL
BH CV UPPER VENOUS RIGHT SUBCLAVIAN PHASIC: NORMAL
BH CV UPPER VENOUS RIGHT SUBCLAVIAN SPONT: NORMAL
BH CV UPPER VENOUS RIGHT ULNAR COMPRESS: NORMAL
BH CV VAS PRELIMINARY FINDINGS SCRIPTING: 1

## 2025-03-16 PROCEDURE — 99284 EMERGENCY DEPT VISIT MOD MDM: CPT

## 2025-03-16 PROCEDURE — 93971 EXTREMITY STUDY: CPT | Performed by: SURGERY

## 2025-03-16 PROCEDURE — 93971 EXTREMITY STUDY: CPT

## 2025-03-16 RX ORDER — ACETAMINOPHEN 500 MG
1000 TABLET ORAL ONCE
Status: COMPLETED | OUTPATIENT
Start: 2025-03-16 | End: 2025-03-16

## 2025-03-16 RX ADMIN — ACETAMINOPHEN 1000 MG: 500 TABLET ORAL at 15:46

## 2025-03-16 NOTE — ED PROVIDER NOTES
Time: 3:34 PM EDT  Date of encounter:  3/16/2025  Independent Historian/Clinical History and Information was obtained by:   Patient    History is limited by: N/A    Chief Complaint   Patient presents with    Arm Swelling         History of Present Illness:  Patient is a 85 y.o. year old male who presents to the emergency department for evaluation of right forearm pain and swelling.  Patient states that Wednesday he was using a wrench to fix his lumbar and states that he injured his arm.  He stated it started as this pain and swelling in the mid right forearm.  He woke up Friday with swelling that extended to his right elbow.  Patient has full mobility.  He states he was seen here in August 2024 due to a laceration.  He states he had stitches in the ED prior denies prior surgery of his right arm.  Denies recent fall or injury.    Patient Care Team  Primary Care Provider: Payam Carroll MD    Past Medical History:     No Known Allergies  Past Medical History:   Diagnosis Date    Anemia     NO CURRENT ISSUES    Arthritis     COPD (chronic obstructive pulmonary disease)     INHALER  PRN    Hyperlipidemia     Hypertension     ON MEDS/FOLLOWS SAHNI, PT DENIES CAD    Lung cancer     REMOVED - 2016    Pneumonia     LEFT LUNG CA NO CURRENT PNEUMONIA    SOB (shortness of breath)     WITH EXERTION SOMETIMES    Stroke     LEFT LEG/FOOT DROP  2019    TIA (transient ischemic attack)     NO RESIDUAL 2010     Past Surgical History:   Procedure Laterality Date    BRONCHOSCOPY Bilateral 11/19/2024    Procedure: BRONCHOSCOPY WITH BAL, BIOPSY, BRONCHIAL WASHINGS;  Surgeon: Dayne Choudhary MD;  Location: [facility] ENDOSCOPY;  Service: Pulmonary;  Laterality: Bilateral;  PERSISTENT PNEUMONIA, LEFT UPPER LOBE INFILTRATE    BRONCHOSCOPY WITH ION ROBOTIC ASSIST N/A 3/4/2024    Procedure: BRONCHOSCOPY WITH ION ROBOT, REBUS, NEEDLE ASPIRATE, BIOPSIES, BAL, WASHINGS;  Surgeon: Dayne Choudhary MD;  Location: [facility] MAIN OR;  Service: Robotics  - Pulmonary;  Laterality: N/A;    CARDIAC CATHETERIZATION Left 11/10/2022    Procedure: Aortogram with left leg angiogram, possible angioplasty or stenting;  Surgeon: Cuauhtemoc Thomas MD;  Location: [facility] CATH INVASIVE LOCATION;  Service: Vascular;  Laterality: Left;    CARDIAC CATHETERIZATION Right 01/13/2023    Procedure: Right leg angiogram, possible angioplasty or stenting;  Surgeon: Cuauhtemoc Thomas MD;  Location: [facility] CATH INVASIVE LOCATION;  Service: Vascular;  Laterality: Right;    CARPAL TUNNEL RELEASE Left 02/09/2024    Procedure: CARPAL TUNNEL RELEASE, left;  Surgeon: Rg Tom MD;  Location: [facility] MAIN OR;  Service: Neurosurgery;  Laterality: Left;    LUNG BIOPSY      LUNG SURGERY      REMOVAL HALF OF UPPER PORTION LEFT LUNG    ORTHOPEDIC SURGERY Left     ROTATOR CUFF    THROAT SURGERY      throat diverticulum repair     Family History   Problem Relation Age of Onset    Cancer Mother     Stomach cancer Other     Malig Hyperthermia Neg Hx        Home Medications:  Prior to Admission medications    Medication Sig Start Date End Date Taking? Authorizing Provider   albuterol sulfate  (90 Base) MCG/ACT inhaler Inhale 2 puffs Every 4 (Four) Hours As Needed for Wheezing or Shortness of Air. 11/7/24   Gely Canales APRN   aspirin 81 MG EC tablet Take 1 tablet by mouth Daily. 8/22/23   Maxine Espinoza MD   atorvastatin (LIPITOR) 40 MG tablet Take 1 tablet by mouth Daily. 4/15/24 4/15/25  Maxine Espinoza MD   azithromycin (ZITHROMAX) 250 MG tablet Take 2 by mouth today then 1 daily for 4 days  Patient not taking: Reported on 3/10/2025 1/30/25   Gely Canales APRN   B Complex Vitamins (VITAMIN-B COMPLEX PO) Take 1 tablet by mouth Daily.    Maxine Espinoza MD   cetirizine (zyrTEC) 10 MG tablet Take 1 tablet by mouth Daily.    Maxine Espinoza MD   Cholecalciferol (VITAMIN D3 PO) Take 1 tablet by mouth Daily.    Maxine Espinoza MD   cilostazol (PLETAL) 100 MG tablet Take 1  tablet by mouth 2 (Two) Times a Day. 5/24/21   Maxine Espinoza MD   cyclobenzaprine (FLEXERIL) 10 MG tablet Every 8 (Eight) Hours.    Maxine Espinoza MD   Fluticasone-Umeclidin-Vilant (TRELEGY ELLIPTA) 200-62.5-25 MCG/ACT inhaler Inhale 1 puff Daily. 8/6/24   Dayne Choudhary MD   gabapentin (NEURONTIN) 300 MG capsule Take 1 capsule by mouth 3 (Three) Times a Day. 1/20/25   Juan Jose Alatorre MD   ipratropium-albuterol (DUO-NEB) 0.5-2.5 mg/3 ml nebulizer Take 3 mL by nebulization 4 (Four) Times a Day As Needed for Wheezing or Shortness of Air. 12/19/24   Gely Canales APRN   losartan (COZAAR) 50 MG tablet Take 1 tablet by mouth Daily.    Maxine Espinoza MD   metoprolol succinate XL (TOPROL-XL) 25 MG 24 hr tablet Take 1 tablet by mouth Daily.    Maxine Espinoza MD   metoprolol tartrate (LOPRESSOR) 25 MG tablet Take 0.5 tablets by mouth Daily. 10/21/24   Maxine Espinoza MD   multivitamin with minerals tablet tablet Take 1 tablet by mouth Daily.    Maxine Espinoza MD   multivitamins-minerals (PRESERVISION AREDS 2) capsule capsule Take 1 capsule by mouth 2 (Two) Times a Day. 7/26/21   Maxine Espinoza MD   pantoprazole (PROTONIX) 40 MG EC tablet Take 1 tablet by mouth Daily. 4/19/21   Maxine Espinoza MD   predniSONE (DELTASONE) 10 MG tablet 6 daily x 2 days, 5 daily x 2 days, 4 daily x 2 days, 3 daily x 2 days, 2 daily x 2 days, 1 daily x 2 days  Patient not taking: Reported on 3/10/2025 1/30/25   Gely Canales APRN   Refresh Tears 0.5 % solution Administer 1 drop to both eyes Daily As Needed for Dry Eyes. 8/24/22   Maxine Espinoza MD   tamsulosin (FLOMAX) 0.4 MG capsule 24 hr capsule Take 1 capsule by mouth 2 (Two) Times a Day. 10/2/23   Del Mai MD        Social History:   Social History     Tobacco Use    Smoking status: Former     Current packs/day: 0.00     Average packs/day: 1 pack/day for 60.0 years (60.0 ttl pk-yrs)     Types: Cigarettes      "Start date: 1950     Quit date: 2010     Years since quitting: 15.2     Passive exposure: Past    Smokeless tobacco: Current     Types: Snuff    Tobacco comments:     INST PER ANESTHESIA PROTOCOL   Vaping Use    Vaping status: Never Used   Substance Use Topics    Alcohol use: Not Currently    Drug use: Never         Review of Systems:  Review of Systems   Constitutional: Negative.    HENT: Negative.     Eyes: Negative.    Respiratory: Negative.     Cardiovascular: Negative.    Gastrointestinal: Negative.    Endocrine: Negative.    Genitourinary: Negative.    Musculoskeletal:  Positive for joint swelling and myalgias.   Skin:  Positive for color change.   Allergic/Immunologic: Negative.    Neurological: Negative.    Hematological: Negative.    Psychiatric/Behavioral: Negative.          Physical Exam:  /78   Pulse 70   Temp 97.9 °F (36.6 °C) (Oral)   Resp 16   Ht 170.2 cm (67.01\")   Wt 72.7 kg (160 lb 4.4 oz)   SpO2 96%   BMI 25.10 kg/m²         Physical Exam  Vitals and nursing note reviewed.   Constitutional:       Appearance: Normal appearance.   HENT:      Head: Normocephalic and atraumatic.      Nose: Nose normal.      Mouth/Throat:      Mouth: Mucous membranes are moist.   Eyes:      Extraocular Movements: Extraocular movements intact.      Conjunctiva/sclera: Conjunctivae normal.      Pupils: Pupils are equal, round, and reactive to light.   Cardiovascular:      Rate and Rhythm: Normal rate and regular rhythm.      Heart sounds: Normal heart sounds.   Pulmonary:      Effort: Pulmonary effort is normal.      Breath sounds: Normal breath sounds.   Musculoskeletal:         General: Swelling present. No tenderness. Normal range of motion.      Right elbow: Swelling present. No tenderness.      Left elbow: Normal.      Right forearm: Swelling present. No tenderness.      Right wrist: Normal. Normal pulse.      Right hand: Normal capillary refill. Normal pulse.        Arms:       Cervical back: Normal " range of motion and neck supple.   Skin:     General: Skin is warm and dry.      Capillary Refill: Capillary refill takes less than 2 seconds.      Findings: Bruising present.   Neurological:      General: No focal deficit present.      Mental Status: He is alert and oriented to person, place, and time.   Psychiatric:         Mood and Affect: Mood normal.         Behavior: Behavior normal.                        Medical Decision Making:      Comorbidities that affect care:    COPD, Hypertension    External Notes reviewed:    Previous ED Note: [facility] ED visit 5/18/2024 for skin tear of right forearm and laceration of right forearm and Previous Radiological Studies: X-ray right elbow from 5/18/2024 negative for fracture      The following orders were placed and all results were independently analyzed by me:  No orders of the defined types were placed in this encounter.      Medications Given in the Emergency Department:  Medications   acetaminophen (TYLENOL) tablet 1,000 mg (1,000 mg Oral Given 3/16/25 1546)        ED Course:    The patient was initially evaluated in the triage area where orders were placed. The patient was later dispositioned by Claus Barton PA-C.      The patient was advised to stay for completion of workup which includes but is not limited to communication of labs and radiological results, reassessment and plan. The patient was advised that leaving prior to disposition by a provider could result in critical findings that are not communicated to the patient.     ED Course as of 03/16/25 1709   Sun Mar 16, 2025   1539 Unit secretary notified that duplex venous Doppler ordered [AJ]   1642 Negative for Dvt and SVT [AJ]      ED Course User Index  [AJ] Claus Barton PA-C       Labs:    Lab Results (last 24 hours)       ** No results found for the last 24 hours. **             Imaging:    No Radiology Exams Resulted Within Past 24 Hours      Differential Diagnosis and Discussion:      Extremity  Pain: Differential diagnosis includes but is not limited to soft tissue sprain, tendonitis, tendon injury, dislocation, fracture, deep vein thrombosis, arterial insufficiency, osteoarthritis, bursitis, and ligamentous damage.    PROCEDURES:    Ultrasound was performed in the emergency department and the ultrasound impression was interpreted by me.     No orders to display        Procedures    MDM                   Patient Care Considerations:          Consultants/Shared Management Plan:    None    Social Determinants of Health:    Patient has presented with family members who are responsible, reliable and will ensure follow up care.      Disposition and Care Coordination:    Discharged: The patient is suitable and stable for discharge with no need for consideration of admission.    I have explained the patient´s condition, diagnoses and treatment plan based on the information available to me at this time. I have answered questions and addressed any concerns. The patient has a good  understanding of the patient´s diagnosis, condition, and treatment plan as can be expected at this point. The vital signs have been stable. The patient´s condition is stable and appropriate for discharge from the emergency department.      The patient will pursue further outpatient evaluation with the primary care physician or other designated or consulting physician as outlined in the discharge instructions. They are agreeable to this plan of care and follow-up instructions have been explained in detail. The patient has received these instructions in written format and has expressed an understanding of the discharge instructions. The patient is aware that any significant change in condition or worsening of symptoms should prompt an immediate return to this or the closest emergency department or call to 911.    Final diagnoses:   Localized swelling of right forearm        ED Disposition       ED Disposition   Discharge    Condition    Stable    Comment   --               This medical record created using voice recognition software.             Claus Barton PA-C  03/16/25 3809

## 2025-03-16 NOTE — DISCHARGE INSTRUCTIONS
Your ultrasound was negative for a blood clot.  Please ice the area 15 to 20 minutes 4-5 times throughout the day.  Take Tylenol/ibuprofen for pain and follow-up with PCP

## 2025-04-21 NOTE — PROGRESS NOTES
Primary Care Provider  Payam Carroll MD   Referring Provider  ARTEMIO Caal    Patient Complaint  Follow-up and COPD    Patient or patient representative verbalized consent for the use of Ambient Listening during the visit with  ARTEMIO Caal for chart documentation. 4/22/2025  09:29 EDT      Subjective       History of Presenting Illness  Carlos Zimmerman is a pleasant 85 y.o. male  of  Dr. Choudhary who presents to [facility] with history of  multifocal pneumonia, Pseudomonas infection, shortness of breath, pulmonary nodule, left upper lobe lung cancer, COPD  here for followup appointment.  Patient was last seen 2/20/2025.      History of Present Illness  The patient presents for evaluation of breathing issues.  Patient is here with spouse    He reports no current respiratory distress or difficulties.  He has requested a refill of his Trelegy prescription, although he is uncertain about the date of his last refill.  He has a supply of steroids and antibiotics at home. .  He had a consultation with Dr. Mcnair, cardiology, yesterday and is scheduled for a follow-up in 3 months.    MEDICATIONS  Current: Trelegy, albuterol and DuoNeb    IMMUNIZATIONS  He reports up-to-date       At present time patient denies dyspnea, coughing, wheezing, headaches, chest pain, weight loss or hemoptysis. Patient denies fevers, chills and night sweats. Carlos Zimmerman is able to perform ADLs.      I have personally reviewed the review of systems, past family, social, medical and surgical histories; and agree with their findings.      Review of Systems   Constitutional: Negative.    HENT: Negative.     Respiratory: Negative.     Cardiovascular: Negative.    Musculoskeletal: Negative.    Neurological: Negative.    Psychiatric/Behavioral: Negative.           Family History   Problem Relation Age of Onset    Cancer Mother     Stomach cancer Other     Malig Hyperthermia Neg Hx          Social History     Socioeconomic History    Marital status:    Tobacco Use    Smoking status: Former     Current packs/day: 0.00     Average packs/day: 1 pack/day for 60.0 years (60.0 ttl pk-yrs)     Types: Cigarettes     Start date: 1950     Quit date: 2010     Years since quitting: 15.3     Passive exposure: Past    Smokeless tobacco: Current     Types: Snuff    Tobacco comments:     INST PER ANESTHESIA PROTOCOL   Vaping Use    Vaping status: Never Used   Substance and Sexual Activity    Alcohol use: Not Currently    Drug use: Never    Sexual activity: Defer        Past Medical History:   Diagnosis Date    Anemia     NO CURRENT ISSUES    Arthritis     COPD (chronic obstructive pulmonary disease)     INHALER  PRN    Hyperlipidemia     Hypertension     ON MEDS/FOLLOWS SAHNI, PT DENIES CAD    Lung cancer     REMOVED - 2016    Pneumonia     LEFT LUNG CA NO CURRENT PNEUMONIA    SOB (shortness of breath)     WITH EXERTION SOMETIMES    Stroke     LEFT LEG/FOOT DROP  2019    TIA (transient ischemic attack)     NO RESIDUAL 2010        Immunization History   Administered Date(s) Administered    COVID-19 (PFIZER) 12YRS+ (COMIRNATY) 11/10/2023    COVID-19 (PFIZER) Purple Cap Monovalent 02/28/2021, 03/11/2021, 10/18/2021    FLUAD TRI 65YR+ 09/06/2024    Fluad Quad 65+ 10/07/2020, 09/24/2021    Fluzone High-Dose 65+yrs 10/06/2022, 11/11/2023    Influenza, Unspecified 09/01/2020    Pneumococcal Conjugate 20-Valent (PCV20) 04/22/2024    Pneumococcal, Unspecified 02/13/2006    TD Preservative Free (Tenivac) 06/27/2022       No Known Allergies       Current Outpatient Medications:     albuterol sulfate  (90 Base) MCG/ACT inhaler, Inhale 2 puffs Every 4 (Four) Hours As Needed for Wheezing or Shortness of Air., Disp: 18 g, Rfl: 3    aspirin 81 MG EC tablet, Take 1 tablet by mouth Daily., Disp: , Rfl:     atorvastatin (LIPITOR) 40 MG tablet, , Disp: , Rfl:     B Complex Vitamins (VITAMIN-B COMPLEX PO), Take 1  "tablet by mouth Daily., Disp: , Rfl:     cetirizine (zyrTEC) 10 MG tablet, Take 1 tablet by mouth Daily., Disp: , Rfl:     Cholecalciferol (VITAMIN D3 PO), Take 1 tablet by mouth Daily., Disp: , Rfl:     cilostazol (PLETAL) 100 MG tablet, Take 1 tablet by mouth 2 (Two) Times a Day., Disp: , Rfl:     cyclobenzaprine (FLEXERIL) 10 MG tablet, Every 8 (Eight) Hours., Disp: , Rfl:     Fluticasone-Umeclidin-Vilant (TRELEGY ELLIPTA) 200-62.5-25 MCG/ACT inhaler, Inhale 1 puff Daily., Disp: 1 each, Rfl: 11    gabapentin (NEURONTIN) 300 MG capsule, Take 1 capsule by mouth 3 (Three) Times a Day., Disp: 90 capsule, Rfl: 5    ipratropium-albuterol (DUO-NEB) 0.5-2.5 mg/3 ml nebulizer, Take 3 mL by nebulization 4 (Four) Times a Day As Needed for Wheezing or Shortness of Air., Disp: 360 mL, Rfl: 3    losartan (COZAAR) 50 MG tablet, Take 1 tablet by mouth Daily., Disp: , Rfl:     metoprolol succinate XL (TOPROL-XL) 25 MG 24 hr tablet, Take 1 tablet by mouth Daily., Disp: , Rfl:     metoprolol tartrate (LOPRESSOR) 25 MG tablet, Take 0.5 tablets by mouth Daily., Disp: , Rfl:     multivitamin with minerals tablet tablet, Take 1 tablet by mouth Daily., Disp: , Rfl:     multivitamins-minerals (PRESERVISION AREDS 2) capsule capsule, Take 1 capsule by mouth 2 (Two) Times a Day., Disp: , Rfl:     pantoprazole (PROTONIX) 40 MG EC tablet, Take 1 tablet by mouth Daily., Disp: , Rfl:     Refresh Tears 0.5 % solution, Administer 1 drop to both eyes Daily As Needed for Dry Eyes., Disp: , Rfl:     tamsulosin (FLOMAX) 0.4 MG capsule 24 hr capsule, Take 1 capsule by mouth 2 (Two) Times a Day., Disp: 180 capsule, Rfl: 4         Vital Signs   /72 (BP Location: Right arm, Patient Position: Sitting, Cuff Size: Adult)   Pulse 90   Temp 97.6 °F (36.4 °C)   Resp 16   Ht 170.2 cm (67.01\")   Wt 72.6 kg (160 lb)   SpO2 99%   BMI 25.05 kg/m²       Objective     Physical Exam  Vitals reviewed.   Constitutional:       Appearance: Normal appearance. "   HENT:      Head: Normocephalic and atraumatic.      Nose: Nose normal.      Mouth/Throat:      Mouth: Mucous membranes are moist.      Pharynx: Oropharynx is clear.   Eyes:      Extraocular Movements: Extraocular movements intact.      Conjunctiva/sclera: Conjunctivae normal.      Pupils: Pupils are equal, round, and reactive to light.   Cardiovascular:      Rate and Rhythm: Normal rate and regular rhythm.      Pulses: Normal pulses.      Heart sounds: Normal heart sounds.   Pulmonary:      Effort: Pulmonary effort is normal.      Breath sounds: Normal breath sounds.   Abdominal:      General: Bowel sounds are normal.   Musculoskeletal:         General: Normal range of motion.      Cervical back: Normal range of motion and neck supple.   Skin:     General: Skin is warm and dry.   Neurological:      Mental Status: He is alert and oriented to person, place, and time.   Psychiatric:         Behavior: Behavior normal.         Physical Exam  Lungs are clear.  Heart sounds are normal.         Results Review  I have personally reviewed the prior office notes, hospital records, labs, and diagnostics.  CT Chest Without Contrast Diagnostic [[id number]] (Order [id number])  Order  Status: Final result     Study Notes     Maribel Daniel on 2/7/2025 10:24 AM EST   CHRONIC SHORTNESS OF BREATH, RECURRENT PNEUMONIA, FORMER SMOKER, HISTORY OF LEFT LUNG CANCER-SURGERY     Appointment Information    PACS Images     Radiology Images  Study Result    Narrative & Impression   CT CHEST WO CONTRAST DIAGNOSTIC     Date of Exam: 2/7/2025 10:19 AM EST     Indication: abnormal ct.     Comparison: 1/14/2025     Technique: Axial CT images were obtained of the chest without contrast administration.  Reconstructed coronal and sagittal images were also obtained. Automated exposure control and iterative construction methods were used.        Findings:  There are waxing and waning areas of airspace consolidation throughout both lung fields. There are  minimal pleural effusions that are smaller on today's examination. There is a minimal pericardial effusion. Severe emphysema is present. Coronary artery   calcification is present. The thoracic aorta has a normal caliber. There is no mediastinal, axillary or hilar adenopathy. Images of the unenhanced upper abdomen are unremarkable. Scoliosis and degenerative change are present in the thoracic spine. Stable   compression deformities of T7 and T11.     IMPRESSION:  Impression:     1. Areas of waxing and waning airspace consolidation throughout both lung field likely an inflammatory or infectious process.     2. Minimal pleural effusions. Minimal pericardial effusion.           Electronically Signed: Lazarus Salinas MD    2/9/2025 3:53 PM EST    Workstation ID: [id number]             Assessment         Patient Active Problem List   Diagnosis    COPD (chronic obstructive pulmonary disease)    Hypertension    Low back pain    Lung disease    Solitary lung nodule    Pneumonia of right lung due to infectious organism    Hematuria    Complicated UTI (urinary tract infection)    Recurrent urinary tract infection    Benign prostatic hyperplasia    Atherosclerosis of lower extremity with claudication    Benign prostatic hyperplasia with lower urinary tract symptoms    Elevated PSA    Cervical radiculopathy    Cervical spondylosis without myelopathy    Abnormal PET scan of lung    Lung nodule    Malignant neoplasm of upper lobe, left bronchus or lung    Sepsis due to pneumonia    Airway clearance impairment    Recurrent pneumonia    Pneumonia due to Pseudomonas species        Plan     Diagnoses and all orders for this visit:    1. Recurrent pneumonia (Primary)    2. Dyspnea on exertion    3. Nicotine dependence, cigarettes, in remission    4. Pulmonary emphysema, unspecified emphysema type    5. Airway clearance impairment    6. Malignant neoplasm of lower lobe of left lung    7. Chronic obstructive pulmonary disease, unspecified  COPD type  -     Fluticasone-Umeclidin-Vilant (TRELEGY ELLIPTA) 200-62.5-25 MCG/ACT inhaler; Inhale 1 puff Daily.  Dispense: 1 each; Refill: 11    8. Solitary lung nodule  -     Fluticasone-Umeclidin-Vilant (TRELEGY ELLIPTA) 200-62.5-25 MCG/ACT inhaler; Inhale 1 puff Daily.  Dispense: 1 each; Refill: 11    9. Abnormal PET scan of lung  -     Fluticasone-Umeclidin-Vilant (TRELEGY ELLIPTA) 200-62.5-25 MCG/ACT inhaler; Inhale 1 puff Daily.  Dispense: 1 each; Refill: 11    10. Lung nodule  -     Fluticasone-Umeclidin-Vilant (TRELEGY ELLIPTA) 200-62.5-25 MCG/ACT inhaler; Inhale 1 puff Daily.  Dispense: 1 each; Refill: 11    11. Malignant neoplasm of upper lobe, left bronchus or lung  -     Fluticasone-Umeclidin-Vilant (TRELEGY ELLIPTA) 200-62.5-25 MCG/ACT inhaler; Inhale 1 puff Daily.  Dispense: 1 each; Refill: 11         Assessment & Plan  1. Breathing issues.  His respiratory function appears to be satisfactory at present. A prescription for Trelegy will be provided, which he can carry to [place].       Follow-up  The patient will follow up in 3 months or earlier if necessary.      Smoking status:  reports that he quit smoking about 15 years ago. His smoking use included cigarettes. He started smoking about 75 years ago. He has a 60 pack-year smoking history. He has been exposed to tobacco smoke. His smokeless tobacco use includes snuff.    Vaccination status: Reviewed  Immunization History   Administered Date(s) Administered    COVID-19 (PFIZER) 12YRS+ (COMIRNATY) 11/10/2023    COVID-19 (PFIZER) Purple Cap Monovalent 02/28/2021, 03/11/2021, 10/18/2021    FLUAD TRI 65YR+ 09/06/2024    Fluad Quad 65+ 10/07/2020, 09/24/2021    Fluzone High-Dose 65+yrs 10/06/2022, 11/11/2023    Influenza, Unspecified 09/01/2020    Pneumococcal Conjugate 20-Valent (PCV20) 04/22/2024    Pneumococcal, Unspecified 02/13/2006    TD Preservative Free (Tenivac) 06/27/2022        Medications personally reviewed    Follow Up  Return in about 3  months (around 7/22/2025) for Dr. Choudhary or Gely.    Patient was given instructions and counseling regarding his condition or for health maintenance advice. Please see specific information pulled into the AVS if appropriate.     I spent 15 minutes caring for Carlos Zimmerman on this date of service. This time includes time spent by me in the following activities:preparing for the visit, reviewing tests, obtaining and/or reviewing a separately obtained history, performing a medically appropriate examination and/or evaluation, counseling and educating the patient/family/caregiver, ordering medications, tests, or procedures, documenting information in the medical record, independently interpreting results and communicating that information with the patient/family/caregiver and answered questions family members, discuss medications.

## 2025-04-22 ENCOUNTER — OFFICE VISIT (OUTPATIENT)
Dept: PULMONOLOGY | Facility: CLINIC | Age: 85
End: 2025-04-22
Payer: MEDICARE

## 2025-04-22 VITALS
BODY MASS INDEX: 25.11 KG/M2 | DIASTOLIC BLOOD PRESSURE: 72 MMHG | SYSTOLIC BLOOD PRESSURE: 140 MMHG | WEIGHT: 160 LBS | HEART RATE: 90 BPM | TEMPERATURE: 97.6 F | HEIGHT: 67 IN | OXYGEN SATURATION: 99 % | RESPIRATION RATE: 16 BRPM

## 2025-04-22 DIAGNOSIS — J18.9 RECURRENT PNEUMONIA: Primary | ICD-10-CM

## 2025-04-22 DIAGNOSIS — R06.09 DYSPNEA ON EXERTION: ICD-10-CM

## 2025-04-22 DIAGNOSIS — C34.12 MALIGNANT NEOPLASM OF UPPER LOBE, LEFT BRONCHUS OR LUNG: ICD-10-CM

## 2025-04-22 DIAGNOSIS — F17.211 NICOTINE DEPENDENCE, CIGARETTES, IN REMISSION: ICD-10-CM

## 2025-04-22 DIAGNOSIS — R91.1 LUNG NODULE: ICD-10-CM

## 2025-04-22 DIAGNOSIS — R06.89 AIRWAY CLEARANCE IMPAIRMENT: ICD-10-CM

## 2025-04-22 DIAGNOSIS — R94.2 ABNORMAL PET SCAN OF LUNG: ICD-10-CM

## 2025-04-22 DIAGNOSIS — J44.9 CHRONIC OBSTRUCTIVE PULMONARY DISEASE, UNSPECIFIED COPD TYPE: ICD-10-CM

## 2025-04-22 DIAGNOSIS — R91.1 SOLITARY LUNG NODULE: ICD-10-CM

## 2025-04-22 DIAGNOSIS — J43.9 PULMONARY EMPHYSEMA, UNSPECIFIED EMPHYSEMA TYPE: ICD-10-CM

## 2025-04-22 DIAGNOSIS — C34.32 MALIGNANT NEOPLASM OF LOWER LOBE OF LEFT LUNG: ICD-10-CM

## 2025-04-22 RX ORDER — ATORVASTATIN CALCIUM 40 MG/1
TABLET, FILM COATED ORAL
COMMUNITY
Start: 2025-04-14

## 2025-05-13 DIAGNOSIS — N40.0 BENIGN PROSTATIC HYPERPLASIA, UNSPECIFIED WHETHER LOWER URINARY TRACT SYMPTOMS PRESENT: ICD-10-CM

## 2025-05-13 RX ORDER — TAMSULOSIN HYDROCHLORIDE 0.4 MG/1
1 CAPSULE ORAL 2 TIMES DAILY
Qty: 180 CAPSULE | Refills: 4 | Status: SHIPPED | OUTPATIENT
Start: 2025-05-13

## 2025-05-13 NOTE — TELEPHONE ENCOUNTER
Caller: DEEPALI RUSSELLTNER    Relationship: SELF    Best call back number: [phone]    Requested Prescriptions: tamsulosin (FLOMAX) 0.4 MG capsule 24 hr capsule  Requested Prescriptions      No prescriptions requested or ordered in this encounter        Pharmacy where request should be sent:  [facility] - [place], [place] - [address] [phone]  - [phone]   [address] [facility] [address]  Phone: [phone] Fax: [phone]    Last office visit with prescribing clinician: 3/10/2025   Last telemedicine visit with prescribing clinician: Visit date not found   Next office visit with prescribing clinician: 7/14/2025     Additional details provided by patient: n/a    Does the patient have less than a 3 day supply:  [x] Yes  [] No    Would you like a call back once the refill request has been completed: [] Yes [x] No    If the office needs to give you a call back, can they leave a voicemail: [] Yes [x] No    Fran Comer Rep   05/13/25 09:44 EDT

## 2025-05-14 ENCOUNTER — TELEPHONE (OUTPATIENT)
Dept: ONCOLOGY | Facility: HOSPITAL | Age: 85
End: 2025-05-14
Payer: MEDICARE

## 2025-05-15 ENCOUNTER — LAB (OUTPATIENT)
Dept: LAB | Facility: HOSPITAL | Age: 85
End: 2025-05-15
Payer: MEDICARE

## 2025-05-15 DIAGNOSIS — R97.20 ELEVATED PSA: ICD-10-CM

## 2025-05-15 DIAGNOSIS — D50.9 IRON DEFICIENCY ANEMIA, UNSPECIFIED IRON DEFICIENCY ANEMIA TYPE: ICD-10-CM

## 2025-05-15 LAB
BASOPHILS # BLD AUTO: 0.03 10*3/MM3 (ref 0–0.2)
BASOPHILS NFR BLD AUTO: 0.4 % (ref 0–1.5)
DEPRECATED RDW RBC AUTO: 54 FL (ref 37–54)
EOSINOPHIL # BLD AUTO: 0.08 10*3/MM3 (ref 0–0.4)
EOSINOPHIL NFR BLD AUTO: 1 % (ref 0.3–6.2)
ERYTHROCYTE [DISTWIDTH] IN BLOOD BY AUTOMATED COUNT: 17 % (ref 12.3–15.4)
FERRITIN SERPL-MCNC: 71.11 NG/ML (ref 30–400)
HCT VFR BLD AUTO: 42.4 % (ref 37.5–51)
HGB BLD-MCNC: 13.2 G/DL (ref 13–17.7)
IMM GRANULOCYTES # BLD AUTO: 0.03 10*3/MM3 (ref 0–0.05)
IMM GRANULOCYTES NFR BLD AUTO: 0.4 % (ref 0–0.5)
IRON 24H UR-MRATE: 60 MCG/DL (ref 59–158)
IRON SATN MFR SERPL: 15 % (ref 20–50)
LYMPHOCYTES # BLD AUTO: 2.71 10*3/MM3 (ref 0.7–3.1)
LYMPHOCYTES NFR BLD AUTO: 33.7 % (ref 19.6–45.3)
MCH RBC QN AUTO: 27.1 PG (ref 26.6–33)
MCHC RBC AUTO-ENTMCNC: 31.1 G/DL (ref 31.5–35.7)
MCV RBC AUTO: 87.1 FL (ref 79–97)
MONOCYTES # BLD AUTO: 0.53 10*3/MM3 (ref 0.1–0.9)
MONOCYTES NFR BLD AUTO: 6.6 % (ref 5–12)
NEUTROPHILS NFR BLD AUTO: 4.65 10*3/MM3 (ref 1.7–7)
NEUTROPHILS NFR BLD AUTO: 57.9 % (ref 42.7–76)
NRBC BLD AUTO-RTO: 0 /100 WBC (ref 0–0.2)
PLATELET # BLD AUTO: 179 10*3/MM3 (ref 140–450)
PMV BLD AUTO: 11.5 FL (ref 6–12)
PSA SERPL-MCNC: 11.5 NG/ML (ref 0–4)
RBC # BLD AUTO: 4.87 10*6/MM3 (ref 4.14–5.8)
TIBC SERPL-MCNC: 387 MCG/DL (ref 298–536)
TRANSFERRIN SERPL-MCNC: 260 MG/DL (ref 200–360)
WBC NRBC COR # BLD AUTO: 8.03 10*3/MM3 (ref 3.4–10.8)

## 2025-05-15 PROCEDURE — 36415 COLL VENOUS BLD VENIPUNCTURE: CPT

## 2025-05-15 PROCEDURE — 82728 ASSAY OF FERRITIN: CPT

## 2025-05-15 PROCEDURE — 85025 COMPLETE CBC W/AUTO DIFF WBC: CPT

## 2025-05-15 PROCEDURE — 84153 ASSAY OF PSA TOTAL: CPT

## 2025-05-15 PROCEDURE — 84466 ASSAY OF TRANSFERRIN: CPT

## 2025-05-15 PROCEDURE — 83540 ASSAY OF IRON: CPT

## 2025-05-19 ENCOUNTER — OFFICE VISIT (OUTPATIENT)
Dept: ONCOLOGY | Facility: HOSPITAL | Age: 85
End: 2025-05-19
Payer: MEDICARE

## 2025-05-19 VITALS
BODY MASS INDEX: 24.74 KG/M2 | OXYGEN SATURATION: 96 % | HEIGHT: 67 IN | WEIGHT: 157.63 LBS | DIASTOLIC BLOOD PRESSURE: 78 MMHG | RESPIRATION RATE: 18 BRPM | SYSTOLIC BLOOD PRESSURE: 134 MMHG | TEMPERATURE: 97.2 F | HEART RATE: 80 BPM

## 2025-05-19 DIAGNOSIS — D50.9 IRON DEFICIENCY ANEMIA, UNSPECIFIED IRON DEFICIENCY ANEMIA TYPE: ICD-10-CM

## 2025-05-19 DIAGNOSIS — J18.9 RECURRENT PNEUMONIA: ICD-10-CM

## 2025-05-19 DIAGNOSIS — Z85.118 HISTORY OF LUNG CANCER: ICD-10-CM

## 2025-05-19 DIAGNOSIS — T45.1X5A CHEMOTHERAPY-INDUCED NEUROPATHY: Primary | ICD-10-CM

## 2025-05-19 DIAGNOSIS — G62.0 CHEMOTHERAPY-INDUCED NEUROPATHY: Primary | ICD-10-CM

## 2025-05-19 DIAGNOSIS — R97.20 ELEVATED PSA: ICD-10-CM

## 2025-05-19 PROCEDURE — G0463 HOSPITAL OUTPT CLINIC VISIT: HCPCS | Performed by: INTERNAL MEDICINE

## 2025-05-19 RX ORDER — FERROUS SULFATE 325(65) MG
325 TABLET ORAL
Qty: 90 TABLET | Refills: 3 | Status: SHIPPED | OUTPATIENT
Start: 2025-05-19

## 2025-05-19 NOTE — PROGRESS NOTES
Chief Complaint   LUNG CA-    Glen, MD Glen Padilla, Payam Allen MD      Subjective          Carlos Zimmerman presents to [facility] for iron defieiency anemia    Lung Cancer  Symptoms include fatigue and numbness.    Pertinent negative symptoms include no abdominal pain, no chest pain, no cough, no diaphoresis, no fever, no myalgias, no nausea, no rash, no sore throat, no swollen glands, no dysuria, no vomiting and no weakness.        Mr. Carlos Zimmerman presents for follow up for iron deficiency anemia and lung cancer. History of prior left lung NSCLC with adenocarcinoma with left VATS procedure and prior chemotherapy. Completed SBRT for left sided recurrence 3/29/24.  Patient has been following with pulmonology for recurrent pneumonia.  Most recent CT chest in February showed waxing and waning airspace opacities.  Patient reports his symptoms are much improved.  Denies any fevers or chills.  He has been prescribed Trelegy by Pulmonology.  He does not report much benefit from the aspirin is compliant.  He has been off of oral iron since last visit.  Iron levels have dropped.  Results discussed.  He is okay to go back on iron. He denies any bleeding.  No anemia. On gabapentin twice a day for his chronic neuropathy. PSA overall stable at 11.5 as of 5/15/25.  He follows with urology for this.  MRI prostate planned for July.      Hematology History  He was started on oral iron 1 tab QD back in April 2022. He is tolerating med well.    2/27/23: Iron studies normal. CBC normal. No anemia.     He is following with Dr. Mai with urology for elevated PSA. MRI of prostate on 2/23/23 showed a small solitary suspicious lesion in the left posterior mid gland peripheral zone, PI-RADS 4.    8/28/23: Hgb 13.8, MCV 92.4, plt 153, WBC 9.24. TIBC 277 (low), ferritin 176.8, iron sat 39%    2/23/24: Iron studies normal. No anemia, normal CBC. Hold oral iron.     9/4/24: Hgb 12.9,  ferritin 86, iron sat 23%    1/14/25: CT Chest:Consolidation in the right upper lobe is improved in comparison to chest radiograph 12/19/2024. Groundglass and patchy alveolar airspace disease is seen on CT scan.Worsening consolidation is seen in the right mid and lower lung in comparison to chest radiograph 12/19/2024. Extensive alveolar airspace disease in the right middle lobe and right lower lobe is not evident on CT scan 11/5/2024. A small right pleural effusion is evident.7 cm x 5 cm area of consolidation and atelectasis in the left upper lobe appears slightly smaller and better defined in comparison to 11/5/2024.    1/15/25: Ferritin 211.9, iron sat 11%, TIBC 247 (low), hgb 12.4, WBC 7.62, plt 379, MCV 80.1    2/7/25: CT chest: Areas of waxing and waning airspace consolidation throughout both lung field likely an inflammatory or infectious process. Minimal pleural effusions. Minimal pericardial effusion.     5/15/25: Ferritin 71, iron sat 15%, TIBC 387, hgb 13.2, plt 179, PSA 11.5      Cancer Staging  No matching staging information was found for the patient.     Treatment intent: curative    Oncology/Hematology History   Malignant neoplasm of upper lobe, left bronchus or lung   3/18/2024 Initial Diagnosis    Malignant neoplasm of upper lobe, left bronchus or lung     3/25/2024 - 3/29/2024 Radiation    Radiation OncologyTreatment Course:  Carlos Zimmerman received 5500 cGy in 5 fractions to via SBRT to the left upper lobe.      This is a very pleasant 76-year-old gentleman who presents with follow-up for     lobectomy with adjuvant chemotherapy.            1) Left lung:  Pathology 5/2016: FNA: Poorly differentiated adenocarcinoma: 5.5     mm BONNIE nodule. 5/2016.       Left lung lobectomy: VATS procedure: Dr. Saul in [place].Path:multifocal     invasive moderately differentiated adenocarcinoma, acinar type, 2.0 x 1.6 x 1.4     cm. Additional nodule 0.6 x 0.5 x 0.3 cm: margins free of tumor, negative LN's.      Visceral pleural invasion noted. (7/12/16). Staging: Stage IIB:  pT3N0.       Carboplatin / Paclitaxel x 4 cycles: 8/10/16 - 10/14/16.      CT CAP: No evidence of disease. 2/2017.       CT CAP: No evidence of disease. 12/11/17.       CT chest: Right lower base: improving consolidation. 2/18/19.       CT chest: T6 / T10 compression fractures: non-pathologic. (8/28/19)      CT chest in ER: likely pneumonia/inflammation. No new evidence of recurring     cancer: (4/4/20)      CT chest: Stable 1.5 cm subpleural ground glass opacity in the BONNIE, new patchy     areas on non-specific ground glass opacity throughout the LLL. (3/2021).       2/15/24: CT chest without contrast: small area of nodularity/consolidation in left upper lung. Stable 3 mm right upper lobe nodule. No new nodule.      2/23/24: NM PET:  Along the posterior medial margin, there is soft tissue nodularity measuring up to about 1.4 centimeters which is hypermetabolic maximum SUV 4.9 most concerning for neoplasm.  This appears larger and more solid compared to more remote CT scans.  Consider biopsy. No other lesions hypermetabolic.        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                    Review of Systems   Constitutional:  Positive for fatigue and unexpected weight loss. Negative for appetite change, diaphoresis, fever and unexpected weight gain.   HENT:  Positive for hearing loss (Left ear). Negative for mouth sores, sore throat, swollen glands, trouble swallowing and voice change.    Eyes:  Negative for blurred vision, double vision, pain, redness and visual disturbance.   Respiratory:  Negative for cough, shortness of breath and wheezing.    Cardiovascular:  Negative for chest pain, palpitations and leg swelling.   Gastrointestinal:  Negative for abdominal pain, blood in stool, constipation, diarrhea, nausea and vomiting.   Endocrine: Negative for  cold intolerance, heat intolerance, polydipsia and polyuria.   Genitourinary:  Negative for decreased urine volume, difficulty urinating, dysuria, frequency, hematuria and urinary incontinence.   Musculoskeletal:  Negative for arthralgias, back pain, joint swelling and myalgias.   Skin:  Negative for color change, rash, skin lesions and wound.   Neurological:  Positive for numbness. Negative for dizziness, seizures, weakness and headache.   Hematological:  Negative for adenopathy. Does not bruise/bleed easily.   Psychiatric/Behavioral:  Negative for depressed mood. The patient is not nervous/anxious.    All other systems reviewed and are negative.      Current Outpatient Medications on File Prior to Visit   Medication Sig Dispense Refill    albuterol sulfate  (90 Base) MCG/ACT inhaler Inhale 2 puffs Every 4 (Four) Hours As Needed for Wheezing or Shortness of Air. 18 g 3    aspirin 81 MG EC tablet Take 1 tablet by mouth Daily.      atorvastatin (LIPITOR) 40 MG tablet       B Complex Vitamins (VITAMIN-B COMPLEX PO) Take 1 tablet by mouth Daily.      cetirizine (zyrTEC) 10 MG tablet Take 1 tablet by mouth Daily.      Cholecalciferol (VITAMIN D3 PO) Take 1 tablet by mouth Daily.      cilostazol (PLETAL) 100 MG tablet Take 1 tablet by mouth 2 (Two) Times a Day.      cyclobenzaprine (FLEXERIL) 10 MG tablet Every 8 (Eight) Hours.      Fluticasone-Umeclidin-Vilant (TRELEGY ELLIPTA) 200-62.5-25 MCG/ACT inhaler Inhale 1 puff Daily. 1 each 11    gabapentin (NEURONTIN) 300 MG capsule Take 1 capsule by mouth 3 (Three) Times a Day. 90 capsule 5    ipratropium-albuterol (DUO-NEB) 0.5-2.5 mg/3 ml nebulizer Take 3 mL by nebulization 4 (Four) Times a Day As Needed for Wheezing or Shortness of Air. 360 mL 3    losartan (COZAAR) 50 MG tablet Take 1 tablet by mouth Daily.      metoprolol succinate XL (TOPROL-XL) 25 MG 24 hr tablet Take 1 tablet by mouth Daily.      metoprolol tartrate (LOPRESSOR) 25 MG tablet Take 0.5 tablets by  mouth Daily.      multivitamin with minerals tablet tablet Take 1 tablet by mouth Daily.      multivitamins-minerals (PRESERVISION AREDS 2) capsule capsule Take 1 capsule by mouth 2 (Two) Times a Day.      pantoprazole (PROTONIX) 40 MG EC tablet Take 1 tablet by mouth Daily.      Refresh Tears 0.5 % solution Administer 1 drop to both eyes Daily As Needed for Dry Eyes.      tamsulosin (FLOMAX) 0.4 MG capsule 24 hr capsule Take 1 capsule by mouth 2 (Two) Times a Day. 180 capsule 4     No current facility-administered medications on file prior to visit.       No Known Allergies  Past Medical History:   Diagnosis Date    Anemia     NO CURRENT ISSUES    Arthritis     COPD (chronic obstructive pulmonary disease)     INHALER  PRN    Hyperlipidemia     Hypertension     ON MEDS/FOLLOWS SAHNI, PT DENIES CAD    Lung cancer     REMOVED - 2016    Pneumonia     LEFT LUNG CA NO CURRENT PNEUMONIA    SOB (shortness of breath)     WITH EXERTION SOMETIMES    Stroke     LEFT LEG/FOOT DROP  2019    TIA (transient ischemic attack)     NO RESIDUAL 2010     Past Surgical History:   Procedure Laterality Date    BRONCHOSCOPY Bilateral 11/19/2024    Procedure: BRONCHOSCOPY WITH BAL, BIOPSY, BRONCHIAL WASHINGS;  Surgeon: Dayne Choudhary MD;  Location: [facility] ENDOSCOPY;  Service: Pulmonary;  Laterality: Bilateral;  PERSISTENT PNEUMONIA, LEFT UPPER LOBE INFILTRATE    BRONCHOSCOPY WITH ION ROBOTIC ASSIST N/A 3/4/2024    Procedure: BRONCHOSCOPY WITH ION ROBOT, REBUS, NEEDLE ASPIRATE, BIOPSIES, BAL, WASHINGS;  Surgeon: Dayne Choudhary MD;  Location: [facility] MAIN OR;  Service: Robotics - Pulmonary;  Laterality: N/A;    CARDIAC CATHETERIZATION Left 11/10/2022    Procedure: Aortogram with left leg angiogram, possible angioplasty or stenting;  Surgeon: Cuauhtemoc Thomas MD;  Location: [facility] CATH INVASIVE LOCATION;  Service: Vascular;  Laterality: Left;    CARDIAC CATHETERIZATION Right 01/13/2023    Procedure: Right leg angiogram, possible angioplasty or  stenting;  Surgeon: Cuauhtemoc Thomas MD;  Location: [facility] CATH INVASIVE LOCATION;  Service: Vascular;  Laterality: Right;    CARPAL TUNNEL RELEASE Left 02/09/2024    Procedure: CARPAL TUNNEL RELEASE, left;  Surgeon: Rg Tom MD;  Location: [facility] MAIN OR;  Service: Neurosurgery;  Laterality: Left;    LUNG BIOPSY      LUNG SURGERY      REMOVAL HALF OF UPPER PORTION LEFT LUNG    ORTHOPEDIC SURGERY Left     ROTATOR CUFF    THROAT SURGERY      throat diverticulum repair     Social History     Socioeconomic History    Marital status:    Tobacco Use    Smoking status: Former     Current packs/day: 0.00     Average packs/day: 1 pack/day for 60.0 years (60.0 ttl pk-yrs)     Types: Cigarettes     Start date: 1950     Quit date: 2010     Years since quitting: 15.3     Passive exposure: Past    Smokeless tobacco: Current     Types: Snuff    Tobacco comments:     INST PER ANESTHESIA PROTOCOL   Vaping Use    Vaping status: Never Used   Substance and Sexual Activity    Alcohol use: Not Currently    Drug use: Never    Sexual activity: Defer     Family History   Problem Relation Age of Onset    Cancer Mother     Stomach cancer Other     Malig Hyperthermia Neg Hx      Immunization History   Administered Date(s) Administered    COVID-19 (PFIZER) 12YRS+ (COMIRNATY) 11/10/2023    COVID-19 (PFIZER) Purple Cap Monovalent 02/28/2021, 03/11/2021, 10/18/2021    FLUAD TRI 65YR+ 09/06/2024    Fluad Quad 65+ 10/07/2020, 09/24/2021    Fluzone High-Dose 65+yrs 10/06/2022, 11/11/2023    Influenza, Unspecified 09/01/2020    Pneumococcal Conjugate 20-Valent (PCV20) 04/22/2024    Pneumococcal, Unspecified 02/13/2006    TD Preservative Free (Tenivac) 06/27/2022       Objective   Physical Exam  Constitutional:       Appearance: Normal appearance.   HENT:      Head: Normocephalic and atraumatic.      Nose: Nose normal.   Eyes:      Conjunctiva/sclera: Conjunctivae normal.   Pulmonary:      Effort: Pulmonary effort is normal.  "  Neurological:      General: No focal deficit present.      Mental Status: He is alert. Mental status is at baseline.   Psychiatric:         Mood and Affect: Mood normal.         Behavior: Behavior normal.         Thought Content: Thought content normal.       Vitals:    05/19/25 1053   BP: 134/78   Pulse: 80   Resp: 18   Temp: 97.2 °F (36.2 °C)   TempSrc: Temporal   SpO2: 96%   Weight: 71.5 kg (157 lb 10.1 oz)   Height: 170.2 cm (67.01\")   PainSc: 0-No pain                     ECOG: (0) Fully Active - Able to Carry On All Pre-disease Performance Without Restriction  Fall Risk Assessment was completed, and patient is at low risk for falls.  PHQ-9 Total Score:         The patient is  experiencing fatigue. Fatigue score: 1    PT/OT Functional Screening: PT fx screen: No needs identified  Speech Functional Screening: Speech fx screen: No needs identified  Rehab to be ordered: Rehab to be ordered: No needs identified        Result Review :   The following data was reviewed by: Juan Jose Alatorre MD on 05/19/25:  Lab Results   Component Value Date    HGB 13.2 05/15/2025    HCT 42.4 05/15/2025    MCV 87.1 05/15/2025     05/15/2025    WBC 8.03 05/15/2025    NEUTROABS 4.65 05/15/2025    LYMPHSABS 2.71 05/15/2025    MONOSABS 0.53 05/15/2025    EOSABS 0.08 05/15/2025    BASOSABS 0.03 05/15/2025     Lab Results   Component Value Date    GLUCOSE 97 08/20/2024    BUN 9 08/20/2024    CREATININE 0.62 (L) 08/20/2024     08/20/2024    K 3.9 08/20/2024     08/20/2024    CO2 26.0 08/20/2024    CALCIUM 8.6 08/20/2024    PROTEINTOT 5.3 (L) 08/20/2024    ALBUMIN 2.9 (L) 08/20/2024    BILITOT 1.5 (H) 08/20/2024    ALKPHOS 292 (H) 08/20/2024    AST 68 (H) 08/20/2024    ALT 91 (H) 08/20/2024     Labs personally reviewed. Mild anemia. Normal iron studies. PSA increased to 11.5     Recent Pulmonary note from 4/2025 personally reviewed    ED note personally reviewed    CT chest personally reviewed with multiple " bilateral opacities per my review.    No radiology results for the last 30 days.         Assessment and Plan    Diagnoses and all orders for this visit:    1. Chemotherapy-induced neuropathy (Primary)    2. Elevated PSA    3. Recurrent pneumonia    4. Iron deficiency anemia, unspecified iron deficiency anemia type  -     ferrous sulfate 325 (65 FE) MG tablet; Take 1 tablet by mouth Daily With Breakfast.  Dispense: 90 tablet; Refill: 3  -     CBC & Differential; Future  -     Ferritin; Future  -     Iron Profile; Future    5. History of lung cancer            BONNIE adenocarcinoma of lung  S/p lobectomy and completed adjuvant chemotherapy 10/2016. 2/2024 CT chest reviewed with concern for recurrence on left side. Subsequent PET with 1.4 cm lesion, SUV 4.9. 3/4/24 biopsy positive for adenocarcinoma. Completed SBRT with Dr. Alex on 3/29/24. CT 9/3/24 with multiple airspace opacities, no evidence of cancer.  CT scans being followed by rad onc and pulmonary. Most recent CT Chest 2/2025.  Recent biopsy via bronch on 11/19/24 was negative for malignancy, infection.  Defer f/u surveillance imaging to rad onc, follows with them in July.     JESÚS  For iron deficiency, 1/2025 iron labs consistent with anemia of chronic disease with elevated ferritin and low TIBC. Iron studies worse as of 5/2025. Recommend to resume oral iron. Can take daily or every other day. Repeat labs 4 months.     Recurrent Pneumonia  Repeat CT chest 1/15/25 without much improvement. Being treated for atypical pneumonia with azithromycin per pulmonary. Has completed multiple antibiotics. Repeat CT chest 2/2025 with waxing and waning areas of consolidation. Recommend ER evaluation if symptoms worsen. Following with pulmonary. Symptoms better at visit today 5/19/25.     Chemo induced neuropathy  On Gabapentin.    Elevated PSA  Small concerning lesion on recent MRI. PSA up to 16.6 5/28/24, 15.8 as of 7/2/24. Down to 11.5 as of 5/15/25. Patient follows with   Sergei with urology. Following conservatively. Will defer management to urology.      Patient Follow Up: 4 months        Patient was given instructions and counseling regarding his condition or for health maintenance advice. Please see specific information pulled into the AVS if appropriate.

## 2025-05-28 ENCOUNTER — TRANSCRIBE ORDERS (OUTPATIENT)
Dept: GENERAL RADIOLOGY | Facility: HOSPITAL | Age: 85
End: 2025-05-28
Payer: MEDICARE

## 2025-05-28 ENCOUNTER — HOSPITAL ENCOUNTER (OUTPATIENT)
Dept: GENERAL RADIOLOGY | Facility: HOSPITAL | Age: 85
Discharge: HOME | End: 2025-05-28
Admitting: INTERNAL MEDICINE
Payer: MEDICARE

## 2025-05-28 DIAGNOSIS — M25.512 LEFT SHOULDER PAIN, UNSPECIFIED CHRONICITY: ICD-10-CM

## 2025-05-28 DIAGNOSIS — M25.512 LEFT SHOULDER PAIN, UNSPECIFIED CHRONICITY: Primary | ICD-10-CM

## 2025-05-28 PROCEDURE — 73030 X-RAY EXAM OF SHOULDER: CPT

## 2025-06-09 ENCOUNTER — TRANSCRIBE ORDERS (OUTPATIENT)
Dept: ADMINISTRATIVE | Facility: HOSPITAL | Age: 85
End: 2025-06-09
Payer: MEDICARE

## 2025-06-09 DIAGNOSIS — R22.2 MASS OF LEFT CHEST WALL: Primary | ICD-10-CM

## 2025-06-09 DIAGNOSIS — Z12.2 SCREENING FOR LUNG CANCER: ICD-10-CM

## 2025-06-13 ENCOUNTER — HOSPITAL ENCOUNTER (OUTPATIENT)
Dept: CT IMAGING | Facility: HOSPITAL | Age: 85
Discharge: HOME | End: 2025-06-13
Payer: MEDICARE

## 2025-06-13 DIAGNOSIS — R22.2 MASS OF LEFT CHEST WALL: ICD-10-CM

## 2025-06-13 DIAGNOSIS — Z12.2 SCREENING FOR LUNG CANCER: ICD-10-CM

## 2025-06-13 PROCEDURE — 71260 CT THORAX DX C+: CPT

## 2025-06-13 PROCEDURE — 25510000001 IOPAMIDOL PER 1 ML: Performed by: INTERNAL MEDICINE

## 2025-06-13 RX ORDER — IOPAMIDOL 755 MG/ML
100 INJECTION, SOLUTION INTRAVASCULAR
Status: COMPLETED | OUTPATIENT
Start: 2025-06-13 | End: 2025-06-13

## 2025-06-13 RX ADMIN — IOPAMIDOL 100 ML: 755 INJECTION, SOLUTION INTRAVENOUS at 13:42

## 2025-06-17 ENCOUNTER — APPOINTMENT (OUTPATIENT)
Dept: CT IMAGING | Facility: HOSPITAL | Age: 85
End: 2025-06-17
Payer: MEDICARE

## 2025-06-17 ENCOUNTER — HOSPITAL ENCOUNTER (EMERGENCY)
Facility: HOSPITAL | Age: 85
Discharge: HOME | End: 2025-06-17
Attending: EMERGENCY MEDICINE | Admitting: EMERGENCY MEDICINE
Payer: MEDICARE

## 2025-06-17 ENCOUNTER — TELEPHONE (OUTPATIENT)
Dept: UROLOGY | Age: 85
End: 2025-06-17
Payer: MEDICARE

## 2025-06-17 VITALS
RESPIRATION RATE: 18 BRPM | OXYGEN SATURATION: 91 % | BODY MASS INDEX: 24.29 KG/M2 | HEIGHT: 67 IN | HEART RATE: 74 BPM | SYSTOLIC BLOOD PRESSURE: 167 MMHG | WEIGHT: 154.76 LBS | DIASTOLIC BLOOD PRESSURE: 81 MMHG | TEMPERATURE: 97.8 F

## 2025-06-17 DIAGNOSIS — N23 RENAL COLIC ON LEFT SIDE: Primary | ICD-10-CM

## 2025-06-17 DIAGNOSIS — N39.0 ACUTE UTI: ICD-10-CM

## 2025-06-17 DIAGNOSIS — N20.1 URETEROLITHIASIS: ICD-10-CM

## 2025-06-17 LAB
ALBUMIN SERPL-MCNC: 3.8 G/DL (ref 3.5–5.2)
ALBUMIN/GLOB SERPL: 1.6 G/DL
ALP SERPL-CCNC: 149 U/L (ref 39–117)
ALT SERPL W P-5'-P-CCNC: 24 U/L (ref 1–41)
AMORPH URATE CRY URNS QL MICRO: ABNORMAL /HPF
ANION GAP SERPL CALCULATED.3IONS-SCNC: 9 MMOL/L (ref 5–15)
AST SERPL-CCNC: 30 U/L (ref 1–40)
BACTERIA UR QL AUTO: ABNORMAL /HPF
BASOPHILS # BLD AUTO: 0.03 10*3/MM3 (ref 0–0.2)
BASOPHILS NFR BLD AUTO: 0.3 % (ref 0–1.5)
BILIRUB SERPL-MCNC: 1 MG/DL (ref 0–1.2)
BILIRUB UR QL STRIP: ABNORMAL
BUN SERPL-MCNC: 12.9 MG/DL (ref 8–23)
BUN/CREAT SERPL: 9.9 (ref 7–25)
CALCIUM SPEC-SCNC: 9.1 MG/DL (ref 8.6–10.5)
CHLORIDE SERPL-SCNC: 105 MMOL/L (ref 98–107)
CLARITY UR: ABNORMAL
CO2 SERPL-SCNC: 27 MMOL/L (ref 22–29)
COLOR UR: ABNORMAL
CREAT SERPL-MCNC: 1.3 MG/DL (ref 0.76–1.27)
D-LACTATE SERPL-SCNC: 1.1 MMOL/L (ref 0.5–2)
DEPRECATED RDW RBC AUTO: 52.2 FL (ref 37–54)
EGFRCR SERPLBLD CKD-EPI 2021: 53.5 ML/MIN/1.73
EOSINOPHIL # BLD AUTO: 0.05 10*3/MM3 (ref 0–0.4)
EOSINOPHIL NFR BLD AUTO: 0.5 % (ref 0.3–6.2)
ERYTHROCYTE [DISTWIDTH] IN BLOOD BY AUTOMATED COUNT: 16.5 % (ref 12.3–15.4)
GLOBULIN UR ELPH-MCNC: 2.4 GM/DL
GLUCOSE SERPL-MCNC: 116 MG/DL (ref 65–99)
GLUCOSE UR STRIP-MCNC: NEGATIVE MG/DL
HCT VFR BLD AUTO: 43 % (ref 37.5–51)
HGB BLD-MCNC: 13.5 G/DL (ref 13–17.7)
HGB UR QL STRIP.AUTO: ABNORMAL
HOLD SPECIMEN: NORMAL
HOLD SPECIMEN: NORMAL
HYALINE CASTS UR QL AUTO: ABNORMAL /LPF
IMM GRANULOCYTES # BLD AUTO: 0.05 10*3/MM3 (ref 0–0.05)
IMM GRANULOCYTES NFR BLD AUTO: 0.5 % (ref 0–0.5)
KETONES UR QL STRIP: ABNORMAL
LEUKOCYTE ESTERASE UR QL STRIP.AUTO: NEGATIVE
LIPASE SERPL-CCNC: 35 U/L (ref 13–60)
LYMPHOCYTES # BLD AUTO: 1.78 10*3/MM3 (ref 0.7–3.1)
LYMPHOCYTES NFR BLD AUTO: 17.9 % (ref 19.6–45.3)
MCH RBC QN AUTO: 27.5 PG (ref 26.6–33)
MCHC RBC AUTO-ENTMCNC: 31.4 G/DL (ref 31.5–35.7)
MCV RBC AUTO: 87.6 FL (ref 79–97)
MONOCYTES # BLD AUTO: 0.95 10*3/MM3 (ref 0.1–0.9)
MONOCYTES NFR BLD AUTO: 9.6 % (ref 5–12)
NEUTROPHILS NFR BLD AUTO: 7.07 10*3/MM3 (ref 1.7–7)
NEUTROPHILS NFR BLD AUTO: 71.2 % (ref 42.7–76)
NITRITE UR QL STRIP: NEGATIVE
NRBC BLD AUTO-RTO: 0 /100 WBC (ref 0–0.2)
PH UR STRIP.AUTO: 6 [PH] (ref 5–8)
PLATELET # BLD AUTO: 160 10*3/MM3 (ref 140–450)
PMV BLD AUTO: 10.1 FL (ref 6–12)
POTASSIUM SERPL-SCNC: 3.4 MMOL/L (ref 3.5–5.2)
PROT SERPL-MCNC: 6.2 G/DL (ref 6–8.5)
PROT UR QL STRIP: ABNORMAL
RBC # BLD AUTO: 4.91 10*6/MM3 (ref 4.14–5.8)
RBC # UR STRIP: ABNORMAL /HPF
REF LAB TEST METHOD: ABNORMAL
SODIUM SERPL-SCNC: 141 MMOL/L (ref 136–145)
SP GR UR STRIP: 1.02 (ref 1–1.03)
SQUAMOUS #/AREA URNS HPF: ABNORMAL /HPF
UROBILINOGEN UR QL STRIP: ABNORMAL
WBC # UR STRIP: ABNORMAL /HPF
WBC NRBC COR # BLD AUTO: 9.93 10*3/MM3 (ref 3.4–10.8)
WHOLE BLOOD HOLD COAG: NORMAL
WHOLE BLOOD HOLD SPECIMEN: NORMAL

## 2025-06-17 PROCEDURE — 36415 COLL VENOUS BLD VENIPUNCTURE: CPT

## 2025-06-17 PROCEDURE — 83605 ASSAY OF LACTIC ACID: CPT

## 2025-06-17 PROCEDURE — 80053 COMPREHEN METABOLIC PANEL: CPT

## 2025-06-17 PROCEDURE — 99285 EMERGENCY DEPT VISIT HI MDM: CPT

## 2025-06-17 PROCEDURE — 74177 CT ABD & PELVIS W/CONTRAST: CPT

## 2025-06-17 PROCEDURE — 81001 URINALYSIS AUTO W/SCOPE: CPT

## 2025-06-17 PROCEDURE — 25510000001 IOPAMIDOL PER 1 ML: Performed by: EMERGENCY MEDICINE

## 2025-06-17 PROCEDURE — 25810000003 LACTATED RINGERS SOLUTION: Performed by: EMERGENCY MEDICINE

## 2025-06-17 PROCEDURE — 83690 ASSAY OF LIPASE: CPT

## 2025-06-17 PROCEDURE — 85025 COMPLETE CBC W/AUTO DIFF WBC: CPT

## 2025-06-17 PROCEDURE — 96374 THER/PROPH/DIAG INJ IV PUSH: CPT

## 2025-06-17 PROCEDURE — 25010000002 KETOROLAC TROMETHAMINE PER 15 MG: Performed by: EMERGENCY MEDICINE

## 2025-06-17 RX ORDER — HYDROCODONE BITARTRATE AND ACETAMINOPHEN 5; 325 MG/1; MG/1
1 TABLET ORAL EVERY 6 HOURS PRN
Qty: 15 TABLET | Refills: 0 | Status: SHIPPED | OUTPATIENT
Start: 2025-06-17

## 2025-06-17 RX ORDER — IOPAMIDOL 755 MG/ML
100 INJECTION, SOLUTION INTRAVASCULAR
Status: COMPLETED | OUTPATIENT
Start: 2025-06-17 | End: 2025-06-17

## 2025-06-17 RX ORDER — KETOROLAC TROMETHAMINE 15 MG/ML
15 INJECTION, SOLUTION INTRAMUSCULAR; INTRAVENOUS ONCE
Status: COMPLETED | OUTPATIENT
Start: 2025-06-17 | End: 2025-06-17

## 2025-06-17 RX ORDER — ONDANSETRON 4 MG/1
4 TABLET, ORALLY DISINTEGRATING ORAL EVERY 6 HOURS PRN
Qty: 20 TABLET | Refills: 0 | Status: SHIPPED | OUTPATIENT
Start: 2025-06-17

## 2025-06-17 RX ORDER — SODIUM CHLORIDE 0.9 % (FLUSH) 0.9 %
10 SYRINGE (ML) INJECTION AS NEEDED
Status: DISCONTINUED | OUTPATIENT
Start: 2025-06-17 | End: 2025-06-17 | Stop reason: HOSPADM

## 2025-06-17 RX ORDER — CEPHALEXIN 500 MG/1
500 CAPSULE ORAL 3 TIMES DAILY
Qty: 21 CAPSULE | Refills: 0 | Status: SHIPPED | OUTPATIENT
Start: 2025-06-17 | End: 2025-06-24

## 2025-06-17 RX ADMIN — IOPAMIDOL 100 ML: 755 INJECTION, SOLUTION INTRAVENOUS at 09:26

## 2025-06-17 RX ADMIN — SODIUM CHLORIDE, POTASSIUM CHLORIDE, SODIUM LACTATE AND CALCIUM CHLORIDE 1000 ML: 600; 310; 30; 20 INJECTION, SOLUTION INTRAVENOUS at 09:34

## 2025-06-17 RX ADMIN — KETOROLAC TROMETHAMINE 15 MG: 15 INJECTION, SOLUTION INTRAMUSCULAR; INTRAVENOUS at 09:33

## 2025-06-17 NOTE — DISCHARGE INSTRUCTIONS
Drink plenty of fluids.  Strain urine for passage of stone.  You do have 1 stones already in your bladder but you have another stone that has to be passed yet.  Follow-up with Dr. Mai as scheduled.  Return to the ER for uncontrollable pain, fever, vomiting, or any other concerns issues that may arise.

## 2025-06-17 NOTE — ED PROVIDER NOTES
Time: 10:32 AM EDT  Date of encounter:  6/17/2025  Independent Historian/Clinical History and Information was obtained by:   Patient and Family  Chief Complaint: Left-sided abdominal pain    History is limited by: N/A    History of Present Illness:  Patient is a 85 y.o. year old male who presents to the emergency department for evaluation of  left side abdominal pain.  Patient is he was awoken at 2:00 this morning with severe left-sided abdominal and flank pain.  Patient reports she had some hematuria last night.  This is unusual for him.  He also had 1 episode of nausea and vomiting.  Patient currently rates his pain as a 6 out of 10.  Patient denies any fevers.  Patient denies any history of kidney stones or pyelonephritis.    Patient Care Team  Primary Care Provider: Payam Carroll MD    Past Medical History:     No Known Allergies  Past Medical History:   Diagnosis Date    Anemia     NO CURRENT ISSUES    Arthritis     COPD (chronic obstructive pulmonary disease)     INHALER  PRN    Hyperlipidemia     Hypertension     ON MEDS/FOLLOWS SAHNI, PT DENIES CAD    Lung cancer     REMOVED - 2016    Pneumonia     LEFT LUNG CA NO CURRENT PNEUMONIA    SOB (shortness of breath)     WITH EXERTION SOMETIMES    Stroke     LEFT LEG/FOOT DROP  2019    TIA (transient ischemic attack)     NO RESIDUAL 2010     Past Surgical History:   Procedure Laterality Date    BRONCHOSCOPY Bilateral 11/19/2024    Procedure: BRONCHOSCOPY WITH BAL, BIOPSY, BRONCHIAL WASHINGS;  Surgeon: Dayne Choudhary MD;  Location: [facility] ENDOSCOPY;  Service: Pulmonary;  Laterality: Bilateral;  PERSISTENT PNEUMONIA, LEFT UPPER LOBE INFILTRATE    BRONCHOSCOPY WITH ION ROBOTIC ASSIST N/A 3/4/2024    Procedure: BRONCHOSCOPY WITH ION ROBOT, REBUS, NEEDLE ASPIRATE, BIOPSIES, BAL, WASHINGS;  Surgeon: Dayne Choudhary MD;  Location: [facility] MAIN OR;  Service: Robotics - Pulmonary;  Laterality: N/A;    CARDIAC CATHETERIZATION Left 11/10/2022    Procedure: Aortogram with  left leg angiogram, possible angioplasty or stenting;  Surgeon: Cuauhtemoc Thomas MD;  Location: [facility] CATH INVASIVE LOCATION;  Service: Vascular;  Laterality: Left;    CARDIAC CATHETERIZATION Right 01/13/2023    Procedure: Right leg angiogram, possible angioplasty or stenting;  Surgeon: Cuauhtemoc Thomas MD;  Location: [facility] CATH INVASIVE LOCATION;  Service: Vascular;  Laterality: Right;    CARPAL TUNNEL RELEASE Left 02/09/2024    Procedure: CARPAL TUNNEL RELEASE, left;  Surgeon: Rg Tom MD;  Location: [facility] MAIN OR;  Service: Neurosurgery;  Laterality: Left;    LUNG BIOPSY      LUNG SURGERY      REMOVAL HALF OF UPPER PORTION LEFT LUNG    ORTHOPEDIC SURGERY Left     ROTATOR CUFF    THROAT SURGERY      throat diverticulum repair     Family History   Problem Relation Age of Onset    Cancer Mother     Stomach cancer Other     Malig Hyperthermia Neg Hx        Home Medications:  Prior to Admission medications    Medication Sig Start Date End Date Taking? Authorizing Provider   albuterol sulfate  (90 Base) MCG/ACT inhaler Inhale 2 puffs Every 4 (Four) Hours As Needed for Wheezing or Shortness of Air. 11/7/24   Gely Canales APRN   aspirin 81 MG EC tablet Take 1 tablet by mouth Daily. 8/22/23   Maxine Espinoza MD   atorvastatin (LIPITOR) 40 MG tablet  4/14/25   Maxine Espinoza MD   B Complex Vitamins (VITAMIN-B COMPLEX PO) Take 1 tablet by mouth Daily.    Maxine Espinoza MD   cetirizine (zyrTEC) 10 MG tablet Take 1 tablet by mouth Daily.    Maxine Espinoza MD   Cholecalciferol (VITAMIN D3 PO) Take 1 tablet by mouth Daily.    Maxine Espinoza MD   cilostazol (PLETAL) 100 MG tablet Take 1 tablet by mouth 2 (Two) Times a Day. 5/24/21   Maxine Espinoza MD   cyclobenzaprine (FLEXERIL) 10 MG tablet Every 8 (Eight) Hours.    Maxine Espinoza MD   ferrous sulfate 325 (65 FE) MG tablet Take 1 tablet by mouth Daily With Breakfast. 5/19/25   Juan Jose Altaorre MD    Fluticasone-Umeclidin-Vilant (TRELEGY ELLIPTA) 200-62.5-25 MCG/ACT inhaler Inhale 1 puff Daily. 4/22/25   Gely Canales APRN   gabapentin (NEURONTIN) 300 MG capsule Take 1 capsule by mouth 3 (Three) Times a Day. 1/20/25   Juan Jose Alatorre MD   ipratropium-albuterol (DUO-NEB) 0.5-2.5 mg/3 ml nebulizer Take 3 mL by nebulization 4 (Four) Times a Day As Needed for Wheezing or Shortness of Air. 12/19/24   Gely Canales APRN   losartan (COZAAR) 50 MG tablet Take 1 tablet by mouth Daily.    Maxine Espinoza MD   metoprolol succinate XL (TOPROL-XL) 25 MG 24 hr tablet Take 1 tablet by mouth Daily.    Maxine Espinoza MD   metoprolol tartrate (LOPRESSOR) 25 MG tablet Take 0.5 tablets by mouth Daily. 10/21/24   Maxine Espinoza MD   multivitamin with minerals tablet tablet Take 1 tablet by mouth Daily.    Maxine Espinoza MD   multivitamins-minerals (PRESERVISION AREDS 2) capsule capsule Take 1 capsule by mouth 2 (Two) Times a Day. 7/26/21   Maxine Espinoza MD   pantoprazole (PROTONIX) 40 MG EC tablet Take 1 tablet by mouth Daily. 4/19/21   Maxine Espinoza MD   Refresh Tears 0.5 % solution Administer 1 drop to both eyes Daily As Needed for Dry Eyes. 8/24/22   Maxine Espinoza MD   tamsulosin (FLOMAX) 0.4 MG capsule 24 hr capsule Take 1 capsule by mouth 2 (Two) Times a Day. 5/13/25   Del Mai MD        Social History:   Social History     Tobacco Use    Smoking status: Former     Current packs/day: 0.00     Average packs/day: 1 pack/day for 60.0 years (60.0 ttl pk-yrs)     Types: Cigarettes     Start date: 1950     Quit date: 2010     Years since quitting: 15.4     Passive exposure: Past    Smokeless tobacco: Current     Types: Snuff    Tobacco comments:     INST PER ANESTHESIA PROTOCOL   Vaping Use    Vaping status: Never Used   Substance Use Topics    Alcohol use: Not Currently    Drug use: Never         Review of Systems:  Review of Systems   Constitutional:  Negative  "for chills and fever.   HENT:  Negative for congestion, ear pain and sore throat.    Eyes:  Negative for pain.   Respiratory:  Negative for cough, chest tightness and shortness of breath.    Cardiovascular:  Negative for chest pain.   Gastrointestinal:  Positive for nausea and vomiting. Negative for abdominal pain and diarrhea.   Genitourinary:  Positive for flank pain and hematuria.   Musculoskeletal:  Negative for joint swelling.   Skin:  Negative for pallor.   Neurological:  Negative for seizures and headaches.   All other systems reviewed and are negative.       Physical Exam:  /74 (BP Location: Right arm, Patient Position: Lying)   Pulse 81   Temp 97.7 °F (36.5 °C) (Oral)   Resp 16   Ht 170.2 cm (67\")   Wt 70.2 kg (154 lb 12.2 oz)   SpO2 94%   BMI 24.24 kg/m²     Physical Exam    Vital signs were reviewed under triage note.  General appearance - Patient appears well-developed and well-nourished.  Patient is in no acute distress.  Head - Normocephalic, atraumatic.  Pupils - Equal, round, reactive to light.  Extraocular muscles are intact.  Conjunctiva is clear.  Nasal - Normal inspection.  No evidence of trauma or epistaxis.  Tympanic membranes - Gray, intact without erythema or retractions.  Oral mucosa - Pink and moist without lesions or erythema.  Uvula is midline.  Chest wall - Atraumatic.  Chest wall is nontender.  There are no vesicular rashes noted.  Neck - Supple.  Trachea was midline.  There is no palpable lymphadenopathy or thyromegaly.  There are no meningeal signs  Lungs - Clear to auscultation and percussion bilaterally.  Heart - Regular rate and rhythm without any murmurs, clicks, or gallops.  Abdomen - Soft.  Bowel sounds are present.  There is mild left-sided palpable tenderness.  There is no rebound, guarding, or rigidity.  There are no palpable masses.  There are no pulsatile masses.  Back - Spine is straight and midline.  There is no CVA tenderness.  Extremities - Intact x4 with " full range of motion.  There is no palpable edema.  Pulses are intact x4 and equal.  Neurologic - Patient is awake, alert, and oriented x3.  Cranial nerves II through XII are grossly intact.  Motor and sensory functions grossly intact.  Cerebellar function was normal.  Integument - There are no rashes.  There are no petechia or purpura lesions noted.  There are no vesicular lesions noted.           Procedures:  Procedures      Medical Decision Making:      Comorbidities that affect care:    COPD, hyperlipidemia, hypertension, stroke, lung cancer    External Notes reviewed:    Previous Clinic Note: Office visit with Dr. Alatorre on 5/19/2025 was reviewed by me.      The following orders were placed and all results were independently analyzed by me:  Orders Placed This Encounter   Procedures    CT Abdomen Pelvis With Contrast    [place] Draw    Comprehensive Metabolic Panel    Lipase    Urinalysis With Microscopic If Indicated (No Culture) - Urine, Clean Catch    Lactic Acid, Plasma    CBC Auto Differential    Urinalysis, Microscopic Only - Urine, Clean Catch    NPO Diet NPO Type: Strict NPO    Undress & Gown    Insert Peripheral IV    CBC & Differential    Green Top (Gel)    Lavender Top    Gold Top - SST    Light Blue Top       Medications Given in the Emergency Department:  Medications   sodium chloride 0.9 % flush 10 mL (has no administration in time range)   ketorolac (TORADOL) injection 15 mg (15 mg Intravenous Given 6/17/25 0933)   lactated ringers bolus 1,000 mL (1,000 mL Intravenous New Bag 6/17/25 0934)   iopamidol (ISOVUE-370) 76 % injection 100 mL (100 mL Intravenous Given 6/17/25 0974)        ED Course:     The patient was seen and evaluated in the ED by me.  The above history and physical examination was performed as documented.  Diagnostic data was obtained.  Results reviewed.  Findings were discussed with the patient and his wife.  Patient would cover with antibiotics.  Patient was given some pain  medication nausea medication for home.  Patient already sees Dr. Mai.  Patient can follow-up with an outpatient follow-up with him if he does not pass the stone on his own accord.    Labs:    Lab Results (last 24 hours)       Procedure Component Value Units Date/Time    CBC & Differential [[id number]]  (Abnormal) Collected: 06/17/25 0644    Specimen: Blood Updated: 06/17/25 0647    Narrative:      The following orders were created for panel order CBC & Differential.  Procedure                               Abnormality         Status                     ---------                               -----------         ------                     CBC Auto Differential[[id number]]        Abnormal            Final result                 Please view results for these tests on the individual orders.    Comprehensive Metabolic Panel [[id number]]  (Abnormal) Collected: 06/17/25 0644    Specimen: Blood Updated: 06/17/25 0706     Glucose 116 mg/dL      BUN 12.9 mg/dL      Creatinine 1.30 mg/dL      Sodium 141 mmol/L      Potassium 3.4 mmol/L      Chloride 105 mmol/L      CO2 27.0 mmol/L      Calcium 9.1 mg/dL      Total Protein 6.2 g/dL      Albumin 3.8 g/dL      ALT (SGPT) 24 U/L      AST (SGOT) 30 U/L      Alkaline Phosphatase 149 U/L      Total Bilirubin 1.0 mg/dL      Globulin 2.4 gm/dL      A/G Ratio 1.6 g/dL      BUN/Creatinine Ratio 9.9     Anion Gap 9.0 mmol/L      eGFR 53.5 mL/min/1.73     Narrative:      GFR Categories in Chronic Kidney Disease (CKD)              GFR Category          GFR (mL/min/1.73)    Interpretation  G1                    90 or greater        Normal or high (1)  G2                    60-89                Mild decrease (1)  G3a                   45-59                Mild to moderate decrease  G3b                   30-44                Moderate to severe decrease  G4                    15-29                Severe decrease  G5                    14 or less           Kidney failure    (1)In the absence  of evidence of kidney disease, neither GFR category G1 or G2 fulfill the criteria for CKD.    eGFR calculation 2021 CKD-EPI creatinine equation, which does not include race as a factor    Lipase [[id number]]  (Normal) Collected: 06/17/25 0644    Specimen: Blood Updated: 06/17/25 0706     Lipase 35 U/L     Lactic Acid, Plasma [[id number]]  (Normal) Collected: 06/17/25 0644    Specimen: Blood Updated: 06/17/25 0704     Lactate 1.1 mmol/L     CBC Auto Differential [[id number]]  (Abnormal) Collected: 06/17/25 0644    Specimen: Blood Updated: 06/17/25 0647     WBC 9.93 10*3/mm3      RBC 4.91 10*6/mm3      Hemoglobin 13.5 g/dL      Hematocrit 43.0 %      MCV 87.6 fL      MCH 27.5 pg      MCHC 31.4 g/dL      RDW 16.5 %      RDW-SD 52.2 fl      MPV 10.1 fL      Platelets 160 10*3/mm3      Neutrophil % 71.2 %      Lymphocyte % 17.9 %      Monocyte % 9.6 %      Eosinophil % 0.5 %      Basophil % 0.3 %      Immature Grans % 0.5 %      Neutrophils, Absolute 7.07 10*3/mm3      Lymphocytes, Absolute 1.78 10*3/mm3      Monocytes, Absolute 0.95 10*3/mm3      Eosinophils, Absolute 0.05 10*3/mm3      Basophils, Absolute 0.03 10*3/mm3      Immature Grans, Absolute 0.05 10*3/mm3      nRBC 0.0 /100 WBC     Urinalysis With Microscopic If Indicated (No Culture) - Urine, Clean Catch [[id number]]  (Abnormal) Collected: 06/17/25 0655    Specimen: Urine, Clean Catch Updated: 06/17/25 0721     Color, UA Brown     Appearance, UA Cloudy     pH, UA 6.0     Specific Gravity, UA 1.025     Glucose, UA Negative     Ketones, UA Trace     Bilirubin, UA Small (1+)     Blood, UA Large (3+)     Protein,  mg/dL (2+)     Leuk Esterase, UA Negative     Nitrite, UA Negative     Urobilinogen, UA 1.0 E.U./dL    Urinalysis, Microscopic Only - Urine, Clean Catch [[id number]]  (Abnormal) Collected: 06/17/25 0655    Specimen: Urine, Clean Catch Updated: 06/17/25 0721     RBC, UA Too Numerous to Count /HPF      WBC, UA 6-10 /HPF      Bacteria, UA 1+ /HPF       Squamous Epithelial Cells, UA 3-6 /HPF      Hyaline Casts, UA 0-2 /LPF      Amorphous Crystals, UA Large/3+ /HPF      Methodology Manual Light Microscopy             Imaging:    CT Abdomen Pelvis With Contrast  Result Date: 6/17/2025  CT ABDOMEN PELVIS W CONTRAST Date of Exam: 6/17/2025 9:23 AM EDT Indication: Left lower quadrant abdominal pain with hematuria. Comparison: CT abdomen and pelvis dated 8/19/2024 Technique: Axial CT images were obtained of the abdomen and pelvis after the uneventful intravenous administration of iodinated contrast. Reconstructed coronal and sagittal images were also obtained. Automated exposure control and iterative construction methods were used. Findings: The heart size is normal. There is a trace pericardial effusion. There is linear atelectasis within the lung bases. The liver is normal in size measuring 15 cm in craniocaudal dimension. There are tiny low-density lesions throughout the liver likely representing small cysts. The portal veins are patent. The gallbladder is present without wall thickening. There is no intrahepatic or extrahepatic biliary ductal dilatation. The spleen is normal in size. The adrenal glands appear within normal limits. There is a 18 mm low-density lesion within the pancreatic tail best seen on image 56 as well as a 17 mm lesion within the pancreatic head. These appear stable from prior examination. The kidneys are symmetric in size. There is a 5 mm stone within the proximal left ureter. There is moderate left-sided hydronephrosis. There are additional small left-sided nonobstructing renal stones. Punctate nonobstructing right-sided renal stone. No right-sided hydronephrosis or hydroureter. There is a 3 mm stone within the dependent portion of the urinary bladder which appears to be beyond the left UVJ. This is best seen on image 163. The prostate is enlarged. The stomach and duodenum are normal in caliber and configuration. There are no abnormally dilated  loops of small bowel to suggest small bowel obstruction or small bowel inflammation. There is colonic diverticulosis without acute diverticulitis. Postsurgical changes of prior right hemicolectomy. The aorta is normal in caliber without evidence of aneurysm formation. There is aortoiliac atherosclerotic calcification. Mild decrease in size of the portacaval lymph node now measuring 10 mm in short axis. There is degenerative disc disease of the lumbar spine with anterior wedging at the T11 level. This is chronic from the prior examination.     Impression: 1.Obstructing 5 mm stone within the proximal left ureter with moderate left-sided hydronephrosis. 2.Additional 3 mm stone within the dependent portion of the urinary bladder which appears to be beyond the left UVJ. 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 4.Prostatomegaly. 5.Colonic diverticulosis without acute diverticulitis. Electronically Signed: Shashi Ulloa MD  6/17/2025 9:53 AM EDT  Workstation ID: [id number]        Differential Diagnosis and Discussion:    Flank Pain: Differential diagnosis includes but is not limited to kidney stones, pyelonephritis, musculoskeletal disorders, renal infarction, urinary tract infection, hydronephrosis, radiculopathy, aortic aneurysm, renal cell carcinoma.    Labs were collected in the emergency department and all labs were reviewed and interpreted by me.  CT scan was performed in the emergency department and the CT scan radiology impression was interpreted by me.    MDM     Amount and/or Complexity of Data Reviewed  Clinical lab tests: reviewed  Tests in the radiology section of CPT®: reviewed             Patient Care Considerations:    SEPSIS was considered but is NOT present in the emergency department as SIRS criteria is not present.      Consultants/Shared Management Plan:    None    Social Determinants of Health:    Patient is independent, reliable,  and has access to care.       Disposition and Care Coordination:    Discharged: I considered escalation of care by admitting this patient to the hospital, however there were no acute findings to warrant inpatient admission.    I have explained the patient´s condition, diagnoses and treatment plan based on the information available to me at this time. I have answered questions and addressed any concerns. The patient has a good  understanding of the patient´s diagnosis, condition, and treatment plan as can be expected at this point. The vital signs have been stable. The patient´s condition is stable and appropriate for discharge from the emergency department.      The patient will pursue further outpatient evaluation with the primary care physician or other designated or consulting physician as outlined in the discharge instructions. They are agreeable to this plan of care and follow-up instructions have been explained in detail. The patient has received these instructions in written format and has expressed an understanding of the discharge instructions. The patient is aware that any significant change in condition or worsening of symptoms should prompt an immediate return to this or the closest emergency department or call to 911.  I have explained discharge medications and the need for follow up with the patient/caretakers. This was also printed in the discharge instructions. Patient was discharged with the following medications and follow up:      Medication List        New Prescriptions      cephalexin 500 MG capsule  Commonly known as: KEFLEX  Take 1 capsule by mouth 3 (Three) Times a Day for 7 days.     HYDROcodone-acetaminophen 5-325 MG per tablet  Commonly known as: NORCO  Take 1 tablet by mouth Every 6 (Six) Hours As Needed for Moderate Pain.     ondansetron ODT 4 MG disintegrating tablet  Commonly known as: ZOFRAN-ODT  Take 1 tablet by mouth Every 6 (Six) Hours As Needed for Nausea or Vomiting.                Where to Get Your Medications        These medications were sent to [facility] - [place], [place] - [address] [facility] - [phone]  - [phone] FX  [address] [facility] [address]      Phone: [phone]   cephalexin 500 MG capsule  HYDROcodone-acetaminophen 5-325 MG per tablet  ondansetron ODT 4 MG disintegrating tablet      Payam Carroll MD  [address]  [place] [id number]  [phone]      As needed    Del Mai MD  [address]  [facility] [address]  [place] [id number]  [phone]      Follow-up as scheduled unless his office calls you with an earlier appointment.      Final diagnoses:   Renal colic on left side   Ureterolithiasis   Acute UTI        ED Disposition       ED Disposition   Discharge    Condition   Stable    Comment   --               This medical record created using voice recognition software.             Nitesh Lindo DO  06/17/25 1732

## 2025-06-17 NOTE — TELEPHONE ENCOUNTER
Called pt in regards to kidney stone after we were notified by the ER. We reviewed his imaging. Pt has a 5mm stone in the proximal ureter and a 3mm almost to the bladder. Pt is not having fevers, dysuria, severe pain, n/v, or other abnormal symptoms. He would like to try to pass the stone on his own and keep the appt with us on 7/14/25 as is with a MRI prior. Pt understands the s/s that warrant a trip back to the ER/a call to our office

## 2025-07-01 ENCOUNTER — HOSPITAL ENCOUNTER (OUTPATIENT)
Dept: MRI IMAGING | Facility: HOSPITAL | Age: 85
Discharge: HOME | End: 2025-07-01
Admitting: UROLOGY
Payer: MEDICARE

## 2025-07-01 DIAGNOSIS — R97.20 ELEVATED PSA: ICD-10-CM

## 2025-07-01 PROCEDURE — 72197 MRI PELVIS W/O & W/DYE: CPT

## 2025-07-01 PROCEDURE — A9573 GADOPICLENOL 0.5 MMOL/ML SOLUTION: HCPCS | Performed by: UROLOGY

## 2025-07-01 PROCEDURE — 25510000001 GADOPICLENOL 0.5 MMOL/ML SOLUTION: Performed by: UROLOGY

## 2025-07-01 RX ADMIN — GADOPICLENOL 7.5 ML: 485.1 INJECTION INTRAVENOUS at 09:19

## 2025-07-11 NOTE — PROGRESS NOTES
Chief Complaint: Urologic complaint    Subjective         History of Present Illness  Carlos Zimmerman is a 85 y.o. male           Recurrent UTI  BPH  Elevated PSA  Nephrolithiasis      Patient went to the ED 3 weeks ago for kidney stone.      No Previous stones      6/17/2025 CT abdomen/pelvis with - 5 mm obstructing stone proximal left ureter moderate left hydro.  3 mm stone urinary bladder.  Possibly in the distal left ureter.  3 mm stone in the left kidney.  No stones on the right.  6/17/2025 UA-TNTC RBCs, 1+ bacteria    Severe pain for couple days.    No pain today.    No fevers    Voiding ok.    No straining.  No change    on Flomax 0.4 mg BID.    No straining.  Nocturia x0.    Not bothered      No cardiopulmonary history.  Non-smoker.  No anticoagulation.  History of lung cancer years ago treated with surgery      Some of this family did live in the 90s      PVR    9/23  000  9/22  061    3/24 lung lesion -with XRT treatments.  Monitoring currently    2016 lung cancer s/p resection and chemo -does still deal with neuropathy        No CAD  H/o of CVA x 3        Previous      1/23 cystoscopy-3 cm prostate large bladder with mild trabeculation, no pathology.    7/22 renal ultrasound-normal  7/22 0.59, GFR 96    Urine cultures    7/22 Serratia-resistant to Ancef and nitrofurantoin  2/21 Serratia      No history of kidney  stone.    No urologic family history,   no history of urologic surgery.          PSA    7/25 MRI prostate - 49 g, PSAD 0.23  PI - RADS 5 - 1.8 x 1.2 x 1.2 cm, right peripheral zone apex posterior medial and posterolateral.  Focal capsular bulge with contact > 1.5 cm.  Possible EPE  PI-RADS 4 - 1.3 x 0.8 x 0.9 cm, left peripheral mid gland base, posterior medial, abuts prostatic capsule    5/25        11.5    12/24     14.5   7/24 MRI prostate - 51 g   PSAd  0.30  PI - RADS 4-posterior lateral left peripheral zone, mid gland 1.4 cm, previously 1 cm on 2/23 7/24     15.8      5/24       16.6  11/23    11.8   8/23      11.9      2/20/2023 MRI prostate-49 g  PI - RADS 4 - left posterior mid gland peripheral zone.  1.0 x 0.7 cm.  Seminal vesicles, neurovascular bundle and lymph nodes negative  1/23       8.0             Objective             No Known Allergies           Vital Signs:   There were no vitals taken for this visit.                 Assessment and Plan    Diagnoses and all orders for this visit:    1. Elevated PSA (Primary)    2. Benign prostatic hyperplasia with lower urinary tract symptoms, symptom details unspecified          Elevated PSA        PSA lower but the MRI does show lesions more worrisome on this most recent scan.    At this time we are going to make sure he is doing better from his kidney stones and will discuss further at his follow-up visit.  Discussed that his age we will have to take a conservative approach to moving forward with further intervention.  Patient voiced understanding we will discuss more after he follows up with CT        BPH with recurrent UTIs      Cont  Flomax to 0.4 mg twice daily.    not  interested in procedures.            Nephrolithiasis  Gross hematuria      ED visit records reviewed.  CT images and read reviewed discussed with patient    Patient had 2 stones on the left.  He is still having some intermittent gross hematuria as of a few days ago.  After discussion we will get a CT without contrast to make sure stone has passed.

## 2025-07-14 ENCOUNTER — OFFICE VISIT (OUTPATIENT)
Dept: UROLOGY | Age: 85
End: 2025-07-14
Payer: MEDICARE

## 2025-07-14 VITALS — WEIGHT: 154 LBS | HEIGHT: 67 IN | BODY MASS INDEX: 24.17 KG/M2

## 2025-07-14 DIAGNOSIS — N40.1 BENIGN PROSTATIC HYPERPLASIA WITH LOWER URINARY TRACT SYMPTOMS, SYMPTOM DETAILS UNSPECIFIED: ICD-10-CM

## 2025-07-14 DIAGNOSIS — R97.20 ELEVATED PSA: Primary | ICD-10-CM

## 2025-07-14 DIAGNOSIS — R31.0 GROSS HEMATURIA: ICD-10-CM

## 2025-07-14 DIAGNOSIS — N20.0 NEPHROLITHIASIS: ICD-10-CM

## 2025-07-14 RX ORDER — CLOPIDOGREL BISULFATE 75 MG/1
TABLET ORAL EVERY 24 HOURS
COMMUNITY
End: 2025-07-17

## 2025-07-14 RX ORDER — PREDNISOLONE ACETATE 10 MG/ML
SUSPENSION/ DROPS OPHTHALMIC
COMMUNITY
Start: 2025-06-21

## 2025-07-14 RX ORDER — PRAVASTATIN SODIUM 40 MG
40 TABLET ORAL DAILY
COMMUNITY

## 2025-07-14 RX ORDER — BUDESONIDE, GLYCOPYRROLATE, AND FORMOTEROL FUMARATE 160; 9; 4.8 UG/1; UG/1; UG/1
2 AEROSOL, METERED RESPIRATORY (INHALATION) 2 TIMES DAILY
COMMUNITY
End: 2025-07-17

## 2025-07-17 ENCOUNTER — OFFICE VISIT (OUTPATIENT)
Dept: RADIATION ONCOLOGY | Facility: HOSPITAL | Age: 85
End: 2025-07-17
Payer: MEDICARE

## 2025-07-17 ENCOUNTER — HOSPITAL ENCOUNTER (OUTPATIENT)
Dept: CT IMAGING | Facility: HOSPITAL | Age: 85
Discharge: HOME | End: 2025-07-17
Admitting: UROLOGY
Payer: MEDICARE

## 2025-07-17 VITALS
WEIGHT: 155 LBS | OXYGEN SATURATION: 95 % | SYSTOLIC BLOOD PRESSURE: 127 MMHG | HEART RATE: 84 BPM | TEMPERATURE: 97.9 F | DIASTOLIC BLOOD PRESSURE: 59 MMHG | BODY MASS INDEX: 24.27 KG/M2 | RESPIRATION RATE: 16 BRPM

## 2025-07-17 DIAGNOSIS — C34.12 MALIGNANT NEOPLASM OF UPPER LOBE, LEFT BRONCHUS OR LUNG: Primary | ICD-10-CM

## 2025-07-17 DIAGNOSIS — N20.0 NEPHROLITHIASIS: ICD-10-CM

## 2025-07-17 PROCEDURE — 74176 CT ABD & PELVIS W/O CONTRAST: CPT

## 2025-07-17 PROCEDURE — G0463 HOSPITAL OUTPT CLINIC VISIT: HCPCS | Performed by: RADIOLOGY

## 2025-07-17 NOTE — PROGRESS NOTES
Follow Up Office Visit      Encounter Date: 07/17/2025   Patient Name: Carlos Zimmerman  YOB: 1940   Medical Record Number: [id number]   Primary Diagnosis: Malignant neoplasm of upper lobe, left bronchus or lung [C34.12]       Completion Date: 3/29/2024    Chief Complaint:    Chief Complaint   Patient presents with    Appointment    Lung Cancer       History of Present Illness: Carlos Zimmerman returns for routine scheduled follow-up.  He reports feeling well overall with no complaints.  CT scan of the chest on 6/13/2025 revealed postsurgical changes in the left upper lobe as well as decreased size of the mixed solid and groundglass opacity within the apical aspect representing evolving posttreatment changes.    Subjective      Review of Systems: Review of Systems   Constitutional:  Positive for fatigue (2/10, pt reports only when active/walking due to neuropathy). Negative for appetite change.   HENT:  Negative for congestion, sore throat, tinnitus and trouble swallowing.    Eyes:  Positive for visual disturbance (floaters, ongoing).   Respiratory:  Negative for cough and shortness of breath.    Cardiovascular:  Positive for leg swelling (ongoing). Negative for chest pain.   Gastrointestinal:  Negative for abdominal pain, constipation, diarrhea, nausea and vomiting.   Genitourinary:  Positive for hematuria (seeing Dr. Mai for possible kidney stone). Negative for difficulty urinating, dysuria, frequency and urgency.   Musculoskeletal:  Positive for arthralgias and gait problem (uses cane).   Neurological:  Negative for dizziness and headaches.   Psychiatric/Behavioral:  Negative for sleep disturbance.        The following portions of the patient's history were reviewed and updated as appropriate: allergies, current medications, past family history, past medical history, past social history, past surgical history and problem list.    Medications:     Current Outpatient Medications:      albuterol sulfate  (90 Base) MCG/ACT inhaler, Inhale 2 puffs Every 4 (Four) Hours As Needed for Wheezing or Shortness of Air., Disp: 18 g, Rfl: 3    aspirin 81 MG EC tablet, Take 1 tablet by mouth Daily., Disp: , Rfl:     B Complex Vitamins (VITAMIN-B COMPLEX PO), Take 1 tablet by mouth Daily., Disp: , Rfl:     cetirizine (zyrTEC) 10 MG tablet, Take 1 tablet by mouth Daily., Disp: , Rfl:     Cholecalciferol (VITAMIN D3 PO), Take 1 tablet by mouth Daily., Disp: , Rfl:     cilostazol (PLETAL) 100 MG tablet, Take 1 tablet by mouth 2 (Two) Times a Day., Disp: , Rfl:     ferrous sulfate 325 (65 FE) MG tablet, Take 1 tablet by mouth Daily With Breakfast., Disp: 90 tablet, Rfl: 3    Fluticasone-Umeclidin-Vilant (TRELEGY ELLIPTA) 200-62.5-25 MCG/ACT inhaler, Inhale 1 puff Daily., Disp: 1 each, Rfl: 11    gabapentin (NEURONTIN) 300 MG capsule, Take 1 capsule by mouth 3 (Three) Times a Day., Disp: 90 capsule, Rfl: 5    metoprolol succinate XL (TOPROL-XL) 25 MG 24 hr tablet, Take 1 tablet by mouth Daily., Disp: , Rfl:     multivitamin with minerals tablet tablet, Take 1 tablet by mouth Daily., Disp: , Rfl:     multivitamins-minerals (PRESERVISION AREDS 2) capsule capsule, Take 1 capsule by mouth 2 (Two) Times a Day., Disp: , Rfl:     pantoprazole (PROTONIX) 40 MG EC tablet, Take 1 tablet by mouth Daily., Disp: , Rfl:     pravastatin (PRAVACHOL) 40 MG tablet, Take 1 tablet by mouth Daily., Disp: , Rfl:     prednisoLONE acetate (PRED FORTE) 1 % ophthalmic suspension, , Disp: , Rfl:     Refresh Tears 0.5 % solution, Administer 1 drop to both eyes Daily As Needed for Dry Eyes., Disp: , Rfl:     tamsulosin (FLOMAX) 0.4 MG capsule 24 hr capsule, Take 1 capsule by mouth 2 (Two) Times a Day., Disp: 180 capsule, Rfl: 4    HYDROcodone-acetaminophen (NORCO) 5-325 MG per tablet, Take 1 tablet by mouth Every 6 (Six) Hours As Needed for Moderate Pain. (Patient not taking: Reported on 7/17/2025), Disp: 15 tablet, Rfl: 0     ipratropium-albuterol (DUO-NEB) 0.5-2.5 mg/3 ml nebulizer, Take 3 mL by nebulization 4 (Four) Times a Day As Needed for Wheezing or Shortness of Air. (Patient not taking: Reported on 7/17/2025), Disp: 360 mL, Rfl: 3    metoprolol tartrate (LOPRESSOR) 25 MG tablet, Take 0.5 tablets by mouth Daily. (Patient not taking: Reported on 7/17/2025), Disp: , Rfl:     naloxone (NARCAN) 4 MG/0.1ML nasal spray, , Disp: , Rfl:     ondansetron ODT (ZOFRAN-ODT) 4 MG disintegrating tablet, Take 1 tablet by mouth Every 6 (Six) Hours As Needed for Nausea or Vomiting. (Patient not taking: Reported on 7/17/2025), Disp: 20 tablet, Rfl: 0    Allergies:   No Known Allergies    ECOG: (2) Ambulatory and capable of self care, unable to carry out work activity, up and about > 50% or waking hours  Quality of Life: 80 - Restricted Physical Activity     Objective     Physical Exam:   Vital Signs:   Vitals:    07/17/25 1119   BP: 127/59   Pulse: 84   Resp: 16   Temp: 97.9 °F (36.6 °C)   TempSrc: Temporal   SpO2: 95%   Weight: 70.3 kg (155 lb)   PainSc: 0-No pain     Body mass index is 24.27 kg/m².     Physical Exam  Carlos Zimmerman reports a pain score of 0.  Given his pain assessment as noted, treatment options were discussed and the following options were decided upon as a follow-up plan to address the patient's pain: continuation of current treatment plan for pain.     Radiographs: MRI Prostate With & Without Contrast  Result Date: 7/3/2025  Impression: 1. Highly suspicious PI-RADS 5 lesion in the right peripheral zone apex. Capsular irregularity and abutment greater than 1.5 cm suggest probable EPE. 2. Suspicious PI-RADS 4 lesion in the left mid gland to base peripheral zone abutting capsule without EPE. 3. No suspicious pelvic adenopathy or bone lesions. 4. Enlarged prostate with BPH change. PI-RADS 5 exam The prostate gland was segmented and the suspicious lesion(s) were annotated on the [employer] system for [employer]ro[employer] guidance. Electronically  Signed: Carlos Pierre MD  7/3/2025 1:39 PM EDT  Workstation ID: [id number]    CT Chest With Contrast Diagnostic  Result Date: 6/17/2025  Impression: 1.Postsurgical changes of prior left upper lobe resection. Decrease in size of the mixed solid and groundglass opacity within the apical aspect of the remnant left lung likely representing evolving posttreatment changes. 2.Near complete resolution of the previously seen right lower lobe airspace disease with only minimal residual scarring. 3.Mild patchy peripheral airspace opacity within the right middle lobe and right upper lobe likely representing mild residual infectious or inflammatory process. 4.Stable 5 mm solid nodule within the posterior right upper lobe along the right major fissure. 5.Small pericardial effusion measuring up to 11 mm in thickness, similar to the prior examination. Electronically Signed: Shashi Ulloa MD  6/17/2025 10:12 AM EDT  Workstation ID: [id number]    CT Abdomen Pelvis With Contrast  Result Date: 6/17/2025  Impression: 1.Obstructing 5 mm stone within the proximal left ureter with moderate left-sided hydronephrosis. 2.Additional 3 mm stone within the dependent portion of the urinary bladder which appears to be beyond the left UVJ. 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 4.Prostatomegaly. 5.Colonic diverticulosis without acute diverticulitis. Electronically Signed: Shashi Ulloa MD  6/17/2025 9:53 AM EDT  Workstation ID: [id number]    XR Shoulder 2+ View Left  Result Date: 5/30/2025  Impression: 1. Severe degenerative change of the left shoulder and AC joint. There is subluxation of the humeral head and narrowing the subacromial space compatible tear of the rotator cuff. Electronically Signed: Fabio Shin MD  5/30/2025 5:23 PM EDT  Workstation ID: [id number]  I personally reviewed the CT scan of the chest from 6/13/2025.  The pertinent findings are as above in  HPI.        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 He has no clinical or radiographic evidence of regional or distant metastatic disease. He has a history of lung cancer status post VATS surgery in 2016.  Completed stereotactic body radiotherapy to the left upper lobe nodule and March 2024.  He is doing well overall with excellent radiographic response.  ECOG 2    Mr. Zimmerman will return for follow-up in 6 months with repeat CT scan of the chest.  He was encouraged to contact me in the interim with any questions or concerns regarding his care.        Julio Cesar Alex MD  Radiation Oncology  [facility]    This document has been signed by Julio Cesar Alex MD on July 17, 2025 13:32 EDT

## 2025-07-18 NOTE — PROGRESS NOTES
Chief Complaint: Urologic complaint    Subjective         History of Present Illness  Carlos Zimmerman is a 85 y.o. male           Recurrent UTI  BPH  Elevated PSA  Nephrolithiasis          7/17/2025 CT abdomen/pelvis without - persistent left-sided hydro secondary to an obstructing 5 mm proximal left ureteral stone.      No pain or gross hematuria since his last visit          1st stone      6/17/2025 CT abdomen/pelvis with - 5 mm obstructing stone proximal left ureter moderate left hydro.  3 mm stone urinary bladder.  Possibly in the distal left ureter.  3 mm stone in the left kidney.  No stones on the right.  6/17/2025 UA-TNTC RBCs, 1+ bacteria    Severe pain for couple days.    No pain today.    No fevers    Voiding ok.    No straining.  No change    on Flomax 0.4 mg BID.    No straining.  Nocturia x0.    Not bothered      No cardiopulmonary history.  Non-smoker.  No anticoagulation.  History of lung cancer years ago treated with surgery      Some of this family did live in the 90s      PVR    9/23  000  9/22  061    3/24 lung lesion -with XRT treatments.  Monitoring currently    2016 lung cancer s/p resection and chemo -does still deal with neuropathy        No CAD  H/o of CVA x 3        Previous      1/23 cystoscopy-3 cm prostate large bladder with mild trabeculation, no pathology.    7/22 renal ultrasound-normal  7/22 0.59, GFR 96    Urine cultures    7/22 Serratia-resistant to Ancef and nitrofurantoin  2/21 Serratia      No history of kidney  stone.    No urologic family history,   no history of urologic surgery.          PSA    7/25 MRI prostate - 49 g, PSAD 0.23  PI - RADS 5 - 1.8 x 1.2 x 1.2 cm, right peripheral zone apex posterior medial and posterolateral.  Focal capsular bulge with contact > 1.5 cm.  Possible EPE  PI-RADS 4 - 1.3 x 0.8 x 0.9 cm, left peripheral mid gland base, posterior medial, abuts prostatic capsule    5/25        11.5    12/24     14.5   7/24 MRI prostate - 51 g   PSAd  0.30  PI -  RADS 4-posterior lateral left peripheral zone, mid gland 1.4 cm, previously 1 cm on 2/23 7/24     15.8      5/24      16.6  11/23    11.8   8/23      11.9      2/20/2023 MRI prostate-49 g  PI - RADS 4 - left posterior mid gland peripheral zone.  1.0 x 0.7 cm.  Seminal vesicles, neurovascular bundle and lymph nodes negative  1/23       8.0             Objective             No Known Allergies           Vital Signs:   There were no vitals taken for this visit.                 Assessment and Plan    Diagnoses and all orders for this visit:    1. Elevated PSA (Primary)    2. Recurrent urinary tract infection    3. Benign prostatic hyperplasia with lower urinary tract symptoms, symptom details unspecified    4. Nephrolithiasis          Elevated PSA        PSA lower but the MRI does show lesions more worrisome on this most recent scan.  Discussed a little further today but, because we are going to have to deal with a kidney stone we are going to hold off on any further  workup for elevated PSA.  We will plan on PSA in November and make further decisions based on the        BPH with recurrent UTIs      Cont  Flomax to 0.4 mg twice daily.    not  interested in procedures.            Nephrolithiasis  Gross hematuria    CT reviewed, patient still with left-sided nephrolith.  Proximal ureter.  I discussed with been in there > 1 month I did recommend cystoscopy with left ureteroscopy with laser and left ureteral stent placement.  Risks and benefits were discussed including bleeding, infection and damage to the urinary system.  We also discussed the risk of anesthesia up to and including death.  Patient voiced understanding and would like to proceed.      Continue aspirin 81    Pulmonary clearance

## 2025-07-21 ENCOUNTER — OFFICE VISIT (OUTPATIENT)
Dept: UROLOGY | Age: 85
End: 2025-07-21
Payer: MEDICARE

## 2025-07-21 ENCOUNTER — TELEPHONE (OUTPATIENT)
Dept: UROLOGY | Age: 85
End: 2025-07-21

## 2025-07-21 ENCOUNTER — PREP FOR SURGERY (OUTPATIENT)
Dept: OTHER | Facility: HOSPITAL | Age: 85
End: 2025-07-21
Payer: MEDICARE

## 2025-07-21 DIAGNOSIS — N20.1 URETERAL STONE: Primary | ICD-10-CM

## 2025-07-21 DIAGNOSIS — N40.1 BENIGN PROSTATIC HYPERPLASIA WITH LOWER URINARY TRACT SYMPTOMS, SYMPTOM DETAILS UNSPECIFIED: ICD-10-CM

## 2025-07-21 DIAGNOSIS — N20.0 NEPHROLITHIASIS: ICD-10-CM

## 2025-07-21 DIAGNOSIS — R97.20 ELEVATED PSA: Primary | ICD-10-CM

## 2025-07-21 DIAGNOSIS — N39.0 RECURRENT URINARY TRACT INFECTION: ICD-10-CM

## 2025-07-21 LAB
BACTERIA UR QL AUTO: ABNORMAL /HPF
BILIRUB UR QL STRIP: NEGATIVE
CLARITY UR: CLEAR
COD CRY URNS QL: PRESENT /HPF
COLOR UR: ABNORMAL
GLUCOSE UR STRIP-MCNC: NEGATIVE MG/DL
HGB UR QL STRIP.AUTO: ABNORMAL
HYALINE CASTS UR QL AUTO: ABNORMAL /LPF
KETONES UR QL STRIP: NEGATIVE
LEUKOCYTE ESTERASE UR QL STRIP.AUTO: ABNORMAL
NITRITE UR QL STRIP: NEGATIVE
PH UR STRIP.AUTO: 6.5 [PH] (ref 5–8)
PROT UR QL STRIP: NEGATIVE
RBC # UR STRIP: ABNORMAL /HPF
REF LAB TEST METHOD: ABNORMAL
SP GR UR STRIP: 1.02 (ref 1–1.03)
SQUAMOUS #/AREA URNS HPF: ABNORMAL /HPF
UROBILINOGEN UR QL STRIP: ABNORMAL
WBC # UR STRIP: ABNORMAL /HPF

## 2025-07-21 PROCEDURE — 87086 URINE CULTURE/COLONY COUNT: CPT | Performed by: UROLOGY

## 2025-07-21 PROCEDURE — 81001 URINALYSIS AUTO W/SCOPE: CPT | Performed by: UROLOGY

## 2025-07-21 RX ORDER — SODIUM CHLORIDE 0.9 % (FLUSH) 0.9 %
3 SYRINGE (ML) INJECTION EVERY 12 HOURS SCHEDULED
Status: CANCELLED | OUTPATIENT
Start: 2025-07-21

## 2025-07-21 RX ORDER — ATORVASTATIN CALCIUM 10 MG/1
40 TABLET, FILM COATED ORAL DAILY
COMMUNITY

## 2025-07-21 RX ORDER — SODIUM CHLORIDE 0.9 % (FLUSH) 0.9 %
10 SYRINGE (ML) INJECTION AS NEEDED
Status: CANCELLED | OUTPATIENT
Start: 2025-07-21

## 2025-07-21 RX ORDER — SODIUM CHLORIDE 9 MG/ML
40 INJECTION, SOLUTION INTRAVENOUS AS NEEDED
Status: CANCELLED | OUTPATIENT
Start: 2025-07-21

## 2025-07-21 NOTE — PROGRESS NOTES
Primary Care Provider  Payam Carroll MD   Referring Provider  No ref. provider found    Patient Complaint  Follow-up and Shortness of Breath (On exertion )    Patient or patient representative verbalized consent for the use of Ambient Listening during the visit with  ARTEMIO Caal for chart documentation. 7/22/2025  08:53 EDT      Subjective       History of Presenting Illness  Carlos Zimmerman is a pleasant 85 y.o. male who presents to [facility] with history of multifocal pneumonia, Pseudomonas infection, shortness of breath, pulmonary nodule, left upper lobe lung cancer, COPD here for followup appointment.  Patient is needing pulmonary clearance for cystoscopy procedure 7/24/2025.      History of Present Illness  The patient is an 85-year-old male who presents for surgical clearance.  Patient is here with his spouse.    He reports satisfactory respiratory function today, with oxygen saturation levels ranging between 98 and 99 percent.  He has O2 that uses as needed and is benefiting from the supplemental oxygen.  He has a cystoscopy scheduled in 2 days to remove a kidney stone. He is not experiencing any pain from the stone but was informed that it could potentially lead to kidney complications in the future. Additionally, he mentions the presence of several smaller stones. He has a cystoscopy scheduled in 2 days to remove a kidney stone.  He continues on Trelegy 200 with albuterol and duo nebulizer as needed.  He does not need refills today.  Overall he feels his breathing is good today and is at his baseline.  He reports he does have shortness of air with exertional activity.     At present time patient denies dyspnea, coughing, wheezing, headaches, chest pain, weight loss or hemoptysis. Patient denies fevers, chills and night sweats. Carlos Zimmerman is able to perform ADLs.      I have personally reviewed the review of systems, past family,  social, medical and surgical histories; and agree with their findings.      Review of Systems   Constitutional: Negative.    HENT: Negative.     Respiratory:  Positive for shortness of breath.    Cardiovascular: Negative.    Musculoskeletal: Negative.    Neurological: Negative.    Psychiatric/Behavioral: Negative.           Family History   Problem Relation Age of Onset    Cancer Mother     Stomach cancer Other     Malig Hyperthermia Neg Hx         Social History     Socioeconomic History    Marital status:    Tobacco Use    Smoking status: Former     Current packs/day: 0.00     Average packs/day: 1 pack/day for 60.0 years (60.0 ttl pk-yrs)     Types: Cigarettes     Start date: 1950     Quit date: 2010     Years since quitting: 15.5     Passive exposure: Past    Smokeless tobacco: Current     Types: Snuff    Tobacco comments:     INST PER ANESTHESIA PROTOCOL   Vaping Use    Vaping status: Never Used   Substance and Sexual Activity    Alcohol use: Not Currently    Drug use: Never    Sexual activity: Defer        Past Medical History:   Diagnosis Date    Anemia     NO CURRENT ISSUES    Arthritis     COPD (chronic obstructive pulmonary disease)     INHALER  PRN    Hyperlipidemia     Hypertension     ON MEDS/FOLLOWS SAHNI, PT DENIES CAD    Lung cancer     REMOVED - 2016    Pneumonia     LEFT LUNG CA NO CURRENT PNEUMONIA    SOB (shortness of breath)     WITH EXERTION SOMETIMES    Stroke     LEFT LEG/FOOT DROP  2019    TIA (transient ischemic attack)     NO RESIDUAL 2010        Immunization History   Administered Date(s) Administered    COVID-19 (PFIZER) 12YRS+ (COMIRNATY) 11/10/2023    COVID-19 (PFIZER) Purple Cap Monovalent 02/28/2021, 03/11/2021, 10/18/2021    FLUAD TRI 65YR+ 09/06/2024    Fluad Quad 65+ 10/07/2020, 09/24/2021    Fluzone High-Dose 65+yrs 10/06/2022, 11/11/2023    Influenza, Unspecified 09/01/2020    Pneumococcal Conjugate 20-Valent (PCV20) 04/22/2024    Pneumococcal, Unspecified 02/13/2006    TD  Preservative Free (Tenivac) 06/27/2022       No Known Allergies       Current Outpatient Medications:     albuterol sulfate  (90 Base) MCG/ACT inhaler, Inhale 2 puffs Every 4 (Four) Hours As Needed for Wheezing or Shortness of Air., Disp: 18 g, Rfl: 3    aspirin 81 MG EC tablet, Take 1 tablet by mouth Daily., Disp: , Rfl:     atorvastatin (LIPITOR) 10 MG tablet, Take 4 tablets by mouth Daily., Disp: , Rfl:     B Complex Vitamins (VITAMIN-B COMPLEX PO), Take 1 tablet by mouth Daily., Disp: , Rfl:     cetirizine (zyrTEC) 10 MG tablet, Take 1 tablet by mouth Daily., Disp: , Rfl:     Cholecalciferol (VITAMIN D3 PO), Take 1 tablet by mouth Daily., Disp: , Rfl:     cilostazol (PLETAL) 100 MG tablet, Take 1 tablet by mouth 2 (Two) Times a Day., Disp: , Rfl:     ferrous sulfate 325 (65 FE) MG tablet, Take 1 tablet by mouth Daily With Breakfast., Disp: 90 tablet, Rfl: 3    Fluticasone-Umeclidin-Vilant (TRELEGY ELLIPTA) 200-62.5-25 MCG/ACT inhaler, Inhale 1 puff Daily., Disp: 1 each, Rfl: 11    gabapentin (NEURONTIN) 300 MG capsule, Take 1 capsule by mouth 3 (Three) Times a Day., Disp: 90 capsule, Rfl: 5    ipratropium-albuterol (DUO-NEB) 0.5-2.5 mg/3 ml nebulizer, Take 3 mL by nebulization 4 (Four) Times a Day As Needed for Wheezing or Shortness of Air., Disp: 360 mL, Rfl: 3    metoprolol succinate XL (TOPROL-XL) 25 MG 24 hr tablet, Take 1 tablet by mouth Daily., Disp: , Rfl:     multivitamin with minerals tablet tablet, Take 1 tablet by mouth Daily., Disp: , Rfl:     multivitamins-minerals (PRESERVISION AREDS 2) capsule capsule, Take 1 capsule by mouth 2 (Two) Times a Day., Disp: , Rfl:     pantoprazole (PROTONIX) 40 MG EC tablet, Take 1 tablet by mouth Daily., Disp: , Rfl:     pravastatin (PRAVACHOL) 40 MG tablet, Take 1 tablet by mouth Daily., Disp: , Rfl:     prednisoLONE acetate (PRED FORTE) 1 % ophthalmic suspension, , Disp: , Rfl:     Refresh Tears 0.5 % solution, Administer 1 drop to both eyes Daily As Needed  "for Dry Eyes., Disp: , Rfl:     tamsulosin (FLOMAX) 0.4 MG capsule 24 hr capsule, Take 1 capsule by mouth 2 (Two) Times a Day., Disp: 180 capsule, Rfl: 4    HYDROcodone-acetaminophen (NORCO) 5-325 MG per tablet, Take 1 tablet by mouth Every 6 (Six) Hours As Needed for Moderate Pain. (Patient not taking: Reported on 7/22/2025), Disp: 15 tablet, Rfl: 0    metoprolol tartrate (LOPRESSOR) 25 MG tablet, Take 0.5 tablets by mouth Daily. (Patient not taking: Reported on 7/17/2025), Disp: , Rfl:     naloxone (NARCAN) 4 MG/0.1ML nasal spray, , Disp: , Rfl:     ondansetron ODT (ZOFRAN-ODT) 4 MG disintegrating tablet, Take 1 tablet by mouth Every 6 (Six) Hours As Needed for Nausea or Vomiting. (Patient not taking: Reported on 7/22/2025), Disp: 20 tablet, Rfl: 0         Vital Signs   /89 (BP Location: Left arm, Patient Position: Sitting, Cuff Size: Adult)   Pulse 97   Temp 96.7 °F (35.9 °C)   Resp 16   Ht 170.2 cm (67.01\")   Wt 70.3 kg (155 lb)   SpO2 96%   BMI 24.27 kg/m²       Objective     Physical Exam  Vitals reviewed.   Constitutional:       General: He is not in acute distress.     Appearance: Normal appearance. He is not ill-appearing.   HENT:      Head: Normocephalic and atraumatic.      Nose: Nose normal.      Mouth/Throat:      Mouth: Mucous membranes are moist.      Pharynx: Oropharynx is clear.   Eyes:      Extraocular Movements: Extraocular movements intact.      Conjunctiva/sclera: Conjunctivae normal.      Pupils: Pupils are equal, round, and reactive to light.   Cardiovascular:      Rate and Rhythm: Normal rate and regular rhythm.      Pulses: Normal pulses.      Heart sounds: Normal heart sounds.   Pulmonary:      Effort: Pulmonary effort is normal. No respiratory distress.      Breath sounds: Normal breath sounds. No stridor. No wheezing, rhonchi or rales.   Abdominal:      General: Bowel sounds are normal.   Musculoskeletal:         General: Normal range of motion.      Cervical back: Normal " range of motion and neck supple.   Skin:     General: Skin is warm and dry.   Neurological:      Mental Status: He is alert and oriented to person, place, and time.   Psychiatric:         Behavior: Behavior normal.         Physical Exam  Heart: Normal heart sounds  Lungs: Clear to auscultation bilaterally         Results Review  I have personally reviewed the prior office notes, hospital records, labs, and diagnostics.  CT Chest With Contrast Diagnostic [CSC045] (Order [id number])  Order  Status: Final result     Study Notes     Maria Esther Bolanos on 6/13/2025  1:41 PM EDT   LT LUNG MASS, QUIT SMOKING 2010, PT REPORTS H/O LUNG CA OF LLL, PARTIAL LT LUNG RESECTION.     Appointment Information    PACS Images     Radiology Images  Study Result    Narrative & Impression   CT CHEST W CONTRAST DIAGNOSTIC     Date of Exam: 6/13/2025 1:40 PM EDT     Indication: History of lung cancer..     Comparison: CT chest dated 2/7/2025     Technique: Axial CT images were obtained of the chest after the uneventful intravenous administration of iodinated contrast.  Reconstructed coronal and sagittal images were also obtained. Automated exposure control and iterative construction methods were   used.        Findings:  The visualized soft tissue structures at the base of the neck including the thyroid appear within normal limits. There is no lower cervical or axillary adenopathy.     The heart size is normal. There is a small pericardial effusion measuring up to 11 mm in thickness, similar to the prior examination. The aorta is normal in caliber without evidence of aneurysm formation. There is coronary and aortic atherosclerotic   calcification. The main pulmonary artery appears normal in caliber. There is no filling defect within the pulmonary arterial system to suggest presence of underlying pulmonary embolism. There is no mediastinal or hilar lymphadenopathy.     There are postsurgical changes of prior left upper lobe resection. There is  decrease in size of the mixed solid and groundglass opacity within the apical aspect of the remnant left lung. This measures 4.9 x 4.0 cm on today's examination, previously   measured 5.4 x 4.0 cm. This likely represents evolving posttreatment changes with resolving surrounding pneumonitis. There is essentially complete resolution of the previously seen right lower lobe airspace disease with only a minimal amount of residual   scarring. There is mild patchy peripheral airspace opacity within the right middle lobe likely representing mild residual infectious or inflammatory process. Previously noted diffuse groundglass appearance of the right upper lobe has also resolved.   Background nodular groundglass opacities best seen on image 48 of series 3 remain present. This measures 11 mm posteriorly and 10 mm anteriorly. There is a stable 5 mm solid nodule within the posterior right upper lobe along the right major fissure best   seen on image 51.     The esophagus is normal in course and caliber. Visualized portions of the upper abdomen demonstrate pancreatic cystic lesions within the pancreatic head and tail. There is atherosclerotic calcification of the abdominal aorta.      There is multilevel degenerative disc disease of the thoracic spine with anterior wedging compression deformities at the T8 and T12 levels which appears unchanged. There is no apparent chest wall mass. There is asymmetric atrophy of the left-sided   shoulder musculature.     IMPRESSION:  Impression:  1.Postsurgical changes of prior left upper lobe resection. Decrease in size of the mixed solid and groundglass opacity within the apical aspect of the remnant left lung likely representing evolving posttreatment changes.  2.Near complete resolution of the previously seen right lower lobe airspace disease with only minimal residual scarring.  3.Mild patchy peripheral airspace opacity within the right middle lobe and right upper lobe likely representing  mild residual infectious or inflammatory process.  4.Stable 5 mm solid nodule within the posterior right upper lobe along the right major fissure.  5.Small pericardial effusion measuring up to 11 mm in thickness, similar to the prior examination.           Electronically Signed: Shashi Ulloa MD    6/17/2025 10:12 AM EDT    Workstation ID: [id number]             Assessment         Patient Active Problem List   Diagnosis    COPD (chronic obstructive pulmonary disease)    Hypertension    Low back pain    Lung disease    Solitary lung nodule    Pneumonia of right lung due to infectious organism    Hematuria    Complicated UTI (urinary tract infection)    Recurrent urinary tract infection    Benign prostatic hyperplasia    Atherosclerosis of lower extremity with claudication    Benign prostatic hyperplasia with lower urinary tract symptoms    Elevated PSA    Cervical radiculopathy    Cervical spondylosis without myelopathy    Abnormal PET scan of lung    Lung nodule    Malignant neoplasm of upper lobe, left bronchus or lung    Sepsis due to pneumonia    Airway clearance impairment    Recurrent pneumonia    Pneumonia due to Pseudomonas species    Nephrolithiasis    Ureteral stone        Plan     Diagnoses and all orders for this visit:    1. Dyspnea on exertion (Primary)    2. Pulmonary emphysema, unspecified emphysema type    3. Nicotine dependence, cigarettes, in remission    4. Airway clearance impairment    5. Solitary lung nodule    6. Malignant neoplasm of upper lobe, left bronchus or lung         Assessment & Plan  1. Preoperative clearance.  Scheduled for a cystoscopy on 07/24/2025 to address a kidney stone. Respiratory function is satisfactory, with clear lung sounds and good heart function. The necessary forms for surgical clearance will be completed and forwarded to the appropriate department.    2. Medication management.  Sufficient Trelegy until 04/2026 and nebulizer medication until 12/2025. The albuterol  prescription will  in 2025. No additional refills are needed at this time.    Follow-up  A follow-up appointment is scheduled for 10/2025.      Carlos Zimmerman is cleared for surgery with moderate risk for perioperative complications.  Patient has pulmonary emphysema, airway clearance impairment, dyspnea on exertion.  Preoperative bronchodilators via nebulizer, incentive spirometry, early ambulation and use of noninvasive positive pressure ventilation as soon as the patient is extubated would help recommend postoperative nebulizers incentive spirometer as well as flutter to help decrease risk of atelectasis and pneumonia.    Smoking status:  reports that he quit smoking about 15 years ago. His smoking use included cigarettes. He started smoking about 75 years ago. He has a 60 pack-year smoking history. He has been exposed to tobacco smoke. His smokeless tobacco use includes snuff.    Vaccination status: Reviewed  Immunization History   Administered Date(s) Administered    COVID-19 (PFIZER) 12YRS+ (COMIRNATY) 11/10/2023    COVID-19 (PFIZER) Purple Cap Monovalent 2021, 2021, 10/18/2021    FLUAD TRI 65YR+ 2024    Fluad Quad 65+ 10/07/2020, 2021    Fluzone High-Dose 65+yrs 10/06/2022, 2023    Influenza, Unspecified 2020    Pneumococcal Conjugate 20-Valent (PCV20) 2024    Pneumococcal, Unspecified 2006    TD Preservative Free (Tenivac) 2022        Medications personally reviewed    Follow Up  Return in about 3 months (around 10/22/2025).    Patient was given instructions and counseling regarding his condition or for health maintenance advice. Please see specific information pulled into the AVS if appropriate.     I spent 15 minutes caring for Carlos Zimmerman on this date of service. This time includes time spent by me in the following activities:preparing for the visit, reviewing tests, obtaining and/or reviewing a separately obtained history, performing a  medically appropriate examination and/or evaluation, counseling and educating the patient/family/caregiver, ordering medications, tests, or procedures, documenting information in the medical record, independently interpreting results and communicating that information with the patient/family/caregiver and answered questions family members, discuss medications.

## 2025-07-22 ENCOUNTER — OFFICE VISIT (OUTPATIENT)
Dept: PULMONOLOGY | Facility: CLINIC | Age: 85
End: 2025-07-22
Payer: MEDICARE

## 2025-07-22 ENCOUNTER — TELEPHONE (OUTPATIENT)
Dept: UROLOGY | Age: 85
End: 2025-07-22
Payer: MEDICARE

## 2025-07-22 VITALS
SYSTOLIC BLOOD PRESSURE: 131 MMHG | TEMPERATURE: 96.7 F | HEIGHT: 67 IN | WEIGHT: 155 LBS | RESPIRATION RATE: 16 BRPM | OXYGEN SATURATION: 96 % | BODY MASS INDEX: 24.33 KG/M2 | DIASTOLIC BLOOD PRESSURE: 89 MMHG | HEART RATE: 97 BPM

## 2025-07-22 DIAGNOSIS — N39.0 RECURRENT URINARY TRACT INFECTION: ICD-10-CM

## 2025-07-22 DIAGNOSIS — C34.12 MALIGNANT NEOPLASM OF UPPER LOBE, LEFT BRONCHUS OR LUNG: ICD-10-CM

## 2025-07-22 DIAGNOSIS — N20.0 NEPHROLITHIASIS: Primary | ICD-10-CM

## 2025-07-22 DIAGNOSIS — F17.211 NICOTINE DEPENDENCE, CIGARETTES, IN REMISSION: ICD-10-CM

## 2025-07-22 DIAGNOSIS — C34.12 MALIGNANT NEOPLASM OF UPPER LOBE, LEFT BRONCHUS OR LUNG: Primary | ICD-10-CM

## 2025-07-22 DIAGNOSIS — R06.09 DYSPNEA ON EXERTION: Primary | ICD-10-CM

## 2025-07-22 DIAGNOSIS — R91.1 SOLITARY LUNG NODULE: ICD-10-CM

## 2025-07-22 DIAGNOSIS — J43.9 PULMONARY EMPHYSEMA, UNSPECIFIED EMPHYSEMA TYPE: ICD-10-CM

## 2025-07-22 DIAGNOSIS — R06.89 AIRWAY CLEARANCE IMPAIRMENT: ICD-10-CM

## 2025-07-22 DIAGNOSIS — R97.20 ELEVATED PSA: Primary | ICD-10-CM

## 2025-07-22 LAB — BACTERIA SPEC AEROBE CULT: ABNORMAL

## 2025-07-22 PROCEDURE — 99214 OFFICE O/P EST MOD 30 MIN: CPT | Performed by: NURSE PRACTITIONER

## 2025-07-22 PROCEDURE — 1159F MED LIST DOCD IN RCRD: CPT | Performed by: NURSE PRACTITIONER

## 2025-07-22 PROCEDURE — 1160F RVW MEDS BY RX/DR IN RCRD: CPT | Performed by: NURSE PRACTITIONER

## 2025-07-22 RX ORDER — CIPROFLOXACIN 500 MG/1
500 TABLET, FILM COATED ORAL 2 TIMES DAILY
Qty: 6 TABLET | Refills: 0 | Status: SHIPPED | OUTPATIENT
Start: 2025-07-22 | End: 2025-07-22 | Stop reason: SDUPTHER

## 2025-07-22 RX ORDER — CIPROFLOXACIN 500 MG/1
500 TABLET, FILM COATED ORAL 2 TIMES DAILY
Qty: 6 TABLET | Refills: 0 | Status: SHIPPED | OUTPATIENT
Start: 2025-07-22

## 2025-07-22 NOTE — TELEPHONE ENCOUNTER
M for patient to call back. Dr. Mai wants to start patient on Cipro twice a day for 3 days based on urine culture  results.

## 2025-07-22 NOTE — TELEPHONE ENCOUNTER
PATIENT CALLED I TOLD HIM ABOUT THE CIPRO BEING CALLED IN. HE WOULD LIKE THE CIPRO TO BE CALLED IN TO [facility] [place]. I TOLD STEVIE AND SHE WILL CALL THIS IN. PATIENT ALSO ASKED ABOUT HIS ARRIVAL TIME. I TOLD HIM THE HOSPITAL WILL CALL HIM THE DAY BEFORE WITH HIS ARRIVAL TIME.

## 2025-07-24 ENCOUNTER — TELEPHONE (OUTPATIENT)
Dept: UROLOGY | Age: 85
End: 2025-07-24

## 2025-07-24 ENCOUNTER — ANESTHESIA (OUTPATIENT)
Dept: PERIOP | Facility: HOSPITAL | Age: 85
End: 2025-07-24
Payer: MEDICARE

## 2025-07-24 ENCOUNTER — ANESTHESIA EVENT (OUTPATIENT)
Dept: PERIOP | Facility: HOSPITAL | Age: 85
End: 2025-07-24
Payer: MEDICARE

## 2025-07-24 ENCOUNTER — HOSPITAL ENCOUNTER (OUTPATIENT)
Facility: HOSPITAL | Age: 85
Discharge: HOME | End: 2025-07-24
Attending: UROLOGY | Admitting: UROLOGY
Payer: MEDICARE

## 2025-07-24 ENCOUNTER — APPOINTMENT (OUTPATIENT)
Dept: GENERAL RADIOLOGY | Facility: HOSPITAL | Age: 85
End: 2025-07-24
Payer: MEDICARE

## 2025-07-24 VITALS
RESPIRATION RATE: 16 BRPM | SYSTOLIC BLOOD PRESSURE: 142 MMHG | HEART RATE: 78 BPM | OXYGEN SATURATION: 91 % | TEMPERATURE: 97.7 F | DIASTOLIC BLOOD PRESSURE: 80 MMHG

## 2025-07-24 DIAGNOSIS — N20.1 URETERAL STONE: ICD-10-CM

## 2025-07-24 DIAGNOSIS — N20.0 NEPHROLITHIASIS: Primary | ICD-10-CM

## 2025-07-24 PROCEDURE — 82365 CALCULUS SPECTROSCOPY: CPT | Performed by: UROLOGY

## 2025-07-24 PROCEDURE — C1758 CATHETER, URETERAL: HCPCS | Performed by: UROLOGY

## 2025-07-24 PROCEDURE — A9270 NON-COVERED ITEM OR SERVICE: HCPCS | Performed by: ANESTHESIOLOGY

## 2025-07-24 PROCEDURE — 52356 CYSTO/URETERO W/LITHOTRIPSY: CPT | Performed by: UROLOGY

## 2025-07-24 PROCEDURE — 25010000002 DEXAMETHASONE PER 1 MG: Performed by: NURSE ANESTHETIST, CERTIFIED REGISTERED

## 2025-07-24 PROCEDURE — 88300 SURGICAL PATH GROSS: CPT | Performed by: UROLOGY

## 2025-07-24 PROCEDURE — 25010000002 PROPOFOL 10 MG/ML EMULSION: Performed by: NURSE ANESTHETIST, CERTIFIED REGISTERED

## 2025-07-24 PROCEDURE — 25010000002 CEFAZOLIN PER 500 MG: Performed by: UROLOGY

## 2025-07-24 PROCEDURE — 25010000002 FENTANYL CITRATE (PF) 50 MCG/ML SOLUTION: Performed by: NURSE ANESTHETIST, CERTIFIED REGISTERED

## 2025-07-24 PROCEDURE — 25510000001 IOPAMIDOL PER 1 ML: Performed by: UROLOGY

## 2025-07-24 PROCEDURE — 25810000003 LACTATED RINGERS PER 1000 ML: Performed by: ANESTHESIOLOGY

## 2025-07-24 PROCEDURE — C2617 STENT, NON-COR, TEM W/O DEL: HCPCS | Performed by: UROLOGY

## 2025-07-24 PROCEDURE — 76000 FLUOROSCOPY <1 HR PHYS/QHP: CPT

## 2025-07-24 PROCEDURE — 25010000002 ONDANSETRON PER 1 MG: Performed by: NURSE ANESTHETIST, CERTIFIED REGISTERED

## 2025-07-24 PROCEDURE — 25010000002 LIDOCAINE PF 2% 2 % SOLUTION: Performed by: NURSE ANESTHETIST, CERTIFIED REGISTERED

## 2025-07-24 PROCEDURE — C1769 GUIDE WIRE: HCPCS | Performed by: UROLOGY

## 2025-07-24 PROCEDURE — 63710000001 ACETAMINOPHEN EXTRA STRENGTH 500 MG TABLET: Performed by: ANESTHESIOLOGY

## 2025-07-24 DEVICE — URETERAL STENT
Type: IMPLANTABLE DEVICE | Site: URETER | Status: FUNCTIONAL
Brand: ASCERTA™

## 2025-07-24 RX ORDER — SODIUM CHLORIDE, SODIUM LACTATE, POTASSIUM CHLORIDE, CALCIUM CHLORIDE 600; 310; 30; 20 MG/100ML; MG/100ML; MG/100ML; MG/100ML
9 INJECTION, SOLUTION INTRAVENOUS CONTINUOUS PRN
Status: DISCONTINUED | OUTPATIENT
Start: 2025-07-24 | End: 2025-07-24 | Stop reason: HOSPADM

## 2025-07-24 RX ORDER — ONDANSETRON 4 MG/1
4 TABLET, ORALLY DISINTEGRATING ORAL ONCE AS NEEDED
Status: DISCONTINUED | OUTPATIENT
Start: 2025-07-24 | End: 2025-07-24 | Stop reason: HOSPADM

## 2025-07-24 RX ORDER — ONDANSETRON 2 MG/ML
INJECTION INTRAMUSCULAR; INTRAVENOUS AS NEEDED
Status: DISCONTINUED | OUTPATIENT
Start: 2025-07-24 | End: 2025-07-24 | Stop reason: SURG

## 2025-07-24 RX ORDER — LIDOCAINE HYDROCHLORIDE 20 MG/ML
INJECTION, SOLUTION EPIDURAL; INFILTRATION; INTRACAUDAL; PERINEURAL AS NEEDED
Status: DISCONTINUED | OUTPATIENT
Start: 2025-07-24 | End: 2025-07-24 | Stop reason: SURG

## 2025-07-24 RX ORDER — HYDROCODONE BITARTRATE AND ACETAMINOPHEN 5; 325 MG/1; MG/1
1 TABLET ORAL ONCE AS NEEDED
Status: DISCONTINUED | OUTPATIENT
Start: 2025-07-24 | End: 2025-07-24 | Stop reason: HOSPADM

## 2025-07-24 RX ORDER — IOPAMIDOL 510 MG/ML
INJECTION, SOLUTION INTRAVASCULAR AS NEEDED
Status: DISCONTINUED | OUTPATIENT
Start: 2025-07-24 | End: 2025-07-24 | Stop reason: HOSPADM

## 2025-07-24 RX ORDER — PROPOFOL 10 MG/ML
VIAL (ML) INTRAVENOUS AS NEEDED
Status: DISCONTINUED | OUTPATIENT
Start: 2025-07-24 | End: 2025-07-24 | Stop reason: SURG

## 2025-07-24 RX ORDER — PROMETHAZINE HYDROCHLORIDE 25 MG/1
25 SUPPOSITORY RECTAL ONCE AS NEEDED
Status: DISCONTINUED | OUTPATIENT
Start: 2025-07-24 | End: 2025-07-24 | Stop reason: HOSPADM

## 2025-07-24 RX ORDER — HYDROCODONE BITARTRATE AND ACETAMINOPHEN 5; 325 MG/1; MG/1
1 TABLET ORAL EVERY 12 HOURS PRN
Qty: 10 TABLET | Refills: 0 | Status: SHIPPED | OUTPATIENT
Start: 2025-07-24

## 2025-07-24 RX ORDER — SODIUM CHLORIDE 9 MG/ML
40 INJECTION, SOLUTION INTRAVENOUS AS NEEDED
Status: DISCONTINUED | OUTPATIENT
Start: 2025-07-24 | End: 2025-07-24 | Stop reason: HOSPADM

## 2025-07-24 RX ORDER — SODIUM CHLORIDE 0.9 % (FLUSH) 0.9 %
10 SYRINGE (ML) INJECTION AS NEEDED
Status: DISCONTINUED | OUTPATIENT
Start: 2025-07-24 | End: 2025-07-24 | Stop reason: HOSPADM

## 2025-07-24 RX ORDER — ONDANSETRON 2 MG/ML
4 INJECTION INTRAMUSCULAR; INTRAVENOUS ONCE AS NEEDED
Status: DISCONTINUED | OUTPATIENT
Start: 2025-07-24 | End: 2025-07-24 | Stop reason: HOSPADM

## 2025-07-24 RX ORDER — OXYCODONE HYDROCHLORIDE 5 MG/1
5 TABLET ORAL
Status: DISCONTINUED | OUTPATIENT
Start: 2025-07-24 | End: 2025-07-24 | Stop reason: HOSPADM

## 2025-07-24 RX ORDER — ACETAMINOPHEN 325 MG/1
650 TABLET ORAL ONCE
Status: DISCONTINUED | OUTPATIENT
Start: 2025-07-24 | End: 2025-07-24 | Stop reason: HOSPADM

## 2025-07-24 RX ORDER — IPRATROPIUM BROMIDE AND ALBUTEROL SULFATE 2.5; .5 MG/3ML; MG/3ML
3 SOLUTION RESPIRATORY (INHALATION) ONCE
Status: COMPLETED | OUTPATIENT
Start: 2025-07-24 | End: 2025-07-24

## 2025-07-24 RX ORDER — PROMETHAZINE HYDROCHLORIDE 12.5 MG/1
12.5 TABLET ORAL ONCE AS NEEDED
Status: DISCONTINUED | OUTPATIENT
Start: 2025-07-24 | End: 2025-07-24 | Stop reason: HOSPADM

## 2025-07-24 RX ORDER — PROMETHAZINE HYDROCHLORIDE 25 MG/1
25 TABLET ORAL ONCE AS NEEDED
Status: DISCONTINUED | OUTPATIENT
Start: 2025-07-24 | End: 2025-07-24 | Stop reason: HOSPADM

## 2025-07-24 RX ORDER — MAGNESIUM HYDROXIDE 1200 MG/15ML
LIQUID ORAL AS NEEDED
Status: DISCONTINUED | OUTPATIENT
Start: 2025-07-24 | End: 2025-07-24 | Stop reason: HOSPADM

## 2025-07-24 RX ORDER — ACETAMINOPHEN 500 MG
500 TABLET ORAL ONCE
Status: COMPLETED | OUTPATIENT
Start: 2025-07-24 | End: 2025-07-24

## 2025-07-24 RX ORDER — DEXAMETHASONE SODIUM PHOSPHATE 4 MG/ML
INJECTION, SOLUTION INTRA-ARTICULAR; INTRALESIONAL; INTRAMUSCULAR; INTRAVENOUS; SOFT TISSUE AS NEEDED
Status: DISCONTINUED | OUTPATIENT
Start: 2025-07-24 | End: 2025-07-24 | Stop reason: SURG

## 2025-07-24 RX ORDER — SODIUM CHLORIDE 0.9 % (FLUSH) 0.9 %
3 SYRINGE (ML) INJECTION EVERY 12 HOURS SCHEDULED
Status: DISCONTINUED | OUTPATIENT
Start: 2025-07-24 | End: 2025-07-24 | Stop reason: HOSPADM

## 2025-07-24 RX ORDER — FENTANYL CITRATE 50 UG/ML
INJECTION, SOLUTION INTRAMUSCULAR; INTRAVENOUS AS NEEDED
Status: DISCONTINUED | OUTPATIENT
Start: 2025-07-24 | End: 2025-07-24 | Stop reason: SURG

## 2025-07-24 RX ADMIN — SODIUM CHLORIDE, POTASSIUM CHLORIDE, SODIUM LACTATE AND CALCIUM CHLORIDE 9 ML/HR: 600; 310; 30; 20 INJECTION, SOLUTION INTRAVENOUS at 08:37

## 2025-07-24 RX ADMIN — FENTANYL CITRATE 50 MCG: 50 INJECTION, SOLUTION INTRAMUSCULAR; INTRAVENOUS at 10:14

## 2025-07-24 RX ADMIN — SODIUM CHLORIDE 2000 MG: 9 INJECTION, SOLUTION INTRAVENOUS at 10:17

## 2025-07-24 RX ADMIN — ONDANSETRON 4 MG: 2 INJECTION INTRAMUSCULAR; INTRAVENOUS at 10:22

## 2025-07-24 RX ADMIN — LIDOCAINE HYDROCHLORIDE 100 MG: 20 INJECTION, SOLUTION INTRAVENOUS at 10:14

## 2025-07-24 RX ADMIN — PROPOFOL 100 MG: 10 INJECTION, EMULSION INTRAVENOUS at 10:14

## 2025-07-24 RX ADMIN — PROPOFOL 50 MG: 10 INJECTION, EMULSION INTRAVENOUS at 10:40

## 2025-07-24 RX ADMIN — DEXAMETHASONE SODIUM PHOSPHATE 4 MG: 4 INJECTION, SOLUTION INTRAMUSCULAR; INTRAVENOUS at 10:22

## 2025-07-24 RX ADMIN — IPRATROPIUM BROMIDE AND ALBUTEROL SULFATE 3 ML: .5; 3 SOLUTION RESPIRATORY (INHALATION) at 08:37

## 2025-07-24 RX ADMIN — ACETAMINOPHEN 500 MG: 500 TABLET ORAL at 08:42

## 2025-07-24 NOTE — DISCHARGE INSTRUCTIONS
DISCHARGE INSTRUCTIONS  Ureteroscopy Lasertripsy  Stent Placement      For your surgery you had:  General anesthesia (you may have a sore throat for the first 24 hours)  IV sedation    You may experience dizziness, drowsiness, or lightheadedness for several hours following surgery.  Do not stay alone today or tonight.  Limit your activity for 24 hours.  You should not drive or operate machinery, drink alcohol, or sign legally binding documents for 24 hours or while you are taking pain medication.  Resume your diet slowly.  Follow any special dietary instructions you may have been given by your doctor.     NOTIFY YOUR DOCTOR IF YOU EXPERIENCE ANY OF THE FOLLOWING:  Temperature greater than 101 degrees Fahrenheit  Shaking Chills  Redness or excessive drainage from incision  Nausea, vomiting and/or pain that is not controlled by prescribed medications  Increase in bleeding or bleeding that is excessive  Unable to urinate in 6 hours after surgery  If unable to reach your doctor, please go to the closest Emergency Room  Strain urine if instructed by physician.  Collect any fragments and take with you on your scheduled appointment. You may pass stone pieces or small blood clots.  Blood in your urine is normal.  It could be light pink to cherry color.  Drink 6-8 glasses of fluid each day to assist with passing of stone fragments.  Back pain is common.  It may feel like a dull ache or back spasm.  Urine will be bloody for several days.  Slight redness or bruising may be noticed on treated side.  If you have difficulty urinating, try sitting in a bathtub of warm water.    If you have a stent, it must be managed by your urologist.  Do NOT forget.  Medications per physician instructions as indicated on Discharge Medication Information Sheet.    Last dose of pain medication was given at:   500mg Tylenol @ 0842. Do not exceed 4000mg in 24 hours.       SPECIAL INSTRUCTIONS:  1) No driving while taking narcotics or for 24 hour  after anesthsia   2) May shower / sponge bathe   3) Pull stent by string on Tuesday am

## 2025-07-24 NOTE — H&P
[facility]   UROLOGY HISTORY AND PHYSICAL    Patient Name: Carlos Zimmerman  : 1940  MRN: [id number]  Primary Care Physician:  Payam Carroll MD  Date of admission: 2025    Subjective   Subjective     Chief Complaint:     Ureteral stone    HPI:    Carlos Zimmerman is a 85 y.o. male     Ureteral stone    No change in H&P    Review of Systems     10 systems reviewed and are negative other than what is listed in HPI    Personal History     Past Medical History:   Diagnosis Date    Anemia     NO CURRENT ISSUES    Arthritis     COPD (chronic obstructive pulmonary disease)     INHALER  PRN    Hyperlipidemia     Hypertension     ON MEDS/FOLLOWS SAHNI, PT DENIES CAD    Lung cancer     REMOVED -     Pneumonia     LEFT LUNG CA NO CURRENT PNEUMONIA    SOB (shortness of breath)     WITH EXERTION SOMETIMES    Stroke     LEFT LEG/FOOT DROP      TIA (transient ischemic attack)     NO RESIDUAL 2010       Past Surgical History:   Procedure Laterality Date    BRONCHOSCOPY Bilateral 2024    Procedure: BRONCHOSCOPY WITH BAL, BIOPSY, BRONCHIAL WASHINGS;  Surgeon: Dayne Choudhary MD;  Location: [facility] ENDOSCOPY;  Service: Pulmonary;  Laterality: Bilateral;  PERSISTENT PNEUMONIA, LEFT UPPER LOBE INFILTRATE    BRONCHOSCOPY WITH ION ROBOTIC ASSIST N/A 3/4/2024    Procedure: BRONCHOSCOPY WITH ION ROBOT, REBUS, NEEDLE ASPIRATE, BIOPSIES, BAL, WASHINGS;  Surgeon: Dayne Choudhary MD;  Location: [facility] MAIN OR;  Service: Robotics - Pulmonary;  Laterality: N/A;    CARDIAC CATHETERIZATION Left 11/10/2022    Procedure: Aortogram with left leg angiogram, possible angioplasty or stenting;  Surgeon: Cuauhtemoc Thomas MD;  Location: [facility] CATH INVASIVE LOCATION;  Service: Vascular;  Laterality: Left;    CARDIAC CATHETERIZATION Right 2023    Procedure: Right leg angiogram, possible angioplasty or stenting;  Surgeon: Cuauhtemoc Thomas MD;  Location: [facility] CATH INVASIVE LOCATION;  Service: Vascular;  Laterality:  Right;    CARPAL TUNNEL RELEASE Left 02/09/2024    Procedure: CARPAL TUNNEL RELEASE, left;  Surgeon: Rg Tom MD;  Location: [facility] OR;  Service: Neurosurgery;  Laterality: Left;    LUNG BIOPSY      LUNG SURGERY      REMOVAL HALF OF UPPER PORTION LEFT LUNG    ORTHOPEDIC SURGERY Left     ROTATOR CUFF    THROAT SURGERY      throat diverticulum repair       Family History: family history includes Cancer in his mother; Stomach cancer in an other family member. Otherwise pertinent FHx was reviewed and not pertinent to current issue.    Social History:  reports that he quit smoking about 15 years ago. His smoking use included cigarettes. He started smoking about 75 years ago. He has a 60 pack-year smoking history. He has been exposed to tobacco smoke. His smokeless tobacco use includes snuff. He reports that he does not currently use alcohol. He reports that he does not use drugs.    Home Medications:  B Complex Vitamins, Cholecalciferol, Fluticasone-Umeclidin-Vilant, HYDROcodone-acetaminophen, albuterol sulfate HFA, aspirin, atorvastatin, carboxymethylcellulose, cetirizine, cilostazol, ciprofloxacin, ferrous sulfate, gabapentin, ipratropium-albuterol, metoprolol succinate XL, metoprolol tartrate, multivitamin with minerals, multivitamins-minerals, naloxone, ondansetron ODT, pantoprazole, pravastatin, prednisoLONE acetate, and tamsulosin      Allergies:  No Known Allergies    Objective   Objective     Vitals:   Temp:  [98.2 °F (36.8 °C)] 98.2 °F (36.8 °C)  Heart Rate:  [87] 87  Resp:  [20] 20  BP: (147)/(70) 147/70  Physical Exam    Constitutional: Awake, alert    Respiratory: Clear to auscultation bilaterally, nonlabored respirations    Cardiovascular: RRR, no murmurs, rubs, or gallops, palpable pedal pulses bilaterally   Gastrointestinal: Positive bowel sounds, soft, nontender, nondistended   Musculoskeletal: No bilateral ankle edema, no clubbing or cyanosis to extremities     Result Review    Result  Review:  I have personally reviewed the results from the time of this admission to 7/24/2025 08:33 EDT and agree with these findings:  []  Laboratory  []  Microbiology  []  Radiology  []  EKG/Telemetry   []  Cardiology/Vascular   []  Pathology  []  Old records  []  Other:    Assessment & Plan   Assessment / Plan     Brief Patient Summary:  Carlos Zimmerman is a 85 y.o. male     Active Hospital Problems:  Active Hospital Problems    Diagnosis     **Ureteral stone     Nephrolithiasis        Cystoscopy with left ureteroscopy with laser and left ureteral stent placement.  Risks and benefits were discussed including bleeding, infection and damage to the urinary system.  We also discussed the risk of anesthesia up to and including death.  Patient voiced understanding and would like to proceed.    Electronically signed by Del Mai MD, 07/24/25, 8:33 AM EDT.

## 2025-07-24 NOTE — TELEPHONE ENCOUNTER
----- Message from Prieto ALEJANDRO sent at 7/24/2025 11:11 AM EDT -----  Regarding: FW: fu  1 mo w/ ABBEY please.  ----- Message -----  From: Del Mai MD  Sent: 7/24/2025  10:49 AM EDT  To: Prieto Khan RN  Subject: fu                                               Pull stent by string on Tuesday a.m., follow-up in clinic in 1 month with renal ultrasound

## 2025-07-24 NOTE — ANESTHESIA PREPROCEDURE EVALUATION
Anesthesia Evaluation     Patient summary reviewed and Nursing notes reviewed   no history of anesthetic complications:   NPO Solid Status: > 8 hours  NPO Liquid Status: > 2 hours           Airway   Mallampati: II  TM distance: >3 FB  Neck ROM: full  Dental    (+) upper dentures and lower dentures    Pulmonary - normal exam   (+) a smoker Former, lung cancer, COPD,  Cardiovascular - normal exam  Exercise tolerance: good (4-7 METS)    (+) hypertension, PVD, hyperlipidemia      Neuro/Psych  (+) TIA, CVA (L foot drop) residual symptoms, numbness  GI/Hepatic/Renal/Endo    (+) renal disease- stones    Musculoskeletal     Abdominal  - normal exam   Substance History - negative use     OB/GYN negative ob/gyn ROS         Other   arthritis,   history of cancer    ROS/Med Hx Other: PAT Nursing Notes unavailable.                     Anesthesia Plan    ASA 3     general     intravenous induction     Anesthetic plan, risks, benefits, and alternatives have been provided, discussed and informed consent has been obtained with: patient.    Plan discussed with CRNA.        CODE STATUS:

## 2025-07-24 NOTE — OP NOTE
URETEROSCOPY LASER LITHOTRIPSY WITH STENT INSERTION  Procedure Report    Patient Name:  Carlos Zimmerman  YOB: 1940    Date of Surgery:  7/24/2025      Pre-op Diagnosis:   Ureteral stone [N20.1]       Postop diagnosis:    Same    Procedure/CPT® Codes:  FL CYSTO/URETERO W/LITHOTRIPSY &INDWELL STENT INSRT [20561]    Procedure(s):        Cystoscopy   left ureteroscopy with laser and basket stone extraction  Left ureteral stent placement 6/26 with string left on    Staff:  Surgeon(s):  Del Mai MD         Anesthesia: General    Estimated Blood Loss: minimal    Implants:    Implant Name Type Inv. Item Serial No.  Lot No. LRB No. Used Action   STNT URETRL ASCERTA 6F 26CM - [id number] Stent STNT URETRL ASCERTA 6F 26CM  [employer] [id number] Left 1 Implanted       Specimen:          Specimens       ID Source Type Tests Collected By Collected At Frozen?    A Kidney, Left Calculus TISSUE PATHOLOGY EXAM  STONE ANALYSIS   Del Mai MD 7/24/25 1044     Description: left renal stone                Findings:       3 cm prostate  Normal bladder  UOs very close to the bladder neck    All stones removed from the left side    Stent placed with string      Complications: none    Description of Procedure:   After informed consent patient taken to the operating room.  Patient was laid supine and placed under general anesthesia by the anesthesia team.  At this point patient was placed in dorsal lithotomy position and prepped and draped in normal sterile fashion.  A multidisciplinary timeout was undertaken documenting the correct patient site and procedure.  At this point a 22 rigid cystoscope was placed into the urethra . Bladder was normal.  At this point a Glidewire was placed up the left     ureter without any issue under fluoroscopic guidance.  I then placed a dual-lumen catheter and a stiff wire alongside the Glidewire under fluoroscopic guidance.  The dual-lumen was removed  and a ureteral access sheath was placed into the distal ureter without any problem.  I removed the obturator and wire and placed a flexible ureteroscope up the ueter.  The stone was identified.  Stone was lasered into multiple small fragments with a 272 µm laser fiber and then these pieces were basketed out with a no tip nitinol basket.  I then took the flexible ureteroscope up and check the rest of the ureter and the upper collecting system.  There were no further stones.  Brought the actual sheath out under direct vision and there was no further stones.      Left side was free of stones.  A 6 x 26 ureteral stent was then placed over the Glidewire through a rigid cystoscope without issue and had a good curl in the bladder under direct vision and a good curl in the left   renal pelvis under fluoroscopy.  Bladder was drained.  Patient tolerated the procedure well, he was taken to the postanesthesia care unit without issue.    Stent Placed with string        Del Mai MD     Date: 7/24/2025  Time: 10:45 EDT

## 2025-07-24 NOTE — ANESTHESIA POSTPROCEDURE EVALUATION
Patient: Carlos Zimmerman    Procedure Summary       Date: 07/24/25 Room / Location: [facility] OR 08 / [facility] MAIN OR    Anesthesia Start: 1008 Anesthesia Stop: 1054    Procedure: Cystoscopy with left ureteroscopy with laser and left ureteral stent placement (Left) Diagnosis:       Ureteral stone      (Ureteral stone [N20.1])    Surgeons: Del Mai MD Provider: Ye Stephen MD    Anesthesia Type: general ASA Status: 3            Anesthesia Type: general    Vitals  Vitals Value Taken Time   /62 07/24/25 10:54   Temp     Pulse 80 07/24/25 10:54   Resp     SpO2 95 % 07/24/25 10:54   Vitals shown include unfiled device data.        Post Anesthesia Care and Evaluation    Patient location during evaluation: bedside  Patient participation: complete - patient participated  Level of consciousness: awake  Pain management: adequate    Airway patency: patent  PONV Status: none  Cardiovascular status: acceptable and stable  Respiratory status: acceptable  Hydration status: acceptable

## 2025-07-25 LAB
LAB AP CASE REPORT: NORMAL
LAB AP CLINICAL INFORMATION: NORMAL
PATH REPORT.FINAL DX SPEC: NORMAL
PATH REPORT.GROSS SPEC: NORMAL

## 2025-07-25 NOTE — TELEPHONE ENCOUNTER
2nd call - CALLED PT TO SCHEDULE 1 MONTH F/U W/ ABBEY PRIOR PER DA.    SPOKE W/ PATIENT AND SCHEDULED APPT

## 2025-08-04 LAB
CALCIUM OXALATE DIHYDRATE MFR STONE IR: 40 %
COLOR STONE: NORMAL
COM MFR STONE: 60 %
SIZE STONE: NORMAL MM
SPEC SOURCE SUBJ: NORMAL
WT STONE: 37 MG

## 2025-08-13 ENCOUNTER — APPOINTMENT (OUTPATIENT)
Dept: CT IMAGING | Facility: HOSPITAL | Age: 85
DRG: 871 | End: 2025-08-13
Payer: MEDICARE

## 2025-08-13 ENCOUNTER — APPOINTMENT (OUTPATIENT)
Dept: GENERAL RADIOLOGY | Facility: HOSPITAL | Age: 85
DRG: 871 | End: 2025-08-13
Payer: MEDICARE

## 2025-08-13 ENCOUNTER — HOSPITAL ENCOUNTER (INPATIENT)
Facility: HOSPITAL | Age: 85
LOS: 2 days | Discharge: HOME | DRG: 871 | End: 2025-08-15
Attending: EMERGENCY MEDICINE | Admitting: INTERNAL MEDICINE
Payer: MEDICARE

## 2025-08-13 ENCOUNTER — HOSPITAL ENCOUNTER (OUTPATIENT)
Dept: ULTRASOUND IMAGING | Facility: HOSPITAL | Age: 85
Discharge: HOME | End: 2025-08-13
Admitting: UROLOGY
Payer: MEDICARE

## 2025-08-13 DIAGNOSIS — N20.0 NEPHROLITHIASIS: ICD-10-CM

## 2025-08-13 PROBLEM — A41.9 SEPSIS: Status: ACTIVE | Noted: 2025-08-13

## 2025-08-13 PROCEDURE — 76775 US EXAM ABDO BACK WALL LIM: CPT

## 2025-08-15 ENCOUNTER — READMISSION MANAGEMENT (OUTPATIENT)
Dept: CALL CENTER | Facility: HOSPITAL | Age: 85
End: 2025-08-15
Payer: MEDICARE

## 2025-08-19 ENCOUNTER — OFFICE VISIT (OUTPATIENT)
Dept: UROLOGY | Age: 85
End: 2025-08-19
Payer: MEDICARE

## 2025-08-19 VITALS — WEIGHT: 149 LBS | HEIGHT: 67 IN | BODY MASS INDEX: 23.39 KG/M2

## 2025-08-19 DIAGNOSIS — N39.0 COMPLICATED UTI (URINARY TRACT INFECTION): ICD-10-CM

## 2025-08-19 DIAGNOSIS — R97.20 ELEVATED PSA: ICD-10-CM

## 2025-08-19 DIAGNOSIS — N20.0 NEPHROLITHIASIS: Primary | ICD-10-CM

## 2025-08-19 PROCEDURE — 1159F MED LIST DOCD IN RCRD: CPT | Performed by: UROLOGY

## 2025-08-19 PROCEDURE — G2211 COMPLEX E/M VISIT ADD ON: HCPCS | Performed by: UROLOGY

## 2025-08-19 PROCEDURE — 1160F RVW MEDS BY RX/DR IN RCRD: CPT | Performed by: UROLOGY

## 2025-08-19 PROCEDURE — 99214 OFFICE O/P EST MOD 30 MIN: CPT | Performed by: UROLOGY

## 2025-08-21 ENCOUNTER — READMISSION MANAGEMENT (OUTPATIENT)
Dept: CALL CENTER | Facility: HOSPITAL | Age: 85
End: 2025-08-21
Payer: MEDICARE

## 2025-08-27 ENCOUNTER — READMISSION MANAGEMENT (OUTPATIENT)
Dept: CALL CENTER | Facility: HOSPITAL | Age: 85
End: 2025-08-27
Payer: MEDICARE

## (undated) DEVICE — REPROCESSING ACCESSORIES KIT

## (undated) DEVICE — LINER SURG CANSTR SXN S/RIGD 1500CC

## (undated) DEVICE — NITINOL STONE RETRIEVAL BASKET: Brand: ESCAPE

## (undated) DEVICE — GLOVE,SURG,SENSICARE SLT,LF,PF,8: Brand: MEDLINE

## (undated) DEVICE — GLV SURG PROTEXIS SYNTH PI LF PF 6.5

## (undated) DEVICE — SINGLE USE BIOPSY VALVE MAJ-210: Brand: SINGLE USE BIOPSY VALVE (STERILE)

## (undated) DEVICE — CATH OMNI FLUSH 5FR

## (undated) DEVICE — SYS IRR PUMP SGL ACTN VAC SYR 10CC

## (undated) DEVICE — SUT VIC 2/0 SH 27IN

## (undated) DEVICE — SPNG GZ STRL 2S 4X4 16PLY

## (undated) DEVICE — BLCK/BITE BLOX WO/DENTL/RIM W/STRAP 54F

## (undated) DEVICE — THE STERILE LIGHT HANDLE COVER IS USED WITH STERIS SURGICAL LIGHTING AND VISUALIZATION SYSTEMS.

## (undated) DEVICE — RADIFOCUS GLIDEWIRE: Brand: GLIDEWIRE

## (undated) DEVICE — SKIN PREP TRAY W/CHG: Brand: MEDLINE INDUSTRIES, INC.

## (undated) DEVICE — REPROCESSING COVER

## (undated) DEVICE — NAVICROSS SUPPORT CATHETER: Brand: NAVICROSS

## (undated) DEVICE — GW ZIPWIRE STD/SHFT STR TPR/3CM .038IN 150CM

## (undated) DEVICE — Device: Brand: BALLOON

## (undated) DEVICE — GLV SURG DERMAPRENE LF PF SZ7.5

## (undated) DEVICE — TUBING, SUCTION, 1/4" X 10', STRAIGHT: Brand: MEDLINE

## (undated) DEVICE — NDL HYPO ECLPS SFTY 22G 1 1/2IN

## (undated) DEVICE — DEV ATOMIZATION MUCOSAL/NASALTRACH

## (undated) DEVICE — CYSTO PACK: Brand: MEDLINE INDUSTRIES, INC.

## (undated) DEVICE — SOLIDIFIER LIQLOC PLS 1500CC BT

## (undated) DEVICE — DRSNG TELFA PAD NONADH STR 1S 3X4IN

## (undated) DEVICE — SINGLE USE SUCTION VALVE MAJ-209: Brand: SINGLE USE SUCTION VALVE (STERILE)

## (undated) DEVICE — INTRO SHEATH PRELUDE PRO MARKRTP GW/0.035MM 5F 11CM

## (undated) DEVICE — GLV SURG PROTEXIS PI BLU NEUTHERA PF 6

## (undated) DEVICE — CATHETER GUIDE

## (undated) DEVICE — DESTINATION RENAL GUIDING SHEATH: Brand: DESTINATION

## (undated) DEVICE — VISION PROBE: Brand: ION

## (undated) DEVICE — FRCP BIOP CAPTURA BRONCH 1.8 110CM BLU

## (undated) DEVICE — BNDG ELAS ECON W/CLIP 4IN 5YD LF STRL

## (undated) DEVICE — SET, IRRIGATION CYSTO, Y-TYPE, 81": Brand: MEDLINE

## (undated) DEVICE — Device

## (undated) DEVICE — CATH 2L URETRL HC 6F 50CM

## (undated) DEVICE — GW STARTER JB STR .035 15X180CM

## (undated) DEVICE — REPROCESSING CONSUMABLES KIT

## (undated) DEVICE — GLV SURG BIOGEL LTX PF 7 1/2

## (undated) DEVICE — BNDG RL GZ BULKEE2 4.5IN 4.1YD STRL

## (undated) DEVICE — BIOPSY NEEDLE, 21G: Brand: FLEXISION

## (undated) DEVICE — PCH INST SURG INVISISHIELD 2PCKT

## (undated) DEVICE — CATHETER: Brand: ION

## (undated) DEVICE — VISION PROBE ADAPTER AND SUCTION ADAPTER

## (undated) DEVICE — DEV TORQ OLCOTT

## (undated) DEVICE — BNDG ELAS MATRX V/CLS 6INX10YD LF

## (undated) DEVICE — TRY PREP SCRB VAG

## (undated) DEVICE — SENSOR CONNECTION CLEANER

## (undated) DEVICE — INTRO SHEATH PRELUDE/PRO .035 5F 11X50CM GRY LF

## (undated) DEVICE — BASIC SINGLE BASIN-LF: Brand: MEDLINE INDUSTRIES, INC.

## (undated) DEVICE — BLD SCLPL RIBBACK C/SS SZ15

## (undated) DEVICE — GOWN ISOL OVR/HD TIE THMB/LP/CUF PE XLG BLU

## (undated) DEVICE — KT INTRO MIC VSI SMOTH STFF 4F 40CM 7CM

## (undated) DEVICE — CONTAINER,SPEC,PNEUM TUBE,4OZ,STRL PATH: Brand: MEDLINE

## (undated) DEVICE — FIBR LASR MOSES 200 DFL 2J 80HZ

## (undated) DEVICE — PK EXTREM CUST HAR

## (undated) DEVICE — SUT ETHLN 3-0 FS118IN 663H

## (undated) DEVICE — GLV SURG PROTEXIS PI BLU NEUTHERA PF 6.5

## (undated) DEVICE — ADAPT UROLOK

## (undated) DEVICE — BALN EVERCROSS OTW .035 5F 6X60MM 80CM

## (undated) DEVICE — BALN EVERCROSS OTW .035 6F 8X40 80

## (undated) DEVICE — CUP SPECI 4OZ LF STRL

## (undated) DEVICE — DISPOSABLE BIOPSY FORCEPS: Brand: DISPOSABLE BIOPSY FORCEPS

## (undated) DEVICE — TOWEL,OR,DSP,ST,BLUE,STD,4/PK,20PK/CS: Brand: MEDLINE

## (undated) DEVICE — DEV INFL BASIXTOUCH ANALOG POLYCARBONATE 35ATM 30ML

## (undated) DEVICE — TRAP,MUCUS SPECIMEN,40CC: Brand: MEDLINE

## (undated) DEVICE — GW PTFE FIX/CORE STFF STR .038 3X150CM

## (undated) DEVICE — STPCK 1WY SPINLOCK FML LL NONDEHP LF .20ML

## (undated) DEVICE — SWIVEL CONNECTOR